# Patient Record
Sex: MALE | Race: WHITE | Employment: UNEMPLOYED | URBAN - METROPOLITAN AREA
[De-identification: names, ages, dates, MRNs, and addresses within clinical notes are randomized per-mention and may not be internally consistent; named-entity substitution may affect disease eponyms.]

---

## 2017-10-28 PROBLEM — F31.2 SEVERE MANIC BIPOLAR 1 DISORDER WITH PSYCHOTIC BEHAVIOR (HCC): Status: ACTIVE | Noted: 2017-10-28

## 2017-11-07 PROBLEM — F12.20 CANNABIS USE DISORDER, SEVERE, DEPENDENCE (HCC): Chronic | Status: ACTIVE | Noted: 2017-11-07

## 2018-03-01 PROBLEM — F31.9 BIPOLAR 1 DISORDER (HCC): Status: ACTIVE | Noted: 2018-03-01

## 2018-03-02 PROBLEM — F31.60 BIPOLAR AFFECTIVE DISORDER, CURRENT EPISODE MIXED, WITHOUT PSYCHOTIC FEATURES (HCC): Status: ACTIVE | Noted: 2018-03-02

## 2019-05-17 ENCOUNTER — HOSPITAL ENCOUNTER (EMERGENCY)
Age: 24
Discharge: PSYCHIATRIC OR SUBSTANCE USE FACILITY | End: 2019-05-17
Attending: EMERGENCY MEDICINE
Payer: MEDICAID

## 2019-05-17 VITALS
HEIGHT: 72 IN | SYSTOLIC BLOOD PRESSURE: 134 MMHG | WEIGHT: 173 LBS | RESPIRATION RATE: 16 BRPM | BODY MASS INDEX: 23.43 KG/M2 | TEMPERATURE: 97.9 F | OXYGEN SATURATION: 98 % | DIASTOLIC BLOOD PRESSURE: 79 MMHG | HEART RATE: 66 BPM

## 2019-05-17 DIAGNOSIS — F29 PSYCHOSIS, UNSPECIFIED PSYCHOSIS TYPE (HCC): Primary | ICD-10-CM

## 2019-05-17 LAB
ACETAMINOPHEN LEVEL: <5 MCG/ML (ref 10–30)
ALBUMIN SERPL-MCNC: 4.5 G/DL (ref 3.5–5.2)
ALP BLD-CCNC: 161 U/L (ref 40–129)
ALT SERPL-CCNC: 15 U/L (ref 0–40)
AMPHETAMINE SCREEN, URINE: NOT DETECTED
ANION GAP SERPL CALCULATED.3IONS-SCNC: 13 MMOL/L (ref 7–16)
AST SERPL-CCNC: 27 U/L (ref 0–39)
BARBITURATE SCREEN URINE: NOT DETECTED
BASOPHILS ABSOLUTE: 0.07 E9/L (ref 0–0.2)
BASOPHILS RELATIVE PERCENT: 0.4 % (ref 0–2)
BENZODIAZEPINE SCREEN, URINE: POSITIVE
BILIRUB SERPL-MCNC: 0.3 MG/DL (ref 0–1.2)
BUN BLDV-MCNC: 17 MG/DL (ref 6–20)
CALCIUM SERPL-MCNC: 10.1 MG/DL (ref 8.6–10.2)
CANNABINOID SCREEN URINE: POSITIVE
CHLORIDE BLD-SCNC: 100 MMOL/L (ref 98–107)
CO2: 27 MMOL/L (ref 22–29)
COCAINE METABOLITE SCREEN URINE: NOT DETECTED
CREAT SERPL-MCNC: 1.2 MG/DL (ref 0.7–1.2)
EOSINOPHILS ABSOLUTE: 0.34 E9/L (ref 0.05–0.5)
EOSINOPHILS RELATIVE PERCENT: 2.1 % (ref 0–6)
ETHANOL: <10 MG/DL (ref 0–0.08)
GFR AFRICAN AMERICAN: >60
GFR NON-AFRICAN AMERICAN: >60 ML/MIN/1.73
GLUCOSE BLD-MCNC: 82 MG/DL (ref 74–99)
HCT VFR BLD CALC: 45.6 % (ref 37–54)
HEMOGLOBIN: 16.1 G/DL (ref 12.5–16.5)
IMMATURE GRANULOCYTES #: 0.11 E9/L
IMMATURE GRANULOCYTES %: 0.7 % (ref 0–5)
LYMPHOCYTES ABSOLUTE: 2.13 E9/L (ref 1.5–4)
LYMPHOCYTES RELATIVE PERCENT: 13 % (ref 20–42)
MCH RBC QN AUTO: 31.3 PG (ref 26–35)
MCHC RBC AUTO-ENTMCNC: 35.3 % (ref 32–34.5)
MCV RBC AUTO: 88.7 FL (ref 80–99.9)
METHADONE SCREEN, URINE: NOT DETECTED
MONOCYTES ABSOLUTE: 1.18 E9/L (ref 0.1–0.95)
MONOCYTES RELATIVE PERCENT: 7.2 % (ref 2–12)
NEUTROPHILS ABSOLUTE: 12.51 E9/L (ref 1.8–7.3)
NEUTROPHILS RELATIVE PERCENT: 76.6 % (ref 43–80)
OPIATE SCREEN URINE: NOT DETECTED
PDW BLD-RTO: 12.5 FL (ref 11.5–15)
PHENCYCLIDINE SCREEN URINE: NOT DETECTED
PLATELET # BLD: 254 E9/L (ref 130–450)
PMV BLD AUTO: 11.3 FL (ref 7–12)
POTASSIUM SERPL-SCNC: 4 MMOL/L (ref 3.5–5)
PROPOXYPHENE SCREEN: NOT DETECTED
RBC # BLD: 5.14 E12/L (ref 3.8–5.8)
SALICYLATE, SERUM: <0.3 MG/DL (ref 0–30)
SODIUM BLD-SCNC: 140 MMOL/L (ref 132–146)
TOTAL PROTEIN: 7.6 G/DL (ref 6.4–8.3)
TRICYCLIC ANTIDEPRESSANTS SCREEN SERUM: NEGATIVE NG/ML
WBC # BLD: 16.3 E9/L (ref 4.5–11.5)

## 2019-05-17 PROCEDURE — G0480 DRUG TEST DEF 1-7 CLASSES: HCPCS

## 2019-05-17 PROCEDURE — 80053 COMPREHEN METABOLIC PANEL: CPT

## 2019-05-17 PROCEDURE — 80307 DRUG TEST PRSMV CHEM ANLYZR: CPT

## 2019-05-17 PROCEDURE — 99285 EMERGENCY DEPT VISIT HI MDM: CPT

## 2019-05-17 PROCEDURE — 36415 COLL VENOUS BLD VENIPUNCTURE: CPT

## 2019-05-17 PROCEDURE — 85025 COMPLETE CBC W/AUTO DIFF WBC: CPT

## 2019-05-17 NOTE — ED NOTES
Pt placed on cardiac monitor, NSR on monitor     Berta Jacobo, [facility]  05/17/19 0040

## 2019-05-17 NOTE — ED NOTES
Pt awake resting quietly in bed, pt calm/cooperative with nursing care.  No distress noted continue to monitor     María Welch RN  05/17/19 7500

## 2019-05-17 NOTE — ED NOTES
Pt awake. hosp gown and pants put on pt but refuses to remove his necklace and underware. md aware no new  Orders.         Dixon Reyez RN  05/17/19 8201

## 2019-05-17 NOTE — ED NOTES
Nurse to nurse report called to [facility] at [facility].       Alondra Gutierrez RN  05/17/19 4131

## 2019-05-17 NOTE — ED NOTES
[facility] ambulance is here to transport pt to [facility].       Brittany Kenny RN  05/17/19 9040

## 2019-05-17 NOTE — ED NOTES
Pt sleeping easily awaken with verbal stimuli. this rn asked pt if she could draw his blood . Pt declined becoming agitated, hostile,  excessively loud yelling at staff, [employer] and md elmore called to bedside. md spoke to pt regarding the importance of lab work and specimen needed to be collected. Pt then became cooperative with the collection of lab draw. Labs drawn/sent. Pt continue to refuse to apply cardiac monitor. md aware no new orders.      Murray Recinos RN  05/17/19 6385

## 2019-05-17 NOTE — ED NOTES
[facility] is aware that Pt needs assessed.     Pt has been medicated    Pt will be assessed after Pt is alert

## 2019-05-17 NOTE — ED NOTES
DR. Hardy Edwards PT TO [facility] ROOM 203B    MED TRANS [facility] OF [facility] ARRANGED TRANSPORT WITH [facility] AT [address] [facility]    [address] [place] [address]  05/17/19 3203

## 2019-05-17 NOTE — ED PROVIDER NOTES
HPI:  5/17/19, Time: 1:25 AM         Antonia Weathers is a 21 y.o. male presenting to the ED for psychiatric evaluation. Patient became very combative at home. As an extensive psychiatric history. Family is concerned he is not taking his medications. Please say he was operating them with nonsense. They did pink slip him. He did receive Haldol and Versed prior to arrival. Currently, patient has no complaints. Denies fever, chills, nausea, vomiting, or any other symptoms or complaints. Review of Systems:   Pertinent positives and negatives are stated within HPI, all other systems reviewed and are negative.          --------------------------------------------- PAST HISTORY ---------------------------------------------  Past Medical History:  has a past medical history of Bipolar 1 disorder ([facility] Depression, Hypertension, and Severe manic bipolar 1 disorder with psychotic behavior ([facility] Past Surgical History:  has no past surgical history on file. Social History:  reports that he has been smoking cigarettes. He has been smoking about 0.50 packs per day. He has never used smokeless tobacco. He reports that he does not drink alcohol or use drugs. Family History: family history is not on file. The patients home medications have been reviewed.     Allergies: Haldol [haloperidol]    -------------------------------------------------- RESULTS -------------------------------------------------  All laboratory and radiology results have been personally reviewed by myself   LABS:  Results for orders placed or performed during the hospital encounter of 05/17/19   Comprehensive Metabolic Panel   Result Value Ref Range    Sodium 140 132 - 146 mmol/L    Potassium 4.0 3.5 - 5.0 mmol/L    Chloride 100 98 - 107 mmol/L    CO2 27 22 - 29 mmol/L    Anion Gap 13 7 - 16 mmol/L    Glucose 82 74 - 99 mg/dL    BUN 17 6 - 20 mg/dL    CREATININE 1.2 0.7 - 1.2 mg/dL    GFR Non-African American >60 >=60 mL/min/1.73    GFR African American >60     Calcium 10.1 8.6 - 10.2 mg/dL    Total Protein 7.6 6.4 - 8.3 g/dL    Alb 4.5 3.5 - 5.2 g/dL    Total Bilirubin 0.3 0.0 - 1.2 mg/dL    Alkaline Phosphatase 161 (H) 40 - 129 U/L    ALT 15 0 - 40 U/L    AST 27 0 - 39 U/L   CBC Auto Differential   Result Value Ref Range    WBC 16.3 (H) 4.5 - 11.5 E9/L    RBC 5.14 3.80 - 5.80 E12/L    Hemoglobin 16.1 12.5 - 16.5 g/dL    Hematocrit 45.6 37.0 - 54.0 %    MCV 88.7 80.0 - 99.9 fL    MCH 31.3 26.0 - 35.0 pg    MCHC 35.3 (H) 32.0 - 34.5 %    RDW 12.5 11.5 - 15.0 fL    Platelets 974 141 - 880 E9/L    MPV 11.3 7.0 - 12.0 fL    Neutrophils % 76.6 43.0 - 80.0 %    Immature Granulocytes % 0.7 0.0 - 5.0 %    Lymphocytes % 13.0 (L) 20.0 - 42.0 %    Monocytes % 7.2 2.0 - 12.0 %    Eosinophils % 2.1 0.0 - 6.0 %    Basophils % 0.4 0.0 - 2.0 %    Neutrophils # 12.51 (H) 1.80 - 7.30 E9/L    Immature Granulocytes # 0.11 E9/L    Lymphocytes # 2.13 1.50 - 4.00 E9/L    Monocytes # 1.18 (H) 0.10 - 0.95 E9/L    Eosinophils # 0.34 0.05 - 0.50 E9/L    Basophils # 0.07 0.00 - 0.20 E9/L   Serum Drug Screen   Result Value Ref Range    Ethanol Lvl <10 mg/dL    Acetaminophen Level <5.0 (L) 10.0 - 78.3 mcg/mL    Salicylate, Serum <0.2 0.0 - 30.0 mg/dL    TCA Scrn NEGATIVE Cutoff:300 ng/mL       RADIOLOGY:  Interpreted by Radiologist.  No orders to display       ------------------------- NURSING NOTES AND VITALS REVIEWED ---------------------------   The nursing notes within the ED encounter and vital signs as below have been reviewed.    /70   Pulse 97   Temp 96.5 °F (35.8 °C) (Temporal)   Resp 16   Ht 6' (1.829 m)   Wt 173 lb (78.5 kg)   SpO2 95%   BMI 23.46 kg/m²   Oxygen Saturation Interpretation: Normal      ---------------------------------------------------PHYSICAL EXAM--------------------------------------      Constitutional/General: Alert and oriented x3, well appearing, non toxic in NAD  Head: Normocephalic and atraumatic  Eyes: PERRL, EOMI  Mouth: Oropharynx clear, handling secretions, no trismus  Neck: Supple, full ROM,   Pulmonary: Lungs clear to auscultation bilaterally, no wheezes, rales, or rhonchi. Not in respiratory distress  Cardiovascular:  Regular rate and rhythm, no murmurs, gallops, or rubs. 2+ distal pulses  Abdomen: Soft, non tender, non distended,   Extremities: Moves all extremities x 4. Warm and well perfused  Skin: warm and dry without rash  Neurologic: GCS 15,  Psych: Normal Affect      ------------------------------ ED COURSE/MEDICAL DECISION MAKING----------------------  Medications - No data to display      ED COURSE:       Medical Decision Making:    Medically clear     Counseling: The emergency provider has spoken with the patient and discussed todays results, in addition to providing specific details for the plan of care and counseling regarding the diagnosis and prognosis. Questions are answered at this time and they are agreeable with the plan.      --------------------------------- IMPRESSION AND DISPOSITION ---------------------------------    IMPRESSION  1. Psychosis, unspecified psychosis type ([facility]        DISPOSITION  Disposition: Admit to mental health unit - medically cleared for admission  Patient condition is stable      NOTE: This report was transcribed using voice recognition software.  Every effort was made to ensure accuracy; however, inadvertent computerized transcription errors may be present        Sri Bingham MD  05/17/19 9958

## 2019-05-17 NOTE — ED NOTES
PT WAS REFERRED TO THE ACCESS CENTER BUT NO REFERRALS HAVE BEEN MADE, ACCORDING TO THEIR NOTATION.     I REFERRED PT TO [facility]    AWAITING [address] DIONICIO Garcia  05/17/19 1100

## 2019-05-23 LAB
7-AMINOCLONAZEPAM, URINE: <5 NG/ML
ALPHA-HYDROXYALPRAZOLAM, URINE: <5 NG/ML
ALPHA-HYDROXYMIDAZOLAM, URINE: >4000 NG/ML
ALPRAZOLAM, URINE: <5 NG/ML
CANNABINOIDS CONF, URINE: 240 NG/ML
CHLORDIAZEPOXIDE, URINE: <20 NG/ML
CLONAZEPAM, URINE: <5 NG/ML
DIAZEPAM, URINE: <20 NG/ML
LORAZEPAM, URINE: <20 NG/ML
MIDAZOLAM, URINE: <20 NG/ML
NORDIAZEPAM, URINE: <20 NG/ML
OXAZEPAM, URINE: <20 NG/ML
TEMAZEPAM, URINE: <20 NG/ML

## 2019-05-30 ENCOUNTER — HOSPITAL ENCOUNTER (INPATIENT)
Age: 24
LOS: 8 days | Discharge: HOME | DRG: 750 | End: 2019-06-07
Attending: EMERGENCY MEDICINE | Admitting: PSYCHIATRY & NEUROLOGY
Payer: MEDICAID

## 2019-05-30 DIAGNOSIS — F29 PSYCHOSIS, UNSPECIFIED PSYCHOSIS TYPE (HCC): Primary | ICD-10-CM

## 2019-05-30 LAB
ACETAMINOPHEN LEVEL: <5 MCG/ML (ref 10–30)
AMPHETAMINE SCREEN, URINE: NOT DETECTED
ANION GAP SERPL CALCULATED.3IONS-SCNC: 14 MMOL/L (ref 7–16)
BARBITURATE SCREEN URINE: NOT DETECTED
BASOPHILS ABSOLUTE: 0.06 E9/L (ref 0–0.2)
BASOPHILS RELATIVE PERCENT: 0.4 % (ref 0–2)
BENZODIAZEPINE SCREEN, URINE: NOT DETECTED
BUN BLDV-MCNC: 18 MG/DL (ref 6–20)
CALCIUM SERPL-MCNC: 10.6 MG/DL (ref 8.6–10.2)
CANNABINOID SCREEN URINE: POSITIVE
CHLORIDE BLD-SCNC: 101 MMOL/L (ref 98–107)
CO2: 27 MMOL/L (ref 22–29)
COCAINE METABOLITE SCREEN URINE: NOT DETECTED
CREAT SERPL-MCNC: 0.9 MG/DL (ref 0.7–1.2)
EOSINOPHILS ABSOLUTE: 0.29 E9/L (ref 0.05–0.5)
EOSINOPHILS RELATIVE PERCENT: 2.1 % (ref 0–6)
ETHANOL: <10 MG/DL (ref 0–0.08)
GFR AFRICAN AMERICAN: >60
GFR NON-AFRICAN AMERICAN: >60 ML/MIN/1.73
GLUCOSE BLD-MCNC: 80 MG/DL (ref 74–99)
HCT VFR BLD CALC: 49.7 % (ref 37–54)
HEMOGLOBIN: 16.8 G/DL (ref 12.5–16.5)
IMMATURE GRANULOCYTES #: 0.1 E9/L
IMMATURE GRANULOCYTES %: 0.7 % (ref 0–5)
LYMPHOCYTES ABSOLUTE: 1.58 E9/L (ref 1.5–4)
LYMPHOCYTES RELATIVE PERCENT: 11.3 % (ref 20–42)
MCH RBC QN AUTO: 30.4 PG (ref 26–35)
MCHC RBC AUTO-ENTMCNC: 33.8 % (ref 32–34.5)
MCV RBC AUTO: 90 FL (ref 80–99.9)
METHADONE SCREEN, URINE: NOT DETECTED
MONOCYTES ABSOLUTE: 0.86 E9/L (ref 0.1–0.95)
MONOCYTES RELATIVE PERCENT: 6.1 % (ref 2–12)
NEUTROPHILS ABSOLUTE: 11.14 E9/L (ref 1.8–7.3)
NEUTROPHILS RELATIVE PERCENT: 79.4 % (ref 43–80)
OPIATE SCREEN URINE: NOT DETECTED
PDW BLD-RTO: 12.8 FL (ref 11.5–15)
PHENCYCLIDINE SCREEN URINE: NOT DETECTED
PLATELET # BLD: 242 E9/L (ref 130–450)
PMV BLD AUTO: 10.4 FL (ref 7–12)
POTASSIUM SERPL-SCNC: 4.5 MMOL/L (ref 3.5–5)
PROPOXYPHENE SCREEN: NOT DETECTED
RBC # BLD: 5.52 E12/L (ref 3.8–5.8)
SALICYLATE, SERUM: <0.3 MG/DL (ref 0–30)
SODIUM BLD-SCNC: 142 MMOL/L (ref 132–146)
TRICYCLIC ANTIDEPRESSANTS SCREEN SERUM: NEGATIVE NG/ML
TSH SERPL DL<=0.05 MIU/L-ACNC: 2.96 UIU/ML (ref 0.27–4.2)
WBC # BLD: 14 E9/L (ref 4.5–11.5)

## 2019-05-30 PROCEDURE — 80048 BASIC METABOLIC PNL TOTAL CA: CPT

## 2019-05-30 PROCEDURE — 1240000000 HC EMOTIONAL WELLNESS R&B

## 2019-05-30 PROCEDURE — 80307 DRUG TEST PRSMV CHEM ANLYZR: CPT

## 2019-05-30 PROCEDURE — 93005 ELECTROCARDIOGRAM TRACING: CPT | Performed by: INTERNAL MEDICINE

## 2019-05-30 PROCEDURE — G0480 DRUG TEST DEF 1-7 CLASSES: HCPCS

## 2019-05-30 PROCEDURE — 6360000002 HC RX W HCPCS: Performed by: INTERNAL MEDICINE

## 2019-05-30 PROCEDURE — 36415 COLL VENOUS BLD VENIPUNCTURE: CPT

## 2019-05-30 PROCEDURE — 99285 EMERGENCY DEPT VISIT HI MDM: CPT

## 2019-05-30 PROCEDURE — 84443 ASSAY THYROID STIM HORMONE: CPT

## 2019-05-30 PROCEDURE — 85025 COMPLETE CBC W/AUTO DIFF WBC: CPT

## 2019-05-30 PROCEDURE — 6360000002 HC RX W HCPCS: Performed by: EMERGENCY MEDICINE

## 2019-05-30 RX ORDER — ZIPRASIDONE MESYLATE 20 MG/ML
10 INJECTION, POWDER, LYOPHILIZED, FOR SOLUTION INTRAMUSCULAR ONCE
Status: COMPLETED | OUTPATIENT
Start: 2019-05-30 | End: 2019-05-30

## 2019-05-30 RX ORDER — MIDAZOLAM HYDROCHLORIDE 1 MG/ML
2 INJECTION INTRAMUSCULAR; INTRAVENOUS ONCE
Status: DISCONTINUED | OUTPATIENT
Start: 2019-05-30 | End: 2019-05-30

## 2019-05-30 RX ORDER — QUETIAPINE FUMARATE 200 MG/1
400 TABLET, FILM COATED ORAL DAILY
Status: ON HOLD | COMMUNITY
End: 2019-06-07 | Stop reason: HOSPADM

## 2019-05-30 RX ORDER — MIDAZOLAM HYDROCHLORIDE 1 MG/ML
2 INJECTION INTRAMUSCULAR; INTRAVENOUS ONCE
Status: COMPLETED | OUTPATIENT
Start: 2019-05-30 | End: 2019-05-30

## 2019-05-30 RX ADMIN — ZIPRASIDONE MESYLATE 10 MG: 20 INJECTION, POWDER, LYOPHILIZED, FOR SOLUTION INTRAMUSCULAR at 19:38

## 2019-05-30 RX ADMIN — MIDAZOLAM 2 MG: 1 INJECTION INTRAMUSCULAR; INTRAVENOUS at 17:01
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
## 2019-05-30 NOTE — ED NOTES
Notified dr Terra Valenzuela of need for admission pt accepted to [address] [place] [address].        Santos Houser, RN  05/30/19 6051

## 2019-05-30 NOTE — ED NOTES
Pt asked this rn if I like running over speed bumps or super heroes then he laughs he begins talking about vampires and how he is a vegan then states well no I'm gonna do meat shows this rn his pointed teeth.      Carlos Carrasco RN  05/30/19 0300

## 2019-05-30 NOTE — ED PROVIDER NOTES
HPI:  5/30/19, Time: 3:55 PM         Jose Alejandro Sharp is a 21 y.o. male presenting to the ED for irrational behavior, beginning several hours ago. The complaint has been constant, severe in severity, and worsened by nothing. Patient was brought in by the [facility] police department after he was found acting irrationally. He denies any suicidal and homicidal ideations and denies any ingestion of any substances. Patient denies any shortness of breath, cough, chest pain, headaches, hallucinations: visual or auditory,nausea, vomiting or diarrhea. Patient has a history of psychiatric illness but notes that he has not been taking his medications as prescribed. He as pink-slipped by the police. Patient denies any suicidal or homicidal ideations. Review of Systems:   Pertinent positives and negatives are stated within HPI, all other systems reviewed and are negative.      --------------------------------------------- PAST HISTORY ---------------------------------------------  Past Medical History:  has a past medical history of B[facility] disorder ([facility] Depression, Hypertension, and Severe manic bipolar 1 disorder with psychotic behavior ([facility] Past Surgical History:  has no past surgical history on file. Social History:  reports that he has been smoking cigarettes. He has been smoking about 0.50 packs per day. He has never used smokeless tobacco. He reports that he does not drink alcohol or use drugs. Family History: family history is not on file. The patients home medications have been reviewed. Allergies: Haldol [haloperidol]    -------------------------------------------------- RESULTS -------------------------------------------------  All laboratory and radiology results have been personally reviewed by myself   LABS:  No results found for this visit on 05/30/19.     RADIOLOGY:  Interpreted by Radiologist.  No orders to display       EKG:    ------------------------- NURSING NOTES AND VITALS REVIEWED ---------------------------   The nursing notes within the ED encounter and vital signs as below have been reviewed. BP (!) 155/122   Pulse 96   Temp 97.6 °F (36.4 °C) (Oral)   Resp 16   Wt 173 lb (78.5 kg)   SpO2 98%   BMI 23.46 kg/m²   Oxygen Saturation Interpretation: Normal      ---------------------------------------------------PHYSICAL EXAM--------------------------------------      Constitutional/General: Alert and oriented x 3, well appearing, non toxic in NAD, appears restless. Head: Normocephalic and atraumatic  Eyes: PERRL, EOMI  Mouth: Oropharynx clear, handling secretions  Neck: Supple, full ROM,   Pulmonary: Lungs clear to auscultation bilaterally, no wheezes, rales, or rhonchi. Not in respiratory distress  Cardiovascular:  Regular rate and rhythm, no murmurs, gallops, or rubs. 2+ distal pulses  Abdomen: Soft, non tender, non distended,   Extremities: Moves all extremities x 4. Warm and well perfused  Skin: warm and dry without rash  Neurologic: GCS 15,  Psych: Patient is inattentive, mood and affect is labile, speech is rapid and pressured, behavior is hyperactive, thought content is delusional,memory is intact, and judgement is inappropriate.     ------------------------------ ED COURSE/MEDICAL DECISION MAKING----------------------  Medications   midazolam (VERSED) injection 2 mg (has no administration in time range)         ED COURSE:   Patient brought to ED by police for agitation and irrational behavior and is pink slipped. Initial set of vitals are tachycardic with elevated blood pressure. Medical Decision Making:   Patient is redirectable, with pressured speech, will obtain new set of vitals, EKG, CBC, BMP,TSH, serum and urine drug screen. Patient for ED observation. Counseling:    The emergency provider has spoken with the patient and discussed todays results, in addition to providing specific details for the plan of care and counseling regarding the diagnosis and prognosis. Questions are answered at this time and they are agreeable with the plan.      --------------------------------- IMPRESSION AND DISPOSITION ---------------------------------    IMPRESSION  1. Psychosis, unspecified psychosis type ([facility]        DISPOSITION  Disposition: Other Disposition: ED Observation  Patient condition is fair      NOTE: This report was transcribed using voice recognition software. Every effort was made to ensure accuracy; however, inadvertent computerized transcription errors may be present       .      Zuleika Dupree MD  Resident  05/30/19 3171

## 2019-05-30 NOTE — ED NOTES
Bed: [facility]  Expected date:   Expected time:   Means of arrival:   Comments:     Christos Faith RN  05/30/19 4447

## 2019-05-30 NOTE — LETTER
[address]  Phone: [phone]          June 5, 2019     Patient: Nicolle Zhong   YOB: 1995   Date of Visit: 5/30/2019       To Whom it May Concern:    Nicolle Zhong has been hospitalized from 5/30/19. A discharge date is not know  If you have any questions or concerns, please don't hesitate to call.     Sincerely,               Holly Dimas MSW, LSW

## 2019-05-31 LAB
EKG ATRIAL RATE: 97 BPM
EKG P AXIS: 72 DEGREES
EKG P-R INTERVAL: 158 MS
EKG Q-T INTERVAL: 352 MS
EKG QRS DURATION: 90 MS
EKG QTC CALCULATION (BAZETT): 447 MS
EKG R AXIS: 78 DEGREES
EKG T AXIS: 54 DEGREES
EKG VENTRICULAR RATE: 97 BPM

## 2019-05-31 PROCEDURE — 1240000000 HC EMOTIONAL WELLNESS R&B

## 2019-05-31 PROCEDURE — 6370000000 HC RX 637 (ALT 250 FOR IP): Performed by: PSYCHIATRY & NEUROLOGY

## 2019-05-31 PROCEDURE — 99222 1ST HOSP IP/OBS MODERATE 55: CPT | Performed by: NURSE PRACTITIONER

## 2019-05-31 PROCEDURE — 93010 ELECTROCARDIOGRAM REPORT: CPT | Performed by: INTERNAL MEDICINE

## 2019-05-31 RX ORDER — OLANZAPINE 10 MG/1
10 INJECTION, POWDER, LYOPHILIZED, FOR SOLUTION INTRAMUSCULAR
Status: ACTIVE | OUTPATIENT
Start: 2019-05-31 | End: 2019-05-31

## 2019-05-31 RX ORDER — MAGNESIUM HYDROXIDE/ALUMINUM HYDROXICE/SIMETHICONE 120; 1200; 1200 MG/30ML; MG/30ML; MG/30ML
30 SUSPENSION ORAL PRN
Status: DISCONTINUED | OUTPATIENT
Start: 2019-05-31 | End: 2019-06-07 | Stop reason: HOSPADM

## 2019-05-31 RX ORDER — TRAZODONE HYDROCHLORIDE 50 MG/1
50 TABLET ORAL NIGHTLY PRN
Status: DISCONTINUED | OUTPATIENT
Start: 2019-05-31 | End: 2019-06-07 | Stop reason: HOSPADM

## 2019-05-31 RX ORDER — HYDROXYZINE PAMOATE 50 MG/1
50 CAPSULE ORAL EVERY 6 HOURS PRN
Status: DISCONTINUED | OUTPATIENT
Start: 2019-05-31 | End: 2019-06-07 | Stop reason: HOSPADM

## 2019-05-31 RX ORDER — NICOTINE 21 MG/24HR
1 PATCH, TRANSDERMAL 24 HOURS TRANSDERMAL DAILY
Status: DISCONTINUED | OUTPATIENT
Start: 2019-05-31 | End: 2019-06-07 | Stop reason: HOSPADM

## 2019-05-31 RX ORDER — BENZTROPINE MESYLATE 1 MG/ML
2 INJECTION INTRAMUSCULAR; INTRAVENOUS 2 TIMES DAILY PRN
Status: DISCONTINUED | OUTPATIENT
Start: 2019-05-31 | End: 2019-06-07 | Stop reason: HOSPADM

## 2019-05-31 RX ORDER — ACETAMINOPHEN 325 MG/1
650 TABLET ORAL EVERY 4 HOURS PRN
Status: DISCONTINUED | OUTPATIENT
Start: 2019-05-31 | End: 2019-06-07 | Stop reason: HOSPADM

## 2019-05-31 RX ORDER — OLANZAPINE 10 MG/1
10 TABLET ORAL
Status: COMPLETED | OUTPATIENT
Start: 2019-05-31 | End: 2019-05-31

## 2019-05-31 RX ORDER — PALIPERIDONE 3 MG/1
3 TABLET, EXTENDED RELEASE ORAL DAILY
Status: DISCONTINUED | OUTPATIENT
Start: 2019-05-31 | End: 2019-06-01

## 2019-05-31 RX ORDER — QUETIAPINE 200 MG/1
400 TABLET, FILM COATED, EXTENDED RELEASE ORAL NIGHTLY
Status: DISCONTINUED | OUTPATIENT
Start: 2019-05-31 | End: 2019-06-01

## 2019-05-31 RX ADMIN — HYDROXYZINE PAMOATE 50 MG: 50 CAPSULE ORAL at 17:41

## 2019-05-31 RX ADMIN — OLANZAPINE 10 MG: 10 TABLET, FILM COATED ORAL at 17:41

## 2019-05-31 ASSESSMENT — SLEEP AND FATIGUE QUESTIONNAIRES
SLEEP PATTERN: INSOMNIA
DIFFICULTY ARISING: NO
DO YOU HAVE DIFFICULTY SLEEPING: YES
AVERAGE NUMBER OF SLEEP HOURS: 2
RESTFUL SLEEP: NO
DIFFICULTY STAYING ASLEEP: YES
DO YOU USE A SLEEP AID: YES
DIFFICULTY FALLING ASLEEP: YES

## 2019-05-31 ASSESSMENT — LIFESTYLE VARIABLES: HISTORY_ALCOHOL_USE: NO

## 2019-05-31 NOTE — PROGRESS NOTES
Patient refuses his admission at this time states\" I am not in the mood for your formalities right now I just want to go to sleep. They gave me a lot medication\" . Patient also refused to sign paperwork will re attempt later in the shift.  Will continue to monitor and observe

## 2019-05-31 NOTE — PROGRESS NOTES
Attended community meeting, in and out jumping over chairs, rambling on and off. Inappropriate language. Shared goal for the day as to not take what Im allergic to.

## 2019-05-31 NOTE — H&P
PSYCHIATRIC EVALUATION  (HISTORY & PHYSICAL)     CHIEF COMPLAINT:   [x] Mood Problems [x] Anxiety Problems [x] Psychosis                    [x] Suicidal/Homicidal   [] Aggression  [] Other    HISTORY OF PRESENT ILLNESS: Shanti Ramsey  is a 21 y.o. male who has a previous psychiatric history of schizoaffective disorder, bipolar type and substance abuse presents for admission with psychosis. Symptoms onset was years ago and is becoming severe for an unknown amount of time. This presentation associates with disorganization, pressured speech, paranoia, delusions, and flight of ideas. Symptoms are constant and usually is worsened by medication noncompliance. Precipitating factor: Per ED note, patient was brought in by the [facility] police department after he was found acting irrationally.       Precipitating Factors:     [] Family Stress   [] Recent loss/grief Stress   [] Health Stress   [] Relationship Stress    [] Legal Stress   [x] Environmental Stress    [] Occupational Stress   [] Financial Stress   [x] Substance Abuse [] Other      PAST PSYCHIATRIC HISTORY:     History of psychiatric Hospitalization:    [] Denies    [x] yes  [] Days ago     []  Weeks Ago    [] Months ago  [] Years ago              [x] [facility]  [] [facility]  [] Other:        [] Once  [x] More than once    Outpatient treatment:  [] [facility]  [] [facility]  [] [facility]              [] [facility]  [] [employer]      [] [address] [place] [address] [] [facility]      [] [facility] [] CSN  [] [facility] [] [facility]  [] Other               [] currently  [] in the past  [x] Non-Compliant    [] Denies    Previous suicide attempt: [x]Denies                [] yes  [] OD  [] Cutting  [] Hanging  [] Gun  [] Other    Previous psych medications:  [x] Was prescribed               [] Currently Taking       [] Never taken medications      PAST MEDICAL HISTORY:       Diagnosis Date    B[facility] disorder ([facility]     Depression     Hypertension     Severe manic bipolar 1 disorder with psychotic behavior ([facility] 10/28/2017       FAMILY PSYCHIATRIC HISTORY:  History reviewed. No pertinent family history. [] Denies      [x] Endorses    [] Father     [] Depression  [] Anxiety  [] Bipolar  [] Psychosis  []  Other      [x] Mother    [] Depression  [] Anxiety  [x] Bipolar  [] Psychosis  []  Other      [] Sibling    [] Depression  [] Anxiety  [] Bipolar  [] Psychosis  []  Other      [] Grandparent    [] Depression  [] Anxiety  [] Bipolar  [] Psychosis  []  Other      SOCIAL HISTORY:     1. Living Situation:[x] Private Residence [] Homeless [] [email or web address]                                   [] Assisted Living [] [email or web address]  [] Shelter [] Other   2. Employment:  [] Yes  [x] No  3. Legal History: [x] No Arrest [] Arrest  [] Theft  []  Assault  [] Substances   4. History of Trama/ Abuse: [x] Denies  [] Emotional [] Physical [] Sexual   5. Spirituality: [x] Spiritual [] Not Spiritual   6. Substance Abuse: [] Denies  [] Drug of choice                                       [] Amphetamines [x] Marijuana [] Cocaine                                       [] Opioids  [] Alcohol  [] Benzodiazepines  For further SH review SW note.     Risk Assessment:  1. Risk Factors:   [] Depression  [] Anxiety  [x] Psychosis   [] Suicidal/Homicidal Thoughts [] Suicide Attempt [x] Substance Abuse      2. Protective Factors: [x] Controlled Environment                                          [] Supportive Family []                                          [] Mandaeism Support      3.  Level of Risk: [] Mild [] Moderate [x] Severe       Strengths & Weaknesses:     1. Strengths: [x] Ability to communicate feelings                          [x] Independent ADL's                           [] Supportive Family                          [] Current Health Status      2. Weaknesses: [x] Emotional                                [] Motivational     MEDICATIONS: Current Facility-Administered Medications: acetaminophen (TYLENOL) tablet 650 mg, 650 mg, Oral, Q4H PRN  hydrOXYzine (VISTARIL) capsule 50 mg, 50 mg, Oral, Q6H PRN  OLANZapine (ZYPREXA) tablet 10 mg, 10 mg, Oral, Once PRN  traZODone (DESYREL) tablet 50 mg, 50 mg, Oral, Nightly PRN  benztropine mesylate (COGENTIN) injection 2 mg, 2 mg, Intramuscular, BID PRN  magnesium hydroxide (MILK OF MAGNESIA) 400 MG/5ML suspension 30 mL, 30 mL, Oral, Daily PRN  aluminum & magnesium hydroxide-simethicone (MAALOX) 200-200-20 MG/5ML suspension 30 mL, 30 mL, Oral, PRN  nicotine (NICODERM CQ) 14 MG/24HR 1 patch, 1 patch, Transdermal, Daily  sterile water injection, , ,     Medical Review of Systems:     All other than marked systmes have been reviewed and are all negative.     Constitutional Symptoms: []  fever []  Chills  Skin Symptoms: [] rash []  Pruritus   Eye Symptoms: [] Vision unchanged []  recent vision problems[] blurred vision   Respiratory Symptoms:[] shortness of breath [] cough  Cardiovascular Symptoms:  [] chest pain   [] palpitations   Gastrointestinal Symptoms: []  abdominal pain []  nausea []  vomiting []  diarrhea  Genitourinary Symptoms: []  dysuria  []  hematuria   Musculoskeletal Symptoms: []  back pain []  muscle pain []  joint pain  Neurologic Symptoms: []  headache []  dizziness  Hematolymphoid Symptoms: [] Adenopathy [] Bruises   [] Schimosis       VITALS: BP (!) 157/88   Pulse 92   Temp 98 °F (36.7 °C) (Oral)   Resp 16   Wt 173 lb (78.5 kg)   SpO2 100%   BMI 23.46 kg/m²     ALLERGIES: Haldol [haloperidol]            Physical Examination:    Head:  [x] Atraumatic:  [x] normocephalic  Skin and Mucosa       [x] Moist [] Dry [] Pale [] Normal   Neck: [x] Thyroid [] Palpable    [x] Not palpable []  venus distention [] adenopathy   Chest: [x] Clear [] Rhonchi  [] Wheezing   CV: [x] S1 [x] S2 [] No murmer   Abdomen:  [x] Soft   [] Tender  [] Viceromegaly   Extremities:  [x] No Edema   [] Edema     Cranial Nerves Examination:     CN II: [x] Pupils are

## 2019-05-31 NOTE — PLAN OF CARE
Problem: Altered Mood, Psychotic Behavior:  Goal: Able to verbalize decrease in frequency and intensity of hallucinations  Description  Able to verbalize decrease in frequency and intensity of hallucinations  Outcome: Not Met This Shift
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
## 2019-05-31 NOTE — GROUP NOTE
Group Therapy Note    Date: May 31    Group Start Time: 1015  Group End Time: 1050  Group Topic: Psychoeducation    [facility]    Vicki Giron, CTRS        Group Therapy Note    Number of participants: 11  Type of group: Psychoeducation  Mode of intervention: Education, Support, Socialization, Exploration, Clarifying and Problem-solving  Topic: A Mindfulness Response to Thoughts: APPLE   Objective: Patient will identify ways to be mindful in recovery utilizing the APPLE acronym. Notes:  Patient came to group and participated in ice breaker activity. When handout was given to patient, patient was observed crumbling the paper up stating: \"I don not like apples\" Patient asked to leave group due to being disruptive.      Status After Intervention:  Unchanged    Participation Level: Minimal    Participation Quality: Resistant      Speech:  loud      Thought Process/Content: Flight of ideas      Affective Functioning: Constricted/Restricted      Mood: elevated      Level of consciousness:  Preoccupied      Response to Learning: Resistant      Endings: None Reported    Modes of Intervention: Limit-setting

## 2019-06-01 PROCEDURE — 99231 SBSQ HOSP IP/OBS SF/LOW 25: CPT | Performed by: NURSE PRACTITIONER

## 2019-06-01 PROCEDURE — 1240000000 HC EMOTIONAL WELLNESS R&B

## 2019-06-01 PROCEDURE — 6370000000 HC RX 637 (ALT 250 FOR IP): Performed by: PSYCHIATRY & NEUROLOGY

## 2019-06-01 PROCEDURE — 6370000000 HC RX 637 (ALT 250 FOR IP): Performed by: NURSE PRACTITIONER

## 2019-06-01 RX ORDER — QUETIAPINE 200 MG/1
400 TABLET, FILM COATED, EXTENDED RELEASE ORAL EVERY EVENING
Status: DISCONTINUED | OUTPATIENT
Start: 2019-06-01 | End: 2019-06-02

## 2019-06-01 RX ADMIN — HYDROXYZINE PAMOATE 50 MG: 50 CAPSULE ORAL at 21:01

## 2019-06-01 RX ADMIN — QUETIAPINE FUMARATE 400 MG: 200 TABLET, EXTENDED RELEASE ORAL at 20:59

## 2019-06-01 RX ADMIN — TRAZODONE HYDROCHLORIDE 50 MG: 50 TABLET ORAL at 21:00

## 2019-06-01 ASSESSMENT — PAIN SCALES - GENERAL: PAINLEVEL_OUTOF10: 0

## 2019-06-01 NOTE — PLAN OF CARE
Problem: Altered Mood, Psychotic Behavior:  Goal: Able to verbalize decrease in frequency and intensity of hallucinations  Description  Able to verbalize decrease in frequency and intensity of hallucinations  Outcome: Met This Shift     Problem: Altered Mood, Psychotic Behavior:  Goal: Able to verbalize reality based thinking  Description  Able to verbalize reality based thinking  Outcome: Ongoing   Up and about. Pleasant, in control. Denies harmful thoughts or hallucinations at this time. Agreeable to take Seroquel.  Will continue to monitor and assess

## 2019-06-01 NOTE — PLAN OF CARE
Patient presently denies suicidal, homicidal thoughts, or hallucinations. Patient continues to exhibit grandiose behavior. Out on unit this shift seclusive to self, pacing. Patient is in control this shift. Compliant with medications. Will continue to monitor and support throughout remainder of shift.

## 2019-06-01 NOTE — PROGRESS NOTES
DATE OF SERVICE:     6/1/2019    Kimmy Joe seen today for the purpose of continuation of care. Nursing, social work reports, laboratory studies and vital signs are reviewed. Patient chief complaint today is:             [] Depression      [] Anxiety        [x] Psychosis         [] Suicidal/Homicidal                         [x] Delusions           [] Aggression          Subjective: Today patient states that he doesn't want medications that effect his \"libido and that will poison him and cause cancer. \" Patient agrees to take seroquel. Patient also states that he wants to stay here as he feels it is \"[place]. \"    Sleep:  [] Good [x] Fair  [] Poor  Appetite:  [] Good [x] Fair  [] Poor    Depression:  [] Mild [] Moderate [] Severe                [] Constant [] Sporadic     Anxiety: [] Mild [] Moderate [x] Severe    [x] Constant [] Sporadic     Delusions: [] Mild [] Moderate [x] Severe     [x] Constant [] Sporadic     [] Paranoid [] Somatic [x] Grandiose     Hallucinations: [] Mild [] Moderate [] Severe     [] Constant [] Sporadic    [] Auditory  [] Visual [] Tactile       Suicidal: [] Constant [] Sporadic  Homicidal: [] Constant [] Sporadic    Unscheduled Medications     [] Patient Receiving Emergency Medications \" Chemical Restraint\"   [] Requesting PRN medications for anxiety    Medical Review of Systems:     All other than marked systmes have been reviewed and are all negative.     Constitutional Symptoms: []  fever []  Chills  Skin Symptoms: [] rash []  Pruritus   Eye Symptoms: [] Vision unchanged []  recent vision problems[] blurred vision   Respiratory Symptoms:[] shortness of breath [] cough  Cardiovascular Symptoms:  [] chest pain   [] palpitations   Gastrointestinal Symptoms: []  abdominal pain []  nausea []  vomiting []  diarrhea  Genitourinary Symptoms: []  dysuria  []  hematuria   Musculoskeletal Symptoms: []  back pain []  muscle pain []  joint pain  Neurologic Symptoms: [] headache []  dizziness  Hematolymphoid Symptoms: [] Adenopathy [] Bruises   [] Schimosis       Psychiatric Review of systems  Delusions:  [] Denies [] Endorses   Withdrawals:  [] Denies [] Endorses    Hallucinations: [] Denies [] Endorses    Extra Pyramidal Symptoms: [] Denies [] Endorses      BP (!) 157/88   Pulse 92   Temp 98 °F (36.7 °C) (Oral)   Resp 16   Ht 5' 9\" (1.753 m)   Wt 173 lb (78.5 kg)   SpO2 100%   BMI 25.55 kg/m²     Mental Status Examination:    Cognition:      [x] Alert  [x] Awake  [x] Oriented  [x] Person  [x] Place [x] Time      [] drowsy  [] tired  [] lethargic  [] distractable  [] Other     Attention/Concentration:   [x] Attentive  [] Distracted        Memory Recent and Remote: [x] Intact   [] Impaired [] Partially Impaired     Language: [] Able to recognize and name objects          [] Unable to recognize and name Objects    Fund of Knowledge:  [] Poor []  Fair  [x] Good    Speech: [x] Normal  [] Soft  [] Slow  [] Fast [] Pressured            [] Loud [] Dysarthria  [] Incoherent    Appearance: [] Well Groomed  [x] Casual Dressed  [] Unkept  [] Disheveled          [] Normal weight[] Thin  [] Overweight  [] Obese           Attitude: [] Positive  [] Hostile  [] Demanding  [] Guarded  [x] Defensive         [x] Cooperative  []  Uncooperative      Behavior:  [x] Normal Gait  [] Walks with Assistance  [] Joie Chair    [] Walks with Ingrid Sinning  [] In Hospital Bed  [] Sitting in Chair    Muscle-Skeletal:  [x] Normal Muscle Tone [] Muscle Atrophy       [] Abnormal Muscle Movement     Eye Contact:  [x] Good eye contact  [] Intermittent Eye Contact  [] Poor Eye Contact     Mood: [] Depressed  [x] Anxious  [x] Irritated  [] Euthymic   [] Angry [x] Restless    Affect:  [] Congruent  [] Incongruent  [x] Labile  [] Constricted  [] Flat  [] Bizarre     Thought Process and Association:  [] Logical [] Illogical       [] Linear and Goal Directed  [x] Tangential  [] Circumstantial     Thought Content:  [] Denies [] Endorses [] Suicidal [] Homicidal  [x] Delusional      [] Paranoid  [] Somatic  [x] Grandiose    Perception: [x]  None  [] Auditory   [] Visual  [] tactile   [] olfactory  [] Illusions         Insight: [] Intact  [] Fair  [x] Limited    Judgement:  [] Intact  [] Fair  [x] Limited      Assessment/Plan:        Patient Active Problem List   Diagnosis Code    Acute psychosis ([facility] F23    Schizoaffective disorder, bipolar type ([facility] F25.0    Bipolar 1 disorder, manic, moderate ([address] F31.12    Severe manic bipolar 1 disorder with psychotic behavior ([address] F31.2    Anxiety state F41.1    Cannabis use disorder, severe, dependence ([facility]) F12.20    Bipolar 1 disorder ([facility]) F31.9    Bipolar affective disorder, current episode mixed, without psychotic features ([address] F31.60         Plan:    [x]  Patient is refusing medications  [] Improving as expected   [x] Not improving as expected   [] Worsening    []  At Baseline     Stop Invega due to patient refusing, agrees to take Seroquel     Reason for more than one antipsychotic:  [x] N/A  [] 3 failed monotherapy(drugs tried):  [] Cross over to a new antipsychotic  [] Taper to monotherapy from polypharmacy  [] Augmentation of Clozapine therapy due to treatment resistance to single therapy      Signed:  Tracy Peña  6/1/2019  8:44 AM

## 2019-06-02 PROCEDURE — 6370000000 HC RX 637 (ALT 250 FOR IP): Performed by: NURSE PRACTITIONER

## 2019-06-02 PROCEDURE — 6370000000 HC RX 637 (ALT 250 FOR IP): Performed by: PSYCHIATRY & NEUROLOGY

## 2019-06-02 PROCEDURE — 1240000000 HC EMOTIONAL WELLNESS R&B

## 2019-06-02 PROCEDURE — 99231 SBSQ HOSP IP/OBS SF/LOW 25: CPT | Performed by: NURSE PRACTITIONER

## 2019-06-02 RX ADMIN — HYDROXYZINE PAMOATE 50 MG: 50 CAPSULE ORAL at 20:28

## 2019-06-02 RX ADMIN — QUETIAPINE FUMARATE 500 MG: 150 TABLET, EXTENDED RELEASE ORAL at 20:29

## 2019-06-02 RX ADMIN — TRAZODONE HYDROCHLORIDE 50 MG: 50 TABLET ORAL at 20:28

## 2019-06-02 ASSESSMENT — PAIN SCALES - GENERAL
PAINLEVEL_OUTOF10: 0
PAINLEVEL_OUTOF10: 0
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
## 2019-06-02 NOTE — PLAN OF CARE
Problem: Altered Mood, Psychotic Behavior:  Goal: Able to verbalize decrease in frequency and intensity of hallucinations  Description  Able to verbalize decrease in frequency and intensity of hallucinations  Outcome: Met This Shift     Problem: Altered Mood, Psychotic Behavior:  Goal: Able to verbalize reality based thinking  Description  Able to verbalize reality based thinking  Outcome: Ongoing   Up and about at intervals. Attending groups. Denies harmful thoughts or hallucinations. Social with select peers. Behavior has been in control.  Will continue to monitor and assess

## 2019-06-02 NOTE — PROGRESS NOTES
DATE OF SERVICE:     6/2/2019    Kimmy Joe seen today for the purpose of continuation of care. Nursing, social work reports, laboratory studies and vital signs are reviewed. Patient chief complaint today is:             [] Depression      [] Anxiety        [x] Psychosis         [] Suicidal/Homicidal                         [x] Delusions           [] Aggression          Subjective: Today patient states he \"is freaked out. \"  Patient states that the electiricyt wwent out last night and he saw \"ghosts trying to kiss me. \"  Patient did take Seroquel last night. Denies SI or HI. Sleep:  [] Good [x] Fair  [] Poor  Appetite:  [] Good [x] Fair  [] Poor    Depression:  [] Mild [] Moderate [] Severe                [] Constant [] Sporadic     Anxiety: [] Mild [] Moderate [x] Severe    [x] Constant [] Sporadic     Delusions: [] Mild [] Moderate [x] Severe     [x] Constant [] Sporadic     [] Paranoid [] Somatic [x] Grandiose     Hallucinations: [] Mild [] Moderate [x] Severe     [] Constant [x] Sporadic    [x] Auditory  [x] Visual [] Tactile       Suicidal: [] Constant [] Sporadic  Homicidal: [] Constant [] Sporadic    Unscheduled Medications     [] Patient Receiving Emergency Medications \" Chemical Restraint\"   [] Requesting PRN medications for anxiety    Medical Review of Systems:     All other than marked systmes have been reviewed and are all negative.     Constitutional Symptoms: []  fever []  Chills  Skin Symptoms: [] rash []  Pruritus   Eye Symptoms: [] Vision unchanged []  recent vision problems[] blurred vision   Respiratory Symptoms:[] shortness of breath [] cough  Cardiovascular Symptoms:  [] chest pain   [] palpitations   Gastrointestinal Symptoms: []  abdominal pain []  nausea []  vomiting []  diarrhea  Genitourinary Symptoms: []  dysuria  []  hematuria   Musculoskeletal Symptoms: []  back pain []  muscle pain []  joint pain  Neurologic Symptoms: []  headache [] Endorses [] Suicidal [] Homicidal  [x] Delusional      [] Paranoid  [] Somatic  [x] Grandiose    Perception: []  None  [x] Auditory   [x] Visual  [] tactile   [] olfactory  [] Illusions         Insight: [] Intact  [] Fair  [x] Limited    Judgement:  [] Intact  [] Fair  [x] Limited      Assessment/Plan:        Patient Active Problem List   Diagnosis Code    Acute psychosis ([facility] F23    Schizoaffective disorder, bipolar type ([facility] F25.0    Bipolar 1 disorder, manic, moderate ([address] F31.12    Severe manic bipolar 1 disorder with psychotic behavior ([facility] F31.2    Anxiety state F41.1    Cannabis use disorder, severe, dependence ([facility]) F12.20    Bipolar 1 disorder ([facility]) F31.9    Bipolar affective disorder, current episode mixed, without psychotic features ([address] F31.60         Plan:    []  Patient is refusing medications  [] Improving as expected   [x] Not improving as expected   [] Worsening    []  At Baseline     Will increase Seroquel to 500 mg QPM    Reason for more than one antipsychotic:  [x] N/A  [] 3 failed monotherapy(drugs tried):  [] Cross over to a new antipsychotic  [] Taper to monotherapy from polypharmacy  [] Augmentation of Clozapine therapy due to treatment resistance to single therapy      Signed:  [facility]  6/2/2019  11:39 AM

## 2019-06-02 NOTE — GROUP NOTE
Group Therapy Note    Date: June 1    Group Start Time: 1900  Group End Time: 2000  Group Topic: Healthy Living/Wellness    [facility]    Tommie Spain RN        Group Therapy Note    Patient attended and participated in Wellness Group.

## 2019-06-02 NOTE — GROUP NOTE
Group Therapy Note    Date: June 2    Group Start Time: 1115  Group End Time: 1150  Group Topic: Psychoeducation    [facility] [address]        Group Therapy Note    Attendees: 16       Notes:  Pleasant and active during discussion on community resources. Accepting of support and kindness from peers.     Status After Intervention:  Unchanged    Participation Level: Minimal    Participation Quality: Appropriate and Attentive      Speech:  hesitant      Thought Process/Content: Linear      Affective Functioning: Flat      Mood: depressed      Level of consciousness:  Alert      Response to Learning: Progressing to goal      Endings: None Reported    Modes of Intervention: Support, Socialization and Exploration      Discipline Responsible: Psychoeducational Specialist      Signature:  Mikhail Doan [place]

## 2019-06-03 PROCEDURE — 6370000000 HC RX 637 (ALT 250 FOR IP): Performed by: PSYCHIATRY & NEUROLOGY

## 2019-06-03 PROCEDURE — 6370000000 HC RX 637 (ALT 250 FOR IP): Performed by: NURSE PRACTITIONER

## 2019-06-03 PROCEDURE — 99231 SBSQ HOSP IP/OBS SF/LOW 25: CPT | Performed by: NURSE PRACTITIONER

## 2019-06-03 PROCEDURE — 1240000000 HC EMOTIONAL WELLNESS R&B

## 2019-06-03 RX ORDER — QUETIAPINE 200 MG/1
600 TABLET, FILM COATED, EXTENDED RELEASE ORAL EVERY EVENING
Status: DISCONTINUED | OUTPATIENT
Start: 2019-06-03 | End: 2019-06-05

## 2019-06-03 RX ADMIN — TRAZODONE HYDROCHLORIDE 50 MG: 50 TABLET ORAL at 20:59

## 2019-06-03 RX ADMIN — HYDROXYZINE PAMOATE 50 MG: 50 CAPSULE ORAL at 20:59

## 2019-06-03 RX ADMIN — QUETIAPINE FUMARATE 600 MG: 200 TABLET, EXTENDED RELEASE ORAL at 20:59

## 2019-06-03 NOTE — PLAN OF CARE
Mood is improved  attended treatment team this a.m. States he did not have any ghostly dreams last night  denies SI/HI   bizarre at times  asking team how many people  in room 25 before?    Otherwise pleasant  will continue to monitor

## 2019-06-03 NOTE — PROGRESS NOTES
systems  Delusions:  [] Denies [] Endorses   Withdrawals:  [] Denies [] Endorses    Hallucinations: [] Denies [] Endorses    Extra Pyramidal Symptoms: [] Denies [] Endorses      BP (!) 157/88   Pulse 92   Temp 98 °F (36.7 °C) (Oral)   Resp 16   Ht 5' 9\" (1.753 m)   Wt 173 lb (78.5 kg)   SpO2 100%   BMI 25.55 kg/m²     Mental Status Examination:    Cognition:      [x] Alert  [x] Awake  [x] Oriented  [x] Person  [x] Place [x] Time      [] drowsy  [] tired  [] lethargic  [] distractable  [] Other     Attention/Concentration:   [x] Attentive  [] Distracted        Memory Recent and Remote: [x] Intact   [] Impaired [] Partially Impaired     Language: [] Able to recognize and name objects          [] Unable to recognize and name Objects    Fund of Knowledge:  [] Poor []  Fair  [x] Good    Speech: [x] Normal  [] Soft  [] Slow  [] Fast [] Pressured            [] Loud [] Dysarthria  [] Incoherent    Appearance: [] Well Groomed  [x] Casual Dressed  [] Unkept  [] Disheveled          [] Normal weight[] Thin  [] Overweight  [] Obese           Attitude: [] Positive  [] Hostile  [] Demanding  [] Guarded  [] Defensive         [x] Cooperative  []  Uncooperative      Behavior:  [x] Normal Gait  [] Walks with Assistance  [] Joie Chair    [] Walks with Lorrane Fruit  [] In Hospital Bed  [] Sitting in Chair    Muscle-Skeletal:  [x] Normal Muscle Tone [] Muscle Atrophy       [] Abnormal Muscle Movement     Eye Contact:  [x] Good eye contact  [] Intermittent Eye Contact  [] Poor Eye Contact     Mood: [] Depressed  [x] Anxious  [x] Irritated  [] Euthymic   [] Angry [x] Restless    Affect:  [] Congruent  [] Incongruent  [x] Labile  [] Constricted  [] Flat  [] Bizarre     Thought Process and Association:  [] Logical [] Illogical       [] Linear and Goal Directed  [x] Tangential  [] Circumstantial     Thought Content:  [] Denies [] Endorses [] Suicidal [] Homicidal  [x] Delusional      [] Paranoid  [] Somatic  [x] Grandiose    Perception: [x]

## 2019-06-04 LAB — CANNABINOIDS CONF, URINE: 91 NG/ML

## 2019-06-04 PROCEDURE — 6370000000 HC RX 637 (ALT 250 FOR IP): Performed by: PSYCHIATRY & NEUROLOGY

## 2019-06-04 PROCEDURE — 1240000000 HC EMOTIONAL WELLNESS R&B

## 2019-06-04 PROCEDURE — 99231 SBSQ HOSP IP/OBS SF/LOW 25: CPT | Performed by: NURSE PRACTITIONER

## 2019-06-04 PROCEDURE — 6370000000 HC RX 637 (ALT 250 FOR IP): Performed by: NURSE PRACTITIONER

## 2019-06-04 RX ORDER — DIVALPROEX SODIUM 500 MG/1
500 TABLET, DELAYED RELEASE ORAL 2 TIMES DAILY
Status: DISCONTINUED | OUTPATIENT
Start: 2019-06-04 | End: 2019-06-05

## 2019-06-04 RX ADMIN — QUETIAPINE FUMARATE 600 MG: 200 TABLET, EXTENDED RELEASE ORAL at 17:05

## 2019-06-04 RX ADMIN — TRAZODONE HYDROCHLORIDE 50 MG: 50 TABLET ORAL at 21:00

## 2019-06-04 RX ADMIN — ALUMINUM HYDROXIDE, MAGNESIUM HYDROXIDE, AND SIMETHICONE 30 ML: 200; 200; 20 SUSPENSION ORAL at 21:01

## 2019-06-04 RX ADMIN — NICOTINE POLACRILEX 4 MG: 2 GUM, CHEWING BUCCAL at 21:01

## 2019-06-04 RX ADMIN — ACETAMINOPHEN 650 MG: 325 TABLET, FILM COATED ORAL at 21:00

## 2019-06-04 ASSESSMENT — PAIN SCALES - GENERAL
PAINLEVEL_OUTOF10: 0
PAINLEVEL_OUTOF10: 0
PAINLEVEL_OUTOF10: 4
PAINLEVEL_OUTOF10: 0

## 2019-06-04 NOTE — PROGRESS NOTES
Patient continues to be manic, labile and psychotic, however, with few loud outbursts. Patient is in better control and is able to be redirected. Patient does not display any violent behaviors but is loud and boisterous. Patient denies SI, HI, and AVH. Patient continues to have flight of ideas. Patient denies anxiety and depression. Patient sleep improving, will monitor closely for any adverse change in condition.

## 2019-06-04 NOTE — PROGRESS NOTES
Bruises   [] Schimosis       Psychiatric Review of systems  Delusions:  [] Denies [] Endorses   Withdrawals:  [] Denies [] Endorses    Hallucinations: [] Denies [] Endorses    Extra Pyramidal Symptoms: [] Denies [] Endorses      BP (!) 157/88   Pulse 92   Temp 98 °F (36.7 °C) (Oral)   Resp 16   Ht 5' 9\" (1.753 m)   Wt 173 lb (78.5 kg)   SpO2 100%   BMI 25.55 kg/m²     Mental Status Examination:    Cognition:      [x] Alert  [x] Awake  [x] Oriented  [x] Person  [x] Place [x] Time      [] drowsy  [] tired  [] lethargic  [] distractable  [] Other     Attention/Concentration:   [x] Attentive  [] Distracted        Memory Recent and Remote: [x] Intact   [] Impaired [] Partially Impaired     Language: [] Able to recognize and name objects          [] Unable to recognize and name Objects    Fund of Knowledge:  [] Poor []  Fair  [x] Good    Speech: [x] Normal  [] Soft  [] Slow  [] Fast [] Pressured            [] Loud [] Dysarthria  [] Incoherent    Appearance: [] Well Groomed  [x] Casual Dressed  [] Unkept  [] Disheveled          [] Normal weight[] Thin  [] Overweight  [] Obese           Attitude: [] Positive  [] Hostile  [] Demanding  [] Guarded  [] Defensive         [x] Cooperative  []  Uncooperative      Behavior:  [x] Normal Gait  [] Walks with Assistance  [] Joie Chair    [] Walks with Mandy Oas  [] In Hospital Bed  [] Sitting in Chair    Muscle-Skeletal:  [x] Normal Muscle Tone [] Muscle Atrophy       [] Abnormal Muscle Movement     Eye Contact:  [x] Good eye contact  [] Intermittent Eye Contact  [] Poor Eye Contact     Mood: [] Depressed  [x] Anxious  [x] Irritated  [] Euthymic   [] Angry [x] Restless    Affect:  [] Congruent  [] Incongruent  [x] Labile  [] Constricted  [] Flat  [] Bizarre     Thought Process and Association:  [] Logical [] Illogical       [] Linear and Goal Directed  [x] Tangential  [] Circumstantial     Thought Content:  [] Denies [] Endorses [] Suicidal [] Homicidal  [x] Delusional      [] Paranoid  [] Somatic  [x] Grandiose    Perception: [x]  None  [] Auditory   [] Visual  [] tactile   [] olfactory  [] Illusions         Insight: [] Intact  [] Fair  [x] Limited    Judgement:  [] Intact  [] Fair  [x] Limited      Assessment/Plan:        Patient Active Problem List   Diagnosis Code    Acute psychosis ([facility] F23    Schizoaffective disorder, bipolar type ([facility] F25.0    Bipolar 1 disorder, manic, moderate ([facility]) F31.12    Severe manic bipolar 1 disorder with psychotic behavior ([facility] F31.2    Anxiety state F41.1    Cannabis use disorder, severe, dependence ([facility]) F12.20    Bipolar 1 disorder ([facility]) F31.9    Bipolar affective disorder, current episode mixed, without psychotic features ([facility] F31.60         Plan:    []  Patient is refusing medications  [x] Improving as expected   [] Not improving as expected   [] Worsening    []  At Baseline     Will start Depakote 500 mg BID    Reason for more than one antipsychotic:  [x] N/A  [] 3 failed monotherapy(drugs tried):  [] Cross over to a new antipsychotic  [] Taper to monotherapy from polypharmacy  [] Augmentation of Clozapine therapy due to treatment resistance to single therapy      Signed:  Yuridia Ontiveros  6/4/2019  12:20 PM

## 2019-06-04 NOTE — GROUP NOTE
Group Therapy Note    Date: June 4    Group Start Time: 1110  Group End Time: 9971  Group Topic: Psychotherapy    [facility]  [address], [place], [place]    Number of participants: 10  Type of group: Psychotherapy  Mode of intervention: Support, Socialization and Exploration  Topic: Increasing socialization and interaction with others  Objective:  Pt to learn to identify ways to improve interaction with others     Notes:  Pt was active and verbal during group and was able to relate to others    Status After Intervention:  Unchanged    Participation Level:  Active Listener    Participation Quality: Attentive       Speech:  hesitant      Thought Process/Content: Delusional      Affective Functioning: Congruent      Mood: anxious and elevated      Level of consciousness:  Attentive      Response to Learning: Able to verbalize current knowledge/experience and Able to retain information      Endings: None Reported      Discipline Responsible: /Counselor      Signature:  DARINEL Treadwell, LSRU

## 2019-06-04 NOTE — PROGRESS NOTES
Patient declined to attend community meeting. Attempted to have patient identify goal for the day but patient was asleep not responding when staff was calling name.

## 2019-06-04 NOTE — GROUP NOTE
Group Therapy Note    Date: June 4    Group Start Time: 0115  Group End Time: 0150  Group Topic: Cognitive Skills    [facility]    Vicki Giron, CTRS        Group Therapy Note  Number of participants: 9  Type of group: Cognitive Skills  Mode of intervention: Education, Support, Socialization, Exploration, Clarifying, Problem-solving and Activity  Topic: Effective Communication   Objective: Patient will identify ways to communicate effectively by practicing ways to be a better speaker and listener. Notes:  Patient came to the last five minutes of group. Patient was an active listener as patient did not participate in activity. Patient was appropriate and was cooperative during group. Status After Intervention:  Unchanged    Participation Level:  Active Listener    Participation Quality: Appropriate and Attentive      Speech:  normal      Thought Process/Content: Logical      Affective Functioning: Congruent      Mood: euthymic      Level of consciousness:  Alert, Oriented x4 and Attentive      Response to Learning: Able to verbalize current knowledge/experience, Able to verbalize/acknowledge new learning, Able to retain information, Capable of insight, Able to change behavior and Progressing to goal      Endings: None Reported    Modes of Intervention: Education, Support, Socialization, Exploration, Clarifying, Problem-solving and Activity

## 2019-06-04 NOTE — GROUP NOTE
Group Therapy Note    Date: Bernadette 3    Group Start Time: 2045  Group End Time: 2115  Group Topic: Wrap-Up & Wellness    [facility]  [address], RN        Group Therapy Note    Attendees: 9

## 2019-06-04 NOTE — PLAN OF CARE
Patient was up for breakfast. Awake now for lunch watching TV. Bizarre and pre-occupied. Whispering to self. Behavior has been in good control. Denies Suicidal and homicidal thoughts.

## 2019-06-05 PROCEDURE — 1240000000 HC EMOTIONAL WELLNESS R&B

## 2019-06-05 PROCEDURE — 6370000000 HC RX 637 (ALT 250 FOR IP): Performed by: NURSE PRACTITIONER

## 2019-06-05 PROCEDURE — 6370000000 HC RX 637 (ALT 250 FOR IP): Performed by: PSYCHIATRY & NEUROLOGY

## 2019-06-05 PROCEDURE — 99231 SBSQ HOSP IP/OBS SF/LOW 25: CPT | Performed by: NURSE PRACTITIONER

## 2019-06-05 RX ORDER — QUETIAPINE 200 MG/1
800 TABLET, FILM COATED, EXTENDED RELEASE ORAL EVERY EVENING
Status: DISCONTINUED | OUTPATIENT
Start: 2019-06-05 | End: 2019-06-07 | Stop reason: HOSPADM

## 2019-06-05 RX ADMIN — NICOTINE POLACRILEX 4 MG: 2 GUM, CHEWING BUCCAL at 18:46

## 2019-06-05 RX ADMIN — QUETIAPINE FUMARATE 800 MG: 200 TABLET, EXTENDED RELEASE ORAL at 17:20

## 2019-06-05 RX ADMIN — TRAZODONE HYDROCHLORIDE 50 MG: 50 TABLET ORAL at 20:26

## 2019-06-05 RX ADMIN — NICOTINE POLACRILEX 4 MG: 2 GUM, CHEWING BUCCAL at 08:46

## 2019-06-05 ASSESSMENT — PAIN SCALES - GENERAL: PAINLEVEL_OUTOF10: 0
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
## 2019-06-05 NOTE — PLAN OF CARE
PAtient pressured and rambling. Focused on his room adding up to 18 using 666. I have bad dreams. \" people have  in this room. \" I have multiple personalities\". Offered a room change but declined. Denies SI and HI.

## 2019-06-05 NOTE — PLAN OF CARE
Patient denies SI, HI and hallucinations. Although patient denies HI, he admitted that he still wants to hurt the . Patient is calm and cooperative. Patient is bizarre at times. Patient is out on unit and is social with peers. Patient attended evening groups. Patient took all medications without issue, except for refusing his Depakote. No complaints or concerns voiced at this time. No unit problems reported. Will continue to observe and support.      Problem: Altered Mood, Psychotic Behavior:  Goal: Able to verbalize decrease in frequency and intensity of hallucinations  Description  Able to verbalize decrease in frequency and intensity of hallucinations  Outcome: Met This Shift  Note:   Pt denies hallucinations     Problem: Altered Mood, Psychotic Behavior:  Goal: Able to verbalize reality based thinking  Description  Able to verbalize reality based thinking  6/4/2019 2138 by Clara Salmon RN  Outcome: Ongoing  Note:   Pt continues to act bizarre

## 2019-06-05 NOTE — GROUP NOTE
Group Therapy Note    Date: June 5    Group Start Time: 1025  Group End Time: 1130  Group Topic: Psychoeducation    [facility]  [address], [place]        Group Therapy Note    Attendees: 12                                                                Module Name:  dimensions of wellness   Objective:  patient will be able to id dimensions of wellness and what he or she can do to improve one or more dimensions of his/her life. Status After Intervention:  Improved    Participation Level: Active Listener and Interactive    Participation Quality: Appropriate, Attentive and Sharing      Speech: normal       Thought Process/Content: Logical      Affective Functioning: Congruent      Mood: euthymic      Level of consciousness:  Alert, Oriented x4 and Attentive      Response to Learning: Able to verbalize/acknowledge new learning, Able to retain information and Progressing to goal      Endings: None Reported    Modes of Intervention: Education, Support, Socialization, Exploration and Problem-solving      Discipline Responsible: Psychoeducational Specialist      Signature:  HERB Bender          Notes: quiet and reserved in group.
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
## 2019-06-05 NOTE — CARE COORDINATION
Attempted to call Grandma, no answer, unable to leave voice mail.    Electronically signed by Radha Clemente on 6/5/2019 at 12:47 PM

## 2019-06-05 NOTE — GROUP NOTE
Group Therapy Note    Date: June 4    Group Start Time: 2045  Group End Time: 2115  Group Topic: Wrap-Up, wellness & relaxation    [facility] Angelia [address]cc[address]., RN        Group Therapy Note    Attendees: 15/24

## 2019-06-05 NOTE — PLAN OF CARE
Patient presently denies suicidal and homicidal thoughts. Patient remains fixated on number 18 and 666. Rambles when speaking which is pressured. Patient has to be redirected constantly about his cleanliness. Compliant with medications. Will continue to monitor and support throughout remainder of shift.

## 2019-06-06 PROCEDURE — 1240000000 HC EMOTIONAL WELLNESS R&B

## 2019-06-06 PROCEDURE — 99231 SBSQ HOSP IP/OBS SF/LOW 25: CPT | Performed by: NURSE PRACTITIONER

## 2019-06-06 RX ORDER — CARBAMAZEPINE 200 MG/1
200 TABLET ORAL 2 TIMES DAILY
Status: DISCONTINUED | OUTPATIENT
Start: 2019-06-06 | End: 2019-06-07 | Stop reason: HOSPADM

## 2019-06-06 ASSESSMENT — PAIN SCALES - GENERAL: PAINLEVEL_OUTOF10: 0

## 2019-06-06 NOTE — GROUP NOTE
Group Therapy Note    Date: June 6    Group Start Time: 3166  Group End Time: 1150  Group Topic: Psychoeducation    [facility]  [address] [facility], [facility]        Group Therapy Note    Attendees: 12                                                                        Wellness Binder Information  Module Name:  Id of stressors   Patient's Objective:    Status After Intervention:  Improved    Participation Level: Active Listener and Interactive    Participation Quality: Appropriate, Attentive and Sharing      Speech:  normal       Thought Process/Content: Logical      Affective Functioning: Congruent      Mood: euphoric      Level of consciousness:  Alert, Oriented x4 and Attentive      Response to Learning: Able to verbalize/acknowledge new learning, Able to retain information and Progressing to goal      Endings: None Reported    Modes of Intervention: Education, Support, Socialization and Exploration      Discipline Responsible: Psychoeducational Specialist      Signature:  Mardee Koyanagi, [facility]            Notes:  Came late to class, willing to share when did come.

## 2019-06-06 NOTE — PROGRESS NOTES
Spiritual Support Group Note    Number of Participants in Group: 7                               Time: 1400    Goal: Relief from isolation and loneliness             Anjelica Sharing             Self-understanding and gain insight              Acceptance and belonging            Recognize they are not alone                Socialization             Empowerment       Encouragement    Topic:  [x] Spiritual Wellness and [address] [facility]                  [] Hope                     [] Connecting with Divine/Others        [] Thankfulness and Gratitude               []  Meaningfulness and Purpose               [] Forgiveness               [] Peace               [] Connect to [employer]      [] Other    Participation Level:   [x] Active Listener   [] Minimal   [] Monopolizing   [] Interactive   [] No Participation   []  Other:     Attention:   [x] Alert   [] Distractible   [] Drowsy   [] Poor   [] Other:    Manner:   [x] Cooperative   [] Suspicious   [] Withdrawn   [] Guarded   [] Irritable   [] Inhospitable   [] Other:     Others Comments from Group: Patient actively participated in [facility] group about strength

## 2019-06-06 NOTE — GROUP NOTE
Group Therapy Note    Date: June 5    Group Start Time: 1900  Group End Time: 2000  Group Topic: Healthy Living/Wellness    [facility]    Nel Ledezma RN        Group Therapy Note    Attendees: 17  Patient attended and participated in Wellness Group.         Signature:  Nel Ledezma RN

## 2019-06-06 NOTE — PLAN OF CARE
Patient is resting quietly in bed with eyes closed at this time. No signs of distress or discomfort noted. No PRN medications given thus far. Safety needs met. No unit problems reported. Will continue to observe and support.      Problem: Altered Mood, Psychotic Behavior:  Goal: Able to verbalize decrease in frequency and intensity of hallucinations  Description  Able to verbalize decrease in frequency and intensity of hallucinations  6/6/2019 0224 by Krista Restrepo RN  Outcome: Ongoing  Note:   LAYTON pt sleeping       Problem: Altered Mood, Psychotic Behavior:  Goal: Able to verbalize reality based thinking  Description  Able to verbalize reality based thinking  6/6/2019 0224 by Krista Restrepo RN  Outcome: Ongoing  Note:   LAYTON pt sleeping

## 2019-06-06 NOTE — PROGRESS NOTES
DATE OF SERVICE:     6/6/2019    Deya Borges seen today for the purpose of continuation of care. Nursing, social work reports, laboratory studies and vital signs are reviewed. Patient chief complaint today is:             [] Depression      [] Anxiety        [x] Psychosis         [] Suicidal/Homicidal                         [x] Delusions           [] Aggression          Subjective: Today patient is the same, bizarre and grandiose with delusions, however has improved since admission. Patient is medication compliant. Possible d/c to [facility] tomorrow. Denies AVH, SI, or HI. Sleep:  [x] Good [] Fair  [] Poor  Appetite:  [] Good [x] Fair  [] Poor    Depression:  [] Mild [] Moderate [] Severe                [] Constant [] Sporadic     Anxiety: [] Mild [] Moderate [x] Severe    [x] Constant [] Sporadic     Delusions: [] Mild [] Moderate [x] Severe     [x] Constant [] Sporadic     [] Paranoid [] Somatic [x] Grandiose     Hallucinations: [] Mild [] Moderate [] Severe     [] Constant [] Sporadic    [] Auditory  [] Visual [] Tactile       Suicidal: [] Constant [] Sporadic  Homicidal: [] Constant [] Sporadic    Unscheduled Medications     [] Patient Receiving Emergency Medications \" Chemical Restraint\"   [] Requesting PRN medications for anxiety    Medical Review of Systems:     All other than marked systmes have been reviewed and are all negative.     Constitutional Symptoms: []  fever []  Chills  Skin Symptoms: [] rash []  Pruritus   Eye Symptoms: [] Vision unchanged []  recent vision problems[] blurred vision   Respiratory Symptoms:[] shortness of breath [] cough  Cardiovascular Symptoms:  [] chest pain   [] palpitations   Gastrointestinal Symptoms: []  abdominal pain []  nausea []  vomiting []  diarrhea  Genitourinary Symptoms: []  dysuria  []  hematuria   Musculoskeletal Symptoms: []  back pain []  muscle pain []  joint pain  Neurologic Symptoms: []  headache []  dizziness  Hematolymphoid Symptoms: [] Adenopathy [] Bruises   [] Schimosis       Psychiatric Review of systems  Delusions:  [] Denies [] Endorses   Withdrawals:  [] Denies [] Endorses    Hallucinations: [] Denies [] Endorses    Extra Pyramidal Symptoms: [] Denies [] Endorses      /68   Pulse 86   Temp 97 °F (36.1 °C) (Temporal)   Resp 16   Ht 5' 9\" (1.753 m)   Wt 173 lb (78.5 kg)   SpO2 100%   BMI 25.55 kg/m²     Mental Status Examination:    Cognition:      [x] Alert  [x] Awake  [x] Oriented  [x] Person  [x] Place [x] Time      [] drowsy  [] tired  [] lethargic  [] distractable  [] Other     Attention/Concentration:   [x] Attentive  [] Distracted        Memory Recent and Remote: [x] Intact   [] Impaired [] Partially Impaired     Language: [] Able to recognize and name objects          [] Unable to recognize and name Objects    Fund of Knowledge:  [] Poor []  Fair  [x] Good    Speech: [x] Normal  [] Soft  [] Slow  [] Fast [] Pressured            [] Loud [] Dysarthria  [] Incoherent    Appearance: [] Well Groomed  [x] Casual Dressed  [] Unkept  [] Disheveled          [] Normal weight[] Thin  [] Overweight  [] Obese           Attitude: [] Positive  [] Hostile  [] Demanding  [] Guarded  [] Defensive         [x] Cooperative  []  Uncooperative      Behavior:  [x] Normal Gait  [] Walks with Assistance  [] Joie Chair    [] Walks with Sami Keenan  [] In Hospital Bed  [] Sitting in Chair    Muscle-Skeletal:  [x] Normal Muscle Tone [] Muscle Atrophy       [] Abnormal Muscle Movement     Eye Contact:  [x] Good eye contact  [] Intermittent Eye Contact  [] Poor Eye Contact     Mood: [] Depressed  [x] Anxious  [x] Irritated  [] Euthymic   [] Angry [x] Restless    Affect:  [] Congruent  [] Incongruent  [x] Labile  [] Constricted  [] Flat  [] Bizarre     Thought Process and Association:  [] Logical [] Illogical       [] Linear and Goal Directed  [x] Tangential  [] Circumstantial     Thought Content:  [] Denies [] Endorses [] Suicidal [] Homicidal [x] Delusional      [] Paranoid  [] Somatic  [x] Grandiose    Perception: [x]  None  [] Auditory   [] Visual  [] tactile   [] olfactory  [] Illusions         Insight: [] Intact  [] Fair  [x] Limited    Judgement:  [] Intact  [] Fair  [x] Limited      Assessment/Plan:        Patient Active Problem List   Diagnosis Code    Acute psychosis ([facility] F23    Schizoaffective disorder, bipolar type ([facility] F25.0    Bipolar 1 disorder, manic, moderate ([facility] F31.12    Severe manic bipolar 1 disorder with psychotic behavior ([facility] [facility] F31.2    Anxiety state F41.1    Cannabis use disorder, severe, dependence ([facility]) F12.20    Bipolar 1 disorder ([facility]) F31.9    Bipolar affective disorder, current episode mixed, without psychotic features ([facility] [address] [facility] F31.60         Plan:    []  Patient is refusing medications  [x] Improving as expected   [] Not improving as expected   [] Worsening    []  At Baseline     Will order tegretol 200 mg BID    Reason for more than one antipsychotic:  [x] N/A  [] 3 failed monotherapy(drugs tried):  [] Cross over to a new antipsychotic  [] Taper to monotherapy from polypharmacy  [] Augmentation of Clozapine therapy due to treatment resistance to single therapy      Signed:  Jorge Montez  6/6/2019  10:26 AM

## 2019-06-06 NOTE — PLAN OF CARE
Problem: Altered Mood, Psychotic Behavior:  Goal: Able to verbalize decrease in frequency and intensity of hallucinations  Description  Able to verbalize decrease in frequency and intensity of hallucinations  6/6/2019 1138 by Mireya Wheat RN  Outcome: Met This Shift     Problem: Altered Mood, Psychotic Behavior:  Goal: Able to verbalize reality based thinking  Description  Able to verbalize reality based thinking  6/6/2019 1138 by Mireya Wheat RN  Outcome: Ongoing   Has been resting quietly this morning. Denies harmful thoughts or hallucinations. Refused Tegretol. Room is a mess and he became angry but allowed some of it to be cleaned. Layering his clothes. Pressured. Bizarre train of thought-has a bottle of ensure filled \"with pepper juice\" for his penis. Has remained in control despite being angry about his room and is presently in group.  Will continue to monitor and assess

## 2019-06-06 NOTE — GROUP NOTE
Group Therapy Note    Date: June 5    Group Start Time: 2100  Group End Time: 2115  Group Topic: Relaxation    [facility]  1    Alisia Green RN        Group Therapy Note    Attendees: 7  Patient attended and participated in relaxation group.           Signature:  Alisia Green RN
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
## 2019-06-06 NOTE — GROUP NOTE
Group Therapy Note    Date: June 5    Group Start Time: 2000  Group End Time: 2030  Group Topic: Wrap-Up    [facility]  1    Tereso Flowers RN        Group Therapy Note    Attendees: 17  Patient attended [address] [place] [address] group.           Signature:  Tereso Flowers RN

## 2019-06-07 VITALS
SYSTOLIC BLOOD PRESSURE: 143 MMHG | HEIGHT: 69 IN | OXYGEN SATURATION: 100 % | TEMPERATURE: 97.6 F | DIASTOLIC BLOOD PRESSURE: 88 MMHG | RESPIRATION RATE: 16 BRPM | HEART RATE: 91 BPM | BODY MASS INDEX: 25.62 KG/M2 | WEIGHT: 173 LBS

## 2019-06-07 PROCEDURE — 99238 HOSP IP/OBS DSCHRG MGMT 30/<: CPT | Performed by: NURSE PRACTITIONER

## 2019-06-07 RX ORDER — QUETIAPINE 400 MG/1
800 TABLET, FILM COATED, EXTENDED RELEASE ORAL EVERY EVENING
Qty: 60 TABLET | Refills: 0 | Status: ON HOLD | OUTPATIENT
Start: 2019-06-07 | End: 2019-08-14

## 2019-06-07 RX ORDER — NICOTINE 21 MG/24HR
1 PATCH, TRANSDERMAL 24 HOURS TRANSDERMAL DAILY
Qty: 30 PATCH | Refills: 0 | Status: SHIPPED | OUTPATIENT
Start: 2019-06-07 | End: 2020-02-28 | Stop reason: ALTCHOICE

## 2019-06-07 NOTE — PROGRESS NOTES
CLINICAL PHARMACY NOTE: MEDS TO [address] Select Patient?: No  Total # of Prescriptions Filled: 2   The following medications were delivered to the patient:  · QUETIAPINE FUMARATE  MG   · NICOTINE 14 MG PATCH  Total # of Interventions Completed: 3  Time Spent (min): 15    Additional Documentation:

## 2019-06-07 NOTE — PROGRESS NOTES
Nurse to nurse called to Paulo at [facility].

## 2019-06-07 NOTE — PROGRESS NOTES
Patient in room . Patient denies SI and hallucinations. Patient is homicidal towards the police .  \" I hate pigs they just want to rape me and that's why I don't eat pork because it reminds of the pigs\" \" I'm just waiting to go to alf\" will continue to monitor and observe

## 2019-06-07 NOTE — CARE COORDINATION
In order to ensure appropriate transition and discharge planning is in place, the following documents have been transmitted to [facility] as the new outpatient provider:     The d/c diagnosis was transmitted to the next care provider   The reason for hospitalization was transmitted to the next care provider   The d/c medications (dosage and indication) were transmitted to the next care provider    The continuing care plan was transmitted to the next care provider  Electronically signed by Fe Ansari [facility] on 6/7/2019 at 12:33 PM

## 2019-06-07 NOTE — GROUP NOTE
Group Therapy Note    Date: June 7    Group Start Time: 1000  Group End Time: 1040  Group Topic: Psychoeducation    [facility] 1    Vicki Giron, CTRS        Group Therapy Note  Number of participants: 9  Type of group: Psychoeducation  Mode of intervention: Education, Support, Socialization, Exploration, Clarifying and Problem-solving  Topic:  Gratitude   Objective: Patient will identify the role gratitude plays in recovery. Notes: Patient offered minimal interaction during group. Observed to be reading a book during discussion. Status After Intervention:  Unchanged    Participation Level:  Active Listener and Minimal    Participation Quality: Appropriate and Attentive      Speech:  normal      Thought Process/Content: Logical      Affective Functioning: Congruent      Mood: irritable      Level of consciousness:  Alert, Oriented x4 and Attentive      Response to Learning: Able to verbalize current knowledge/experience, Able to verbalize/acknowledge new learning, Able to retain information, Capable of insight, Able to change behavior and Progressing to goal      Endings: None Reported    Modes of Intervention: Education, Support, Socialization, Exploration, Clarifying and Problem-solving

## 2019-06-07 NOTE — PLAN OF CARE
Pt. Denies SI and hallucinations this shift. Pt. States he is positive for HI towards police. Pt. Denies physical complaints currently.

## 2019-06-07 NOTE — GROUP NOTE
Group Therapy Note    Date: June 7    Group Start Time: 0215  Group End Time: 3230  Group Topic: Psychoeducation    [facility]  [address] [facility], [facility]        Group Therapy Note    Attendees: 10                                                             Module Name:  Deep breathing techniques  Patient's objective:patient will be able to identify basic breathing techniques. Will practice breathing exercise as well. Status After Intervention:  Improved    Participation Level: Active Listener and Interactive    Participation Quality: Appropriate, Attentive and Sharing      Speech:  normal      Thought Process/Content: Logical      Affective Functioning: Congruent      Mood: euthymic      Level of consciousness:  Alert, Oriented x4 and Attentive      Response to Learning: Able to verbalize/acknowledge new learning, Able to retain information and Progressing to goal      Endings: None Reported    Modes of Intervention: Education, Support, Socialization, Exploration and Problem-solving      Discipline Responsible: Psychoeducational Specialist      Signature:  HERB Hector          Notes:patient teetering on inappropriate comments, able to be redirected.

## 2019-06-07 NOTE — DISCHARGE SUMMARY
Physician Discharge Summary      Physical Examination   VS/Measurements:     BP (!) 143/88   Pulse 91   Temp 97.6 °F (36.4 °C) (Oral)   Resp 16   Ht 5' 9\" (1.753 m)   Wt 173 lb (78.5 kg)   SpO2 100%   BMI 25.55 kg/m²         Discharge Medications:              Cong Martins [address] Medication Instructions [id number]    Printed on:06/07/19 0849   Medication Information                      nicotine (NICODERM CQ) 14 MG/24HR  Place 1 patch onto the skin daily             QUEtiapine (SEROQUEL XR) 400 MG extended release tablet  Take 2 tablets by mouth every evening                     Psychiatric: Mental Status Examination:    Cognition:      [x] Alert  [x] Awake  [x] Oriented  [x] Person  [x] Place [x] Time    [] drowsy  [] tired  [] lethargic  [] distractable       Attention/Concentration:   [x] Attentive  [] Distracted        Memory Recent and Remote: [x] Intact   [] Impaired [] Partially Impaired     Language: [] Able to recognize and name objects          [] Unable to recognize and name Objects    Fund of Knowledge:  [] Poor []  Fair  [] Good    Speech: [x] Normal  [] Soft  [] Slow  [] Fast [] Pressured            [] Loud [] Dysarthria  [] Incoherent       Appearance: [] Well Groomed  [x] Casual Dressed  [] Unkept  [] Disheveled          [] Normal weight[] Thin  [] Overweight  [] Obese           Attitude: [] Positive  [] Hostile  [] Demanding  [] Guarded  [] Defensive         [x] Cooperative  []  Uncooperative      Behavior:  [x] Normal Gait  [] Walks with Assistance  [] Joie Chair    [] Walks with Araceli Elpidio  [] In Hospital Bed  [] Sitting in Chair    Muscle-Skeletal:  [x] Normal Muscle Tone [] Muscle Atrophy       [] Abnormal Muscle Movement     Eye Contact:  [x] Good eye contact  [] Intermittent Eye Contact  [] Poor Eye Contact     Mood: [] Depressed  [x] Anxious  [] Irritated  [] Euthymic   [] Angry [] Restless    Affect:  [x] Congruent  [] Incongruent  [] Labile  [] Constricted  [] Flat [] Bizarre     Thought Process and Association:  [] Logical [] Illogical       [x] Linear and Goal Directed  [] Tangential  [] Circumstantial     Thought Content:  [x] Denies [] Endorses [] Suicidal [] Homicidal  [] Delusional      [] Paranoid  [] Somatic  [] Grandiose    Perception: [x]  None  [] Auditory   [] Visual  [] tactile   [] olfactory  [] Illusions         Insight: [] Intact  [x] Fair  [] Limited    Judgement:  [] Intact  [x] Fair  [] Limited    Hospital Course:   Admit Date: 5/30/2019     Discharge Date: 6/7/2019  Admitted from:  [x]  Emergency Room  []  Home  []  Another facility   []  [facility]     Admitting diagnosis:   Patient Active Problem List   Diagnosis    Acute psychosis ([facility] [address] [facility]    Schizoaffective disorder, bipolar type ([facility] [address] [facility]    Bipolar 1 disorder, manic, moderate ([facility])    Severe manic bipolar 1 disorder with psychotic behavior ([facility] [address] [facility]    Anxiety state    Cannabis use disorder, severe, dependence ([facility] [address]    Bipolar 1 disorder ([address]    Bipolar affective disorder, current episode mixed, without psychotic features ([address]      Length of stay:  8 days              Germán Medina was admitted in Psychiatric unit  from ER with psychosis. Patient was treated            With the above . Patient responded well to the treatment. Discharge Summary Plan:     Discharge Status:    [x] Improved [] Unchanged    [] Worse       Discharge instructions given:  [x] Patient    [] Family [] Other         Discharge disposition:  [] Home [] Step Down unit  [] Group Home []  [facility]                                                    [] [facility]    [] AMA  [x] Other           Prescriptions: Continue same medications, review with patient.        Reason for more than one antipsychotic:  [x] N/A  [] 3 failed monotherapy(drugs tried):  [] Cross over to a new antipsychotic  [] Taper to monotherapy from polypharmacy  [] Augmentation of Clozapine therapy due to treatment resistance to single therapy      Diagnosis: Patient Active Problem List   Diagnosis Code    Acute psychosis ([facility] F23    Schizoaffective disorder, bipolar type ([address] F25.0    Bipolar 1 disorder, manic, moderate ([address] F31.12    Severe manic bipolar 1 disorder with psychotic behavior ([facility] F31.2    Anxiety state F41.1    Cannabis use disorder, severe, dependence ([facility] [address] F12.20    Bipolar 1 disorder ([facility] [address] F31.9    Bipolar affective disorder, current episode mixed, without psychotic features ([facility] [address] F31.60       Education and Follow-up:  Counseled:  [x] Patient     [] Family    [] Guardian      Signed:   Verena Paget   6/7/2019   8:49 AM

## 2019-06-07 NOTE — PROGRESS NOTES
[facility]  Discharge Note    Pt discharged with followings belongings:   Dentures: None  Vision - Corrective Lenses: None  Hearing Aid: None  Jewelry: None  Body Piercings Removed: No  Clothing: Sweater, Pants, Shirt  Were All Patient Medications Collected?: No  Other Valuables: Wallet, Other (Comment)(1 debit card and 1 direction card.)   Valuables sent home with yes. Valuables retrieved from safe, Security envelope number:  n/a and returned to patient. Patient left department with Departure Mode: In police custody via Mobility at Departure: Ambulatory, discharged to Discharged to: [employer]. Patient education on aftercare instructions: yes  Information faxed to n/a by n/a Patient verbalize understanding of AVS:  yes.     Status EXAM upon discharge:  Status and Exam  Normal: No  Facial Expression: Avoids Gaze, Exaggerated  Affect: Congruent  Level of Consciousness: Alert  Mood:Normal: No  Mood: Anxious, Labile, Suspicious  Motor Activity:Normal: Yes  Motor Activity: Decreased  Interview Behavior: Cooperative  Preception: [place] to Person, Genita Brunt to Time, [place] to Place, [place] to Situation  Attention:Normal: No  Attention: Distractible  Thought Processes: Flt.of Ideas  Thought Content:Normal: No  Thought Content: Delusions, Obsessions, Paranoia  Hallucinations: None  Delusions: Yes  Delusions: [place], Persecution, Other(See Comment), Obsessions  Memory:Normal: No  Memory: Poor Recent  Insight and Judgment: No  Insight and Judgment: Poor Judgment, Poor Insight, Unrealistic  Present Suicidal Ideation: No  Present Homicidal Ideation: Yes      Metabolic Screening:    No results found for: LABA1C    Lab Results   Component Value Date    CHOL 241 (H) 10/17/2016     Lab Results   Component Value Date    TRIG 133 10/17/2016     Lab Results   Component Value Date    HDL 48 10/17/2016     No components found for: [facility] EVALUATION AND TREATMENT [place]  Lab Results   Component Value Date    LABVLDL 27 10/17/2016       Pili Montero Kayleigh Tellez RN

## 2019-08-11 ENCOUNTER — APPOINTMENT (OUTPATIENT)
Dept: GENERAL RADIOLOGY | Age: 24
DRG: 812 | End: 2019-08-11
Payer: MEDICAID

## 2019-08-11 ENCOUNTER — APPOINTMENT (OUTPATIENT)
Dept: CT IMAGING | Age: 24
DRG: 812 | End: 2019-08-11
Payer: MEDICAID

## 2019-08-11 ENCOUNTER — HOSPITAL ENCOUNTER (INPATIENT)
Age: 24
LOS: 2 days | Discharge: PSYCHIATRIC OR SUBSTANCE USE FACILITY | DRG: 812 | End: 2019-08-13
Attending: EMERGENCY MEDICINE | Admitting: INTERNAL MEDICINE
Payer: MEDICAID

## 2019-08-11 DIAGNOSIS — R46.89 COMBATIVE BEHAVIOR: Primary | ICD-10-CM

## 2019-08-11 DIAGNOSIS — R41.82 ALTERED MENTAL STATUS, UNSPECIFIED ALTERED MENTAL STATUS TYPE: ICD-10-CM

## 2019-08-11 DIAGNOSIS — T50.902A INTENTIONAL DRUG OVERDOSE, INITIAL ENCOUNTER (HCC): ICD-10-CM

## 2019-08-11 PROBLEM — T50.901A DRUG OVERDOSE: Status: ACTIVE | Noted: 2019-08-11

## 2019-08-11 LAB
ACETAMINOPHEN LEVEL: <5 MCG/ML (ref 10–30)
ALBUMIN SERPL-MCNC: 4.1 G/DL (ref 3.5–5.2)
ALBUMIN SERPL-MCNC: 4.4 G/DL (ref 3.5–5.2)
ALP BLD-CCNC: 111 U/L (ref 40–129)
ALP BLD-CCNC: 98 U/L (ref 40–129)
ALT SERPL-CCNC: 15 U/L (ref 0–40)
ALT SERPL-CCNC: 17 U/L (ref 0–40)
AMPHETAMINE SCREEN, URINE: NOT DETECTED
ANION GAP SERPL CALCULATED.3IONS-SCNC: 12 MMOL/L (ref 7–16)
ANION GAP SERPL CALCULATED.3IONS-SCNC: 6 MMOL/L (ref 7–16)
AST SERPL-CCNC: 42 U/L (ref 0–39)
AST SERPL-CCNC: 51 U/L (ref 0–39)
BARBITURATE SCREEN URINE: NOT DETECTED
BASOPHILS ABSOLUTE: 0.03 E9/L (ref 0–0.2)
BASOPHILS RELATIVE PERCENT: 0.3 % (ref 0–2)
BENZODIAZEPINE SCREEN, URINE: NOT DETECTED
BILIRUB SERPL-MCNC: 0.8 MG/DL (ref 0–1.2)
BILIRUB SERPL-MCNC: 1.1 MG/DL (ref 0–1.2)
BUN BLDV-MCNC: 16 MG/DL (ref 6–20)
BUN BLDV-MCNC: 8 MG/DL (ref 6–20)
CALCIUM SERPL-MCNC: 8.8 MG/DL (ref 8.6–10.2)
CALCIUM SERPL-MCNC: 9.2 MG/DL (ref 8.6–10.2)
CANNABINOID SCREEN URINE: POSITIVE
CHLORIDE BLD-SCNC: 100 MMOL/L (ref 98–107)
CHLORIDE BLD-SCNC: 107 MMOL/L (ref 98–107)
CO2: 26 MMOL/L (ref 22–29)
CO2: 30 MMOL/L (ref 22–29)
COCAINE METABOLITE SCREEN URINE: NOT DETECTED
CREAT SERPL-MCNC: 0.9 MG/DL (ref 0.7–1.2)
CREAT SERPL-MCNC: 1 MG/DL (ref 0.7–1.2)
EOSINOPHILS ABSOLUTE: 0.03 E9/L (ref 0.05–0.5)
EOSINOPHILS RELATIVE PERCENT: 0.3 % (ref 0–6)
ETHANOL: <10 MG/DL (ref 0–0.08)
GFR AFRICAN AMERICAN: >60
GFR AFRICAN AMERICAN: >60
GFR NON-AFRICAN AMERICAN: >60 ML/MIN/1.73
GFR NON-AFRICAN AMERICAN: >60 ML/MIN/1.73
GLUCOSE BLD-MCNC: 123 MG/DL (ref 74–99)
GLUCOSE BLD-MCNC: 86 MG/DL (ref 74–99)
HCT VFR BLD CALC: 41.2 % (ref 37–54)
HEMOGLOBIN: 14.2 G/DL (ref 12.5–16.5)
INR BLD: 1.2
LACTIC ACID: 1.3 MMOL/L (ref 0.5–2.2)
LYMPHOCYTES ABSOLUTE: 1.49 E9/L (ref 1.5–4)
LYMPHOCYTES RELATIVE PERCENT: 14.8 % (ref 20–42)
Lab: ABNORMAL
MAGNESIUM: 2.2 MG/DL (ref 1.6–2.6)
MAGNESIUM: 2.2 MG/DL (ref 1.6–2.6)
MCH RBC QN AUTO: 30.9 PG (ref 26–35)
MCHC RBC AUTO-ENTMCNC: 34.5 % (ref 32–34.5)
MCV RBC AUTO: 89.8 FL (ref 80–99.9)
METHADONE SCREEN, URINE: NOT DETECTED
MONOCYTES ABSOLUTE: 0.87 E9/L (ref 0.1–0.95)
MONOCYTES RELATIVE PERCENT: 8.7 % (ref 2–12)
NEUTROPHILS ABSOLUTE: 7.59 E9/L (ref 1.8–7.3)
NEUTROPHILS RELATIVE PERCENT: 75.6 % (ref 43–80)
OPIATE SCREEN URINE: NOT DETECTED
PDW BLD-RTO: 12.3 FL (ref 11.5–15)
PHENCYCLIDINE SCREEN URINE: NOT DETECTED
PHOSPHORUS: 1.7 MG/DL (ref 2.5–4.5)
PLATELET # BLD: 213 E9/L (ref 130–450)
PMV BLD AUTO: 10.4 FL (ref 7–12)
POTASSIUM SERPL-SCNC: 3 MMOL/L (ref 3.5–5)
POTASSIUM SERPL-SCNC: 3.5 MMOL/L (ref 3.5–5)
PROPOXYPHENE SCREEN: NOT DETECTED
PROTHROMBIN TIME: 13.7 SEC (ref 9.3–12.4)
RBC # BLD: 4.59 E12/L (ref 3.8–5.8)
RBC # BLD: NORMAL 10*6/UL
SALICYLATE, SERUM: <0.3 MG/DL (ref 0–30)
SODIUM BLD-SCNC: 138 MMOL/L (ref 132–146)
SODIUM BLD-SCNC: 143 MMOL/L (ref 132–146)
TOTAL CK: 1701 U/L (ref 20–200)
TOTAL PROTEIN: 6.6 G/DL (ref 6.4–8.3)
TOTAL PROTEIN: 7.2 G/DL (ref 6.4–8.3)
TRICYCLIC ANTIDEPRESSANTS SCREEN SERUM: NEGATIVE NG/ML
WBC # BLD: 10 E9/L (ref 4.5–11.5)

## 2019-08-11 PROCEDURE — 80053 COMPREHEN METABOLIC PANEL: CPT

## 2019-08-11 PROCEDURE — 93005 ELECTROCARDIOGRAM TRACING: CPT | Performed by: INTERNAL MEDICINE

## 2019-08-11 PROCEDURE — G0480 DRUG TEST DEF 1-7 CLASSES: HCPCS

## 2019-08-11 PROCEDURE — 93005 ELECTROCARDIOGRAM TRACING: CPT | Performed by: EMERGENCY MEDICINE

## 2019-08-11 PROCEDURE — 83735 ASSAY OF MAGNESIUM: CPT

## 2019-08-11 PROCEDURE — 80307 DRUG TEST PRSMV CHEM ANLYZR: CPT

## 2019-08-11 PROCEDURE — 82550 ASSAY OF CK (CPK): CPT

## 2019-08-11 PROCEDURE — 84100 ASSAY OF PHOSPHORUS: CPT

## 2019-08-11 PROCEDURE — 2580000003 HC RX 258: Performed by: EMERGENCY MEDICINE

## 2019-08-11 PROCEDURE — 6360000002 HC RX W HCPCS: Performed by: EMERGENCY MEDICINE

## 2019-08-11 PROCEDURE — 2580000003 HC RX 258: Performed by: INTERNAL MEDICINE

## 2019-08-11 PROCEDURE — 83605 ASSAY OF LACTIC ACID: CPT

## 2019-08-11 PROCEDURE — 72125 CT NECK SPINE W/O DYE: CPT

## 2019-08-11 PROCEDURE — 36415 COLL VENOUS BLD VENIPUNCTURE: CPT

## 2019-08-11 PROCEDURE — 6360000002 HC RX W HCPCS

## 2019-08-11 PROCEDURE — 70450 CT HEAD/BRAIN W/O DYE: CPT

## 2019-08-11 PROCEDURE — 6360000002 HC RX W HCPCS: Performed by: INTERNAL MEDICINE

## 2019-08-11 PROCEDURE — 99285 EMERGENCY DEPT VISIT HI MDM: CPT

## 2019-08-11 PROCEDURE — 85025 COMPLETE CBC W/AUTO DIFF WBC: CPT

## 2019-08-11 PROCEDURE — 71045 X-RAY EXAM CHEST 1 VIEW: CPT

## 2019-08-11 PROCEDURE — 2500000003 HC RX 250 WO HCPCS

## 2019-08-11 PROCEDURE — 2500000003 HC RX 250 WO HCPCS: Performed by: EMERGENCY MEDICINE

## 2019-08-11 PROCEDURE — 85610 PROTHROMBIN TIME: CPT

## 2019-08-11 PROCEDURE — 99255 IP/OBS CONSLTJ NEW/EST HI 80: CPT | Performed by: INTERNAL MEDICINE

## 2019-08-11 PROCEDURE — 94002 VENT MGMT INPAT INIT DAY: CPT

## 2019-08-11 PROCEDURE — 6360000002 HC RX W HCPCS: Performed by: FAMILY MEDICINE

## 2019-08-11 PROCEDURE — 2000000000 HC ICU R&B

## 2019-08-11 RX ORDER — ZIPRASIDONE MESYLATE 20 MG/ML
20 INJECTION, POWDER, LYOPHILIZED, FOR SOLUTION INTRAMUSCULAR ONCE
Status: COMPLETED | OUTPATIENT
Start: 2019-08-11 | End: 2019-08-11

## 2019-08-11 RX ORDER — LORAZEPAM 2 MG/ML
2 INJECTION INTRAMUSCULAR ONCE
Status: DISCONTINUED | OUTPATIENT
Start: 2019-08-11 | End: 2019-08-13 | Stop reason: HOSPADM

## 2019-08-11 RX ORDER — PROPOFOL 10 MG/ML
INJECTION, EMULSION INTRAVENOUS
Status: COMPLETED
Start: 2019-08-11 | End: 2019-08-11

## 2019-08-11 RX ORDER — PROPOFOL 10 MG/ML
20 INJECTION, EMULSION INTRAVENOUS
Status: DISCONTINUED | OUTPATIENT
Start: 2019-08-11 | End: 2019-08-11

## 2019-08-11 RX ORDER — SODIUM CHLORIDE 0.9 % (FLUSH) 0.9 %
10 SYRINGE (ML) INJECTION EVERY 12 HOURS SCHEDULED
Status: DISCONTINUED | OUTPATIENT
Start: 2019-08-11 | End: 2019-08-11 | Stop reason: SDUPTHER

## 2019-08-11 RX ORDER — VECURONIUM BROMIDE 1 MG/ML
10 INJECTION, POWDER, LYOPHILIZED, FOR SOLUTION INTRAVENOUS ONCE
Status: COMPLETED | OUTPATIENT
Start: 2019-08-11 | End: 2019-08-11

## 2019-08-11 RX ORDER — LORAZEPAM 2 MG/ML
2 INJECTION INTRAMUSCULAR ONCE
Status: DISCONTINUED | OUTPATIENT
Start: 2019-08-11 | End: 2019-08-11

## 2019-08-11 RX ORDER — SODIUM CHLORIDE 9 MG/ML
INJECTION, SOLUTION INTRAVENOUS CONTINUOUS
Status: DISCONTINUED | OUTPATIENT
Start: 2019-08-11 | End: 2019-08-11

## 2019-08-11 RX ORDER — DIPHENHYDRAMINE HYDROCHLORIDE 50 MG/ML
50 INJECTION INTRAMUSCULAR; INTRAVENOUS ONCE
Status: COMPLETED | OUTPATIENT
Start: 2019-08-11 | End: 2019-08-11

## 2019-08-11 RX ORDER — MIDAZOLAM HYDROCHLORIDE 1 MG/ML
2 INJECTION INTRAMUSCULAR; INTRAVENOUS ONCE
Status: COMPLETED | OUTPATIENT
Start: 2019-08-11 | End: 2019-08-11

## 2019-08-11 RX ORDER — ZIPRASIDONE MESYLATE 20 MG/ML
INJECTION, POWDER, LYOPHILIZED, FOR SOLUTION INTRAMUSCULAR
Status: COMPLETED
Start: 2019-08-11 | End: 2019-08-11

## 2019-08-11 RX ORDER — SODIUM CHLORIDE 9 MG/ML
INJECTION, SOLUTION INTRAVENOUS CONTINUOUS
Status: DISCONTINUED | OUTPATIENT
Start: 2019-08-11 | End: 2019-08-12

## 2019-08-11 RX ORDER — PROPOFOL 10 MG/ML
INJECTION, EMULSION INTRAVENOUS
Status: DISPENSED
Start: 2019-08-11 | End: 2019-08-11

## 2019-08-11 RX ORDER — SODIUM CHLORIDE 0.9 % (FLUSH) 0.9 %
10 SYRINGE (ML) INJECTION EVERY 12 HOURS SCHEDULED
Status: DISCONTINUED | OUTPATIENT
Start: 2019-08-11 | End: 2019-08-13 | Stop reason: HOSPADM

## 2019-08-11 RX ORDER — MIDAZOLAM HYDROCHLORIDE 1 MG/ML
4 INJECTION INTRAMUSCULAR; INTRAVENOUS ONCE
Status: COMPLETED | OUTPATIENT
Start: 2019-08-11 | End: 2019-08-11

## 2019-08-11 RX ORDER — ONDANSETRON 2 MG/ML
4 INJECTION INTRAMUSCULAR; INTRAVENOUS EVERY 6 HOURS PRN
Status: DISCONTINUED | OUTPATIENT
Start: 2019-08-11 | End: 2019-08-13 | Stop reason: HOSPADM

## 2019-08-11 RX ORDER — SODIUM CHLORIDE 0.9 % (FLUSH) 0.9 %
10 SYRINGE (ML) INJECTION PRN
Status: DISCONTINUED | OUTPATIENT
Start: 2019-08-11 | End: 2019-08-11 | Stop reason: SDUPTHER

## 2019-08-11 RX ORDER — ROCURONIUM BROMIDE 10 MG/ML
INJECTION, SOLUTION INTRAVENOUS
Status: DISCONTINUED
Start: 2019-08-11 | End: 2019-08-11 | Stop reason: WASHOUT

## 2019-08-11 RX ORDER — POTASSIUM CHLORIDE 7.45 MG/ML
10 INJECTION INTRAVENOUS
Status: COMPLETED | OUTPATIENT
Start: 2019-08-11 | End: 2019-08-11

## 2019-08-11 RX ORDER — LORAZEPAM 2 MG/ML
INJECTION INTRAMUSCULAR
Status: DISPENSED
Start: 2019-08-11 | End: 2019-08-11

## 2019-08-11 RX ORDER — VECURONIUM BROMIDE 1 MG/ML
INJECTION, POWDER, LYOPHILIZED, FOR SOLUTION INTRAVENOUS
Status: COMPLETED
Start: 2019-08-11 | End: 2019-08-11

## 2019-08-11 RX ORDER — PROPOFOL 10 MG/ML
10 INJECTION, EMULSION INTRAVENOUS
Status: DISCONTINUED | OUTPATIENT
Start: 2019-08-11 | End: 2019-08-12

## 2019-08-11 RX ORDER — SODIUM CHLORIDE 0.9 % (FLUSH) 0.9 %
10 SYRINGE (ML) INJECTION PRN
Status: DISCONTINUED | OUTPATIENT
Start: 2019-08-11 | End: 2019-08-13 | Stop reason: HOSPADM

## 2019-08-11 RX ORDER — 0.9 % SODIUM CHLORIDE 0.9 %
1000 INTRAVENOUS SOLUTION INTRAVENOUS ONCE
Status: COMPLETED | OUTPATIENT
Start: 2019-08-11 | End: 2019-08-11

## 2019-08-11 RX ORDER — PROPOFOL 10 MG/ML
INJECTION, EMULSION INTRAVENOUS
Status: DISCONTINUED
Start: 2019-08-11 | End: 2019-08-12

## 2019-08-11 RX ORDER — DIPHENHYDRAMINE HYDROCHLORIDE 50 MG/ML
INJECTION INTRAMUSCULAR; INTRAVENOUS
Status: COMPLETED
Start: 2019-08-11 | End: 2019-08-11

## 2019-08-11 RX ORDER — MIDAZOLAM HYDROCHLORIDE 1 MG/ML
INJECTION INTRAMUSCULAR; INTRAVENOUS
Status: COMPLETED
Start: 2019-08-11 | End: 2019-08-11

## 2019-08-11 RX ORDER — PROPOFOL 10 MG/ML
10 INJECTION, EMULSION INTRAVENOUS ONCE
Status: COMPLETED | OUTPATIENT
Start: 2019-08-11 | End: 2019-08-11

## 2019-08-11 RX ADMIN — ENOXAPARIN SODIUM 40 MG: 40 INJECTION SUBCUTANEOUS at 13:53

## 2019-08-11 RX ADMIN — VECURONIUM BROMIDE 10 MG: 10 INJECTION, POWDER, LYOPHILIZED, FOR SOLUTION INTRAVENOUS at 14:11

## 2019-08-11 RX ADMIN — PROPOFOL 30 MCG/KG/MIN: 10 INJECTION, EMULSION INTRAVENOUS at 08:15

## 2019-08-11 RX ADMIN — Medication 10 ML: at 21:40

## 2019-08-11 RX ADMIN — PROPOFOL 20 MCG/KG/MIN: 10 INJECTION, EMULSION INTRAVENOUS at 02:40

## 2019-08-11 RX ADMIN — MIDAZOLAM HYDROCHLORIDE 2 MG: 1 INJECTION INTRAMUSCULAR; INTRAVENOUS at 16:10

## 2019-08-11 RX ADMIN — MIDAZOLAM 2 MG: 1 INJECTION INTRAMUSCULAR; INTRAVENOUS at 14:11

## 2019-08-11 RX ADMIN — SODIUM CHLORIDE 1000 ML: 9 INJECTION, SOLUTION INTRAVENOUS at 01:01

## 2019-08-11 RX ADMIN — POTASSIUM CHLORIDE 10 MEQ: 7.46 INJECTION, SOLUTION INTRAVENOUS at 20:34

## 2019-08-11 RX ADMIN — POTASSIUM CHLORIDE 10 MEQ: 7.46 INJECTION, SOLUTION INTRAVENOUS at 22:36

## 2019-08-11 RX ADMIN — ZIPRASIDONE MESYLATE 20 MG: 20 INJECTION, POWDER, LYOPHILIZED, FOR SOLUTION INTRAMUSCULAR at 02:15

## 2019-08-11 RX ADMIN — VECURONIUM BROMIDE 10 MG: 10 INJECTION, POWDER, LYOPHILIZED, FOR SOLUTION INTRAVENOUS at 03:27

## 2019-08-11 RX ADMIN — DIPHENHYDRAMINE HYDROCHLORIDE 50 MG: 50 INJECTION INTRAMUSCULAR; INTRAVENOUS at 02:15

## 2019-08-11 RX ADMIN — VECURONIUM BROMIDE 10 MG: 1 INJECTION, POWDER, LYOPHILIZED, FOR SOLUTION INTRAVENOUS at 14:11

## 2019-08-11 RX ADMIN — PROPOFOL 45 MCG/KG/MIN: 10 INJECTION, EMULSION INTRAVENOUS at 13:46

## 2019-08-11 RX ADMIN — MIDAZOLAM 4 MG: 1 INJECTION INTRAMUSCULAR; INTRAVENOUS at 04:38

## 2019-08-11 RX ADMIN — POTASSIUM CHLORIDE 10 MEQ: 7.46 INJECTION, SOLUTION INTRAVENOUS at 21:52

## 2019-08-11 RX ADMIN — MIDAZOLAM 2 MG: 1 INJECTION INTRAMUSCULAR; INTRAVENOUS at 16:10

## 2019-08-11 RX ADMIN — POTASSIUM CHLORIDE 10 MEQ: 7.46 INJECTION, SOLUTION INTRAVENOUS at 23:58

## 2019-08-11 RX ADMIN — MIDAZOLAM 4 MG: 1 INJECTION INTRAMUSCULAR; INTRAVENOUS at 02:30

## 2019-08-11 RX ADMIN — DIPHENHYDRAMINE HYDROCHLORIDE 50 MG: 50 INJECTION, SOLUTION INTRAMUSCULAR; INTRAVENOUS at 02:15

## 2019-08-11 ASSESSMENT — PULMONARY FUNCTION TESTS
PIF_VALUE: 15

## 2019-08-11 ASSESSMENT — PAIN SCALES - GENERAL: PAINLEVEL_OUTOF10: 0

## 2019-08-11 NOTE — CONSULTS
cms    SECRETIONS   Amount:  [] Small [] Moderate  [] Large [x] None    Color:     [] White [] Colored  [] Bloody    SEDATION:  RAAS Score:   [x] Propofol gtt  [] Versed gtt  [] Ativan gtt   [] No Sedation    PARALYZED:  [x] No    [] Yes    VASOPRESSORS:  [x] No    [] Yes    If yes -   [] Levophed       [] Dopamine     [] Vasopressin       [] Dobutamine  [] Phenylephrine         [] Epinephrine    CENTRAL LINES:     [x] No   [] Yes   (Date of Insertion:   )           If yes -     [] Right IJ     [] Left IJ [] Right Femoral [] Left Femoral                   [] Right Subclavian [] Left Subclavian     SILVA'S CATHETER:   [] No   [x] Yes  (Date of Insertion:   )     URINE OUTPUT:            [x] Good   [] Low              [] Anuric    REVIEW OF SYSTEMS:    · Unable to perform patient is sedated    OBJECTIVE:     VITAL SIGNS:  BP (!) 148/82   Pulse 92   Temp 99 °F (37.2 °C) (Oral)   Resp 15   Ht 5' 9\" (1.753 m)   Wt 160 lb (72.6 kg)   SpO2 99%   BMI 23.63 kg/m²   Tmax over 24 hours:  Temp (24hrs), Av °F (36.7 °C), Min:97.1 °F (36.2 °C), Max:99 °F (37.2 °C)      Patient Vitals for the past 6 hrs:   BP Temp Temp src Pulse Resp SpO2 Weight   19 (!) 148/82 99 °F (37.2 °C) Oral 92 15 99 % --   19 1900 (!) 148/93 -- -- 114 24 99 % --   19 1800 (!) 147/92 -- -- 91 19 -- --   19 1749 (!) 140/89 -- -- 87 -- -- --   19 1722 (!) 150/91 97.8 °F (36.6 °C) Axillary 75 19 100 % 160 lb (72.6 kg)   19 1546 (!) 141/95 -- -- 85 19 100 % --   19 1445 (!) 141/94 -- -- 83 16 100 % --         Intake/Output Summary (Last 24 hours) at 2019  Last data filed at 2019  Gross per 24 hour   Intake --   Output  ml   Net - ml     Wt Readings from Last 2 Encounters:   19 160 lb (72.6 kg)   19 173 lb (78.5 kg)     Body mass index is 23.63 kg/m².       PHYSICAL EXAMINATION:  Constitutional: Patient is sedated and intubated  Head: Normocephalic and Hydroxyzine overdose   As discussed with poison control   Peak onset 6 hours after ingestion   Can cause EKG abnormalities   Can cause anticholinergic effects   We will follow-up repeat EKG   Possible concern for seizures   Consider EEG if patient has seizure-like activity   Given the timeframe of drug onset it is currently unlikely that he still suffers from side effects of the medication    4. Suicide attempt VS unintentional overdose   H/o bipolar with severe nini and psychotic behavior   We will hold home Seroquel dose   Consider psych consult once patient is off sedation     Pulmonary  · Currently intubated and on ventilator  · Will extubate in a.m. Genitourinary/Renal  1. Hypokalemia  · Potassium was 3.0 on repeat BMP  · 10 meq potassium were given  · Follow-up repeat BMP      WEAN PER PROTOCOL:  [x] No   [] Yes  [] N/A    ICU PROPHYLAXIS:  Stress ulcer:  [] PPI Agent  [] F6Pdnbr [] Sucralfate  [] Other:  VTE:   [x] Enoxaparin  [] Unfract. Heparin Subcut  [] EPC Cuffs    NUTRITION:  [x] NPO [] Tube Feeding (Specify: ) [] TPN  [] PO (Diet: Diet NPO Effective Now)    INSULIN DRIP:   [] No   [] Yes    CONSULTATION NEEDED:   [x] No   [] Yes    FAMILY UPDATED:    [x] No   [] Yes    TRANSFER OUT OF ICU:   [x] No   [] Yes    Abram Villareal MD PGY-1  Attending Physician: Dr. Sheree Jacobs  8/11/2019, 8:35 PM  Addendum ICU Attending Statement     Shima Dumont was seen, examined and discussed with the multi-disciplinary ICU team during rounds. A addendum note may be separate to the residents note. If not then, I have personally seen and examined the patient and the key elements of the encounter were performed by me (> 85 % time). The medications & laboratory data was discussed and adjusted where necessary. The radiographic images were reviewed either as a group or with radiologist.  Any changes are document or changed if felt dis-concordant with the exam or history.  The above findings were

## 2019-08-11 NOTE — ED NOTES
Pt resting in bed. Vitals are stable. Will conitnue to monitor.       Keo Crawford RN  08/11/19 1402

## 2019-08-11 NOTE — H&P
Pertinent positives as noted in the HPI. All other systems reviewed and negative. PHYSICAL EXAM:    BP (!) 150/91   Pulse 75   Temp 97.8 °F (36.6 °C) (Axillary)   Resp 19   Ht 5' 9\" (1.753 m)   Wt 160 lb (72.6 kg)   SpO2 100%   BMI 23.63 kg/m²     General appearance:  No apparent distress, appears stated age and cooperative. HEENT:  Normal cephalic, atraumatic without obvious deformity. Pupils equal, round, and reactive to light. Extra ocular muscles intact. Conjunctivae/corneas clear. Neck: Supple, with full range of motion. No jugular venous distention. Trachea midline. Respiratory:  Normal respiratory effort. Clear to auscultation, bilaterally without Rales/Wheezes/Rhonchi. Intubated  Cardiovascular:  Regular rate and rhythm with normal S1/S2 without murmurs, rubs or gallops. Abdomen: Soft, non-tender, non-distended with normal bowel sounds. Musculoskeletal:  No clubbing, cyanosis or edema bilaterally. Full range of motion without deformity. Skin: Skin color, texture, turgor normal.  No rashes or lesions. Neurologic: Sedated      CT Head WO Contrast   Final Result   No acute intracranial abnormality. This report has been electronically signed by Sabina Odom MD.      CT Cervical Spine WO Contrast   Final Result   No acute fracture. This report has been electronically signed by Sabina Odom MD.      XR CHEST PORTABLE   Final Result      Endotracheal and orogastric tubes as noted. No pulmonary airspace consolidation. Labs:     Recent Labs     08/11/19  0050   WBC 10.0   HGB 14.2   HCT 41.2        Recent Labs     08/11/19  0050      K 3.5      CO2 26   BUN 16   CREATININE 0.9   CALCIUM 9.2     Recent Labs     08/11/19  0050   AST 51*   ALT 17   BILITOT 1.1   ALKPHOS 111     Recent Labs     08/11/19  0050   INR 1.2     No results for input(s): Litzy Brandon in the last 72 hours.     Urinalysis:      Lab Results   Component Value Date    NITRU Negative 03/01/2018    WBCUA NONE 10/27/2017    BACTERIA FEW 10/27/2017    RBCUA NONE 10/27/2017    BLOODU Negative 03/01/2018    SPECGRAV 1.015 03/01/2018    GLUCOSEU Negative 03/01/2018         ASSESSMENT:  Drug overdose  Combative behavior  AMS  Acute hypoxic respiratory failure     PLAN:  Admit to ICU   Wean vent and sedation as able  Consult psych when more alert and less combative  Poison control contacted  Suicide precautions        DVT Prophylaxis: lovenox  Diet: Diet NPO Effective Now  Code Status: Full Code    PT/OT Eval Status: na    Dispo - ICU        Radha Ghotra MD    Thank you Bassem Saunders MD for the opportunity to be involved in this patient's care. If you have any questions or concerns please feel free to contact me at [phone].

## 2019-08-11 NOTE — ED NOTES
Spoke with poison control and updated them with the patients condition. They will continue to follow.      Janet Martin RN  08/11/19 3121

## 2019-08-12 LAB
ALBUMIN SERPL-MCNC: 3.7 G/DL (ref 3.5–5.2)
ALP BLD-CCNC: 100 U/L (ref 40–129)
ALT SERPL-CCNC: 17 U/L (ref 0–40)
ANION GAP SERPL CALCULATED.3IONS-SCNC: 15 MMOL/L (ref 7–16)
AST SERPL-CCNC: 45 U/L (ref 0–39)
BASOPHILS ABSOLUTE: 0.04 E9/L (ref 0–0.2)
BASOPHILS RELATIVE PERCENT: 0.3 % (ref 0–2)
BILIRUB SERPL-MCNC: 1.3 MG/DL (ref 0–1.2)
BUN BLDV-MCNC: 6 MG/DL (ref 6–20)
CALCIUM SERPL-MCNC: 8.4 MG/DL (ref 8.6–10.2)
CHLORIDE BLD-SCNC: 106 MMOL/L (ref 98–107)
CO2: 21 MMOL/L (ref 22–29)
CREAT SERPL-MCNC: 0.8 MG/DL (ref 0.7–1.2)
EKG ATRIAL RATE: 88 BPM
EKG ATRIAL RATE: 89 BPM
EKG P AXIS: 50 DEGREES
EKG P AXIS: 74 DEGREES
EKG P-R INTERVAL: 140 MS
EKG P-R INTERVAL: 146 MS
EKG Q-T INTERVAL: 390 MS
EKG Q-T INTERVAL: 410 MS
EKG QRS DURATION: 92 MS
EKG QRS DURATION: 96 MS
EKG QTC CALCULATION (BAZETT): 474 MS
EKG QTC CALCULATION (BAZETT): 496 MS
EKG R AXIS: 63 DEGREES
EKG R AXIS: 85 DEGREES
EKG T AXIS: 42 DEGREES
EKG T AXIS: 56 DEGREES
EKG VENTRICULAR RATE: 88 BPM
EKG VENTRICULAR RATE: 89 BPM
EOSINOPHILS ABSOLUTE: 0.18 E9/L (ref 0.05–0.5)
EOSINOPHILS RELATIVE PERCENT: 1.6 % (ref 0–6)
GFR AFRICAN AMERICAN: >60
GFR NON-AFRICAN AMERICAN: >60 ML/MIN/1.73
GLUCOSE BLD-MCNC: 88 MG/DL (ref 74–99)
HCT VFR BLD CALC: 40.8 % (ref 37–54)
HEMOGLOBIN: 13.8 G/DL (ref 12.5–16.5)
IMMATURE GRANULOCYTES #: 0.03 E9/L
IMMATURE GRANULOCYTES %: 0.3 % (ref 0–5)
LYMPHOCYTES ABSOLUTE: 1.16 E9/L (ref 1.5–4)
LYMPHOCYTES RELATIVE PERCENT: 10 % (ref 20–42)
MAGNESIUM: 2.1 MG/DL (ref 1.6–2.6)
MCH RBC QN AUTO: 30.9 PG (ref 26–35)
MCHC RBC AUTO-ENTMCNC: 33.8 % (ref 32–34.5)
MCV RBC AUTO: 91.5 FL (ref 80–99.9)
MONOCYTES ABSOLUTE: 0.97 E9/L (ref 0.1–0.95)
MONOCYTES RELATIVE PERCENT: 8.4 % (ref 2–12)
NEUTROPHILS ABSOLUTE: 9.23 E9/L (ref 1.8–7.3)
NEUTROPHILS RELATIVE PERCENT: 79.4 % (ref 43–80)
PDW BLD-RTO: 12.6 FL (ref 11.5–15)
PHOSPHORUS: 2.4 MG/DL (ref 2.5–4.5)
PLATELET # BLD: 193 E9/L (ref 130–450)
PMV BLD AUTO: 10.6 FL (ref 7–12)
POTASSIUM REFLEX MAGNESIUM: 4.2 MMOL/L (ref 3.5–5)
POTASSIUM SERPL-SCNC: 4.2 MMOL/L (ref 3.5–5)
RBC # BLD: 4.46 E12/L (ref 3.8–5.8)
REASON FOR REJECTION: NORMAL
REJECTED TEST: NORMAL
SODIUM BLD-SCNC: 142 MMOL/L (ref 132–146)
TOTAL PROTEIN: 6.6 G/DL (ref 6.4–8.3)
WBC # BLD: 11.6 E9/L (ref 4.5–11.5)

## 2019-08-12 PROCEDURE — 93010 ELECTROCARDIOGRAM REPORT: CPT | Performed by: INTERNAL MEDICINE

## 2019-08-12 PROCEDURE — 84100 ASSAY OF PHOSPHORUS: CPT

## 2019-08-12 PROCEDURE — 2580000003 HC RX 258: Performed by: EMERGENCY MEDICINE

## 2019-08-12 PROCEDURE — 36415 COLL VENOUS BLD VENIPUNCTURE: CPT

## 2019-08-12 PROCEDURE — 1200000000 HC SEMI PRIVATE

## 2019-08-12 PROCEDURE — 80048 BASIC METABOLIC PNL TOTAL CA: CPT

## 2019-08-12 PROCEDURE — 80053 COMPREHEN METABOLIC PANEL: CPT

## 2019-08-12 PROCEDURE — 83735 ASSAY OF MAGNESIUM: CPT

## 2019-08-12 PROCEDURE — 2580000003 HC RX 258: Performed by: INTERNAL MEDICINE

## 2019-08-12 PROCEDURE — 85025 COMPLETE CBC W/AUTO DIFF WBC: CPT

## 2019-08-12 PROCEDURE — 99253 IP/OBS CNSLTJ NEW/EST LOW 45: CPT | Performed by: NURSE PRACTITIONER

## 2019-08-12 PROCEDURE — 6360000002 HC RX W HCPCS: Performed by: INTERNAL MEDICINE

## 2019-08-12 RX ORDER — LORAZEPAM 2 MG/ML
2 INJECTION INTRAMUSCULAR ONCE
Status: COMPLETED | OUTPATIENT
Start: 2019-08-12 | End: 2019-08-12

## 2019-08-12 RX ADMIN — Medication 10 ML: at 20:55

## 2019-08-12 RX ADMIN — LORAZEPAM 2 MG: 2 INJECTION INTRAMUSCULAR; INTRAVENOUS at 22:47

## 2019-08-12 RX ADMIN — SODIUM CHLORIDE: 9 INJECTION, SOLUTION INTRAVENOUS at 05:47

## 2019-08-12 ASSESSMENT — PAIN SCALES - GENERAL
PAINLEVEL_OUTOF10: 0

## 2019-08-12 NOTE — ED NOTES
[facility] AWARE OF PATIENT NEED FOR PSYCH BED. MEDICAL CLEARANCE NEEDED BEFORE PATIENT CAN BE REFERRED FOR INPATIENT PSYCH.        Reba Dotson, DARINEL, \[facility] [place] [facility]\""  08/12/19 Jenae Camacho, MSRU, [place]  08/12/19 1986

## 2019-08-12 NOTE — CONSULTS
PSYCHIATRIC EVALUATION  (Consult)     CHIEF COMPLAINT:   [x] Mood Problems [x] Anxiety Problems [] Psychosis                    [x] Suicidal/Homicidal   [] Aggression  [] Other    HISTORY OF PRESENT ILLNESS: Renea Arias  is a 21 y.o. male who has a previous psychiatric history of bipolar disorder and presents for admission with overdose on trazodone and hydroxyzine. Symptoms onset was years ago and and is becoming severe for the last few days. This presentation associates with superficial cuts to wrists, suicide attempt via overdose, and aggressive behavior in ER, and usually is worsened by medication noncompliance. Please transfer to psych when medically clear. Precipitating Factors:     [] Family Stress   [] Recent loss/grief Stress   [] Health Stress   [] Relationship Stress    [] Legal Stress   [x] Environmental Stress    [] Occupational Stress   [] Financial Stress   [] Substance Abuse [] Other      PAST PSYCHIATRIC HISTORY:   History of psychiatric Hospitalization:    [] Denies    [x] yes  [] Days ago     []  Weeks Ago    [x] Months ago  [] Years ago              [x] [facility]  [] [facility]  [] Other:        [] Once  [x] More than once    Outpatient treatment:  [] [facility]  [] [place]  [] [facility]              [] [facility]  [] [facility] Pretty      [] [facility] [] [facility]      [] [facility] [] [facility]  [] [facility] [] [facility]             [] currently  [] in the past  [x] Non-Compliant    [] Denies    Previous suicide attempt: [x]Denies            [] yes  [] OD  [] Cutting  [] Hanging  [] Gun  [] Other    Previous psych medications:  [x] Was prescribed               [] Currently Taking       [] Never taken medications      PAST MEDICAL HISTORY:       Diagnosis Date    Bipolar 1 disorder [facility]     Depression     Hypertension     Severe manic bipolar 1 disorder with psychotic behavior ([facility] 10/28/2017       FAMILY PSYCHIATRIC HISTORY:  History reviewed.  No pertinent family history.        [] Denies       [x] Endorses               [] Father                [] Depression  [] Anxiety  [] Bipolar  [] Psychosis  []  Other                  [x] Mother               [] Depression  [] Anxiety  [x] Bipolar  [] Psychosis  []  Other                  [] Sibling               [] Depression  [] Anxiety  [] Bipolar  [] Psychosis  []  Other                  [] Grandparent               [] Depression  [] Anxiety  [] Bipolar  [] Psychosis  []  Other       SOCIAL HISTORY:     1. Living Situation:[x] Private Residence [] Homeless [] [address]             [] Assisted Living [] [address]  [] Shelter [] Other   2. Employment:  [] Unemployed  [] Employed  [] Disabled  [] Retired   3. Legal History: [x] No Arrest [] Arrest  [] Theft  []  Assault  [] Substances   4. History of Trama/ Abuse: [x] Denies  [] Emotional [] Physical [] Sexual   5. Spirituality: [x] Spiritual [] Not Spiritual   6. Substance Abuse: [] Denies  [x] Drug of choice      [] Amphetamines [x] Marijuana [] Cocaine      [] Opioids  [] Alcohol  [] Benzodiazepines     For further SH review SW note. Risk Assessment:  1. Risk Factors:   [x] Depression  [x] Anxiety  [] Psychosis   [x] Suicidal/Homicidal Thoughts [x] Suicide Attempt [] Substance Abuse     2. Protective Factors: [x] Controlled Environment         [x] Supportive Family []         [] Cheondoism Support     3. Level of Risk: [] Mild [] Moderate [x] Severe      Strengths & Weaknesses:    1. Strengths: [x] Ability to communicate feelings     [x] Independent ADL's     [x] Supportive Family    [] Current Health Status     2.  Weaknesses: [x] Emotional          [x] Motivational     MEDICATIONS: Current Facility-Administered Medications: 0.9 % sodium chloride infusion, , Intravenous, Continuous  LORazepam (ATIVAN) injection 2 mg, 2 mg, Intravenous, Once  ondansetron (ZOFRAN) injection 4 mg, 4 mg, Intravenous, Q6H PRN  enoxaparin (LOVENOX) injection 40 mg, 40 mg, Subcutaneous, Linear and Goal Directed  [] Tangential  [] Circumstantial     Thought Content:  [] Denies [x] Endorses [x] Suicidal [] Homicidal  [x] Delusional      [] Paranoid  [] Somatic  [x] Grandiose    Perception: []  None  [] Auditory   [] Visual  [] tactile   [] olfactory  [] Illusions         Insight: [] Intact  [] Fair  [x] Limited    Judgement:  [] Intact  [] Fair  [x] Limited        ASSESSMENT  Patient Active Problem List   Diagnosis    Acute psychosis ([facility]    Schizoaffective disorder, bipolar type ([facility]    Bipolar 1 disorder, manic, moderate (HCC)    Severe manic bipolar 1 disorder with psychotic behavior ([facility] [address] [facility]    Anxiety state    Cannabis use disorder, severe, dependence ([address]    Bipolar 1 disorder (HCC)    Bipolar affective disorder, current episode mixed, without psychotic features ([address]    Drug overdose         DX:  Bipolar Depressed    Recommendations and plan of treatment: Patient is actively presenting with severe psychiatric symptoms. Will benefit from inpatient hospitalization. Please transfer to psych when medically clear.       Signed:  Shital Thompson  8/12/2019  12:46 PM

## 2019-08-12 NOTE — PLAN OF CARE
Problem: Falls - Risk of:  Goal: Will remain free from falls  Description  Will remain free from falls  8/12/2019 0413 by Angelo Greenberg RN  Outcome: Met This Shift  8/11/2019 2233 by Angelo Greenberg RN  Outcome: Met This Shift  Goal: Absence of physical injury  Description  Absence of physical injury  8/12/2019 0413 by Angelo Greenberg RN  Outcome: Met This Shift  8/11/2019 2233 by Angelo Greenberg RN  Outcome: Met This Shift     Problem: Restraint Use - Nonviolent/Non-Self-Destructive Behavior:  Goal: Absence of restraint indications  Description  Absence of restraint indications  8/12/2019 0413 by Angelo Greenberg RN  Outcome: Not Met This Shift  8/11/2019 2233 by Angelo Greenberg RN  Outcome: Not Met This Shift  8/11/2019 1807 by Darlynn Alpers, RN  Outcome: Not Met This Shift  Goal: Absence of restraint-related injury  Description  Absence of restraint-related injury  8/12/2019 0413 by Angelo Greenberg RN  Outcome: Met This Shift  8/11/2019 2233 by Angelo Greenberg RN  Outcome: Met This Shift  8/11/2019 1807 by Darlynn Alpers, RN  Outcome: Met This Shift     Problem: Suicide risk  Goal: Provide patient with safe environment  Description  Provide patient with safe environment  Outcome: Met This Shift

## 2019-08-12 NOTE — PROGRESS NOTES
Daily    sodium chloride flush  10 mL Intravenous 2 times per day     PRN Meds: ondansetron, sodium chloride flush, magnesium hydroxide    Labs and Imaging Studies     CBC:   Recent Labs     08/11/19  0050 08/12/19  0530   WBC 10.0 11.6*   HGB 14.2 13.8   HCT 41.2 40.8   MCV 89.8 91.5    193       BMP:    Recent Labs     08/11/19  0050 08/11/19  1800 08/12/19  0420    143 142   K 3.5 3.0* 4.2  4.2    107 106   CO2 26 30* 21*   BUN 16 8 6   CREATININE 0.9 1.0 0.8   GLUCOSE 123* 86 88       LIVER PROFILE:   Recent Labs     08/11/19  0050 08/11/19  1800 08/12/19  0420   AST 51* 42* 45*   ALT 17 15 17   BILITOT 1.1 0.8 1.3*   ALKPHOS 111 98 100       PT/INR:   Recent Labs     08/11/19  0050   PROTIME 13.7*   INR 1.2       APTT:   No results for input(s): APTT in the last 72 hours. Fasting Lipid Panel:    Lab Results   Component Value Date    CHOL 241 10/17/2016    TRIG 133 10/17/2016    HDL 48 10/17/2016       Cardiac Enzymes:    Lab Results   Component Value Date    CKTOTAL 1,701 (H) 08/11/2019    CKTOTAL 624 (H) 08/08/2016    TROPONINI <0.01 08/08/2016       Notable Cultures:      Blood cultures No results found for: BC  Respiratory cultures No results found for: RESPCULTURE No results found for: LABGRAM  Urine No results found for: LABURIN  Legionella No results found for: LABLEGI  C Diff PCR No results found for: CDIFPCR  Wound culture/abscess: No results for input(s): WNDABS in the last 72 hours. Tip culture:No results for input(s): CXCATHTIP in the last 72 hours. Antibiotic  Days  Day started                                Oxygen:     Vent Information  $Ventilation: $Initial Day  Vent Type:  Other(comment)(HT70)  Vent Mode: AC/VC  Vt Ordered: 450 mL  Rate Set: 16 bmp  Peak Flow: 45 L/min  FiO2 : 30 %  PEEP/CPAP: 5  Cuff Pressure (cm H2O): 29 cm H2O  Humidification Source: HME  Additional Respiratory  Assessments  Pulse: 85  Resp: 29  SpO2: 100 %  End Tidal CO2: 41 (%)  Humidification therapists and consultants. Critical care services and times documented are independent of procedures and multidisciplinary rounds with Residents. Additionally comprehensive, multidisciplinary rounds were conducted with the MICU team. The case was discussed in detail and plans for care were established. Review of Residents documentation was conducted and revisions were made as appropriate.  I agree with the above documented exam, problem list and plan of care with the following additions:    Admitted for intentional overdose  Aggressive toward staff  Self extubated  Transfer to [facility], medically clear    June Carrizales MD

## 2019-08-13 ENCOUNTER — HOSPITAL ENCOUNTER (INPATIENT)
Age: 24
LOS: 3 days | Discharge: HOME | DRG: 751 | End: 2019-08-16
Attending: PSYCHIATRY & NEUROLOGY | Admitting: PSYCHIATRY & NEUROLOGY
Payer: MEDICAID

## 2019-08-13 VITALS
HEART RATE: 81 BPM | OXYGEN SATURATION: 99 % | HEIGHT: 69 IN | SYSTOLIC BLOOD PRESSURE: 153 MMHG | BODY MASS INDEX: 23.7 KG/M2 | DIASTOLIC BLOOD PRESSURE: 90 MMHG | TEMPERATURE: 99 F | RESPIRATION RATE: 27 BRPM | WEIGHT: 160 LBS

## 2019-08-13 DIAGNOSIS — F31.12 BIPOLAR 1 DISORDER, MANIC, MODERATE (HCC): Primary | ICD-10-CM

## 2019-08-13 LAB — METER GLUCOSE: 104 MG/DL (ref 74–99)

## 2019-08-13 PROCEDURE — 2580000003 HC RX 258: Performed by: INTERNAL MEDICINE

## 2019-08-13 PROCEDURE — 1240000000 HC EMOTIONAL WELLNESS R&B

## 2019-08-13 PROCEDURE — 6360000002 HC RX W HCPCS: Performed by: PSYCHIATRY & NEUROLOGY

## 2019-08-13 PROCEDURE — 82962 GLUCOSE BLOOD TEST: CPT

## 2019-08-13 RX ORDER — ZIPRASIDONE MESYLATE 20 MG/ML
20 INJECTION, POWDER, LYOPHILIZED, FOR SOLUTION INTRAMUSCULAR ONCE
Status: COMPLETED | OUTPATIENT
Start: 2019-08-13 | End: 2019-08-13

## 2019-08-13 RX ORDER — LORAZEPAM 2 MG/ML
2 INJECTION INTRAMUSCULAR ONCE
Status: COMPLETED | OUTPATIENT
Start: 2019-08-13 | End: 2019-08-13

## 2019-08-13 RX ORDER — OLANZAPINE 10 MG/1
10 INJECTION, POWDER, LYOPHILIZED, FOR SOLUTION INTRAMUSCULAR EVERY 4 HOURS PRN
Status: DISCONTINUED | OUTPATIENT
Start: 2019-08-13 | End: 2019-08-16 | Stop reason: HOSPADM

## 2019-08-13 RX ORDER — OLANZAPINE 5 MG/1
5 TABLET ORAL EVERY 4 HOURS PRN
Status: DISCONTINUED | OUTPATIENT
Start: 2019-08-13 | End: 2019-08-16 | Stop reason: HOSPADM

## 2019-08-13 RX ORDER — BENZTROPINE MESYLATE 1 MG/ML
2 INJECTION INTRAMUSCULAR; INTRAVENOUS 2 TIMES DAILY PRN
Status: DISCONTINUED | OUTPATIENT
Start: 2019-08-13 | End: 2019-08-16 | Stop reason: HOSPADM

## 2019-08-13 RX ORDER — ACETAMINOPHEN 325 MG/1
650 TABLET ORAL EVERY 4 HOURS PRN
Status: DISCONTINUED | OUTPATIENT
Start: 2019-08-13 | End: 2019-08-16 | Stop reason: HOSPADM

## 2019-08-13 RX ORDER — TRAZODONE HYDROCHLORIDE 50 MG/1
50 TABLET ORAL NIGHTLY PRN
Status: DISCONTINUED | OUTPATIENT
Start: 2019-08-14 | End: 2019-08-16 | Stop reason: HOSPADM

## 2019-08-13 RX ORDER — HYDROXYZINE PAMOATE 25 MG/1
50 CAPSULE ORAL 3 TIMES DAILY PRN
Status: DISCONTINUED | OUTPATIENT
Start: 2019-08-13 | End: 2019-08-16 | Stop reason: HOSPADM

## 2019-08-13 RX ORDER — MAGNESIUM HYDROXIDE/ALUMINUM HYDROXICE/SIMETHICONE 120; 1200; 1200 MG/30ML; MG/30ML; MG/30ML
30 SUSPENSION ORAL PRN
Status: DISCONTINUED | OUTPATIENT
Start: 2019-08-13 | End: 2019-08-16 | Stop reason: HOSPADM

## 2019-08-13 RX ORDER — NICOTINE 21 MG/24HR
1 PATCH, TRANSDERMAL 24 HOURS TRANSDERMAL DAILY
Status: DISCONTINUED | OUTPATIENT
Start: 2019-08-14 | End: 2019-08-16 | Stop reason: HOSPADM

## 2019-08-13 RX ADMIN — Medication 10 ML: at 09:11

## 2019-08-13 RX ADMIN — LORAZEPAM 2 MG: 2 INJECTION INTRAMUSCULAR; INTRAVENOUS at 19:30

## 2019-08-13 RX ADMIN — ZIPRASIDONE MESYLATE 20 MG: 20 INJECTION, POWDER, LYOPHILIZED, FOR SOLUTION INTRAMUSCULAR at 19:30

## 2019-08-13 ASSESSMENT — PAIN SCALES - GENERAL
PAINLEVEL_OUTOF10: 0
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
## 2019-08-13 NOTE — PROGRESS NOTES
42* 45*   ALT 17 15 17   BILITOT 1.1 0.8 1.3*   ALKPHOS 111 98 100     Recent Labs     08/11/19  0050   INR 1.2     Recent Labs     08/11/19  0050   CKTOTAL 1,701*     Recent Labs     08/11/19  0050 08/11/19  1800 08/12/19  0420   AST 51* 42* 45*   ALT 17 15 17   BILITOT 1.1 0.8 1.3*   ALKPHOS 111 98 100     Recent Labs     08/11/19  0050   LACTA 1.3     No results found for: Jenny Lombard  No results found for: AMMONIA    Assessment:    Active Hospital Problems    Diagnosis Date Noted    Drug overdose [T50.901A] 08/11/2019       Plan:  Cont supportive care   for psych today    Electronically signed by Lida Giron DO on 8/13/2019 at 6:09 PM Yelitza Anderson

## 2019-08-13 NOTE — ED NOTES
Pt accepted to 7s per Dr Anjelica Zelaya, room 2571. Spoke to floor. Unable to find pt nurse.  They will have him call     Dejon Stearns RN  08/13/19 3504

## 2019-08-13 NOTE — PLAN OF CARE
Patient is medically clearred to be transfer to [facility].     Wilberto Pabon MD  08/13/19  5:12 PM

## 2019-08-14 VITALS
HEART RATE: 90 BPM | TEMPERATURE: 98 F | RESPIRATION RATE: 18 BRPM | BODY MASS INDEX: 23.7 KG/M2 | HEIGHT: 69 IN | DIASTOLIC BLOOD PRESSURE: 87 MMHG | SYSTOLIC BLOOD PRESSURE: 131 MMHG | OXYGEN SATURATION: 97 % | WEIGHT: 160 LBS

## 2019-08-14 PROCEDURE — 1240000000 HC EMOTIONAL WELLNESS R&B

## 2019-08-14 PROCEDURE — 99222 1ST HOSP IP/OBS MODERATE 55: CPT | Performed by: NURSE PRACTITIONER

## 2019-08-14 PROCEDURE — 6370000000 HC RX 637 (ALT 250 FOR IP): Performed by: PSYCHIATRY & NEUROLOGY

## 2019-08-14 PROCEDURE — 6370000000 HC RX 637 (ALT 250 FOR IP): Performed by: NURSE PRACTITIONER

## 2019-08-14 RX ORDER — HALOPERIDOL 5 MG
10 TABLET ORAL 2 TIMES DAILY
Status: DISCONTINUED | OUTPATIENT
Start: 2019-08-14 | End: 2019-08-15

## 2019-08-14 RX ORDER — HYDROXYZINE PAMOATE 50 MG/1
50 CAPSULE ORAL 3 TIMES DAILY PRN
Status: ON HOLD | COMMUNITY
End: 2019-10-25 | Stop reason: HOSPADM

## 2019-08-14 RX ORDER — ALPRAZOLAM 1 MG/1
1 TABLET ORAL 2 TIMES DAILY
Status: DISCONTINUED | OUTPATIENT
Start: 2019-08-14 | End: 2019-08-15

## 2019-08-14 RX ORDER — QUETIAPINE FUMARATE 200 MG/1
400 TABLET, FILM COATED ORAL NIGHTLY
Status: ON HOLD | COMMUNITY
End: 2019-08-16 | Stop reason: HOSPADM

## 2019-08-14 RX ORDER — QUETIAPINE FUMARATE 200 MG/1
200 TABLET, FILM COATED ORAL DAILY
Status: ON HOLD | COMMUNITY
End: 2019-08-16 | Stop reason: HOSPADM

## 2019-08-14 RX ADMIN — HALOPERIDOL 10 MG: 5 TABLET ORAL at 20:04

## 2019-08-14 RX ADMIN — TRAZODONE HYDROCHLORIDE 50 MG: 50 TABLET ORAL at 20:04

## 2019-08-14 RX ADMIN — HALOPERIDOL 10 MG: 5 TABLET ORAL at 11:25

## 2019-08-14 RX ADMIN — ALPRAZOLAM 1 MG: 1 TABLET ORAL at 11:25

## 2019-08-14 RX ADMIN — ALPRAZOLAM 1 MG: 1 TABLET ORAL at 20:04

## 2019-08-14 ASSESSMENT — PAIN - FUNCTIONAL ASSESSMENT: PAIN_FUNCTIONAL_ASSESSMENT: 0-10

## 2019-08-14 ASSESSMENT — PAIN SCALES - GENERAL: PAINLEVEL_OUTOF10: 0

## 2019-08-14 NOTE — GROUP NOTE
Group Therapy Note    Date: August 14    Group Start Time: 1010  Group End Time: 1100  Group Topic: Psychoeducation    [facility] AC[facility]  [id number], [place]        Group Therapy Note    Attendees: [facility]   Patient's objective: Patient will be able to identify triggers and what patient has been dealing with prior to admission. Status After Intervention:  Improved  Participation Level: Active Listener and Interactive  Participation Quality: Appropriate, Attentive and Sharing  Speech:  normal   Thought Process/Content: Logical  Affective Functioning: Congruent  Mood: euthymic  Level of consciousness:  Alert, Oriented x4 and Attentive  Response to Learning: Able to verbalize/acknowledge new learning, Able to retain information and Progressing to goal  Endings: None Reported  Modes of Intervention: Education, Support, Socialization and Problem-solving  Discipline Responsible: Psychoeducational Specialist  Signature:  Mikhail Jones     Notes:  Patient attended class, kept to self and declined to verbally participate when prompted.

## 2019-08-14 NOTE — BH NOTE
Pt is stable though is not communicative. Pt denies suicidal / homicidal ideations. Pt denies hallucinations. Pt appears to be internally stimulated. Pt does not respond very much at all to questions. Has poverty of content in questions / requests asked of him. Will follow and monitor.

## 2019-08-14 NOTE — PROGRESS NOTES
Patient remains sleep at this time in the quiet room no signs or symptoms of discomfort or distress noted. Will continue to monitor patient q 15 minutes.

## 2019-08-14 NOTE — CARE COORDINATION
using marijuana with them. Mo states she doesn't want him to use marijuana in her home. Grandparents have had custody of pt since age 8. Mo had lost contact with him up until he turned 25. She is now trying to get closer with him. 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 She is now retired and on ss, and will be able to stay home with pt. She is agreeable with pt remaining with [facility] in [facility].

## 2019-08-14 NOTE — PROGRESS NOTES
Unable to complete assessment at this time. Patient fails to respond verbally when prompted. Patient appears to be internally stimulated. Will try again tomorrow hopefully with better results.

## 2019-08-15 PROCEDURE — 6370000000 HC RX 637 (ALT 250 FOR IP): Performed by: PSYCHIATRY & NEUROLOGY

## 2019-08-15 PROCEDURE — 6360000002 HC RX W HCPCS: Performed by: PSYCHIATRY & NEUROLOGY

## 2019-08-15 PROCEDURE — 1240000000 HC EMOTIONAL WELLNESS R&B

## 2019-08-15 PROCEDURE — 6370000000 HC RX 637 (ALT 250 FOR IP): Performed by: NURSE PRACTITIONER

## 2019-08-15 PROCEDURE — 99232 SBSQ HOSP IP/OBS MODERATE 35: CPT | Performed by: NURSE PRACTITIONER

## 2019-08-15 RX ORDER — ALPRAZOLAM 1 MG/1
1 TABLET ORAL 2 TIMES DAILY
Status: DISCONTINUED | OUTPATIENT
Start: 2019-08-15 | End: 2019-08-16 | Stop reason: HOSPADM

## 2019-08-15 RX ORDER — HALOPERIDOL 5 MG
20 TABLET ORAL 2 TIMES DAILY
Status: DISCONTINUED | OUTPATIENT
Start: 2019-08-15 | End: 2019-08-16 | Stop reason: HOSPADM

## 2019-08-15 RX ADMIN — HALOPERIDOL 10 MG: 5 TABLET ORAL at 08:41

## 2019-08-15 RX ADMIN — HALOPERIDOL 20 MG: 5 TABLET ORAL at 21:15

## 2019-08-15 RX ADMIN — ALPRAZOLAM 1 MG: 1 TABLET ORAL at 21:15

## 2019-08-15 RX ADMIN — BENZTROPINE MESYLATE 2 MG: 1 INJECTION INTRAMUSCULAR; INTRAVENOUS at 14:29

## 2019-08-15 RX ADMIN — TRAZODONE HYDROCHLORIDE 50 MG: 50 TABLET ORAL at 21:15

## 2019-08-15 RX ADMIN — ALPRAZOLAM 1 MG: 1 TABLET ORAL at 08:41

## 2019-08-15 ASSESSMENT — PAIN SCALES - GENERAL: PAINLEVEL_OUTOF10: 0

## 2019-08-15 NOTE — PROGRESS NOTES
Patient attended and participated in [facility] and was actively engaged. Patient was 1 out of 12.

## 2019-08-15 NOTE — PLAN OF CARE
Out of room for periods of time. Pacing hallways. Offers very little conversation. Denies suicidal ideations or intent to harm himself or others. Appears preoccupied and guarded. Will continue to monitor and offer reassurance.

## 2019-08-15 NOTE — GROUP NOTE
Group Therapy Note    Date: August 14    Group Start Time: 2030  Group End Time: 2100  Group Topic: Healthy Living/Wellness    [facility]  1    Citlalli Morgan RN; Marcelina Moore RN        Group Therapy Note    Attendees: 20/24

## 2019-08-16 PROCEDURE — 6370000000 HC RX 637 (ALT 250 FOR IP): Performed by: PSYCHIATRY & NEUROLOGY

## 2019-08-16 PROCEDURE — 6370000000 HC RX 637 (ALT 250 FOR IP): Performed by: NURSE PRACTITIONER

## 2019-08-16 PROCEDURE — 6360000002 HC RX W HCPCS: Performed by: NURSE PRACTITIONER

## 2019-08-16 PROCEDURE — 99238 HOSP IP/OBS DSCHRG MGMT 30/<: CPT | Performed by: NURSE PRACTITIONER

## 2019-08-16 RX ORDER — HALOPERIDOL 20 MG/1
20 TABLET ORAL 2 TIMES DAILY
Qty: 60 TABLET | Refills: 0 | Status: ON HOLD | OUTPATIENT
Start: 2019-08-16 | End: 2019-10-25 | Stop reason: HOSPADM

## 2019-08-16 RX ORDER — HALOPERIDOL DECANOATE 50 MG/ML
50 INJECTION INTRAMUSCULAR
Status: DISCONTINUED | OUTPATIENT
Start: 2019-08-16 | End: 2019-08-16 | Stop reason: HOSPADM

## 2019-08-16 RX ORDER — BENZTROPINE MESYLATE 1 MG/1
1 TABLET ORAL 2 TIMES DAILY
Qty: 60 TABLET | Refills: 0 | Status: SHIPPED | OUTPATIENT
Start: 2019-08-16 | End: 2020-02-28 | Stop reason: ALTCHOICE

## 2019-08-16 RX ORDER — ALPRAZOLAM 1 MG/1
1 TABLET ORAL 2 TIMES DAILY
Qty: 28 TABLET | Refills: 0 | Status: SHIPPED | OUTPATIENT
Start: 2019-08-16 | End: 2019-08-30

## 2019-08-16 RX ORDER — BENZTROPINE MESYLATE 1 MG/1
1 TABLET ORAL 2 TIMES DAILY
Status: DISCONTINUED | OUTPATIENT
Start: 2019-08-16 | End: 2019-08-16 | Stop reason: HOSPADM

## 2019-08-16 RX ORDER — HALOPERIDOL DECANOATE 50 MG/ML
50 INJECTION INTRAMUSCULAR
Qty: 1 ML | Refills: 0 | Status: ON HOLD | OUTPATIENT
Start: 2019-09-16 | End: 2019-10-25 | Stop reason: HOSPADM

## 2019-08-16 RX ADMIN — ALPRAZOLAM 1 MG: 1 TABLET ORAL at 09:17

## 2019-08-16 RX ADMIN — BENZTROPINE MESYLATE 1 MG: 1 TABLET ORAL at 13:23

## 2019-08-16 RX ADMIN — HYDROXYZINE PAMOATE 50 MG: 25 CAPSULE ORAL at 13:16

## 2019-08-16 RX ADMIN — HALOPERIDOL DECANOATE 50 MG: 50 INJECTION, SOLUTION INTRAMUSCULAR at 13:24

## 2019-08-16 ASSESSMENT — PAIN SCALES - GENERAL: PAINLEVEL_OUTOF10: 0

## 2019-08-16 NOTE — PROGRESS NOTES
[facility]  Discharge Note    Pt discharged with followings belongings:   Dentures: None  Vision - Corrective Lenses: None  Hearing Aid: None  Jewelry: None  Body Piercings Removed: N/A  Clothing: (as documented by unit tech. )  Were All Patient Medications Collected?: Not Applicable  Other Valuables: Other (Comment)(as documenyed by unit tech. )   Valuables sent home with yes. Valuables retrieved from safe, Security envelope number:  n/a and returned to patient. Patient education on aftercare instructions: yes  Information faxed to [facility] by  This staff Patient verbalize understanding of AVS:  yes    Status EXAM upon discharge:  Status and Exam  Normal: No  Facial Expression: Avoids Gaze, Flat  Affect: Blunt  Level of Consciousness: Alert  Mood:Normal: No  Mood: Suspicious  Motor Activity:Normal: No  Motor Activity: Increased  Interview Behavior: Cooperative, Evasive  Preception: [place] to Person, [place] to Time, [place] to Place  Attention:Normal: No  Attention: Distractible  Thought Processes: Blocking  Thought Content:Normal: No  Thought Content: Paranoia, Poverty of Content  Hallucinations: None  Delusions: Yes  Delusions:  Other(See Comment)  Memory:Normal: No  Memory: Poor Recent, Poor Remote  Insight and Judgment: No  Insight and Judgment: Poor Judgment, Poor Insight  Present Suicidal Ideation: No  Present Homicidal Ideation: No      Metabolic Screening:    No results found for: LABA1C    Lab Results   Component Value Date    CHOL 241 (H) 10/17/2016     Lab Results   Component Value Date    TRIG 133 10/17/2016     Lab Results   Component Value Date    HDL 48 10/17/2016     No components found for: [facility] [place]  Lab Results   Component Value Date    LABVLDL 27 10/17/2016       Justino Jacobsen RN

## 2019-08-16 NOTE — DISCHARGE SUMMARY
Physician Discharge Summary      Physical Examination   VS/Measurements:     /87   Pulse 90   Temp 98 °F (36.7 °C) (Oral)   Resp 18   Ht 5' 9\" (1.753 m)   Wt 160 lb (72.6 kg)   SpO2 97%   BMI 23.63 kg/m²         Discharge Medications:              Cong Arzola [address] Medication Instructions VBI:[id number]    Printed on:08/16/19 5849   Medication Information                      ALPRAZolam (XANAX) 1 MG tablet  Take 1 tablet by mouth 2 times daily for 14 days.              benztropine (COGENTIN) 1 MG tablet  Take 1 tablet by mouth 2 times daily             haloperidol (HALDOL) 20 MG tablet  Take 1 tablet by mouth 2 times daily             haloperidol decanoate (HALDOL DECANOATE) 50 MG/ML injection  Inject 1 mL into the muscle every 30 days             hydrOXYzine (VISTARIL) 50 MG capsule  Take 50 mg by mouth 3 times daily as needed for Anxiety             nicotine (NICODERM CQ) 14 MG/24HR  Place 1 patch onto the skin daily                     Psychiatric: Mental Status Examination:    Cognition:      [x] Alert  [x] Awake  [x] Oriented  [x] Person  [x] Place [x] Time    [] drowsy  [] tired  [] lethargic  [] distractable       Attention/Concentration:   [x] Attentive  [] Distracted        Memory Recent and Remote: [x] Intact   [] Impaired [] Partially Impaired     Language: [] Able to recognize and name objects          [] Unable to recognize and name Objects    Fund of Knowledge:  [] Poor []  Fair  [] Good    Speech: [x] Normal  [] Soft  [] Slow  [] Fast [] Pressured            [] Loud [] Dysarthria  [] Incoherent       Appearance: [] Well Groomed  [x] Casual Dressed  [] Unkept  [] Disheveled          [] Normal weight[] Thin  [] Overweight  [] Obese           Attitude: [] Positive  [] Hostile  [] Demanding  [] Guarded  [] Defensive         [x] Cooperative  []  Uncooperative      Behavior:  [x] Normal Gait  [] Walks with Assistance  [] Joie Chair    [] Walks with [place] Golas  [] In Hospital Bed [] Sitting in Chair    Muscle-Skeletal:  [x] Normal Muscle Tone [] Muscle Atrophy       [] Abnormal Muscle Movement     Eye Contact:  [x] Good eye contact  [] Intermittent Eye Contact  [] Poor Eye Contact     Mood: [] Depressed  [] Anxious  [] Irritated  [x] Euthymic   [] Angry [] Restless    Affect:  [x] Congruent  [] Incongruent  [] Labile  [] Constricted  [] Flat  [] Bizarre     Thought Process and Association:  [] Logical [] Illogical       [x] Linear and Goal Directed  [] Tangential  [] Circumstantial     Thought Content:  [x] Denies [] Endorses [] Suicidal [] Homicidal  [] Delusional      [] Paranoid  [] Somatic  [] Grandiose    Perception: [x]  None  [] Auditory   [] Visual  [] tactile   [] olfactory  [] Illusions         Insight: [] Intact  [x] Fair  [] Limited    Judgement:  [] Intact  [x] Fair  [] Limited    Hospital Course:   Admit Date: 8/13/2019     Discharge Date: 8/16/2019  Admitted from:  [x]  Emergency Room  []  Home  []  Another facility   []  [facility]     Admitting diagnosis:   Patient Active Problem List   Diagnosis    [facility]    Schizoaffective disorder, bipolar type ([facility]    Bipolar 1 disorder, manic, moderate (HCC)    Severe manic bipolar 1 disorder with psychotic behavior ([address]    Anxiety state    Cannabis use disorder, severe, dependence ([address]    Bipolar 1 disorder ([address]    Bipolar affective disorder, current episode mixed, without psychotic features ([facility]    Drug overdose      Length of stay:  3 days              Robert Henderson was admitted in Psychiatric unit  from ER with depression and SI. Patient was treated            With the above . Patient responded well to the treatment.      Discharge Summary Plan:     Discharge Status:    [x] Improved [] Unchanged    [] Worse       Discharge instructions given:  [x] Patient    [] Family [] Other         Discharge disposition:  [x] Home [] Step Down unit  [] Group Home []  [facility]

## 2019-08-17 LAB — CANNABINOIDS CONF, URINE: 70 NG/ML

## 2019-10-10 ENCOUNTER — APPOINTMENT (OUTPATIENT)
Dept: CT IMAGING | Age: 24
DRG: 053 | End: 2019-10-10
Payer: MEDICAID

## 2019-10-10 ENCOUNTER — HOSPITAL ENCOUNTER (EMERGENCY)
Age: 24
Discharge: HOME | DRG: 053 | End: 2019-10-10
Attending: EMERGENCY MEDICINE
Payer: MEDICAID

## 2019-10-10 VITALS
SYSTOLIC BLOOD PRESSURE: 144 MMHG | RESPIRATION RATE: 16 BRPM | OXYGEN SATURATION: 98 % | WEIGHT: 160 LBS | BODY MASS INDEX: 25.11 KG/M2 | DIASTOLIC BLOOD PRESSURE: 85 MMHG | HEIGHT: 67 IN | TEMPERATURE: 98.7 F | HEART RATE: 98 BPM

## 2019-10-10 DIAGNOSIS — R56.9 SEIZURE (HCC): Primary | ICD-10-CM

## 2019-10-10 LAB
ACETAMINOPHEN LEVEL: <5 MCG/ML (ref 10–30)
ALBUMIN SERPL-MCNC: 4.5 G/DL (ref 3.5–5.2)
ALP BLD-CCNC: 118 U/L (ref 40–129)
ALT SERPL-CCNC: 14 U/L (ref 0–40)
AMPHETAMINE SCREEN, URINE: NOT DETECTED
ANION GAP SERPL CALCULATED.3IONS-SCNC: 16 MMOL/L (ref 7–16)
AST SERPL-CCNC: 16 U/L (ref 0–39)
BARBITURATE SCREEN URINE: NOT DETECTED
BASOPHILS ABSOLUTE: 0.04 E9/L (ref 0–0.2)
BASOPHILS RELATIVE PERCENT: 0.5 % (ref 0–2)
BENZODIAZEPINE SCREEN, URINE: NOT DETECTED
BILIRUB SERPL-MCNC: 0.4 MG/DL (ref 0–1.2)
BUN BLDV-MCNC: 10 MG/DL (ref 6–20)
CALCIUM SERPL-MCNC: 9.3 MG/DL (ref 8.6–10.2)
CANNABINOID SCREEN URINE: NOT DETECTED
CHLORIDE BLD-SCNC: 94 MMOL/L (ref 98–107)
CO2: 24 MMOL/L (ref 22–29)
COCAINE METABOLITE SCREEN URINE: NOT DETECTED
CREAT SERPL-MCNC: 0.9 MG/DL (ref 0.7–1.2)
EOSINOPHILS ABSOLUTE: 0.07 E9/L (ref 0.05–0.5)
EOSINOPHILS RELATIVE PERCENT: 0.9 % (ref 0–6)
ETHANOL: <10 MG/DL (ref 0–0.08)
GFR AFRICAN AMERICAN: >60
GFR NON-AFRICAN AMERICAN: >60 ML/MIN/1.73
GLUCOSE BLD-MCNC: 72 MG/DL (ref 74–99)
HCT VFR BLD CALC: 44.4 % (ref 37–54)
HEMOGLOBIN: 15.1 G/DL (ref 12.5–16.5)
IMMATURE GRANULOCYTES #: 0.04 E9/L
IMMATURE GRANULOCYTES %: 0.5 % (ref 0–5)
LYMPHOCYTES ABSOLUTE: 1.28 E9/L (ref 1.5–4)
LYMPHOCYTES RELATIVE PERCENT: 15.9 % (ref 20–42)
Lab: NORMAL
MCH RBC QN AUTO: 30.9 PG (ref 26–35)
MCHC RBC AUTO-ENTMCNC: 34 % (ref 32–34.5)
MCV RBC AUTO: 90.8 FL (ref 80–99.9)
METHADONE SCREEN, URINE: NOT DETECTED
MONOCYTES ABSOLUTE: 0.78 E9/L (ref 0.1–0.95)
MONOCYTES RELATIVE PERCENT: 9.7 % (ref 2–12)
NEUTROPHILS ABSOLUTE: 5.85 E9/L (ref 1.8–7.3)
NEUTROPHILS RELATIVE PERCENT: 72.5 % (ref 43–80)
OPIATE SCREEN URINE: NOT DETECTED
PDW BLD-RTO: 12.8 FL (ref 11.5–15)
PHENCYCLIDINE SCREEN URINE: NOT DETECTED
PLATELET # BLD: 268 E9/L (ref 130–450)
PMV BLD AUTO: 10.2 FL (ref 7–12)
POTASSIUM SERPL-SCNC: 4 MMOL/L (ref 3.5–5)
PROPOXYPHENE SCREEN: NOT DETECTED
RBC # BLD: 4.89 E12/L (ref 3.8–5.8)
SALICYLATE, SERUM: 4.3 MG/DL (ref 0–30)
SODIUM BLD-SCNC: 134 MMOL/L (ref 132–146)
TOTAL PROTEIN: 7.9 G/DL (ref 6.4–8.3)
TRICYCLIC ANTIDEPRESSANTS SCREEN SERUM: NEGATIVE NG/ML
WBC # BLD: 8.1 E9/L (ref 4.5–11.5)

## 2019-10-10 PROCEDURE — G0480 DRUG TEST DEF 1-7 CLASSES: HCPCS

## 2019-10-10 PROCEDURE — 80307 DRUG TEST PRSMV CHEM ANLYZR: CPT

## 2019-10-10 PROCEDURE — 6360000002 HC RX W HCPCS

## 2019-10-10 PROCEDURE — 99285 EMERGENCY DEPT VISIT HI MDM: CPT

## 2019-10-10 PROCEDURE — 36415 COLL VENOUS BLD VENIPUNCTURE: CPT

## 2019-10-10 PROCEDURE — 70450 CT HEAD/BRAIN W/O DYE: CPT

## 2019-10-10 PROCEDURE — 80053 COMPREHEN METABOLIC PANEL: CPT

## 2019-10-10 PROCEDURE — 6370000000 HC RX 637 (ALT 250 FOR IP): Performed by: EMERGENCY MEDICINE

## 2019-10-10 PROCEDURE — 96374 THER/PROPH/DIAG INJ IV PUSH: CPT

## 2019-10-10 PROCEDURE — 85025 COMPLETE CBC W/AUTO DIFF WBC: CPT

## 2019-10-10 RX ORDER — DIAPER,BRIEF,INFANT-TODD,DISP
EACH MISCELLANEOUS ONCE
Status: COMPLETED | OUTPATIENT
Start: 2019-10-10 | End: 2019-10-10

## 2019-10-10 RX ORDER — LORAZEPAM 2 MG/ML
INJECTION INTRAMUSCULAR
Status: COMPLETED
Start: 2019-10-10 | End: 2019-10-10

## 2019-10-10 RX ORDER — LORAZEPAM 2 MG/ML
2 INJECTION INTRAMUSCULAR ONCE
Status: COMPLETED | OUTPATIENT
Start: 2019-10-10 | End: 2019-10-10

## 2019-10-10 RX ORDER — LEVETIRACETAM 10 MG/ML
1000 INJECTION INTRAVASCULAR ONCE
Status: DISCONTINUED | OUTPATIENT
Start: 2019-10-10 | End: 2019-10-10

## 2019-10-10 RX ADMIN — BACITRACIN ZINC: 500 OINTMENT TOPICAL at 13:34

## 2019-10-10 RX ADMIN — LORAZEPAM 2 MG: 2 INJECTION INTRAMUSCULAR at 11:55

## 2019-10-10 RX ADMIN — LORAZEPAM 2 MG: 2 INJECTION INTRAMUSCULAR; INTRAVENOUS at 11:55

## 2019-10-12 ENCOUNTER — APPOINTMENT (OUTPATIENT)
Dept: CT IMAGING | Age: 24
DRG: 053 | End: 2019-10-12
Payer: MEDICAID

## 2019-10-12 ENCOUNTER — HOSPITAL ENCOUNTER (INPATIENT)
Age: 24
LOS: 1 days | Discharge: PSYCHIATRIC OR SUBSTANCE USE FACILITY | DRG: 053 | End: 2019-10-13
Attending: EMERGENCY MEDICINE | Admitting: INTERNAL MEDICINE
Payer: MEDICAID

## 2019-10-12 DIAGNOSIS — R56.9 SEIZURE (HCC): Primary | ICD-10-CM

## 2019-10-12 LAB
ACETAMINOPHEN LEVEL: <5 MCG/ML (ref 10–30)
ALBUMIN SERPL-MCNC: 4.5 G/DL (ref 3.5–5.2)
ALP BLD-CCNC: 128 U/L (ref 40–129)
ALT SERPL-CCNC: 12 U/L (ref 0–40)
AMPHETAMINE SCREEN, URINE: NOT DETECTED
ANION GAP SERPL CALCULATED.3IONS-SCNC: 9 MMOL/L (ref 7–16)
AST SERPL-CCNC: 17 U/L (ref 0–39)
BACTERIA: ABNORMAL /HPF
BACTERIA: ABNORMAL /HPF
BARBITURATE SCREEN URINE: NOT DETECTED
BASOPHILS ABSOLUTE: 0.03 E9/L (ref 0–0.2)
BASOPHILS RELATIVE PERCENT: 0.6 % (ref 0–2)
BENZODIAZEPINE SCREEN, URINE: NOT DETECTED
BILIRUB SERPL-MCNC: 0.2 MG/DL (ref 0–1.2)
BILIRUBIN URINE: NEGATIVE
BILIRUBIN URINE: NEGATIVE
BLOOD, URINE: NEGATIVE
BLOOD, URINE: NEGATIVE
BUN BLDV-MCNC: 10 MG/DL (ref 6–20)
CALCIUM SERPL-MCNC: 9.4 MG/DL (ref 8.6–10.2)
CANNABINOID SCREEN URINE: POSITIVE
CHLORIDE BLD-SCNC: 100 MMOL/L (ref 98–107)
CLARITY: CLEAR
CLARITY: CLEAR
CO2: 31 MMOL/L (ref 22–29)
COCAINE METABOLITE SCREEN URINE: NOT DETECTED
COLOR: ABNORMAL
COLOR: YELLOW
CREAT SERPL-MCNC: 1 MG/DL (ref 0.7–1.2)
EOSINOPHILS ABSOLUTE: 0.11 E9/L (ref 0.05–0.5)
EOSINOPHILS RELATIVE PERCENT: 2 % (ref 0–6)
ETHANOL: 20 MG/DL (ref 0–0.08)
GFR AFRICAN AMERICAN: >60
GFR NON-AFRICAN AMERICAN: >60 ML/MIN/1.73
GLUCOSE BLD-MCNC: 92 MG/DL (ref 74–99)
GLUCOSE URINE: NEGATIVE MG/DL
GLUCOSE URINE: NEGATIVE MG/DL
HCT VFR BLD CALC: 44.2 % (ref 37–54)
HEMOGLOBIN: 14.6 G/DL (ref 12.5–16.5)
IMMATURE GRANULOCYTES #: 0.02 E9/L
IMMATURE GRANULOCYTES %: 0.4 % (ref 0–5)
KETONES, URINE: NEGATIVE MG/DL
KETONES, URINE: NEGATIVE MG/DL
LACTIC ACID: 1.5 MMOL/L (ref 0.5–2.2)
LEUKOCYTE ESTERASE, URINE: ABNORMAL
LEUKOCYTE ESTERASE, URINE: NEGATIVE
LYMPHOCYTES ABSOLUTE: 1.28 E9/L (ref 1.5–4)
LYMPHOCYTES RELATIVE PERCENT: 23.7 % (ref 20–42)
Lab: ABNORMAL
MAGNESIUM: 2.4 MG/DL (ref 1.6–2.6)
MCH RBC QN AUTO: 30.4 PG (ref 26–35)
MCHC RBC AUTO-ENTMCNC: 33 % (ref 32–34.5)
MCV RBC AUTO: 91.9 FL (ref 80–99.9)
METHADONE SCREEN, URINE: NOT DETECTED
MONOCYTES ABSOLUTE: 0.38 E9/L (ref 0.1–0.95)
MONOCYTES RELATIVE PERCENT: 7 % (ref 2–12)
NEUTROPHILS ABSOLUTE: 3.59 E9/L (ref 1.8–7.3)
NEUTROPHILS RELATIVE PERCENT: 66.3 % (ref 43–80)
NITRITE, URINE: NEGATIVE
NITRITE, URINE: NEGATIVE
OPIATE SCREEN URINE: NOT DETECTED
PDW BLD-RTO: 12.8 FL (ref 11.5–15)
PH UA: 7 (ref 5–9)
PH UA: 7 (ref 5–9)
PHENCYCLIDINE SCREEN URINE: NOT DETECTED
PHOSPHORUS: 3.6 MG/DL (ref 2.5–4.5)
PLATELET # BLD: 238 E9/L (ref 130–450)
PMV BLD AUTO: 9.8 FL (ref 7–12)
POTASSIUM REFLEX MAGNESIUM: 4 MMOL/L (ref 3.5–5)
PROLACTIN: 6.21 NG/ML
PROPOXYPHENE SCREEN: NOT DETECTED
PROTEIN UA: NEGATIVE MG/DL
PROTEIN UA: NEGATIVE MG/DL
RBC # BLD: 4.81 E12/L (ref 3.8–5.8)
RBC UA: ABNORMAL /HPF (ref 0–2)
RBC UA: ABNORMAL /HPF (ref 0–2)
SALICYLATE, SERUM: 3.2 MG/DL (ref 0–30)
SODIUM BLD-SCNC: 140 MMOL/L (ref 132–146)
SPECIFIC GRAVITY UA: 1.01 (ref 1–1.03)
SPECIFIC GRAVITY UA: <=1.005 (ref 1–1.03)
T4 TOTAL: 7.9 MCG/DL (ref 4.5–11.7)
TOTAL CK: 111 U/L (ref 20–200)
TOTAL PROTEIN: 7.5 G/DL (ref 6.4–8.3)
TRICYCLIC ANTIDEPRESSANTS SCREEN SERUM: NEGATIVE NG/ML
TSH SERPL DL<=0.05 MIU/L-ACNC: 0.79 UIU/ML (ref 0.27–4.2)
UROBILINOGEN, URINE: 0.2 E.U./DL
UROBILINOGEN, URINE: 1 E.U./DL
WBC # BLD: 5.4 E9/L (ref 4.5–11.5)
WBC UA: ABNORMAL /HPF (ref 0–5)
WBC UA: ABNORMAL /HPF (ref 0–5)

## 2019-10-12 PROCEDURE — 2580000003 HC RX 258: Performed by: INTERNAL MEDICINE

## 2019-10-12 PROCEDURE — 70450 CT HEAD/BRAIN W/O DYE: CPT

## 2019-10-12 PROCEDURE — 6370000000 HC RX 637 (ALT 250 FOR IP): Performed by: INTERNAL MEDICINE

## 2019-10-12 PROCEDURE — 93005 ELECTROCARDIOGRAM TRACING: CPT | Performed by: EMERGENCY MEDICINE

## 2019-10-12 PROCEDURE — 2060000000 HC ICU INTERMEDIATE R&B

## 2019-10-12 PROCEDURE — 80307 DRUG TEST PRSMV CHEM ANLYZR: CPT

## 2019-10-12 PROCEDURE — 83735 ASSAY OF MAGNESIUM: CPT

## 2019-10-12 PROCEDURE — 81001 URINALYSIS AUTO W/SCOPE: CPT

## 2019-10-12 PROCEDURE — 84146 ASSAY OF PROLACTIN: CPT

## 2019-10-12 PROCEDURE — 80053 COMPREHEN METABOLIC PANEL: CPT

## 2019-10-12 PROCEDURE — 36415 COLL VENOUS BLD VENIPUNCTURE: CPT

## 2019-10-12 PROCEDURE — G0480 DRUG TEST DEF 1-7 CLASSES: HCPCS

## 2019-10-12 PROCEDURE — 94760 N-INVAS EAR/PLS OXIMETRY 1: CPT

## 2019-10-12 PROCEDURE — 84443 ASSAY THYROID STIM HORMONE: CPT

## 2019-10-12 PROCEDURE — G0378 HOSPITAL OBSERVATION PER HR: HCPCS

## 2019-10-12 PROCEDURE — 82550 ASSAY OF CK (CPK): CPT

## 2019-10-12 PROCEDURE — 84100 ASSAY OF PHOSPHORUS: CPT

## 2019-10-12 PROCEDURE — 6360000002 HC RX W HCPCS: Performed by: EMERGENCY MEDICINE

## 2019-10-12 PROCEDURE — 99285 EMERGENCY DEPT VISIT HI MDM: CPT

## 2019-10-12 PROCEDURE — 84436 ASSAY OF TOTAL THYROXINE: CPT

## 2019-10-12 PROCEDURE — 96365 THER/PROPH/DIAG IV INF INIT: CPT

## 2019-10-12 PROCEDURE — 83605 ASSAY OF LACTIC ACID: CPT

## 2019-10-12 PROCEDURE — 85025 COMPLETE CBC W/AUTO DIFF WBC: CPT

## 2019-10-12 RX ORDER — ONDANSETRON 2 MG/ML
4 INJECTION INTRAMUSCULAR; INTRAVENOUS EVERY 4 HOURS PRN
Status: DISCONTINUED | OUTPATIENT
Start: 2019-10-12 | End: 2019-10-13 | Stop reason: HOSPADM

## 2019-10-12 RX ORDER — SODIUM CHLORIDE 0.9 % (FLUSH) 0.9 %
10 SYRINGE (ML) INJECTION PRN
Status: DISCONTINUED | OUTPATIENT
Start: 2019-10-12 | End: 2019-10-13 | Stop reason: HOSPADM

## 2019-10-12 RX ORDER — FAMOTIDINE 20 MG/1
20 TABLET, FILM COATED ORAL 2 TIMES DAILY
Status: DISCONTINUED | OUTPATIENT
Start: 2019-10-12 | End: 2019-10-13 | Stop reason: HOSPADM

## 2019-10-12 RX ORDER — ACETAMINOPHEN 325 MG/1
650 TABLET ORAL EVERY 4 HOURS PRN
Status: DISCONTINUED | OUTPATIENT
Start: 2019-10-12 | End: 2019-10-13 | Stop reason: HOSPADM

## 2019-10-12 RX ORDER — SODIUM CHLORIDE 0.9 % (FLUSH) 0.9 %
10 SYRINGE (ML) INJECTION EVERY 12 HOURS SCHEDULED
Status: DISCONTINUED | OUTPATIENT
Start: 2019-10-12 | End: 2019-10-13 | Stop reason: HOSPADM

## 2019-10-12 RX ORDER — LEVETIRACETAM 10 MG/ML
1000 INJECTION INTRAVASCULAR ONCE
Status: COMPLETED | OUTPATIENT
Start: 2019-10-12 | End: 2019-10-12

## 2019-10-12 RX ADMIN — FAMOTIDINE 20 MG: 20 TABLET ORAL at 20:00

## 2019-10-12 RX ADMIN — Medication 10 ML: at 20:01

## 2019-10-12 RX ADMIN — LEVETIRACETAM 1000 MG: 10 INJECTION INTRAVENOUS at 13:05

## 2019-10-12 ASSESSMENT — PAIN SCALES - GENERAL
PAINLEVEL_OUTOF10: 0
PAINLEVEL_OUTOF10: 0

## 2019-10-13 ENCOUNTER — HOSPITAL ENCOUNTER (INPATIENT)
Age: 24
LOS: 12 days | Discharge: ANOTHER HOSPITAL | DRG: 750 | End: 2019-10-25
Attending: PSYCHIATRY & NEUROLOGY | Admitting: PSYCHIATRY & NEUROLOGY
Payer: MEDICAID

## 2019-10-13 VITALS
BODY MASS INDEX: 25.11 KG/M2 | HEIGHT: 67 IN | RESPIRATION RATE: 16 BRPM | SYSTOLIC BLOOD PRESSURE: 137 MMHG | WEIGHT: 160 LBS | OXYGEN SATURATION: 100 % | HEART RATE: 73 BPM | TEMPERATURE: 98.2 F | DIASTOLIC BLOOD PRESSURE: 82 MMHG

## 2019-10-13 LAB
ALBUMIN SERPL-MCNC: 4.1 G/DL (ref 3.5–5.2)
ALP BLD-CCNC: 145 U/L (ref 40–129)
ALT SERPL-CCNC: 13 U/L (ref 0–40)
ANION GAP SERPL CALCULATED.3IONS-SCNC: 11 MMOL/L (ref 7–16)
AST SERPL-CCNC: 14 U/L (ref 0–39)
BASOPHILS ABSOLUTE: 0.06 E9/L (ref 0–0.2)
BASOPHILS RELATIVE PERCENT: 0.9 % (ref 0–2)
BILIRUB SERPL-MCNC: 0.3 MG/DL (ref 0–1.2)
BUN BLDV-MCNC: 15 MG/DL (ref 6–20)
CALCIUM SERPL-MCNC: 8.8 MG/DL (ref 8.6–10.2)
CHLORIDE BLD-SCNC: 104 MMOL/L (ref 98–107)
CO2: 25 MMOL/L (ref 22–29)
CREAT SERPL-MCNC: 1 MG/DL (ref 0.7–1.2)
EKG ATRIAL RATE: 86 BPM
EKG P AXIS: 54 DEGREES
EKG P-R INTERVAL: 148 MS
EKG Q-T INTERVAL: 366 MS
EKG QRS DURATION: 94 MS
EKG QTC CALCULATION (BAZETT): 437 MS
EKG R AXIS: 115 DEGREES
EKG T AXIS: 7 DEGREES
EKG VENTRICULAR RATE: 86 BPM
EOSINOPHILS ABSOLUTE: 0.49 E9/L (ref 0.05–0.5)
EOSINOPHILS RELATIVE PERCENT: 7 % (ref 0–6)
GFR AFRICAN AMERICAN: >60
GFR NON-AFRICAN AMERICAN: >60 ML/MIN/1.73
GLUCOSE BLD-MCNC: 123 MG/DL (ref 74–99)
HCT VFR BLD CALC: 41.7 % (ref 37–54)
HEMOGLOBIN: 14 G/DL (ref 12.5–16.5)
IMMATURE GRANULOCYTES #: 0.02 E9/L
IMMATURE GRANULOCYTES %: 0.3 % (ref 0–5)
LYMPHOCYTES ABSOLUTE: 2.08 E9/L (ref 1.5–4)
LYMPHOCYTES RELATIVE PERCENT: 29.8 % (ref 20–42)
MCH RBC QN AUTO: 31.3 PG (ref 26–35)
MCHC RBC AUTO-ENTMCNC: 33.6 % (ref 32–34.5)
MCV RBC AUTO: 93.1 FL (ref 80–99.9)
MONOCYTES ABSOLUTE: 0.56 E9/L (ref 0.1–0.95)
MONOCYTES RELATIVE PERCENT: 8 % (ref 2–12)
NEUTROPHILS ABSOLUTE: 3.77 E9/L (ref 1.8–7.3)
NEUTROPHILS RELATIVE PERCENT: 54 % (ref 43–80)
PDW BLD-RTO: 13.1 FL (ref 11.5–15)
PLATELET # BLD: 243 E9/L (ref 130–450)
PMV BLD AUTO: 10.5 FL (ref 7–12)
POTASSIUM SERPL-SCNC: 4.1 MMOL/L (ref 3.5–5)
RBC # BLD: 4.48 E12/L (ref 3.8–5.8)
SODIUM BLD-SCNC: 140 MMOL/L (ref 132–146)
TOTAL PROTEIN: 6.7 G/DL (ref 6.4–8.3)
WBC # BLD: 7 E9/L (ref 4.5–11.5)

## 2019-10-13 PROCEDURE — 6370000000 HC RX 637 (ALT 250 FOR IP): Performed by: INTERNAL MEDICINE

## 2019-10-13 PROCEDURE — 6370000000 HC RX 637 (ALT 250 FOR IP): Performed by: PSYCHIATRY & NEUROLOGY

## 2019-10-13 PROCEDURE — G0378 HOSPITAL OBSERVATION PER HR: HCPCS

## 2019-10-13 PROCEDURE — 85025 COMPLETE CBC W/AUTO DIFF WBC: CPT

## 2019-10-13 PROCEDURE — 99253 IP/OBS CNSLTJ NEW/EST LOW 45: CPT | Performed by: PSYCHIATRY & NEUROLOGY

## 2019-10-13 PROCEDURE — 99222 1ST HOSP IP/OBS MODERATE 55: CPT | Performed by: INTERNAL MEDICINE

## 2019-10-13 PROCEDURE — 80053 COMPREHEN METABOLIC PANEL: CPT

## 2019-10-13 PROCEDURE — 1240000000 HC EMOTIONAL WELLNESS R&B

## 2019-10-13 PROCEDURE — 93010 ELECTROCARDIOGRAM REPORT: CPT | Performed by: INTERNAL MEDICINE

## 2019-10-13 PROCEDURE — 6370000000 HC RX 637 (ALT 250 FOR IP): Performed by: NURSE PRACTITIONER

## 2019-10-13 PROCEDURE — 36415 COLL VENOUS BLD VENIPUNCTURE: CPT

## 2019-10-13 RX ORDER — FAMOTIDINE 20 MG/1
20 TABLET, FILM COATED ORAL 2 TIMES DAILY
Status: CANCELLED | OUTPATIENT
Start: 2019-10-13

## 2019-10-13 RX ORDER — LORAZEPAM 2 MG/ML
0.5 INJECTION INTRAMUSCULAR ONCE
Status: DISCONTINUED | OUTPATIENT
Start: 2019-10-13 | End: 2019-10-13 | Stop reason: HOSPADM

## 2019-10-13 RX ORDER — ZIPRASIDONE MESYLATE 20 MG/ML
20 INJECTION, POWDER, LYOPHILIZED, FOR SOLUTION INTRAMUSCULAR ONCE
Status: DISCONTINUED | OUTPATIENT
Start: 2019-10-13 | End: 2019-10-13

## 2019-10-13 RX ORDER — QUETIAPINE FUMARATE 25 MG/1
25 TABLET, FILM COATED ORAL 2 TIMES DAILY
Status: DISCONTINUED | OUTPATIENT
Start: 2019-10-13 | End: 2019-10-13 | Stop reason: HOSPADM

## 2019-10-13 RX ORDER — OLANZAPINE 10 MG/1
10 INJECTION, POWDER, LYOPHILIZED, FOR SOLUTION INTRAMUSCULAR EVERY 4 HOURS PRN
Status: DISCONTINUED | OUTPATIENT
Start: 2019-10-13 | End: 2019-10-16

## 2019-10-13 RX ORDER — HYDROXYZINE HYDROCHLORIDE 10 MG/1
50 TABLET, FILM COATED ORAL 3 TIMES DAILY PRN
Status: DISCONTINUED | OUTPATIENT
Start: 2019-10-13 | End: 2019-10-14 | Stop reason: SDUPTHER

## 2019-10-13 RX ORDER — ACETAMINOPHEN 325 MG/1
650 TABLET ORAL EVERY 4 HOURS PRN
Status: DISCONTINUED | OUTPATIENT
Start: 2019-10-13 | End: 2019-10-13 | Stop reason: DRUGHIGH

## 2019-10-13 RX ORDER — ZIPRASIDONE MESYLATE 20 MG/ML
20 INJECTION, POWDER, LYOPHILIZED, FOR SOLUTION INTRAMUSCULAR
Status: CANCELLED | OUTPATIENT
Start: 2019-10-13 | End: 2019-10-13

## 2019-10-13 RX ORDER — QUETIAPINE FUMARATE 25 MG/1
25 TABLET, FILM COATED ORAL 2 TIMES DAILY
Status: DISCONTINUED | OUTPATIENT
Start: 2019-10-13 | End: 2019-10-17

## 2019-10-13 RX ORDER — DIPHENHYDRAMINE HCL 25 MG
50 TABLET ORAL ONCE
Status: DISCONTINUED | OUTPATIENT
Start: 2019-10-13 | End: 2019-10-13 | Stop reason: HOSPADM

## 2019-10-13 RX ORDER — CARBAMAZEPINE 200 MG/1
200 TABLET ORAL 2 TIMES DAILY
Status: CANCELLED | OUTPATIENT
Start: 2019-10-13

## 2019-10-13 RX ORDER — FAMOTIDINE 20 MG/1
20 TABLET, FILM COATED ORAL 2 TIMES DAILY
Status: DISCONTINUED | OUTPATIENT
Start: 2019-10-13 | End: 2019-10-25 | Stop reason: HOSPADM

## 2019-10-13 RX ORDER — ACETAMINOPHEN 325 MG/1
650 TABLET ORAL EVERY 4 HOURS PRN
Status: CANCELLED | OUTPATIENT
Start: 2019-10-13

## 2019-10-13 RX ORDER — QUETIAPINE FUMARATE 25 MG/1
25 TABLET, FILM COATED ORAL 2 TIMES DAILY
Status: CANCELLED | OUTPATIENT
Start: 2019-10-13

## 2019-10-13 RX ORDER — TRAZODONE HYDROCHLORIDE 50 MG/1
50 TABLET ORAL NIGHTLY PRN
Status: DISCONTINUED | OUTPATIENT
Start: 2019-10-13 | End: 2019-10-25 | Stop reason: HOSPADM

## 2019-10-13 RX ORDER — ACETAMINOPHEN 325 MG/1
650 TABLET ORAL EVERY 4 HOURS PRN
Status: DISCONTINUED | OUTPATIENT
Start: 2019-10-13 | End: 2019-10-25 | Stop reason: HOSPADM

## 2019-10-13 RX ORDER — CARBAMAZEPINE 200 MG/1
200 TABLET ORAL 2 TIMES DAILY
Status: DISCONTINUED | OUTPATIENT
Start: 2019-10-13 | End: 2019-10-13 | Stop reason: HOSPADM

## 2019-10-13 RX ORDER — RISPERIDONE 2 MG/1
2 TABLET, ORALLY DISINTEGRATING ORAL ONCE
Status: COMPLETED | OUTPATIENT
Start: 2019-10-13 | End: 2019-10-13

## 2019-10-13 RX ORDER — ZIPRASIDONE MESYLATE 20 MG/ML
20 INJECTION, POWDER, LYOPHILIZED, FOR SOLUTION INTRAMUSCULAR
Status: DISCONTINUED | OUTPATIENT
Start: 2019-10-13 | End: 2019-10-13 | Stop reason: HOSPADM

## 2019-10-13 RX ORDER — OLANZAPINE 5 MG/1
5 TABLET ORAL EVERY 4 HOURS PRN
Status: DISCONTINUED | OUTPATIENT
Start: 2019-10-13 | End: 2019-10-16

## 2019-10-13 RX ORDER — CARBAMAZEPINE 200 MG/1
200 TABLET ORAL 2 TIMES DAILY
Status: DISCONTINUED | OUTPATIENT
Start: 2019-10-13 | End: 2019-10-17

## 2019-10-13 RX ORDER — BENZTROPINE MESYLATE 1 MG/ML
2 INJECTION INTRAMUSCULAR; INTRAVENOUS 2 TIMES DAILY PRN
Status: DISCONTINUED | OUTPATIENT
Start: 2019-10-13 | End: 2019-10-25 | Stop reason: HOSPADM

## 2019-10-13 RX ORDER — ZIPRASIDONE MESYLATE 20 MG/ML
20 INJECTION, POWDER, LYOPHILIZED, FOR SOLUTION INTRAMUSCULAR
Status: ACTIVE | OUTPATIENT
Start: 2019-10-13 | End: 2019-10-13

## 2019-10-13 RX ORDER — MAGNESIUM HYDROXIDE/ALUMINUM HYDROXICE/SIMETHICONE 120; 1200; 1200 MG/30ML; MG/30ML; MG/30ML
30 SUSPENSION ORAL PRN
Status: DISCONTINUED | OUTPATIENT
Start: 2019-10-13 | End: 2019-10-25 | Stop reason: HOSPADM

## 2019-10-13 RX ADMIN — TRAZODONE HYDROCHLORIDE 50 MG: 50 TABLET ORAL at 20:06

## 2019-10-13 RX ADMIN — QUETIAPINE FUMARATE 25 MG: 25 TABLET ORAL at 11:32

## 2019-10-13 RX ADMIN — FAMOTIDINE 20 MG: 20 TABLET, FILM COATED ORAL at 20:05

## 2019-10-13 RX ADMIN — QUETIAPINE FUMARATE 25 MG: 25 TABLET ORAL at 20:06

## 2019-10-13 RX ADMIN — CARBAMAZEPINE 200 MG: 200 TABLET ORAL at 20:05

## 2019-10-13 RX ADMIN — OLANZAPINE 5 MG: 5 TABLET, FILM COATED ORAL at 17:37

## 2019-10-13 RX ADMIN — RISPERIDONE 2 MG: 2 TABLET, ORALLY DISINTEGRATING ORAL at 11:32

## 2019-10-13 RX ADMIN — FAMOTIDINE 20 MG: 20 TABLET ORAL at 07:51

## 2019-10-13 RX ADMIN — CARBAMAZEPINE 200 MG: 200 TABLET ORAL at 11:32

## 2019-10-13 ASSESSMENT — SLEEP AND FATIGUE QUESTIONNAIRES
DIFFICULTY FALLING ASLEEP: YES
SLEEP PATTERN: RESTLESSNESS
DO YOU HAVE DIFFICULTY SLEEPING: YES
AVERAGE NUMBER OF SLEEP HOURS: 0
RESTFUL SLEEP: NO
DIFFICULTY ARISING: YES
DIFFICULTY STAYING ASLEEP: YES
DO YOU USE A SLEEP AID: YES

## 2019-10-13 ASSESSMENT — PAIN SCALES - GENERAL
PAINLEVEL_OUTOF10: 0

## 2019-10-13 ASSESSMENT — LIFESTYLE VARIABLES: HISTORY_ALCOHOL_USE: NO

## 2019-10-14 LAB
CHOLESTEROL, TOTAL: 145 MG/DL (ref 0–199)
HBA1C MFR BLD: 5.3 % (ref 4–5.6)
HDLC SERPL-MCNC: 42 MG/DL
LDL CHOLESTEROL CALCULATED: 52 MG/DL (ref 0–99)
TRIGL SERPL-MCNC: 255 MG/DL (ref 0–149)
VLDLC SERPL CALC-MCNC: 51 MG/DL

## 2019-10-14 PROCEDURE — 6370000000 HC RX 637 (ALT 250 FOR IP): Performed by: NURSE PRACTITIONER

## 2019-10-14 PROCEDURE — 36415 COLL VENOUS BLD VENIPUNCTURE: CPT

## 2019-10-14 PROCEDURE — 1240000000 HC EMOTIONAL WELLNESS R&B

## 2019-10-14 PROCEDURE — 6370000000 HC RX 637 (ALT 250 FOR IP): Performed by: PSYCHIATRY & NEUROLOGY

## 2019-10-14 PROCEDURE — 80061 LIPID PANEL: CPT

## 2019-10-14 PROCEDURE — 99233 SBSQ HOSP IP/OBS HIGH 50: CPT | Performed by: NURSE PRACTITIONER

## 2019-10-14 PROCEDURE — 6370000000 HC RX 637 (ALT 250 FOR IP): Performed by: INTERNAL MEDICINE

## 2019-10-14 PROCEDURE — 83036 HEMOGLOBIN GLYCOSYLATED A1C: CPT

## 2019-10-14 RX ORDER — BENZTROPINE MESYLATE 1 MG/1
1 TABLET ORAL 2 TIMES DAILY
Status: DISCONTINUED | OUTPATIENT
Start: 2019-10-14 | End: 2019-10-25 | Stop reason: HOSPADM

## 2019-10-14 RX ORDER — HYDROXYZINE PAMOATE 50 MG/1
50 CAPSULE ORAL 3 TIMES DAILY PRN
Status: DISCONTINUED | OUTPATIENT
Start: 2019-10-14 | End: 2019-10-19

## 2019-10-14 RX ORDER — LORAZEPAM 0.5 MG/1
0.5 TABLET ORAL 3 TIMES DAILY
Status: DISCONTINUED | OUTPATIENT
Start: 2019-10-14 | End: 2019-10-15

## 2019-10-14 RX ORDER — HALOPERIDOL 5 MG
20 TABLET ORAL 2 TIMES DAILY
Status: DISCONTINUED | OUTPATIENT
Start: 2019-10-14 | End: 2019-10-16

## 2019-10-14 RX ADMIN — CARBAMAZEPINE 200 MG: 200 TABLET ORAL at 08:40

## 2019-10-14 RX ADMIN — QUETIAPINE FUMARATE 25 MG: 25 TABLET ORAL at 21:44

## 2019-10-14 RX ADMIN — HYDROXYZINE HYDROCHLORIDE 50 MG: 10 TABLET, FILM COATED ORAL at 08:41

## 2019-10-14 RX ADMIN — CARBAMAZEPINE 200 MG: 200 TABLET ORAL at 21:44

## 2019-10-14 RX ADMIN — FAMOTIDINE 20 MG: 20 TABLET, FILM COATED ORAL at 08:40

## 2019-10-14 RX ADMIN — SERTRALINE 25 MG: 50 TABLET, FILM COATED ORAL at 11:33

## 2019-10-14 RX ADMIN — QUETIAPINE FUMARATE 25 MG: 25 TABLET ORAL at 08:40

## 2019-10-14 RX ADMIN — LORAZEPAM 0.5 MG: 0.5 TABLET ORAL at 13:46

## 2019-10-14 RX ADMIN — LORAZEPAM 0.5 MG: 0.5 TABLET ORAL at 21:43

## 2019-10-14 RX ADMIN — NICOTINE POLACRILEX 2 MG: 2 GUM, CHEWING BUCCAL at 08:40

## 2019-10-14 RX ADMIN — FAMOTIDINE 20 MG: 20 TABLET, FILM COATED ORAL at 21:44

## 2019-10-14 RX ADMIN — HALOPERIDOL 20 MG: 5 TABLET ORAL at 21:43

## 2019-10-14 RX ADMIN — TRAZODONE HYDROCHLORIDE 50 MG: 50 TABLET ORAL at 21:44

## 2019-10-14 RX ADMIN — BENZTROPINE MESYLATE 1 MG: 1 TABLET ORAL at 21:49

## 2019-10-14 ASSESSMENT — SLEEP AND FATIGUE QUESTIONNAIRES
DIFFICULTY STAYING ASLEEP: YES
SLEEP PATTERN: RESTLESSNESS
DO YOU USE A SLEEP AID: YES
RESTFUL SLEEP: NO
DIFFICULTY FALLING ASLEEP: YES
DO YOU HAVE DIFFICULTY SLEEPING: YES
AVERAGE NUMBER OF SLEEP HOURS: 0
DIFFICULTY ARISING: YES

## 2019-10-14 ASSESSMENT — LIFESTYLE VARIABLES: HISTORY_ALCOHOL_USE: NO

## 2019-10-14 ASSESSMENT — PAIN SCALES - GENERAL
PAINLEVEL_OUTOF10: 0

## 2019-10-15 PROCEDURE — 1240000000 HC EMOTIONAL WELLNESS R&B

## 2019-10-15 PROCEDURE — 6370000000 HC RX 637 (ALT 250 FOR IP): Performed by: NURSE PRACTITIONER

## 2019-10-15 PROCEDURE — 99231 SBSQ HOSP IP/OBS SF/LOW 25: CPT | Performed by: NURSE PRACTITIONER

## 2019-10-15 PROCEDURE — 6370000000 HC RX 637 (ALT 250 FOR IP): Performed by: INTERNAL MEDICINE

## 2019-10-15 RX ORDER — LORAZEPAM 0.5 MG/1
0.5 TABLET ORAL NIGHTLY
Status: DISCONTINUED | OUTPATIENT
Start: 2019-10-16 | End: 2019-10-16

## 2019-10-15 RX ADMIN — SERTRALINE 25 MG: 50 TABLET, FILM COATED ORAL at 09:05

## 2019-10-15 RX ADMIN — BENZTROPINE MESYLATE 1 MG: 1 TABLET ORAL at 09:05

## 2019-10-15 RX ADMIN — FAMOTIDINE 20 MG: 20 TABLET, FILM COATED ORAL at 09:05

## 2019-10-15 RX ADMIN — LORAZEPAM 0.5 MG: 0.5 TABLET ORAL at 09:05

## 2019-10-15 RX ADMIN — QUETIAPINE FUMARATE 25 MG: 25 TABLET ORAL at 09:05

## 2019-10-15 RX ADMIN — FAMOTIDINE 20 MG: 20 TABLET, FILM COATED ORAL at 21:51

## 2019-10-15 RX ADMIN — LORAZEPAM 0.5 MG: 0.5 TABLET ORAL at 13:04

## 2019-10-15 RX ADMIN — HALOPERIDOL 20 MG: 5 TABLET ORAL at 09:05

## 2019-10-15 RX ADMIN — BENZTROPINE MESYLATE 1 MG: 1 TABLET ORAL at 21:51

## 2019-10-15 RX ADMIN — CARBAMAZEPINE 200 MG: 200 TABLET ORAL at 09:05

## 2019-10-15 RX ADMIN — HALOPERIDOL 20 MG: 5 TABLET ORAL at 21:51

## 2019-10-15 RX ADMIN — QUETIAPINE FUMARATE 25 MG: 25 TABLET ORAL at 21:51

## 2019-10-15 RX ADMIN — CARBAMAZEPINE 200 MG: 200 TABLET ORAL at 21:51

## 2019-10-15 ASSESSMENT — PAIN SCALES - GENERAL
PAINLEVEL_OUTOF10: 0
PAINLEVEL_OUTOF10: 0

## 2019-10-16 PROCEDURE — 99232 SBSQ HOSP IP/OBS MODERATE 35: CPT | Performed by: NURSE PRACTITIONER

## 2019-10-16 PROCEDURE — 6370000000 HC RX 637 (ALT 250 FOR IP): Performed by: NURSE PRACTITIONER

## 2019-10-16 PROCEDURE — 6370000000 HC RX 637 (ALT 250 FOR IP): Performed by: INTERNAL MEDICINE

## 2019-10-16 PROCEDURE — 1240000000 HC EMOTIONAL WELLNESS R&B

## 2019-10-16 RX ORDER — LORAZEPAM 0.5 MG/1
0.5 TABLET ORAL 2 TIMES DAILY
Status: DISCONTINUED | OUTPATIENT
Start: 2019-10-16 | End: 2019-10-17

## 2019-10-16 RX ORDER — OLANZAPINE 10 MG/1
10 TABLET ORAL 2 TIMES DAILY
Status: DISCONTINUED | OUTPATIENT
Start: 2019-10-16 | End: 2019-10-17

## 2019-10-16 RX ADMIN — HALOPERIDOL 20 MG: 5 TABLET ORAL at 09:04

## 2019-10-16 RX ADMIN — CARBAMAZEPINE 200 MG: 200 TABLET ORAL at 20:34

## 2019-10-16 RX ADMIN — QUETIAPINE FUMARATE 25 MG: 25 TABLET ORAL at 09:04

## 2019-10-16 RX ADMIN — FAMOTIDINE 20 MG: 20 TABLET, FILM COATED ORAL at 20:34

## 2019-10-16 RX ADMIN — OLANZAPINE 10 MG: 10 TABLET, FILM COATED ORAL at 11:05

## 2019-10-16 RX ADMIN — BENZTROPINE MESYLATE 1 MG: 1 TABLET ORAL at 20:34

## 2019-10-16 RX ADMIN — SERTRALINE 25 MG: 50 TABLET, FILM COATED ORAL at 09:05

## 2019-10-16 RX ADMIN — CARBAMAZEPINE 200 MG: 200 TABLET ORAL at 09:04

## 2019-10-16 RX ADMIN — FAMOTIDINE 20 MG: 20 TABLET, FILM COATED ORAL at 09:04

## 2019-10-16 RX ADMIN — LORAZEPAM 0.5 MG: 0.5 TABLET ORAL at 20:35

## 2019-10-16 RX ADMIN — OLANZAPINE 10 MG: 10 TABLET, FILM COATED ORAL at 20:34

## 2019-10-16 RX ADMIN — QUETIAPINE FUMARATE 25 MG: 25 TABLET ORAL at 20:35

## 2019-10-16 RX ADMIN — BENZTROPINE MESYLATE 1 MG: 1 TABLET ORAL at 09:04

## 2019-10-16 ASSESSMENT — PAIN SCALES - GENERAL
PAINLEVEL_OUTOF10: 0
PAINLEVEL_OUTOF10: 0

## 2019-10-17 LAB — CANNABINOIDS CONF, URINE: 28 NG/ML

## 2019-10-17 PROCEDURE — 6370000000 HC RX 637 (ALT 250 FOR IP): Performed by: NURSE PRACTITIONER

## 2019-10-17 PROCEDURE — 6370000000 HC RX 637 (ALT 250 FOR IP): Performed by: INTERNAL MEDICINE

## 2019-10-17 PROCEDURE — 2580000003 HC RX 258: Performed by: PSYCHIATRY & NEUROLOGY

## 2019-10-17 PROCEDURE — 6360000002 HC RX W HCPCS

## 2019-10-17 PROCEDURE — 6360000002 HC RX W HCPCS: Performed by: NURSE PRACTITIONER

## 2019-10-17 PROCEDURE — 1240000000 HC EMOTIONAL WELLNESS R&B

## 2019-10-17 PROCEDURE — 99233 SBSQ HOSP IP/OBS HIGH 50: CPT | Performed by: NURSE PRACTITIONER

## 2019-10-17 RX ORDER — ZIPRASIDONE MESYLATE 20 MG/ML
INJECTION, POWDER, LYOPHILIZED, FOR SOLUTION INTRAMUSCULAR
Status: COMPLETED
Start: 2019-10-17 | End: 2019-10-17

## 2019-10-17 RX ORDER — QUETIAPINE FUMARATE 200 MG/1
400 TABLET, FILM COATED ORAL 2 TIMES DAILY
Status: DISCONTINUED | OUTPATIENT
Start: 2019-10-17 | End: 2019-10-25 | Stop reason: HOSPADM

## 2019-10-17 RX ORDER — ZIPRASIDONE MESYLATE 20 MG/ML
10 INJECTION, POWDER, LYOPHILIZED, FOR SOLUTION INTRAMUSCULAR ONCE
Status: COMPLETED | OUTPATIENT
Start: 2019-10-17 | End: 2019-10-17

## 2019-10-17 RX ORDER — DIPHENHYDRAMINE HYDROCHLORIDE 50 MG/ML
INJECTION INTRAMUSCULAR; INTRAVENOUS
Status: COMPLETED
Start: 2019-10-17 | End: 2019-10-17

## 2019-10-17 RX ORDER — CARBAMAZEPINE 200 MG/1
300 TABLET ORAL 2 TIMES DAILY
Status: DISCONTINUED | OUTPATIENT
Start: 2019-10-17 | End: 2019-10-25 | Stop reason: HOSPADM

## 2019-10-17 RX ORDER — QUETIAPINE FUMARATE 25 MG/1
50 TABLET, FILM COATED ORAL 2 TIMES DAILY
Status: DISCONTINUED | OUTPATIENT
Start: 2019-10-17 | End: 2019-10-17

## 2019-10-17 RX ORDER — DIPHENHYDRAMINE HYDROCHLORIDE 50 MG/ML
50 INJECTION INTRAMUSCULAR; INTRAVENOUS ONCE
Status: COMPLETED | OUTPATIENT
Start: 2019-10-17 | End: 2019-10-17

## 2019-10-17 RX ORDER — FLUPHENAZINE HYDROCHLORIDE 5 MG/1
5 TABLET ORAL 2 TIMES DAILY
Status: DISCONTINUED | OUTPATIENT
Start: 2019-10-17 | End: 2019-10-19

## 2019-10-17 RX ORDER — QUETIAPINE FUMARATE 200 MG/1
400 TABLET, FILM COATED ORAL ONCE
Status: DISCONTINUED | OUTPATIENT
Start: 2019-10-17 | End: 2019-10-19

## 2019-10-17 RX ADMIN — QUETIAPINE FUMARATE 25 MG: 25 TABLET ORAL at 09:13

## 2019-10-17 RX ADMIN — CARBAMAZEPINE 200 MG: 200 TABLET ORAL at 09:13

## 2019-10-17 RX ADMIN — FAMOTIDINE 20 MG: 20 TABLET, FILM COATED ORAL at 09:13

## 2019-10-17 RX ADMIN — ZIPRASIDONE MESYLATE 10 MG: 20 INJECTION, POWDER, LYOPHILIZED, FOR SOLUTION INTRAMUSCULAR at 10:34

## 2019-10-17 RX ADMIN — FLUPHENAZINE HYDROCHLORIDE 5 MG: 5 TABLET, FILM COATED ORAL at 20:12

## 2019-10-17 RX ADMIN — BENZTROPINE MESYLATE 1 MG: 1 TABLET ORAL at 09:13

## 2019-10-17 RX ADMIN — BENZTROPINE MESYLATE 1 MG: 1 TABLET ORAL at 20:12

## 2019-10-17 RX ADMIN — CARBAMAZEPINE 300 MG: 200 TABLET ORAL at 20:12

## 2019-10-17 RX ADMIN — DIPHENHYDRAMINE HYDROCHLORIDE 50 MG: 50 INJECTION, SOLUTION INTRAMUSCULAR; INTRAVENOUS at 10:39

## 2019-10-17 RX ADMIN — QUETIAPINE FUMARATE 400 MG: 200 TABLET ORAL at 20:11

## 2019-10-17 RX ADMIN — ZIPRASIDONE MESYLATE 10 MG: 20 INJECTION, POWDER, LYOPHILIZED, FOR SOLUTION INTRAMUSCULAR at 10:41

## 2019-10-17 RX ADMIN — FAMOTIDINE 20 MG: 20 TABLET, FILM COATED ORAL at 20:12

## 2019-10-17 RX ADMIN — DIPHENHYDRAMINE HYDROCHLORIDE 50 MG: 50 INJECTION INTRAMUSCULAR; INTRAVENOUS at 10:39

## 2019-10-17 RX ADMIN — WATER 2.1 ML: 1 INJECTION INTRAMUSCULAR; INTRAVENOUS; SUBCUTANEOUS at 10:41

## 2019-10-17 RX ADMIN — SERTRALINE 50 MG: 50 TABLET, FILM COATED ORAL at 09:13

## 2019-10-17 RX ADMIN — OLANZAPINE 10 MG: 10 TABLET, FILM COATED ORAL at 09:13

## 2019-10-17 RX ADMIN — LORAZEPAM 0.5 MG: 0.5 TABLET ORAL at 09:13

## 2019-10-17 ASSESSMENT — PAIN SCALES - GENERAL
PAINLEVEL_OUTOF10: 0
PAINLEVEL_OUTOF10: 0

## 2019-10-18 PROCEDURE — 6370000000 HC RX 637 (ALT 250 FOR IP): Performed by: PSYCHIATRY & NEUROLOGY

## 2019-10-18 PROCEDURE — 1240000000 HC EMOTIONAL WELLNESS R&B

## 2019-10-18 PROCEDURE — 6370000000 HC RX 637 (ALT 250 FOR IP): Performed by: NURSE PRACTITIONER

## 2019-10-18 PROCEDURE — 99232 SBSQ HOSP IP/OBS MODERATE 35: CPT | Performed by: NURSE PRACTITIONER

## 2019-10-18 PROCEDURE — 6370000000 HC RX 637 (ALT 250 FOR IP): Performed by: INTERNAL MEDICINE

## 2019-10-18 RX ADMIN — FLUPHENAZINE HYDROCHLORIDE 5 MG: 5 TABLET, FILM COATED ORAL at 09:08

## 2019-10-18 RX ADMIN — BENZTROPINE MESYLATE 1 MG: 1 TABLET ORAL at 09:07

## 2019-10-18 RX ADMIN — FAMOTIDINE 20 MG: 20 TABLET, FILM COATED ORAL at 09:08

## 2019-10-18 RX ADMIN — FAMOTIDINE 20 MG: 20 TABLET, FILM COATED ORAL at 20:35

## 2019-10-18 RX ADMIN — TRAZODONE HYDROCHLORIDE 50 MG: 50 TABLET ORAL at 20:35

## 2019-10-18 RX ADMIN — CARBAMAZEPINE 300 MG: 200 TABLET ORAL at 09:08

## 2019-10-18 RX ADMIN — FLUPHENAZINE HYDROCHLORIDE 5 MG: 5 TABLET, FILM COATED ORAL at 20:35

## 2019-10-18 RX ADMIN — SERTRALINE 50 MG: 50 TABLET, FILM COATED ORAL at 09:08

## 2019-10-18 RX ADMIN — BENZTROPINE MESYLATE 1 MG: 1 TABLET ORAL at 20:35

## 2019-10-18 RX ADMIN — QUETIAPINE FUMARATE 400 MG: 200 TABLET ORAL at 09:08

## 2019-10-18 RX ADMIN — QUETIAPINE FUMARATE 400 MG: 200 TABLET ORAL at 20:35

## 2019-10-18 RX ADMIN — CARBAMAZEPINE 300 MG: 200 TABLET ORAL at 20:35

## 2019-10-18 ASSESSMENT — PAIN SCALES - GENERAL: PAINLEVEL_OUTOF10: 0

## 2019-10-19 PROCEDURE — 6370000000 HC RX 637 (ALT 250 FOR IP): Performed by: INTERNAL MEDICINE

## 2019-10-19 PROCEDURE — 1240000000 HC EMOTIONAL WELLNESS R&B

## 2019-10-19 PROCEDURE — 6370000000 HC RX 637 (ALT 250 FOR IP): Performed by: PSYCHIATRY & NEUROLOGY

## 2019-10-19 PROCEDURE — 6370000000 HC RX 637 (ALT 250 FOR IP): Performed by: NURSE PRACTITIONER

## 2019-10-19 PROCEDURE — 99232 SBSQ HOSP IP/OBS MODERATE 35: CPT | Performed by: NURSE PRACTITIONER

## 2019-10-19 RX ORDER — HYDROXYZINE PAMOATE 50 MG/1
50 CAPSULE ORAL 3 TIMES DAILY
Status: DISCONTINUED | OUTPATIENT
Start: 2019-10-19 | End: 2019-10-25 | Stop reason: HOSPADM

## 2019-10-19 RX ORDER — FLUPHENAZINE HYDROCHLORIDE 5 MG/1
10 TABLET ORAL 2 TIMES DAILY
Status: DISCONTINUED | OUTPATIENT
Start: 2019-10-19 | End: 2019-10-21

## 2019-10-19 RX ORDER — NITROGLYCERIN 0.4 MG/1
10 TABLET SUBLINGUAL 2 TIMES DAILY
Status: DISCONTINUED | OUTPATIENT
Start: 2019-10-19 | End: 2019-10-19 | Stop reason: RX

## 2019-10-19 RX ORDER — FLUPHENAZINE HYDROCHLORIDE 5 MG/1
10 TABLET ORAL 2 TIMES DAILY
Status: DISCONTINUED | OUTPATIENT
Start: 2019-10-19 | End: 2019-10-19

## 2019-10-19 RX ADMIN — FLUPHENAZINE HYDROCHLORIDE 10 MG: 5 TABLET, FILM COATED ORAL at 20:33

## 2019-10-19 RX ADMIN — FAMOTIDINE 20 MG: 20 TABLET, FILM COATED ORAL at 20:33

## 2019-10-19 RX ADMIN — BENZTROPINE MESYLATE 1 MG: 1 TABLET ORAL at 12:07

## 2019-10-19 RX ADMIN — CARBAMAZEPINE 300 MG: 200 TABLET ORAL at 12:08

## 2019-10-19 RX ADMIN — QUETIAPINE FUMARATE 400 MG: 200 TABLET ORAL at 12:07

## 2019-10-19 RX ADMIN — FAMOTIDINE 20 MG: 20 TABLET, FILM COATED ORAL at 12:08

## 2019-10-19 RX ADMIN — SERTRALINE 50 MG: 50 TABLET, FILM COATED ORAL at 12:07

## 2019-10-19 RX ADMIN — HYDROXYZINE PAMOATE 50 MG: 50 CAPSULE ORAL at 20:33

## 2019-10-19 RX ADMIN — MAGNESIUM HYDROXIDE 30 ML: 400 SUSPENSION ORAL at 18:01

## 2019-10-19 RX ADMIN — TRAZODONE HYDROCHLORIDE 50 MG: 50 TABLET ORAL at 20:31

## 2019-10-19 RX ADMIN — CARBAMAZEPINE 300 MG: 200 TABLET ORAL at 20:34

## 2019-10-19 RX ADMIN — HYDROXYZINE PAMOATE 50 MG: 50 CAPSULE ORAL at 13:36

## 2019-10-19 RX ADMIN — BENZTROPINE MESYLATE 1 MG: 1 TABLET ORAL at 20:33

## 2019-10-19 RX ADMIN — QUETIAPINE FUMARATE 400 MG: 200 TABLET ORAL at 20:33

## 2019-10-19 ASSESSMENT — PAIN SCALES - GENERAL: PAINLEVEL_OUTOF10: 0

## 2019-10-20 PROCEDURE — 6370000000 HC RX 637 (ALT 250 FOR IP): Performed by: INTERNAL MEDICINE

## 2019-10-20 PROCEDURE — 1240000000 HC EMOTIONAL WELLNESS R&B

## 2019-10-20 PROCEDURE — 6370000000 HC RX 637 (ALT 250 FOR IP): Performed by: PSYCHIATRY & NEUROLOGY

## 2019-10-20 PROCEDURE — 6370000000 HC RX 637 (ALT 250 FOR IP): Performed by: NURSE PRACTITIONER

## 2019-10-20 PROCEDURE — 99231 SBSQ HOSP IP/OBS SF/LOW 25: CPT | Performed by: NURSE PRACTITIONER

## 2019-10-20 RX ADMIN — QUETIAPINE FUMARATE 400 MG: 200 TABLET ORAL at 09:00

## 2019-10-20 RX ADMIN — HYDROXYZINE PAMOATE 50 MG: 50 CAPSULE ORAL at 21:09

## 2019-10-20 RX ADMIN — FLUPHENAZINE HYDROCHLORIDE 10 MG: 5 TABLET, FILM COATED ORAL at 09:00

## 2019-10-20 RX ADMIN — QUETIAPINE FUMARATE 400 MG: 200 TABLET ORAL at 21:09

## 2019-10-20 RX ADMIN — FAMOTIDINE 20 MG: 20 TABLET, FILM COATED ORAL at 09:00

## 2019-10-20 RX ADMIN — FAMOTIDINE 20 MG: 20 TABLET, FILM COATED ORAL at 21:09

## 2019-10-20 RX ADMIN — HYDROXYZINE PAMOATE 50 MG: 50 CAPSULE ORAL at 14:33

## 2019-10-20 RX ADMIN — SERTRALINE 50 MG: 50 TABLET, FILM COATED ORAL at 09:00

## 2019-10-20 RX ADMIN — HYDROXYZINE PAMOATE 50 MG: 50 CAPSULE ORAL at 09:00

## 2019-10-20 RX ADMIN — MAGNESIUM HYDROXIDE 30 ML: 400 SUSPENSION ORAL at 19:02

## 2019-10-20 RX ADMIN — CARBAMAZEPINE 300 MG: 200 TABLET ORAL at 09:00

## 2019-10-20 RX ADMIN — BENZTROPINE MESYLATE 1 MG: 1 TABLET ORAL at 09:00

## 2019-10-20 RX ADMIN — BENZTROPINE MESYLATE 1 MG: 1 TABLET ORAL at 21:09

## 2019-10-20 RX ADMIN — FLUPHENAZINE HYDROCHLORIDE 10 MG: 5 TABLET, FILM COATED ORAL at 21:09

## 2019-10-20 RX ADMIN — CARBAMAZEPINE 300 MG: 200 TABLET ORAL at 21:09

## 2019-10-21 PROCEDURE — 1240000000 HC EMOTIONAL WELLNESS R&B

## 2019-10-21 PROCEDURE — 6370000000 HC RX 637 (ALT 250 FOR IP): Performed by: PSYCHIATRY & NEUROLOGY

## 2019-10-21 PROCEDURE — 6370000000 HC RX 637 (ALT 250 FOR IP): Performed by: INTERNAL MEDICINE

## 2019-10-21 PROCEDURE — 6370000000 HC RX 637 (ALT 250 FOR IP): Performed by: NURSE PRACTITIONER

## 2019-10-21 PROCEDURE — 99232 SBSQ HOSP IP/OBS MODERATE 35: CPT | Performed by: NURSE PRACTITIONER

## 2019-10-21 RX ORDER — FLUPHENAZINE HYDROCHLORIDE 5 MG/1
10 TABLET ORAL 3 TIMES DAILY
Status: DISCONTINUED | OUTPATIENT
Start: 2019-10-21 | End: 2019-10-25 | Stop reason: HOSPADM

## 2019-10-21 RX ADMIN — CARBAMAZEPINE 300 MG: 200 TABLET ORAL at 20:03

## 2019-10-21 RX ADMIN — CARBAMAZEPINE 300 MG: 200 TABLET ORAL at 09:09

## 2019-10-21 RX ADMIN — MAGNESIUM HYDROXIDE 30 ML: 400 SUSPENSION ORAL at 07:08

## 2019-10-21 RX ADMIN — HYDROXYZINE PAMOATE 50 MG: 50 CAPSULE ORAL at 09:09

## 2019-10-21 RX ADMIN — FLUPHENAZINE HYDROCHLORIDE 10 MG: 5 TABLET, FILM COATED ORAL at 09:09

## 2019-10-21 RX ADMIN — QUETIAPINE FUMARATE 400 MG: 200 TABLET ORAL at 09:09

## 2019-10-21 RX ADMIN — TRAZODONE HYDROCHLORIDE 50 MG: 50 TABLET ORAL at 20:03

## 2019-10-21 RX ADMIN — SERTRALINE 50 MG: 50 TABLET, FILM COATED ORAL at 09:09

## 2019-10-21 RX ADMIN — FLUPHENAZINE HYDROCHLORIDE 10 MG: 5 TABLET, FILM COATED ORAL at 14:05

## 2019-10-21 RX ADMIN — FLUPHENAZINE HYDROCHLORIDE 10 MG: 5 TABLET, FILM COATED ORAL at 20:03

## 2019-10-21 RX ADMIN — FAMOTIDINE 20 MG: 20 TABLET, FILM COATED ORAL at 09:09

## 2019-10-21 RX ADMIN — BENZTROPINE MESYLATE 1 MG: 1 TABLET ORAL at 09:09

## 2019-10-21 RX ADMIN — HYDROXYZINE PAMOATE 50 MG: 50 CAPSULE ORAL at 20:03

## 2019-10-21 RX ADMIN — BENZTROPINE MESYLATE 1 MG: 1 TABLET ORAL at 20:03

## 2019-10-21 RX ADMIN — FAMOTIDINE 20 MG: 20 TABLET, FILM COATED ORAL at 20:03

## 2019-10-21 RX ADMIN — HYDROXYZINE PAMOATE 50 MG: 50 CAPSULE ORAL at 14:05

## 2019-10-21 RX ADMIN — QUETIAPINE FUMARATE 400 MG: 200 TABLET ORAL at 20:03

## 2019-10-21 ASSESSMENT — PAIN SCALES - GENERAL: PAINLEVEL_OUTOF10: 0

## 2019-10-22 PROCEDURE — 1240000000 HC EMOTIONAL WELLNESS R&B

## 2019-10-22 PROCEDURE — 99231 SBSQ HOSP IP/OBS SF/LOW 25: CPT | Performed by: NURSE PRACTITIONER

## 2019-10-22 PROCEDURE — 6370000000 HC RX 637 (ALT 250 FOR IP): Performed by: NURSE PRACTITIONER

## 2019-10-22 PROCEDURE — 6370000000 HC RX 637 (ALT 250 FOR IP): Performed by: PSYCHIATRY & NEUROLOGY

## 2019-10-22 PROCEDURE — 6370000000 HC RX 637 (ALT 250 FOR IP): Performed by: INTERNAL MEDICINE

## 2019-10-22 RX ADMIN — HYDROXYZINE PAMOATE 50 MG: 50 CAPSULE ORAL at 21:17

## 2019-10-22 RX ADMIN — QUETIAPINE FUMARATE 400 MG: 200 TABLET ORAL at 08:57

## 2019-10-22 RX ADMIN — HYDROXYZINE PAMOATE 50 MG: 50 CAPSULE ORAL at 08:57

## 2019-10-22 RX ADMIN — QUETIAPINE FUMARATE 400 MG: 200 TABLET ORAL at 21:17

## 2019-10-22 RX ADMIN — MAGNESIUM HYDROXIDE 30 ML: 400 SUSPENSION ORAL at 06:31

## 2019-10-22 RX ADMIN — HYDROXYZINE PAMOATE 50 MG: 50 CAPSULE ORAL at 13:38

## 2019-10-22 RX ADMIN — FLUPHENAZINE HYDROCHLORIDE 10 MG: 5 TABLET, FILM COATED ORAL at 13:38

## 2019-10-22 RX ADMIN — FAMOTIDINE 20 MG: 20 TABLET, FILM COATED ORAL at 08:57

## 2019-10-22 RX ADMIN — CARBAMAZEPINE 300 MG: 200 TABLET ORAL at 21:17

## 2019-10-22 RX ADMIN — BENZTROPINE MESYLATE 1 MG: 1 TABLET ORAL at 21:18

## 2019-10-22 RX ADMIN — BENZTROPINE MESYLATE 1 MG: 1 TABLET ORAL at 08:58

## 2019-10-22 RX ADMIN — FLUPHENAZINE HYDROCHLORIDE 10 MG: 5 TABLET, FILM COATED ORAL at 08:57

## 2019-10-22 RX ADMIN — FLUPHENAZINE HYDROCHLORIDE 10 MG: 5 TABLET, FILM COATED ORAL at 21:17

## 2019-10-22 RX ADMIN — FAMOTIDINE 20 MG: 20 TABLET, FILM COATED ORAL at 21:17

## 2019-10-22 RX ADMIN — CARBAMAZEPINE 300 MG: 200 TABLET ORAL at 08:57

## 2019-10-22 ASSESSMENT — PAIN SCALES - GENERAL: PAINLEVEL_OUTOF10: 0

## 2019-10-23 PROCEDURE — 6370000000 HC RX 637 (ALT 250 FOR IP): Performed by: NURSE PRACTITIONER

## 2019-10-23 PROCEDURE — 6370000000 HC RX 637 (ALT 250 FOR IP): Performed by: INTERNAL MEDICINE

## 2019-10-23 PROCEDURE — 6370000000 HC RX 637 (ALT 250 FOR IP): Performed by: PSYCHIATRY & NEUROLOGY

## 2019-10-23 PROCEDURE — 99232 SBSQ HOSP IP/OBS MODERATE 35: CPT | Performed by: NURSE PRACTITIONER

## 2019-10-23 PROCEDURE — 1240000000 HC EMOTIONAL WELLNESS R&B

## 2019-10-23 RX ADMIN — QUETIAPINE FUMARATE 400 MG: 200 TABLET ORAL at 09:19

## 2019-10-23 RX ADMIN — BENZTROPINE MESYLATE 1 MG: 1 TABLET ORAL at 09:18

## 2019-10-23 RX ADMIN — FAMOTIDINE 20 MG: 20 TABLET, FILM COATED ORAL at 20:57

## 2019-10-23 RX ADMIN — SERTRALINE 25 MG: 50 TABLET, FILM COATED ORAL at 09:58

## 2019-10-23 RX ADMIN — HYDROXYZINE PAMOATE 50 MG: 50 CAPSULE ORAL at 20:57

## 2019-10-23 RX ADMIN — FLUPHENAZINE HYDROCHLORIDE 10 MG: 5 TABLET, FILM COATED ORAL at 09:19

## 2019-10-23 RX ADMIN — FAMOTIDINE 20 MG: 20 TABLET, FILM COATED ORAL at 09:18

## 2019-10-23 RX ADMIN — CARBAMAZEPINE 300 MG: 200 TABLET ORAL at 09:18

## 2019-10-23 RX ADMIN — HYDROXYZINE PAMOATE 50 MG: 50 CAPSULE ORAL at 09:19

## 2019-10-23 RX ADMIN — CARBAMAZEPINE 300 MG: 200 TABLET ORAL at 20:56

## 2019-10-23 RX ADMIN — FLUPHENAZINE HYDROCHLORIDE 10 MG: 5 TABLET, FILM COATED ORAL at 14:04

## 2019-10-23 RX ADMIN — BENZTROPINE MESYLATE 1 MG: 1 TABLET ORAL at 20:56

## 2019-10-23 RX ADMIN — QUETIAPINE FUMARATE 400 MG: 200 TABLET ORAL at 20:57

## 2019-10-23 RX ADMIN — FLUPHENAZINE HYDROCHLORIDE 10 MG: 5 TABLET, FILM COATED ORAL at 20:57

## 2019-10-23 RX ADMIN — MAGNESIUM HYDROXIDE 30 ML: 400 SUSPENSION ORAL at 10:34

## 2019-10-23 RX ADMIN — HYDROXYZINE PAMOATE 50 MG: 50 CAPSULE ORAL at 14:04

## 2019-10-23 ASSESSMENT — PAIN SCALES - GENERAL
PAINLEVEL_OUTOF10: 0
PAINLEVEL_OUTOF10: 0

## 2019-10-24 PROCEDURE — 1240000000 HC EMOTIONAL WELLNESS R&B

## 2019-10-24 PROCEDURE — 99232 SBSQ HOSP IP/OBS MODERATE 35: CPT | Performed by: NURSE PRACTITIONER

## 2019-10-24 PROCEDURE — 6370000000 HC RX 637 (ALT 250 FOR IP): Performed by: NURSE PRACTITIONER

## 2019-10-24 PROCEDURE — 6370000000 HC RX 637 (ALT 250 FOR IP): Performed by: PSYCHIATRY & NEUROLOGY

## 2019-10-24 PROCEDURE — 6370000000 HC RX 637 (ALT 250 FOR IP): Performed by: INTERNAL MEDICINE

## 2019-10-24 RX ADMIN — BENZTROPINE MESYLATE 1 MG: 1 TABLET ORAL at 08:42

## 2019-10-24 RX ADMIN — FLUPHENAZINE HYDROCHLORIDE 10 MG: 5 TABLET, FILM COATED ORAL at 14:30

## 2019-10-24 RX ADMIN — SERTRALINE 25 MG: 50 TABLET, FILM COATED ORAL at 08:41

## 2019-10-24 RX ADMIN — BENZTROPINE MESYLATE 1 MG: 1 TABLET ORAL at 20:10

## 2019-10-24 RX ADMIN — FLUPHENAZINE HYDROCHLORIDE 10 MG: 5 TABLET, FILM COATED ORAL at 08:41

## 2019-10-24 RX ADMIN — QUETIAPINE FUMARATE 400 MG: 200 TABLET ORAL at 08:41

## 2019-10-24 RX ADMIN — QUETIAPINE FUMARATE 400 MG: 200 TABLET ORAL at 20:10

## 2019-10-24 RX ADMIN — CARBAMAZEPINE 300 MG: 200 TABLET ORAL at 20:10

## 2019-10-24 RX ADMIN — HYDROXYZINE PAMOATE 50 MG: 50 CAPSULE ORAL at 20:11

## 2019-10-24 RX ADMIN — FAMOTIDINE 20 MG: 20 TABLET, FILM COATED ORAL at 20:11

## 2019-10-24 RX ADMIN — HYDROXYZINE PAMOATE 50 MG: 50 CAPSULE ORAL at 08:42

## 2019-10-24 RX ADMIN — CARBAMAZEPINE 300 MG: 200 TABLET ORAL at 08:41

## 2019-10-24 RX ADMIN — HYDROXYZINE PAMOATE 50 MG: 50 CAPSULE ORAL at 14:30

## 2019-10-24 RX ADMIN — FAMOTIDINE 20 MG: 20 TABLET, FILM COATED ORAL at 08:41

## 2019-10-24 RX ADMIN — FLUPHENAZINE HYDROCHLORIDE 10 MG: 5 TABLET, FILM COATED ORAL at 20:10

## 2019-10-24 ASSESSMENT — PAIN SCALES - GENERAL
PAINLEVEL_OUTOF10: 0
PAINLEVEL_OUTOF10: 0

## 2019-10-25 VITALS
OXYGEN SATURATION: 98 % | RESPIRATION RATE: 18 BRPM | HEIGHT: 67 IN | DIASTOLIC BLOOD PRESSURE: 84 MMHG | SYSTOLIC BLOOD PRESSURE: 137 MMHG | WEIGHT: 160 LBS | HEART RATE: 94 BPM | BODY MASS INDEX: 25.11 KG/M2 | TEMPERATURE: 98.2 F

## 2019-10-25 PROCEDURE — 6370000000 HC RX 637 (ALT 250 FOR IP): Performed by: PSYCHIATRY & NEUROLOGY

## 2019-10-25 PROCEDURE — 6370000000 HC RX 637 (ALT 250 FOR IP): Performed by: NURSE PRACTITIONER

## 2019-10-25 PROCEDURE — 6370000000 HC RX 637 (ALT 250 FOR IP): Performed by: INTERNAL MEDICINE

## 2019-10-25 PROCEDURE — 99238 HOSP IP/OBS DSCHRG MGMT 30/<: CPT | Performed by: NURSE PRACTITIONER

## 2019-10-25 RX ORDER — CARBAMAZEPINE 200 MG/1
300 TABLET ORAL 2 TIMES DAILY
Qty: 30 TABLET | Refills: 0 | Status: SHIPPED | OUTPATIENT
Start: 2019-10-25 | End: 2020-02-28 | Stop reason: DRUGHIGH

## 2019-10-25 RX ORDER — FLUPHENAZINE HYDROCHLORIDE 10 MG/1
10 TABLET ORAL 3 TIMES DAILY
Qty: 90 TABLET | Refills: 0 | Status: SHIPPED | OUTPATIENT
Start: 2019-10-25 | End: 2020-02-28 | Stop reason: ALTCHOICE

## 2019-10-25 RX ORDER — FAMOTIDINE 20 MG/1
20 TABLET, FILM COATED ORAL 2 TIMES DAILY
Qty: 60 TABLET | Refills: 0 | Status: SHIPPED | OUTPATIENT
Start: 2019-10-25 | End: 2020-02-28 | Stop reason: ALTCHOICE

## 2019-10-25 RX ORDER — BENZTROPINE MESYLATE 1 MG/1
1 TABLET ORAL 2 TIMES DAILY
Qty: 60 TABLET | Refills: 0 | Status: SHIPPED | OUTPATIENT
Start: 2019-10-25 | End: 2020-02-28 | Stop reason: ALTCHOICE

## 2019-10-25 RX ORDER — QUETIAPINE FUMARATE 400 MG/1
400 TABLET, FILM COATED ORAL 2 TIMES DAILY
Qty: 60 TABLET | Refills: 0 | Status: SHIPPED | OUTPATIENT
Start: 2019-10-25 | End: 2020-02-28 | Stop reason: ALTCHOICE

## 2019-10-25 RX ADMIN — FAMOTIDINE 20 MG: 20 TABLET, FILM COATED ORAL at 08:34

## 2019-10-25 RX ADMIN — FLUPHENAZINE HYDROCHLORIDE 10 MG: 5 TABLET, FILM COATED ORAL at 08:34

## 2019-10-25 RX ADMIN — HYDROXYZINE PAMOATE 50 MG: 50 CAPSULE ORAL at 08:34

## 2019-10-25 RX ADMIN — QUETIAPINE FUMARATE 400 MG: 200 TABLET ORAL at 08:34

## 2019-10-25 RX ADMIN — FLUPHENAZINE HYDROCHLORIDE 10 MG: 5 TABLET, FILM COATED ORAL at 14:09

## 2019-10-25 RX ADMIN — CARBAMAZEPINE 300 MG: 200 TABLET ORAL at 08:35

## 2019-10-25 RX ADMIN — HYDROXYZINE PAMOATE 50 MG: 50 CAPSULE ORAL at 14:09

## 2019-10-25 RX ADMIN — BENZTROPINE MESYLATE 1 MG: 1 TABLET ORAL at 08:34

## 2019-10-25 RX ADMIN — SERTRALINE 50 MG: 50 TABLET, FILM COATED ORAL at 08:34

## 2019-10-25 ASSESSMENT — PAIN SCALES - GENERAL
PAINLEVEL_OUTOF10: 0
PAINLEVEL_OUTOF10: 0

## 2019-12-05 ENCOUNTER — ANESTHESIA (OUTPATIENT)
Dept: EMERGENCY DEPT | Age: 24
DRG: 912 | End: 2019-12-05
Payer: MEDICAID

## 2019-12-05 ENCOUNTER — APPOINTMENT (OUTPATIENT)
Dept: GENERAL RADIOLOGY | Age: 24
DRG: 912 | End: 2019-12-05
Payer: MEDICAID

## 2019-12-05 ENCOUNTER — ANESTHESIA (OUTPATIENT)
Dept: OPERATING ROOM | Age: 24
DRG: 912 | End: 2019-12-05
Payer: MEDICAID

## 2019-12-05 ENCOUNTER — ANESTHESIA EVENT (OUTPATIENT)
Dept: OPERATING ROOM | Age: 24
DRG: 912 | End: 2019-12-05
Payer: MEDICAID

## 2019-12-05 ENCOUNTER — ANESTHESIA EVENT (OUTPATIENT)
Dept: EMERGENCY DEPT | Age: 24
DRG: 912 | End: 2019-12-05
Payer: MEDICAID

## 2019-12-05 ENCOUNTER — HOSPITAL ENCOUNTER (INPATIENT)
Age: 24
LOS: 18 days | Discharge: PSYCHIATRIC OR SUBSTANCE USE FACILITY | DRG: 912 | End: 2019-12-23
Attending: EMERGENCY MEDICINE | Admitting: SURGERY
Payer: MEDICAID

## 2019-12-05 ENCOUNTER — APPOINTMENT (OUTPATIENT)
Dept: CT IMAGING | Age: 24
DRG: 912 | End: 2019-12-05
Payer: MEDICAID

## 2019-12-05 VITALS — OXYGEN SATURATION: 100 % | RESPIRATION RATE: 10 BRPM | TEMPERATURE: 96.6 F

## 2019-12-05 DIAGNOSIS — J96.90 RESPIRATORY FAILURE FOLLOWING TRAUMA (HCC): ICD-10-CM

## 2019-12-05 DIAGNOSIS — J94.2 HEMOTHORAX, LEFT: Primary | ICD-10-CM
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
## 2019-12-05 DIAGNOSIS — T79.4XXA TRAUMATIC SHOCK, INITIAL ENCOUNTER (HCC): ICD-10-CM

## 2019-12-05 DIAGNOSIS — T14.90XA TRAUMA: ICD-10-CM

## 2019-12-05 DIAGNOSIS — S27.0XXA TRAUMATIC PNEUMOTHORAX, INITIAL ENCOUNTER: ICD-10-CM

## 2019-12-05 DIAGNOSIS — M54.50 LOW BACK PAIN, UNSPECIFIED BACK PAIN LATERALITY, UNSPECIFIED CHRONICITY, UNSPECIFIED WHETHER SCIATICA PRESENT: ICD-10-CM

## 2019-12-05 DIAGNOSIS — T14.91XA SUICIDAL BEHAVIOR WITH ATTEMPTED SELF-INJURY (HCC): ICD-10-CM

## 2019-12-05 LAB
AADO2: 177.2 MMHG
ABO/RH: NORMAL
ACETAMINOPHEN LEVEL: <5 MCG/ML (ref 10–30)
ALBUMIN SERPL-MCNC: 2 G/DL (ref 3.5–5.2)
ALBUMIN SERPL-MCNC: 2.6 G/DL (ref 3.5–5.2)
ALBUMIN SERPL-MCNC: 3.1 G/DL (ref 3.5–5.2)
ALP BLD-CCNC: 51 U/L (ref 40–129)
ALP BLD-CCNC: 51 U/L (ref 40–129)
ALP BLD-CCNC: 63 U/L (ref 40–129)
ALT SERPL-CCNC: 26 U/L (ref 0–40)
ALT SERPL-CCNC: 37 U/L (ref 0–40)
ALT SERPL-CCNC: 42 U/L (ref 0–40)
ANGLE (CLOT STRENGTH): 67.3 DEGREE (ref 59–74)
ANION GAP SERPL CALCULATED.3IONS-SCNC: 10 MMOL/L (ref 7–16)
ANION GAP SERPL CALCULATED.3IONS-SCNC: 13 MMOL/L (ref 7–16)
ANION GAP SERPL CALCULATED.3IONS-SCNC: 6 MMOL/L (ref 7–16)
ANTIBODY SCREEN: NORMAL
APTT: 30 SEC (ref 24.5–35.1)
AST SERPL-CCNC: 121 U/L (ref 0–39)
AST SERPL-CCNC: 123 U/L (ref 0–39)
AST SERPL-CCNC: 47 U/L (ref 0–39)
B.E.: -4.5 MMOL/L (ref -3–0)
B.E.: -8.8 MMOL/L (ref -3–3)
BILIRUB SERPL-MCNC: 0.4 MG/DL (ref 0–1.2)
BILIRUB SERPL-MCNC: 0.7 MG/DL (ref 0–1.2)
BILIRUB SERPL-MCNC: <0.2 MG/DL (ref 0–1.2)
BLOOD BANK DISPENSE STATUS: NORMAL
BLOOD BANK PRODUCT CODE: NORMAL
BPU ID: NORMAL
BUN BLDV-MCNC: 14 MG/DL (ref 6–20)
BUN BLDV-MCNC: 15 MG/DL (ref 6–20)
BUN BLDV-MCNC: 16 MG/DL (ref 6–20)
CALCIUM IONIZED: 1.13 MMOL/L (ref 1.15–1.33)
CALCIUM SERPL-MCNC: 5.8 MG/DL (ref 8.6–10.2)
CALCIUM SERPL-MCNC: 7.6 MG/DL (ref 8.6–10.2)
CALCIUM SERPL-MCNC: 7.8 MG/DL (ref 8.6–10.2)
CARDIOPULMONARY BYPASS: YES
CHLORIDE BLD-SCNC: 106 MMOL/L (ref 98–107)
CHLORIDE BLD-SCNC: 108 MMOL/L (ref 98–107)
CHLORIDE BLD-SCNC: 114 MMOL/L (ref 98–107)
CO2: 19 MMOL/L (ref 22–29)
CO2: 21 MMOL/L (ref 22–29)
CO2: 23 MMOL/L (ref 22–29)
COHB: 1.2 % (ref 0–1.5)
CREAT SERPL-MCNC: 0.9 MG/DL (ref 0.7–1.2)
CREAT SERPL-MCNC: 1 MG/DL (ref 0.7–1.2)
CREAT SERPL-MCNC: 1.1 MG/DL (ref 0.7–1.2)
CRITICAL: ABNORMAL
DATE ANALYZED: ABNORMAL
DATE OF COLLECTION: ABNORMAL
DESCRIPTION BLOOD BANK: NORMAL
DEVICE: ABNORMAL
EPL-TEG: 1.4 % (ref 0–15)
ETHANOL: <10 MG/DL (ref 0–0.08)
FIO2: 100 %
G-TEG: 7 K D/SC (ref 4.5–11)
GFR AFRICAN AMERICAN: >60
GFR NON-AFRICAN AMERICAN: >60 ML/MIN/1.73
GLUCOSE BLD-MCNC: 131 MG/DL (ref 74–99)
GLUCOSE BLD-MCNC: 136 MG/DL (ref 74–99)
GLUCOSE BLD-MCNC: 145 MG/DL (ref 74–99)
HCO3 ARTERIAL: 21.6 MMOL/L (ref 22–26)
HCO3: 18.7 MMOL/L (ref 22–26)
HCT (EST): 32 % (ref 37–54)
HCT VFR BLD CALC: 26.1 % (ref 37–54)
HCT VFR BLD CALC: 31.1 % (ref 37–54)
HEMOGLOBIN: 10.6 G/DL (ref 12.5–16.5)
HEMOGLOBIN: 8.3 G/DL (ref 12.5–16.5)
HGB, (EST): 10.7 G/DL (ref 12.5–15.5)
HHB: 1.1 % (ref 0–5)
INR BLD: 1.4
K (CLOTTING TIME): 1.8 MIN (ref 1–3)
LAB: ABNORMAL
LACTIC ACID: 4.6 MMOL/L (ref 0.5–2.2)
LY30 (FIBRINOLYSIS): 1.4 % (ref 0–8)
Lab: ABNORMAL
MA (MAX AMPLITUDE): 58.5 MM (ref 50–70)
MAGNESIUM: 1.7 MG/DL (ref 1.6–2.6)
MCH RBC QN AUTO: 30 PG (ref 26–35)
MCH RBC QN AUTO: 30.6 PG (ref 26–35)
MCHC RBC AUTO-ENTMCNC: 31.8 % (ref 32–34.5)
MCHC RBC AUTO-ENTMCNC: 34.1 % (ref 32–34.5)
MCV RBC AUTO: 88.1 FL (ref 80–99.9)
MCV RBC AUTO: 96.3 FL (ref 80–99.9)
METHB: 0.4 % (ref 0–1.5)
MODE: AC
O2 CONTENT: 14.3 ML/DL
O2 SATURATION: 98.9 % (ref 92–98.5)
O2 SATURATION: 99.9 % (ref 92–98.5)
O2HB: 97.3 % (ref 94–97)
OPERATOR ID: 2325
OPERATOR ID: ABNORMAL
PATIENT TEMP: 37 C
PCO2 ARTERIAL: 42.8 MMHG (ref 35–45)
PCO2: 47.3 MMHG (ref 35–45)
PDW BLD-RTO: 12.6 FL (ref 11.5–15)
PDW BLD-RTO: 13.9 FL (ref 11.5–15)
PEEP/CPAP: 5 CMH2O
PFO2: 4.64 MMHG/%
PH BLOOD GAS: 7.21 (ref 7.35–7.45)
PH BLOOD GAS: 7.31 (ref 7.35–7.45)
PHOSPHORUS: 4.1 MG/DL (ref 2.5–4.5)
PLATELET # BLD: 133 E9/L (ref 130–450)
PLATELET # BLD: 150 E9/L (ref 130–450)
PMV BLD AUTO: 10.2 FL (ref 7–12)
PMV BLD AUTO: 10.4 FL (ref 7–12)
PO2 ARTERIAL: 295.9 MMHG (ref 80–100)
PO2: 463.5 MMHG (ref 60–100)
POTASSIUM SERPL-SCNC: 3.4 MMOL/L (ref 3.5–5)
POTASSIUM SERPL-SCNC: 3.56 MMOL/L (ref 3.3–5.1)
POTASSIUM SERPL-SCNC: 4.1 MMOL/L (ref 3.5–5)
POTASSIUM SERPL-SCNC: 4.2 MMOL/L (ref 3.5–5)
POTASSIUM SERPL-SCNC: 4.6 MMOL/L (ref 3.5–5.5)
PROTHROMBIN TIME: 15.3 SEC (ref 9.3–12.4)
R (REACTION TIME): 6.3 MIN (ref 5–10)
RBC # BLD: 2.71 E12/L (ref 3.8–5.8)
RBC # BLD: 3.53 E12/L (ref 3.8–5.8)
RI(T): 38 %
RR MECHANICAL: 14 B/MIN
SALICYLATE, SERUM: <0.3 MG/DL (ref 0–30)
SODIUM BLD-SCNC: 139 MMOL/L (ref 132–146)
SODIUM BLD-SCNC: 140 MMOL/L (ref 132–146)
SODIUM BLD-SCNC: 141 MMOL/L (ref 132–146)
SOURCE, BLOOD GAS: ABNORMAL
SOURCE, BLOOD GAS: ABNORMAL
THB: 9.5 G/DL (ref 11.5–16.5)
TIME ANALYZED: 1059
TOTAL PROTEIN: 3.2 G/DL (ref 6.4–8.3)
TOTAL PROTEIN: 4.3 G/DL (ref 6.4–8.3)
TOTAL PROTEIN: 5.1 G/DL (ref 6.4–8.3)
TRICYCLIC ANTIDEPRESSANTS SCREEN SERUM: NEGATIVE NG/ML
VT MECHANICAL: 650 ML
WBC # BLD: 10.2 E9/L (ref 4.5–11.5)
WBC # BLD: 12 E9/L (ref 4.5–11.5)

## 2019-12-05 PROCEDURE — 85384 FIBRINOGEN ACTIVITY: CPT

## 2019-12-05 PROCEDURE — 31500 INSERT EMERGENCY AIRWAY: CPT | Performed by: ANESTHESIOLOGY

## 2019-12-05 PROCEDURE — 86920 COMPATIBILITY TEST SPIN: CPT

## 2019-12-05 PROCEDURE — 6370000000 HC RX 637 (ALT 250 FOR IP): Performed by: ORTHOPAEDIC SURGERY

## 2019-12-05 PROCEDURE — 86901 BLOOD TYPING SEROLOGIC RH(D): CPT

## 2019-12-05 PROCEDURE — 85347 COAGULATION TIME ACTIVATED: CPT

## 2019-12-05 PROCEDURE — 73090 X-RAY EXAM OF FOREARM: CPT

## 2019-12-05 PROCEDURE — 2709999900 HC NON-CHARGEABLE SUPPLY: Performed by: ORTHOPAEDIC SURGERY

## 2019-12-05 PROCEDURE — 6360000002 HC RX W HCPCS: Performed by: EMERGENCY MEDICINE

## 2019-12-05 PROCEDURE — 85610 PROTHROMBIN TIME: CPT

## 2019-12-05 PROCEDURE — 2580000003 HC RX 258: Performed by: STUDENT IN AN ORGANIZED HEALTH CARE EDUCATION/TRAINING PROGRAM

## 2019-12-05 PROCEDURE — 83605 ASSAY OF LACTIC ACID: CPT

## 2019-12-05 PROCEDURE — 2000000000 HC ICU R&B

## 2019-12-05 PROCEDURE — 5A1945Z RESPIRATORY VENTILATION, 24-96 CONSECUTIVE HOURS: ICD-10-PCS | Performed by: ANESTHESIOLOGY

## 2019-12-05 PROCEDURE — 6370000000 HC RX 637 (ALT 250 FOR IP): Performed by: STUDENT IN AN ORGANIZED HEALTH CARE EDUCATION/TRAINING PROGRAM

## 2019-12-05 PROCEDURE — 71045 X-RAY EXAM CHEST 1 VIEW: CPT

## 2019-12-05 PROCEDURE — 80053 COMPREHEN METABOLIC PANEL: CPT

## 2019-12-05 PROCEDURE — 85025 COMPLETE CBC W/AUTO DIFF WBC: CPT

## 2019-12-05 PROCEDURE — 6810039001 HC L1 TRAUMA PRIORITY

## 2019-12-05 PROCEDURE — 6360000002 HC RX W HCPCS: Performed by: STUDENT IN AN ORGANIZED HEALTH CARE EDUCATION/TRAINING PROGRAM

## 2019-12-05 PROCEDURE — 72125 CT NECK SPINE W/O DYE: CPT

## 2019-12-05 PROCEDURE — 2500000003 HC RX 250 WO HCPCS: Performed by: EMERGENCY MEDICINE

## 2019-12-05 PROCEDURE — 2500000003 HC RX 250 WO HCPCS

## 2019-12-05 PROCEDURE — 2580000003 HC RX 258: Performed by: ANESTHESIOLOGIST ASSISTANT

## 2019-12-05 PROCEDURE — 32551 INSERTION OF CHEST TUBE: CPT | Performed by: SURGERY

## 2019-12-05 PROCEDURE — 86900 BLOOD TYPING SEROLOGIC ABO: CPT

## 2019-12-05 PROCEDURE — 6360000002 HC RX W HCPCS: Performed by: ORTHOPAEDIC SURGERY

## 2019-12-05 PROCEDURE — 2580000003 HC RX 258: Performed by: RADIOLOGY

## 2019-12-05 PROCEDURE — 99284 EMERGENCY DEPT VISIT MOD MDM: CPT

## 2019-12-05 PROCEDURE — 05H533Z INSERTION OF INFUSION DEVICE INTO RIGHT SUBCLAVIAN VEIN, PERCUTANEOUS APPROACH: ICD-10-PCS | Performed by: SURGERY

## 2019-12-05 PROCEDURE — P9059 PLASMA, FRZ BETWEEN 8-24HOUR: HCPCS

## 2019-12-05 PROCEDURE — 82330 ASSAY OF CALCIUM: CPT

## 2019-12-05 PROCEDURE — 72131 CT LUMBAR SPINE W/O DYE: CPT

## 2019-12-05 PROCEDURE — 99254 IP/OBS CNSLTJ NEW/EST MOD 60: CPT | Performed by: ORTHOPAEDIC SURGERY

## 2019-12-05 PROCEDURE — 31500 INSERT EMERGENCY AIRWAY: CPT

## 2019-12-05 PROCEDURE — APPSS45 APP SPLIT SHARED TIME 31-45 MINUTES: Performed by: NURSE PRACTITIONER

## 2019-12-05 PROCEDURE — 6360000002 HC RX W HCPCS: Performed by: NURSE PRACTITIONER

## 2019-12-05 PROCEDURE — 0W9930Z DRAINAGE OF RIGHT PLEURAL CAVITY WITH DRAINAGE DEVICE, PERCUTANEOUS APPROACH: ICD-10-PCS | Performed by: SURGERY

## 2019-12-05 PROCEDURE — 87081 CULTURE SCREEN ONLY: CPT

## 2019-12-05 PROCEDURE — 73060 X-RAY EXAM OF HUMERUS: CPT

## 2019-12-05 PROCEDURE — 71260 CT THORAX DX C+: CPT

## 2019-12-05 PROCEDURE — 7100000000 HC PACU RECOVERY - FIRST 15 MIN

## 2019-12-05 PROCEDURE — C9113 INJ PANTOPRAZOLE SODIUM, VIA: HCPCS | Performed by: NURSE PRACTITIONER

## 2019-12-05 PROCEDURE — 70498 CT ANGIOGRAPHY NECK: CPT

## 2019-12-05 PROCEDURE — 70450 CT HEAD/BRAIN W/O DYE: CPT

## 2019-12-05 PROCEDURE — 36620 INSERTION CATHETER ARTERY: CPT | Performed by: ANESTHESIOLOGY

## 2019-12-05 PROCEDURE — 72170 X-RAY EXAM OF PELVIS: CPT

## 2019-12-05 PROCEDURE — 36620 INSERTION CATHETER ARTERY: CPT

## 2019-12-05 PROCEDURE — 6360000002 HC RX W HCPCS

## 2019-12-05 PROCEDURE — 3700000000 HC ANESTHESIA ATTENDED CARE: Performed by: ORTHOPAEDIC SURGERY

## 2019-12-05 PROCEDURE — 6360000002 HC RX W HCPCS: Performed by: ANESTHESIOLOGY

## 2019-12-05 PROCEDURE — 85027 COMPLETE CBC AUTOMATED: CPT

## 2019-12-05 PROCEDURE — 6360000002 HC RX W HCPCS: Performed by: SURGERY

## 2019-12-05 PROCEDURE — 82805 BLOOD GASES W/O2 SATURATION: CPT

## 2019-12-05 PROCEDURE — 3600000014 HC SURGERY LEVEL 4 ADDTL 15MIN: Performed by: ORTHOPAEDIC SURGERY

## 2019-12-05 PROCEDURE — 82803 BLOOD GASES ANY COMBINATION: CPT

## 2019-12-05 PROCEDURE — 73070 X-RAY EXAM OF ELBOW: CPT

## 2019-12-05 PROCEDURE — 7100000001 HC PACU RECOVERY - ADDTL 15 MIN

## 2019-12-05 PROCEDURE — 36556 INSERT NON-TUNNEL CV CATH: CPT

## 2019-12-05 PROCEDURE — 2580000003 HC RX 258: Performed by: NURSE PRACTITIONER

## 2019-12-05 PROCEDURE — 3209999900 FLUORO FOR SURGICAL PROCEDURES

## 2019-12-05 PROCEDURE — C1713 ANCHOR/SCREW BN/BN,TIS/BN: HCPCS | Performed by: ORTHOPAEDIC SURGERY

## 2019-12-05 PROCEDURE — 86923 COMPATIBILITY TEST ELECTRIC: CPT

## 2019-12-05 PROCEDURE — 6370000000 HC RX 637 (ALT 250 FOR IP): Performed by: NURSE PRACTITIONER

## 2019-12-05 PROCEDURE — 2720000010 HC SURG SUPPLY STERILE: Performed by: ORTHOPAEDIC SURGERY

## 2019-12-05 PROCEDURE — 73200 CT UPPER EXTREMITY W/O DYE: CPT

## 2019-12-05 PROCEDURE — 3700000001 HC ADD 15 MINUTES (ANESTHESIA): Performed by: ORTHOPAEDIC SURGERY

## 2019-12-05 PROCEDURE — G0480 DRUG TEST DEF 1-7 CLASSES: HCPCS

## 2019-12-05 PROCEDURE — 85576 BLOOD PLATELET AGGREGATION: CPT

## 2019-12-05 PROCEDURE — 36415 COLL VENOUS BLD VENIPUNCTURE: CPT

## 2019-12-05 PROCEDURE — 90715 TDAP VACCINE 7 YRS/> IM: CPT | Performed by: EMERGENCY MEDICINE

## 2019-12-05 PROCEDURE — P9016 RBC LEUKOCYTES REDUCED: HCPCS

## 2019-12-05 PROCEDURE — 2500000003 HC RX 250 WO HCPCS: Performed by: STUDENT IN AN ORGANIZED HEALTH CARE EDUCATION/TRAINING PROGRAM

## 2019-12-05 PROCEDURE — 72128 CT CHEST SPINE W/O DYE: CPT

## 2019-12-05 PROCEDURE — 94002 VENT MGMT INPAT INIT DAY: CPT

## 2019-12-05 PROCEDURE — 2580000003 HC RX 258

## 2019-12-05 PROCEDURE — 0BH17EZ INSERTION OF ENDOTRACHEAL AIRWAY INTO TRACHEA, VIA NATURAL OR ARTIFICIAL OPENING: ICD-10-PCS | Performed by: ANESTHESIOLOGY

## 2019-12-05 PROCEDURE — 84100 ASSAY OF PHOSPHORUS: CPT

## 2019-12-05 PROCEDURE — 2500000003 HC RX 250 WO HCPCS: Performed by: ANESTHESIOLOGY

## 2019-12-05 PROCEDURE — 3600000004 HC SURGERY LEVEL 4 BASE: Performed by: ORTHOPAEDIC SURGERY

## 2019-12-05 PROCEDURE — 90471 IMMUNIZATION ADMIN: CPT | Performed by: EMERGENCY MEDICINE

## 2019-12-05 PROCEDURE — 85730 THROMBOPLASTIN TIME PARTIAL: CPT

## 2019-12-05 PROCEDURE — 80307 DRUG TEST PRSMV CHEM ANLYZR: CPT

## 2019-12-05 PROCEDURE — 86850 RBC ANTIBODY SCREEN: CPT

## 2019-12-05 PROCEDURE — 99222 1ST HOSP IP/OBS MODERATE 55: CPT | Performed by: SURGERY

## 2019-12-05 PROCEDURE — 83735 ASSAY OF MAGNESIUM: CPT

## 2019-12-05 PROCEDURE — 6360000004 HC RX CONTRAST MEDICATION: Performed by: EMERGENCY MEDICINE

## 2019-12-05 PROCEDURE — 73600 X-RAY EXAM OF ANKLE: CPT

## 2019-12-05 PROCEDURE — 84132 ASSAY OF SERUM POTASSIUM: CPT

## 2019-12-05 PROCEDURE — 74177 CT ABD & PELVIS W/CONTRAST: CPT

## 2019-12-05 PROCEDURE — 36556 INSERT NON-TUNNEL CV CATH: CPT | Performed by: SURGERY

## 2019-12-05 DEVICE — IMPLANTABLE DEVICE: Type: IMPLANTABLE DEVICE | Site: PELVIS | Status: FUNCTIONAL

## 2019-12-05 DEVICE — SCREW BNE L38MM DIA3.5MM CORT S STL ST NONCANNULATED LOK: Type: IMPLANTABLE DEVICE | Site: PELVIS | Status: FUNCTIONAL

## 2019-12-05 DEVICE — SCREW BNE L46MM DIA3.5MM CORT S STL ST NONCANNULATED LOK: Type: IMPLANTABLE DEVICE | Site: PELVIS | Status: FUNCTIONAL

## 2019-12-05 DEVICE — SCREW BNE L22MM DIA3.5MM CORT S STL ST NONCANNULATED LOK: Type: IMPLANTABLE DEVICE | Site: PELVIS | Status: FUNCTIONAL

## 2019-12-05 DEVICE — SCREW BNE L24MM DIA3.5MM CORT S STL ST NONCANNULATED LOK: Type: IMPLANTABLE DEVICE | Site: PELVIS | Status: FUNCTIONAL

## 2019-12-05 DEVICE — SCREW BNE L65MM DIA3.5MM STD CORT S STL ST NONCANNULATED: Type: IMPLANTABLE DEVICE | Site: PELVIS | Status: FUNCTIONAL

## 2019-12-05 DEVICE — SCREW BNE L34MM DIA3.5MM CORT S STL ST NONCANNULATED LOK: Type: IMPLANTABLE DEVICE | Site: PELVIS | Status: FUNCTIONAL

## 2019-12-05 DEVICE — SCREW BNE L20MM DIA3.5MM CORT S STL ST NONCANNULATED LOK: Type: IMPLANTABLE DEVICE | Site: PELVIS | Status: FUNCTIONAL

## 2019-12-05 RX ORDER — CARBAMAZEPINE 300 MG/1
100 CAPSULE, EXTENDED RELEASE ORAL 3 TIMES DAILY
Status: DISCONTINUED | OUTPATIENT
Start: 2019-12-06 | End: 2019-12-06 | Stop reason: CLARIF

## 2019-12-05 RX ORDER — ACETAMINOPHEN 325 MG/1
650 TABLET ORAL EVERY 4 HOURS PRN
Status: DISCONTINUED | OUTPATIENT
Start: 2019-12-05 | End: 2019-12-07

## 2019-12-05 RX ORDER — CEFAZOLIN SODIUM 2 G/50ML
SOLUTION INTRAVENOUS CONTINUOUS PRN
Status: COMPLETED | OUTPATIENT
Start: 2019-12-05 | End: 2019-12-05

## 2019-12-05 RX ORDER — PHENYLEPHRINE HYDROCHLORIDE 10 MG/ML
INJECTION INTRAVENOUS PRN
Status: DISCONTINUED | OUTPATIENT
Start: 2019-12-05 | End: 2019-12-05 | Stop reason: SDUPTHER

## 2019-12-05 RX ORDER — FLUPHENAZINE HYDROCHLORIDE 5 MG/1
5 TABLET ORAL DAILY
Status: ON HOLD | COMMUNITY
End: 2020-01-02 | Stop reason: HOSPADM

## 2019-12-05 RX ORDER — 0.9 % SODIUM CHLORIDE 0.9 %
250 INTRAVENOUS SOLUTION INTRAVENOUS ONCE
Status: DISCONTINUED | OUTPATIENT
Start: 2019-12-05 | End: 2019-12-05

## 2019-12-05 RX ORDER — 0.9 % SODIUM CHLORIDE 0.9 %
1000 INTRAVENOUS SOLUTION INTRAVENOUS ONCE
Status: COMPLETED | OUTPATIENT
Start: 2019-12-06 | End: 2019-12-06

## 2019-12-05 RX ORDER — ROCURONIUM BROMIDE 10 MG/ML
INJECTION, SOLUTION INTRAVENOUS
Status: DISCONTINUED
Start: 2019-12-05 | End: 2019-12-05 | Stop reason: WASHOUT

## 2019-12-05 RX ORDER — FAMOTIDINE 20 MG/1
20 TABLET, FILM COATED ORAL 2 TIMES DAILY
Status: ON HOLD | COMMUNITY
End: 2020-01-02 | Stop reason: HOSPADM

## 2019-12-05 RX ORDER — VECURONIUM BROMIDE 1 MG/ML
10 INJECTION, POWDER, LYOPHILIZED, FOR SOLUTION INTRAVENOUS ONCE
Status: COMPLETED | OUTPATIENT
Start: 2019-12-05 | End: 2019-12-05

## 2019-12-05 RX ORDER — CARBAMAZEPINE 300 MG/1
300 CAPSULE, EXTENDED RELEASE ORAL 2 TIMES DAILY
Status: ON HOLD | COMMUNITY
End: 2019-12-06 | Stop reason: ALTCHOICE

## 2019-12-05 RX ORDER — FLUPHENAZINE HYDROCHLORIDE 5 MG/1
5 TABLET ORAL 3 TIMES DAILY
Status: DISCONTINUED | OUTPATIENT
Start: 2019-12-05 | End: 2019-12-23 | Stop reason: HOSPADM

## 2019-12-05 RX ORDER — CHLORHEXIDINE GLUCONATE 0.12 MG/ML
15 RINSE ORAL 2 TIMES DAILY
Status: DISCONTINUED | OUTPATIENT
Start: 2019-12-05 | End: 2019-12-07

## 2019-12-05 RX ORDER — MAGNESIUM SULFATE IN WATER 40 MG/ML
2 INJECTION, SOLUTION INTRAVENOUS ONCE
Status: COMPLETED | OUTPATIENT
Start: 2019-12-05 | End: 2019-12-05

## 2019-12-05 RX ORDER — MIDAZOLAM HYDROCHLORIDE 1 MG/ML
2 INJECTION INTRAMUSCULAR; INTRAVENOUS ONCE
Status: DISCONTINUED | OUTPATIENT
Start: 2019-12-05 | End: 2019-12-09

## 2019-12-05 RX ORDER — MORPHINE SULFATE 2 MG/ML
2 INJECTION, SOLUTION INTRAMUSCULAR; INTRAVENOUS
Status: DISCONTINUED | OUTPATIENT
Start: 2019-12-05 | End: 2019-12-06

## 2019-12-05 RX ORDER — QUETIAPINE FUMARATE 200 MG/1
400 TABLET, FILM COATED ORAL 2 TIMES DAILY
Status: ON HOLD | COMMUNITY
End: 2020-01-23

## 2019-12-05 RX ORDER — DOCUSATE SODIUM 100 MG/1
100 CAPSULE, LIQUID FILLED ORAL 2 TIMES DAILY
Status: DISCONTINUED | OUTPATIENT
Start: 2019-12-05 | End: 2019-12-05

## 2019-12-05 RX ORDER — MIDAZOLAM HYDROCHLORIDE 1 MG/ML
4 INJECTION INTRAMUSCULAR; INTRAVENOUS ONCE
Status: COMPLETED | OUTPATIENT
Start: 2019-12-05 | End: 2019-12-05

## 2019-12-05 RX ORDER — DIAPER,BRIEF,INFANT-TODD,DISP
EACH MISCELLANEOUS PRN
Status: DISCONTINUED | OUTPATIENT
Start: 2019-12-05 | End: 2019-12-05 | Stop reason: HOSPADM

## 2019-12-05 RX ORDER — MIDAZOLAM HYDROCHLORIDE 1 MG/ML
2 INJECTION INTRAMUSCULAR; INTRAVENOUS ONCE
Status: COMPLETED | OUTPATIENT
Start: 2019-12-05 | End: 2019-12-05

## 2019-12-05 RX ORDER — BENZTROPINE MESYLATE 1 MG/1
1 TABLET ORAL 2 TIMES DAILY
Status: DISCONTINUED | OUTPATIENT
Start: 2019-12-05 | End: 2019-12-23 | Stop reason: HOSPADM

## 2019-12-05 RX ORDER — SUCCINYLCHOLINE CHLORIDE 20 MG/ML
INJECTION INTRAMUSCULAR; INTRAVENOUS DAILY PRN
Status: COMPLETED | OUTPATIENT
Start: 2019-12-05 | End: 2019-12-05

## 2019-12-05 RX ORDER — SODIUM CHLORIDE 9 MG/ML
10 INJECTION INTRAVENOUS DAILY
Status: DISCONTINUED | OUTPATIENT
Start: 2019-12-05 | End: 2019-12-09

## 2019-12-05 RX ORDER — PROPOFOL 10 MG/ML
100 INJECTION, EMULSION INTRAVENOUS ONCE
Status: DISCONTINUED | OUTPATIENT
Start: 2019-12-05 | End: 2019-12-09

## 2019-12-05 RX ORDER — 0.9 % SODIUM CHLORIDE 0.9 %
250 INTRAVENOUS SOLUTION INTRAVENOUS ONCE
Status: COMPLETED | OUTPATIENT
Start: 2019-12-05 | End: 2019-12-05

## 2019-12-05 RX ORDER — MIDAZOLAM HYDROCHLORIDE 1 MG/ML
INJECTION INTRAMUSCULAR; INTRAVENOUS
Status: DISPENSED
Start: 2019-12-05 | End: 2019-12-06

## 2019-12-05 RX ORDER — FENTANYL CITRATE 50 UG/ML
INJECTION, SOLUTION INTRAMUSCULAR; INTRAVENOUS PRN
Status: DISCONTINUED | OUTPATIENT
Start: 2019-12-05 | End: 2019-12-05 | Stop reason: SDUPTHER

## 2019-12-05 RX ORDER — VECURONIUM BROMIDE 1 MG/ML
10 INJECTION, POWDER, LYOPHILIZED, FOR SOLUTION INTRAVENOUS ONCE
Status: DISCONTINUED | OUTPATIENT
Start: 2019-12-05 | End: 2019-12-05

## 2019-12-05 RX ORDER — SODIUM CHLORIDE 450 MG/100ML
INJECTION, SOLUTION INTRAVENOUS CONTINUOUS
Status: DISCONTINUED | OUTPATIENT
Start: 2019-12-05 | End: 2019-12-05

## 2019-12-05 RX ORDER — 0.9 % SODIUM CHLORIDE 0.9 %
250 INTRAVENOUS SOLUTION INTRAVENOUS ONCE
Status: COMPLETED | OUTPATIENT
Start: 2019-12-06 | End: 2019-12-06

## 2019-12-05 RX ORDER — SODIUM CHLORIDE 9 MG/ML
INJECTION, SOLUTION INTRAVENOUS CONTINUOUS PRN
Status: DISCONTINUED | OUTPATIENT
Start: 2019-12-05 | End: 2019-12-05 | Stop reason: SDUPTHER

## 2019-12-05 RX ORDER — VANCOMYCIN HYDROCHLORIDE 1 G/20ML
INJECTION, POWDER, LYOPHILIZED, FOR SOLUTION INTRAVENOUS PRN
Status: DISCONTINUED | OUTPATIENT
Start: 2019-12-05 | End: 2019-12-05 | Stop reason: HOSPADM

## 2019-12-05 RX ORDER — SODIUM CHLORIDE 0.9 % (FLUSH) 0.9 %
10 SYRINGE (ML) INJECTION EVERY 12 HOURS SCHEDULED
Status: DISCONTINUED | OUTPATIENT
Start: 2019-12-05 | End: 2019-12-10 | Stop reason: SDUPTHER

## 2019-12-05 RX ORDER — CEFAZOLIN SODIUM 2 G/50ML
2 SOLUTION INTRAVENOUS ONCE
Status: DISCONTINUED | OUTPATIENT
Start: 2019-12-05 | End: 2019-12-09

## 2019-12-05 RX ORDER — MORPHINE SULFATE 4 MG/ML
4 INJECTION, SOLUTION INTRAMUSCULAR; INTRAVENOUS
Status: DISCONTINUED | OUTPATIENT
Start: 2019-12-05 | End: 2019-12-06

## 2019-12-05 RX ORDER — CEFAZOLIN SODIUM 2 G/50ML
SOLUTION INTRAVENOUS
Status: DISPENSED
Start: 2019-12-05 | End: 2019-12-05

## 2019-12-05 RX ORDER — MIDAZOLAM HYDROCHLORIDE 5 MG/ML
INJECTION INTRAMUSCULAR; INTRAVENOUS DAILY PRN
Status: COMPLETED | OUTPATIENT
Start: 2019-12-05 | End: 2019-12-05

## 2019-12-05 RX ORDER — CEFAZOLIN SODIUM 2 G/50ML
2 SOLUTION INTRAVENOUS EVERY 8 HOURS
Status: COMPLETED | OUTPATIENT
Start: 2019-12-05 | End: 2019-12-06

## 2019-12-05 RX ORDER — ONDANSETRON 2 MG/ML
4 INJECTION INTRAMUSCULAR; INTRAVENOUS EVERY 6 HOURS PRN
Status: DISCONTINUED | OUTPATIENT
Start: 2019-12-05 | End: 2019-12-10 | Stop reason: SDUPTHER

## 2019-12-05 RX ORDER — PROPOFOL 10 MG/ML
10 INJECTION, EMULSION INTRAVENOUS
Status: DISCONTINUED | OUTPATIENT
Start: 2019-12-05 | End: 2019-12-07

## 2019-12-05 RX ORDER — SUCCINYLCHOLINE CHLORIDE 20 MG/ML
200 INJECTION INTRAMUSCULAR; INTRAVENOUS ONCE
Status: DISCONTINUED | OUTPATIENT
Start: 2019-12-05 | End: 2019-12-06

## 2019-12-05 RX ORDER — FENTANYL CITRATE 50 UG/ML
INJECTION, SOLUTION INTRAMUSCULAR; INTRAVENOUS
Status: DISPENSED
Start: 2019-12-05 | End: 2019-12-05

## 2019-12-05 RX ORDER — PROPOFOL 10 MG/ML
INJECTION, EMULSION INTRAVENOUS
Status: DISPENSED
Start: 2019-12-05 | End: 2019-12-06

## 2019-12-05 RX ORDER — VECURONIUM BROMIDE 1 MG/ML
INJECTION, POWDER, LYOPHILIZED, FOR SOLUTION INTRAVENOUS DAILY PRN
Status: COMPLETED | OUTPATIENT
Start: 2019-12-05 | End: 2019-12-05

## 2019-12-05 RX ORDER — PANTOPRAZOLE SODIUM 40 MG/10ML
40 INJECTION, POWDER, LYOPHILIZED, FOR SOLUTION INTRAVENOUS DAILY
Status: DISCONTINUED | OUTPATIENT
Start: 2019-12-05 | End: 2019-12-09

## 2019-12-05 RX ORDER — BENZTROPINE MESYLATE 1 MG/1
1 TABLET ORAL 2 TIMES DAILY
Status: ON HOLD | COMMUNITY
End: 2020-01-23

## 2019-12-05 RX ORDER — FENTANYL CITRATE 50 UG/ML
INJECTION, SOLUTION INTRAMUSCULAR; INTRAVENOUS DAILY PRN
Status: COMPLETED | OUTPATIENT
Start: 2019-12-05 | End: 2019-12-05

## 2019-12-05 RX ORDER — DOCUSATE SODIUM 50 MG/5ML
100 LIQUID ORAL 2 TIMES DAILY
Status: DISCONTINUED | OUTPATIENT
Start: 2019-12-05 | End: 2019-12-08 | Stop reason: SDUPTHER

## 2019-12-05 RX ORDER — SODIUM CHLORIDE 0.9 % (FLUSH) 0.9 %
10 SYRINGE (ML) INJECTION PRN
Status: DISCONTINUED | OUTPATIENT
Start: 2019-12-05 | End: 2019-12-10 | Stop reason: SDUPTHER

## 2019-12-05 RX ORDER — CARBAMAZEPINE 300 MG/1
300 CAPSULE, EXTENDED RELEASE ORAL 2 TIMES DAILY
Status: DISCONTINUED | OUTPATIENT
Start: 2019-12-05 | End: 2019-12-05

## 2019-12-05 RX ORDER — SODIUM CHLORIDE 0.9 % (FLUSH) 0.9 %
10 SYRINGE (ML) INJECTION PRN
Status: DISCONTINUED | OUTPATIENT
Start: 2019-12-05 | End: 2019-12-05

## 2019-12-05 RX ORDER — PROPOFOL 10 MG/ML
INJECTION, EMULSION INTRAVENOUS DAILY PRN
Status: COMPLETED | OUTPATIENT
Start: 2019-12-05 | End: 2019-12-05

## 2019-12-05 RX ORDER — QUETIAPINE FUMARATE 200 MG/1
400 TABLET, FILM COATED ORAL 2 TIMES DAILY
Status: DISCONTINUED | OUTPATIENT
Start: 2019-12-05 | End: 2019-12-23 | Stop reason: HOSPADM

## 2019-12-05 RX ORDER — CEFAZOLIN SODIUM 1 G/3ML
INJECTION, POWDER, FOR SOLUTION INTRAMUSCULAR; INTRAVENOUS PRN
Status: DISCONTINUED | OUTPATIENT
Start: 2019-12-05 | End: 2019-12-05 | Stop reason: SDUPTHER

## 2019-12-05 RX ORDER — FENTANYL CITRATE 50 UG/ML
100 INJECTION, SOLUTION INTRAMUSCULAR; INTRAVENOUS ONCE
Status: DISCONTINUED | OUTPATIENT
Start: 2019-12-05 | End: 2019-12-09

## 2019-12-05 RX ORDER — SODIUM CHLORIDE 9 MG/ML
INJECTION, SOLUTION INTRAVENOUS CONTINUOUS
Status: DISCONTINUED | OUTPATIENT
Start: 2019-12-05 | End: 2019-12-09

## 2019-12-05 RX ORDER — OXYCODONE HYDROCHLORIDE AND ACETAMINOPHEN 5; 325 MG/1; MG/1
1 TABLET ORAL EVERY 4 HOURS PRN
Status: DISCONTINUED | OUTPATIENT
Start: 2019-12-05 | End: 2019-12-06

## 2019-12-05 RX ADMIN — SODIUM CHLORIDE, PRESERVATIVE FREE 10 ML: 5 INJECTION INTRAVENOUS at 22:02

## 2019-12-05 RX ADMIN — CEFAZOLIN SODIUM 2 G: 2 SOLUTION INTRAVENOUS at 22:04

## 2019-12-05 RX ADMIN — QUETIAPINE FUMARATE 400 MG: 200 TABLET ORAL at 23:11

## 2019-12-05 RX ADMIN — PHENYLEPHRINE HYDROCHLORIDE 100 MCG: 10 INJECTION INTRAVENOUS at 14:23

## 2019-12-05 RX ADMIN — SUCCINYLCHOLINE CHLORIDE 200 MG: 20 INJECTION, SOLUTION INTRAMUSCULAR; INTRAVENOUS at 10:38

## 2019-12-05 RX ADMIN — VECURONIUM BROMIDE FOR INJECTION 5 MG: 1 INJECTION, POWDER, LYOPHILIZED, FOR SOLUTION INTRAVENOUS at 15:00

## 2019-12-05 RX ADMIN — IOPAMIDOL 110 ML: 755 INJECTION, SOLUTION INTRAVENOUS at 11:36

## 2019-12-05 RX ADMIN — CEFAZOLIN SODIUM 2 G: 2 SOLUTION INTRAVENOUS at 11:08

## 2019-12-05 RX ADMIN — FLUPHENAZINE HYDROCHLORIDE 5 MG: 5 TABLET, FILM COATED ORAL at 22:03

## 2019-12-05 RX ADMIN — FENTANYL CITRATE 50 MCG: 50 INJECTION, SOLUTION INTRAMUSCULAR; INTRAVENOUS at 13:52

## 2019-12-05 RX ADMIN — CEFAZOLIN 2000 MG: 1 INJECTION, POWDER, FOR SOLUTION INTRAMUSCULAR; INTRAVENOUS at 14:04

## 2019-12-05 RX ADMIN — FENTANYL CITRATE 200 MCG: 50 INJECTION, SOLUTION INTRAMUSCULAR; INTRAVENOUS at 13:43

## 2019-12-05 RX ADMIN — BENZTROPINE MESYLATE 1 MG: 1 TABLET ORAL at 22:55

## 2019-12-05 RX ADMIN — SODIUM CHLORIDE, PRESERVATIVE FREE 10 ML: 5 INJECTION INTRAVENOUS at 18:17

## 2019-12-05 RX ADMIN — CHLORHEXIDINE GLUCONATE 0.12% ORAL RINSE 15 ML: 1.2 LIQUID ORAL at 18:17

## 2019-12-05 RX ADMIN — TETANUS TOXOID, REDUCED DIPHTHERIA TOXOID AND ACELLULAR PERTUSSIS VACCINE, ADSORBED 0.5 ML: 5; 2.5; 8; 8; 2.5 SUSPENSION INTRAMUSCULAR at 11:08

## 2019-12-05 RX ADMIN — VECURONIUM BROMIDE FOR INJECTION 5 MG: 1 INJECTION, POWDER, LYOPHILIZED, FOR SOLUTION INTRAVENOUS at 14:00

## 2019-12-05 RX ADMIN — Medication 200 MCG/HR: at 17:30

## 2019-12-05 RX ADMIN — MAGNESIUM SULFATE HEPTAHYDRATE 2 G: 40 INJECTION, SOLUTION INTRAVENOUS at 19:55

## 2019-12-05 RX ADMIN — VECURONIUM BROMIDE FOR INJECTION 5 MG: 1 INJECTION, POWDER, LYOPHILIZED, FOR SOLUTION INTRAVENOUS at 13:20

## 2019-12-05 RX ADMIN — MIDAZOLAM 2 MG: 1 INJECTION INTRAMUSCULAR; INTRAVENOUS at 13:40

## 2019-12-05 RX ADMIN — SODIUM CHLORIDE: 9 INJECTION, SOLUTION INTRAVENOUS at 13:52

## 2019-12-05 RX ADMIN — CHLORHEXIDINE GLUCONATE 0.12% ORAL RINSE 15 ML: 1.2 LIQUID ORAL at 21:17

## 2019-12-05 RX ADMIN — Medication 50 MCG/HR: at 10:51

## 2019-12-05 RX ADMIN — VECURONIUM BROMIDE FOR INJECTION 5 MG: 1 INJECTION, POWDER, LYOPHILIZED, FOR SOLUTION INTRAVENOUS at 14:41

## 2019-12-05 RX ADMIN — FENTANYL CITRATE 100 MCG: 50 INJECTION, SOLUTION INTRAMUSCULAR; INTRAVENOUS at 10:38

## 2019-12-05 RX ADMIN — Medication 200 MCG/HR: at 14:08

## 2019-12-05 RX ADMIN — SODIUM CHLORIDE 1000 ML: 9 INJECTION, SOLUTION INTRAVENOUS at 23:51

## 2019-12-05 RX ADMIN — DOCUSATE SODIUM 100 MG: 50 LIQUID ORAL at 22:03

## 2019-12-05 RX ADMIN — PROPOFOL 100 MG: 10 INJECTION, EMULSION INTRAVENOUS at 10:38

## 2019-12-05 RX ADMIN — MIDAZOLAM 2 MG: 1 INJECTION INTRAMUSCULAR; INTRAVENOUS at 12:41

## 2019-12-05 RX ADMIN — ACETAMINOPHEN 650 MG: 325 TABLET, FILM COATED ORAL at 22:03

## 2019-12-05 RX ADMIN — MIDAZOLAM HYDROCHLORIDE 4 MG: 5 INJECTION INTRAMUSCULAR; INTRAVENOUS at 11:14

## 2019-12-05 RX ADMIN — SODIUM CHLORIDE 250 ML: 9 INJECTION, SOLUTION INTRAVENOUS at 14:00

## 2019-12-05 RX ADMIN — VECURONIUM BROMIDE FOR INJECTION 10 MG: 1 INJECTION, POWDER, LYOPHILIZED, FOR SOLUTION INTRAVENOUS at 10:44

## 2019-12-05 RX ADMIN — CALCIUM GLUCONATE 2 G: 98 INJECTION, SOLUTION INTRAVENOUS at 20:28

## 2019-12-05 RX ADMIN — PANTOPRAZOLE SODIUM 40 MG: 40 INJECTION, POWDER, FOR SOLUTION INTRAVENOUS at 18:17

## 2019-12-05 RX ADMIN — MIDAZOLAM 4 MG: 1 INJECTION INTRAMUSCULAR; INTRAVENOUS at 14:50

## 2019-12-05 RX ADMIN — Medication 200 MCG/HR: at 23:26

## 2019-12-05 RX ADMIN — PROPOFOL 30.03 MCG/KG/MIN: 10 INJECTION, EMULSION INTRAVENOUS at 19:58

## 2019-12-05 SDOH — HEALTH STABILITY: MENTAL HEALTH: HOW OFTEN DO YOU HAVE A DRINK CONTAINING ALCOHOL?: NEVER

## 2019-12-05 ASSESSMENT — PULMONARY FUNCTION TESTS
PIF_VALUE: 23
PIF_VALUE: 22
PIF_VALUE: 23
PIF_VALUE: 22
PIF_VALUE: 23
PIF_VALUE: 23
PIF_VALUE: 25
PIF_VALUE: 22
PIF_VALUE: 25
PIF_VALUE: 23
PIF_VALUE: 22
PIF_VALUE: 23
PIF_VALUE: 3
PIF_VALUE: 22
PIF_VALUE: 22
PIF_VALUE: 23
PIF_VALUE: 22
PIF_VALUE: 23
PIF_VALUE: 23
PIF_VALUE: 21
PIF_VALUE: 22
PIF_VALUE: 22
PIF_VALUE: 23
PIF_VALUE: 23
PIF_VALUE: 21
PIF_VALUE: 27
PIF_VALUE: 22
PIF_VALUE: 22
PIF_VALUE: 20
PIF_VALUE: 23
PIF_VALUE: 22
PIF_VALUE: 23
PIF_VALUE: 24
PIF_VALUE: 23
PIF_VALUE: 23
PIF_VALUE: 20
PIF_VALUE: 23
PIF_VALUE: 25
PIF_VALUE: 25
PIF_VALUE: 22
PIF_VALUE: 24
PIF_VALUE: 24
PIF_VALUE: 23
PIF_VALUE: 22
PIF_VALUE: 25
PIF_VALUE: 23
PIF_VALUE: 22
PIF_VALUE: 23
PIF_VALUE: 22
PIF_VALUE: 22
PIF_VALUE: 23
PIF_VALUE: 22
PIF_VALUE: 22
PIF_VALUE: 24
PIF_VALUE: 23
PIF_VALUE: 24
PIF_VALUE: 23
PIF_VALUE: 25
PIF_VALUE: 23
PIF_VALUE: 22
PIF_VALUE: 27
PIF_VALUE: 23
PIF_VALUE: 23
PIF_VALUE: 22
PIF_VALUE: 23
PIF_VALUE: 22
PIF_VALUE: 23
PIF_VALUE: 23
PIF_VALUE: 26
PIF_VALUE: 22
PIF_VALUE: 24
PIF_VALUE: 23
PIF_VALUE: 26
PIF_VALUE: 23
PIF_VALUE: 21
PIF_VALUE: 22
PIF_VALUE: 26
PIF_VALUE: 22
PIF_VALUE: 23
PIF_VALUE: 22
PIF_VALUE: 23
PIF_VALUE: 23
PIF_VALUE: 21
PIF_VALUE: 25
PIF_VALUE: 23
PIF_VALUE: 23
PIF_VALUE: 22
PIF_VALUE: 22
PIF_VALUE: 24
PIF_VALUE: 21
PIF_VALUE: 22
PIF_VALUE: 22
PIF_VALUE: 23
PIF_VALUE: 22
PIF_VALUE: 21
PIF_VALUE: 22
PIF_VALUE: 22
PIF_VALUE: 23
PIF_VALUE: 22
PIF_VALUE: 22
PIF_VALUE: 21
PIF_VALUE: 23
PIF_VALUE: 19
PIF_VALUE: 21
PIF_VALUE: 23
PIF_VALUE: 23
PIF_VALUE: 22
PIF_VALUE: 23
PIF_VALUE: 22
PIF_VALUE: 22
PIF_VALUE: 23
PIF_VALUE: 23
PIF_VALUE: 22
PIF_VALUE: 23
PIF_VALUE: 24
PIF_VALUE: 23
PIF_VALUE: 23
PIF_VALUE: 22
PIF_VALUE: 22
PIF_VALUE: 23
PIF_VALUE: 23
PIF_VALUE: 22
PIF_VALUE: 23
PIF_VALUE: 21
PIF_VALUE: 27
PIF_VALUE: 24
PIF_VALUE: 23
PIF_VALUE: 25
PIF_VALUE: 25
PIF_VALUE: 26
PIF_VALUE: 24
PIF_VALUE: 23
PIF_VALUE: 24
PIF_VALUE: 21
PIF_VALUE: 23
PIF_VALUE: 21
PIF_VALUE: 23
PIF_VALUE: 23
PIF_VALUE: 22
PIF_VALUE: 24
PIF_VALUE: 23
PIF_VALUE: 22
PIF_VALUE: 23
PIF_VALUE: 22
PIF_VALUE: 23
PIF_VALUE: 21
PIF_VALUE: 23
PIF_VALUE: 21
PIF_VALUE: 22
PIF_VALUE: 23
PIF_VALUE: 23
PIF_VALUE: 21
PIF_VALUE: 23
PIF_VALUE: 23
PIF_VALUE: 22
PIF_VALUE: 23
PIF_VALUE: 24
PIF_VALUE: 21
PIF_VALUE: 21
PIF_VALUE: 22
PIF_VALUE: 21
PIF_VALUE: 23
PIF_VALUE: 23
PIF_VALUE: 25
PIF_VALUE: 23
PIF_VALUE: 22
PIF_VALUE: 21
PIF_VALUE: 22
PIF_VALUE: 22
PIF_VALUE: 21
PIF_VALUE: 22
PIF_VALUE: 21
PIF_VALUE: 24
PIF_VALUE: 23
PIF_VALUE: 22
PIF_VALUE: 23
PIF_VALUE: 23

## 2019-12-06 ENCOUNTER — APPOINTMENT (OUTPATIENT)
Dept: GENERAL RADIOLOGY | Age: 24
DRG: 912 | End: 2019-12-06
Payer: MEDICAID

## 2019-12-06 ENCOUNTER — TELEPHONE (OUTPATIENT)
Dept: ORTHOPEDIC SURGERY | Age: 24
End: 2019-12-06

## 2019-12-06 LAB
AADO2: 149.6 MMHG
ALBUMIN SERPL-MCNC: 2.5 G/DL (ref 3.5–5.2)
ALP BLD-CCNC: 52 U/L (ref 40–129)
ALT SERPL-CCNC: 35 U/L (ref 0–40)
ANION GAP SERPL CALCULATED.3IONS-SCNC: 11 MMOL/L (ref 7–16)
AST SERPL-CCNC: 126 U/L (ref 0–39)
B.E.: -4.8 MMOL/L (ref -3–3)
BASOPHILS ABSOLUTE: 0 E9/L (ref 0–0.2)
BASOPHILS ABSOLUTE: 0.01 E9/L (ref 0–0.2)
BASOPHILS RELATIVE PERCENT: 0.2 % (ref 0–2)
BASOPHILS RELATIVE PERCENT: 0.2 % (ref 0–2)
BILIRUB SERPL-MCNC: 0.5 MG/DL (ref 0–1.2)
BUN BLDV-MCNC: 16 MG/DL (ref 6–20)
CALCIUM IONIZED: 1.2 MMOL/L (ref 1.15–1.33)
CALCIUM SERPL-MCNC: 7.6 MG/DL (ref 8.6–10.2)
CHLORIDE BLD-SCNC: 110 MMOL/L (ref 98–107)
CO2: 18 MMOL/L (ref 22–29)
COHB: 0.3 % (ref 0–1.5)
CREAT SERPL-MCNC: 1.1 MG/DL (ref 0.7–1.2)
CRITICAL: ABNORMAL
DATE ANALYZED: ABNORMAL
DATE OF COLLECTION: ABNORMAL
EKG ATRIAL RATE: 164 BPM
EKG P AXIS: 61 DEGREES
EKG P-R INTERVAL: 90 MS
EKG Q-T INTERVAL: 266 MS
EKG QRS DURATION: 72 MS
EKG QTC CALCULATION (BAZETT): 439 MS
EKG R AXIS: 76 DEGREES
EKG T AXIS: 75 DEGREES
EKG VENTRICULAR RATE: 164 BPM
EOSINOPHILS ABSOLUTE: 0.02 E9/L (ref 0.05–0.5)
EOSINOPHILS ABSOLUTE: 0.11 E9/L (ref 0.05–0.5)
EOSINOPHILS RELATIVE PERCENT: 0.3 % (ref 0–6)
EOSINOPHILS RELATIVE PERCENT: 1.8 % (ref 0–6)
FIO2: 60 %
GFR AFRICAN AMERICAN: >60
GFR NON-AFRICAN AMERICAN: >60 ML/MIN/1.73
GLUCOSE BLD-MCNC: 120 MG/DL (ref 74–99)
HCO3: 18.8 MMOL/L (ref 22–26)
HCT VFR BLD CALC: 26.5 % (ref 37–54)
HCT VFR BLD CALC: 26.7 % (ref 37–54)
HEMOGLOBIN: 8.9 G/DL (ref 12.5–16.5)
HEMOGLOBIN: 9.1 G/DL (ref 12.5–16.5)
HHB: 1.6 % (ref 0–5)
IMMATURE GRANULOCYTES #: 0.04 E9/L
IMMATURE GRANULOCYTES %: 0.6 % (ref 0–5)
LAB: ABNORMAL
LYMPHOCYTES ABSOLUTE: 0.81 E9/L (ref 1.5–4)
LYMPHOCYTES ABSOLUTE: 1.12 E9/L (ref 1.5–4)
LYMPHOCYTES RELATIVE PERCENT: 12.3 % (ref 20–42)
LYMPHOCYTES RELATIVE PERCENT: 18.4 % (ref 20–42)
Lab: ABNORMAL
MAGNESIUM: 1.9 MG/DL (ref 1.6–2.6)
MCH RBC QN AUTO: 29.7 PG (ref 26–35)
MCH RBC QN AUTO: 30 PG (ref 26–35)
MCHC RBC AUTO-ENTMCNC: 33.6 % (ref 32–34.5)
MCHC RBC AUTO-ENTMCNC: 34.1 % (ref 32–34.5)
MCV RBC AUTO: 88.1 FL (ref 80–99.9)
MCV RBC AUTO: 88.3 FL (ref 80–99.9)
METHB: 0.6 % (ref 0–1.5)
MODE: AC
MONOCYTES ABSOLUTE: 0.25 E9/L (ref 0.1–0.95)
MONOCYTES ABSOLUTE: 0.75 E9/L (ref 0.1–0.95)
MONOCYTES RELATIVE PERCENT: 11.4 % (ref 2–12)
MONOCYTES RELATIVE PERCENT: 4.4 % (ref 2–12)
NEUTROPHILS ABSOLUTE: 4.65 E9/L (ref 1.8–7.3)
NEUTROPHILS ABSOLUTE: 4.97 E9/L (ref 1.8–7.3)
NEUTROPHILS RELATIVE PERCENT: 75.2 % (ref 43–80)
NEUTROPHILS RELATIVE PERCENT: 75.4 % (ref 43–80)
O2 CONTENT: 14.3 ML/DL
O2 SATURATION: 98.4 % (ref 92–98.5)
O2HB: 97.5 % (ref 94–97)
OPERATOR ID: ABNORMAL
OVALOCYTES: ABNORMAL
PATIENT TEMP: 37 C
PCO2: 29.5 MMHG (ref 35–45)
PDW BLD-RTO: 14.2 FL (ref 11.5–15)
PDW BLD-RTO: 14.5 FL (ref 11.5–15)
PEEP/CPAP: 5 CMH2O
PFO2: 3.85 MMHG/%
PH BLOOD GAS: 7.42 (ref 7.35–7.45)
PHOSPHORUS: 3.7 MG/DL (ref 2.5–4.5)
PLATELET # BLD: 116 E9/L (ref 130–450)
PLATELET # BLD: 96 E9/L (ref 130–450)
PLATELET CONFIRMATION: NORMAL
PMV BLD AUTO: 10.7 FL (ref 7–12)
PMV BLD AUTO: 10.8 FL (ref 7–12)
PO2: 230.8 MMHG (ref 60–100)
POIKILOCYTES: ABNORMAL
POTASSIUM SERPL-SCNC: 4 MMOL/L (ref 3.5–5)
RBC # BLD: 3 E12/L (ref 3.8–5.8)
RBC # BLD: 3.03 E12/L (ref 3.8–5.8)
RBC # BLD: NORMAL 10*6/UL
RI(T): 0.65
RR MECHANICAL: 16 B/MIN
SODIUM BLD-SCNC: 139 MMOL/L (ref 132–146)
SOURCE, BLOOD GAS: ABNORMAL
THB: 10 G/DL (ref 11.5–16.5)
TIME ANALYZED: 621
TOTAL PROTEIN: 4.4 G/DL (ref 6.4–8.3)
VT MECHANICAL: 550 ML
WBC # BLD: 6.2 E9/L (ref 4.5–11.5)
WBC # BLD: 6.6 E9/L (ref 4.5–11.5)

## 2019-12-06 PROCEDURE — 73630 X-RAY EXAM OF FOOT: CPT

## 2019-12-06 PROCEDURE — 36430 TRANSFUSION BLD/BLD COMPNT: CPT

## 2019-12-06 PROCEDURE — 82330 ASSAY OF CALCIUM: CPT

## 2019-12-06 PROCEDURE — 0PSQ04Z REPOSITION LEFT METACARPAL WITH INTERNAL FIXATION DEVICE, OPEN APPROACH: ICD-10-PCS | Performed by: ORTHOPAEDIC SURGERY

## 2019-12-06 PROCEDURE — 25685 OPTX TRNS-SCPHPRLNR FX DISLC: CPT | Performed by: ORTHOPAEDIC SURGERY

## 2019-12-06 PROCEDURE — 0PSJ04Z REPOSITION LEFT RADIUS WITH INTERNAL FIXATION DEVICE, OPEN APPROACH: ICD-10-PCS | Performed by: ORTHOPAEDIC SURGERY

## 2019-12-06 PROCEDURE — 82805 BLOOD GASES W/O2 SATURATION: CPT

## 2019-12-06 PROCEDURE — 99291 CRITICAL CARE FIRST HOUR: CPT | Performed by: SURGERY

## 2019-12-06 PROCEDURE — 27218 TREAT PELVIC RING FRACTURE: CPT | Performed by: ORTHOPAEDIC SURGERY

## 2019-12-06 PROCEDURE — 6370000000 HC RX 637 (ALT 250 FOR IP): Performed by: STUDENT IN AN ORGANIZED HEALTH CARE EDUCATION/TRAINING PROGRAM

## 2019-12-06 PROCEDURE — 37799 UNLISTED PX VASCULAR SURGERY: CPT

## 2019-12-06 PROCEDURE — C9113 INJ PANTOPRAZOLE SODIUM, VIA: HCPCS | Performed by: STUDENT IN AN ORGANIZED HEALTH CARE EDUCATION/TRAINING PROGRAM

## 2019-12-06 PROCEDURE — 84100 ASSAY OF PHOSPHORUS: CPT

## 2019-12-06 PROCEDURE — 2000000000 HC ICU R&B

## 2019-12-06 PROCEDURE — 93010 ELECTROCARDIOGRAM REPORT: CPT | Performed by: INTERNAL MEDICINE

## 2019-12-06 PROCEDURE — 94003 VENT MGMT INPAT SUBQ DAY: CPT

## 2019-12-06 PROCEDURE — 2580000003 HC RX 258: Performed by: STUDENT IN AN ORGANIZED HEALTH CARE EDUCATION/TRAINING PROGRAM

## 2019-12-06 PROCEDURE — 93005 ELECTROCARDIOGRAM TRACING: CPT | Performed by: STUDENT IN AN ORGANIZED HEALTH CARE EDUCATION/TRAINING PROGRAM

## 2019-12-06 PROCEDURE — 0SS804Z REPOSITION LEFT SACROILIAC JOINT WITH INTERNAL FIXATION DEVICE, OPEN APPROACH: ICD-10-PCS | Performed by: ORTHOPAEDIC SURGERY

## 2019-12-06 PROCEDURE — 6360000002 HC RX W HCPCS

## 2019-12-06 PROCEDURE — 99222 1ST HOSP IP/OBS MODERATE 55: CPT | Performed by: NEUROLOGICAL SURGERY

## 2019-12-06 PROCEDURE — 83735 ASSAY OF MAGNESIUM: CPT

## 2019-12-06 PROCEDURE — 11012 DEB SKIN BONE AT FX SITE: CPT | Performed by: ORTHOPAEDIC SURGERY

## 2019-12-06 PROCEDURE — 2500000003 HC RX 250 WO HCPCS

## 2019-12-06 PROCEDURE — 6360000002 HC RX W HCPCS: Performed by: STUDENT IN AN ORGANIZED HEALTH CARE EDUCATION/TRAINING PROGRAM

## 2019-12-06 PROCEDURE — P9016 RBC LEUKOCYTES REDUCED: HCPCS

## 2019-12-06 PROCEDURE — 71045 X-RAY EXAM CHEST 1 VIEW: CPT

## 2019-12-06 PROCEDURE — 20690 APPL UNIPLN UNI EXT FIXJ SYS: CPT | Performed by: ORTHOPAEDIC SURGERY

## 2019-12-06 PROCEDURE — 2500000003 HC RX 250 WO HCPCS: Performed by: STUDENT IN AN ORGANIZED HEALTH CARE EDUCATION/TRAINING PROGRAM

## 2019-12-06 PROCEDURE — 36415 COLL VENOUS BLD VENIPUNCTURE: CPT

## 2019-12-06 PROCEDURE — 0PHQ35Z INSERTION OF EXTERNAL FIXATION DEVICE INTO LEFT METACARPAL, PERCUTANEOUS APPROACH: ICD-10-PCS | Performed by: ORTHOPAEDIC SURGERY

## 2019-12-06 PROCEDURE — P9059 PLASMA, FRZ BETWEEN 8-24HOUR: HCPCS

## 2019-12-06 PROCEDURE — 85025 COMPLETE CBC W/AUTO DIFF WBC: CPT

## 2019-12-06 PROCEDURE — 0QS304Z REPOSITION LEFT PELVIC BONE WITH INTERNAL FIXATION DEVICE, OPEN APPROACH: ICD-10-PCS | Performed by: ORTHOPAEDIC SURGERY

## 2019-12-06 PROCEDURE — 6370000000 HC RX 637 (ALT 250 FOR IP): Performed by: EMERGENCY MEDICINE

## 2019-12-06 PROCEDURE — 80053 COMPREHEN METABOLIC PANEL: CPT

## 2019-12-06 RX ORDER — 0.9 % SODIUM CHLORIDE 0.9 %
250 INTRAVENOUS SOLUTION INTRAVENOUS ONCE
Status: COMPLETED | OUTPATIENT
Start: 2019-12-06 | End: 2019-12-06

## 2019-12-06 RX ORDER — CARBAMAZEPINE 300 MG/1
100 CAPSULE, EXTENDED RELEASE ORAL 3 TIMES DAILY
Status: DISCONTINUED | OUTPATIENT
Start: 2019-12-06 | End: 2019-12-06

## 2019-12-06 RX ORDER — 0.9 % SODIUM CHLORIDE 0.9 %
1000 INTRAVENOUS SOLUTION INTRAVENOUS ONCE
Status: COMPLETED | OUTPATIENT
Start: 2019-12-06 | End: 2019-12-06

## 2019-12-06 RX ORDER — METOPROLOL TARTRATE 5 MG/5ML
INJECTION INTRAVENOUS
Status: COMPLETED
Start: 2019-12-06 | End: 2019-12-06

## 2019-12-06 RX ORDER — CARBAMAZEPINE 100 MG/1
300 TABLET, EXTENDED RELEASE ORAL 2 TIMES DAILY
Status: ON HOLD | COMMUNITY
End: 2020-01-23

## 2019-12-06 RX ORDER — OXYCODONE HYDROCHLORIDE AND ACETAMINOPHEN 5; 325 MG/1; MG/1
2 TABLET ORAL EVERY 4 HOURS
Status: DISCONTINUED | OUTPATIENT
Start: 2019-12-06 | End: 2019-12-06

## 2019-12-06 RX ORDER — OXYCODONE HCL 5 MG/5 ML
10 SOLUTION, ORAL ORAL EVERY 4 HOURS
Status: DISCONTINUED | OUTPATIENT
Start: 2019-12-06 | End: 2019-12-07

## 2019-12-06 RX ORDER — OXYCODONE HCL 5 MG/5 ML
10 SOLUTION, ORAL ORAL EVERY 4 HOURS
Status: DISCONTINUED | OUTPATIENT
Start: 2019-12-06 | End: 2019-12-06

## 2019-12-06 RX ORDER — ACETAMINOPHEN 325 MG/1
650 TABLET ORAL ONCE
Status: COMPLETED | OUTPATIENT
Start: 2019-12-06 | End: 2019-12-06

## 2019-12-06 RX ORDER — METOPROLOL TARTRATE 5 MG/5ML
2.5 INJECTION INTRAVENOUS ONCE
Status: COMPLETED | OUTPATIENT
Start: 2019-12-06 | End: 2019-12-06

## 2019-12-06 RX ADMIN — HYDROMORPHONE HYDROCHLORIDE 1 MG: 1 INJECTION, SOLUTION INTRAMUSCULAR; INTRAVENOUS; SUBCUTANEOUS at 12:39

## 2019-12-06 RX ADMIN — SODIUM CHLORIDE, PRESERVATIVE FREE 10 ML: 5 INJECTION INTRAVENOUS at 21:23

## 2019-12-06 RX ADMIN — DOCUSATE SODIUM 100 MG: 50 LIQUID ORAL at 21:26

## 2019-12-06 RX ADMIN — Medication 1 MG: at 11:32

## 2019-12-06 RX ADMIN — ACETAMINOPHEN 650 MG: 325 TABLET, FILM COATED ORAL at 02:51

## 2019-12-06 RX ADMIN — Medication 200 MCG/HR: at 21:24

## 2019-12-06 RX ADMIN — BENZTROPINE MESYLATE 1 MG: 1 TABLET ORAL at 21:24

## 2019-12-06 RX ADMIN — Medication 200 MCG/HR: at 15:56

## 2019-12-06 RX ADMIN — Medication 200 MCG/HR: at 06:02

## 2019-12-06 RX ADMIN — MORPHINE SULFATE 4 MG: 4 INJECTION, SOLUTION INTRAMUSCULAR; INTRAVENOUS at 10:43

## 2019-12-06 RX ADMIN — BENZTROPINE MESYLATE 1 MG: 1 TABLET ORAL at 08:08

## 2019-12-06 RX ADMIN — CEFAZOLIN SODIUM 2 G: 2 SOLUTION INTRAVENOUS at 05:15

## 2019-12-06 RX ADMIN — CHLORHEXIDINE GLUCONATE 0.12% ORAL RINSE 15 ML: 1.2 LIQUID ORAL at 08:09

## 2019-12-06 RX ADMIN — CHLORHEXIDINE GLUCONATE 0.12% ORAL RINSE 15 ML: 1.2 LIQUID ORAL at 21:23

## 2019-12-06 RX ADMIN — METOPROLOL TARTRATE 2.5 MG: 5 INJECTION INTRAVENOUS at 10:19

## 2019-12-06 RX ADMIN — FLUPHENAZINE HYDROCHLORIDE 5 MG: 5 TABLET, FILM COATED ORAL at 21:23

## 2019-12-06 RX ADMIN — OXYCODONE HYDROCHLORIDE AND ACETAMINOPHEN 1 TABLET: 5; 325 TABLET ORAL at 08:08

## 2019-12-06 RX ADMIN — SODIUM CHLORIDE 250 ML: 9 INJECTION, SOLUTION INTRAVENOUS at 00:10

## 2019-12-06 RX ADMIN — SODIUM CHLORIDE 250 ML: 9 INJECTION, SOLUTION INTRAVENOUS at 16:52

## 2019-12-06 RX ADMIN — SODIUM CHLORIDE 1000 ML: 9 INJECTION, SOLUTION INTRAVENOUS at 10:22

## 2019-12-06 RX ADMIN — FLUPHENAZINE HYDROCHLORIDE 5 MG: 5 TABLET, FILM COATED ORAL at 15:52

## 2019-12-06 RX ADMIN — FLUPHENAZINE HYDROCHLORIDE 5 MG: 5 TABLET, FILM COATED ORAL at 08:08

## 2019-12-06 RX ADMIN — CARBAMAZEPINE 300 MG: 200 TABLET, EXTENDED RELEASE ORAL at 21:24

## 2019-12-06 RX ADMIN — PANTOPRAZOLE SODIUM 40 MG: 40 INJECTION, POWDER, FOR SOLUTION INTRAVENOUS at 08:09

## 2019-12-06 RX ADMIN — ENOXAPARIN SODIUM 30 MG: 30 INJECTION SUBCUTANEOUS at 21:23

## 2019-12-06 RX ADMIN — ACETAMINOPHEN 650 MG: 325 TABLET, FILM COATED ORAL at 11:13

## 2019-12-06 RX ADMIN — HYDROMORPHONE HYDROCHLORIDE 1 MG: 1 INJECTION, SOLUTION INTRAMUSCULAR; INTRAVENOUS; SUBCUTANEOUS at 11:32

## 2019-12-06 RX ADMIN — OXYCODONE HYDROCHLORIDE 10 MG: 5 SOLUTION ORAL at 15:52

## 2019-12-06 RX ADMIN — SODIUM CHLORIDE, PRESERVATIVE FREE 10 ML: 5 INJECTION INTRAVENOUS at 08:09

## 2019-12-06 RX ADMIN — DOCUSATE SODIUM 100 MG: 50 LIQUID ORAL at 08:09

## 2019-12-06 RX ADMIN — OXYCODONE HYDROCHLORIDE AND ACETAMINOPHEN 2 TABLET: 5; 325 TABLET ORAL at 12:38

## 2019-12-06 RX ADMIN — QUETIAPINE FUMARATE 400 MG: 200 TABLET ORAL at 21:23

## 2019-12-06 RX ADMIN — SERTRALINE 50 MG: 50 TABLET, FILM COATED ORAL at 08:08

## 2019-12-06 RX ADMIN — PROPOFOL 40.04 MCG/KG/MIN: 10 INJECTION, EMULSION INTRAVENOUS at 06:38

## 2019-12-06 RX ADMIN — OXYCODONE HYDROCHLORIDE 10 MG: 5 SOLUTION ORAL at 21:22

## 2019-12-06 RX ADMIN — PROPOFOL 15 MCG/KG/MIN: 10 INJECTION, EMULSION INTRAVENOUS at 15:56

## 2019-12-06 RX ADMIN — MORPHINE SULFATE 4 MG: 4 INJECTION, SOLUTION INTRAMUSCULAR; INTRAVENOUS at 08:09

## 2019-12-06 RX ADMIN — QUETIAPINE FUMARATE 400 MG: 200 TABLET ORAL at 08:08

## 2019-12-06 RX ADMIN — SODIUM CHLORIDE: 9 INJECTION, SOLUTION INTRAVENOUS at 17:21

## 2019-12-06 ASSESSMENT — PULMONARY FUNCTION TESTS
PIF_VALUE: 10
PIF_VALUE: 19
PIF_VALUE: 17
PIF_VALUE: 16
PIF_VALUE: 12
PIF_VALUE: 13
PIF_VALUE: 11

## 2019-12-06 ASSESSMENT — PAIN SCALES - GENERAL
PAINLEVEL_OUTOF10: 0
PAINLEVEL_OUTOF10: 0
PAINLEVEL_OUTOF10: 10
PAINLEVEL_OUTOF10: 0

## 2019-12-07 ENCOUNTER — APPOINTMENT (OUTPATIENT)
Dept: GENERAL RADIOLOGY | Age: 24
DRG: 912 | End: 2019-12-07
Payer: MEDICAID

## 2019-12-07 LAB
AADO2: 60.8 MMHG
AADO2: 65.4 MMHG
AADO2: 95.4 MMHG
ALBUMIN SERPL-MCNC: 2.3 G/DL (ref 3.5–5.2)
ALP BLD-CCNC: 57 U/L (ref 40–129)
ALT SERPL-CCNC: 32 U/L (ref 0–40)
ANION GAP SERPL CALCULATED.3IONS-SCNC: 9 MMOL/L (ref 7–16)
AST SERPL-CCNC: 121 U/L (ref 0–39)
B.E.: -0.9 MMOL/L (ref -3–3)
B.E.: -2.8 MMOL/L (ref -3–3)
B.E.: -3.8 MMOL/L (ref -3–3)
BASOPHILS ABSOLUTE: 0.03 E9/L (ref 0–0.2)
BASOPHILS RELATIVE PERCENT: 0.4 % (ref 0–2)
BILIRUB SERPL-MCNC: 0.7 MG/DL (ref 0–1.2)
BUN BLDV-MCNC: 11 MG/DL (ref 6–20)
CALCIUM IONIZED: 1.29 MMOL/L (ref 1.15–1.33)
CALCIUM SERPL-MCNC: 8 MG/DL (ref 8.6–10.2)
CHLORIDE BLD-SCNC: 112 MMOL/L (ref 98–107)
CO2: 20 MMOL/L (ref 22–29)
COHB: 0.3 % (ref 0–1.5)
COHB: 0.3 % (ref 0–1.5)
COHB: 0.4 % (ref 0–1.5)
CREAT SERPL-MCNC: 1.1 MG/DL (ref 0.7–1.2)
CRITICAL: ABNORMAL
DATE ANALYZED: ABNORMAL
DATE OF COLLECTION: ABNORMAL
EOSINOPHILS ABSOLUTE: 0.15 E9/L (ref 0.05–0.5)
EOSINOPHILS RELATIVE PERCENT: 1.9 % (ref 0–6)
FIO2: 40 %
GFR AFRICAN AMERICAN: >60
GFR NON-AFRICAN AMERICAN: >60 ML/MIN/1.73
GLUCOSE BLD-MCNC: 107 MG/DL (ref 74–99)
HAPTOGLOBIN: 154 MG/DL (ref 30–200)
HCO3: 22 MMOL/L (ref 22–26)
HCO3: 22.8 MMOL/L (ref 22–26)
HCO3: 23.9 MMOL/L (ref 22–26)
HCT VFR BLD CALC: 22 % (ref 37–54)
HCT VFR BLD CALC: 25 % (ref 37–54)
HEMOGLOBIN: 7.3 G/DL (ref 12.5–16.5)
HEMOGLOBIN: 8.2 G/DL (ref 12.5–16.5)
HHB: 1.6 % (ref 0–5)
HHB: 1.6 % (ref 0–5)
HHB: 2 % (ref 0–5)
IMMATURE GRANULOCYTES #: 0.05 E9/L
IMMATURE GRANULOCYTES %: 0.6 % (ref 0–5)
LAB: ABNORMAL
LYMPHOCYTES ABSOLUTE: 0.95 E9/L (ref 1.5–4)
LYMPHOCYTES RELATIVE PERCENT: 12.2 % (ref 20–42)
Lab: ABNORMAL
MAGNESIUM: 1.9 MG/DL (ref 1.6–2.6)
MCH RBC QN AUTO: 29.9 PG (ref 26–35)
MCHC RBC AUTO-ENTMCNC: 32.8 % (ref 32–34.5)
MCV RBC AUTO: 91.2 FL (ref 80–99.9)
METHB: 0.5 % (ref 0–1.5)
METHB: 0.5 % (ref 0–1.5)
METHB: 0.6 % (ref 0–1.5)
MODE: ABNORMAL
MODE: ABNORMAL
MODE: AC
MONOCYTES ABSOLUTE: 0.98 E9/L (ref 0.1–0.95)
MONOCYTES RELATIVE PERCENT: 12.6 % (ref 2–12)
MRSA CULTURE ONLY: NORMAL
NEUTROPHILS ABSOLUTE: 5.63 E9/L (ref 1.8–7.3)
NEUTROPHILS RELATIVE PERCENT: 72.3 % (ref 43–80)
O2 CONTENT: 11.8 ML/DL
O2 CONTENT: 12.2 ML/DL
O2 CONTENT: 13.1 ML/DL
O2 SATURATION: 98 % (ref 92–98.5)
O2 SATURATION: 98.4 % (ref 92–98.5)
O2 SATURATION: 98.4 % (ref 92–98.5)
O2HB: 97.2 % (ref 94–97)
O2HB: 97.5 % (ref 94–97)
O2HB: 97.5 % (ref 94–97)
OPERATOR ID: 421
OPERATOR ID: 421
OPERATOR ID: ABNORMAL
PATIENT TEMP: 37 C
PCO2: 33.7 MMHG (ref 35–45)
PCO2: 43.3 MMHG (ref 35–45)
PCO2: 51.4 MMHG (ref 35–45)
PDW BLD-RTO: 14.5 FL (ref 11.5–15)
PEEP/CPAP: 5 CMH2O
PFO2: 3.25 MMHG/%
PFO2: 3.77 MMHG/%
PFO2: 4.39 MMHG/%
PH BLOOD GAS: 7.29 (ref 7.35–7.45)
PH BLOOD GAS: 7.32 (ref 7.35–7.45)
PH BLOOD GAS: 7.45 (ref 7.35–7.45)
PHOSPHORUS: 2.4 MG/DL (ref 2.5–4.5)
PLATELET # BLD: 82 E9/L (ref 130–450)
PLATELET CONFIRMATION: NORMAL
PMV BLD AUTO: 10.5 FL (ref 7–12)
PO2: 130 MMHG (ref 60–100)
PO2: 150.7 MMHG (ref 60–100)
PO2: 175.6 MMHG (ref 60–100)
POTASSIUM SERPL-SCNC: 4.6 MMOL/L (ref 3.5–5)
PROCALCITONIN: 1.25 NG/ML (ref 0–0.08)
PS: 5 CMH20
PS: 5 CMH20
RBC # BLD: 2.74 E12/L (ref 3.8–5.8)
RI(T): 0.35
RI(T): 43 %
RI(T): 73 %
RR MECHANICAL: 16 B/MIN
SODIUM BLD-SCNC: 141 MMOL/L (ref 132–146)
SOURCE, BLOOD GAS: ABNORMAL
THB: 8.4 G/DL (ref 11.5–16.5)
THB: 8.6 G/DL (ref 11.5–16.5)
THB: 9.3 G/DL (ref 11.5–16.5)
TIME ANALYZED: 1327
TIME ANALYZED: 526
TIME ANALYZED: 946
TOTAL CK: 5595 U/L (ref 20–200)
TOTAL PROTEIN: 4.6 G/DL (ref 6.4–8.3)
VT MECHANICAL: 550 ML
WBC # BLD: 7.8 E9/L (ref 4.5–11.5)

## 2019-12-07 PROCEDURE — 2500000003 HC RX 250 WO HCPCS: Performed by: STUDENT IN AN ORGANIZED HEALTH CARE EDUCATION/TRAINING PROGRAM

## 2019-12-07 PROCEDURE — 84145 PROCALCITONIN (PCT): CPT

## 2019-12-07 PROCEDURE — 94003 VENT MGMT INPAT SUBQ DAY: CPT

## 2019-12-07 PROCEDURE — 2000000000 HC ICU R&B

## 2019-12-07 PROCEDURE — 85014 HEMATOCRIT: CPT

## 2019-12-07 PROCEDURE — P9016 RBC LEUKOCYTES REDUCED: HCPCS

## 2019-12-07 PROCEDURE — 36430 TRANSFUSION BLD/BLD COMPNT: CPT

## 2019-12-07 PROCEDURE — 36415 COLL VENOUS BLD VENIPUNCTURE: CPT

## 2019-12-07 PROCEDURE — 6360000002 HC RX W HCPCS: Performed by: EMERGENCY MEDICINE

## 2019-12-07 PROCEDURE — 6370000000 HC RX 637 (ALT 250 FOR IP): Performed by: STUDENT IN AN ORGANIZED HEALTH CARE EDUCATION/TRAINING PROGRAM

## 2019-12-07 PROCEDURE — 2580000003 HC RX 258: Performed by: STUDENT IN AN ORGANIZED HEALTH CARE EDUCATION/TRAINING PROGRAM

## 2019-12-07 PROCEDURE — C9113 INJ PANTOPRAZOLE SODIUM, VIA: HCPCS | Performed by: STUDENT IN AN ORGANIZED HEALTH CARE EDUCATION/TRAINING PROGRAM

## 2019-12-07 PROCEDURE — 85025 COMPLETE CBC W/AUTO DIFF WBC: CPT

## 2019-12-07 PROCEDURE — 6360000002 HC RX W HCPCS: Performed by: STUDENT IN AN ORGANIZED HEALTH CARE EDUCATION/TRAINING PROGRAM

## 2019-12-07 PROCEDURE — 84100 ASSAY OF PHOSPHORUS: CPT

## 2019-12-07 PROCEDURE — 2700000000 HC OXYGEN THERAPY PER DAY

## 2019-12-07 PROCEDURE — 83735 ASSAY OF MAGNESIUM: CPT

## 2019-12-07 PROCEDURE — 83010 ASSAY OF HAPTOGLOBIN QUANT: CPT

## 2019-12-07 PROCEDURE — 82550 ASSAY OF CK (CPK): CPT

## 2019-12-07 PROCEDURE — 2500000003 HC RX 250 WO HCPCS

## 2019-12-07 PROCEDURE — 80053 COMPREHEN METABOLIC PANEL: CPT

## 2019-12-07 PROCEDURE — 82805 BLOOD GASES W/O2 SATURATION: CPT

## 2019-12-07 PROCEDURE — 82330 ASSAY OF CALCIUM: CPT

## 2019-12-07 PROCEDURE — 37799 UNLISTED PX VASCULAR SURGERY: CPT

## 2019-12-07 PROCEDURE — 85018 HEMOGLOBIN: CPT

## 2019-12-07 PROCEDURE — 99291 CRITICAL CARE FIRST HOUR: CPT | Performed by: SURGERY

## 2019-12-07 PROCEDURE — 71045 X-RAY EXAM CHEST 1 VIEW: CPT

## 2019-12-07 RX ORDER — OXYCODONE HYDROCHLORIDE AND ACETAMINOPHEN 5; 325 MG/1; MG/1
2 TABLET ORAL EVERY 4 HOURS PRN
Status: DISCONTINUED | OUTPATIENT
Start: 2019-12-07 | End: 2019-12-10

## 2019-12-07 RX ORDER — LABETALOL HYDROCHLORIDE 5 MG/ML
INJECTION, SOLUTION INTRAVENOUS
Status: COMPLETED
Start: 2019-12-07 | End: 2019-12-07

## 2019-12-07 RX ORDER — DIPHENHYDRAMINE HYDROCHLORIDE 50 MG/ML
50 INJECTION INTRAMUSCULAR; INTRAVENOUS ONCE
Status: COMPLETED | OUTPATIENT
Start: 2019-12-07 | End: 2019-12-07

## 2019-12-07 RX ORDER — 0.9 % SODIUM CHLORIDE 0.9 %
250 INTRAVENOUS SOLUTION INTRAVENOUS ONCE
Status: COMPLETED | OUTPATIENT
Start: 2019-12-07 | End: 2019-12-07

## 2019-12-07 RX ORDER — FUROSEMIDE 10 MG/ML
20 INJECTION INTRAMUSCULAR; INTRAVENOUS SEE ADMIN INSTRUCTIONS
Status: DISCONTINUED | OUTPATIENT
Start: 2019-12-07 | End: 2019-12-09

## 2019-12-07 RX ORDER — METOPROLOL TARTRATE 5 MG/5ML
5 INJECTION INTRAVENOUS ONCE
Status: COMPLETED | OUTPATIENT
Start: 2019-12-07 | End: 2019-12-07

## 2019-12-07 RX ORDER — METHYLPREDNISOLONE SODIUM SUCCINATE 125 MG/2ML
125 INJECTION, POWDER, LYOPHILIZED, FOR SOLUTION INTRAMUSCULAR; INTRAVENOUS ONCE
Status: COMPLETED | OUTPATIENT
Start: 2019-12-07 | End: 2019-12-07

## 2019-12-07 RX ORDER — OXYCODONE HYDROCHLORIDE AND ACETAMINOPHEN 5; 325 MG/1; MG/1
1 TABLET ORAL EVERY 4 HOURS PRN
Status: DISCONTINUED | OUTPATIENT
Start: 2019-12-07 | End: 2019-12-10

## 2019-12-07 RX ORDER — ACETAMINOPHEN 325 MG/1
650 TABLET ORAL EVERY 4 HOURS PRN
Status: DISCONTINUED | OUTPATIENT
Start: 2019-12-07 | End: 2019-12-10

## 2019-12-07 RX ADMIN — DOCUSATE SODIUM 100 MG: 50 LIQUID ORAL at 21:05

## 2019-12-07 RX ADMIN — PROPOFOL 15 MCG/KG/MIN: 10 INJECTION, EMULSION INTRAVENOUS at 11:18

## 2019-12-07 RX ADMIN — QUETIAPINE FUMARATE 400 MG: 200 TABLET ORAL at 21:05

## 2019-12-07 RX ADMIN — FLUPHENAZINE HYDROCHLORIDE 5 MG: 5 TABLET, FILM COATED ORAL at 21:05

## 2019-12-07 RX ADMIN — SERTRALINE 50 MG: 50 TABLET, FILM COATED ORAL at 09:03

## 2019-12-07 RX ADMIN — SODIUM CHLORIDE, PRESERVATIVE FREE 10 ML: 5 INJECTION INTRAVENOUS at 21:05

## 2019-12-07 RX ADMIN — OXYCODONE HYDROCHLORIDE 10 MG: 5 SOLUTION ORAL at 04:12

## 2019-12-07 RX ADMIN — SODIUM CHLORIDE, PRESERVATIVE FREE 10 ML: 5 INJECTION INTRAVENOUS at 09:07

## 2019-12-07 RX ADMIN — OXYCODONE HYDROCHLORIDE 10 MG: 5 SOLUTION ORAL at 00:31

## 2019-12-07 RX ADMIN — METHYLPREDNISOLONE SODIUM SUCCINATE 125 MG: 125 INJECTION, POWDER, FOR SOLUTION INTRAMUSCULAR; INTRAVENOUS at 18:03

## 2019-12-07 RX ADMIN — LABETALOL HYDROCHLORIDE 10 MG: 5 INJECTION, SOLUTION INTRAVENOUS at 19:05

## 2019-12-07 RX ADMIN — BENZTROPINE MESYLATE 1 MG: 1 TABLET ORAL at 21:04

## 2019-12-07 RX ADMIN — Medication 200 MCG/HR: at 04:11

## 2019-12-07 RX ADMIN — METOPROLOL TARTRATE 5 MG: 5 INJECTION INTRAVENOUS at 15:28

## 2019-12-07 RX ADMIN — ACETAMINOPHEN 650 MG: 325 TABLET, FILM COATED ORAL at 08:04

## 2019-12-07 RX ADMIN — OXYCODONE HYDROCHLORIDE 10 MG: 5 SOLUTION ORAL at 08:04

## 2019-12-07 RX ADMIN — HYDROMORPHONE HYDROCHLORIDE 0.5 MG: 1 INJECTION, SOLUTION INTRAMUSCULAR; INTRAVENOUS; SUBCUTANEOUS at 15:22

## 2019-12-07 RX ADMIN — Medication 200 MCG/HR: at 10:54

## 2019-12-07 RX ADMIN — OXYCODONE HYDROCHLORIDE AND ACETAMINOPHEN 2 TABLET: 5; 325 TABLET ORAL at 21:10

## 2019-12-07 RX ADMIN — ENOXAPARIN SODIUM 30 MG: 30 INJECTION SUBCUTANEOUS at 09:06

## 2019-12-07 RX ADMIN — DOCUSATE SODIUM 100 MG: 50 LIQUID ORAL at 09:06

## 2019-12-07 RX ADMIN — BENZTROPINE MESYLATE 1 MG: 1 TABLET ORAL at 13:26

## 2019-12-07 RX ADMIN — QUETIAPINE FUMARATE 400 MG: 200 TABLET ORAL at 09:03

## 2019-12-07 RX ADMIN — OXYCODONE HYDROCHLORIDE 10 MG: 5 SOLUTION ORAL at 12:25

## 2019-12-07 RX ADMIN — DIPHENHYDRAMINE HYDROCHLORIDE 50 MG: 50 INJECTION, SOLUTION INTRAMUSCULAR; INTRAVENOUS at 18:03

## 2019-12-07 RX ADMIN — PROPOFOL 15 MCG/KG/MIN: 10 INJECTION, EMULSION INTRAVENOUS at 00:33

## 2019-12-07 RX ADMIN — ENOXAPARIN SODIUM 30 MG: 30 INJECTION SUBCUTANEOUS at 21:05

## 2019-12-07 RX ADMIN — CHLORHEXIDINE GLUCONATE 0.12% ORAL RINSE 15 ML: 1.2 LIQUID ORAL at 09:06

## 2019-12-07 RX ADMIN — SODIUM CHLORIDE 250 ML: 9 INJECTION, SOLUTION INTRAVENOUS at 22:05

## 2019-12-07 RX ADMIN — SODIUM CHLORIDE: 9 INJECTION, SOLUTION INTRAVENOUS at 04:11

## 2019-12-07 RX ADMIN — FLUPHENAZINE HYDROCHLORIDE 5 MG: 5 TABLET, FILM COATED ORAL at 13:26

## 2019-12-07 RX ADMIN — PANTOPRAZOLE SODIUM 40 MG: 40 INJECTION, POWDER, FOR SOLUTION INTRAVENOUS at 09:06

## 2019-12-07 RX ADMIN — SODIUM CHLORIDE, PRESERVATIVE FREE 10 ML: 5 INJECTION INTRAVENOUS at 09:04

## 2019-12-07 RX ADMIN — CARBAMAZEPINE 300 MG: 200 TABLET, EXTENDED RELEASE ORAL at 21:04

## 2019-12-07 RX ADMIN — SODIUM CHLORIDE, PRESERVATIVE FREE 10 ML: 5 INJECTION INTRAVENOUS at 08:11

## 2019-12-07 ASSESSMENT — PAIN SCALES - GENERAL
PAINLEVEL_OUTOF10: 1
PAINLEVEL_OUTOF10: 0
PAINLEVEL_OUTOF10: 0
PAINLEVEL_OUTOF10: 3
PAINLEVEL_OUTOF10: 10
PAINLEVEL_OUTOF10: 10
PAINLEVEL_OUTOF10: 0
PAINLEVEL_OUTOF10: 10
PAINLEVEL_OUTOF10: 0
PAINLEVEL_OUTOF10: 1

## 2019-12-07 ASSESSMENT — PULMONARY FUNCTION TESTS
PIF_VALUE: 13
PIF_VALUE: 17
PIF_VALUE: 18
PIF_VALUE: 18
PIF_VALUE: 11
PIF_VALUE: 12

## 2019-12-07 ASSESSMENT — PAIN DESCRIPTION - PAIN TYPE
TYPE: SURGICAL PAIN;ACUTE PAIN
TYPE: SURGICAL PAIN;ACUTE PAIN

## 2019-12-07 ASSESSMENT — PAIN DESCRIPTION - DESCRIPTORS
DESCRIPTORS: PATIENT UNABLE TO DESCRIBE
DESCRIPTORS: PATIENT UNABLE TO DESCRIBE

## 2019-12-07 ASSESSMENT — PAIN DESCRIPTION - LOCATION: LOCATION: BACK;NECK;GENERALIZED

## 2019-12-08 ENCOUNTER — APPOINTMENT (OUTPATIENT)
Dept: GENERAL RADIOLOGY | Age: 24
DRG: 912 | End: 2019-12-08
Payer: MEDICAID

## 2019-12-08 LAB
ALBUMIN SERPL-MCNC: 2.6 G/DL (ref 3.5–5.2)
ALP BLD-CCNC: 64 U/L (ref 40–129)
ALT SERPL-CCNC: 34 U/L (ref 0–40)
ANION GAP SERPL CALCULATED.3IONS-SCNC: 15 MMOL/L (ref 7–16)
AST SERPL-CCNC: 118 U/L (ref 0–39)
BASOPHILS ABSOLUTE: 0 E9/L (ref 0–0.2)
BASOPHILS RELATIVE PERCENT: 0 % (ref 0–2)
BILIRUB SERPL-MCNC: 0.9 MG/DL (ref 0–1.2)
BLOOD BANK DISPENSE STATUS: NORMAL
BLOOD BANK PRODUCT CODE: NORMAL
BPU ID: NORMAL
BUN BLDV-MCNC: 8 MG/DL (ref 6–20)
CALCIUM IONIZED: 1.27 MMOL/L (ref 1.15–1.33)
CALCIUM SERPL-MCNC: 8.4 MG/DL (ref 8.6–10.2)
CHLORIDE BLD-SCNC: 108 MMOL/L (ref 98–107)
CO2: 19 MMOL/L (ref 22–29)
CREAT SERPL-MCNC: 0.8 MG/DL (ref 0.7–1.2)
DESCRIPTION BLOOD BANK: NORMAL
EOSINOPHILS ABSOLUTE: 0 E9/L (ref 0.05–0.5)
EOSINOPHILS RELATIVE PERCENT: 0 % (ref 0–6)
GFR AFRICAN AMERICAN: >60
GFR NON-AFRICAN AMERICAN: >60 ML/MIN/1.73
GLUCOSE BLD-MCNC: 152 MG/DL (ref 74–99)
HCT VFR BLD CALC: 25.9 % (ref 37–54)
HCT VFR BLD CALC: 27 % (ref 37–54)
HEMOGLOBIN: 8.8 G/DL (ref 12.5–16.5)
HEMOGLOBIN: 9.3 G/DL (ref 12.5–16.5)
IMMATURE GRANULOCYTES #: 0.08 E9/L
IMMATURE GRANULOCYTES %: 1.1 % (ref 0–5)
LYMPHOCYTES ABSOLUTE: 0.36 E9/L (ref 1.5–4)
LYMPHOCYTES RELATIVE PERCENT: 5 % (ref 20–42)
MAGNESIUM: 1.8 MG/DL (ref 1.6–2.6)
MCH RBC QN AUTO: 30 PG (ref 26–35)
MCHC RBC AUTO-ENTMCNC: 34 % (ref 32–34.5)
MCV RBC AUTO: 88.4 FL (ref 80–99.9)
MONOCYTES ABSOLUTE: 0.4 E9/L (ref 0.1–0.95)
MONOCYTES RELATIVE PERCENT: 5.5 % (ref 2–12)
NEUTROPHILS ABSOLUTE: 6.42 E9/L (ref 1.8–7.3)
NEUTROPHILS RELATIVE PERCENT: 88.4 % (ref 43–80)
PDW BLD-RTO: 13.2 FL (ref 11.5–15)
PHOSPHORUS: 2.2 MG/DL (ref 2.5–4.5)
PLATELET # BLD: 82 E9/L (ref 130–450)
PLATELET CONFIRMATION: NORMAL
PMV BLD AUTO: 10.8 FL (ref 7–12)
POTASSIUM SERPL-SCNC: 3.6 MMOL/L (ref 3.5–5)
RBC # BLD: 2.93 E12/L (ref 3.8–5.8)
RBC # BLD: NORMAL 10*6/UL
SODIUM BLD-SCNC: 142 MMOL/L (ref 132–146)
TOTAL PROTEIN: 5.5 G/DL (ref 6.4–8.3)
WBC # BLD: 7.3 E9/L (ref 4.5–11.5)

## 2019-12-08 PROCEDURE — 83735 ASSAY OF MAGNESIUM: CPT

## 2019-12-08 PROCEDURE — 80053 COMPREHEN METABOLIC PANEL: CPT

## 2019-12-08 PROCEDURE — 71045 X-RAY EXAM CHEST 1 VIEW: CPT

## 2019-12-08 PROCEDURE — 84100 ASSAY OF PHOSPHORUS: CPT

## 2019-12-08 PROCEDURE — 6360000002 HC RX W HCPCS: Performed by: STUDENT IN AN ORGANIZED HEALTH CARE EDUCATION/TRAINING PROGRAM

## 2019-12-08 PROCEDURE — 85018 HEMOGLOBIN: CPT

## 2019-12-08 PROCEDURE — 2580000003 HC RX 258: Performed by: STUDENT IN AN ORGANIZED HEALTH CARE EDUCATION/TRAINING PROGRAM

## 2019-12-08 PROCEDURE — 36430 TRANSFUSION BLD/BLD COMPNT: CPT

## 2019-12-08 PROCEDURE — 6370000000 HC RX 637 (ALT 250 FOR IP): Performed by: STUDENT IN AN ORGANIZED HEALTH CARE EDUCATION/TRAINING PROGRAM

## 2019-12-08 PROCEDURE — 36415 COLL VENOUS BLD VENIPUNCTURE: CPT

## 2019-12-08 PROCEDURE — 2000000000 HC ICU R&B

## 2019-12-08 PROCEDURE — 85014 HEMATOCRIT: CPT

## 2019-12-08 PROCEDURE — 99253 IP/OBS CNSLTJ NEW/EST LOW 45: CPT | Performed by: PSYCHIATRY & NEUROLOGY

## 2019-12-08 PROCEDURE — 82330 ASSAY OF CALCIUM: CPT

## 2019-12-08 PROCEDURE — P9016 RBC LEUKOCYTES REDUCED: HCPCS

## 2019-12-08 PROCEDURE — 2700000000 HC OXYGEN THERAPY PER DAY

## 2019-12-08 PROCEDURE — 37799 UNLISTED PX VASCULAR SURGERY: CPT

## 2019-12-08 PROCEDURE — C9113 INJ PANTOPRAZOLE SODIUM, VIA: HCPCS | Performed by: STUDENT IN AN ORGANIZED HEALTH CARE EDUCATION/TRAINING PROGRAM

## 2019-12-08 PROCEDURE — 85025 COMPLETE CBC W/AUTO DIFF WBC: CPT

## 2019-12-08 PROCEDURE — 99232 SBSQ HOSP IP/OBS MODERATE 35: CPT | Performed by: SURGERY

## 2019-12-08 RX ORDER — DOCUSATE SODIUM 100 MG/1
100 CAPSULE, LIQUID FILLED ORAL 2 TIMES DAILY
Status: DISCONTINUED | OUTPATIENT
Start: 2019-12-08 | End: 2019-12-10 | Stop reason: SDUPTHER

## 2019-12-08 RX ADMIN — DOCUSATE SODIUM 100 MG: 100 CAPSULE, LIQUID FILLED ORAL at 21:02

## 2019-12-08 RX ADMIN — QUETIAPINE FUMARATE 400 MG: 200 TABLET ORAL at 21:02

## 2019-12-08 RX ADMIN — SODIUM CHLORIDE: 9 INJECTION, SOLUTION INTRAVENOUS at 00:33

## 2019-12-08 RX ADMIN — ENOXAPARIN SODIUM 30 MG: 30 INJECTION SUBCUTANEOUS at 09:08

## 2019-12-08 RX ADMIN — CARBAMAZEPINE 300 MG: 200 TABLET, EXTENDED RELEASE ORAL at 22:32

## 2019-12-08 RX ADMIN — CARBAMAZEPINE 300 MG: 200 TABLET, EXTENDED RELEASE ORAL at 09:07

## 2019-12-08 RX ADMIN — HYDROMORPHONE HYDROCHLORIDE 1 MG: 1 INJECTION, SOLUTION INTRAMUSCULAR; INTRAVENOUS; SUBCUTANEOUS at 22:33

## 2019-12-08 RX ADMIN — FLUPHENAZINE HYDROCHLORIDE 5 MG: 5 TABLET, FILM COATED ORAL at 09:07

## 2019-12-08 RX ADMIN — FLUPHENAZINE HYDROCHLORIDE 5 MG: 5 TABLET, FILM COATED ORAL at 21:01

## 2019-12-08 RX ADMIN — SERTRALINE 50 MG: 50 TABLET, FILM COATED ORAL at 09:08

## 2019-12-08 RX ADMIN — BENZTROPINE MESYLATE 1 MG: 1 TABLET ORAL at 21:03

## 2019-12-08 RX ADMIN — SODIUM CHLORIDE, PRESERVATIVE FREE 10 ML: 5 INJECTION INTRAVENOUS at 21:01

## 2019-12-08 RX ADMIN — HYDROMORPHONE HYDROCHLORIDE 1 MG: 1 INJECTION, SOLUTION INTRAMUSCULAR; INTRAVENOUS; SUBCUTANEOUS at 05:07

## 2019-12-08 RX ADMIN — BENZTROPINE MESYLATE 1 MG: 1 TABLET ORAL at 09:08

## 2019-12-08 RX ADMIN — FLUPHENAZINE HYDROCHLORIDE 5 MG: 5 TABLET, FILM COATED ORAL at 15:29

## 2019-12-08 RX ADMIN — DOCUSATE SODIUM 100 MG: 50 LIQUID ORAL at 09:08

## 2019-12-08 RX ADMIN — QUETIAPINE FUMARATE 400 MG: 200 TABLET ORAL at 09:08

## 2019-12-08 RX ADMIN — SODIUM CHLORIDE, PRESERVATIVE FREE 10 ML: 5 INJECTION INTRAVENOUS at 09:09

## 2019-12-08 RX ADMIN — SODIUM CHLORIDE: 9 INJECTION, SOLUTION INTRAVENOUS at 22:03

## 2019-12-08 RX ADMIN — ENOXAPARIN SODIUM 30 MG: 30 INJECTION SUBCUTANEOUS at 21:02

## 2019-12-08 RX ADMIN — SODIUM CHLORIDE, PRESERVATIVE FREE 10 ML: 5 INJECTION INTRAVENOUS at 09:14

## 2019-12-08 RX ADMIN — HYDROMORPHONE HYDROCHLORIDE 1 MG: 1 INJECTION, SOLUTION INTRAMUSCULAR; INTRAVENOUS; SUBCUTANEOUS at 16:01

## 2019-12-08 RX ADMIN — HYDROMORPHONE HYDROCHLORIDE 1 MG: 1 INJECTION, SOLUTION INTRAMUSCULAR; INTRAVENOUS; SUBCUTANEOUS at 00:29

## 2019-12-08 RX ADMIN — HYDROMORPHONE HYDROCHLORIDE 1 MG: 1 INJECTION, SOLUTION INTRAMUSCULAR; INTRAVENOUS; SUBCUTANEOUS at 07:39

## 2019-12-08 RX ADMIN — PANTOPRAZOLE SODIUM 40 MG: 40 INJECTION, POWDER, FOR SOLUTION INTRAVENOUS at 09:09

## 2019-12-08 ASSESSMENT — PAIN SCALES - GENERAL
PAINLEVEL_OUTOF10: 2
PAINLEVEL_OUTOF10: 0
PAINLEVEL_OUTOF10: 0
PAINLEVEL_OUTOF10: 7
PAINLEVEL_OUTOF10: 0
PAINLEVEL_OUTOF10: 10
PAINLEVEL_OUTOF10: 0
PAINLEVEL_OUTOF10: 10
PAINLEVEL_OUTOF10: 7
PAINLEVEL_OUTOF10: 10
PAINLEVEL_OUTOF10: 0
PAINLEVEL_OUTOF10: 3
PAINLEVEL_OUTOF10: 0
PAINLEVEL_OUTOF10: 1
PAINLEVEL_OUTOF10: 7

## 2019-12-09 ENCOUNTER — APPOINTMENT (OUTPATIENT)
Dept: GENERAL RADIOLOGY | Age: 24
DRG: 912 | End: 2019-12-09
Payer: MEDICAID

## 2019-12-09 ENCOUNTER — APPOINTMENT (OUTPATIENT)
Dept: CT IMAGING | Age: 24
DRG: 912 | End: 2019-12-09
Payer: MEDICAID

## 2019-12-09 LAB
ALBUMIN SERPL-MCNC: 2.4 G/DL (ref 3.5–5.2)
ALP BLD-CCNC: 63 U/L (ref 40–129)
ALT SERPL-CCNC: 28 U/L (ref 0–40)
ANION GAP SERPL CALCULATED.3IONS-SCNC: 12 MMOL/L (ref 7–16)
AST SERPL-CCNC: 68 U/L (ref 0–39)
BASOPHILS ABSOLUTE: 0.01 E9/L (ref 0–0.2)
BASOPHILS RELATIVE PERCENT: 0.1 % (ref 0–2)
BILIRUB SERPL-MCNC: 0.8 MG/DL (ref 0–1.2)
BUN BLDV-MCNC: 12 MG/DL (ref 6–20)
CALCIUM IONIZED: 1.23 MMOL/L (ref 1.15–1.33)
CALCIUM SERPL-MCNC: 8.3 MG/DL (ref 8.6–10.2)
CHLORIDE BLD-SCNC: 107 MMOL/L (ref 98–107)
CO2: 24 MMOL/L (ref 22–29)
CREAT SERPL-MCNC: 0.7 MG/DL (ref 0.7–1.2)
EOSINOPHILS ABSOLUTE: 0 E9/L (ref 0.05–0.5)
EOSINOPHILS RELATIVE PERCENT: 0 % (ref 0–6)
GFR AFRICAN AMERICAN: >60
GFR NON-AFRICAN AMERICAN: >60 ML/MIN/1.73
GLUCOSE BLD-MCNC: 104 MG/DL (ref 74–99)
HCT VFR BLD CALC: 25.5 % (ref 37–54)
HEMOGLOBIN: 8.6 G/DL (ref 12.5–16.5)
IMMATURE GRANULOCYTES #: 0.07 E9/L
IMMATURE GRANULOCYTES %: 0.8 % (ref 0–5)
LYMPHOCYTES ABSOLUTE: 0.65 E9/L (ref 1.5–4)
LYMPHOCYTES RELATIVE PERCENT: 7.6 % (ref 20–42)
MAGNESIUM: 1.9 MG/DL (ref 1.6–2.6)
MCH RBC QN AUTO: 29.9 PG (ref 26–35)
MCHC RBC AUTO-ENTMCNC: 33.7 % (ref 32–34.5)
MCV RBC AUTO: 88.5 FL (ref 80–99.9)
MONOCYTES ABSOLUTE: 0.73 E9/L (ref 0.1–0.95)
MONOCYTES RELATIVE PERCENT: 8.5 % (ref 2–12)
NEUTROPHILS ABSOLUTE: 7.09 E9/L (ref 1.8–7.3)
NEUTROPHILS RELATIVE PERCENT: 83 % (ref 43–80)
PDW BLD-RTO: 13.2 FL (ref 11.5–15)
PHOSPHORUS: 2.2 MG/DL (ref 2.5–4.5)
PLATELET # BLD: 121 E9/L (ref 130–450)
PMV BLD AUTO: 10.5 FL (ref 7–12)
POTASSIUM SERPL-SCNC: 3.2 MMOL/L (ref 3.5–5)
RBC # BLD: 2.88 E12/L (ref 3.8–5.8)
SODIUM BLD-SCNC: 143 MMOL/L (ref 132–146)
TOTAL PROTEIN: 5.3 G/DL (ref 6.4–8.3)
WBC # BLD: 8.6 E9/L (ref 4.5–11.5)

## 2019-12-09 PROCEDURE — C9113 INJ PANTOPRAZOLE SODIUM, VIA: HCPCS | Performed by: STUDENT IN AN ORGANIZED HEALTH CARE EDUCATION/TRAINING PROGRAM

## 2019-12-09 PROCEDURE — 2580000003 HC RX 258: Performed by: STUDENT IN AN ORGANIZED HEALTH CARE EDUCATION/TRAINING PROGRAM

## 2019-12-09 PROCEDURE — 0W9B30Z DRAINAGE OF LEFT PLEURAL CAVITY WITH DRAINAGE DEVICE, PERCUTANEOUS APPROACH: ICD-10-PCS | Performed by: SURGERY

## 2019-12-09 PROCEDURE — 6360000004 HC RX CONTRAST MEDICATION: Performed by: RADIOLOGY

## 2019-12-09 PROCEDURE — 83735 ASSAY OF MAGNESIUM: CPT

## 2019-12-09 PROCEDURE — 32551 INSERTION OF CHEST TUBE: CPT

## 2019-12-09 PROCEDURE — 80053 COMPREHEN METABOLIC PANEL: CPT

## 2019-12-09 PROCEDURE — 6360000002 HC RX W HCPCS: Performed by: STUDENT IN AN ORGANIZED HEALTH CARE EDUCATION/TRAINING PROGRAM

## 2019-12-09 PROCEDURE — 6370000000 HC RX 637 (ALT 250 FOR IP): Performed by: STUDENT IN AN ORGANIZED HEALTH CARE EDUCATION/TRAINING PROGRAM

## 2019-12-09 PROCEDURE — 2500000003 HC RX 250 WO HCPCS: Performed by: EMERGENCY MEDICINE

## 2019-12-09 PROCEDURE — 36415 COLL VENOUS BLD VENIPUNCTURE: CPT

## 2019-12-09 PROCEDURE — 2500000003 HC RX 250 WO HCPCS: Performed by: STUDENT IN AN ORGANIZED HEALTH CARE EDUCATION/TRAINING PROGRAM

## 2019-12-09 PROCEDURE — 71275 CT ANGIOGRAPHY CHEST: CPT

## 2019-12-09 PROCEDURE — 71045 X-RAY EXAM CHEST 1 VIEW: CPT

## 2019-12-09 PROCEDURE — 37799 UNLISTED PX VASCULAR SURGERY: CPT

## 2019-12-09 PROCEDURE — 2700000000 HC OXYGEN THERAPY PER DAY

## 2019-12-09 PROCEDURE — 2580000003 HC RX 258: Performed by: RADIOLOGY

## 2019-12-09 PROCEDURE — 99291 CRITICAL CARE FIRST HOUR: CPT | Performed by: SURGERY

## 2019-12-09 PROCEDURE — 84100 ASSAY OF PHOSPHORUS: CPT

## 2019-12-09 PROCEDURE — 85025 COMPLETE CBC W/AUTO DIFF WBC: CPT

## 2019-12-09 PROCEDURE — 2000000000 HC ICU R&B

## 2019-12-09 PROCEDURE — 82330 ASSAY OF CALCIUM: CPT

## 2019-12-09 RX ORDER — LIDOCAINE 4 G/G
1 PATCH TOPICAL DAILY
Status: DISCONTINUED | OUTPATIENT
Start: 2019-12-09 | End: 2019-12-23 | Stop reason: HOSPADM

## 2019-12-09 RX ORDER — BISACODYL 10 MG
10 SUPPOSITORY, RECTAL RECTAL DAILY
Status: DISCONTINUED | OUTPATIENT
Start: 2019-12-09 | End: 2019-12-23 | Stop reason: HOSPADM

## 2019-12-09 RX ORDER — PANTOPRAZOLE SODIUM 40 MG/1
40 TABLET, DELAYED RELEASE ORAL
Status: DISCONTINUED | OUTPATIENT
Start: 2019-12-10 | End: 2019-12-23 | Stop reason: HOSPADM

## 2019-12-09 RX ORDER — SODIUM CHLORIDE 0.9 % (FLUSH) 0.9 %
10 SYRINGE (ML) INJECTION ONCE
Status: COMPLETED | OUTPATIENT
Start: 2019-12-09 | End: 2019-12-09

## 2019-12-09 RX ORDER — CEFAZOLIN SODIUM 2 G/50ML
2 SOLUTION INTRAVENOUS SEE ADMIN INSTRUCTIONS
Status: DISCONTINUED | OUTPATIENT
Start: 2019-12-09 | End: 2019-12-10

## 2019-12-09 RX ORDER — LIDOCAINE HYDROCHLORIDE AND EPINEPHRINE 10; 10 MG/ML; UG/ML
20 INJECTION, SOLUTION INFILTRATION; PERINEURAL ONCE
Status: COMPLETED | OUTPATIENT
Start: 2019-12-09 | End: 2019-12-09

## 2019-12-09 RX ORDER — POTASSIUM CHLORIDE 29.8 MG/ML
20 INJECTION INTRAVENOUS
Status: COMPLETED | OUTPATIENT
Start: 2019-12-09 | End: 2019-12-09

## 2019-12-09 RX ORDER — FUROSEMIDE 10 MG/ML
20 INJECTION INTRAMUSCULAR; INTRAVENOUS ONCE
Status: COMPLETED | OUTPATIENT
Start: 2019-12-09 | End: 2019-12-09

## 2019-12-09 RX ORDER — ACETAMINOPHEN 650 MG
TABLET, EXTENDED RELEASE ORAL ONCE
Status: COMPLETED | OUTPATIENT
Start: 2019-12-09 | End: 2019-12-09

## 2019-12-09 RX ADMIN — HYDROMORPHONE HYDROCHLORIDE 1 MG: 1 INJECTION, SOLUTION INTRAMUSCULAR; INTRAVENOUS; SUBCUTANEOUS at 12:42

## 2019-12-09 RX ADMIN — OXYCODONE HYDROCHLORIDE AND ACETAMINOPHEN 2 TABLET: 5; 325 TABLET ORAL at 16:29

## 2019-12-09 RX ADMIN — FLUPHENAZINE HYDROCHLORIDE 5 MG: 5 TABLET, FILM COATED ORAL at 09:24

## 2019-12-09 RX ADMIN — HYDROMORPHONE HYDROCHLORIDE 1 MG: 1 INJECTION, SOLUTION INTRAMUSCULAR; INTRAVENOUS; SUBCUTANEOUS at 14:10

## 2019-12-09 RX ADMIN — POTASSIUM PHOSPHATE, MONOBASIC AND POTASSIUM PHOSPHATE, DIBASIC 20 MMOL: 224; 236 INJECTION, SOLUTION, CONCENTRATE INTRAVENOUS at 09:39

## 2019-12-09 RX ADMIN — QUETIAPINE FUMARATE 400 MG: 200 TABLET ORAL at 09:26

## 2019-12-09 RX ADMIN — METHOCARBAMOL 1000 MG: 100 INJECTION, SOLUTION INTRAMUSCULAR; INTRAVENOUS at 23:05

## 2019-12-09 RX ADMIN — HYDROMORPHONE HYDROCHLORIDE 1 MG: 1 INJECTION, SOLUTION INTRAMUSCULAR; INTRAVENOUS; SUBCUTANEOUS at 07:41

## 2019-12-09 RX ADMIN — SODIUM CHLORIDE, PRESERVATIVE FREE 10 ML: 5 INJECTION INTRAVENOUS at 09:27

## 2019-12-09 RX ADMIN — CARBAMAZEPINE 300 MG: 200 TABLET, EXTENDED RELEASE ORAL at 20:27

## 2019-12-09 RX ADMIN — FUROSEMIDE 20 MG: 10 INJECTION, SOLUTION INTRAMUSCULAR; INTRAVENOUS at 11:16

## 2019-12-09 RX ADMIN — BENZTROPINE MESYLATE 1 MG: 1 TABLET ORAL at 20:29

## 2019-12-09 RX ADMIN — POTASSIUM CHLORIDE 20 MEQ: 29.8 INJECTION, SOLUTION INTRAVENOUS at 07:58

## 2019-12-09 RX ADMIN — HYDROMORPHONE HYDROCHLORIDE 1 MG: 1 INJECTION, SOLUTION INTRAMUSCULAR; INTRAVENOUS; SUBCUTANEOUS at 21:33

## 2019-12-09 RX ADMIN — Medication: at 15:00

## 2019-12-09 RX ADMIN — CARBAMAZEPINE 300 MG: 200 TABLET, EXTENDED RELEASE ORAL at 09:25

## 2019-12-09 RX ADMIN — METHOCARBAMOL 1000 MG: 100 INJECTION, SOLUTION INTRAMUSCULAR; INTRAVENOUS at 13:43

## 2019-12-09 RX ADMIN — SODIUM CHLORIDE: 9 INJECTION, SOLUTION INTRAVENOUS at 07:59

## 2019-12-09 RX ADMIN — DOCUSATE SODIUM 100 MG: 100 CAPSULE, LIQUID FILLED ORAL at 20:26

## 2019-12-09 RX ADMIN — POTASSIUM CHLORIDE 20 MEQ: 29.8 INJECTION, SOLUTION INTRAVENOUS at 08:48

## 2019-12-09 RX ADMIN — OXYCODONE HYDROCHLORIDE AND ACETAMINOPHEN 2 TABLET: 5; 325 TABLET ORAL at 23:16

## 2019-12-09 RX ADMIN — IOPAMIDOL 70 ML: 755 INJECTION, SOLUTION INTRAVENOUS at 12:19

## 2019-12-09 RX ADMIN — SODIUM CHLORIDE, PRESERVATIVE FREE 10 ML: 5 INJECTION INTRAVENOUS at 12:41

## 2019-12-09 RX ADMIN — HYDROMORPHONE HYDROCHLORIDE 1 MG: 1 INJECTION, SOLUTION INTRAMUSCULAR; INTRAVENOUS; SUBCUTANEOUS at 14:29

## 2019-12-09 RX ADMIN — DOCUSATE SODIUM 100 MG: 100 CAPSULE, LIQUID FILLED ORAL at 09:27

## 2019-12-09 RX ADMIN — QUETIAPINE FUMARATE 400 MG: 200 TABLET ORAL at 20:27

## 2019-12-09 RX ADMIN — ENOXAPARIN SODIUM 30 MG: 30 INJECTION SUBCUTANEOUS at 09:27

## 2019-12-09 RX ADMIN — ENOXAPARIN SODIUM 30 MG: 30 INJECTION SUBCUTANEOUS at 20:30

## 2019-12-09 RX ADMIN — BISACODYL 10 MG: 10 SUPPOSITORY RECTAL at 11:16

## 2019-12-09 RX ADMIN — SODIUM CHLORIDE, PRESERVATIVE FREE 10 ML: 5 INJECTION INTRAVENOUS at 09:34

## 2019-12-09 RX ADMIN — BENZTROPINE MESYLATE 1 MG: 1 TABLET ORAL at 09:24

## 2019-12-09 RX ADMIN — METHOCARBAMOL 1000 MG: 100 INJECTION, SOLUTION INTRAMUSCULAR; INTRAVENOUS at 16:34

## 2019-12-09 RX ADMIN — FLUPHENAZINE HYDROCHLORIDE 5 MG: 5 TABLET, FILM COATED ORAL at 20:27

## 2019-12-09 RX ADMIN — Medication 10 ML: at 07:42

## 2019-12-09 RX ADMIN — Medication 10 ML: at 12:19

## 2019-12-09 RX ADMIN — LIDOCAINE HYDROCHLORIDE,EPINEPHRINE BITARTRATE 20 ML: 10; .01 INJECTION, SOLUTION INFILTRATION; PERINEURAL at 15:00

## 2019-12-09 RX ADMIN — LIDOCAINE HYDROCHLORIDE AND EPINEPHRINE 20 ML: 10; 10 INJECTION, SOLUTION INFILTRATION; PERINEURAL at 17:53

## 2019-12-09 RX ADMIN — HYDROMORPHONE HYDROCHLORIDE 1 MG: 1 INJECTION, SOLUTION INTRAMUSCULAR; INTRAVENOUS; SUBCUTANEOUS at 05:29

## 2019-12-09 RX ADMIN — SODIUM CHLORIDE, PRESERVATIVE FREE 10 ML: 5 INJECTION INTRAVENOUS at 20:28

## 2019-12-09 RX ADMIN — PANTOPRAZOLE SODIUM 40 MG: 40 INJECTION, POWDER, FOR SOLUTION INTRAVENOUS at 09:27

## 2019-12-09 ASSESSMENT — PAIN SCALES - GENERAL
PAINLEVEL_OUTOF10: 10
PAINLEVEL_OUTOF10: 0
PAINLEVEL_OUTOF10: 10
PAINLEVEL_OUTOF10: 1
PAINLEVEL_OUTOF10: 10
PAINLEVEL_OUTOF10: 10
PAINLEVEL_OUTOF10: 3
PAINLEVEL_OUTOF10: 0
PAINLEVEL_OUTOF10: 10

## 2019-12-09 ASSESSMENT — PAIN DESCRIPTION - LOCATION
LOCATION: BACK;NECK
LOCATION: BACK;NECK
LOCATION: BACK;NECK;PELVIS

## 2019-12-09 ASSESSMENT — PAIN DESCRIPTION - PAIN TYPE
TYPE: SURGICAL PAIN
TYPE: SURGICAL PAIN
TYPE: SURGICAL PAIN;ACUTE PAIN

## 2019-12-09 ASSESSMENT — PAIN DESCRIPTION - DESCRIPTORS
DESCRIPTORS: ACHING;BURNING;CONSTANT
DESCRIPTORS: PATIENT UNABLE TO DESCRIBE
DESCRIPTORS: ACHING;BURNING;CONSTANT

## 2019-12-09 ASSESSMENT — PAIN DESCRIPTION - ORIENTATION
ORIENTATION: LEFT
ORIENTATION: LEFT

## 2019-12-10 ENCOUNTER — APPOINTMENT (OUTPATIENT)
Dept: GENERAL RADIOLOGY | Age: 24
DRG: 912 | End: 2019-12-10
Payer: MEDICAID

## 2019-12-10 ENCOUNTER — ANESTHESIA EVENT (OUTPATIENT)
Dept: OPERATING ROOM | Age: 24
DRG: 912 | End: 2019-12-10
Payer: MEDICAID

## 2019-12-10 ENCOUNTER — ANESTHESIA (OUTPATIENT)
Dept: OPERATING ROOM | Age: 24
DRG: 912 | End: 2019-12-10
Payer: MEDICAID

## 2019-12-10 VITALS — TEMPERATURE: 99.1 F | OXYGEN SATURATION: 97 % | RESPIRATION RATE: 13 BRPM

## 2019-12-10 PROBLEM — S22.081A T12 BURST FRACTURE (HCC): Status: ACTIVE | Noted: 2019-12-10

## 2019-12-10 LAB
ABO/RH: NORMAL
ALBUMIN SERPL-MCNC: 2.6 G/DL (ref 3.5–5.2)
ALP BLD-CCNC: 69 U/L (ref 40–129)
ALT SERPL-CCNC: 27 U/L (ref 0–40)
ANION GAP SERPL CALCULATED.3IONS-SCNC: 13 MMOL/L (ref 7–16)
ANTIBODY SCREEN: NORMAL
AST SERPL-CCNC: 44 U/L (ref 0–39)
BASOPHILS ABSOLUTE: 0.01 E9/L (ref 0–0.2)
BASOPHILS RELATIVE PERCENT: 0.1 % (ref 0–2)
BILIRUB SERPL-MCNC: 1 MG/DL (ref 0–1.2)
BUN BLDV-MCNC: 11 MG/DL (ref 6–20)
CALCIUM IONIZED: 1.18 MMOL/L (ref 1.15–1.33)
CALCIUM SERPL-MCNC: 8.2 MG/DL (ref 8.6–10.2)
CHLORIDE BLD-SCNC: 98 MMOL/L (ref 98–107)
CO2: 26 MMOL/L (ref 22–29)
CREAT SERPL-MCNC: 0.6 MG/DL (ref 0.7–1.2)
EOSINOPHILS ABSOLUTE: 0.16 E9/L (ref 0.05–0.5)
EOSINOPHILS RELATIVE PERCENT: 2.3 % (ref 0–6)
GFR AFRICAN AMERICAN: >60
GFR NON-AFRICAN AMERICAN: >60 ML/MIN/1.73
GLUCOSE BLD-MCNC: 99 MG/DL (ref 74–99)
HCT VFR BLD CALC: 29.1 % (ref 37–54)
HEMOGLOBIN: 9.6 G/DL (ref 12.5–16.5)
IMMATURE GRANULOCYTES #: 0.07 E9/L
IMMATURE GRANULOCYTES %: 1 % (ref 0–5)
LYMPHOCYTES ABSOLUTE: 0.87 E9/L (ref 1.5–4)
LYMPHOCYTES RELATIVE PERCENT: 12.6 % (ref 20–42)
MAGNESIUM: 1.9 MG/DL (ref 1.6–2.6)
MCH RBC QN AUTO: 29.5 PG (ref 26–35)
MCHC RBC AUTO-ENTMCNC: 33 % (ref 32–34.5)
MCV RBC AUTO: 89.5 FL (ref 80–99.9)
MONOCYTES ABSOLUTE: 0.62 E9/L (ref 0.1–0.95)
MONOCYTES RELATIVE PERCENT: 9 % (ref 2–12)
NEUTROPHILS ABSOLUTE: 5.17 E9/L (ref 1.8–7.3)
NEUTROPHILS RELATIVE PERCENT: 75 % (ref 43–80)
PDW BLD-RTO: 13.2 FL (ref 11.5–15)
PHOSPHORUS: 3.2 MG/DL (ref 2.5–4.5)
PLATELET # BLD: 161 E9/L (ref 130–450)
PMV BLD AUTO: 10 FL (ref 7–12)
POTASSIUM SERPL-SCNC: 3.2 MMOL/L (ref 3.5–5)
RBC # BLD: 3.25 E12/L (ref 3.8–5.8)
SODIUM BLD-SCNC: 137 MMOL/L (ref 132–146)
TOTAL PROTEIN: 5.8 G/DL (ref 6.4–8.3)
WBC # BLD: 6.9 E9/L (ref 4.5–11.5)

## 2019-12-10 PROCEDURE — 22842 INSERT SPINE FIXATION DEVICE: CPT | Performed by: PHYSICIAN ASSISTANT

## 2019-12-10 PROCEDURE — 6370000000 HC RX 637 (ALT 250 FOR IP): Performed by: PHYSICIAN ASSISTANT

## 2019-12-10 PROCEDURE — 71045 X-RAY EXAM CHEST 1 VIEW: CPT

## 2019-12-10 PROCEDURE — 36415 COLL VENOUS BLD VENIPUNCTURE: CPT

## 2019-12-10 PROCEDURE — 95940 IONM IN OPERATNG ROOM 15 MIN: CPT | Performed by: AUDIOLOGIST

## 2019-12-10 PROCEDURE — 32551 INSERTION OF CHEST TUBE: CPT

## 2019-12-10 PROCEDURE — 6370000000 HC RX 637 (ALT 250 FOR IP): Performed by: NEUROLOGICAL SURGERY

## 2019-12-10 PROCEDURE — 2720000010 HC SURG SUPPLY STERILE: Performed by: NEUROLOGICAL SURGERY

## 2019-12-10 PROCEDURE — 2709999900 HC NON-CHARGEABLE SUPPLY: Performed by: NEUROLOGICAL SURGERY

## 2019-12-10 PROCEDURE — 2060000000 HC ICU INTERMEDIATE R&B

## 2019-12-10 PROCEDURE — 0RGA0K1 FUSION OF THORACOLUMBAR VERTEBRAL JOINT WITH NONAUTOLOGOUS TISSUE SUBSTITUTE, POSTERIOR APPROACH, POSTERIOR COLUMN, OPEN APPROACH: ICD-10-PCS | Performed by: NEUROLOGICAL SURGERY

## 2019-12-10 PROCEDURE — 6370000000 HC RX 637 (ALT 250 FOR IP): Performed by: STUDENT IN AN ORGANIZED HEALTH CARE EDUCATION/TRAINING PROGRAM

## 2019-12-10 PROCEDURE — 86850 RBC ANTIBODY SCREEN: CPT

## 2019-12-10 PROCEDURE — 3209999900 FLUORO FOR SURGICAL PROCEDURES

## 2019-12-10 PROCEDURE — 85025 COMPLETE CBC W/AUTO DIFF WBC: CPT

## 2019-12-10 PROCEDURE — 22842 INSERT SPINE FIXATION DEVICE: CPT | Performed by: NEUROLOGICAL SURGERY

## 2019-12-10 PROCEDURE — 22610 ARTHRD PST TQ 1NTRSPC THRC: CPT | Performed by: NEUROLOGICAL SURGERY

## 2019-12-10 PROCEDURE — 86900 BLOOD TYPING SEROLOGIC ABO: CPT

## 2019-12-10 PROCEDURE — 84100 ASSAY OF PHOSPHORUS: CPT

## 2019-12-10 PROCEDURE — 0RG60K1 FUSION OF THORACIC VERTEBRAL JOINT WITH NONAUTOLOGOUS TISSUE SUBSTITUTE, POSTERIOR APPROACH, POSTERIOR COLUMN, OPEN APPROACH: ICD-10-PCS | Performed by: NEUROLOGICAL SURGERY

## 2019-12-10 PROCEDURE — 2700000000 HC OXYGEN THERAPY PER DAY

## 2019-12-10 PROCEDURE — 6360000002 HC RX W HCPCS: Performed by: PHYSICIAN ASSISTANT

## 2019-12-10 PROCEDURE — 6360000002 HC RX W HCPCS

## 2019-12-10 PROCEDURE — 95909 NRV CNDJ TST 5-6 STUDIES: CPT | Performed by: AUDIOLOGIST

## 2019-12-10 PROCEDURE — C1713 ANCHOR/SCREW BN/BN,TIS/BN: HCPCS | Performed by: NEUROLOGICAL SURGERY

## 2019-12-10 PROCEDURE — 22610 ARTHRD PST TQ 1NTRSPC THRC: CPT | Performed by: PHYSICIAN ASSISTANT

## 2019-12-10 PROCEDURE — 2580000003 HC RX 258: Performed by: STUDENT IN AN ORGANIZED HEALTH CARE EDUCATION/TRAINING PROGRAM

## 2019-12-10 PROCEDURE — 3600000005 HC SURGERY LEVEL 5 BASE: Performed by: NEUROLOGICAL SURGERY

## 2019-12-10 PROCEDURE — 2580000003 HC RX 258: Performed by: PHYSICIAN ASSISTANT

## 2019-12-10 PROCEDURE — 83735 ASSAY OF MAGNESIUM: CPT

## 2019-12-10 PROCEDURE — 99233 SBSQ HOSP IP/OBS HIGH 50: CPT | Performed by: SURGERY

## 2019-12-10 PROCEDURE — 2580000003 HC RX 258: Performed by: ANESTHESIOLOGIST ASSISTANT

## 2019-12-10 PROCEDURE — 7100000001 HC PACU RECOVERY - ADDTL 15 MIN

## 2019-12-10 PROCEDURE — 3700000001 HC ADD 15 MINUTES (ANESTHESIA): Performed by: NEUROLOGICAL SURGERY

## 2019-12-10 PROCEDURE — 80053 COMPREHEN METABOLIC PANEL: CPT

## 2019-12-10 PROCEDURE — 2500000003 HC RX 250 WO HCPCS: Performed by: ANESTHESIOLOGIST ASSISTANT

## 2019-12-10 PROCEDURE — 22614 ARTHRD PST TQ 1NTRSPC EA ADD: CPT | Performed by: PHYSICIAN ASSISTANT

## 2019-12-10 PROCEDURE — 2500000003 HC RX 250 WO HCPCS: Performed by: NEUROLOGICAL SURGERY

## 2019-12-10 PROCEDURE — 3700000000 HC ANESTHESIA ATTENDED CARE: Performed by: NEUROLOGICAL SURGERY

## 2019-12-10 PROCEDURE — 22614 ARTHRD PST TQ 1NTRSPC EA ADD: CPT | Performed by: NEUROLOGICAL SURGERY

## 2019-12-10 PROCEDURE — 86901 BLOOD TYPING SEROLOGIC RH(D): CPT

## 2019-12-10 PROCEDURE — 2580000003 HC RX 258: Performed by: NEUROLOGICAL SURGERY

## 2019-12-10 PROCEDURE — 7100000000 HC PACU RECOVERY - FIRST 15 MIN

## 2019-12-10 PROCEDURE — L0486 TLSO RIGIDLINED CUST FAB TWO: HCPCS

## 2019-12-10 PROCEDURE — 37799 UNLISTED PX VASCULAR SURGERY: CPT

## 2019-12-10 PROCEDURE — 6360000002 HC RX W HCPCS: Performed by: NEUROLOGICAL SURGERY

## 2019-12-10 PROCEDURE — 0SG00K1 FUSION OF LUMBAR VERTEBRAL JOINT WITH NONAUTOLOGOUS TISSUE SUBSTITUTE, POSTERIOR APPROACH, POSTERIOR COLUMN, OPEN APPROACH: ICD-10-PCS | Performed by: NEUROLOGICAL SURGERY

## 2019-12-10 PROCEDURE — 6360000002 HC RX W HCPCS: Performed by: STUDENT IN AN ORGANIZED HEALTH CARE EDUCATION/TRAINING PROGRAM

## 2019-12-10 PROCEDURE — 82330 ASSAY OF CALCIUM: CPT

## 2019-12-10 PROCEDURE — 6360000002 HC RX W HCPCS: Performed by: ANESTHESIOLOGIST ASSISTANT

## 2019-12-10 PROCEDURE — 3600000015 HC SURGERY LEVEL 5 ADDTL 15MIN: Performed by: NEUROLOGICAL SURGERY

## 2019-12-10 DEVICE — DBM 7509211 MAGNIFUSE 1 X 10CM
Type: IMPLANTABLE DEVICE | Site: SPINE THORACIC | Status: FUNCTIONAL
Brand: MAGNIFUSE® BONE GRAFT

## 2019-12-10 DEVICE — CREO® THREADED 6.5 X 40MM POLYAXIAL SCREW
Type: IMPLANTABLE DEVICE | Site: SPINE THORACIC | Status: FUNCTIONAL
Brand: CREO

## 2019-12-10 DEVICE — THREADED LOCKING CAP, CREO
Type: IMPLANTABLE DEVICE | Site: SPINE THORACIC | Status: FUNCTIONAL
Brand: CREO

## 2019-12-10 DEVICE — DBM 7509215 MAGNIFUSE 1 X 5CM
Type: IMPLANTABLE DEVICE | Site: SPINE THORACIC | Status: FUNCTIONAL
Brand: MAGNIFUSE® BONE GRAFT

## 2019-12-10 DEVICE — 5.5MM CURVED ROD, 110MM
Type: IMPLANTABLE DEVICE | Site: SPINE THORACIC | Status: FUNCTIONAL
Brand: REVERE

## 2019-12-10 DEVICE — CREO® THREADED 6.5 X 45MM POLYAXIAL SCREW
Type: IMPLANTABLE DEVICE | Site: SPINE THORACIC | Status: FUNCTIONAL
Brand: CREO

## 2019-12-10 RX ORDER — DEXAMETHASONE SODIUM PHOSPHATE 10 MG/ML
INJECTION INTRAMUSCULAR; INTRAVENOUS PRN
Status: DISCONTINUED | OUTPATIENT
Start: 2019-12-10 | End: 2019-12-10 | Stop reason: SDUPTHER

## 2019-12-10 RX ORDER — FUROSEMIDE 10 MG/ML
20 INJECTION INTRAMUSCULAR; INTRAVENOUS ONCE
Status: COMPLETED | OUTPATIENT
Start: 2019-12-10 | End: 2019-12-10

## 2019-12-10 RX ORDER — SODIUM CHLORIDE 9 MG/ML
INJECTION, SOLUTION INTRAVENOUS CONTINUOUS PRN
Status: DISCONTINUED | OUTPATIENT
Start: 2019-12-10 | End: 2019-12-10 | Stop reason: SDUPTHER

## 2019-12-10 RX ORDER — MIDAZOLAM HYDROCHLORIDE 1 MG/ML
INJECTION INTRAMUSCULAR; INTRAVENOUS PRN
Status: DISCONTINUED | OUTPATIENT
Start: 2019-12-10 | End: 2019-12-10 | Stop reason: SDUPTHER

## 2019-12-10 RX ORDER — VANCOMYCIN HYDROCHLORIDE 500 MG/10ML
INJECTION, POWDER, LYOPHILIZED, FOR SOLUTION INTRAVENOUS PRN
Status: DISCONTINUED | OUTPATIENT
Start: 2019-12-10 | End: 2019-12-10 | Stop reason: ALTCHOICE

## 2019-12-10 RX ORDER — OXYCODONE HYDROCHLORIDE AND ACETAMINOPHEN 5; 325 MG/1; MG/1
1 TABLET ORAL PRN
Status: DISCONTINUED | OUTPATIENT
Start: 2019-12-10 | End: 2019-12-10

## 2019-12-10 RX ORDER — SODIUM CHLORIDE 9 MG/ML
INJECTION, SOLUTION INTRAVENOUS CONTINUOUS
Status: DISCONTINUED | OUTPATIENT
Start: 2019-12-10 | End: 2019-12-10

## 2019-12-10 RX ORDER — CYCLOBENZAPRINE HCL 10 MG
10 TABLET ORAL 3 TIMES DAILY PRN
Status: DISCONTINUED | OUTPATIENT
Start: 2019-12-10 | End: 2019-12-23 | Stop reason: HOSPADM

## 2019-12-10 RX ORDER — NEOSTIGMINE METHYLSULFATE 1 MG/ML
INJECTION, SOLUTION INTRAVENOUS PRN
Status: DISCONTINUED | OUTPATIENT
Start: 2019-12-10 | End: 2019-12-10 | Stop reason: SDUPTHER

## 2019-12-10 RX ORDER — BUPIVACAINE HYDROCHLORIDE 2.5 MG/ML
INJECTION, SOLUTION EPIDURAL; INFILTRATION; INTRACAUDAL PRN
Status: DISCONTINUED | OUTPATIENT
Start: 2019-12-10 | End: 2019-12-10 | Stop reason: ALTCHOICE

## 2019-12-10 RX ORDER — PROPOFOL 10 MG/ML
INJECTION, EMULSION INTRAVENOUS PRN
Status: DISCONTINUED | OUTPATIENT
Start: 2019-12-10 | End: 2019-12-10 | Stop reason: SDUPTHER

## 2019-12-10 RX ORDER — ACETAMINOPHEN 325 MG/1
650 TABLET ORAL EVERY 4 HOURS
Status: DISCONTINUED | OUTPATIENT
Start: 2019-12-10 | End: 2019-12-23 | Stop reason: HOSPADM

## 2019-12-10 RX ORDER — FENTANYL CITRATE 50 UG/ML
INJECTION, SOLUTION INTRAMUSCULAR; INTRAVENOUS PRN
Status: DISCONTINUED | OUTPATIENT
Start: 2019-12-10 | End: 2019-12-10 | Stop reason: SDUPTHER

## 2019-12-10 RX ORDER — OXYCODONE HYDROCHLORIDE 15 MG/1
15 TABLET ORAL EVERY 4 HOURS PRN
Status: DISCONTINUED | OUTPATIENT
Start: 2019-12-10 | End: 2019-12-15

## 2019-12-10 RX ORDER — DOCUSATE SODIUM 100 MG/1
100 CAPSULE, LIQUID FILLED ORAL 2 TIMES DAILY
Status: DISCONTINUED | OUTPATIENT
Start: 2019-12-10 | End: 2019-12-15

## 2019-12-10 RX ORDER — GLYCOPYRROLATE 1 MG/5 ML
SYRINGE (ML) INTRAVENOUS PRN
Status: DISCONTINUED | OUTPATIENT
Start: 2019-12-10 | End: 2019-12-10 | Stop reason: SDUPTHER

## 2019-12-10 RX ORDER — MORPHINE SULFATE 2 MG/ML
1 INJECTION, SOLUTION INTRAMUSCULAR; INTRAVENOUS EVERY 5 MIN PRN
Status: DISCONTINUED | OUTPATIENT
Start: 2019-12-10 | End: 2019-12-10

## 2019-12-10 RX ORDER — SODIUM CHLORIDE 0.9 % (FLUSH) 0.9 %
10 SYRINGE (ML) INJECTION PRN
Status: DISCONTINUED | OUTPATIENT
Start: 2019-12-10 | End: 2019-12-23 | Stop reason: HOSPADM

## 2019-12-10 RX ORDER — FAMOTIDINE 20 MG/1
20 TABLET, FILM COATED ORAL 2 TIMES DAILY
Status: DISCONTINUED | OUTPATIENT
Start: 2019-12-10 | End: 2019-12-10

## 2019-12-10 RX ORDER — LIDOCAINE HYDROCHLORIDE AND EPINEPHRINE 5; 5 MG/ML; UG/ML
INJECTION, SOLUTION INFILTRATION; PERINEURAL PRN
Status: DISCONTINUED | OUTPATIENT
Start: 2019-12-10 | End: 2019-12-10 | Stop reason: ALTCHOICE

## 2019-12-10 RX ORDER — OXYCODONE HYDROCHLORIDE 10 MG/1
10 TABLET ORAL EVERY 4 HOURS PRN
Status: DISCONTINUED | OUTPATIENT
Start: 2019-12-10 | End: 2019-12-23 | Stop reason: HOSPADM

## 2019-12-10 RX ORDER — POTASSIUM CHLORIDE 29.8 MG/ML
20 INJECTION INTRAVENOUS
Status: DISPENSED | OUTPATIENT
Start: 2019-12-10 | End: 2019-12-10

## 2019-12-10 RX ORDER — OXYCODONE HYDROCHLORIDE AND ACETAMINOPHEN 5; 325 MG/1; MG/1
2 TABLET ORAL PRN
Status: DISCONTINUED | OUTPATIENT
Start: 2019-12-10 | End: 2019-12-10

## 2019-12-10 RX ORDER — ONDANSETRON 2 MG/ML
4 INJECTION INTRAMUSCULAR; INTRAVENOUS EVERY 6 HOURS PRN
Status: DISCONTINUED | OUTPATIENT
Start: 2019-12-10 | End: 2019-12-23 | Stop reason: HOSPADM

## 2019-12-10 RX ORDER — ONDANSETRON 2 MG/ML
INJECTION INTRAMUSCULAR; INTRAVENOUS PRN
Status: DISCONTINUED | OUTPATIENT
Start: 2019-12-10 | End: 2019-12-10 | Stop reason: SDUPTHER

## 2019-12-10 RX ORDER — CEFAZOLIN SODIUM 1 G/3ML
INJECTION, POWDER, FOR SOLUTION INTRAMUSCULAR; INTRAVENOUS PRN
Status: DISCONTINUED | OUTPATIENT
Start: 2019-12-10 | End: 2019-12-10 | Stop reason: SDUPTHER

## 2019-12-10 RX ORDER — DIAPER,BRIEF,INFANT-TODD,DISP
EACH MISCELLANEOUS PRN
Status: DISCONTINUED | OUTPATIENT
Start: 2019-12-10 | End: 2019-12-10 | Stop reason: ALTCHOICE

## 2019-12-10 RX ORDER — SODIUM CHLORIDE 0.9 % (FLUSH) 0.9 %
10 SYRINGE (ML) INJECTION EVERY 12 HOURS SCHEDULED
Status: DISCONTINUED | OUTPATIENT
Start: 2019-12-10 | End: 2019-12-23 | Stop reason: HOSPADM

## 2019-12-10 RX ORDER — MEPERIDINE HYDROCHLORIDE 50 MG/ML
12.5 INJECTION INTRAMUSCULAR; INTRAVENOUS; SUBCUTANEOUS
Status: DISCONTINUED | OUTPATIENT
Start: 2019-12-10 | End: 2019-12-10

## 2019-12-10 RX ORDER — ONDANSETRON 2 MG/ML
4 INJECTION INTRAMUSCULAR; INTRAVENOUS
Status: DISCONTINUED | OUTPATIENT
Start: 2019-12-10 | End: 2019-12-10

## 2019-12-10 RX ORDER — MORPHINE SULFATE 2 MG/ML
2 INJECTION, SOLUTION INTRAMUSCULAR; INTRAVENOUS EVERY 5 MIN PRN
Status: DISCONTINUED | OUTPATIENT
Start: 2019-12-10 | End: 2019-12-10

## 2019-12-10 RX ORDER — LIDOCAINE HYDROCHLORIDE 20 MG/ML
INJECTION, SOLUTION INTRAVENOUS PRN
Status: DISCONTINUED | OUTPATIENT
Start: 2019-12-10 | End: 2019-12-10 | Stop reason: SDUPTHER

## 2019-12-10 RX ORDER — CEFAZOLIN SODIUM 2 G/50ML
2 SOLUTION INTRAVENOUS EVERY 8 HOURS
Status: COMPLETED | OUTPATIENT
Start: 2019-12-10 | End: 2019-12-10

## 2019-12-10 RX ORDER — ROCURONIUM BROMIDE 10 MG/ML
INJECTION, SOLUTION INTRAVENOUS PRN
Status: DISCONTINUED | OUTPATIENT
Start: 2019-12-10 | End: 2019-12-10 | Stop reason: SDUPTHER

## 2019-12-10 RX ADMIN — BENZTROPINE MESYLATE 1 MG: 1 TABLET ORAL at 20:28

## 2019-12-10 RX ADMIN — FLUPHENAZINE HYDROCHLORIDE 5 MG: 5 TABLET, FILM COATED ORAL at 11:54

## 2019-12-10 RX ADMIN — METHOCARBAMOL 1000 MG: 100 INJECTION, SOLUTION INTRAMUSCULAR; INTRAVENOUS at 12:02

## 2019-12-10 RX ADMIN — Medication 10 ML: at 04:08

## 2019-12-10 RX ADMIN — OXYCODONE HYDROCHLORIDE 10 MG: 10 TABLET ORAL at 22:26

## 2019-12-10 RX ADMIN — QUETIAPINE FUMARATE 400 MG: 200 TABLET ORAL at 20:28

## 2019-12-10 RX ADMIN — SODIUM CHLORIDE, PRESERVATIVE FREE 10 ML: 5 INJECTION INTRAVENOUS at 22:26

## 2019-12-10 RX ADMIN — ACETAMINOPHEN 650 MG: 325 TABLET, FILM COATED ORAL at 20:28

## 2019-12-10 RX ADMIN — OXYCODONE HYDROCHLORIDE AND ACETAMINOPHEN 2 TABLET: 5; 325 TABLET ORAL at 06:33

## 2019-12-10 RX ADMIN — FLUPHENAZINE HYDROCHLORIDE 5 MG: 5 TABLET, FILM COATED ORAL at 20:28

## 2019-12-10 RX ADMIN — Medication 10 ML: at 11:55

## 2019-12-10 RX ADMIN — METHOCARBAMOL 1000 MG: 100 INJECTION, SOLUTION INTRAMUSCULAR; INTRAVENOUS at 17:52

## 2019-12-10 RX ADMIN — Medication 10 ML: at 10:54

## 2019-12-10 RX ADMIN — DEXAMETHASONE SODIUM PHOSPHATE 10 MG: 10 INJECTION INTRAMUSCULAR; INTRAVENOUS at 07:56

## 2019-12-10 RX ADMIN — FLUPHENAZINE HYDROCHLORIDE 5 MG: 5 TABLET, FILM COATED ORAL at 16:30

## 2019-12-10 RX ADMIN — PROPOFOL 200 MG: 10 INJECTION, EMULSION INTRAVENOUS at 07:56

## 2019-12-10 RX ADMIN — SODIUM CHLORIDE: 9 INJECTION, SOLUTION INTRAVENOUS at 07:51

## 2019-12-10 RX ADMIN — LIDOCAINE HYDROCHLORIDE 100 MG: 20 INJECTION, SOLUTION INTRAVENOUS at 07:56

## 2019-12-10 RX ADMIN — ONDANSETRON HYDROCHLORIDE 4 MG: 2 INJECTION, SOLUTION INTRAMUSCULAR; INTRAVENOUS at 07:56

## 2019-12-10 RX ADMIN — ONDANSETRON HYDROCHLORIDE 4 MG: 2 INJECTION, SOLUTION INTRAMUSCULAR; INTRAVENOUS at 14:13

## 2019-12-10 RX ADMIN — ACETAMINOPHEN 650 MG: 325 TABLET, FILM COATED ORAL at 16:30

## 2019-12-10 RX ADMIN — Medication 10 ML: at 20:29

## 2019-12-10 RX ADMIN — FUROSEMIDE 20 MG: 10 INJECTION, SOLUTION INTRAMUSCULAR; INTRAVENOUS at 12:00

## 2019-12-10 RX ADMIN — DOCUSATE SODIUM 100 MG: 100 CAPSULE, LIQUID FILLED ORAL at 12:00

## 2019-12-10 RX ADMIN — CARBAMAZEPINE 300 MG: 200 TABLET, EXTENDED RELEASE ORAL at 11:55

## 2019-12-10 RX ADMIN — Medication 10 ML: at 16:52

## 2019-12-10 RX ADMIN — FENTANYL CITRATE 100 MCG: 50 INJECTION, SOLUTION INTRAMUSCULAR; INTRAVENOUS at 07:56

## 2019-12-10 RX ADMIN — Medication 3 MG: at 09:44

## 2019-12-10 RX ADMIN — HYDROMORPHONE HYDROCHLORIDE 1 MG: 1 INJECTION, SOLUTION INTRAMUSCULAR; INTRAVENOUS; SUBCUTANEOUS at 16:50

## 2019-12-10 RX ADMIN — ACETAMINOPHEN 650 MG: 325 TABLET, FILM COATED ORAL at 12:00

## 2019-12-10 RX ADMIN — HYDROMORPHONE HYDROCHLORIDE 1 MG: 1 INJECTION, SOLUTION INTRAMUSCULAR; INTRAVENOUS; SUBCUTANEOUS at 10:49

## 2019-12-10 RX ADMIN — FENTANYL CITRATE 100 MCG: 50 INJECTION, SOLUTION INTRAMUSCULAR; INTRAVENOUS at 08:09

## 2019-12-10 RX ADMIN — CEFAZOLIN SODIUM 2 G: 2 SOLUTION INTRAVENOUS at 16:23

## 2019-12-10 RX ADMIN — CEFAZOLIN SODIUM 2 G: 2 SOLUTION INTRAVENOUS at 22:30

## 2019-12-10 RX ADMIN — OXYCODONE HYDROCHLORIDE 15 MG: 15 TABLET ORAL at 12:48

## 2019-12-10 RX ADMIN — METHOCARBAMOL 1000 MG: 100 INJECTION, SOLUTION INTRAMUSCULAR; INTRAVENOUS at 04:11

## 2019-12-10 RX ADMIN — ROCURONIUM BROMIDE 20 MG: 10 INJECTION, SOLUTION INTRAVENOUS at 08:11

## 2019-12-10 RX ADMIN — DOCUSATE SODIUM 100 MG: 100 CAPSULE, LIQUID FILLED ORAL at 20:28

## 2019-12-10 RX ADMIN — QUETIAPINE FUMARATE 400 MG: 200 TABLET ORAL at 11:54

## 2019-12-10 RX ADMIN — FENTANYL CITRATE 50 MCG: 50 INJECTION, SOLUTION INTRAMUSCULAR; INTRAVENOUS at 08:15

## 2019-12-10 RX ADMIN — MIDAZOLAM 2 MG: 1 INJECTION INTRAMUSCULAR; INTRAVENOUS at 07:51

## 2019-12-10 RX ADMIN — CARBAMAZEPINE 300 MG: 200 TABLET, EXTENDED RELEASE ORAL at 20:28

## 2019-12-10 RX ADMIN — Medication 0.6 MG: at 09:44

## 2019-12-10 RX ADMIN — CEFAZOLIN 2000 MG: 1 INJECTION, POWDER, FOR SOLUTION INTRAMUSCULAR; INTRAVENOUS at 08:01

## 2019-12-10 RX ADMIN — PANTOPRAZOLE SODIUM 40 MG: 40 TABLET, DELAYED RELEASE ORAL at 06:33

## 2019-12-10 RX ADMIN — ROCURONIUM BROMIDE 50 MG: 10 INJECTION, SOLUTION INTRAVENOUS at 07:56

## 2019-12-10 RX ADMIN — HYDROMORPHONE HYDROCHLORIDE 1 MG: 1 INJECTION, SOLUTION INTRAMUSCULAR; INTRAVENOUS; SUBCUTANEOUS at 20:28

## 2019-12-10 RX ADMIN — HYDROMORPHONE HYDROCHLORIDE 1 MG: 1 INJECTION, SOLUTION INTRAMUSCULAR; INTRAVENOUS; SUBCUTANEOUS at 04:08

## 2019-12-10 ASSESSMENT — PULMONARY FUNCTION TESTS
PIF_VALUE: 26
PIF_VALUE: 2
PIF_VALUE: 26
PIF_VALUE: 28
PIF_VALUE: 26
PIF_VALUE: 26
PIF_VALUE: 2
PIF_VALUE: 26
PIF_VALUE: 23
PIF_VALUE: 26
PIF_VALUE: 38
PIF_VALUE: 26
PIF_VALUE: 27
PIF_VALUE: 0
PIF_VALUE: 26
PIF_VALUE: 66
PIF_VALUE: 26
PIF_VALUE: 27
PIF_VALUE: 26
PIF_VALUE: 26
PIF_VALUE: 2
PIF_VALUE: 26
PIF_VALUE: 26
PIF_VALUE: 27
PIF_VALUE: 26
PIF_VALUE: 30
PIF_VALUE: 25
PIF_VALUE: 26
PIF_VALUE: 25
PIF_VALUE: 26
PIF_VALUE: 27
PIF_VALUE: 26
PIF_VALUE: 27
PIF_VALUE: 26
PIF_VALUE: 27
PIF_VALUE: 26
PIF_VALUE: 41
PIF_VALUE: 26
PIF_VALUE: 3
PIF_VALUE: 26
PIF_VALUE: 1
PIF_VALUE: 26
PIF_VALUE: 2
PIF_VALUE: 12
PIF_VALUE: 27
PIF_VALUE: 26
PIF_VALUE: 2
PIF_VALUE: 26
PIF_VALUE: 27
PIF_VALUE: 27
PIF_VALUE: 26
PIF_VALUE: 2
PIF_VALUE: 26
PIF_VALUE: 27
PIF_VALUE: 26
PIF_VALUE: 2
PIF_VALUE: 26
PIF_VALUE: 3
PIF_VALUE: 26
PIF_VALUE: 2
PIF_VALUE: 26
PIF_VALUE: 27
PIF_VALUE: 26

## 2019-12-10 ASSESSMENT — PAIN DESCRIPTION - ORIENTATION
ORIENTATION: LEFT

## 2019-12-10 ASSESSMENT — PAIN DESCRIPTION - PAIN TYPE
TYPE: SURGICAL PAIN

## 2019-12-10 ASSESSMENT — PAIN SCALES - GENERAL
PAINLEVEL_OUTOF10: 10
PAINLEVEL_OUTOF10: 9
PAINLEVEL_OUTOF10: 6
PAINLEVEL_OUTOF10: 6
PAINLEVEL_OUTOF10: 0
PAINLEVEL_OUTOF10: 0
PAINLEVEL_OUTOF10: 4
PAINLEVEL_OUTOF10: 10
PAINLEVEL_OUTOF10: 4
PAINLEVEL_OUTOF10: 10
PAINLEVEL_OUTOF10: 9
PAINLEVEL_OUTOF10: 0
PAINLEVEL_OUTOF10: 9
PAINLEVEL_OUTOF10: 4
PAINLEVEL_OUTOF10: 0
PAINLEVEL_OUTOF10: 9
PAINLEVEL_OUTOF10: 0
PAINLEVEL_OUTOF10: 0
PAINLEVEL_OUTOF10: 7
PAINLEVEL_OUTOF10: 7

## 2019-12-10 ASSESSMENT — PAIN DESCRIPTION - DESCRIPTORS
DESCRIPTORS: ACHING

## 2019-12-10 ASSESSMENT — PAIN DESCRIPTION - LOCATION
LOCATION: BACK
LOCATION: BACK
LOCATION: ARM
LOCATION: BACK

## 2019-12-11 ENCOUNTER — APPOINTMENT (OUTPATIENT)
Dept: GENERAL RADIOLOGY | Age: 24
DRG: 912 | End: 2019-12-11
Payer: MEDICAID

## 2019-12-11 ENCOUNTER — ANESTHESIA EVENT (OUTPATIENT)
Dept: OPERATING ROOM | Age: 24
DRG: 912 | End: 2019-12-11
Payer: MEDICAID

## 2019-12-11 ENCOUNTER — ANESTHESIA (OUTPATIENT)
Dept: OPERATING ROOM | Age: 24
DRG: 912 | End: 2019-12-11
Payer: MEDICAID

## 2019-12-11 VITALS — TEMPERATURE: 98.1 F | DIASTOLIC BLOOD PRESSURE: 110 MMHG | SYSTOLIC BLOOD PRESSURE: 154 MMHG | OXYGEN SATURATION: 100 %

## 2019-12-11 LAB
ALBUMIN SERPL-MCNC: 2.7 G/DL (ref 3.5–5.2)
ALP BLD-CCNC: 69 U/L (ref 40–129)
ALT SERPL-CCNC: 23 U/L (ref 0–40)
ANION GAP SERPL CALCULATED.3IONS-SCNC: 12 MMOL/L (ref 7–16)
AST SERPL-CCNC: 37 U/L (ref 0–39)
BASOPHILS ABSOLUTE: 0.02 E9/L (ref 0–0.2)
BASOPHILS RELATIVE PERCENT: 0.2 % (ref 0–2)
BILIRUB SERPL-MCNC: 0.8 MG/DL (ref 0–1.2)
BUN BLDV-MCNC: 12 MG/DL (ref 6–20)
CALCIUM SERPL-MCNC: 8.4 MG/DL (ref 8.6–10.2)
CHLORIDE BLD-SCNC: 97 MMOL/L (ref 98–107)
CO2: 29 MMOL/L (ref 22–29)
CREAT SERPL-MCNC: 0.7 MG/DL (ref 0.7–1.2)
EOSINOPHILS ABSOLUTE: 0.12 E9/L (ref 0.05–0.5)
EOSINOPHILS RELATIVE PERCENT: 1.4 % (ref 0–6)
GFR AFRICAN AMERICAN: >60
GFR NON-AFRICAN AMERICAN: >60 ML/MIN/1.73
GLUCOSE BLD-MCNC: 126 MG/DL (ref 74–99)
HCT VFR BLD CALC: 30.6 % (ref 37–54)
HEMOGLOBIN: 10 G/DL (ref 12.5–16.5)
IMMATURE GRANULOCYTES #: 0.35 E9/L
IMMATURE GRANULOCYTES %: 4 % (ref 0–5)
LYMPHOCYTES ABSOLUTE: 0.95 E9/L (ref 1.5–4)
LYMPHOCYTES RELATIVE PERCENT: 10.9 % (ref 20–42)
MCH RBC QN AUTO: 29.6 PG (ref 26–35)
MCHC RBC AUTO-ENTMCNC: 32.7 % (ref 32–34.5)
MCV RBC AUTO: 90.5 FL (ref 80–99.9)
MONOCYTES ABSOLUTE: 0.83 E9/L (ref 0.1–0.95)
MONOCYTES RELATIVE PERCENT: 9.5 % (ref 2–12)
NEUTROPHILS ABSOLUTE: 6.47 E9/L (ref 1.8–7.3)
NEUTROPHILS RELATIVE PERCENT: 74 % (ref 43–80)
PDW BLD-RTO: 13 FL (ref 11.5–15)
PLATELET # BLD: 212 E9/L (ref 130–450)
PMV BLD AUTO: 10 FL (ref 7–12)
POTASSIUM SERPL-SCNC: 3.2 MMOL/L (ref 3.5–5)
RBC # BLD: 3.38 E12/L (ref 3.8–5.8)
SODIUM BLD-SCNC: 138 MMOL/L (ref 132–146)
TOTAL PROTEIN: 5.9 G/DL (ref 6.4–8.3)
WBC # BLD: 8.7 E9/L (ref 4.5–11.5)

## 2019-12-11 PROCEDURE — 6360000002 HC RX W HCPCS

## 2019-12-11 PROCEDURE — 73060 X-RAY EXAM OF HUMERUS: CPT

## 2019-12-11 PROCEDURE — 6370000000 HC RX 637 (ALT 250 FOR IP): Performed by: STUDENT IN AN ORGANIZED HEALTH CARE EDUCATION/TRAINING PROGRAM

## 2019-12-11 PROCEDURE — 36415 COLL VENOUS BLD VENIPUNCTURE: CPT

## 2019-12-11 PROCEDURE — 99232 SBSQ HOSP IP/OBS MODERATE 35: CPT | Performed by: SURGERY

## 2019-12-11 PROCEDURE — 2700000000 HC OXYGEN THERAPY PER DAY

## 2019-12-11 PROCEDURE — 2709999900 HC NON-CHARGEABLE SUPPLY: Performed by: ORTHOPAEDIC SURGERY

## 2019-12-11 PROCEDURE — 7100000000 HC PACU RECOVERY - FIRST 15 MIN: Performed by: ORTHOPAEDIC SURGERY

## 2019-12-11 PROCEDURE — 2580000003 HC RX 258: Performed by: STUDENT IN AN ORGANIZED HEALTH CARE EDUCATION/TRAINING PROGRAM

## 2019-12-11 PROCEDURE — 3600000015 HC SURGERY LEVEL 5 ADDTL 15MIN: Performed by: ORTHOPAEDIC SURGERY

## 2019-12-11 PROCEDURE — 6360000002 HC RX W HCPCS: Performed by: ANESTHESIOLOGY

## 2019-12-11 PROCEDURE — 80053 COMPREHEN METABOLIC PANEL: CPT

## 2019-12-11 PROCEDURE — 97530 THERAPEUTIC ACTIVITIES: CPT

## 2019-12-11 PROCEDURE — 3700000001 HC ADD 15 MINUTES (ANESTHESIA): Performed by: ORTHOPAEDIC SURGERY

## 2019-12-11 PROCEDURE — 2500000003 HC RX 250 WO HCPCS

## 2019-12-11 PROCEDURE — C1713 ANCHOR/SCREW BN/BN,TIS/BN: HCPCS | Performed by: ORTHOPAEDIC SURGERY

## 2019-12-11 PROCEDURE — 6360000002 HC RX W HCPCS: Performed by: STUDENT IN AN ORGANIZED HEALTH CARE EDUCATION/TRAINING PROGRAM

## 2019-12-11 PROCEDURE — 2580000003 HC RX 258: Performed by: PHYSICIAN ASSISTANT

## 2019-12-11 PROCEDURE — 3209999900 FLUORO FOR SURGICAL PROCEDURES

## 2019-12-11 PROCEDURE — 36592 COLLECT BLOOD FROM PICC: CPT

## 2019-12-11 PROCEDURE — 2580000003 HC RX 258

## 2019-12-11 PROCEDURE — 3600000005 HC SURGERY LEVEL 5 BASE: Performed by: ORTHOPAEDIC SURGERY

## 2019-12-11 PROCEDURE — 85025 COMPLETE CBC W/AUTO DIFF WBC: CPT

## 2019-12-11 PROCEDURE — 97163 PT EVAL HIGH COMPLEX 45 MIN: CPT

## 2019-12-11 PROCEDURE — 71045 X-RAY EXAM CHEST 1 VIEW: CPT

## 2019-12-11 PROCEDURE — 24515 OPTX HUMRL SHFT FX PLATE/SCR: CPT | Performed by: ORTHOPAEDIC SURGERY

## 2019-12-11 PROCEDURE — 3700000000 HC ANESTHESIA ATTENDED CARE: Performed by: ORTHOPAEDIC SURGERY

## 2019-12-11 PROCEDURE — 97167 OT EVAL HIGH COMPLEX 60 MIN: CPT

## 2019-12-11 PROCEDURE — 2060000000 HC ICU INTERMEDIATE R&B

## 2019-12-11 PROCEDURE — 97535 SELF CARE MNGMENT TRAINING: CPT

## 2019-12-11 PROCEDURE — 2720000010 HC SURG SUPPLY STERILE: Performed by: ORTHOPAEDIC SURGERY

## 2019-12-11 PROCEDURE — 0PSG04Z REPOSITION LEFT HUMERAL SHAFT WITH INTERNAL FIXATION DEVICE, OPEN APPROACH: ICD-10-PCS | Performed by: ORTHOPAEDIC SURGERY

## 2019-12-11 DEVICE — SCREW BNE L30MM DIA4.5MM PROX CORT TIB S STL ST LOK FULL: Type: IMPLANTABLE DEVICE | Site: ARM | Status: FUNCTIONAL

## 2019-12-11 DEVICE — SCREW BNE L26MM DIA4.5MM PROX CORT TIB S STL ST LOK FULL: Type: IMPLANTABLE DEVICE | Site: ARM | Status: FUNCTIONAL

## 2019-12-11 DEVICE — PLATE BNE W13.5XL134MM THK4.2MM 7 H BILAT S STL NAR LOK: Type: IMPLANTABLE DEVICE | Site: ARM | Status: FUNCTIONAL

## 2019-12-11 DEVICE — SCREW BNE L28MM DIA4.5MM PROX CORT TIB S STL ST LOK FULL: Type: IMPLANTABLE DEVICE | Site: ARM | Status: FUNCTIONAL

## 2019-12-11 RX ORDER — ROCURONIUM BROMIDE 10 MG/ML
INJECTION, SOLUTION INTRAVENOUS PRN
Status: DISCONTINUED | OUTPATIENT
Start: 2019-12-11 | End: 2019-12-11 | Stop reason: SDUPTHER

## 2019-12-11 RX ORDER — SODIUM CHLORIDE 9 MG/ML
INJECTION, SOLUTION INTRAVENOUS CONTINUOUS PRN
Status: DISCONTINUED | OUTPATIENT
Start: 2019-12-11 | End: 2019-12-11 | Stop reason: SDUPTHER

## 2019-12-11 RX ORDER — MEPERIDINE HYDROCHLORIDE 50 MG/ML
12.5 INJECTION INTRAMUSCULAR; INTRAVENOUS; SUBCUTANEOUS EVERY 5 MIN PRN
Status: DISCONTINUED | OUTPATIENT
Start: 2019-12-11 | End: 2019-12-11 | Stop reason: HOSPADM

## 2019-12-11 RX ORDER — NEOSTIGMINE METHYLSULFATE 1 MG/ML
INJECTION, SOLUTION INTRAVENOUS PRN
Status: DISCONTINUED | OUTPATIENT
Start: 2019-12-11 | End: 2019-12-11 | Stop reason: SDUPTHER

## 2019-12-11 RX ORDER — LABETALOL HYDROCHLORIDE 5 MG/ML
5 INJECTION, SOLUTION INTRAVENOUS EVERY 10 MIN PRN
Status: DISCONTINUED | OUTPATIENT
Start: 2019-12-11 | End: 2019-12-11 | Stop reason: HOSPADM

## 2019-12-11 RX ORDER — PROMETHAZINE HYDROCHLORIDE 25 MG/ML
25 INJECTION, SOLUTION INTRAMUSCULAR; INTRAVENOUS PRN
Status: DISCONTINUED | OUTPATIENT
Start: 2019-12-11 | End: 2019-12-11 | Stop reason: HOSPADM

## 2019-12-11 RX ORDER — PROPOFOL 10 MG/ML
INJECTION, EMULSION INTRAVENOUS PRN
Status: DISCONTINUED | OUTPATIENT
Start: 2019-12-11 | End: 2019-12-11 | Stop reason: SDUPTHER

## 2019-12-11 RX ORDER — DEXAMETHASONE SODIUM PHOSPHATE 10 MG/ML
INJECTION, SOLUTION INTRAMUSCULAR; INTRAVENOUS PRN
Status: DISCONTINUED | OUTPATIENT
Start: 2019-12-11 | End: 2019-12-11 | Stop reason: SDUPTHER

## 2019-12-11 RX ORDER — MIDAZOLAM HYDROCHLORIDE 1 MG/ML
INJECTION INTRAMUSCULAR; INTRAVENOUS PRN
Status: DISCONTINUED | OUTPATIENT
Start: 2019-12-11 | End: 2019-12-11 | Stop reason: SDUPTHER

## 2019-12-11 RX ORDER — GLYCOPYRROLATE 1 MG/5 ML
SYRINGE (ML) INTRAVENOUS PRN
Status: DISCONTINUED | OUTPATIENT
Start: 2019-12-11 | End: 2019-12-11 | Stop reason: SDUPTHER

## 2019-12-11 RX ORDER — ONDANSETRON 2 MG/ML
INJECTION INTRAMUSCULAR; INTRAVENOUS PRN
Status: DISCONTINUED | OUTPATIENT
Start: 2019-12-11 | End: 2019-12-11 | Stop reason: SDUPTHER

## 2019-12-11 RX ORDER — POTASSIUM CHLORIDE 20 MEQ/1
40 TABLET, EXTENDED RELEASE ORAL
Status: DISPENSED | OUTPATIENT
Start: 2019-12-11 | End: 2019-12-12

## 2019-12-11 RX ORDER — FENTANYL CITRATE 50 UG/ML
INJECTION, SOLUTION INTRAMUSCULAR; INTRAVENOUS PRN
Status: DISCONTINUED | OUTPATIENT
Start: 2019-12-11 | End: 2019-12-11 | Stop reason: SDUPTHER

## 2019-12-11 RX ORDER — CEFAZOLIN SODIUM 1 G/3ML
INJECTION, POWDER, FOR SOLUTION INTRAMUSCULAR; INTRAVENOUS PRN
Status: DISCONTINUED | OUTPATIENT
Start: 2019-12-11 | End: 2019-12-11 | Stop reason: SDUPTHER

## 2019-12-11 RX ADMIN — FENTANYL CITRATE 100 MCG: 50 INJECTION, SOLUTION INTRAMUSCULAR; INTRAVENOUS at 20:07

## 2019-12-11 RX ADMIN — HYDROMORPHONE HYDROCHLORIDE 0.5 MG: 1 INJECTION, SOLUTION INTRAMUSCULAR; INTRAVENOUS; SUBCUTANEOUS at 22:02

## 2019-12-11 RX ADMIN — OXYCODONE HYDROCHLORIDE 15 MG: 15 TABLET ORAL at 08:21

## 2019-12-11 RX ADMIN — QUETIAPINE FUMARATE 400 MG: 200 TABLET ORAL at 23:36

## 2019-12-11 RX ADMIN — CARBAMAZEPINE 300 MG: 200 TABLET, EXTENDED RELEASE ORAL at 09:24

## 2019-12-11 RX ADMIN — FLUPHENAZINE HYDROCHLORIDE 5 MG: 5 TABLET, FILM COATED ORAL at 09:24

## 2019-12-11 RX ADMIN — OXYCODONE HYDROCHLORIDE 15 MG: 15 TABLET ORAL at 13:24

## 2019-12-11 RX ADMIN — SODIUM CHLORIDE: 9 INJECTION, SOLUTION INTRAVENOUS at 21:24

## 2019-12-11 RX ADMIN — HYDROMORPHONE HYDROCHLORIDE 1 MG: 1 INJECTION, SOLUTION INTRAMUSCULAR; INTRAVENOUS; SUBCUTANEOUS at 11:40

## 2019-12-11 RX ADMIN — BENZTROPINE MESYLATE 1 MG: 1 TABLET ORAL at 09:24

## 2019-12-11 RX ADMIN — DEXAMETHASONE SODIUM PHOSPHATE 10 MG: 10 INJECTION INTRAMUSCULAR; INTRAVENOUS at 20:55

## 2019-12-11 RX ADMIN — DOCUSATE SODIUM 100 MG: 100 CAPSULE, LIQUID FILLED ORAL at 23:45

## 2019-12-11 RX ADMIN — FLUPHENAZINE HYDROCHLORIDE 5 MG: 5 TABLET, FILM COATED ORAL at 23:37

## 2019-12-11 RX ADMIN — POTASSIUM CHLORIDE 40 MEQ: 1500 TABLET, EXTENDED RELEASE ORAL at 13:24

## 2019-12-11 RX ADMIN — Medication 3 MG: at 21:25

## 2019-12-11 RX ADMIN — CEFAZOLIN 2000 MG: 1 INJECTION, POWDER, FOR SOLUTION INTRAMUSCULAR; INTRAVENOUS; PARENTERAL at 20:33

## 2019-12-11 RX ADMIN — ACETAMINOPHEN 650 MG: 325 TABLET, FILM COATED ORAL at 11:41

## 2019-12-11 RX ADMIN — HYDROMORPHONE HYDROCHLORIDE 1 MG: 1 INJECTION, SOLUTION INTRAMUSCULAR; INTRAVENOUS; SUBCUTANEOUS at 15:29

## 2019-12-11 RX ADMIN — HYDROMORPHONE HYDROCHLORIDE 1 MG: 1 INJECTION, SOLUTION INTRAMUSCULAR; INTRAVENOUS; SUBCUTANEOUS at 05:22

## 2019-12-11 RX ADMIN — SODIUM CHLORIDE, PRESERVATIVE FREE 10 ML: 5 INJECTION INTRAVENOUS at 01:18

## 2019-12-11 RX ADMIN — ACETAMINOPHEN 650 MG: 325 TABLET, FILM COATED ORAL at 08:20

## 2019-12-11 RX ADMIN — FENTANYL CITRATE 50 MCG: 50 INJECTION, SOLUTION INTRAMUSCULAR; INTRAVENOUS at 20:12

## 2019-12-11 RX ADMIN — ONDANSETRON HYDROCHLORIDE 4 MG: 2 INJECTION, SOLUTION INTRAMUSCULAR; INTRAVENOUS at 21:24

## 2019-12-11 RX ADMIN — SODIUM CHLORIDE, PRESERVATIVE FREE 10 ML: 5 INJECTION INTRAVENOUS at 11:40

## 2019-12-11 RX ADMIN — Medication 10 ML: at 23:40

## 2019-12-11 RX ADMIN — METHOCARBAMOL 1000 MG: 100 INJECTION, SOLUTION INTRAMUSCULAR; INTRAVENOUS at 01:18

## 2019-12-11 RX ADMIN — Medication 10 ML: at 09:25

## 2019-12-11 RX ADMIN — QUETIAPINE FUMARATE 400 MG: 200 TABLET ORAL at 09:24

## 2019-12-11 RX ADMIN — HYDROMORPHONE HYDROCHLORIDE 1 MG: 1 INJECTION, SOLUTION INTRAMUSCULAR; INTRAVENOUS; SUBCUTANEOUS at 01:21

## 2019-12-11 RX ADMIN — OXYCODONE HYDROCHLORIDE 10 MG: 10 TABLET ORAL at 23:45

## 2019-12-11 RX ADMIN — METHOCARBAMOL 1000 MG: 100 INJECTION, SOLUTION INTRAMUSCULAR; INTRAVENOUS at 23:37

## 2019-12-11 RX ADMIN — SODIUM CHLORIDE, PRESERVATIVE FREE 10 ML: 5 INJECTION INTRAVENOUS at 15:29

## 2019-12-11 RX ADMIN — FENTANYL CITRATE 50 MCG: 50 INJECTION, SOLUTION INTRAMUSCULAR; INTRAVENOUS at 21:46

## 2019-12-11 RX ADMIN — POTASSIUM CHLORIDE 40 MEQ: 1500 TABLET, EXTENDED RELEASE ORAL at 10:23

## 2019-12-11 RX ADMIN — BENZTROPINE MESYLATE 1 MG: 1 TABLET ORAL at 23:36

## 2019-12-11 RX ADMIN — CARBAMAZEPINE 300 MG: 200 TABLET, EXTENDED RELEASE ORAL at 23:37

## 2019-12-11 RX ADMIN — HYDROMORPHONE HYDROCHLORIDE 1 MG: 1 INJECTION, SOLUTION INTRAMUSCULAR; INTRAVENOUS; SUBCUTANEOUS at 09:24

## 2019-12-11 RX ADMIN — ROCURONIUM BROMIDE 10 MG: 10 INJECTION, SOLUTION INTRAVENOUS at 20:13

## 2019-12-11 RX ADMIN — PROPOFOL 150 MG: 10 INJECTION, EMULSION INTRAVENOUS at 20:07

## 2019-12-11 RX ADMIN — HYDROMORPHONE HYDROCHLORIDE 0.5 MG: 1 INJECTION, SOLUTION INTRAMUSCULAR; INTRAVENOUS; SUBCUTANEOUS at 22:12

## 2019-12-11 RX ADMIN — ROCURONIUM BROMIDE 40 MG: 10 INJECTION, SOLUTION INTRAVENOUS at 20:07

## 2019-12-11 RX ADMIN — MIDAZOLAM 2 MG: 1 INJECTION INTRAMUSCULAR; INTRAVENOUS at 20:03

## 2019-12-11 RX ADMIN — METHOCARBAMOL 1000 MG: 100 INJECTION, SOLUTION INTRAMUSCULAR; INTRAVENOUS at 05:23

## 2019-12-11 RX ADMIN — FLUPHENAZINE HYDROCHLORIDE 5 MG: 5 TABLET, FILM COATED ORAL at 13:24

## 2019-12-11 RX ADMIN — SODIUM CHLORIDE: 9 INJECTION, SOLUTION INTRAVENOUS at 20:04

## 2019-12-11 RX ADMIN — Medication 0.6 MG: at 21:25

## 2019-12-11 RX ADMIN — SODIUM CHLORIDE, PRESERVATIVE FREE 10 ML: 5 INJECTION INTRAVENOUS at 05:22

## 2019-12-11 RX ADMIN — DOCUSATE SODIUM 100 MG: 100 CAPSULE, LIQUID FILLED ORAL at 09:24

## 2019-12-11 RX ADMIN — ACETAMINOPHEN 650 MG: 325 TABLET, FILM COATED ORAL at 15:29

## 2019-12-11 ASSESSMENT — PAIN DESCRIPTION - LOCATION
LOCATION: SHOULDER
LOCATION: BACK;ARM;GENERALIZED
LOCATION: ARM
LOCATION: BACK;ARM;GENERALIZED
LOCATION: SHOULDER
LOCATION: BACK;ARM;GENERALIZED
LOCATION: BACK;ARM;GENERALIZED
LOCATION: SHOULDER
LOCATION: BACK;ARM;GENERALIZED

## 2019-12-11 ASSESSMENT — PULMONARY FUNCTION TESTS
PIF_VALUE: 27
PIF_VALUE: 3
PIF_VALUE: 27
PIF_VALUE: 28
PIF_VALUE: 27
PIF_VALUE: 26
PIF_VALUE: 27
PIF_VALUE: 3
PIF_VALUE: 27
PIF_VALUE: 27
PIF_VALUE: 4
PIF_VALUE: 0
PIF_VALUE: 1
PIF_VALUE: 27
PIF_VALUE: 0
PIF_VALUE: 27
PIF_VALUE: 27
PIF_VALUE: 2
PIF_VALUE: 27
PIF_VALUE: 28
PIF_VALUE: 27
PIF_VALUE: 28
PIF_VALUE: 27
PIF_VALUE: 26
PIF_VALUE: 27
PIF_VALUE: 0
PIF_VALUE: 0
PIF_VALUE: 25
PIF_VALUE: 25
PIF_VALUE: 3
PIF_VALUE: 27
PIF_VALUE: 24
PIF_VALUE: 27
PIF_VALUE: 0
PIF_VALUE: 27
PIF_VALUE: 28
PIF_VALUE: 27
PIF_VALUE: 2
PIF_VALUE: 2
PIF_VALUE: 3
PIF_VALUE: 23
PIF_VALUE: 27
PIF_VALUE: 2
PIF_VALUE: 27
PIF_VALUE: 26
PIF_VALUE: 28
PIF_VALUE: 26
PIF_VALUE: 24
PIF_VALUE: 27
PIF_VALUE: 0
PIF_VALUE: 24
PIF_VALUE: 27
PIF_VALUE: 1
PIF_VALUE: 3
PIF_VALUE: 6
PIF_VALUE: 27
PIF_VALUE: 3
PIF_VALUE: 27
PIF_VALUE: 1
PIF_VALUE: 27

## 2019-12-11 ASSESSMENT — PAIN DESCRIPTION - FREQUENCY
FREQUENCY: CONTINUOUS

## 2019-12-11 ASSESSMENT — PAIN DESCRIPTION - DESCRIPTORS
DESCRIPTORS: ACHING;DISCOMFORT
DESCRIPTORS: ACHING;CONSTANT;DISCOMFORT
DESCRIPTORS: ACHING;DISCOMFORT
DESCRIPTORS: ACHING;DISCOMFORT
DESCRIPTORS: ACHING;CONSTANT;DISCOMFORT
DESCRIPTORS: ACHING;DISCOMFORT
DESCRIPTORS: ACHING;CONSTANT;DISCOMFORT

## 2019-12-11 ASSESSMENT — PAIN SCALES - GENERAL
PAINLEVEL_OUTOF10: 10
PAINLEVEL_OUTOF10: 9
PAINLEVEL_OUTOF10: 10
PAINLEVEL_OUTOF10: 7
PAINLEVEL_OUTOF10: 10
PAINLEVEL_OUTOF10: 0
PAINLEVEL_OUTOF10: 8
PAINLEVEL_OUTOF10: 7
PAINLEVEL_OUTOF10: 8
PAINLEVEL_OUTOF10: 10
PAINLEVEL_OUTOF10: 2
PAINLEVEL_OUTOF10: 8
PAINLEVEL_OUTOF10: 2
PAINLEVEL_OUTOF10: 8

## 2019-12-11 ASSESSMENT — PAIN DESCRIPTION - ONSET
ONSET: ON-GOING

## 2019-12-11 ASSESSMENT — PAIN DESCRIPTION - ORIENTATION
ORIENTATION: LEFT

## 2019-12-11 ASSESSMENT — PAIN DESCRIPTION - PAIN TYPE
TYPE: SURGICAL PAIN
TYPE: ACUTE PAIN;SURGICAL PAIN
TYPE: SURGICAL PAIN
TYPE: ACUTE PAIN;SURGICAL PAIN

## 2019-12-12 ENCOUNTER — APPOINTMENT (OUTPATIENT)
Dept: GENERAL RADIOLOGY | Age: 24
DRG: 912 | End: 2019-12-12
Payer: MEDICAID

## 2019-12-12 PROBLEM — S42.302A CLOSED FRACTURE OF SHAFT OF LEFT HUMERUS: Status: ACTIVE | Noted: 2019-12-12

## 2019-12-12 PROBLEM — S52.512A DISPLACED FRACTURE OF LEFT RADIAL STYLOID PROCESS, INITIAL ENCOUNTER FOR CLOSED FRACTURE: Status: ACTIVE | Noted: 2019-12-12

## 2019-12-12 PROBLEM — S62.002B: Status: ACTIVE | Noted: 2019-12-12

## 2019-12-12 LAB
ALBUMIN SERPL-MCNC: 2.3 G/DL (ref 3.5–5.2)
ALP BLD-CCNC: 187 U/L (ref 40–129)
ALT SERPL-CCNC: 57 U/L (ref 0–40)
ANION GAP SERPL CALCULATED.3IONS-SCNC: 14 MMOL/L (ref 7–16)
AST SERPL-CCNC: 80 U/L (ref 0–39)
BASOPHILS ABSOLUTE: 0.02 E9/L (ref 0–0.2)
BASOPHILS RELATIVE PERCENT: 0.2 % (ref 0–2)
BILIRUB SERPL-MCNC: 0.7 MG/DL (ref 0–1.2)
BUN BLDV-MCNC: 13 MG/DL (ref 6–20)
CALCIUM SERPL-MCNC: 8.3 MG/DL (ref 8.6–10.2)
CHLORIDE BLD-SCNC: 100 MMOL/L (ref 98–107)
CO2: 22 MMOL/L (ref 22–29)
CREAT SERPL-MCNC: 0.6 MG/DL (ref 0.7–1.2)
EOSINOPHILS ABSOLUTE: 0.03 E9/L (ref 0.05–0.5)
EOSINOPHILS RELATIVE PERCENT: 0.3 % (ref 0–6)
GFR AFRICAN AMERICAN: >60
GFR NON-AFRICAN AMERICAN: >60 ML/MIN/1.73
GLUCOSE BLD-MCNC: 146 MG/DL (ref 74–99)
HCT VFR BLD CALC: 30.8 % (ref 37–54)
HEMOGLOBIN: 9.8 G/DL (ref 12.5–16.5)
IMMATURE GRANULOCYTES #: 0.31 E9/L
IMMATURE GRANULOCYTES %: 3.1 % (ref 0–5)
LYMPHOCYTES ABSOLUTE: 0.79 E9/L (ref 1.5–4)
LYMPHOCYTES RELATIVE PERCENT: 7.8 % (ref 20–42)
MCH RBC QN AUTO: 29.3 PG (ref 26–35)
MCHC RBC AUTO-ENTMCNC: 31.8 % (ref 32–34.5)
MCV RBC AUTO: 92.2 FL (ref 80–99.9)
MONOCYTES ABSOLUTE: 0.78 E9/L (ref 0.1–0.95)
MONOCYTES RELATIVE PERCENT: 7.7 % (ref 2–12)
NEUTROPHILS ABSOLUTE: 8.22 E9/L (ref 1.8–7.3)
NEUTROPHILS RELATIVE PERCENT: 80.9 % (ref 43–80)
PDW BLD-RTO: 13.4 FL (ref 11.5–15)
PLATELET # BLD: 305 E9/L (ref 130–450)
PMV BLD AUTO: 10.1 FL (ref 7–12)
POTASSIUM SERPL-SCNC: 4.5 MMOL/L (ref 3.5–5)
RBC # BLD: 3.34 E12/L (ref 3.8–5.8)
SODIUM BLD-SCNC: 136 MMOL/L (ref 132–146)
TOTAL PROTEIN: 5.9 G/DL (ref 6.4–8.3)
WBC # BLD: 10.2 E9/L (ref 4.5–11.5)

## 2019-12-12 PROCEDURE — 97112 NEUROMUSCULAR REEDUCATION: CPT

## 2019-12-12 PROCEDURE — 97535 SELF CARE MNGMENT TRAINING: CPT

## 2019-12-12 PROCEDURE — 6370000000 HC RX 637 (ALT 250 FOR IP): Performed by: ORTHOPAEDIC SURGERY

## 2019-12-12 PROCEDURE — 2580000003 HC RX 258: Performed by: ORTHOPAEDIC SURGERY

## 2019-12-12 PROCEDURE — 36415 COLL VENOUS BLD VENIPUNCTURE: CPT

## 2019-12-12 PROCEDURE — 6360000002 HC RX W HCPCS: Performed by: STUDENT IN AN ORGANIZED HEALTH CARE EDUCATION/TRAINING PROGRAM

## 2019-12-12 PROCEDURE — 97110 THERAPEUTIC EXERCISES: CPT

## 2019-12-12 PROCEDURE — APPSS60 APP SPLIT SHARED TIME 46-60 MINUTES: Performed by: NURSE PRACTITIONER

## 2019-12-12 PROCEDURE — 2700000000 HC OXYGEN THERAPY PER DAY

## 2019-12-12 PROCEDURE — 99232 SBSQ HOSP IP/OBS MODERATE 35: CPT | Performed by: SURGERY

## 2019-12-12 PROCEDURE — 71045 X-RAY EXAM CHEST 1 VIEW: CPT

## 2019-12-12 PROCEDURE — 85025 COMPLETE CBC W/AUTO DIFF WBC: CPT

## 2019-12-12 PROCEDURE — 2060000000 HC ICU INTERMEDIATE R&B

## 2019-12-12 PROCEDURE — 2580000003 HC RX 258: Performed by: STUDENT IN AN ORGANIZED HEALTH CARE EDUCATION/TRAINING PROGRAM

## 2019-12-12 PROCEDURE — 6360000002 HC RX W HCPCS: Performed by: ORTHOPAEDIC SURGERY

## 2019-12-12 PROCEDURE — 80053 COMPREHEN METABOLIC PANEL: CPT

## 2019-12-12 PROCEDURE — 6370000000 HC RX 637 (ALT 250 FOR IP): Performed by: NURSE PRACTITIONER

## 2019-12-12 RX ORDER — METHOCARBAMOL 750 MG/1
1500 TABLET, FILM COATED ORAL 4 TIMES DAILY
Status: DISCONTINUED | OUTPATIENT
Start: 2019-12-12 | End: 2019-12-23 | Stop reason: HOSPADM

## 2019-12-12 RX ORDER — SENNA PLUS 8.6 MG/1
1 TABLET ORAL NIGHTLY
Status: DISCONTINUED | OUTPATIENT
Start: 2019-12-12 | End: 2019-12-15

## 2019-12-12 RX ADMIN — ACETAMINOPHEN 650 MG: 325 TABLET, FILM COATED ORAL at 11:35

## 2019-12-12 RX ADMIN — HYDROMORPHONE HYDROCHLORIDE 1 MG: 1 INJECTION, SOLUTION INTRAMUSCULAR; INTRAVENOUS; SUBCUTANEOUS at 11:35

## 2019-12-12 RX ADMIN — QUETIAPINE FUMARATE 400 MG: 200 TABLET ORAL at 22:19

## 2019-12-12 RX ADMIN — FLUPHENAZINE HYDROCHLORIDE 5 MG: 5 TABLET, FILM COATED ORAL at 20:53

## 2019-12-12 RX ADMIN — SODIUM CHLORIDE, PRESERVATIVE FREE 10 ML: 5 INJECTION INTRAVENOUS at 15:49

## 2019-12-12 RX ADMIN — CARBAMAZEPINE 300 MG: 200 TABLET, EXTENDED RELEASE ORAL at 22:19

## 2019-12-12 RX ADMIN — HYDROMORPHONE HYDROCHLORIDE 1 MG: 1 INJECTION, SOLUTION INTRAMUSCULAR; INTRAVENOUS; SUBCUTANEOUS at 08:31

## 2019-12-12 RX ADMIN — SODIUM CHLORIDE, PRESERVATIVE FREE 10 ML: 5 INJECTION INTRAVENOUS at 02:04

## 2019-12-12 RX ADMIN — METHOCARBAMOL TABLETS 1500 MG: 750 TABLET, COATED ORAL at 17:09

## 2019-12-12 RX ADMIN — SODIUM CHLORIDE, PRESERVATIVE FREE 10 ML: 5 INJECTION INTRAVENOUS at 05:48

## 2019-12-12 RX ADMIN — HYDROMORPHONE HYDROCHLORIDE 1 MG: 1 INJECTION, SOLUTION INTRAMUSCULAR; INTRAVENOUS; SUBCUTANEOUS at 02:03

## 2019-12-12 RX ADMIN — DOCUSATE SODIUM 100 MG: 100 CAPSULE, LIQUID FILLED ORAL at 20:52

## 2019-12-12 RX ADMIN — ACETAMINOPHEN 650 MG: 325 TABLET, FILM COATED ORAL at 20:51

## 2019-12-12 RX ADMIN — QUETIAPINE FUMARATE 400 MG: 200 TABLET ORAL at 10:22

## 2019-12-12 RX ADMIN — SODIUM CHLORIDE, PRESERVATIVE FREE 10 ML: 5 INJECTION INTRAVENOUS at 18:27

## 2019-12-12 RX ADMIN — HYDROMORPHONE HYDROCHLORIDE 1 MG: 1 INJECTION, SOLUTION INTRAMUSCULAR; INTRAVENOUS; SUBCUTANEOUS at 13:42

## 2019-12-12 RX ADMIN — HYDROMORPHONE HYDROCHLORIDE 1 MG: 1 INJECTION, SOLUTION INTRAMUSCULAR; INTRAVENOUS; SUBCUTANEOUS at 20:51

## 2019-12-12 RX ADMIN — METHOCARBAMOL TABLETS 1500 MG: 750 TABLET, COATED ORAL at 20:52

## 2019-12-12 RX ADMIN — BENZTROPINE MESYLATE 1 MG: 1 TABLET ORAL at 10:22

## 2019-12-12 RX ADMIN — METHOCARBAMOL TABLETS 1500 MG: 750 TABLET, COATED ORAL at 13:41

## 2019-12-12 RX ADMIN — SODIUM CHLORIDE, PRESERVATIVE FREE 10 ML: 5 INJECTION INTRAVENOUS at 13:42

## 2019-12-12 RX ADMIN — CARBAMAZEPINE 300 MG: 200 TABLET, EXTENDED RELEASE ORAL at 10:22

## 2019-12-12 RX ADMIN — SODIUM CHLORIDE, PRESERVATIVE FREE 10 ML: 5 INJECTION INTRAVENOUS at 04:58

## 2019-12-12 RX ADMIN — OXYCODONE HYDROCHLORIDE 10 MG: 10 TABLET ORAL at 22:38

## 2019-12-12 RX ADMIN — SODIUM CHLORIDE, PRESERVATIVE FREE 10 ML: 5 INJECTION INTRAVENOUS at 11:35

## 2019-12-12 RX ADMIN — FLUPHENAZINE HYDROCHLORIDE 5 MG: 5 TABLET, FILM COATED ORAL at 10:22

## 2019-12-12 RX ADMIN — ACETAMINOPHEN 650 MG: 325 TABLET, FILM COATED ORAL at 15:24

## 2019-12-12 RX ADMIN — HYDROMORPHONE HYDROCHLORIDE 1 MG: 1 INJECTION, SOLUTION INTRAMUSCULAR; INTRAVENOUS; SUBCUTANEOUS at 04:58

## 2019-12-12 RX ADMIN — FLUPHENAZINE HYDROCHLORIDE 5 MG: 5 TABLET, FILM COATED ORAL at 15:24

## 2019-12-12 RX ADMIN — SENNOSIDES 8.6 MG: 8.6 TABLET, FILM COATED ORAL at 20:52

## 2019-12-12 RX ADMIN — HYDROMORPHONE HYDROCHLORIDE 1 MG: 1 INJECTION, SOLUTION INTRAMUSCULAR; INTRAVENOUS; SUBCUTANEOUS at 18:27

## 2019-12-12 RX ADMIN — BENZTROPINE MESYLATE 1 MG: 1 TABLET ORAL at 22:19

## 2019-12-12 RX ADMIN — DOCUSATE SODIUM 100 MG: 100 CAPSULE, LIQUID FILLED ORAL at 08:31

## 2019-12-12 RX ADMIN — HYDROMORPHONE HYDROCHLORIDE 1 MG: 1 INJECTION, SOLUTION INTRAMUSCULAR; INTRAVENOUS; SUBCUTANEOUS at 15:49

## 2019-12-12 RX ADMIN — OXYCODONE HYDROCHLORIDE 10 MG: 10 TABLET ORAL at 17:09

## 2019-12-12 RX ADMIN — Medication 10 ML: at 20:51

## 2019-12-12 RX ADMIN — OXYCODONE HYDROCHLORIDE 15 MG: 15 TABLET ORAL at 10:22

## 2019-12-12 RX ADMIN — METHOCARBAMOL 1000 MG: 100 INJECTION, SOLUTION INTRAMUSCULAR; INTRAVENOUS at 05:47

## 2019-12-12 RX ADMIN — Medication 10 ML: at 08:43

## 2019-12-12 RX ADMIN — PANTOPRAZOLE SODIUM 40 MG: 40 TABLET, DELAYED RELEASE ORAL at 05:48

## 2019-12-12 RX ADMIN — ACETAMINOPHEN 650 MG: 325 TABLET, FILM COATED ORAL at 08:30

## 2019-12-12 ASSESSMENT — PAIN DESCRIPTION - ONSET
ONSET: ON-GOING

## 2019-12-12 ASSESSMENT — PAIN DESCRIPTION - DESCRIPTORS
DESCRIPTORS: ACHING;DISCOMFORT;SORE

## 2019-12-12 ASSESSMENT — PAIN SCALES - GENERAL
PAINLEVEL_OUTOF10: 10
PAINLEVEL_OUTOF10: 8
PAINLEVEL_OUTOF10: 10
PAINLEVEL_OUTOF10: 8
PAINLEVEL_OUTOF10: 10
PAINLEVEL_OUTOF10: 8
PAINLEVEL_OUTOF10: 10
PAINLEVEL_OUTOF10: 8
PAINLEVEL_OUTOF10: 10

## 2019-12-12 ASSESSMENT — PAIN DESCRIPTION - FREQUENCY
FREQUENCY: CONTINUOUS

## 2019-12-12 ASSESSMENT — PAIN DESCRIPTION - PAIN TYPE
TYPE: SURGICAL PAIN

## 2019-12-12 ASSESSMENT — PAIN DESCRIPTION - PROGRESSION
CLINICAL_PROGRESSION: NOT CHANGED

## 2019-12-12 ASSESSMENT — PAIN DESCRIPTION - LOCATION: LOCATION: GENERALIZED

## 2019-12-13 ENCOUNTER — APPOINTMENT (OUTPATIENT)
Dept: GENERAL RADIOLOGY | Age: 24
DRG: 912 | End: 2019-12-13
Payer: MEDICAID

## 2019-12-13 LAB
ALBUMIN SERPL-MCNC: 2.4 G/DL (ref 3.5–5.2)
ALP BLD-CCNC: 134 U/L (ref 40–129)
ALT SERPL-CCNC: 34 U/L (ref 0–40)
ANION GAP SERPL CALCULATED.3IONS-SCNC: 12 MMOL/L (ref 7–16)
AST SERPL-CCNC: 31 U/L (ref 0–39)
BASOPHILS ABSOLUTE: 0.02 E9/L (ref 0–0.2)
BASOPHILS RELATIVE PERCENT: 0.2 % (ref 0–2)
BILIRUB SERPL-MCNC: 0.7 MG/DL (ref 0–1.2)
BUN BLDV-MCNC: 12 MG/DL (ref 6–20)
CALCIUM SERPL-MCNC: 8.2 MG/DL (ref 8.6–10.2)
CHLORIDE BLD-SCNC: 102 MMOL/L (ref 98–107)
CO2: 26 MMOL/L (ref 22–29)
CREAT SERPL-MCNC: 0.6 MG/DL (ref 0.7–1.2)
EOSINOPHILS ABSOLUTE: 0.34 E9/L (ref 0.05–0.5)
EOSINOPHILS RELATIVE PERCENT: 2.9 % (ref 0–6)
GFR AFRICAN AMERICAN: >60
GFR NON-AFRICAN AMERICAN: >60 ML/MIN/1.73
GLUCOSE BLD-MCNC: 134 MG/DL (ref 74–99)
HCT VFR BLD CALC: 29.5 % (ref 37–54)
HEMOGLOBIN: 9.4 G/DL (ref 12.5–16.5)
IMMATURE GRANULOCYTES #: 0.33 E9/L
IMMATURE GRANULOCYTES %: 2.8 % (ref 0–5)
LYMPHOCYTES ABSOLUTE: 1.54 E9/L (ref 1.5–4)
LYMPHOCYTES RELATIVE PERCENT: 13 % (ref 20–42)
MCH RBC QN AUTO: 29.7 PG (ref 26–35)
MCHC RBC AUTO-ENTMCNC: 31.9 % (ref 32–34.5)
MCV RBC AUTO: 93.4 FL (ref 80–99.9)
MONOCYTES ABSOLUTE: 0.88 E9/L (ref 0.1–0.95)
MONOCYTES RELATIVE PERCENT: 7.5 % (ref 2–12)
NEUTROPHILS ABSOLUTE: 8.7 E9/L (ref 1.8–7.3)
NEUTROPHILS RELATIVE PERCENT: 73.6 % (ref 43–80)
PDW BLD-RTO: 13.8 FL (ref 11.5–15)
PLATELET # BLD: 352 E9/L (ref 130–450)
PMV BLD AUTO: 9.7 FL (ref 7–12)
POTASSIUM SERPL-SCNC: 3.7 MMOL/L (ref 3.5–5)
RBC # BLD: 3.16 E12/L (ref 3.8–5.8)
SODIUM BLD-SCNC: 140 MMOL/L (ref 132–146)
TOTAL PROTEIN: 5.6 G/DL (ref 6.4–8.3)
WBC # BLD: 11.8 E9/L (ref 4.5–11.5)

## 2019-12-13 PROCEDURE — 71045 X-RAY EXAM CHEST 1 VIEW: CPT

## 2019-12-13 PROCEDURE — 2700000000 HC OXYGEN THERAPY PER DAY

## 2019-12-13 PROCEDURE — 6370000000 HC RX 637 (ALT 250 FOR IP): Performed by: ORTHOPAEDIC SURGERY

## 2019-12-13 PROCEDURE — 6360000002 HC RX W HCPCS: Performed by: ORTHOPAEDIC SURGERY

## 2019-12-13 PROCEDURE — 6370000000 HC RX 637 (ALT 250 FOR IP): Performed by: NURSE PRACTITIONER

## 2019-12-13 PROCEDURE — 80053 COMPREHEN METABOLIC PANEL: CPT

## 2019-12-13 PROCEDURE — 97530 THERAPEUTIC ACTIVITIES: CPT

## 2019-12-13 PROCEDURE — 99232 SBSQ HOSP IP/OBS MODERATE 35: CPT | Performed by: SURGERY

## 2019-12-13 PROCEDURE — 2060000000 HC ICU INTERMEDIATE R&B

## 2019-12-13 PROCEDURE — 6360000002 HC RX W HCPCS: Performed by: STUDENT IN AN ORGANIZED HEALTH CARE EDUCATION/TRAINING PROGRAM

## 2019-12-13 PROCEDURE — 85025 COMPLETE CBC W/AUTO DIFF WBC: CPT

## 2019-12-13 PROCEDURE — 2580000003 HC RX 258: Performed by: ORTHOPAEDIC SURGERY

## 2019-12-13 RX ADMIN — CARBAMAZEPINE 300 MG: 200 TABLET, EXTENDED RELEASE ORAL at 09:29

## 2019-12-13 RX ADMIN — ACETAMINOPHEN 650 MG: 325 TABLET, FILM COATED ORAL at 04:15

## 2019-12-13 RX ADMIN — METHOCARBAMOL TABLETS 1500 MG: 750 TABLET, COATED ORAL at 17:28

## 2019-12-13 RX ADMIN — PANTOPRAZOLE SODIUM 40 MG: 40 TABLET, DELAYED RELEASE ORAL at 09:29

## 2019-12-13 RX ADMIN — OXYCODONE HYDROCHLORIDE 15 MG: 15 TABLET ORAL at 22:34

## 2019-12-13 RX ADMIN — FLUPHENAZINE HYDROCHLORIDE 5 MG: 5 TABLET, FILM COATED ORAL at 14:25

## 2019-12-13 RX ADMIN — ACETAMINOPHEN 650 MG: 325 TABLET, FILM COATED ORAL at 17:28

## 2019-12-13 RX ADMIN — METHOCARBAMOL TABLETS 1500 MG: 750 TABLET, COATED ORAL at 09:29

## 2019-12-13 RX ADMIN — SENNOSIDES 8.6 MG: 8.6 TABLET, FILM COATED ORAL at 20:15

## 2019-12-13 RX ADMIN — ACETAMINOPHEN 650 MG: 325 TABLET, FILM COATED ORAL at 12:05

## 2019-12-13 RX ADMIN — FLUPHENAZINE HYDROCHLORIDE 5 MG: 5 TABLET, FILM COATED ORAL at 20:14

## 2019-12-13 RX ADMIN — SODIUM CHLORIDE, PRESERVATIVE FREE 10 ML: 5 INJECTION INTRAVENOUS at 09:30

## 2019-12-13 RX ADMIN — FLUPHENAZINE HYDROCHLORIDE 5 MG: 5 TABLET, FILM COATED ORAL at 09:29

## 2019-12-13 RX ADMIN — DOCUSATE SODIUM 100 MG: 100 CAPSULE, LIQUID FILLED ORAL at 09:30

## 2019-12-13 RX ADMIN — CARBAMAZEPINE 300 MG: 200 TABLET, EXTENDED RELEASE ORAL at 20:14

## 2019-12-13 RX ADMIN — BISACODYL 10 MG: 10 SUPPOSITORY RECTAL at 09:30

## 2019-12-13 RX ADMIN — QUETIAPINE FUMARATE 400 MG: 200 TABLET ORAL at 20:14

## 2019-12-13 RX ADMIN — ENOXAPARIN SODIUM 30 MG: 30 INJECTION SUBCUTANEOUS at 09:30

## 2019-12-13 RX ADMIN — HYDROMORPHONE HYDROCHLORIDE 1 MG: 1 INJECTION, SOLUTION INTRAMUSCULAR; INTRAVENOUS; SUBCUTANEOUS at 08:04

## 2019-12-13 RX ADMIN — QUETIAPINE FUMARATE 400 MG: 200 TABLET ORAL at 09:29

## 2019-12-13 RX ADMIN — OXYCODONE HYDROCHLORIDE 15 MG: 15 TABLET ORAL at 18:26

## 2019-12-13 RX ADMIN — METHOCARBAMOL TABLETS 1500 MG: 750 TABLET, COATED ORAL at 14:25

## 2019-12-13 RX ADMIN — DOCUSATE SODIUM 100 MG: 100 CAPSULE, LIQUID FILLED ORAL at 20:15

## 2019-12-13 RX ADMIN — HYDROMORPHONE HYDROCHLORIDE 1 MG: 1 INJECTION, SOLUTION INTRAMUSCULAR; INTRAVENOUS; SUBCUTANEOUS at 00:45

## 2019-12-13 RX ADMIN — OXYCODONE HYDROCHLORIDE 15 MG: 15 TABLET ORAL at 14:25

## 2019-12-13 RX ADMIN — BENZTROPINE MESYLATE 1 MG: 1 TABLET ORAL at 20:14

## 2019-12-13 RX ADMIN — OXYCODONE HYDROCHLORIDE 15 MG: 15 TABLET ORAL at 09:50

## 2019-12-13 RX ADMIN — BENZTROPINE MESYLATE 1 MG: 1 TABLET ORAL at 09:30

## 2019-12-13 RX ADMIN — OXYCODONE HYDROCHLORIDE 10 MG: 10 TABLET ORAL at 04:15

## 2019-12-13 RX ADMIN — ENOXAPARIN SODIUM 30 MG: 30 INJECTION SUBCUTANEOUS at 20:13

## 2019-12-13 RX ADMIN — SODIUM CHLORIDE, PRESERVATIVE FREE 10 ML: 5 INJECTION INTRAVENOUS at 00:45

## 2019-12-13 RX ADMIN — CYCLOBENZAPRINE 10 MG: 10 TABLET, FILM COATED ORAL at 20:14

## 2019-12-13 RX ADMIN — METHOCARBAMOL TABLETS 1500 MG: 750 TABLET, COATED ORAL at 20:14

## 2019-12-13 RX ADMIN — Medication 10 ML: at 08:04

## 2019-12-13 RX ADMIN — ACETAMINOPHEN 650 MG: 325 TABLET, FILM COATED ORAL at 20:14

## 2019-12-13 RX ADMIN — Medication 10 ML: at 20:15

## 2019-12-13 ASSESSMENT — PAIN SCALES - GENERAL
PAINLEVEL_OUTOF10: 10
PAINLEVEL_OUTOF10: 10
PAINLEVEL_OUTOF10: 5
PAINLEVEL_OUTOF10: 8
PAINLEVEL_OUTOF10: 10
PAINLEVEL_OUTOF10: 8
PAINLEVEL_OUTOF10: 9
PAINLEVEL_OUTOF10: 0
PAINLEVEL_OUTOF10: 10
PAINLEVEL_OUTOF10: 10

## 2019-12-14 ENCOUNTER — APPOINTMENT (OUTPATIENT)
Dept: ULTRASOUND IMAGING | Age: 24
DRG: 912 | End: 2019-12-14
Payer: MEDICAID

## 2019-12-14 ENCOUNTER — APPOINTMENT (OUTPATIENT)
Dept: GENERAL RADIOLOGY | Age: 24
DRG: 912 | End: 2019-12-14
Payer: MEDICAID

## 2019-12-14 LAB
ALBUMIN SERPL-MCNC: 2.7 G/DL (ref 3.5–5.2)
ALP BLD-CCNC: 131 U/L (ref 40–129)
ALT SERPL-CCNC: 28 U/L (ref 0–40)
ANION GAP SERPL CALCULATED.3IONS-SCNC: 11 MMOL/L (ref 7–16)
AST SERPL-CCNC: 30 U/L (ref 0–39)
BACTERIA: ABNORMAL /HPF
BASOPHILS ABSOLUTE: 0.04 E9/L (ref 0–0.2)
BASOPHILS RELATIVE PERCENT: 0.2 % (ref 0–2)
BILIRUB SERPL-MCNC: 0.7 MG/DL (ref 0–1.2)
BILIRUBIN URINE: NEGATIVE
BLOOD, URINE: ABNORMAL
BUN BLDV-MCNC: 12 MG/DL (ref 6–20)
CALCIUM SERPL-MCNC: 8.7 MG/DL (ref 8.6–10.2)
CHLORIDE BLD-SCNC: 98 MMOL/L (ref 98–107)
CLARITY: CLEAR
CO2: 26 MMOL/L (ref 22–29)
COLOR: YELLOW
CREAT SERPL-MCNC: 0.6 MG/DL (ref 0.7–1.2)
EOSINOPHILS ABSOLUTE: 0.32 E9/L (ref 0.05–0.5)
EOSINOPHILS RELATIVE PERCENT: 1.8 % (ref 0–6)
GFR AFRICAN AMERICAN: >60
GFR NON-AFRICAN AMERICAN: >60 ML/MIN/1.73
GLUCOSE BLD-MCNC: 110 MG/DL (ref 74–99)
GLUCOSE URINE: NEGATIVE MG/DL
HCT VFR BLD CALC: 30.8 % (ref 37–54)
HEMOGLOBIN: 10 G/DL (ref 12.5–16.5)
IMMATURE GRANULOCYTES #: 0.4 E9/L
IMMATURE GRANULOCYTES %: 2.3 % (ref 0–5)
KETONES, URINE: NEGATIVE MG/DL
LEUKOCYTE ESTERASE, URINE: NEGATIVE
LYMPHOCYTES ABSOLUTE: 1.2 E9/L (ref 1.5–4)
LYMPHOCYTES RELATIVE PERCENT: 6.8 % (ref 20–42)
MCH RBC QN AUTO: 29.9 PG (ref 26–35)
MCHC RBC AUTO-ENTMCNC: 32.5 % (ref 32–34.5)
MCV RBC AUTO: 92.2 FL (ref 80–99.9)
MONOCYTES ABSOLUTE: 1.03 E9/L (ref 0.1–0.95)
MONOCYTES RELATIVE PERCENT: 5.8 % (ref 2–12)
NEUTROPHILS ABSOLUTE: 14.67 E9/L (ref 1.8–7.3)
NEUTROPHILS RELATIVE PERCENT: 83.1 % (ref 43–80)
NITRITE, URINE: NEGATIVE
PDW BLD-RTO: 13.9 FL (ref 11.5–15)
PH UA: 7.5 (ref 5–9)
PLATELET # BLD: 479 E9/L (ref 130–450)
PMV BLD AUTO: 9.6 FL (ref 7–12)
POTASSIUM SERPL-SCNC: 3.8 MMOL/L (ref 3.5–5)
PROTEIN UA: NEGATIVE MG/DL
RBC # BLD: 3.34 E12/L (ref 3.8–5.8)
RBC UA: ABNORMAL /HPF (ref 0–2)
SODIUM BLD-SCNC: 135 MMOL/L (ref 132–146)
SPECIFIC GRAVITY UA: 1.01 (ref 1–1.03)
TOTAL PROTEIN: 6.2 G/DL (ref 6.4–8.3)
UROBILINOGEN, URINE: 0.2 E.U./DL
WBC # BLD: 17.7 E9/L (ref 4.5–11.5)
WBC UA: ABNORMAL /HPF (ref 0–5)

## 2019-12-14 PROCEDURE — 6370000000 HC RX 637 (ALT 250 FOR IP): Performed by: STUDENT IN AN ORGANIZED HEALTH CARE EDUCATION/TRAINING PROGRAM

## 2019-12-14 PROCEDURE — 2060000000 HC ICU INTERMEDIATE R&B

## 2019-12-14 PROCEDURE — 97530 THERAPEUTIC ACTIVITIES: CPT

## 2019-12-14 PROCEDURE — 6370000000 HC RX 637 (ALT 250 FOR IP): Performed by: ORTHOPAEDIC SURGERY

## 2019-12-14 PROCEDURE — 6370000000 HC RX 637 (ALT 250 FOR IP): Performed by: SURGERY

## 2019-12-14 PROCEDURE — 2580000003 HC RX 258: Performed by: ORTHOPAEDIC SURGERY

## 2019-12-14 PROCEDURE — 85025 COMPLETE CBC W/AUTO DIFF WBC: CPT

## 2019-12-14 PROCEDURE — 36415 COLL VENOUS BLD VENIPUNCTURE: CPT

## 2019-12-14 PROCEDURE — 93970 EXTREMITY STUDY: CPT

## 2019-12-14 PROCEDURE — 99232 SBSQ HOSP IP/OBS MODERATE 35: CPT | Performed by: SURGERY

## 2019-12-14 PROCEDURE — 71045 X-RAY EXAM CHEST 1 VIEW: CPT

## 2019-12-14 PROCEDURE — 80053 COMPREHEN METABOLIC PANEL: CPT

## 2019-12-14 PROCEDURE — 81001 URINALYSIS AUTO W/SCOPE: CPT

## 2019-12-14 PROCEDURE — 87088 URINE BACTERIA CULTURE: CPT

## 2019-12-14 PROCEDURE — 6370000000 HC RX 637 (ALT 250 FOR IP): Performed by: NURSE PRACTITIONER

## 2019-12-14 PROCEDURE — 97110 THERAPEUTIC EXERCISES: CPT

## 2019-12-14 PROCEDURE — 6360000002 HC RX W HCPCS: Performed by: STUDENT IN AN ORGANIZED HEALTH CARE EDUCATION/TRAINING PROGRAM

## 2019-12-14 RX ORDER — TAMSULOSIN HYDROCHLORIDE 0.4 MG/1
0.4 CAPSULE ORAL DAILY
Status: DISCONTINUED | OUTPATIENT
Start: 2019-12-14 | End: 2019-12-23 | Stop reason: HOSPADM

## 2019-12-14 RX ORDER — POLYETHYLENE GLYCOL 3350 17 G/17G
17 POWDER, FOR SOLUTION ORAL DAILY
Status: DISCONTINUED | OUTPATIENT
Start: 2019-12-14 | End: 2019-12-15

## 2019-12-14 RX ADMIN — OXYCODONE HYDROCHLORIDE 10 MG: 10 TABLET ORAL at 19:58

## 2019-12-14 RX ADMIN — BENZTROPINE MESYLATE 1 MG: 1 TABLET ORAL at 09:40

## 2019-12-14 RX ADMIN — DOCUSATE SODIUM 100 MG: 100 CAPSULE, LIQUID FILLED ORAL at 09:40

## 2019-12-14 RX ADMIN — Medication 10 ML: at 23:03

## 2019-12-14 RX ADMIN — DOCUSATE SODIUM 100 MG: 100 CAPSULE, LIQUID FILLED ORAL at 23:02

## 2019-12-14 RX ADMIN — METHOCARBAMOL TABLETS 1500 MG: 750 TABLET, COATED ORAL at 09:40

## 2019-12-14 RX ADMIN — FLUPHENAZINE HYDROCHLORIDE 5 MG: 5 TABLET, FILM COATED ORAL at 23:02

## 2019-12-14 RX ADMIN — ENOXAPARIN SODIUM 30 MG: 30 INJECTION SUBCUTANEOUS at 09:39

## 2019-12-14 RX ADMIN — PANTOPRAZOLE SODIUM 40 MG: 40 TABLET, DELAYED RELEASE ORAL at 06:47

## 2019-12-14 RX ADMIN — ENOXAPARIN SODIUM 30 MG: 30 INJECTION SUBCUTANEOUS at 23:01

## 2019-12-14 RX ADMIN — METHOCARBAMOL TABLETS 1500 MG: 750 TABLET, COATED ORAL at 14:03

## 2019-12-14 RX ADMIN — OXYCODONE HYDROCHLORIDE 15 MG: 15 TABLET ORAL at 02:45

## 2019-12-14 RX ADMIN — POLYETHYLENE GLYCOL 3350 17 G: 17 POWDER, FOR SOLUTION ORAL at 09:39

## 2019-12-14 RX ADMIN — FLUPHENAZINE HYDROCHLORIDE 5 MG: 5 TABLET, FILM COATED ORAL at 14:03

## 2019-12-14 RX ADMIN — CARBAMAZEPINE 300 MG: 200 TABLET, EXTENDED RELEASE ORAL at 23:02

## 2019-12-14 RX ADMIN — BENZTROPINE MESYLATE 1 MG: 1 TABLET ORAL at 23:01

## 2019-12-14 RX ADMIN — QUETIAPINE FUMARATE 400 MG: 200 TABLET ORAL at 23:02

## 2019-12-14 RX ADMIN — OXYCODONE HYDROCHLORIDE 10 MG: 10 TABLET ORAL at 15:13

## 2019-12-14 RX ADMIN — METHOCARBAMOL TABLETS 1500 MG: 750 TABLET, COATED ORAL at 23:01

## 2019-12-14 RX ADMIN — BISACODYL 10 MG: 10 SUPPOSITORY RECTAL at 09:40

## 2019-12-14 RX ADMIN — ACETAMINOPHEN 650 MG: 325 TABLET, FILM COATED ORAL at 00:32

## 2019-12-14 RX ADMIN — CARBAMAZEPINE 300 MG: 200 TABLET, EXTENDED RELEASE ORAL at 09:39

## 2019-12-14 RX ADMIN — OXYCODONE HYDROCHLORIDE 15 MG: 15 TABLET ORAL at 10:50

## 2019-12-14 RX ADMIN — QUETIAPINE FUMARATE 400 MG: 200 TABLET ORAL at 09:40

## 2019-12-14 RX ADMIN — Medication 10 ML: at 09:39

## 2019-12-14 RX ADMIN — ACETAMINOPHEN 650 MG: 325 TABLET, FILM COATED ORAL at 06:47

## 2019-12-14 RX ADMIN — ACETAMINOPHEN 650 MG: 325 TABLET, FILM COATED ORAL at 23:00

## 2019-12-14 RX ADMIN — TAMSULOSIN HYDROCHLORIDE 0.4 MG: 0.4 CAPSULE ORAL at 23:18

## 2019-12-14 RX ADMIN — METHOCARBAMOL TABLETS 1500 MG: 750 TABLET, COATED ORAL at 17:17

## 2019-12-14 RX ADMIN — FLUPHENAZINE HYDROCHLORIDE 5 MG: 5 TABLET, FILM COATED ORAL at 09:40

## 2019-12-14 RX ADMIN — SENNOSIDES 8.6 MG: 8.6 TABLET, FILM COATED ORAL at 23:02

## 2019-12-14 ASSESSMENT — PAIN DESCRIPTION - PAIN TYPE: TYPE: ACUTE PAIN;SURGICAL PAIN

## 2019-12-14 ASSESSMENT — PAIN SCALES - GENERAL
PAINLEVEL_OUTOF10: 8
PAINLEVEL_OUTOF10: 10
PAINLEVEL_OUTOF10: 5

## 2019-12-14 ASSESSMENT — PAIN DESCRIPTION - ONSET: ONSET: ON-GOING

## 2019-12-14 ASSESSMENT — PAIN DESCRIPTION - LOCATION: LOCATION: ARM;BACK

## 2019-12-14 ASSESSMENT — PAIN DESCRIPTION - DESCRIPTORS: DESCRIPTORS: ACHING;DISCOMFORT;NAGGING

## 2019-12-15 LAB
ALBUMIN SERPL-MCNC: 2.9 G/DL (ref 3.5–5.2)
ALP BLD-CCNC: 139 U/L (ref 40–129)
ALT SERPL-CCNC: 24 U/L (ref 0–40)
ANION GAP SERPL CALCULATED.3IONS-SCNC: 14 MMOL/L (ref 7–16)
AST SERPL-CCNC: 29 U/L (ref 0–39)
BASOPHILS ABSOLUTE: 0 E9/L (ref 0–0.2)
BASOPHILS RELATIVE PERCENT: 0.3 % (ref 0–2)
BILIRUB SERPL-MCNC: 1.1 MG/DL (ref 0–1.2)
BUN BLDV-MCNC: 18 MG/DL (ref 6–20)
CALCIUM SERPL-MCNC: 9 MG/DL (ref 8.6–10.2)
CHLORIDE BLD-SCNC: 94 MMOL/L (ref 98–107)
CO2: 25 MMOL/L (ref 22–29)
CREAT SERPL-MCNC: 0.7 MG/DL (ref 0.7–1.2)
EOSINOPHILS ABSOLUTE: 0 E9/L (ref 0.05–0.5)
EOSINOPHILS RELATIVE PERCENT: 1 % (ref 0–6)
GFR AFRICAN AMERICAN: >60
GFR NON-AFRICAN AMERICAN: >60 ML/MIN/1.73
GLUCOSE BLD-MCNC: 112 MG/DL (ref 74–99)
HCT VFR BLD CALC: 30.7 % (ref 37–54)
HEMOGLOBIN: 10.1 G/DL (ref 12.5–16.5)
LYMPHOCYTES ABSOLUTE: 0.91 E9/L (ref 1.5–4)
LYMPHOCYTES RELATIVE PERCENT: 4.3 % (ref 20–42)
MCH RBC QN AUTO: 30.4 PG (ref 26–35)
MCHC RBC AUTO-ENTMCNC: 32.9 % (ref 32–34.5)
MCV RBC AUTO: 92.5 FL (ref 80–99.9)
MONOCYTES ABSOLUTE: 1.37 E9/L (ref 0.1–0.95)
MONOCYTES RELATIVE PERCENT: 6.1 % (ref 2–12)
NEUTROPHILS ABSOLUTE: 20.52 E9/L (ref 1.8–7.3)
NEUTROPHILS RELATIVE PERCENT: 89.6 % (ref 43–80)
OVALOCYTES: ABNORMAL
PDW BLD-RTO: 13.9 FL (ref 11.5–15)
PLATELET # BLD: 562 E9/L (ref 130–450)
PMV BLD AUTO: 9.6 FL (ref 7–12)
POIKILOCYTES: ABNORMAL
POLYCHROMASIA: ABNORMAL
POTASSIUM SERPL-SCNC: 4 MMOL/L (ref 3.5–5)
RBC # BLD: 3.32 E12/L (ref 3.8–5.8)
SODIUM BLD-SCNC: 133 MMOL/L (ref 132–146)
TOTAL PROTEIN: 6.6 G/DL (ref 6.4–8.3)
WBC # BLD: 22.8 E9/L (ref 4.5–11.5)

## 2019-12-15 PROCEDURE — 99232 SBSQ HOSP IP/OBS MODERATE 35: CPT | Performed by: SURGERY

## 2019-12-15 PROCEDURE — 6370000000 HC RX 637 (ALT 250 FOR IP): Performed by: SURGERY

## 2019-12-15 PROCEDURE — 6370000000 HC RX 637 (ALT 250 FOR IP): Performed by: STUDENT IN AN ORGANIZED HEALTH CARE EDUCATION/TRAINING PROGRAM

## 2019-12-15 PROCEDURE — 6370000000 HC RX 637 (ALT 250 FOR IP): Performed by: ORTHOPAEDIC SURGERY

## 2019-12-15 PROCEDURE — 2580000003 HC RX 258: Performed by: ORTHOPAEDIC SURGERY

## 2019-12-15 PROCEDURE — 85025 COMPLETE CBC W/AUTO DIFF WBC: CPT

## 2019-12-15 PROCEDURE — 87150 DNA/RNA AMPLIFIED PROBE: CPT

## 2019-12-15 PROCEDURE — 2060000000 HC ICU INTERMEDIATE R&B

## 2019-12-15 PROCEDURE — 80053 COMPREHEN METABOLIC PANEL: CPT

## 2019-12-15 PROCEDURE — 6370000000 HC RX 637 (ALT 250 FOR IP): Performed by: NURSE PRACTITIONER

## 2019-12-15 PROCEDURE — 36415 COLL VENOUS BLD VENIPUNCTURE: CPT

## 2019-12-15 PROCEDURE — 87040 BLOOD CULTURE FOR BACTERIA: CPT

## 2019-12-15 RX ORDER — SENNA PLUS 8.6 MG/1
2 TABLET ORAL NIGHTLY
Status: DISCONTINUED | OUTPATIENT
Start: 2019-12-15 | End: 2019-12-23 | Stop reason: HOSPADM

## 2019-12-15 RX ORDER — POLYETHYLENE GLYCOL 3350 17 G/17G
17 POWDER, FOR SOLUTION ORAL 2 TIMES DAILY
Status: DISCONTINUED | OUTPATIENT
Start: 2019-12-15 | End: 2019-12-23 | Stop reason: HOSPADM

## 2019-12-15 RX ORDER — METOPROLOL TARTRATE 5 MG/5ML
5 INJECTION INTRAVENOUS EVERY 6 HOURS PRN
Status: DISCONTINUED | OUTPATIENT
Start: 2019-12-15 | End: 2019-12-23 | Stop reason: HOSPADM

## 2019-12-15 RX ORDER — DOCUSATE SODIUM 100 MG/1
200 CAPSULE, LIQUID FILLED ORAL 2 TIMES DAILY
Status: DISCONTINUED | OUTPATIENT
Start: 2019-12-15 | End: 2019-12-23 | Stop reason: HOSPADM

## 2019-12-15 RX ADMIN — PANTOPRAZOLE SODIUM 40 MG: 40 TABLET, DELAYED RELEASE ORAL at 07:06

## 2019-12-15 RX ADMIN — OXYCODONE HYDROCHLORIDE 15 MG: 15 TABLET ORAL at 07:08

## 2019-12-15 RX ADMIN — QUETIAPINE FUMARATE 400 MG: 200 TABLET ORAL at 10:13

## 2019-12-15 RX ADMIN — CARBAMAZEPINE 300 MG: 200 TABLET, EXTENDED RELEASE ORAL at 21:49

## 2019-12-15 RX ADMIN — FLUPHENAZINE HYDROCHLORIDE 5 MG: 5 TABLET, FILM COATED ORAL at 13:41

## 2019-12-15 RX ADMIN — Medication 10 ML: at 10:12

## 2019-12-15 RX ADMIN — POLYETHYLENE GLYCOL 3350 17 G: 17 POWDER, FOR SOLUTION ORAL at 21:46

## 2019-12-15 RX ADMIN — METHOCARBAMOL TABLETS 1500 MG: 750 TABLET, COATED ORAL at 17:43

## 2019-12-15 RX ADMIN — FLUPHENAZINE HYDROCHLORIDE 5 MG: 5 TABLET, FILM COATED ORAL at 21:55

## 2019-12-15 RX ADMIN — BISACODYL 10 MG: 10 SUPPOSITORY RECTAL at 10:13

## 2019-12-15 RX ADMIN — ACETAMINOPHEN 650 MG: 325 TABLET, FILM COATED ORAL at 03:58

## 2019-12-15 RX ADMIN — Medication 10 ML: at 22:00

## 2019-12-15 RX ADMIN — OXYCODONE HYDROCHLORIDE 15 MG: 15 TABLET ORAL at 12:03

## 2019-12-15 RX ADMIN — CARBAMAZEPINE 300 MG: 200 TABLET, EXTENDED RELEASE ORAL at 10:14

## 2019-12-15 RX ADMIN — DOCUSATE SODIUM 100 MG: 100 CAPSULE, LIQUID FILLED ORAL at 10:14

## 2019-12-15 RX ADMIN — ACETAMINOPHEN 650 MG: 325 TABLET, FILM COATED ORAL at 12:03

## 2019-12-15 RX ADMIN — METHOCARBAMOL TABLETS 1500 MG: 750 TABLET, COATED ORAL at 10:13

## 2019-12-15 RX ADMIN — FLUPHENAZINE HYDROCHLORIDE 5 MG: 5 TABLET, FILM COATED ORAL at 10:14

## 2019-12-15 RX ADMIN — METHOCARBAMOL TABLETS 1500 MG: 750 TABLET, COATED ORAL at 13:41

## 2019-12-15 RX ADMIN — ACETAMINOPHEN 650 MG: 325 TABLET, FILM COATED ORAL at 21:47

## 2019-12-15 RX ADMIN — ACETAMINOPHEN 650 MG: 325 TABLET, FILM COATED ORAL at 07:06

## 2019-12-15 RX ADMIN — BENZTROPINE MESYLATE 1 MG: 1 TABLET ORAL at 21:55

## 2019-12-15 RX ADMIN — OXYCODONE HYDROCHLORIDE 10 MG: 10 TABLET ORAL at 17:43

## 2019-12-15 RX ADMIN — SENNOSIDES 17.2 MG: 8.6 TABLET, FILM COATED ORAL at 21:47

## 2019-12-15 RX ADMIN — METHOCARBAMOL TABLETS 1500 MG: 750 TABLET, COATED ORAL at 21:49

## 2019-12-15 RX ADMIN — OXYCODONE HYDROCHLORIDE 10 MG: 10 TABLET ORAL at 00:40

## 2019-12-15 RX ADMIN — POLYETHYLENE GLYCOL 3350 17 G: 17 POWDER, FOR SOLUTION ORAL at 10:13

## 2019-12-15 RX ADMIN — BENZTROPINE MESYLATE 1 MG: 1 TABLET ORAL at 10:14

## 2019-12-15 RX ADMIN — OXYCODONE HYDROCHLORIDE 10 MG: 10 TABLET ORAL at 21:46

## 2019-12-15 RX ADMIN — TAMSULOSIN HYDROCHLORIDE 0.4 MG: 0.4 CAPSULE ORAL at 10:14

## 2019-12-15 RX ADMIN — QUETIAPINE FUMARATE 400 MG: 200 TABLET ORAL at 21:50

## 2019-12-15 RX ADMIN — DOCUSATE SODIUM 200 MG: 100 CAPSULE, LIQUID FILLED ORAL at 21:46

## 2019-12-15 ASSESSMENT — PAIN SCALES - GENERAL
PAINLEVEL_OUTOF10: 4
PAINLEVEL_OUTOF10: 10
PAINLEVEL_OUTOF10: 8
PAINLEVEL_OUTOF10: 6
PAINLEVEL_OUTOF10: 10
PAINLEVEL_OUTOF10: 7

## 2019-12-15 ASSESSMENT — PAIN DESCRIPTION - LOCATION
LOCATION: ARM;BACK
LOCATION: ARM;BACK

## 2019-12-15 ASSESSMENT — PAIN DESCRIPTION - FREQUENCY: FREQUENCY: CONTINUOUS

## 2019-12-15 ASSESSMENT — PAIN DESCRIPTION - ONSET
ONSET: ON-GOING
ONSET: ON-GOING

## 2019-12-15 ASSESSMENT — PAIN DESCRIPTION - DESCRIPTORS
DESCRIPTORS: ACHING;DISCOMFORT;NAGGING
DESCRIPTORS: ACHING;DISCOMFORT;NAGGING

## 2019-12-15 ASSESSMENT — PAIN DESCRIPTION - PAIN TYPE
TYPE: ACUTE PAIN;SURGICAL PAIN
TYPE: ACUTE PAIN;SURGICAL PAIN

## 2019-12-16 ENCOUNTER — APPOINTMENT (OUTPATIENT)
Dept: GENERAL RADIOLOGY | Age: 24
DRG: 912 | End: 2019-12-16
Payer: MEDICAID

## 2019-12-16 LAB
ALBUMIN SERPL-MCNC: 2.9 G/DL (ref 3.5–5.2)
ALP BLD-CCNC: 125 U/L (ref 40–129)
ALT SERPL-CCNC: 21 U/L (ref 0–40)
ANION GAP SERPL CALCULATED.3IONS-SCNC: 12 MMOL/L (ref 7–16)
AST SERPL-CCNC: 23 U/L (ref 0–39)
BASOPHILS ABSOLUTE: 0 E9/L (ref 0–0.2)
BASOPHILS RELATIVE PERCENT: 0 % (ref 0–2)
BILIRUB SERPL-MCNC: 0.9 MG/DL (ref 0–1.2)
BUN BLDV-MCNC: 17 MG/DL (ref 6–20)
CALCIUM SERPL-MCNC: 8.9 MG/DL (ref 8.6–10.2)
CHLORIDE BLD-SCNC: 98 MMOL/L (ref 98–107)
CO2: 27 MMOL/L (ref 22–29)
CREAT SERPL-MCNC: 0.6 MG/DL (ref 0.7–1.2)
EOSINOPHILS ABSOLUTE: 0.16 E9/L (ref 0.05–0.5)
EOSINOPHILS RELATIVE PERCENT: 1 % (ref 0–6)
GFR AFRICAN AMERICAN: >60
GFR NON-AFRICAN AMERICAN: >60 ML/MIN/1.73
GLUCOSE BLD-MCNC: 119 MG/DL (ref 74–99)
HCT VFR BLD CALC: 28.8 % (ref 37–54)
HEMOGLOBIN: 9.2 G/DL (ref 12.5–16.5)
LYMPHOCYTES ABSOLUTE: 1.93 E9/L (ref 1.5–4)
LYMPHOCYTES RELATIVE PERCENT: 12 % (ref 20–42)
MCH RBC QN AUTO: 29.6 PG (ref 26–35)
MCHC RBC AUTO-ENTMCNC: 31.9 % (ref 32–34.5)
MCV RBC AUTO: 92.6 FL (ref 80–99.9)
MONOCYTES ABSOLUTE: 1.45 E9/L (ref 0.1–0.95)
MONOCYTES RELATIVE PERCENT: 9 % (ref 2–12)
MYELOCYTE PERCENT: 1 % (ref 0–0)
NEUTROPHILS ABSOLUTE: 12.56 E9/L (ref 1.8–7.3)
NEUTROPHILS RELATIVE PERCENT: 77 % (ref 43–80)
OVALOCYTES: ABNORMAL
PDW BLD-RTO: 14.1 FL (ref 11.5–15)
PLATELET # BLD: 580 E9/L (ref 130–450)
PMV BLD AUTO: 9.3 FL (ref 7–12)
POIKILOCYTES: ABNORMAL
POLYCHROMASIA: ABNORMAL
POTASSIUM SERPL-SCNC: 3.6 MMOL/L (ref 3.5–5)
PROCALCITONIN: 0.28 NG/ML (ref 0–0.08)
RBC # BLD: 3.11 E12/L (ref 3.8–5.8)
SODIUM BLD-SCNC: 137 MMOL/L (ref 132–146)
TOTAL PROTEIN: 6.3 G/DL (ref 6.4–8.3)
URINE CULTURE, ROUTINE: NORMAL
WBC # BLD: 16.1 E9/L (ref 4.5–11.5)

## 2019-12-16 PROCEDURE — 6370000000 HC RX 637 (ALT 250 FOR IP): Performed by: ORTHOPAEDIC SURGERY

## 2019-12-16 PROCEDURE — 97530 THERAPEUTIC ACTIVITIES: CPT

## 2019-12-16 PROCEDURE — 1200000000 HC SEMI PRIVATE

## 2019-12-16 PROCEDURE — 99233 SBSQ HOSP IP/OBS HIGH 50: CPT | Performed by: SURGERY

## 2019-12-16 PROCEDURE — 6370000000 HC RX 637 (ALT 250 FOR IP): Performed by: STUDENT IN AN ORGANIZED HEALTH CARE EDUCATION/TRAINING PROGRAM

## 2019-12-16 PROCEDURE — 6360000002 HC RX W HCPCS: Performed by: STUDENT IN AN ORGANIZED HEALTH CARE EDUCATION/TRAINING PROGRAM

## 2019-12-16 PROCEDURE — 6370000000 HC RX 637 (ALT 250 FOR IP): Performed by: SURGERY

## 2019-12-16 PROCEDURE — 97110 THERAPEUTIC EXERCISES: CPT

## 2019-12-16 PROCEDURE — 2500000003 HC RX 250 WO HCPCS: Performed by: SURGERY

## 2019-12-16 PROCEDURE — 6370000000 HC RX 637 (ALT 250 FOR IP): Performed by: NURSE PRACTITIONER

## 2019-12-16 PROCEDURE — 2580000003 HC RX 258: Performed by: ORTHOPAEDIC SURGERY

## 2019-12-16 PROCEDURE — 36415 COLL VENOUS BLD VENIPUNCTURE: CPT

## 2019-12-16 PROCEDURE — 84145 PROCALCITONIN (PCT): CPT

## 2019-12-16 PROCEDURE — 97535 SELF CARE MNGMENT TRAINING: CPT

## 2019-12-16 PROCEDURE — 80053 COMPREHEN METABOLIC PANEL: CPT

## 2019-12-16 PROCEDURE — 85025 COMPLETE CBC W/AUTO DIFF WBC: CPT

## 2019-12-16 PROCEDURE — L4386 NON-PNEUM WALK BOOT PRE CST: HCPCS

## 2019-12-16 PROCEDURE — 73110 X-RAY EXAM OF WRIST: CPT

## 2019-12-16 RX ADMIN — FLUPHENAZINE HYDROCHLORIDE 5 MG: 5 TABLET, FILM COATED ORAL at 13:36

## 2019-12-16 RX ADMIN — FLUPHENAZINE HYDROCHLORIDE 5 MG: 5 TABLET, FILM COATED ORAL at 20:52

## 2019-12-16 RX ADMIN — QUETIAPINE FUMARATE 400 MG: 200 TABLET ORAL at 09:27

## 2019-12-16 RX ADMIN — BENZTROPINE MESYLATE 1 MG: 1 TABLET ORAL at 21:16

## 2019-12-16 RX ADMIN — OXYCODONE HYDROCHLORIDE 10 MG: 10 TABLET ORAL at 19:52

## 2019-12-16 RX ADMIN — ACETAMINOPHEN 650 MG: 325 TABLET, FILM COATED ORAL at 19:53

## 2019-12-16 RX ADMIN — POLYETHYLENE GLYCOL 3350 17 G: 17 POWDER, FOR SOLUTION ORAL at 20:54

## 2019-12-16 RX ADMIN — POLYETHYLENE GLYCOL 3350 17 G: 17 POWDER, FOR SOLUTION ORAL at 09:27

## 2019-12-16 RX ADMIN — METHOCARBAMOL TABLETS 1500 MG: 750 TABLET, COATED ORAL at 20:52

## 2019-12-16 RX ADMIN — CARBAMAZEPINE 300 MG: 200 TABLET, EXTENDED RELEASE ORAL at 20:53

## 2019-12-16 RX ADMIN — Medication 10 ML: at 20:54

## 2019-12-16 RX ADMIN — METOPROLOL TARTRATE 5 MG: 5 INJECTION INTRAVENOUS at 01:16

## 2019-12-16 RX ADMIN — PANTOPRAZOLE SODIUM 40 MG: 40 TABLET, DELAYED RELEASE ORAL at 06:01

## 2019-12-16 RX ADMIN — OXYCODONE HYDROCHLORIDE 10 MG: 10 TABLET ORAL at 10:17

## 2019-12-16 RX ADMIN — METHOCARBAMOL TABLETS 1500 MG: 750 TABLET, COATED ORAL at 17:07

## 2019-12-16 RX ADMIN — BENZTROPINE MESYLATE 1 MG: 1 TABLET ORAL at 09:27

## 2019-12-16 RX ADMIN — BISACODYL 10 MG: 10 SUPPOSITORY RECTAL at 09:31

## 2019-12-16 RX ADMIN — ACETAMINOPHEN 650 MG: 325 TABLET, FILM COATED ORAL at 15:34

## 2019-12-16 RX ADMIN — OXYCODONE HYDROCHLORIDE 10 MG: 10 TABLET ORAL at 06:01

## 2019-12-16 RX ADMIN — SENNOSIDES 17.2 MG: 8.6 TABLET, FILM COATED ORAL at 20:52

## 2019-12-16 RX ADMIN — Medication 10 ML: at 09:28

## 2019-12-16 RX ADMIN — METHOCARBAMOL TABLETS 1500 MG: 750 TABLET, COATED ORAL at 12:46

## 2019-12-16 RX ADMIN — CYCLOBENZAPRINE 10 MG: 10 TABLET, FILM COATED ORAL at 19:53

## 2019-12-16 RX ADMIN — QUETIAPINE FUMARATE 400 MG: 200 TABLET ORAL at 20:52

## 2019-12-16 RX ADMIN — ACETAMINOPHEN 650 MG: 325 TABLET, FILM COATED ORAL at 06:01

## 2019-12-16 RX ADMIN — ACETAMINOPHEN 650 MG: 325 TABLET, FILM COATED ORAL at 12:46

## 2019-12-16 RX ADMIN — CARBAMAZEPINE 300 MG: 200 TABLET, EXTENDED RELEASE ORAL at 09:27

## 2019-12-16 RX ADMIN — OXYCODONE HYDROCHLORIDE 10 MG: 10 TABLET ORAL at 14:37

## 2019-12-16 RX ADMIN — FLUPHENAZINE HYDROCHLORIDE 5 MG: 5 TABLET, FILM COATED ORAL at 09:27

## 2019-12-16 RX ADMIN — TAMSULOSIN HYDROCHLORIDE 0.4 MG: 0.4 CAPSULE ORAL at 09:27

## 2019-12-16 RX ADMIN — DOCUSATE SODIUM 200 MG: 100 CAPSULE, LIQUID FILLED ORAL at 20:53

## 2019-12-16 RX ADMIN — ENOXAPARIN SODIUM 30 MG: 30 INJECTION SUBCUTANEOUS at 20:53

## 2019-12-16 RX ADMIN — METHOCARBAMOL TABLETS 1500 MG: 750 TABLET, COATED ORAL at 09:27

## 2019-12-16 RX ADMIN — CYCLOBENZAPRINE 10 MG: 10 TABLET, FILM COATED ORAL at 09:31

## 2019-12-16 ASSESSMENT — PAIN DESCRIPTION - LOCATION
LOCATION: ARM;BACK
LOCATION: ARM;BACK

## 2019-12-16 ASSESSMENT — PAIN SCALES - GENERAL
PAINLEVEL_OUTOF10: 9
PAINLEVEL_OUTOF10: 6
PAINLEVEL_OUTOF10: 5
PAINLEVEL_OUTOF10: 10
PAINLEVEL_OUTOF10: 8

## 2019-12-16 ASSESSMENT — PAIN DESCRIPTION - PAIN TYPE
TYPE: ACUTE PAIN;SURGICAL PAIN
TYPE: ACUTE PAIN;SURGICAL PAIN

## 2019-12-16 ASSESSMENT — PAIN DESCRIPTION - ONSET
ONSET: ON-GOING
ONSET: ON-GOING

## 2019-12-16 ASSESSMENT — PAIN DESCRIPTION - FREQUENCY
FREQUENCY: CONTINUOUS
FREQUENCY: CONTINUOUS

## 2019-12-16 ASSESSMENT — PAIN DESCRIPTION - ORIENTATION: ORIENTATION: LEFT

## 2019-12-16 ASSESSMENT — PAIN DESCRIPTION - DESCRIPTORS
DESCRIPTORS: ACHING;CONSTANT;DISCOMFORT
DESCRIPTORS: ACHING;CONSTANT;DISCOMFORT

## 2019-12-17 LAB
ACINETOBACTER BAUMANNII BY PCR: NOT DETECTED
ALBUMIN SERPL-MCNC: 2.8 G/DL (ref 3.5–5.2)
ALP BLD-CCNC: 139 U/L (ref 40–129)
ALT SERPL-CCNC: 22 U/L (ref 0–40)
ANION GAP SERPL CALCULATED.3IONS-SCNC: 11 MMOL/L (ref 7–16)
ANISOCYTOSIS: ABNORMAL
AST SERPL-CCNC: 26 U/L (ref 0–39)
BASOPHILS ABSOLUTE: 0 E9/L (ref 0–0.2)
BASOPHILS RELATIVE PERCENT: 0.4 % (ref 0–2)
BILIRUB SERPL-MCNC: 0.7 MG/DL (ref 0–1.2)
BOTTLE TYPE: ABNORMAL
BUN BLDV-MCNC: 12 MG/DL (ref 6–20)
CALCIUM SERPL-MCNC: 8.9 MG/DL (ref 8.6–10.2)
CANDIDA ALBICANS BY PCR: NOT DETECTED
CANDIDA GLABRATA BY PCR: NOT DETECTED
CANDIDA KRUSEI BY PCR: NOT DETECTED
CANDIDA PARAPSILOSIS BY PCR: NOT DETECTED
CANDIDA TROPICALIS BY PCR: NOT DETECTED
CHLORIDE BLD-SCNC: 100 MMOL/L (ref 98–107)
CO2: 28 MMOL/L (ref 22–29)
CREAT SERPL-MCNC: 0.6 MG/DL (ref 0.7–1.2)
ENTEROBACTER CLOACAE COMPLEX BY PCR: NOT DETECTED
ENTEROBACTERALES BY PCR: NOT DETECTED
ENTEROCOCCUS BY PCR: NOT DETECTED
EOSINOPHILS ABSOLUTE: 0.53 E9/L (ref 0.05–0.5)
EOSINOPHILS RELATIVE PERCENT: 4.3 % (ref 0–6)
ESCHERICHIA COLI BY PCR: NOT DETECTED
GFR AFRICAN AMERICAN: >60
GFR NON-AFRICAN AMERICAN: >60 ML/MIN/1.73
GLUCOSE BLD-MCNC: 126 MG/DL (ref 74–99)
HAEMOPHILUS INFLUENZAE BY PCR: NOT DETECTED
HCT VFR BLD CALC: 28.3 % (ref 37–54)
HEMOGLOBIN: 9 G/DL (ref 12.5–16.5)
HYPOCHROMIA: ABNORMAL
KLEBSIELLA OXYTOCA BY PCR: NOT DETECTED
KLEBSIELLA PNEUMONIAE GROUP BY PCR: NOT DETECTED
LISTERIA MONOCYTOGENES BY PCR: NOT DETECTED
LYMPHOCYTES ABSOLUTE: 2.09 E9/L (ref 1.5–4)
LYMPHOCYTES RELATIVE PERCENT: 17.4 % (ref 20–42)
MCH RBC QN AUTO: 30.2 PG (ref 26–35)
MCHC RBC AUTO-ENTMCNC: 31.8 % (ref 32–34.5)
MCV RBC AUTO: 95 FL (ref 80–99.9)
METHICILLIN RESISTANCE MECA/C  BY PCR: DETECTED
MONOCYTES ABSOLUTE: 0.49 E9/L (ref 0.1–0.95)
MONOCYTES RELATIVE PERCENT: 3.5 % (ref 2–12)
NEISSERIA MENINGITIDIS BY PCR: NOT DETECTED
NEUTROPHILS ABSOLUTE: 9.1 E9/L (ref 1.8–7.3)
NEUTROPHILS RELATIVE PERCENT: 73.9 % (ref 43–80)
ORDER NUMBER: ABNORMAL
PDW BLD-RTO: 14.2 FL (ref 11.5–15)
PLATELET # BLD: 624 E9/L (ref 130–450)
PMV BLD AUTO: 9.5 FL (ref 7–12)
POIKILOCYTES: ABNORMAL
POLYCHROMASIA: ABNORMAL
POTASSIUM SERPL-SCNC: 4.3 MMOL/L (ref 3.5–5)
PROMYELOCYTES PERCENT: 0.9 % (ref 0–0)
PROTEUS BY PCR: NOT DETECTED
PSEUDOMONAS AERUGINOSA BY PCR: NOT DETECTED
RBC # BLD: 2.98 E12/L (ref 3.8–5.8)
SERRATIA MARCESCENS BY PCR: NOT DETECTED
SODIUM BLD-SCNC: 139 MMOL/L (ref 132–146)
SOURCE OF BLOOD CULTURE: ABNORMAL
STAPHYLOCOCCUS AUREUS BY PCR: NOT DETECTED
STAPHYLOCOCCUS SPECIES BY PCR: DETECTED
STREPTOCOCCUS AGALACTIAE BY PCR: NOT DETECTED
STREPTOCOCCUS PNEUMONIAE BY PCR: NOT DETECTED
STREPTOCOCCUS PYOGENES  BY PCR: NOT DETECTED
STREPTOCOCCUS SPECIES BY PCR: NOT DETECTED
TOTAL PROTEIN: 6.3 G/DL (ref 6.4–8.3)
WBC # BLD: 12.3 E9/L (ref 4.5–11.5)

## 2019-12-17 PROCEDURE — 6370000000 HC RX 637 (ALT 250 FOR IP): Performed by: NURSE PRACTITIONER

## 2019-12-17 PROCEDURE — 99254 IP/OBS CNSLTJ NEW/EST MOD 60: CPT | Performed by: ORTHOPAEDIC SURGERY

## 2019-12-17 PROCEDURE — 6370000000 HC RX 637 (ALT 250 FOR IP): Performed by: ORTHOPAEDIC SURGERY

## 2019-12-17 PROCEDURE — 6370000000 HC RX 637 (ALT 250 FOR IP): Performed by: STUDENT IN AN ORGANIZED HEALTH CARE EDUCATION/TRAINING PROGRAM

## 2019-12-17 PROCEDURE — 6360000002 HC RX W HCPCS: Performed by: STUDENT IN AN ORGANIZED HEALTH CARE EDUCATION/TRAINING PROGRAM

## 2019-12-17 PROCEDURE — 85025 COMPLETE CBC W/AUTO DIFF WBC: CPT

## 2019-12-17 PROCEDURE — 1200000000 HC SEMI PRIVATE

## 2019-12-17 PROCEDURE — 99233 SBSQ HOSP IP/OBS HIGH 50: CPT | Performed by: SURGERY

## 2019-12-17 PROCEDURE — 80053 COMPREHEN METABOLIC PANEL: CPT

## 2019-12-17 PROCEDURE — 97530 THERAPEUTIC ACTIVITIES: CPT

## 2019-12-17 PROCEDURE — 36415 COLL VENOUS BLD VENIPUNCTURE: CPT

## 2019-12-17 PROCEDURE — 97535 SELF CARE MNGMENT TRAINING: CPT

## 2019-12-17 PROCEDURE — 6370000000 HC RX 637 (ALT 250 FOR IP): Performed by: SURGERY

## 2019-12-17 PROCEDURE — 2580000003 HC RX 258: Performed by: ORTHOPAEDIC SURGERY

## 2019-12-17 RX ADMIN — ACETAMINOPHEN 650 MG: 325 TABLET, FILM COATED ORAL at 11:26

## 2019-12-17 RX ADMIN — OXYCODONE HYDROCHLORIDE 10 MG: 10 TABLET ORAL at 11:24

## 2019-12-17 RX ADMIN — ACETAMINOPHEN 650 MG: 325 TABLET, FILM COATED ORAL at 02:50

## 2019-12-17 RX ADMIN — QUETIAPINE FUMARATE 400 MG: 200 TABLET ORAL at 20:57

## 2019-12-17 RX ADMIN — TAMSULOSIN HYDROCHLORIDE 0.4 MG: 0.4 CAPSULE ORAL at 08:06

## 2019-12-17 RX ADMIN — ENOXAPARIN SODIUM 30 MG: 30 INJECTION SUBCUTANEOUS at 08:05

## 2019-12-17 RX ADMIN — POLYETHYLENE GLYCOL 3350 17 G: 17 POWDER, FOR SOLUTION ORAL at 08:05

## 2019-12-17 RX ADMIN — CARBAMAZEPINE 300 MG: 200 TABLET, EXTENDED RELEASE ORAL at 20:58

## 2019-12-17 RX ADMIN — OXYCODONE HYDROCHLORIDE 10 MG: 10 TABLET ORAL at 15:40

## 2019-12-17 RX ADMIN — SENNOSIDES 17.2 MG: 8.6 TABLET, FILM COATED ORAL at 20:57

## 2019-12-17 RX ADMIN — Medication 10 ML: at 21:55

## 2019-12-17 RX ADMIN — CYCLOBENZAPRINE 10 MG: 10 TABLET, FILM COATED ORAL at 02:50

## 2019-12-17 RX ADMIN — ACETAMINOPHEN 650 MG: 325 TABLET, FILM COATED ORAL at 18:26

## 2019-12-17 RX ADMIN — QUETIAPINE FUMARATE 400 MG: 200 TABLET ORAL at 08:06

## 2019-12-17 RX ADMIN — CARBAMAZEPINE 300 MG: 200 TABLET, EXTENDED RELEASE ORAL at 08:05

## 2019-12-17 RX ADMIN — METHOCARBAMOL TABLETS 1500 MG: 750 TABLET, COATED ORAL at 14:10

## 2019-12-17 RX ADMIN — OXYCODONE HYDROCHLORIDE 10 MG: 10 TABLET ORAL at 06:52

## 2019-12-17 RX ADMIN — BENZTROPINE MESYLATE 1 MG: 1 TABLET ORAL at 20:55

## 2019-12-17 RX ADMIN — POLYETHYLENE GLYCOL 3350 17 G: 17 POWDER, FOR SOLUTION ORAL at 20:58

## 2019-12-17 RX ADMIN — FLUPHENAZINE HYDROCHLORIDE 5 MG: 5 TABLET, FILM COATED ORAL at 14:11

## 2019-12-17 RX ADMIN — PANTOPRAZOLE SODIUM 40 MG: 40 TABLET, DELAYED RELEASE ORAL at 06:52

## 2019-12-17 RX ADMIN — ACETAMINOPHEN 650 MG: 325 TABLET, FILM COATED ORAL at 15:38

## 2019-12-17 RX ADMIN — OXYCODONE HYDROCHLORIDE 10 MG: 10 TABLET ORAL at 20:57

## 2019-12-17 RX ADMIN — DOCUSATE SODIUM 200 MG: 100 CAPSULE, LIQUID FILLED ORAL at 08:06

## 2019-12-17 RX ADMIN — METHOCARBAMOL TABLETS 1500 MG: 750 TABLET, COATED ORAL at 20:56

## 2019-12-17 RX ADMIN — METHOCARBAMOL TABLETS 1500 MG: 750 TABLET, COATED ORAL at 08:04

## 2019-12-17 RX ADMIN — METHOCARBAMOL TABLETS 1500 MG: 750 TABLET, COATED ORAL at 17:06

## 2019-12-17 RX ADMIN — ACETAMINOPHEN 650 MG: 325 TABLET, FILM COATED ORAL at 06:52

## 2019-12-17 RX ADMIN — BENZTROPINE MESYLATE 1 MG: 1 TABLET ORAL at 08:05

## 2019-12-17 RX ADMIN — DOCUSATE SODIUM 200 MG: 100 CAPSULE, LIQUID FILLED ORAL at 20:57

## 2019-12-17 RX ADMIN — OXYCODONE HYDROCHLORIDE 10 MG: 10 TABLET ORAL at 02:50

## 2019-12-17 RX ADMIN — Medication 10 ML: at 08:07

## 2019-12-17 RX ADMIN — FLUPHENAZINE HYDROCHLORIDE 5 MG: 5 TABLET, FILM COATED ORAL at 20:56

## 2019-12-17 RX ADMIN — FLUPHENAZINE HYDROCHLORIDE 5 MG: 5 TABLET, FILM COATED ORAL at 08:05

## 2019-12-17 ASSESSMENT — PAIN DESCRIPTION - DESCRIPTORS
DESCRIPTORS: ACHING;CONSTANT;DISCOMFORT
DESCRIPTORS: ACHING;CONSTANT;DISCOMFORT
DESCRIPTORS: ACHING;CONSTANT

## 2019-12-17 ASSESSMENT — PAIN DESCRIPTION - PAIN TYPE
TYPE: ACUTE PAIN;SURGICAL PAIN

## 2019-12-17 ASSESSMENT — PAIN DESCRIPTION - PROGRESSION
CLINICAL_PROGRESSION: NOT CHANGED

## 2019-12-17 ASSESSMENT — PAIN DESCRIPTION - ONSET
ONSET: ON-GOING

## 2019-12-17 ASSESSMENT — PAIN DESCRIPTION - FREQUENCY
FREQUENCY: CONTINUOUS

## 2019-12-17 ASSESSMENT — PAIN SCALES - GENERAL
PAINLEVEL_OUTOF10: 8
PAINLEVEL_OUTOF10: 7
PAINLEVEL_OUTOF10: 8
PAINLEVEL_OUTOF10: 7
PAINLEVEL_OUTOF10: 6
PAINLEVEL_OUTOF10: 8
PAINLEVEL_OUTOF10: 5
PAINLEVEL_OUTOF10: 10

## 2019-12-17 ASSESSMENT — PAIN DESCRIPTION - ORIENTATION
ORIENTATION: LEFT

## 2019-12-17 ASSESSMENT — PAIN DESCRIPTION - LOCATION
LOCATION: ARM;BACK;LEG
LOCATION: ARM;LEG;BACK
LOCATION: ARM;BACK;LEG

## 2019-12-18 ENCOUNTER — ANESTHESIA (OUTPATIENT)
Dept: OPERATING ROOM | Age: 24
DRG: 912 | End: 2019-12-18
Payer: MEDICAID

## 2019-12-18 ENCOUNTER — ANESTHESIA EVENT (OUTPATIENT)
Dept: OPERATING ROOM | Age: 24
DRG: 912 | End: 2019-12-18
Payer: MEDICAID

## 2019-12-18 LAB
ALBUMIN SERPL-MCNC: 3 G/DL (ref 3.5–5.2)
ALP BLD-CCNC: 166 U/L (ref 40–129)
ALT SERPL-CCNC: 23 U/L (ref 0–40)
ANION GAP SERPL CALCULATED.3IONS-SCNC: 12 MMOL/L (ref 7–16)
AST SERPL-CCNC: 26 U/L (ref 0–39)
BASOPHILS ABSOLUTE: 0 E9/L (ref 0–0.2)
BASOPHILS RELATIVE PERCENT: 0.5 % (ref 0–2)
BILIRUB SERPL-MCNC: 0.7 MG/DL (ref 0–1.2)
BUN BLDV-MCNC: 10 MG/DL (ref 6–20)
CALCIUM SERPL-MCNC: 8.8 MG/DL (ref 8.6–10.2)
CHLORIDE BLD-SCNC: 98 MMOL/L (ref 98–107)
CO2: 26 MMOL/L (ref 22–29)
CREAT SERPL-MCNC: 0.6 MG/DL (ref 0.7–1.2)
EOSINOPHILS ABSOLUTE: 0.34 E9/L (ref 0.05–0.5)
EOSINOPHILS RELATIVE PERCENT: 3.5 % (ref 0–6)
GFR AFRICAN AMERICAN: >60
GFR NON-AFRICAN AMERICAN: >60 ML/MIN/1.73
GLUCOSE BLD-MCNC: 108 MG/DL (ref 74–99)
HCT VFR BLD CALC: 30.3 % (ref 37–54)
HEMOGLOBIN: 9.4 G/DL (ref 12.5–16.5)
HYPOCHROMIA: ABNORMAL
LYMPHOCYTES ABSOLUTE: 1.47 E9/L (ref 1.5–4)
LYMPHOCYTES RELATIVE PERCENT: 14.9 % (ref 20–42)
MCH RBC QN AUTO: 29.7 PG (ref 26–35)
MCHC RBC AUTO-ENTMCNC: 31 % (ref 32–34.5)
MCV RBC AUTO: 95.9 FL (ref 80–99.9)
METAMYELOCYTES RELATIVE PERCENT: 1.8 % (ref 0–1)
MONOCYTES ABSOLUTE: 0.78 E9/L (ref 0.1–0.95)
MONOCYTES RELATIVE PERCENT: 7.9 % (ref 2–12)
MYELOCYTE PERCENT: 3.5 % (ref 0–0)
NEUTROPHILS ABSOLUTE: 7.25 E9/L (ref 1.8–7.3)
NEUTROPHILS RELATIVE PERCENT: 68.4 % (ref 43–80)
PDW BLD-RTO: 14.1 FL (ref 11.5–15)
PLATELET # BLD: 705 E9/L (ref 130–450)
PMV BLD AUTO: 9.2 FL (ref 7–12)
POLYCHROMASIA: ABNORMAL
POTASSIUM SERPL-SCNC: 4.1 MMOL/L (ref 3.5–5)
RBC # BLD: 3.16 E12/L (ref 3.8–5.8)
SODIUM BLD-SCNC: 136 MMOL/L (ref 132–146)
TOTAL PROTEIN: 6.4 G/DL (ref 6.4–8.3)
WBC # BLD: 9.8 E9/L (ref 4.5–11.5)

## 2019-12-18 PROCEDURE — 6370000000 HC RX 637 (ALT 250 FOR IP): Performed by: ORTHOPAEDIC SURGERY

## 2019-12-18 PROCEDURE — 2580000003 HC RX 258: Performed by: ORTHOPAEDIC SURGERY

## 2019-12-18 PROCEDURE — 1200000000 HC SEMI PRIVATE

## 2019-12-18 PROCEDURE — 80053 COMPREHEN METABOLIC PANEL: CPT

## 2019-12-18 PROCEDURE — 6370000000 HC RX 637 (ALT 250 FOR IP): Performed by: NURSE PRACTITIONER

## 2019-12-18 PROCEDURE — 99233 SBSQ HOSP IP/OBS HIGH 50: CPT | Performed by: SURGERY

## 2019-12-18 PROCEDURE — 36415 COLL VENOUS BLD VENIPUNCTURE: CPT

## 2019-12-18 PROCEDURE — 85025 COMPLETE CBC W/AUTO DIFF WBC: CPT

## 2019-12-18 PROCEDURE — 6370000000 HC RX 637 (ALT 250 FOR IP): Performed by: SURGERY

## 2019-12-18 RX ORDER — CEFAZOLIN SODIUM 2 G/50ML
2 SOLUTION INTRAVENOUS
Status: DISPENSED | OUTPATIENT
Start: 2019-12-18 | End: 2019-12-18

## 2019-12-18 RX ADMIN — CYCLOBENZAPRINE 10 MG: 10 TABLET, FILM COATED ORAL at 15:35

## 2019-12-18 RX ADMIN — METHOCARBAMOL TABLETS 1500 MG: 750 TABLET, COATED ORAL at 08:42

## 2019-12-18 RX ADMIN — CYCLOBENZAPRINE 10 MG: 10 TABLET, FILM COATED ORAL at 00:04

## 2019-12-18 RX ADMIN — ACETAMINOPHEN 650 MG: 325 TABLET, FILM COATED ORAL at 06:52

## 2019-12-18 RX ADMIN — ACETAMINOPHEN 650 MG: 325 TABLET, FILM COATED ORAL at 00:04

## 2019-12-18 RX ADMIN — Medication 10 ML: at 08:45

## 2019-12-18 RX ADMIN — BENZTROPINE MESYLATE 1 MG: 1 TABLET ORAL at 08:42

## 2019-12-18 RX ADMIN — OXYCODONE HYDROCHLORIDE 10 MG: 10 TABLET ORAL at 21:15

## 2019-12-18 RX ADMIN — CARBAMAZEPINE 300 MG: 200 TABLET, EXTENDED RELEASE ORAL at 08:42

## 2019-12-18 RX ADMIN — CARBAMAZEPINE 300 MG: 200 TABLET, EXTENDED RELEASE ORAL at 20:17

## 2019-12-18 RX ADMIN — QUETIAPINE FUMARATE 400 MG: 200 TABLET ORAL at 08:42

## 2019-12-18 RX ADMIN — QUETIAPINE FUMARATE 400 MG: 200 TABLET ORAL at 20:19

## 2019-12-18 RX ADMIN — OXYCODONE HYDROCHLORIDE 10 MG: 10 TABLET ORAL at 17:08

## 2019-12-18 RX ADMIN — CYCLOBENZAPRINE 10 MG: 10 TABLET, FILM COATED ORAL at 20:19

## 2019-12-18 RX ADMIN — BENZTROPINE MESYLATE 1 MG: 1 TABLET ORAL at 20:19

## 2019-12-18 RX ADMIN — FLUPHENAZINE HYDROCHLORIDE 5 MG: 5 TABLET, FILM COATED ORAL at 20:18

## 2019-12-18 RX ADMIN — FLUPHENAZINE HYDROCHLORIDE 5 MG: 5 TABLET, FILM COATED ORAL at 08:42

## 2019-12-18 RX ADMIN — OXYCODONE HYDROCHLORIDE 10 MG: 10 TABLET ORAL at 12:58

## 2019-12-18 RX ADMIN — PANTOPRAZOLE SODIUM 40 MG: 40 TABLET, DELAYED RELEASE ORAL at 06:52

## 2019-12-18 RX ADMIN — METHOCARBAMOL TABLETS 1500 MG: 750 TABLET, COATED ORAL at 20:17

## 2019-12-18 RX ADMIN — OXYCODONE HYDROCHLORIDE 10 MG: 10 TABLET ORAL at 08:47

## 2019-12-18 RX ADMIN — OXYCODONE HYDROCHLORIDE 10 MG: 10 TABLET ORAL at 04:29

## 2019-12-18 RX ADMIN — Medication 10 ML: at 21:43

## 2019-12-18 RX ADMIN — SENNOSIDES 17.2 MG: 8.6 TABLET, FILM COATED ORAL at 20:17

## 2019-12-18 ASSESSMENT — PAIN DESCRIPTION - PAIN TYPE
TYPE: ACUTE PAIN

## 2019-12-18 ASSESSMENT — PAIN DESCRIPTION - FREQUENCY
FREQUENCY: CONTINUOUS

## 2019-12-18 ASSESSMENT — PAIN SCALES - GENERAL
PAINLEVEL_OUTOF10: 6
PAINLEVEL_OUTOF10: 8
PAINLEVEL_OUTOF10: 8
PAINLEVEL_OUTOF10: 0
PAINLEVEL_OUTOF10: 4
PAINLEVEL_OUTOF10: 3
PAINLEVEL_OUTOF10: 8
PAINLEVEL_OUTOF10: 8
PAINLEVEL_OUTOF10: 9
PAINLEVEL_OUTOF10: 4
PAINLEVEL_OUTOF10: 10

## 2019-12-18 ASSESSMENT — PAIN DESCRIPTION - ORIENTATION
ORIENTATION: LOWER

## 2019-12-18 ASSESSMENT — PAIN DESCRIPTION - PROGRESSION
CLINICAL_PROGRESSION: NOT CHANGED

## 2019-12-18 ASSESSMENT — PAIN DESCRIPTION - ONSET
ONSET: ON-GOING

## 2019-12-18 ASSESSMENT — PAIN DESCRIPTION - DESCRIPTORS
DESCRIPTORS: ACHING;CONSTANT;SHARP
DESCRIPTORS: ACHING;CONSTANT;SHARP
DESCRIPTORS: ACHING;CONSTANT;THROBBING

## 2019-12-18 ASSESSMENT — PAIN DESCRIPTION - LOCATION
LOCATION: BACK

## 2019-12-19 ENCOUNTER — APPOINTMENT (OUTPATIENT)
Dept: GENERAL RADIOLOGY | Age: 24
DRG: 912 | End: 2019-12-19
Payer: MEDICAID

## 2019-12-19 ENCOUNTER — APPOINTMENT (OUTPATIENT)
Dept: CT IMAGING | Age: 24
DRG: 912 | End: 2019-12-19
Payer: MEDICAID

## 2019-12-19 VITALS — OXYGEN SATURATION: 92 % | DIASTOLIC BLOOD PRESSURE: 98 MMHG | SYSTOLIC BLOOD PRESSURE: 161 MMHG | TEMPERATURE: 98.6 F

## 2019-12-19 PROBLEM — S62.022B: Status: ACTIVE | Noted: 2019-12-19

## 2019-12-19 LAB
ALBUMIN SERPL-MCNC: 3.2 G/DL (ref 3.5–5.2)
ALP BLD-CCNC: 225 U/L (ref 40–129)
ALT SERPL-CCNC: 54 U/L (ref 0–40)
ANION GAP SERPL CALCULATED.3IONS-SCNC: 16 MMOL/L (ref 7–16)
AST SERPL-CCNC: 68 U/L (ref 0–39)
BASOPHILS ABSOLUTE: 0 E9/L (ref 0–0.2)
BASOPHILS RELATIVE PERCENT: 0.4 % (ref 0–2)
BILIRUB SERPL-MCNC: 0.7 MG/DL (ref 0–1.2)
BUN BLDV-MCNC: 14 MG/DL (ref 6–20)
CALCIUM SERPL-MCNC: 9.1 MG/DL (ref 8.6–10.2)
CHLORIDE BLD-SCNC: 97 MMOL/L (ref 98–107)
CO2: 22 MMOL/L (ref 22–29)
CREAT SERPL-MCNC: 0.7 MG/DL (ref 0.7–1.2)
EOSINOPHILS ABSOLUTE: 0 E9/L (ref 0.05–0.5)
EOSINOPHILS RELATIVE PERCENT: 0.3 % (ref 0–6)
GFR AFRICAN AMERICAN: >60
GFR NON-AFRICAN AMERICAN: >60 ML/MIN/1.73
GLUCOSE BLD-MCNC: 136 MG/DL (ref 74–99)
HCT VFR BLD CALC: 31.3 % (ref 37–54)
HEMOGLOBIN: 9.9 G/DL (ref 12.5–16.5)
LYMPHOCYTES ABSOLUTE: 0.63 E9/L (ref 1.5–4)
LYMPHOCYTES RELATIVE PERCENT: 3.5 % (ref 20–42)
MCH RBC QN AUTO: 30 PG (ref 26–35)
MCHC RBC AUTO-ENTMCNC: 31.6 % (ref 32–34.5)
MCV RBC AUTO: 94.8 FL (ref 80–99.9)
METAMYELOCYTES RELATIVE PERCENT: 1.7 % (ref 0–1)
MONOCYTES ABSOLUTE: 0.32 E9/L (ref 0.1–0.95)
MONOCYTES RELATIVE PERCENT: 1.7 % (ref 2–12)
MYELOCYTE PERCENT: 0.9 % (ref 0–0)
NEUTROPHILS ABSOLUTE: 14.85 E9/L (ref 1.8–7.3)
NEUTROPHILS RELATIVE PERCENT: 91.3 % (ref 43–80)
OVALOCYTES: ABNORMAL
PDW BLD-RTO: 14.4 FL (ref 11.5–15)
PLATELET # BLD: 790 E9/L (ref 130–450)
PMV BLD AUTO: 9 FL (ref 7–12)
POIKILOCYTES: ABNORMAL
POLYCHROMASIA: ABNORMAL
POTASSIUM SERPL-SCNC: 4.5 MMOL/L (ref 3.5–5)
PROMYELOCYTES PERCENT: 0.9 % (ref 0–0)
RBC # BLD: 3.3 E12/L (ref 3.8–5.8)
SODIUM BLD-SCNC: 135 MMOL/L (ref 132–146)
TOTAL PROTEIN: 6.7 G/DL (ref 6.4–8.3)
WBC # BLD: 15.8 E9/L (ref 4.5–11.5)

## 2019-12-19 PROCEDURE — 6370000000 HC RX 637 (ALT 250 FOR IP): Performed by: STUDENT IN AN ORGANIZED HEALTH CARE EDUCATION/TRAINING PROGRAM

## 2019-12-19 PROCEDURE — 2720000010 HC SURG SUPPLY STERILE: Performed by: ORTHOPAEDIC SURGERY

## 2019-12-19 PROCEDURE — 3600000002 HC SURGERY LEVEL 2 BASE: Performed by: ORTHOPAEDIC SURGERY

## 2019-12-19 PROCEDURE — 0PSQ04Z REPOSITION LEFT METACARPAL WITH INTERNAL FIXATION DEVICE, OPEN APPROACH: ICD-10-PCS | Performed by: ORTHOPAEDIC SURGERY

## 2019-12-19 PROCEDURE — 1200000000 HC SEMI PRIVATE

## 2019-12-19 PROCEDURE — 99233 SBSQ HOSP IP/OBS HIGH 50: CPT | Performed by: SURGERY

## 2019-12-19 PROCEDURE — 0PSJ04Z REPOSITION LEFT RADIUS WITH INTERNAL FIXATION DEVICE, OPEN APPROACH: ICD-10-PCS | Performed by: ORTHOPAEDIC SURGERY

## 2019-12-19 PROCEDURE — 2580000003 HC RX 258: Performed by: STUDENT IN AN ORGANIZED HEALTH CARE EDUCATION/TRAINING PROGRAM

## 2019-12-19 PROCEDURE — 6360000002 HC RX W HCPCS: Performed by: SURGERY

## 2019-12-19 PROCEDURE — 64772 INCISION OF SPINAL NERVE: CPT | Performed by: ORTHOPAEDIC SURGERY

## 2019-12-19 PROCEDURE — 87205 SMEAR GRAM STAIN: CPT

## 2019-12-19 PROCEDURE — 6360000002 HC RX W HCPCS: Performed by: STUDENT IN AN ORGANIZED HEALTH CARE EDUCATION/TRAINING PROGRAM

## 2019-12-19 PROCEDURE — 01N50ZZ RELEASE MEDIAN NERVE, OPEN APPROACH: ICD-10-PCS | Performed by: ORTHOPAEDIC SURGERY

## 2019-12-19 PROCEDURE — 64721 CARPAL TUNNEL SURGERY: CPT | Performed by: ORTHOPAEDIC SURGERY

## 2019-12-19 PROCEDURE — 71275 CT ANGIOGRAPHY CHEST: CPT

## 2019-12-19 PROCEDURE — 2500000003 HC RX 250 WO HCPCS: Performed by: STUDENT IN AN ORGANIZED HEALTH CARE EDUCATION/TRAINING PROGRAM

## 2019-12-19 PROCEDURE — 11012 DEB SKIN BONE AT FX SITE: CPT | Performed by: ORTHOPAEDIC SURGERY

## 2019-12-19 PROCEDURE — 73110 X-RAY EXAM OF WRIST: CPT

## 2019-12-19 PROCEDURE — 25607 OPTX DST RD XARTC FX/EPI SEP: CPT | Performed by: ORTHOPAEDIC SURGERY

## 2019-12-19 PROCEDURE — 0PPQX5Z REMOVAL OF EXTERNAL FIXATION DEVICE FROM LEFT METACARPAL, EXTERNAL APPROACH: ICD-10-PCS | Performed by: ORTHOPAEDIC SURGERY

## 2019-12-19 PROCEDURE — 72190 X-RAY EXAM OF PELVIS: CPT

## 2019-12-19 PROCEDURE — 36415 COLL VENOUS BLD VENIPUNCTURE: CPT

## 2019-12-19 PROCEDURE — C1776 JOINT DEVICE (IMPLANTABLE): HCPCS | Performed by: ORTHOPAEDIC SURGERY

## 2019-12-19 PROCEDURE — 6360000004 HC RX CONTRAST MEDICATION: Performed by: RADIOLOGY

## 2019-12-19 PROCEDURE — 2580000003 HC RX 258: Performed by: NURSE ANESTHETIST, CERTIFIED REGISTERED

## 2019-12-19 PROCEDURE — 73130 X-RAY EXAM OF HAND: CPT

## 2019-12-19 PROCEDURE — C1713 ANCHOR/SCREW BN/BN,TIS/BN: HCPCS | Performed by: ORTHOPAEDIC SURGERY

## 2019-12-19 PROCEDURE — C1769 GUIDE WIRE: HCPCS | Performed by: ORTHOPAEDIC SURGERY

## 2019-12-19 PROCEDURE — 7100000000 HC PACU RECOVERY - FIRST 15 MIN: Performed by: ORTHOPAEDIC SURGERY

## 2019-12-19 PROCEDURE — 0MQ80ZZ REPAIR LEFT HAND BURSA AND LIGAMENT, OPEN APPROACH: ICD-10-PCS | Performed by: ORTHOPAEDIC SURGERY

## 2019-12-19 PROCEDURE — 25695 OPTX LUNATE DISLOCATION: CPT | Performed by: ORTHOPAEDIC SURGERY

## 2019-12-19 PROCEDURE — 85025 COMPLETE CBC W/AUTO DIFF WBC: CPT

## 2019-12-19 PROCEDURE — 3700000000 HC ANESTHESIA ATTENDED CARE: Performed by: ORTHOPAEDIC SURGERY

## 2019-12-19 PROCEDURE — 87075 CULTR BACTERIA EXCEPT BLOOD: CPT

## 2019-12-19 PROCEDURE — 2500000003 HC RX 250 WO HCPCS: Performed by: ORTHOPAEDIC SURGERY

## 2019-12-19 PROCEDURE — 3700000001 HC ADD 15 MINUTES (ANESTHESIA): Performed by: ORTHOPAEDIC SURGERY

## 2019-12-19 PROCEDURE — 87070 CULTURE OTHR SPECIMN AEROBIC: CPT

## 2019-12-19 PROCEDURE — 80053 COMPREHEN METABOLIC PANEL: CPT

## 2019-12-19 PROCEDURE — 2700000000 HC OXYGEN THERAPY PER DAY

## 2019-12-19 PROCEDURE — 0LN80ZZ RELEASE LEFT HAND TENDON, OPEN APPROACH: ICD-10-PCS | Performed by: ORTHOPAEDIC SURGERY

## 2019-12-19 PROCEDURE — 6370000000 HC RX 637 (ALT 250 FOR IP): Performed by: ORTHOPAEDIC SURGERY

## 2019-12-19 PROCEDURE — 7100000001 HC PACU RECOVERY - ADDTL 15 MIN: Performed by: ORTHOPAEDIC SURGERY

## 2019-12-19 PROCEDURE — 2500000003 HC RX 250 WO HCPCS: Performed by: NURSE ANESTHETIST, CERTIFIED REGISTERED

## 2019-12-19 PROCEDURE — 25320 REPAIR/REVISE WRIST JOINT: CPT | Performed by: ORTHOPAEDIC SURGERY

## 2019-12-19 PROCEDURE — 3209999900 FLUORO FOR SURGICAL PROCEDURES

## 2019-12-19 PROCEDURE — 6360000002 HC RX W HCPCS: Performed by: NURSE ANESTHETIST, CERTIFIED REGISTERED

## 2019-12-19 PROCEDURE — 20694 RMVL EXT FIXJ SYS UNDER ANES: CPT | Performed by: ORTHOPAEDIC SURGERY

## 2019-12-19 PROCEDURE — 25628 OPTX CARPL SCPHD FX INT FIXJ: CPT | Performed by: ORTHOPAEDIC SURGERY

## 2019-12-19 PROCEDURE — 2709999900 HC NON-CHARGEABLE SUPPLY: Performed by: ORTHOPAEDIC SURGERY

## 2019-12-19 PROCEDURE — 3600000012 HC SURGERY LEVEL 2 ADDTL 15MIN: Performed by: ORTHOPAEDIC SURGERY

## 2019-12-19 DEVICE — IMPLANTABLE DEVICE: Type: IMPLANTABLE DEVICE | Site: WRIST | Status: FUNCTIONAL

## 2019-12-19 DEVICE — ZINACTIVE USE 2646902 ANCHOR SUT L8.5MM DIA3.5MM HND WRST FORKED TIP EYELET: Type: IMPLANTABLE DEVICE | Site: WRIST | Status: FUNCTIONAL

## 2019-12-19 RX ORDER — FENTANYL CITRATE 50 UG/ML
INJECTION, SOLUTION INTRAMUSCULAR; INTRAVENOUS PRN
Status: DISCONTINUED | OUTPATIENT
Start: 2019-12-19 | End: 2019-12-19 | Stop reason: SDUPTHER

## 2019-12-19 RX ORDER — BUPIVACAINE HYDROCHLORIDE AND EPINEPHRINE 5; 5 MG/ML; UG/ML
INJECTION, SOLUTION EPIDURAL; INTRACAUDAL; PERINEURAL PRN
Status: DISCONTINUED | OUTPATIENT
Start: 2019-12-19 | End: 2019-12-19 | Stop reason: ALTCHOICE

## 2019-12-19 RX ORDER — ONDANSETRON 2 MG/ML
INJECTION INTRAMUSCULAR; INTRAVENOUS PRN
Status: DISCONTINUED | OUTPATIENT
Start: 2019-12-19 | End: 2019-12-19 | Stop reason: SDUPTHER

## 2019-12-19 RX ORDER — SODIUM CHLORIDE 9 MG/ML
INJECTION, SOLUTION INTRAVENOUS CONTINUOUS PRN
Status: DISCONTINUED | OUTPATIENT
Start: 2019-12-19 | End: 2019-12-19

## 2019-12-19 RX ORDER — CEFAZOLIN SODIUM 1 G/3ML
INJECTION, POWDER, FOR SOLUTION INTRAMUSCULAR; INTRAVENOUS PRN
Status: DISCONTINUED | OUTPATIENT
Start: 2019-12-19 | End: 2019-12-19 | Stop reason: SDUPTHER

## 2019-12-19 RX ORDER — FENTANYL CITRATE 50 UG/ML
50 INJECTION, SOLUTION INTRAMUSCULAR; INTRAVENOUS EVERY 5 MIN PRN
Status: DISCONTINUED | OUTPATIENT
Start: 2019-12-19 | End: 2019-12-19 | Stop reason: HOSPADM

## 2019-12-19 RX ORDER — SODIUM CHLORIDE 9 MG/ML
INJECTION, SOLUTION INTRAVENOUS CONTINUOUS PRN
Status: DISCONTINUED | OUTPATIENT
Start: 2019-12-19 | End: 2019-12-19 | Stop reason: SDUPTHER

## 2019-12-19 RX ORDER — CEFAZOLIN SODIUM 2 G/50ML
2 SOLUTION INTRAVENOUS EVERY 8 HOURS
Status: COMPLETED | OUTPATIENT
Start: 2019-12-19 | End: 2019-12-21

## 2019-12-19 RX ORDER — FENTANYL CITRATE 50 UG/ML
25 INJECTION, SOLUTION INTRAMUSCULAR; INTRAVENOUS EVERY 5 MIN PRN
Status: DISCONTINUED | OUTPATIENT
Start: 2019-12-19 | End: 2019-12-19 | Stop reason: HOSPADM

## 2019-12-19 RX ORDER — OXYCODONE HYDROCHLORIDE AND ACETAMINOPHEN 5; 325 MG/1; MG/1
1 TABLET ORAL
Status: DISCONTINUED | OUTPATIENT
Start: 2019-12-19 | End: 2019-12-19 | Stop reason: HOSPADM

## 2019-12-19 RX ORDER — DEXAMETHASONE SODIUM PHOSPHATE 10 MG/ML
INJECTION INTRAMUSCULAR; INTRAVENOUS PRN
Status: DISCONTINUED | OUTPATIENT
Start: 2019-12-19 | End: 2019-12-19 | Stop reason: SDUPTHER

## 2019-12-19 RX ORDER — NEOSTIGMINE METHYLSULFATE 1 MG/ML
INJECTION, SOLUTION INTRAVENOUS PRN
Status: DISCONTINUED | OUTPATIENT
Start: 2019-12-19 | End: 2019-12-19 | Stop reason: SDUPTHER

## 2019-12-19 RX ORDER — SODIUM CHLORIDE 0.9 % (FLUSH) 0.9 %
10 SYRINGE (ML) INJECTION PRN
Status: DISCONTINUED | OUTPATIENT
Start: 2019-12-19 | End: 2019-12-23 | Stop reason: HOSPADM

## 2019-12-19 RX ORDER — MEPERIDINE HYDROCHLORIDE 50 MG/ML
12.5 INJECTION INTRAMUSCULAR; INTRAVENOUS; SUBCUTANEOUS EVERY 5 MIN PRN
Status: DISCONTINUED | OUTPATIENT
Start: 2019-12-19 | End: 2019-12-19 | Stop reason: HOSPADM

## 2019-12-19 RX ORDER — PROPOFOL 10 MG/ML
INJECTION, EMULSION INTRAVENOUS PRN
Status: DISCONTINUED | OUTPATIENT
Start: 2019-12-19 | End: 2019-12-19 | Stop reason: SDUPTHER

## 2019-12-19 RX ORDER — ROCURONIUM BROMIDE 10 MG/ML
INJECTION, SOLUTION INTRAVENOUS PRN
Status: DISCONTINUED | OUTPATIENT
Start: 2019-12-19 | End: 2019-12-19 | Stop reason: SDUPTHER

## 2019-12-19 RX ORDER — GLYCOPYRROLATE 1 MG/5 ML
SYRINGE (ML) INTRAVENOUS PRN
Status: DISCONTINUED | OUTPATIENT
Start: 2019-12-19 | End: 2019-12-19 | Stop reason: SDUPTHER

## 2019-12-19 RX ORDER — DIPHENHYDRAMINE HYDROCHLORIDE 50 MG/ML
12.5 INJECTION INTRAMUSCULAR; INTRAVENOUS
Status: DISCONTINUED | OUTPATIENT
Start: 2019-12-19 | End: 2019-12-19 | Stop reason: HOSPADM

## 2019-12-19 RX ORDER — MIDAZOLAM HYDROCHLORIDE 1 MG/ML
INJECTION INTRAMUSCULAR; INTRAVENOUS PRN
Status: DISCONTINUED | OUTPATIENT
Start: 2019-12-19 | End: 2019-12-19 | Stop reason: SDUPTHER

## 2019-12-19 RX ORDER — PROMETHAZINE HYDROCHLORIDE 25 MG/ML
6.25 INJECTION, SOLUTION INTRAMUSCULAR; INTRAVENOUS
Status: DISCONTINUED | OUTPATIENT
Start: 2019-12-19 | End: 2019-12-19 | Stop reason: HOSPADM

## 2019-12-19 RX ORDER — SUCCINYLCHOLINE/SOD CL,ISO/PF 200MG/10ML
SYRINGE (ML) INTRAVENOUS PRN
Status: DISCONTINUED | OUTPATIENT
Start: 2019-12-19 | End: 2019-12-19 | Stop reason: SDUPTHER

## 2019-12-19 RX ORDER — LIDOCAINE HYDROCHLORIDE 20 MG/ML
INJECTION, SOLUTION INTRAVENOUS PRN
Status: DISCONTINUED | OUTPATIENT
Start: 2019-12-19 | End: 2019-12-19 | Stop reason: SDUPTHER

## 2019-12-19 RX ADMIN — DEXAMETHASONE SODIUM PHOSPHATE 10 MG: 10 INJECTION INTRAMUSCULAR; INTRAVENOUS at 02:24

## 2019-12-19 RX ADMIN — CYCLOBENZAPRINE 10 MG: 10 TABLET, FILM COATED ORAL at 17:32

## 2019-12-19 RX ADMIN — IOPAMIDOL 60 ML: 755 INJECTION, SOLUTION INTRAVENOUS at 22:10

## 2019-12-19 RX ADMIN — DOCUSATE SODIUM 200 MG: 100 CAPSULE, LIQUID FILLED ORAL at 08:52

## 2019-12-19 RX ADMIN — Medication 10 ML: at 20:51

## 2019-12-19 RX ADMIN — METHOCARBAMOL TABLETS 1500 MG: 750 TABLET, COATED ORAL at 12:39

## 2019-12-19 RX ADMIN — CARBAMAZEPINE 300 MG: 200 TABLET, EXTENDED RELEASE ORAL at 08:53

## 2019-12-19 RX ADMIN — SODIUM CHLORIDE, PRESERVATIVE FREE 10 ML: 5 INJECTION INTRAVENOUS at 23:56

## 2019-12-19 RX ADMIN — ROCURONIUM BROMIDE 30 MG: 10 INJECTION, SOLUTION INTRAVENOUS at 02:29

## 2019-12-19 RX ADMIN — Medication 3 MG: at 05:11

## 2019-12-19 RX ADMIN — METHOCARBAMOL TABLETS 1500 MG: 750 TABLET, COATED ORAL at 16:39

## 2019-12-19 RX ADMIN — PROPOFOL 200 MG: 10 INJECTION, EMULSION INTRAVENOUS at 02:21

## 2019-12-19 RX ADMIN — ROCURONIUM BROMIDE 20 MG: 10 INJECTION, SOLUTION INTRAVENOUS at 03:28

## 2019-12-19 RX ADMIN — ACETAMINOPHEN 650 MG: 325 TABLET, FILM COATED ORAL at 18:21

## 2019-12-19 RX ADMIN — Medication 0.6 MG: at 05:11

## 2019-12-19 RX ADMIN — ACETAMINOPHEN 650 MG: 325 TABLET, FILM COATED ORAL at 06:52

## 2019-12-19 RX ADMIN — SENNOSIDES 17.2 MG: 8.6 TABLET, FILM COATED ORAL at 20:51

## 2019-12-19 RX ADMIN — QUETIAPINE FUMARATE 400 MG: 200 TABLET ORAL at 20:51

## 2019-12-19 RX ADMIN — OXYCODONE HYDROCHLORIDE 10 MG: 10 TABLET ORAL at 11:12

## 2019-12-19 RX ADMIN — ONDANSETRON HYDROCHLORIDE 4 MG: 2 INJECTION, SOLUTION INTRAMUSCULAR; INTRAVENOUS at 05:06

## 2019-12-19 RX ADMIN — FENTANYL CITRATE 50 MCG: 50 INJECTION, SOLUTION INTRAMUSCULAR; INTRAVENOUS at 03:37

## 2019-12-19 RX ADMIN — LIDOCAINE HYDROCHLORIDE 60 MG: 20 INJECTION, SOLUTION INTRAVENOUS at 02:21

## 2019-12-19 RX ADMIN — ACETAMINOPHEN 650 MG: 325 TABLET, FILM COATED ORAL at 13:44

## 2019-12-19 RX ADMIN — METOPROLOL TARTRATE 5 MG: 5 INJECTION INTRAVENOUS at 16:33

## 2019-12-19 RX ADMIN — CARBAMAZEPINE 300 MG: 200 TABLET, EXTENDED RELEASE ORAL at 20:51

## 2019-12-19 RX ADMIN — MIDAZOLAM 2 MG: 1 INJECTION INTRAMUSCULAR; INTRAVENOUS at 02:15

## 2019-12-19 RX ADMIN — OXYCODONE HYDROCHLORIDE 10 MG: 10 TABLET ORAL at 20:52

## 2019-12-19 RX ADMIN — TAMSULOSIN HYDROCHLORIDE 0.4 MG: 0.4 CAPSULE ORAL at 08:53

## 2019-12-19 RX ADMIN — HYDROMORPHONE HYDROCHLORIDE 0.5 MG: 1 INJECTION, SOLUTION INTRAMUSCULAR; INTRAVENOUS; SUBCUTANEOUS at 23:55

## 2019-12-19 RX ADMIN — PANTOPRAZOLE SODIUM 40 MG: 40 TABLET, DELAYED RELEASE ORAL at 06:52

## 2019-12-19 RX ADMIN — FLUPHENAZINE HYDROCHLORIDE 5 MG: 5 TABLET, FILM COATED ORAL at 08:53

## 2019-12-19 RX ADMIN — HYDROMORPHONE HYDROCHLORIDE 1 MG: 1 INJECTION, SOLUTION INTRAMUSCULAR; INTRAVENOUS; SUBCUTANEOUS at 17:36

## 2019-12-19 RX ADMIN — ROCURONIUM BROMIDE 10 MG: 10 INJECTION, SOLUTION INTRAVENOUS at 04:04

## 2019-12-19 RX ADMIN — FENTANYL CITRATE 100 MCG: 50 INJECTION, SOLUTION INTRAMUSCULAR; INTRAVENOUS at 02:21

## 2019-12-19 RX ADMIN — FLUPHENAZINE HYDROCHLORIDE 5 MG: 5 TABLET, FILM COATED ORAL at 13:45

## 2019-12-19 RX ADMIN — CYCLOBENZAPRINE 10 MG: 10 TABLET, FILM COATED ORAL at 06:52

## 2019-12-19 RX ADMIN — BENZTROPINE MESYLATE 1 MG: 1 TABLET ORAL at 20:52

## 2019-12-19 RX ADMIN — CEFAZOLIN SODIUM 2 G: 2 SOLUTION INTRAVENOUS at 11:13

## 2019-12-19 RX ADMIN — OXYCODONE HYDROCHLORIDE 10 MG: 10 TABLET ORAL at 15:18

## 2019-12-19 RX ADMIN — BENZTROPINE MESYLATE 1 MG: 1 TABLET ORAL at 08:53

## 2019-12-19 RX ADMIN — FENTANYL CITRATE 50 MCG: 50 INJECTION, SOLUTION INTRAMUSCULAR; INTRAVENOUS at 02:44

## 2019-12-19 RX ADMIN — CEFAZOLIN 2000 MG: 1 INJECTION, POWDER, FOR SOLUTION INTRAMUSCULAR; INTRAVENOUS at 02:27

## 2019-12-19 RX ADMIN — Medication 140 MG: at 02:21

## 2019-12-19 RX ADMIN — METHOCARBAMOL TABLETS 1500 MG: 750 TABLET, COATED ORAL at 20:51

## 2019-12-19 RX ADMIN — CYCLOBENZAPRINE 10 MG: 10 TABLET, FILM COATED ORAL at 11:12

## 2019-12-19 RX ADMIN — FENTANYL CITRATE 50 MCG: 50 INJECTION, SOLUTION INTRAMUSCULAR; INTRAVENOUS at 05:22

## 2019-12-19 RX ADMIN — SODIUM CHLORIDE: 9 INJECTION, SOLUTION INTRAVENOUS at 02:15

## 2019-12-19 RX ADMIN — CEFAZOLIN SODIUM 2 G: 2 SOLUTION INTRAVENOUS at 18:30

## 2019-12-19 RX ADMIN — Medication 10 ML: at 08:59

## 2019-12-19 RX ADMIN — SODIUM CHLORIDE: 9 INJECTION, SOLUTION INTRAVENOUS at 04:07

## 2019-12-19 RX ADMIN — OXYCODONE HYDROCHLORIDE 10 MG: 10 TABLET ORAL at 06:52

## 2019-12-19 RX ADMIN — METHOCARBAMOL TABLETS 1500 MG: 750 TABLET, COATED ORAL at 08:53

## 2019-12-19 RX ADMIN — DOCUSATE SODIUM 200 MG: 100 CAPSULE, LIQUID FILLED ORAL at 20:51

## 2019-12-19 RX ADMIN — FLUPHENAZINE HYDROCHLORIDE 5 MG: 5 TABLET, FILM COATED ORAL at 20:51

## 2019-12-19 RX ADMIN — QUETIAPINE FUMARATE 400 MG: 200 TABLET ORAL at 08:53

## 2019-12-19 ASSESSMENT — PULMONARY FUNCTION TESTS
PIF_VALUE: 23
PIF_VALUE: 24
PIF_VALUE: 23
PIF_VALUE: 23
PIF_VALUE: 24
PIF_VALUE: 25
PIF_VALUE: 6
PIF_VALUE: 2
PIF_VALUE: 23
PIF_VALUE: 23
PIF_VALUE: 6
PIF_VALUE: 23
PIF_VALUE: 23
PIF_VALUE: 24
PIF_VALUE: 23
PIF_VALUE: 24
PIF_VALUE: 24
PIF_VALUE: 6
PIF_VALUE: 3
PIF_VALUE: 23
PIF_VALUE: 6
PIF_VALUE: 23
PIF_VALUE: 23
PIF_VALUE: 22
PIF_VALUE: 22
PIF_VALUE: 23
PIF_VALUE: 6
PIF_VALUE: 24
PIF_VALUE: 23
PIF_VALUE: 24
PIF_VALUE: 23
PIF_VALUE: 1
PIF_VALUE: 2
PIF_VALUE: 24
PIF_VALUE: 6
PIF_VALUE: 23
PIF_VALUE: 24
PIF_VALUE: 10
PIF_VALUE: 23
PIF_VALUE: 24
PIF_VALUE: 5
PIF_VALUE: 3
PIF_VALUE: 23
PIF_VALUE: 3
PIF_VALUE: 23
PIF_VALUE: 24
PIF_VALUE: 1
PIF_VALUE: 23
PIF_VALUE: 23
PIF_VALUE: 24
PIF_VALUE: 24
PIF_VALUE: 23
PIF_VALUE: 3
PIF_VALUE: 24
PIF_VALUE: 1
PIF_VALUE: 24
PIF_VALUE: 23
PIF_VALUE: 23
PIF_VALUE: 24
PIF_VALUE: 24
PIF_VALUE: 23
PIF_VALUE: 26
PIF_VALUE: 23
PIF_VALUE: 25
PIF_VALUE: 5
PIF_VALUE: 24
PIF_VALUE: 24
PIF_VALUE: 23
PIF_VALUE: 3
PIF_VALUE: 23
PIF_VALUE: 6
PIF_VALUE: 24
PIF_VALUE: 23
PIF_VALUE: 23
PIF_VALUE: 24
PIF_VALUE: 2
PIF_VALUE: 23
PIF_VALUE: 5
PIF_VALUE: 23
PIF_VALUE: 3
PIF_VALUE: 23
PIF_VALUE: 25
PIF_VALUE: 23
PIF_VALUE: 24
PIF_VALUE: 2
PIF_VALUE: 23
PIF_VALUE: 0
PIF_VALUE: 6
PIF_VALUE: 24
PIF_VALUE: 23
PIF_VALUE: 23
PIF_VALUE: 3
PIF_VALUE: 23
PIF_VALUE: 25
PIF_VALUE: 23
PIF_VALUE: 23
PIF_VALUE: 24
PIF_VALUE: 22
PIF_VALUE: 23
PIF_VALUE: 23
PIF_VALUE: 24
PIF_VALUE: 23
PIF_VALUE: 24
PIF_VALUE: 0
PIF_VALUE: 24
PIF_VALUE: 25
PIF_VALUE: 24
PIF_VALUE: 23
PIF_VALUE: 24
PIF_VALUE: 24
PIF_VALUE: 23
PIF_VALUE: 24
PIF_VALUE: 24
PIF_VALUE: 23
PIF_VALUE: 0
PIF_VALUE: 23
PIF_VALUE: 1
PIF_VALUE: 24
PIF_VALUE: 22
PIF_VALUE: 24
PIF_VALUE: 4
PIF_VALUE: 23
PIF_VALUE: 24
PIF_VALUE: 24
PIF_VALUE: 5
PIF_VALUE: 23
PIF_VALUE: 3
PIF_VALUE: 23
PIF_VALUE: 25
PIF_VALUE: 6
PIF_VALUE: 24
PIF_VALUE: 23
PIF_VALUE: 23
PIF_VALUE: 3
PIF_VALUE: 23
PIF_VALUE: 23
PIF_VALUE: 24
PIF_VALUE: 2
PIF_VALUE: 23
PIF_VALUE: 24
PIF_VALUE: 1
PIF_VALUE: 23
PIF_VALUE: 23
PIF_VALUE: 24
PIF_VALUE: 23
PIF_VALUE: 24
PIF_VALUE: 3
PIF_VALUE: 24
PIF_VALUE: 5
PIF_VALUE: 24
PIF_VALUE: 23
PIF_VALUE: 24
PIF_VALUE: 24
PIF_VALUE: 1
PIF_VALUE: 24
PIF_VALUE: 23
PIF_VALUE: 23
PIF_VALUE: 24
PIF_VALUE: 23
PIF_VALUE: 23
PIF_VALUE: 24
PIF_VALUE: 24
PIF_VALUE: 23
PIF_VALUE: 5
PIF_VALUE: 23
PIF_VALUE: 24
PIF_VALUE: 23
PIF_VALUE: 7
PIF_VALUE: 24
PIF_VALUE: 25
PIF_VALUE: 6
PIF_VALUE: 1
PIF_VALUE: 24
PIF_VALUE: 23
PIF_VALUE: 3

## 2019-12-19 ASSESSMENT — PAIN SCALES - GENERAL
PAINLEVEL_OUTOF10: 2
PAINLEVEL_OUTOF10: 5
PAINLEVEL_OUTOF10: 10
PAINLEVEL_OUTOF10: 0
PAINLEVEL_OUTOF10: 10
PAINLEVEL_OUTOF10: 10
PAINLEVEL_OUTOF10: 2
PAINLEVEL_OUTOF10: 9
PAINLEVEL_OUTOF10: 10
PAINLEVEL_OUTOF10: 5
PAINLEVEL_OUTOF10: 0
PAINLEVEL_OUTOF10: 10
PAINLEVEL_OUTOF10: 10
PAINLEVEL_OUTOF10: 8
PAINLEVEL_OUTOF10: 10
PAINLEVEL_OUTOF10: 0
PAINLEVEL_OUTOF10: 10
PAINLEVEL_OUTOF10: 10

## 2019-12-19 ASSESSMENT — PAIN DESCRIPTION - FREQUENCY
FREQUENCY: CONTINUOUS
FREQUENCY: INTERMITTENT

## 2019-12-19 ASSESSMENT — PAIN DESCRIPTION - PROGRESSION: CLINICAL_PROGRESSION: NOT CHANGED

## 2019-12-19 ASSESSMENT — PAIN DESCRIPTION - LOCATION
LOCATION: HAND

## 2019-12-19 ASSESSMENT — PAIN DESCRIPTION - DESCRIPTORS
DESCRIPTORS: ACHING;CRAMPING;DISCOMFORT
DESCRIPTORS: ACHING;CONSTANT;DISCOMFORT

## 2019-12-19 ASSESSMENT — PAIN DESCRIPTION - ONSET
ONSET: ON-GOING

## 2019-12-19 ASSESSMENT — PAIN DESCRIPTION - ORIENTATION
ORIENTATION: LEFT

## 2019-12-19 ASSESSMENT — PAIN DESCRIPTION - PAIN TYPE
TYPE: SURGICAL PAIN

## 2019-12-20 LAB
ALBUMIN SERPL-MCNC: 3.3 G/DL (ref 3.5–5.2)
ALP BLD-CCNC: 229 U/L (ref 40–129)
ALT SERPL-CCNC: 44 U/L (ref 0–40)
ANION GAP SERPL CALCULATED.3IONS-SCNC: 13 MMOL/L (ref 7–16)
ANISOCYTOSIS: ABNORMAL
AST SERPL-CCNC: 38 U/L (ref 0–39)
BASOPHILS ABSOLUTE: 0 E9/L (ref 0–0.2)
BASOPHILS RELATIVE PERCENT: 0.6 % (ref 0–2)
BILIRUB SERPL-MCNC: 0.6 MG/DL (ref 0–1.2)
BUN BLDV-MCNC: 11 MG/DL (ref 6–20)
CALCIUM SERPL-MCNC: 9.1 MG/DL (ref 8.6–10.2)
CHLORIDE BLD-SCNC: 99 MMOL/L (ref 98–107)
CO2: 26 MMOL/L (ref 22–29)
CREAT SERPL-MCNC: 0.6 MG/DL (ref 0.7–1.2)
CULTURE, BLOOD 2: NORMAL
EOSINOPHILS ABSOLUTE: 0 E9/L (ref 0.05–0.5)
EOSINOPHILS RELATIVE PERCENT: 1.2 % (ref 0–6)
GFR AFRICAN AMERICAN: >60
GFR NON-AFRICAN AMERICAN: >60 ML/MIN/1.73
GLUCOSE BLD-MCNC: 120 MG/DL (ref 74–99)
GRAM STAIN ORDERABLE: NORMAL
HCT VFR BLD CALC: 32.4 % (ref 37–54)
HEMOGLOBIN: 10.2 G/DL (ref 12.5–16.5)
HYPOCHROMIA: ABNORMAL
LYMPHOCYTES ABSOLUTE: 1.02 E9/L (ref 1.5–4)
LYMPHOCYTES RELATIVE PERCENT: 7.8 % (ref 20–42)
MCH RBC QN AUTO: 29.7 PG (ref 26–35)
MCHC RBC AUTO-ENTMCNC: 31.5 % (ref 32–34.5)
MCV RBC AUTO: 94.5 FL (ref 80–99.9)
METAMYELOCYTES RELATIVE PERCENT: 1.7 % (ref 0–1)
MONOCYTES ABSOLUTE: 1.14 E9/L (ref 0.1–0.95)
MONOCYTES RELATIVE PERCENT: 8.7 % (ref 2–12)
MYELOCYTE PERCENT: 1.7 % (ref 0–0)
NEUTROPHILS ABSOLUTE: 10.54 E9/L (ref 1.8–7.3)
NEUTROPHILS RELATIVE PERCENT: 80 % (ref 43–80)
ORGANISM: ABNORMAL
OVALOCYTES: ABNORMAL
PDW BLD-RTO: 14.7 FL (ref 11.5–15)
PLATELET # BLD: 726 E9/L (ref 130–450)
PMV BLD AUTO: 8.9 FL (ref 7–12)
POIKILOCYTES: ABNORMAL
POLYCHROMASIA: ABNORMAL
POTASSIUM SERPL-SCNC: 3.6 MMOL/L (ref 3.5–5)
RBC # BLD: 3.43 E12/L (ref 3.8–5.8)
SODIUM BLD-SCNC: 138 MMOL/L (ref 132–146)
TOTAL PROTEIN: 6.9 G/DL (ref 6.4–8.3)
WBC # BLD: 12.7 E9/L (ref 4.5–11.5)

## 2019-12-20 PROCEDURE — 6360000002 HC RX W HCPCS: Performed by: STUDENT IN AN ORGANIZED HEALTH CARE EDUCATION/TRAINING PROGRAM

## 2019-12-20 PROCEDURE — 6370000000 HC RX 637 (ALT 250 FOR IP): Performed by: STUDENT IN AN ORGANIZED HEALTH CARE EDUCATION/TRAINING PROGRAM

## 2019-12-20 PROCEDURE — 97530 THERAPEUTIC ACTIVITIES: CPT

## 2019-12-20 PROCEDURE — 1200000000 HC SEMI PRIVATE

## 2019-12-20 PROCEDURE — 85025 COMPLETE CBC W/AUTO DIFF WBC: CPT

## 2019-12-20 PROCEDURE — 6370000000 HC RX 637 (ALT 250 FOR IP): Performed by: SURGERY

## 2019-12-20 PROCEDURE — 99233 SBSQ HOSP IP/OBS HIGH 50: CPT | Performed by: SURGERY

## 2019-12-20 PROCEDURE — 2580000003 HC RX 258: Performed by: STUDENT IN AN ORGANIZED HEALTH CARE EDUCATION/TRAINING PROGRAM

## 2019-12-20 PROCEDURE — 80053 COMPREHEN METABOLIC PANEL: CPT

## 2019-12-20 PROCEDURE — 97535 SELF CARE MNGMENT TRAINING: CPT

## 2019-12-20 PROCEDURE — 36415 COLL VENOUS BLD VENIPUNCTURE: CPT

## 2019-12-20 RX ORDER — LANOLIN ALCOHOL/MO/W.PET/CERES
3 CREAM (GRAM) TOPICAL NIGHTLY PRN
Status: DISCONTINUED | OUTPATIENT
Start: 2019-12-20 | End: 2019-12-23 | Stop reason: HOSPADM

## 2019-12-20 RX ADMIN — ACETAMINOPHEN 650 MG: 325 TABLET, FILM COATED ORAL at 06:33

## 2019-12-20 RX ADMIN — DOCUSATE SODIUM 200 MG: 100 CAPSULE, LIQUID FILLED ORAL at 21:46

## 2019-12-20 RX ADMIN — SENNOSIDES 17.2 MG: 8.6 TABLET, FILM COATED ORAL at 21:47

## 2019-12-20 RX ADMIN — BENZTROPINE MESYLATE 1 MG: 1 TABLET ORAL at 21:47

## 2019-12-20 RX ADMIN — DOCUSATE SODIUM 200 MG: 100 CAPSULE, LIQUID FILLED ORAL at 09:30

## 2019-12-20 RX ADMIN — Medication 10 ML: at 09:32

## 2019-12-20 RX ADMIN — OXYCODONE HYDROCHLORIDE 10 MG: 10 TABLET ORAL at 23:29

## 2019-12-20 RX ADMIN — TAMSULOSIN HYDROCHLORIDE 0.4 MG: 0.4 CAPSULE ORAL at 09:30

## 2019-12-20 RX ADMIN — QUETIAPINE FUMARATE 400 MG: 200 TABLET ORAL at 09:30

## 2019-12-20 RX ADMIN — BENZTROPINE MESYLATE 1 MG: 1 TABLET ORAL at 09:30

## 2019-12-20 RX ADMIN — ACETAMINOPHEN 650 MG: 325 TABLET, FILM COATED ORAL at 10:30

## 2019-12-20 RX ADMIN — OXYCODONE HYDROCHLORIDE 10 MG: 10 TABLET ORAL at 02:41

## 2019-12-20 RX ADMIN — CARBAMAZEPINE 300 MG: 200 TABLET, EXTENDED RELEASE ORAL at 21:46

## 2019-12-20 RX ADMIN — OXYCODONE HYDROCHLORIDE 10 MG: 10 TABLET ORAL at 15:12

## 2019-12-20 RX ADMIN — OXYCODONE HYDROCHLORIDE 10 MG: 10 TABLET ORAL at 10:26

## 2019-12-20 RX ADMIN — METHOCARBAMOL TABLETS 1500 MG: 750 TABLET, COATED ORAL at 09:30

## 2019-12-20 RX ADMIN — FLUPHENAZINE HYDROCHLORIDE 5 MG: 5 TABLET, FILM COATED ORAL at 21:46

## 2019-12-20 RX ADMIN — CEFAZOLIN SODIUM 2 G: 2 SOLUTION INTRAVENOUS at 10:27

## 2019-12-20 RX ADMIN — METHOCARBAMOL TABLETS 1500 MG: 750 TABLET, COATED ORAL at 13:41

## 2019-12-20 RX ADMIN — FLUPHENAZINE HYDROCHLORIDE 5 MG: 5 TABLET, FILM COATED ORAL at 09:31

## 2019-12-20 RX ADMIN — OXYCODONE HYDROCHLORIDE 10 MG: 10 TABLET ORAL at 06:33

## 2019-12-20 RX ADMIN — QUETIAPINE FUMARATE 400 MG: 200 TABLET ORAL at 21:46

## 2019-12-20 RX ADMIN — METHOCARBAMOL TABLETS 1500 MG: 750 TABLET, COATED ORAL at 17:59

## 2019-12-20 RX ADMIN — ACETAMINOPHEN 650 MG: 325 TABLET, FILM COATED ORAL at 00:01

## 2019-12-20 RX ADMIN — PANTOPRAZOLE SODIUM 40 MG: 40 TABLET, DELAYED RELEASE ORAL at 06:33

## 2019-12-20 RX ADMIN — ACETAMINOPHEN 650 MG: 325 TABLET, FILM COATED ORAL at 18:32

## 2019-12-20 RX ADMIN — OXYCODONE HYDROCHLORIDE 10 MG: 10 TABLET ORAL at 18:32

## 2019-12-20 RX ADMIN — CYCLOBENZAPRINE 10 MG: 10 TABLET, FILM COATED ORAL at 09:31

## 2019-12-20 RX ADMIN — ACETAMINOPHEN 650 MG: 325 TABLET, FILM COATED ORAL at 15:12

## 2019-12-20 RX ADMIN — FLUPHENAZINE HYDROCHLORIDE 5 MG: 5 TABLET, FILM COATED ORAL at 13:41

## 2019-12-20 RX ADMIN — CARBAMAZEPINE 300 MG: 200 TABLET, EXTENDED RELEASE ORAL at 09:31

## 2019-12-20 RX ADMIN — METHOCARBAMOL TABLETS 1500 MG: 750 TABLET, COATED ORAL at 21:46

## 2019-12-20 RX ADMIN — CEFAZOLIN SODIUM 2 G: 2 SOLUTION INTRAVENOUS at 18:04

## 2019-12-20 RX ADMIN — CEFAZOLIN SODIUM 2 G: 2 SOLUTION INTRAVENOUS at 03:59

## 2019-12-20 RX ADMIN — MELATONIN 3 MG ORAL TABLET 3 MG: 3 TABLET ORAL at 21:47

## 2019-12-20 ASSESSMENT — PAIN DESCRIPTION - FREQUENCY
FREQUENCY: INTERMITTENT
FREQUENCY: CONTINUOUS

## 2019-12-20 ASSESSMENT — PAIN DESCRIPTION - ONSET
ONSET: ON-GOING

## 2019-12-20 ASSESSMENT — PAIN DESCRIPTION - LOCATION
LOCATION: ARM
LOCATION: HAND
LOCATION: ARM
LOCATION: ARM

## 2019-12-20 ASSESSMENT — PAIN SCALES - GENERAL
PAINLEVEL_OUTOF10: 10

## 2019-12-20 ASSESSMENT — PAIN DESCRIPTION - ORIENTATION
ORIENTATION: LEFT

## 2019-12-20 ASSESSMENT — PAIN DESCRIPTION - DESCRIPTORS
DESCRIPTORS: ACHING;DISCOMFORT;HEAVINESS
DESCRIPTORS: ACHING
DESCRIPTORS: ACHING;CONSTANT;DISCOMFORT
DESCRIPTORS: ACHING

## 2019-12-20 ASSESSMENT — PAIN DESCRIPTION - PAIN TYPE
TYPE: SURGICAL PAIN

## 2019-12-20 ASSESSMENT — PAIN SCALES - WONG BAKER: WONGBAKER_NUMERICALRESPONSE: 0

## 2019-12-21 LAB
ALBUMIN SERPL-MCNC: 3.5 G/DL (ref 3.5–5.2)
ALP BLD-CCNC: 253 U/L (ref 40–129)
ALT SERPL-CCNC: 29 U/L (ref 0–40)
ANION GAP SERPL CALCULATED.3IONS-SCNC: 13 MMOL/L (ref 7–16)
ANISOCYTOSIS: ABNORMAL
AST SERPL-CCNC: 22 U/L (ref 0–39)
BASOPHILS ABSOLUTE: 0.09 E9/L (ref 0–0.2)
BASOPHILS RELATIVE PERCENT: 0.9 % (ref 0–2)
BILIRUB SERPL-MCNC: 0.5 MG/DL (ref 0–1.2)
BUN BLDV-MCNC: 11 MG/DL (ref 6–20)
CALCIUM SERPL-MCNC: 9.2 MG/DL (ref 8.6–10.2)
CHLORIDE BLD-SCNC: 98 MMOL/L (ref 98–107)
CO2: 28 MMOL/L (ref 22–29)
CREAT SERPL-MCNC: 0.6 MG/DL (ref 0.7–1.2)
CULTURE SURGICAL: NORMAL
EOSINOPHILS ABSOLUTE: 0.16 E9/L (ref 0.05–0.5)
EOSINOPHILS RELATIVE PERCENT: 1.7 % (ref 0–6)
GFR AFRICAN AMERICAN: >60
GFR NON-AFRICAN AMERICAN: >60 ML/MIN/1.73
GLUCOSE BLD-MCNC: 114 MG/DL (ref 74–99)
HCT VFR BLD CALC: 35.8 % (ref 37–54)
HEMOGLOBIN: 11 G/DL (ref 12.5–16.5)
LYMPHOCYTES ABSOLUTE: 1.36 E9/L (ref 1.5–4)
LYMPHOCYTES RELATIVE PERCENT: 13.9 % (ref 20–42)
MCH RBC QN AUTO: 29.6 PG (ref 26–35)
MCHC RBC AUTO-ENTMCNC: 30.7 % (ref 32–34.5)
MCV RBC AUTO: 96.2 FL (ref 80–99.9)
METAMYELOCYTES RELATIVE PERCENT: 2.6 % (ref 0–1)
MONOCYTES ABSOLUTE: 0.97 E9/L (ref 0.1–0.95)
MONOCYTES RELATIVE PERCENT: 10.4 % (ref 2–12)
MYELOCYTE PERCENT: 1.7 % (ref 0–0)
NEUTROPHILS ABSOLUTE: 7.08 E9/L (ref 1.8–7.3)
NEUTROPHILS RELATIVE PERCENT: 68.7 % (ref 43–80)
PDW BLD-RTO: 14.6 FL (ref 11.5–15)
PLATELET # BLD: 755 E9/L (ref 130–450)
PMV BLD AUTO: 8.6 FL (ref 7–12)
POLYCHROMASIA: ABNORMAL
POTASSIUM SERPL-SCNC: 4.4 MMOL/L (ref 3.5–5)
RBC # BLD: 3.72 E12/L (ref 3.8–5.8)
SODIUM BLD-SCNC: 139 MMOL/L (ref 132–146)
TOTAL PROTEIN: 7.5 G/DL (ref 6.4–8.3)
WBC # BLD: 9.7 E9/L (ref 4.5–11.5)

## 2019-12-21 PROCEDURE — 6370000000 HC RX 637 (ALT 250 FOR IP): Performed by: STUDENT IN AN ORGANIZED HEALTH CARE EDUCATION/TRAINING PROGRAM

## 2019-12-21 PROCEDURE — 86706 HEP B SURFACE ANTIBODY: CPT

## 2019-12-21 PROCEDURE — 80053 COMPREHEN METABOLIC PANEL: CPT

## 2019-12-21 PROCEDURE — 1200000000 HC SEMI PRIVATE

## 2019-12-21 PROCEDURE — 86703 HIV-1/HIV-2 1 RESULT ANTBDY: CPT

## 2019-12-21 PROCEDURE — 87491 CHLMYD TRACH DNA AMP PROBE: CPT

## 2019-12-21 PROCEDURE — 86803 HEPATITIS C AB TEST: CPT

## 2019-12-21 PROCEDURE — 99232 SBSQ HOSP IP/OBS MODERATE 35: CPT | Performed by: SURGERY

## 2019-12-21 PROCEDURE — 2580000003 HC RX 258: Performed by: STUDENT IN AN ORGANIZED HEALTH CARE EDUCATION/TRAINING PROGRAM

## 2019-12-21 PROCEDURE — 97530 THERAPEUTIC ACTIVITIES: CPT

## 2019-12-21 PROCEDURE — 87340 HEPATITIS B SURFACE AG IA: CPT

## 2019-12-21 PROCEDURE — 36415 COLL VENOUS BLD VENIPUNCTURE: CPT

## 2019-12-21 PROCEDURE — 87591 N.GONORRHOEAE DNA AMP PROB: CPT

## 2019-12-21 PROCEDURE — 86704 HEP B CORE ANTIBODY TOTAL: CPT

## 2019-12-21 PROCEDURE — 6360000002 HC RX W HCPCS: Performed by: STUDENT IN AN ORGANIZED HEALTH CARE EDUCATION/TRAINING PROGRAM

## 2019-12-21 PROCEDURE — 85025 COMPLETE CBC W/AUTO DIFF WBC: CPT

## 2019-12-21 RX ADMIN — BENZTROPINE MESYLATE 1 MG: 1 TABLET ORAL at 20:11

## 2019-12-21 RX ADMIN — BENZTROPINE MESYLATE 1 MG: 1 TABLET ORAL at 09:19

## 2019-12-21 RX ADMIN — DOCUSATE SODIUM 200 MG: 100 CAPSULE, LIQUID FILLED ORAL at 09:18

## 2019-12-21 RX ADMIN — FLUPHENAZINE HYDROCHLORIDE 5 MG: 5 TABLET, FILM COATED ORAL at 09:18

## 2019-12-21 RX ADMIN — OXYCODONE HYDROCHLORIDE 10 MG: 10 TABLET ORAL at 23:21

## 2019-12-21 RX ADMIN — OXYCODONE HYDROCHLORIDE 10 MG: 10 TABLET ORAL at 18:45

## 2019-12-21 RX ADMIN — OXYCODONE HYDROCHLORIDE 10 MG: 10 TABLET ORAL at 13:16

## 2019-12-21 RX ADMIN — OXYCODONE HYDROCHLORIDE 10 MG: 10 TABLET ORAL at 03:30

## 2019-12-21 RX ADMIN — ACETAMINOPHEN 650 MG: 325 TABLET, FILM COATED ORAL at 19:19

## 2019-12-21 RX ADMIN — ACETAMINOPHEN 650 MG: 325 TABLET, FILM COATED ORAL at 15:24

## 2019-12-21 RX ADMIN — CEFAZOLIN SODIUM 2 G: 2 SOLUTION INTRAVENOUS at 03:30

## 2019-12-21 RX ADMIN — QUETIAPINE FUMARATE 400 MG: 200 TABLET ORAL at 20:12

## 2019-12-21 RX ADMIN — ACETAMINOPHEN 650 MG: 325 TABLET, FILM COATED ORAL at 23:21

## 2019-12-21 RX ADMIN — METHOCARBAMOL TABLETS 1500 MG: 750 TABLET, COATED ORAL at 13:16

## 2019-12-21 RX ADMIN — SENNOSIDES 17.2 MG: 8.6 TABLET, FILM COATED ORAL at 20:11

## 2019-12-21 RX ADMIN — CARBAMAZEPINE 300 MG: 200 TABLET, EXTENDED RELEASE ORAL at 09:18

## 2019-12-21 RX ADMIN — DOCUSATE SODIUM 200 MG: 100 CAPSULE, LIQUID FILLED ORAL at 20:12

## 2019-12-21 RX ADMIN — METHOCARBAMOL TABLETS 1500 MG: 750 TABLET, COATED ORAL at 16:40

## 2019-12-21 RX ADMIN — Medication 10 ML: at 20:21

## 2019-12-21 RX ADMIN — FLUPHENAZINE HYDROCHLORIDE 5 MG: 5 TABLET, FILM COATED ORAL at 15:10

## 2019-12-21 RX ADMIN — PANTOPRAZOLE SODIUM 40 MG: 40 TABLET, DELAYED RELEASE ORAL at 07:30

## 2019-12-21 RX ADMIN — METHOCARBAMOL TABLETS 1500 MG: 750 TABLET, COATED ORAL at 09:18

## 2019-12-21 RX ADMIN — FLUPHENAZINE HYDROCHLORIDE 5 MG: 5 TABLET, FILM COATED ORAL at 20:12

## 2019-12-21 RX ADMIN — OXYCODONE HYDROCHLORIDE 10 MG: 10 TABLET ORAL at 07:33

## 2019-12-21 RX ADMIN — CYCLOBENZAPRINE 10 MG: 10 TABLET, FILM COATED ORAL at 18:45

## 2019-12-21 RX ADMIN — TAMSULOSIN HYDROCHLORIDE 0.4 MG: 0.4 CAPSULE ORAL at 09:19

## 2019-12-21 RX ADMIN — ACETAMINOPHEN 650 MG: 325 TABLET, FILM COATED ORAL at 07:30

## 2019-12-21 RX ADMIN — QUETIAPINE FUMARATE 400 MG: 200 TABLET ORAL at 09:18

## 2019-12-21 RX ADMIN — CARBAMAZEPINE 300 MG: 200 TABLET, EXTENDED RELEASE ORAL at 20:12

## 2019-12-21 RX ADMIN — METHOCARBAMOL TABLETS 1500 MG: 750 TABLET, COATED ORAL at 20:12

## 2019-12-21 ASSESSMENT — PAIN DESCRIPTION - ORIENTATION
ORIENTATION: LEFT

## 2019-12-21 ASSESSMENT — PAIN SCALES - GENERAL
PAINLEVEL_OUTOF10: 8
PAINLEVEL_OUTOF10: 4
PAINLEVEL_OUTOF10: 9
PAINLEVEL_OUTOF10: 8
PAINLEVEL_OUTOF10: 10

## 2019-12-21 ASSESSMENT — PAIN DESCRIPTION - DESCRIPTORS
DESCRIPTORS: ACHING
DESCRIPTORS: ACHING;CONSTANT;THROBBING
DESCRIPTORS: ACHING;CONSTANT;THROBBING

## 2019-12-21 ASSESSMENT — PAIN DESCRIPTION - ONSET
ONSET: ON-GOING

## 2019-12-21 ASSESSMENT — PAIN DESCRIPTION - FREQUENCY
FREQUENCY: CONTINUOUS

## 2019-12-21 ASSESSMENT — PAIN DESCRIPTION - PROGRESSION
CLINICAL_PROGRESSION: GRADUALLY WORSENING
CLINICAL_PROGRESSION: NOT CHANGED

## 2019-12-21 ASSESSMENT — PAIN DESCRIPTION - PAIN TYPE
TYPE: SURGICAL PAIN

## 2019-12-21 ASSESSMENT — PAIN DESCRIPTION - LOCATION
LOCATION: ARM
LOCATION: ARM

## 2019-12-21 ASSESSMENT — PAIN SCALES - WONG BAKER
WONGBAKER_NUMERICALRESPONSE: 0
WONGBAKER_NUMERICALRESPONSE: 0

## 2019-12-21 ASSESSMENT — PAIN - FUNCTIONAL ASSESSMENT
PAIN_FUNCTIONAL_ASSESSMENT: PREVENTS OR INTERFERES WITH ALL ACTIVE AND SOME PASSIVE ACTIVITIES
PAIN_FUNCTIONAL_ASSESSMENT: PREVENTS OR INTERFERES SOME ACTIVE ACTIVITIES AND ADLS

## 2019-12-22 PROCEDURE — 99232 SBSQ HOSP IP/OBS MODERATE 35: CPT | Performed by: SURGERY

## 2019-12-22 PROCEDURE — 2580000003 HC RX 258: Performed by: STUDENT IN AN ORGANIZED HEALTH CARE EDUCATION/TRAINING PROGRAM

## 2019-12-22 PROCEDURE — 1200000000 HC SEMI PRIVATE

## 2019-12-22 PROCEDURE — 6360000002 HC RX W HCPCS: Performed by: SURGERY

## 2019-12-22 PROCEDURE — 6370000000 HC RX 637 (ALT 250 FOR IP): Performed by: STUDENT IN AN ORGANIZED HEALTH CARE EDUCATION/TRAINING PROGRAM

## 2019-12-22 PROCEDURE — 2500000003 HC RX 250 WO HCPCS: Performed by: STUDENT IN AN ORGANIZED HEALTH CARE EDUCATION/TRAINING PROGRAM

## 2019-12-22 PROCEDURE — 6360000002 HC RX W HCPCS: Performed by: STUDENT IN AN ORGANIZED HEALTH CARE EDUCATION/TRAINING PROGRAM

## 2019-12-22 RX ORDER — LIDOCAINE 4 G/G
1 PATCH TOPICAL DAILY
Status: CANCELLED | OUTPATIENT
Start: 2019-12-22

## 2019-12-22 RX ORDER — BENZTROPINE MESYLATE 1 MG/1
1 TABLET ORAL 2 TIMES DAILY
Status: CANCELLED | OUTPATIENT
Start: 2019-12-22

## 2019-12-22 RX ORDER — OXYCODONE HYDROCHLORIDE 10 MG/1
10 TABLET ORAL EVERY 4 HOURS PRN
Status: CANCELLED | OUTPATIENT
Start: 2019-12-22

## 2019-12-22 RX ORDER — ONDANSETRON 2 MG/ML
4 INJECTION INTRAMUSCULAR; INTRAVENOUS EVERY 6 HOURS PRN
Status: CANCELLED | OUTPATIENT
Start: 2019-12-22

## 2019-12-22 RX ORDER — PROPRANOLOL HYDROCHLORIDE 1 MG/ML
1 INJECTION, SOLUTION INTRAVENOUS ONCE
Status: COMPLETED | OUTPATIENT
Start: 2019-12-22 | End: 2019-12-22

## 2019-12-22 RX ORDER — BISACODYL 10 MG
10 SUPPOSITORY, RECTAL RECTAL DAILY
Status: CANCELLED | OUTPATIENT
Start: 2019-12-22

## 2019-12-22 RX ORDER — SODIUM CHLORIDE 0.9 % (FLUSH) 0.9 %
10 SYRINGE (ML) INJECTION EVERY 12 HOURS SCHEDULED
Status: CANCELLED | OUTPATIENT
Start: 2019-12-22

## 2019-12-22 RX ORDER — SODIUM CHLORIDE 0.9 % (FLUSH) 0.9 %
10 SYRINGE (ML) INJECTION PRN
Status: CANCELLED | OUTPATIENT
Start: 2019-12-22

## 2019-12-22 RX ORDER — FLUPHENAZINE HYDROCHLORIDE 5 MG/1
5 TABLET ORAL 3 TIMES DAILY
Status: CANCELLED | OUTPATIENT
Start: 2019-12-22

## 2019-12-22 RX ORDER — METOPROLOL TARTRATE 5 MG/5ML
5 INJECTION INTRAVENOUS EVERY 6 HOURS PRN
Status: CANCELLED | OUTPATIENT
Start: 2019-12-22

## 2019-12-22 RX ORDER — CYCLOBENZAPRINE HCL 10 MG
10 TABLET ORAL 3 TIMES DAILY PRN
Status: CANCELLED | OUTPATIENT
Start: 2019-12-22

## 2019-12-22 RX ORDER — LANOLIN ALCOHOL/MO/W.PET/CERES
3 CREAM (GRAM) TOPICAL NIGHTLY PRN
Status: CANCELLED | OUTPATIENT
Start: 2019-12-22

## 2019-12-22 RX ORDER — QUETIAPINE FUMARATE 200 MG/1
400 TABLET, FILM COATED ORAL 2 TIMES DAILY
Status: CANCELLED | OUTPATIENT
Start: 2019-12-22

## 2019-12-22 RX ORDER — SENNA PLUS 8.6 MG/1
2 TABLET ORAL NIGHTLY
Status: CANCELLED | OUTPATIENT
Start: 2019-12-22

## 2019-12-22 RX ORDER — TAMSULOSIN HYDROCHLORIDE 0.4 MG/1
0.4 CAPSULE ORAL DAILY
Status: CANCELLED | OUTPATIENT
Start: 2019-12-22

## 2019-12-22 RX ORDER — ACETAMINOPHEN 325 MG/1
650 TABLET ORAL EVERY 4 HOURS
Status: CANCELLED | OUTPATIENT
Start: 2019-12-22

## 2019-12-22 RX ORDER — METHOCARBAMOL 750 MG/1
1500 TABLET, FILM COATED ORAL 4 TIMES DAILY
Status: CANCELLED | OUTPATIENT
Start: 2019-12-22

## 2019-12-22 RX ORDER — DOCUSATE SODIUM 100 MG/1
200 CAPSULE, LIQUID FILLED ORAL 2 TIMES DAILY
Status: CANCELLED | OUTPATIENT
Start: 2019-12-22

## 2019-12-22 RX ORDER — POLYETHYLENE GLYCOL 3350 17 G/17G
17 POWDER, FOR SOLUTION ORAL 2 TIMES DAILY
Status: CANCELLED | OUTPATIENT
Start: 2019-12-22

## 2019-12-22 RX ORDER — PANTOPRAZOLE SODIUM 40 MG/1
40 TABLET, DELAYED RELEASE ORAL
Status: CANCELLED | OUTPATIENT
Start: 2019-12-22

## 2019-12-22 RX ADMIN — DOCUSATE SODIUM 200 MG: 100 CAPSULE, LIQUID FILLED ORAL at 21:39

## 2019-12-22 RX ADMIN — DOCUSATE SODIUM 200 MG: 100 CAPSULE, LIQUID FILLED ORAL at 09:00

## 2019-12-22 RX ADMIN — METHOCARBAMOL TABLETS 1500 MG: 750 TABLET, COATED ORAL at 17:12

## 2019-12-22 RX ADMIN — Medication 10 ML: at 09:04

## 2019-12-22 RX ADMIN — CYCLOBENZAPRINE 10 MG: 10 TABLET, FILM COATED ORAL at 15:44

## 2019-12-22 RX ADMIN — ACETAMINOPHEN 650 MG: 325 TABLET, FILM COATED ORAL at 21:40

## 2019-12-22 RX ADMIN — OXYCODONE HYDROCHLORIDE 10 MG: 10 TABLET ORAL at 03:27

## 2019-12-22 RX ADMIN — QUETIAPINE FUMARATE 400 MG: 200 TABLET ORAL at 21:40

## 2019-12-22 RX ADMIN — CARBAMAZEPINE 300 MG: 200 TABLET, EXTENDED RELEASE ORAL at 21:39

## 2019-12-22 RX ADMIN — QUETIAPINE FUMARATE 400 MG: 200 TABLET ORAL at 08:58

## 2019-12-22 RX ADMIN — ACETAMINOPHEN 650 MG: 325 TABLET, FILM COATED ORAL at 13:36

## 2019-12-22 RX ADMIN — CYCLOBENZAPRINE 10 MG: 10 TABLET, FILM COATED ORAL at 02:29

## 2019-12-22 RX ADMIN — METHOCARBAMOL TABLETS 1500 MG: 750 TABLET, COATED ORAL at 08:58

## 2019-12-22 RX ADMIN — METHOCARBAMOL TABLETS 1500 MG: 750 TABLET, COATED ORAL at 21:39

## 2019-12-22 RX ADMIN — SENNOSIDES 17.2 MG: 8.6 TABLET, FILM COATED ORAL at 21:40

## 2019-12-22 RX ADMIN — FLUPHENAZINE HYDROCHLORIDE 5 MG: 5 TABLET, FILM COATED ORAL at 21:39

## 2019-12-22 RX ADMIN — OXYCODONE HYDROCHLORIDE 10 MG: 10 TABLET ORAL at 07:42

## 2019-12-22 RX ADMIN — OXYCODONE HYDROCHLORIDE 10 MG: 10 TABLET ORAL at 15:44

## 2019-12-22 RX ADMIN — OXYCODONE HYDROCHLORIDE 10 MG: 10 TABLET ORAL at 19:41

## 2019-12-22 RX ADMIN — PANTOPRAZOLE SODIUM 40 MG: 40 TABLET, DELAYED RELEASE ORAL at 07:04

## 2019-12-22 RX ADMIN — Medication 10 ML: at 21:48

## 2019-12-22 RX ADMIN — ACETAMINOPHEN 650 MG: 325 TABLET, FILM COATED ORAL at 17:12

## 2019-12-22 RX ADMIN — BENZTROPINE MESYLATE 1 MG: 1 TABLET ORAL at 21:39

## 2019-12-22 RX ADMIN — METHOCARBAMOL TABLETS 1500 MG: 750 TABLET, COATED ORAL at 13:36

## 2019-12-22 RX ADMIN — OXYCODONE HYDROCHLORIDE 10 MG: 10 TABLET ORAL at 11:32

## 2019-12-22 RX ADMIN — TAMSULOSIN HYDROCHLORIDE 0.4 MG: 0.4 CAPSULE ORAL at 09:00

## 2019-12-22 RX ADMIN — PROPRANOLOL HYDROCHLORIDE 1 MG: 1 INJECTION, SOLUTION INTRAVENOUS at 03:24

## 2019-12-22 RX ADMIN — ENOXAPARIN SODIUM 30 MG: 30 INJECTION SUBCUTANEOUS at 21:40

## 2019-12-22 ASSESSMENT — PAIN DESCRIPTION - PAIN TYPE
TYPE: SURGICAL PAIN
TYPE: SURGICAL PAIN

## 2019-12-22 ASSESSMENT — PAIN DESCRIPTION - ORIENTATION
ORIENTATION: LEFT;LOWER;MID
ORIENTATION: LEFT;LOWER;MID

## 2019-12-22 ASSESSMENT — PAIN SCALES - GENERAL
PAINLEVEL_OUTOF10: 10

## 2019-12-22 ASSESSMENT — PAIN DESCRIPTION - DESCRIPTORS
DESCRIPTORS: ACHING;CONSTANT;THROBBING
DESCRIPTORS: ACHING;CONSTANT;DISCOMFORT

## 2019-12-22 ASSESSMENT — PAIN DESCRIPTION - ONSET: ONSET: ON-GOING

## 2019-12-22 ASSESSMENT — PAIN DESCRIPTION - LOCATION
LOCATION: ARM;BACK
LOCATION: ARM;BACK

## 2019-12-22 ASSESSMENT — PAIN - FUNCTIONAL ASSESSMENT: PAIN_FUNCTIONAL_ASSESSMENT: PREVENTS OR INTERFERES WITH MANY ACTIVE NOT PASSIVE ACTIVITIES

## 2019-12-22 ASSESSMENT — PAIN DESCRIPTION - FREQUENCY
FREQUENCY: CONTINUOUS
FREQUENCY: CONTINUOUS

## 2019-12-22 ASSESSMENT — PAIN DESCRIPTION - PROGRESSION: CLINICAL_PROGRESSION: NOT CHANGED

## 2019-12-23 ENCOUNTER — HOSPITAL ENCOUNTER (INPATIENT)
Age: 24
LOS: 10 days | Discharge: NURSING, REHAB OR LONG-TERM CARE | DRG: 754 | End: 2020-01-02
Attending: PSYCHIATRY & NEUROLOGY | Admitting: PSYCHIATRY & NEUROLOGY
Payer: MEDICAID

## 2019-12-23 VITALS
RESPIRATION RATE: 16 BRPM | WEIGHT: 202.6 LBS | TEMPERATURE: 98.1 F | SYSTOLIC BLOOD PRESSURE: 147 MMHG | HEART RATE: 110 BPM | HEIGHT: 70 IN | DIASTOLIC BLOOD PRESSURE: 90 MMHG | BODY MASS INDEX: 29.01 KG/M2 | OXYGEN SATURATION: 98 %

## 2019-12-23 PROCEDURE — 6370000000 HC RX 637 (ALT 250 FOR IP): Performed by: STUDENT IN AN ORGANIZED HEALTH CARE EDUCATION/TRAINING PROGRAM

## 2019-12-23 PROCEDURE — 97535 SELF CARE MNGMENT TRAINING: CPT

## 2019-12-23 PROCEDURE — 1240000000 HC EMOTIONAL WELLNESS R&B

## 2019-12-23 PROCEDURE — 6370000000 HC RX 637 (ALT 250 FOR IP): Performed by: SURGERY

## 2019-12-23 PROCEDURE — 97530 THERAPEUTIC ACTIVITIES: CPT

## 2019-12-23 PROCEDURE — 99238 HOSP IP/OBS DSCHRG MGMT 30/<: CPT | Performed by: SURGERY

## 2019-12-23 PROCEDURE — 2580000003 HC RX 258: Performed by: STUDENT IN AN ORGANIZED HEALTH CARE EDUCATION/TRAINING PROGRAM

## 2019-12-23 RX ORDER — BENZTROPINE MESYLATE 1 MG/ML
2 INJECTION INTRAMUSCULAR; INTRAVENOUS 2 TIMES DAILY PRN
Status: DISCONTINUED | OUTPATIENT
Start: 2019-12-23 | End: 2020-01-02 | Stop reason: HOSPADM

## 2019-12-23 RX ORDER — HYDROXYZINE PAMOATE 50 MG/1
50 CAPSULE ORAL 3 TIMES DAILY PRN
Status: DISCONTINUED | OUTPATIENT
Start: 2019-12-23 | End: 2020-01-02 | Stop reason: HOSPADM

## 2019-12-23 RX ORDER — TAMSULOSIN HYDROCHLORIDE 0.4 MG/1
0.4 CAPSULE ORAL DAILY
Status: DISCONTINUED | OUTPATIENT
Start: 2019-12-24 | End: 2020-01-02 | Stop reason: HOSPADM

## 2019-12-23 RX ORDER — LANOLIN ALCOHOL/MO/W.PET/CERES
3 CREAM (GRAM) TOPICAL NIGHTLY PRN
Status: DISCONTINUED | OUTPATIENT
Start: 2019-12-23 | End: 2020-01-02 | Stop reason: HOSPADM

## 2019-12-23 RX ORDER — METHOCARBAMOL 750 MG/1
1500 TABLET, FILM COATED ORAL 4 TIMES DAILY
Status: DISCONTINUED | OUTPATIENT
Start: 2019-12-24 | End: 2020-01-02 | Stop reason: HOSPADM

## 2019-12-23 RX ORDER — LIDOCAINE 4 G/G
1 PATCH TOPICAL DAILY
Status: DISCONTINUED | OUTPATIENT
Start: 2019-12-24 | End: 2020-01-02 | Stop reason: HOSPADM

## 2019-12-23 RX ORDER — OLANZAPINE 5 MG/1
5 TABLET ORAL EVERY 4 HOURS PRN
Status: DISCONTINUED | OUTPATIENT
Start: 2019-12-23 | End: 2020-01-02 | Stop reason: HOSPADM

## 2019-12-23 RX ORDER — SENNA PLUS 8.6 MG/1
2 TABLET ORAL NIGHTLY
Status: DISCONTINUED | OUTPATIENT
Start: 2019-12-24 | End: 2020-01-02 | Stop reason: HOSPADM

## 2019-12-23 RX ORDER — SODIUM CHLORIDE 0.9 % (FLUSH) 0.9 %
10 SYRINGE (ML) INJECTION EVERY 12 HOURS SCHEDULED
Status: DISCONTINUED | OUTPATIENT
Start: 2019-12-24 | End: 2020-01-02 | Stop reason: HOSPADM

## 2019-12-23 RX ORDER — QUETIAPINE FUMARATE 200 MG/1
400 TABLET, FILM COATED ORAL 2 TIMES DAILY
Status: DISCONTINUED | OUTPATIENT
Start: 2019-12-24 | End: 2020-01-02 | Stop reason: HOSPADM

## 2019-12-23 RX ORDER — ONDANSETRON 2 MG/ML
4 INJECTION INTRAMUSCULAR; INTRAVENOUS EVERY 6 HOURS PRN
Status: DISCONTINUED | OUTPATIENT
Start: 2019-12-23 | End: 2020-01-02

## 2019-12-23 RX ORDER — FLUPHENAZINE HYDROCHLORIDE 5 MG/1
5 TABLET ORAL 3 TIMES DAILY
Status: DISCONTINUED | OUTPATIENT
Start: 2019-12-24 | End: 2019-12-31

## 2019-12-23 RX ORDER — METOPROLOL TARTRATE 5 MG/5ML
5 INJECTION INTRAVENOUS EVERY 6 HOURS PRN
Status: DISCONTINUED | OUTPATIENT
Start: 2019-12-23 | End: 2019-12-31

## 2019-12-23 RX ORDER — OXYCODONE HYDROCHLORIDE 5 MG/1
10 TABLET ORAL EVERY 4 HOURS PRN
Status: DISCONTINUED | OUTPATIENT
Start: 2019-12-23 | End: 2019-12-26

## 2019-12-23 RX ORDER — BENZTROPINE MESYLATE 1 MG/1
1 TABLET ORAL 2 TIMES DAILY
Status: DISCONTINUED | OUTPATIENT
Start: 2019-12-24 | End: 2020-01-02 | Stop reason: HOSPADM

## 2019-12-23 RX ORDER — ACETAMINOPHEN 325 MG/1
650 TABLET ORAL EVERY 4 HOURS PRN
Status: DISCONTINUED | OUTPATIENT
Start: 2019-12-23 | End: 2020-01-02 | Stop reason: HOSPADM

## 2019-12-23 RX ORDER — PANTOPRAZOLE SODIUM 40 MG/1
40 TABLET, DELAYED RELEASE ORAL
Status: DISCONTINUED | OUTPATIENT
Start: 2019-12-24 | End: 2020-01-02 | Stop reason: HOSPADM

## 2019-12-23 RX ORDER — BISACODYL 10 MG
10 SUPPOSITORY, RECTAL RECTAL DAILY
Status: DISCONTINUED | OUTPATIENT
Start: 2019-12-24 | End: 2020-01-02 | Stop reason: HOSPADM

## 2019-12-23 RX ORDER — SODIUM CHLORIDE 0.9 % (FLUSH) 0.9 %
10 SYRINGE (ML) INJECTION PRN
Status: DISCONTINUED | OUTPATIENT
Start: 2019-12-23 | End: 2019-12-23 | Stop reason: SDUPTHER

## 2019-12-23 RX ORDER — POLYETHYLENE GLYCOL 3350 17 G/17G
17 POWDER, FOR SOLUTION ORAL 2 TIMES DAILY
Status: DISCONTINUED | OUTPATIENT
Start: 2019-12-24 | End: 2020-01-02 | Stop reason: HOSPADM

## 2019-12-23 RX ORDER — NICOTINE 21 MG/24HR
1 PATCH, TRANSDERMAL 24 HOURS TRANSDERMAL DAILY
Status: DISCONTINUED | OUTPATIENT
Start: 2019-12-24 | End: 2019-12-26

## 2019-12-23 RX ORDER — SODIUM CHLORIDE 0.9 % (FLUSH) 0.9 %
10 SYRINGE (ML) INJECTION PRN
Status: DISCONTINUED | OUTPATIENT
Start: 2019-12-23 | End: 2019-12-30 | Stop reason: SDUPTHER

## 2019-12-23 RX ORDER — MAGNESIUM HYDROXIDE/ALUMINUM HYDROXICE/SIMETHICONE 120; 1200; 1200 MG/30ML; MG/30ML; MG/30ML
30 SUSPENSION ORAL PRN
Status: DISCONTINUED | OUTPATIENT
Start: 2019-12-23 | End: 2020-01-02 | Stop reason: HOSPADM

## 2019-12-23 RX ORDER — CYCLOBENZAPRINE HCL 10 MG
10 TABLET ORAL 3 TIMES DAILY PRN
Status: DISCONTINUED | OUTPATIENT
Start: 2019-12-23 | End: 2019-12-26

## 2019-12-23 RX ORDER — DOCUSATE SODIUM 100 MG/1
200 CAPSULE, LIQUID FILLED ORAL 2 TIMES DAILY
Status: DISCONTINUED | OUTPATIENT
Start: 2019-12-24 | End: 2020-01-02 | Stop reason: HOSPADM

## 2019-12-23 RX ORDER — TRAZODONE HYDROCHLORIDE 50 MG/1
50 TABLET ORAL NIGHTLY PRN
Status: DISCONTINUED | OUTPATIENT
Start: 2019-12-23 | End: 2020-01-02 | Stop reason: HOSPADM

## 2019-12-23 RX ORDER — ACETAMINOPHEN 325 MG/1
650 TABLET ORAL EVERY 4 HOURS
Status: DISCONTINUED | OUTPATIENT
Start: 2019-12-24 | End: 2020-01-02 | Stop reason: HOSPADM

## 2019-12-23 RX ORDER — OLANZAPINE 10 MG/1
10 INJECTION, POWDER, LYOPHILIZED, FOR SOLUTION INTRAMUSCULAR EVERY 4 HOURS PRN
Status: DISCONTINUED | OUTPATIENT
Start: 2019-12-23 | End: 2020-01-02 | Stop reason: HOSPADM

## 2019-12-23 RX ADMIN — QUETIAPINE FUMARATE 400 MG: 200 TABLET ORAL at 21:42

## 2019-12-23 RX ADMIN — METHOCARBAMOL TABLETS 1500 MG: 750 TABLET, COATED ORAL at 16:20

## 2019-12-23 RX ADMIN — OXYCODONE HYDROCHLORIDE 10 MG: 10 TABLET ORAL at 00:31

## 2019-12-23 RX ADMIN — BENZTROPINE MESYLATE 1 MG: 1 TABLET ORAL at 21:42

## 2019-12-23 RX ADMIN — ACETAMINOPHEN 650 MG: 325 TABLET, FILM COATED ORAL at 06:09

## 2019-12-23 RX ADMIN — OXYCODONE HYDROCHLORIDE 10 MG: 10 TABLET ORAL at 14:09

## 2019-12-23 RX ADMIN — OXYCODONE HYDROCHLORIDE 10 MG: 10 TABLET ORAL at 06:09

## 2019-12-23 RX ADMIN — OXYCODONE 10 MG: 5 TABLET ORAL at 22:50

## 2019-12-23 RX ADMIN — Medication 10 ML: at 10:11

## 2019-12-23 RX ADMIN — CYCLOBENZAPRINE 10 MG: 10 TABLET, FILM COATED ORAL at 08:52

## 2019-12-23 RX ADMIN — METHOCARBAMOL TABLETS 1500 MG: 750 TABLET, COATED ORAL at 08:51

## 2019-12-23 RX ADMIN — CYCLOBENZAPRINE 10 MG: 10 TABLET, FILM COATED ORAL at 18:16

## 2019-12-23 RX ADMIN — FLUPHENAZINE HYDROCHLORIDE 5 MG: 5 TABLET, FILM COATED ORAL at 13:54

## 2019-12-23 RX ADMIN — QUETIAPINE FUMARATE 400 MG: 200 TABLET ORAL at 08:51

## 2019-12-23 RX ADMIN — PANTOPRAZOLE SODIUM 40 MG: 40 TABLET, DELAYED RELEASE ORAL at 06:10

## 2019-12-23 RX ADMIN — METHOCARBAMOL TABLETS 1500 MG: 750 TABLET, COATED ORAL at 21:42

## 2019-12-23 RX ADMIN — TAMSULOSIN HYDROCHLORIDE 0.4 MG: 0.4 CAPSULE ORAL at 08:52

## 2019-12-23 RX ADMIN — FLUPHENAZINE HYDROCHLORIDE 5 MG: 5 TABLET, FILM COATED ORAL at 21:42

## 2019-12-23 RX ADMIN — FLUPHENAZINE HYDROCHLORIDE 5 MG: 5 TABLET, FILM COATED ORAL at 08:52

## 2019-12-23 RX ADMIN — METHOCARBAMOL TABLETS 1500 MG: 750 TABLET, COATED ORAL at 13:54

## 2019-12-23 RX ADMIN — BENZTROPINE MESYLATE 1 MG: 1 TABLET ORAL at 08:52

## 2019-12-23 RX ADMIN — CARBAMAZEPINE 300 MG: 200 TABLET, EXTENDED RELEASE ORAL at 08:51

## 2019-12-23 RX ADMIN — ACETAMINOPHEN 650 MG: 325 TABLET, FILM COATED ORAL at 00:31

## 2019-12-23 RX ADMIN — OXYCODONE HYDROCHLORIDE 10 MG: 10 TABLET ORAL at 18:16

## 2019-12-23 RX ADMIN — OXYCODONE HYDROCHLORIDE 10 MG: 10 TABLET ORAL at 10:09

## 2019-12-23 RX ADMIN — ACETAMINOPHEN 650 MG: 325 TABLET, FILM COATED ORAL at 13:54

## 2019-12-23 RX ADMIN — CARBAMAZEPINE 300 MG: 200 TABLET, EXTENDED RELEASE ORAL at 21:42

## 2019-12-23 ASSESSMENT — SLEEP AND FATIGUE QUESTIONNAIRES
SLEEP PATTERN: RESTLESSNESS;DIFFICULTY FALLING ASLEEP
RESTFUL SLEEP: NO
DIFFICULTY FALLING ASLEEP: YES
AVERAGE NUMBER OF SLEEP HOURS: 9
DIFFICULTY ARISING: NO
DO YOU HAVE DIFFICULTY SLEEPING: YES
DIFFICULTY STAYING ASLEEP: NO
DO YOU USE A SLEEP AID: YES

## 2019-12-23 ASSESSMENT — PAIN DESCRIPTION - FREQUENCY
FREQUENCY: CONTINUOUS

## 2019-12-23 ASSESSMENT — PAIN SCALES - GENERAL
PAINLEVEL_OUTOF10: 0
PAINLEVEL_OUTOF10: 10
PAINLEVEL_OUTOF10: 0
PAINLEVEL_OUTOF10: 0

## 2019-12-23 ASSESSMENT — PAIN - FUNCTIONAL ASSESSMENT
PAIN_FUNCTIONAL_ASSESSMENT: PREVENTS OR INTERFERES WITH MANY ACTIVE NOT PASSIVE ACTIVITIES
PAIN_FUNCTIONAL_ASSESSMENT: PREVENTS OR INTERFERES WITH MANY ACTIVE NOT PASSIVE ACTIVITIES

## 2019-12-23 ASSESSMENT — PAIN DESCRIPTION - ONSET
ONSET: ON-GOING
ONSET: ON-GOING
ONSET: AWAKENED FROM SLEEP

## 2019-12-23 ASSESSMENT — PATIENT HEALTH QUESTIONNAIRE - PHQ9: SUM OF ALL RESPONSES TO PHQ QUESTIONS 1-9: 16

## 2019-12-23 ASSESSMENT — PAIN DESCRIPTION - DESCRIPTORS
DESCRIPTORS: ACHING
DESCRIPTORS: ACHING;CONSTANT;DISCOMFORT
DESCRIPTORS: ACHING;CONSTANT;DISCOMFORT

## 2019-12-23 ASSESSMENT — PAIN DESCRIPTION - PAIN TYPE
TYPE: SURGICAL PAIN

## 2019-12-23 ASSESSMENT — PAIN DESCRIPTION - LOCATION
LOCATION: PELVIS
LOCATION: BACK
LOCATION: ARM;BACK

## 2019-12-23 ASSESSMENT — LIFESTYLE VARIABLES: HISTORY_ALCOHOL_USE: NO

## 2019-12-23 ASSESSMENT — PAIN DESCRIPTION - ORIENTATION
ORIENTATION: LOWER
ORIENTATION: LEFT;LOWER;MID
ORIENTATION: MID;LOWER

## 2019-12-23 ASSESSMENT — PAIN DESCRIPTION - PROGRESSION
CLINICAL_PROGRESSION: NOT CHANGED

## 2019-12-24 PROBLEM — R45.851 DEPRESSION WITH SUICIDAL IDEATION: Status: ACTIVE | Noted: 2019-12-24

## 2019-12-24 PROBLEM — F32.A DEPRESSION WITH SUICIDAL IDEATION: Status: ACTIVE | Noted: 2019-12-24

## 2019-12-24 PROBLEM — F25.0 SCHIZOAFFECTIVE DISORDER, BIPOLAR TYPE (HCC): Status: ACTIVE | Noted: 2019-12-24

## 2019-12-24 LAB
ANAEROBIC CULTURE: NORMAL
HEPATITIS B CORE TOTAL ANTIBODY: NONREACTIVE

## 2019-12-24 PROCEDURE — 99221 1ST HOSP IP/OBS SF/LOW 40: CPT | Performed by: NURSE PRACTITIONER

## 2019-12-24 PROCEDURE — 97530 THERAPEUTIC ACTIVITIES: CPT

## 2019-12-24 PROCEDURE — 97162 PT EVAL MOD COMPLEX 30 MIN: CPT

## 2019-12-24 PROCEDURE — 6370000000 HC RX 637 (ALT 250 FOR IP): Performed by: PSYCHIATRY & NEUROLOGY

## 2019-12-24 PROCEDURE — 6360000002 HC RX W HCPCS: Performed by: SURGERY

## 2019-12-24 PROCEDURE — 1240000000 HC EMOTIONAL WELLNESS R&B

## 2019-12-24 PROCEDURE — 6370000000 HC RX 637 (ALT 250 FOR IP): Performed by: SURGERY

## 2019-12-24 PROCEDURE — 99221 1ST HOSP IP/OBS SF/LOW 40: CPT | Performed by: PSYCHIATRY & NEUROLOGY

## 2019-12-24 RX ORDER — VENLAFAXINE HYDROCHLORIDE 37.5 MG/1
37.5 CAPSULE, EXTENDED RELEASE ORAL
Status: DISCONTINUED | OUTPATIENT
Start: 2019-12-25 | End: 2020-01-02 | Stop reason: HOSPADM

## 2019-12-24 RX ADMIN — CYCLOBENZAPRINE 10 MG: 10 TABLET, FILM COATED ORAL at 08:33

## 2019-12-24 RX ADMIN — OXYCODONE 10 MG: 5 TABLET ORAL at 02:10

## 2019-12-24 RX ADMIN — METHOCARBAMOL TABLETS 1500 MG: 750 TABLET, COATED ORAL at 17:03

## 2019-12-24 RX ADMIN — CARBAMAZEPINE 300 MG: 200 TABLET, EXTENDED RELEASE ORAL at 08:33

## 2019-12-24 RX ADMIN — FLUPHENAZINE HYDROCHLORIDE 5 MG: 5 TABLET, FILM COATED ORAL at 08:32

## 2019-12-24 RX ADMIN — OXYCODONE 10 MG: 5 TABLET ORAL at 20:30

## 2019-12-24 RX ADMIN — OXYCODONE 10 MG: 5 TABLET ORAL at 06:20

## 2019-12-24 RX ADMIN — POLYETHYLENE GLYCOL 3350 17 G: 17 POWDER, FOR SOLUTION ORAL at 08:36

## 2019-12-24 RX ADMIN — PANTOPRAZOLE SODIUM 40 MG: 40 TABLET, DELAYED RELEASE ORAL at 05:59

## 2019-12-24 RX ADMIN — FLUPHENAZINE HYDROCHLORIDE 5 MG: 5 TABLET, FILM COATED ORAL at 13:00

## 2019-12-24 RX ADMIN — DOCUSATE SODIUM 200 MG: 100 CAPSULE, LIQUID FILLED ORAL at 08:32

## 2019-12-24 RX ADMIN — SENNOSIDES 17.2 MG: 8.6 TABLET, FILM COATED ORAL at 20:31

## 2019-12-24 RX ADMIN — ACETAMINOPHEN 650 MG: 325 TABLET, FILM COATED ORAL at 13:00

## 2019-12-24 RX ADMIN — QUETIAPINE FUMARATE 400 MG: 200 TABLET ORAL at 08:36

## 2019-12-24 RX ADMIN — METHOCARBAMOL TABLETS 1500 MG: 750 TABLET, COATED ORAL at 13:00

## 2019-12-24 RX ADMIN — ACETAMINOPHEN 650 MG: 325 TABLET, FILM COATED ORAL at 17:05

## 2019-12-24 RX ADMIN — QUETIAPINE FUMARATE 400 MG: 200 TABLET ORAL at 20:31

## 2019-12-24 RX ADMIN — METHOCARBAMOL TABLETS 1500 MG: 750 TABLET, COATED ORAL at 20:31

## 2019-12-24 RX ADMIN — ACETAMINOPHEN 650 MG: 325 TABLET, FILM COATED ORAL at 10:44

## 2019-12-24 RX ADMIN — TAMSULOSIN HYDROCHLORIDE 0.4 MG: 0.4 CAPSULE ORAL at 08:42

## 2019-12-24 RX ADMIN — ENOXAPARIN SODIUM 30 MG: 30 INJECTION SUBCUTANEOUS at 08:34

## 2019-12-24 RX ADMIN — OXYCODONE 10 MG: 5 TABLET ORAL at 10:45

## 2019-12-24 RX ADMIN — ACETAMINOPHEN 650 MG: 325 TABLET, FILM COATED ORAL at 00:59

## 2019-12-24 RX ADMIN — OXYCODONE 10 MG: 5 TABLET ORAL at 15:53

## 2019-12-24 RX ADMIN — CARBAMAZEPINE 300 MG: 200 TABLET, EXTENDED RELEASE ORAL at 21:16

## 2019-12-24 RX ADMIN — BENZTROPINE MESYLATE 1 MG: 1 TABLET ORAL at 20:31

## 2019-12-24 RX ADMIN — MAGNESIUM HYDROXIDE 30 ML: 400 SUSPENSION ORAL at 21:15

## 2019-12-24 RX ADMIN — METHOCARBAMOL TABLETS 1500 MG: 750 TABLET, COATED ORAL at 08:33

## 2019-12-24 RX ADMIN — ACETAMINOPHEN 650 MG: 325 TABLET, FILM COATED ORAL at 22:46

## 2019-12-24 RX ADMIN — ENOXAPARIN SODIUM 30 MG: 30 INJECTION SUBCUTANEOUS at 20:31

## 2019-12-24 RX ADMIN — BENZTROPINE MESYLATE 1 MG: 1 TABLET ORAL at 08:32

## 2019-12-24 RX ADMIN — DOCUSATE SODIUM 200 MG: 100 CAPSULE, LIQUID FILLED ORAL at 20:32

## 2019-12-24 RX ADMIN — FLUPHENAZINE HYDROCHLORIDE 5 MG: 5 TABLET, FILM COATED ORAL at 23:24

## 2019-12-24 RX ADMIN — POLYETHYLENE GLYCOL 3350 17 G: 17 POWDER, FOR SOLUTION ORAL at 20:32

## 2019-12-24 RX ADMIN — ACETAMINOPHEN 650 MG: 325 TABLET, FILM COATED ORAL at 05:59

## 2019-12-24 ASSESSMENT — PAIN DESCRIPTION - PAIN TYPE
TYPE: ACUTE PAIN
TYPE: ACUTE PAIN

## 2019-12-24 ASSESSMENT — PAIN DESCRIPTION - DESCRIPTORS
DESCRIPTORS: CONSTANT;DISCOMFORT;SHARP;SORE
DESCRIPTORS: ACHING;CONSTANT;DISCOMFORT;SHARP;SORE

## 2019-12-24 ASSESSMENT — PAIN DESCRIPTION - ONSET
ONSET: GRADUAL
ONSET: ON-GOING

## 2019-12-24 ASSESSMENT — PAIN SCALES - GENERAL
PAINLEVEL_OUTOF10: 8
PAINLEVEL_OUTOF10: 0
PAINLEVEL_OUTOF10: 8
PAINLEVEL_OUTOF10: 8
PAINLEVEL_OUTOF10: 0
PAINLEVEL_OUTOF10: 10
PAINLEVEL_OUTOF10: 8
PAINLEVEL_OUTOF10: 6
PAINLEVEL_OUTOF10: 10

## 2019-12-24 ASSESSMENT — SLEEP AND FATIGUE QUESTIONNAIRES
DIFFICULTY STAYING ASLEEP: YES
AVERAGE NUMBER OF SLEEP HOURS: 9
DIFFICULTY ARISING: NO
RESTFUL SLEEP: NO
DO YOU USE A SLEEP AID: NO
DIFFICULTY FALLING ASLEEP: YES

## 2019-12-24 ASSESSMENT — PAIN DESCRIPTION - ORIENTATION
ORIENTATION: LEFT
ORIENTATION: LEFT

## 2019-12-24 ASSESSMENT — PAIN - FUNCTIONAL ASSESSMENT
PAIN_FUNCTIONAL_ASSESSMENT: PREVENTS OR INTERFERES SOME ACTIVE ACTIVITIES AND ADLS
PAIN_FUNCTIONAL_ASSESSMENT: PREVENTS OR INTERFERES SOME ACTIVE ACTIVITIES AND ADLS

## 2019-12-24 ASSESSMENT — LIFESTYLE VARIABLES: HISTORY_ALCOHOL_USE: NO

## 2019-12-24 ASSESSMENT — PAIN DESCRIPTION - PROGRESSION
CLINICAL_PROGRESSION: NOT CHANGED
CLINICAL_PROGRESSION: NOT CHANGED

## 2019-12-24 ASSESSMENT — PAIN DESCRIPTION - FREQUENCY
FREQUENCY: CONTINUOUS
FREQUENCY: CONTINUOUS

## 2019-12-24 NOTE — CARE COORDINATION
Guardianship hearing is set for 2/12/20 at 1:30 pm at [facility].   Electronically signed by Esau Guerrero [address] Viviane on 12/24/2019 at 10:39 AM

## 2019-12-24 NOTE — PROGRESS NOTES
Called office of Dr. Rosie Mckenzie. Office closed for the holiday. Dr. Marialuisa Vázquez would like him to see patient for pain management. Will have to retry on Thursday as office is again closed for the holiday.
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
## 2019-12-24 NOTE — H&P
cyclobenzaprine (FLEXERIL) tablet 10 mg, 10 mg, Oral, TID PRN  docusate sodium (COLACE) capsule 200 mg, 200 mg, Oral, BID  ondansetron (ZOFRAN) injection 4 mg, 4 mg, Intravenous, Q6H PRN  sodium chloride flush 0.9 % injection 10 mL, 10 mL, Intravenous, 2 times per day  acetaminophen (TYLENOL) tablet 650 mg, 650 mg, Oral, Q4H  benztropine (COGENTIN) tablet 1 mg, 1 mg, Oral, BID  bisacodyl (DULCOLAX) suppository 10 mg, 10 mg, Rectal, Daily  carBAMazepine (TEGRETOL XR) extended release tablet 300 mg, 300 mg, Oral, BID  enoxaparin (LOVENOX) injection 30 mg, 30 mg, Subcutaneous, BID  fluPHENAZine HCl (PROLIXIN) tablet 5 mg, 5 mg, Oral, TID  lidocaine 4 % external patch 1 patch, 1 patch, Transdermal, Daily  melatonin tablet 3 mg, 3 mg, Oral, Nightly PRN  methocarbamol (ROBAXIN) tablet 1,500 mg, 1,500 mg, Oral, 4x Daily  metoprolol (LOPRESSOR) injection 5 mg, 5 mg, Intravenous, Q6H PRN  oxyCODONE (ROXICODONE) immediate release tablet 10 mg, 10 mg, Oral, Q4H PRN  pantoprazole (PROTONIX) tablet 40 mg, 40 mg, Oral, QAM AC  polyethylene glycol (GLYCOLAX) packet 17 g, 17 g, Oral, BID  QUEtiapine (SEROQUEL) tablet 400 mg, 400 mg, Oral, BID  senna (SENOKOT) tablet 17.2 mg, 2 tablet, Oral, Nightly  sodium chloride flush 0.9 % injection 10 mL, 10 mL, Intravenous, PRN  tamsulosin (FLOMAX) capsule 0.4 mg, 0.4 mg, Oral, Daily  acetaminophen (TYLENOL) tablet 650 mg, 650 mg, Oral, Q4H PRN  hydrOXYzine (VISTARIL) capsule 50 mg, 50 mg, Oral, TID PRN  OLANZapine (ZYPREXA) tablet 5 mg, 5 mg, Oral, Q4H PRN **OR** OLANZapine (ZYPREXA) injection 10 mg, 10 mg, Intramuscular, Q4H PRN  sterile water injection 2.1 mL, 2.1 mL, Intramuscular, Q4H PRN  traZODone (DESYREL) tablet 50 mg, 50 mg, Oral, Nightly PRN  benztropine mesylate (COGENTIN) injection 2 mg, 2 mg, Intramuscular, BID PRN  magnesium hydroxide (MILK OF MAGNESIA) 400 MG/5ML suspension 30 mL, 30 mL, Oral, Daily PRN  aluminum & magnesium hydroxide-simethicone (MAALOX) 200-200-20 MG/5ML suspension 30 mL, 30 mL, Oral, PRN  nicotine (NICODERM CQ) 21 MG/24HR 1 patch, 1 patch, Transdermal, Daily    Medical Review of Systems:     All other than marked systmes have been reviewed and are all negative. Constitutional Symptoms: []  fever []  Chills  Skin Symptoms: [] rash []  Pruritus   Eye Symptoms: [] Vision unchanged []  recent vision problems[] blurred vision   Respiratory Symptoms:[] shortness of breath [] cough  Cardiovascular Symptoms:  [] chest pain   [] palpitations   Gastrointestinal Symptoms: []  abdominal pain []  nausea []  vomiting []  diarrhea  Genitourinary Symptoms: []  dysuria  []  hematuria   Musculoskeletal Symptoms: [x]  back pain []  muscle pain [x]  joint pain  Neurologic Symptoms: []  headache []  dizziness  Hematolymphoid Symptoms: [] Adenopathy [] Bruises   [] Schimosis       VITALS: BP (!) 162/90   Pulse 120   Temp 98.8 °F (37.1 °C) (Temporal)   Resp 16   Ht 5' 9\" (1.753 m)   Wt 160 lb (72.6 kg)   BMI 23.63 kg/m²     ALLERGIES: Depakote [valproic acid]; Haldol [haloperidol];  Invega sustenna [paliperidone palmitate er]; and Invega [paliperidone]            Physical Examination:    Head:  [x] Atraumatic:  [x] normocephalic  Skin and Mucosa       [] Moist [] Dry [] Pale [x] Normal   Neck: [x] Thyroid [] Palpable    [x] Not palpable []  venus distention [] adenopathy   Chest: [x] Clear [] Rhonchi  [] Wheezing   CV: [x] S1 [x] S2 [x] No murmer   Abdomen:  [x] Soft   [] Tender  [] Viceromegaly   Extremities:  [x] No Edema   [x] left arm in a cast     Cranial Nerves Examination:    CN II: [x] Pupils are reactive to light [x] Pupils are non reactive to light  CN III, IV, VI:[x] No eye deviation  [] No diplopia or ptosis   CN V: [x] Facial Sensation is intact  [] Facial Sensation is not intact   CN IIIV:  [x] Hearing is normal to rubbing fingers   CN IX, X:  [x] Normal gag reflex and phonation   CN XI: [x] Shoulder shrug and neck rotation is normal  CNXII: [x] Tongue is midline no

## 2019-12-24 NOTE — PLAN OF CARE
[facility]  Initial Interdisciplinary Treatment Plan NOTE    Review Date & Time: 12/24/19 1800    Patient was in treatment team    Admission Type:   Admission Type: Probate    Reason for admission:  Reason for Admission: jumped off [address]. suicide attempt      Estimated Length of Stay Update:  3-5 days  Estimated Discharge Date Update: 12/27/19    PATIENT STRENGTHS:  Patient Strengths Strengths: Communication, Positive Support, Connection to output provider, Motivated  Patient Strengths and Limitations:Limitations: Perceives need for assistance with self-care, Tendency to isolate self, Multiple barriers to leisure interests  Addictive Behavior:Addictive Behavior  In the past 3 months, have you felt or has someone told you that you have a problem with:  : None  Do you have a history of Chemical Use?: No  Do you have a history of Alcohol Use?: No  Do you have a history of Street Drug Abuse?: No  Histroy of Prescripton Drug Abuse?: No  Medical Problems:  Past Medical History:   Diagnosis Date    Bipolar 1 disorder ([facility] [address]        EDUCATION:   Learner Progress Toward Treatment Goals: Reviewed results and recommendations of this team    Method: Small group    Outcome: Verbalized understanding    PATIENT GOALS: No goal    PLAN/TREATMENT RECOMMENDATIONS UPDATE: Encourage group participation and continue to provide emotional support. GOALS UPDATE:   Time frame for Short-Term Goals: 1-3 days.     Rosmery Epps RN

## 2019-12-24 NOTE — ED NOTES
PT ACCEPTED BY DR. Pérez Like TO [address] [place] [address], BED [id number]    DISPOSITION CALLED INTO [facility] IN ADMITTING    ACCEPTING INFORMATION GIVEN TO [facility], RN     EVA [address]       DARINEL Zhao, [facility]  12/23/19 1916

## 2019-12-25 LAB
HBV SURFACE AB TITR SER: NORMAL {TITER}
HEPATITIS B SURFACE ANTIGEN INTERPRETATION: NORMAL
HEPATITIS C ANTIBODY INTERPRETATION: NORMAL
HIV-1 AND HIV-2 ANTIBODIES: NORMAL

## 2019-12-25 PROCEDURE — 99231 SBSQ HOSP IP/OBS SF/LOW 25: CPT | Performed by: PSYCHIATRY & NEUROLOGY

## 2019-12-25 PROCEDURE — 6370000000 HC RX 637 (ALT 250 FOR IP): Performed by: SURGERY

## 2019-12-25 PROCEDURE — 1240000000 HC EMOTIONAL WELLNESS R&B

## 2019-12-25 PROCEDURE — 6370000000 HC RX 637 (ALT 250 FOR IP): Performed by: PSYCHIATRY & NEUROLOGY

## 2019-12-25 PROCEDURE — 6360000002 HC RX W HCPCS: Performed by: SURGERY

## 2019-12-25 PROCEDURE — 6370000000 HC RX 637 (ALT 250 FOR IP): Performed by: NURSE PRACTITIONER

## 2019-12-25 RX ADMIN — CARBAMAZEPINE 300 MG: 200 TABLET, EXTENDED RELEASE ORAL at 08:13

## 2019-12-25 RX ADMIN — OXYCODONE 10 MG: 5 TABLET ORAL at 08:09

## 2019-12-25 RX ADMIN — ACETAMINOPHEN 650 MG: 325 TABLET, FILM COATED ORAL at 20:41

## 2019-12-25 RX ADMIN — ACETAMINOPHEN 650 MG: 325 TABLET, FILM COATED ORAL at 17:35

## 2019-12-25 RX ADMIN — BENZTROPINE MESYLATE 1 MG: 1 TABLET ORAL at 20:40

## 2019-12-25 RX ADMIN — VENLAFAXINE HYDROCHLORIDE 37.5 MG: 37.5 CAPSULE, EXTENDED RELEASE ORAL at 08:12

## 2019-12-25 RX ADMIN — METHOCARBAMOL TABLETS 1500 MG: 750 TABLET, COATED ORAL at 16:22

## 2019-12-25 RX ADMIN — METHOCARBAMOL TABLETS 1500 MG: 750 TABLET, COATED ORAL at 20:40

## 2019-12-25 RX ADMIN — HYDROXYZINE PAMOATE 50 MG: 50 CAPSULE ORAL at 20:40

## 2019-12-25 RX ADMIN — QUETIAPINE FUMARATE 400 MG: 200 TABLET ORAL at 20:39

## 2019-12-25 RX ADMIN — CYCLOBENZAPRINE 10 MG: 10 TABLET, FILM COATED ORAL at 19:39

## 2019-12-25 RX ADMIN — TRAZODONE HYDROCHLORIDE 50 MG: 50 TABLET ORAL at 20:40

## 2019-12-25 RX ADMIN — ENOXAPARIN SODIUM 30 MG: 30 INJECTION SUBCUTANEOUS at 20:41

## 2019-12-25 RX ADMIN — CARBAMAZEPINE 300 MG: 200 TABLET, EXTENDED RELEASE ORAL at 20:40

## 2019-12-25 RX ADMIN — OXYCODONE 10 MG: 5 TABLET ORAL at 13:31

## 2019-12-25 RX ADMIN — QUETIAPINE FUMARATE 400 MG: 200 TABLET ORAL at 08:12

## 2019-12-25 RX ADMIN — FLUPHENAZINE HYDROCHLORIDE 5 MG: 5 TABLET, FILM COATED ORAL at 20:40

## 2019-12-25 RX ADMIN — ACETAMINOPHEN 650 MG: 325 TABLET, FILM COATED ORAL at 10:10

## 2019-12-25 RX ADMIN — OXYCODONE 10 MG: 5 TABLET ORAL at 19:38

## 2019-12-25 RX ADMIN — PANTOPRAZOLE SODIUM 40 MG: 40 TABLET, DELAYED RELEASE ORAL at 06:09

## 2019-12-25 RX ADMIN — OXYCODONE 10 MG: 5 TABLET ORAL at 01:29

## 2019-12-25 RX ADMIN — FLUPHENAZINE HYDROCHLORIDE 5 MG: 5 TABLET, FILM COATED ORAL at 13:31

## 2019-12-25 RX ADMIN — FLUPHENAZINE HYDROCHLORIDE 5 MG: 5 TABLET, FILM COATED ORAL at 08:13

## 2019-12-25 RX ADMIN — ACETAMINOPHEN 650 MG: 325 TABLET, FILM COATED ORAL at 13:31

## 2019-12-25 RX ADMIN — ACETAMINOPHEN 650 MG: 325 TABLET, FILM COATED ORAL at 06:11

## 2019-12-25 RX ADMIN — METHOCARBAMOL TABLETS 1500 MG: 750 TABLET, COATED ORAL at 13:31

## 2019-12-25 RX ADMIN — TAMSULOSIN HYDROCHLORIDE 0.4 MG: 0.4 CAPSULE ORAL at 08:14

## 2019-12-25 RX ADMIN — BENZTROPINE MESYLATE 1 MG: 1 TABLET ORAL at 08:12

## 2019-12-25 RX ADMIN — METHOCARBAMOL TABLETS 1500 MG: 750 TABLET, COATED ORAL at 08:12

## 2019-12-25 ASSESSMENT — PAIN DESCRIPTION - PAIN TYPE: TYPE: ACUTE PAIN

## 2019-12-25 ASSESSMENT — PAIN SCALES - GENERAL
PAINLEVEL_OUTOF10: 10
PAINLEVEL_OUTOF10: 10
PAINLEVEL_OUTOF10: 9
PAINLEVEL_OUTOF10: 10
PAINLEVEL_OUTOF10: 6
PAINLEVEL_OUTOF10: 8
PAINLEVEL_OUTOF10: 5
PAINLEVEL_OUTOF10: 10

## 2019-12-25 ASSESSMENT — PAIN DESCRIPTION - FREQUENCY: FREQUENCY: CONTINUOUS

## 2019-12-25 ASSESSMENT — PAIN DESCRIPTION - DESCRIPTORS: DESCRIPTORS: ACHING;CONSTANT

## 2019-12-25 ASSESSMENT — PAIN - FUNCTIONAL ASSESSMENT: PAIN_FUNCTIONAL_ASSESSMENT: PREVENTS OR INTERFERES WITH MANY ACTIVE NOT PASSIVE ACTIVITIES

## 2019-12-25 ASSESSMENT — PAIN SCALES - WONG BAKER: WONGBAKER_NUMERICALRESPONSE: 10

## 2019-12-25 ASSESSMENT — PAIN DESCRIPTION - PROGRESSION: CLINICAL_PROGRESSION: NOT CHANGED

## 2019-12-25 ASSESSMENT — PAIN DESCRIPTION - ONSET: ONSET: ON-GOING

## 2019-12-25 ASSESSMENT — PAIN DESCRIPTION - LOCATION: LOCATION: ARM;PELVIS

## 2019-12-25 ASSESSMENT — PAIN DESCRIPTION - ORIENTATION: ORIENTATION: LOWER;LEFT

## 2019-12-25 NOTE — PLAN OF CARE
Problem: Pain:  Goal: Pain level will decrease  Description  Pain level will decrease  Outcome: Ongoing  Goal: Control of acute pain  Description  Control of acute pain  Outcome: Ongoing      Jonathan Farmer denies any SI, HI, or any Hallucinations at this time. Patient states he is depressed 5/10 and has 8/10 anxiety. Groups are offered and encouraged.

## 2019-12-25 NOTE — PROGRESS NOTES
Patient has been in his room, pleasant, calm, and cooperative. Patient denies all and states \"I just didn't think I would start 2020 like this. \"  Attempted to offer patient positive support and patient stated that \"I'm so stupid. Why would I do this to myself? \"  Patient stated \"I definitely learned that jumping off a bridge isn't smart and antidepressants aren't good either. \"  Patient is encouraged to continue to work towards discharge goal by complying with medications, attending groups and to seek staff if feelings are overwhelming. Environmental rounds completed per unit policy to maintain safety of everyone on the unit. Staff will offer support and interventions as requested or required.

## 2019-12-25 NOTE — PROGRESS NOTES
pain []  muscle pain []  joint pain  Neurologic Symptoms: []  headache []  dizziness  Hematolymphoid Symptoms: [] Adenopathy [] Bruises   [] Schimosis       Mental Status Examination:    Cognition:      [x] Alert  [x] Awake  [x] Oriented  [x] Person  [x] Place [x] Time      [] drowsy  [] tired  [] lethargic  [] distractable  [] Other     Attention/Concentration:   [x] Attentive  [] Distracted        Memory Recent and Remote: [x] Intact   [] Impaired [] Partially Impaired     Language: [x] Able to recognize and name objects          [] Unable to recognize and name Objects    Fund of Knowledge:  [] Poor [x]  Fair  [] Good    Speech: [x] Normal  [x] Soft  [] Slow  [] Fast [] Pressured            [] Loud [] Dysarthria  [] Incoherent    Appearance: [] Well Groomed  [x] Casual Dressed  [] Unkept  [] Disheveled          [] Normal weight[] Thin  [] Overweight  [] Obese           Attitude: [x] Positive  [] Hostile  [] Demanding  [] Guarded  [] Defensive         [x] Cooperative  []  Uncooperative      Behavior:  [] Normal Gait  [] Walks with Assistance  [] Joie Chair    [] Walks with Wendy Wilner  [x] In Hospital Bed  [] Sitting in Chair    Muscle-Skeletal:  [] Normal Muscle Tone [] Muscle Atrophy       [] Abnormal Muscle Movement     Eye Contact:  [x] Good eye contact  [] Intermittent Eye Contact  [] Poor Eye Contact     Mood: [x] Depressed  [x] Anxious  [] Irritated  [] Euthymic   [] Angry [] Restless    Affect:  [x] Congruent  [] Incongruent  [] Labile  [] Constricted  [] Flat  [] Bizarre     Thought Process and Association:  [x] Logical [] Illogical       [x] Linear and Goal Directed  [] Tangential  [] Circumstantial     Thought Content:  [x] Denies [] Endorses [] Suicidal [] Homicidal  [] Delusional      [] Paranoid  [] Somatic  [] Grandiose    Perception: [x]  None  [] Auditory   [] Visual  [] tactile   [] olfactory  [] Illusions         Insight: [] Intact  [] Fair  [x] Limited    Judgement:  [] Intact  [] Fair  [x] Limited      Assessment/Plan:        Patient Active Problem List   Diagnosis Code    Trauma T14.90XA    Injury resulting from fall from height W17.89XA    Closed displaced fracture of pelvis ([address] S32. 9XXA    Shock following injury ([address] T79. 4XXA    Pneumothorax, traumatic S27. 0XXA    Suicidal behavior with attempted self-injury ([address] T14.91XA    Respiratory failure following trauma ([address] J96.90    T12 burst fracture ([address] S22.081A    Closed fracture of shaft of left humerus S42.302A    Transscaphoid perilunate fracture dislocation of left wrist, open, initial encounter S62.002B    Displaced fracture of left radial styloid process, initial encounter for closed fracture S52.512A    Open comminuted fracture of waist of scaphoid bone of left wrist, initial encounter S62.022B    Depression with suicidal ideation F32.9, R45.851    Schizoaffective disorder, bipolar type ([address] F25.0         Plan:    []  Patient is refusing medications  [] Improving as expected   [x] Not improving as expected   [] Worsening    []  At Baseline      Add Trazodone 100 mg PO QHS  Cont other meds      Reason for more than one antipsychotic:  [] N/A  [] 3 failed monotherapy(drugs tried):  [] Cross over to a new antipsychotic  [] Taper to monotherapy from polypharmacy  [] Augmentation of Clozapine therapy due to treatment resistance to single therapy      Signed:   Daphney Patel  12/25/2019  4:23 PM

## 2019-12-25 NOTE — PLAN OF CARE
Problem: Pain:  Goal: Control of acute pain  Description  Control of acute pain  Outcome: Met This Shift     Problem: Suicide risk  Goal: Provide patient with safe environment  Description  Provide patient with safe environment  12/24/2019 2105 by Howard Joe RN  Outcome: Met This Shift

## 2019-12-26 LAB
C. TRACHOMATIS DNA ,URINE: NEGATIVE
N. GONORRHOEAE DNA, URINE: NEGATIVE
SOURCE: NORMAL

## 2019-12-26 PROCEDURE — 99231 SBSQ HOSP IP/OBS SF/LOW 25: CPT | Performed by: PSYCHIATRY & NEUROLOGY

## 2019-12-26 PROCEDURE — 97535 SELF CARE MNGMENT TRAINING: CPT

## 2019-12-26 PROCEDURE — 6370000000 HC RX 637 (ALT 250 FOR IP): Performed by: PSYCHIATRY & NEUROLOGY

## 2019-12-26 PROCEDURE — 6370000000 HC RX 637 (ALT 250 FOR IP): Performed by: SURGERY

## 2019-12-26 PROCEDURE — 6370000000 HC RX 637 (ALT 250 FOR IP): Performed by: PHYSICAL MEDICINE & REHABILITATION

## 2019-12-26 PROCEDURE — 6360000002 HC RX W HCPCS: Performed by: PHYSICAL MEDICINE & REHABILITATION

## 2019-12-26 PROCEDURE — 6360000002 HC RX W HCPCS: Performed by: SURGERY

## 2019-12-26 PROCEDURE — 1240000000 HC EMOTIONAL WELLNESS R&B

## 2019-12-26 PROCEDURE — 97530 THERAPEUTIC ACTIVITIES: CPT

## 2019-12-26 PROCEDURE — 6370000000 HC RX 637 (ALT 250 FOR IP): Performed by: NURSE PRACTITIONER

## 2019-12-26 PROCEDURE — 97112 NEUROMUSCULAR REEDUCATION: CPT

## 2019-12-26 RX ORDER — ERGOCALCIFEROL 1.25 MG/1
50000 CAPSULE ORAL WEEKLY
Status: DISCONTINUED | OUTPATIENT
Start: 2019-12-26 | End: 2020-01-02 | Stop reason: HOSPADM

## 2019-12-26 RX ORDER — OXYCODONE HYDROCHLORIDE 5 MG/1
5 TABLET ORAL EVERY 4 HOURS PRN
Status: DISCONTINUED | OUTPATIENT
Start: 2019-12-26 | End: 2020-01-02 | Stop reason: HOSPADM

## 2019-12-26 RX ORDER — OXYCODONE HCL 10 MG/1
10 TABLET, FILM COATED, EXTENDED RELEASE ORAL EVERY 12 HOURS SCHEDULED
Status: DISCONTINUED | OUTPATIENT
Start: 2019-12-26 | End: 2020-01-02 | Stop reason: HOSPADM

## 2019-12-26 RX ORDER — CALCITONIN SALMON 200 [USP'U]/ML
100 INJECTION, SOLUTION INTRAMUSCULAR; SUBCUTANEOUS DAILY
Status: COMPLETED | OUTPATIENT
Start: 2019-12-26 | End: 2020-01-01

## 2019-12-26 RX ORDER — NICOTINE 21 MG/24HR
1 PATCH, TRANSDERMAL 24 HOURS TRANSDERMAL DAILY
Status: DISCONTINUED | OUTPATIENT
Start: 2019-12-26 | End: 2020-01-02 | Stop reason: HOSPADM

## 2019-12-26 RX ADMIN — CALCITONIN SALMON 100 UNITS: 200 INJECTION, SOLUTION INTRAMUSCULAR; SUBCUTANEOUS at 10:48

## 2019-12-26 RX ADMIN — FLUPHENAZINE HYDROCHLORIDE 5 MG: 5 TABLET, FILM COATED ORAL at 14:16

## 2019-12-26 RX ADMIN — ACETAMINOPHEN 650 MG: 325 TABLET, FILM COATED ORAL at 06:15

## 2019-12-26 RX ADMIN — ACETAMINOPHEN 650 MG: 325 TABLET, FILM COATED ORAL at 21:23

## 2019-12-26 RX ADMIN — METHOCARBAMOL TABLETS 1500 MG: 750 TABLET, COATED ORAL at 10:05

## 2019-12-26 RX ADMIN — ERGOCALCIFEROL 50000 UNITS: 1.25 CAPSULE ORAL at 10:48

## 2019-12-26 RX ADMIN — ACETAMINOPHEN 650 MG: 325 TABLET, FILM COATED ORAL at 17:22

## 2019-12-26 RX ADMIN — OXYCODONE 5 MG: 5 TABLET ORAL at 16:27

## 2019-12-26 RX ADMIN — BENZTROPINE MESYLATE 1 MG: 1 TABLET ORAL at 21:23

## 2019-12-26 RX ADMIN — FLUPHENAZINE HYDROCHLORIDE 5 MG: 5 TABLET, FILM COATED ORAL at 10:06

## 2019-12-26 RX ADMIN — OXYCODONE HYDROCHLORIDE 10 MG: 10 TABLET, FILM COATED, EXTENDED RELEASE ORAL at 21:24

## 2019-12-26 RX ADMIN — TRAZODONE HYDROCHLORIDE 50 MG: 50 TABLET ORAL at 21:22

## 2019-12-26 RX ADMIN — DOCUSATE SODIUM 200 MG: 100 CAPSULE, LIQUID FILLED ORAL at 21:23

## 2019-12-26 RX ADMIN — ACETAMINOPHEN 650 MG: 325 TABLET, FILM COATED ORAL at 14:16

## 2019-12-26 RX ADMIN — POLYETHYLENE GLYCOL 3350 17 G: 17 POWDER, FOR SOLUTION ORAL at 10:04

## 2019-12-26 RX ADMIN — ACETAMINOPHEN 650 MG: 325 TABLET, FILM COATED ORAL at 10:06

## 2019-12-26 RX ADMIN — METHOCARBAMOL TABLETS 1500 MG: 750 TABLET, COATED ORAL at 21:23

## 2019-12-26 RX ADMIN — SENNOSIDES 17.2 MG: 8.6 TABLET, FILM COATED ORAL at 21:22

## 2019-12-26 RX ADMIN — ENOXAPARIN SODIUM 30 MG: 30 INJECTION SUBCUTANEOUS at 21:22

## 2019-12-26 RX ADMIN — DOCUSATE SODIUM 200 MG: 100 CAPSULE, LIQUID FILLED ORAL at 10:05

## 2019-12-26 RX ADMIN — METHOCARBAMOL TABLETS 1500 MG: 750 TABLET, COATED ORAL at 13:12

## 2019-12-26 RX ADMIN — ENOXAPARIN SODIUM 30 MG: 30 INJECTION SUBCUTANEOUS at 10:33

## 2019-12-26 RX ADMIN — OXYCODONE 10 MG: 5 TABLET ORAL at 02:26

## 2019-12-26 RX ADMIN — TAMSULOSIN HYDROCHLORIDE 0.4 MG: 0.4 CAPSULE ORAL at 10:05

## 2019-12-26 RX ADMIN — PANTOPRAZOLE SODIUM 40 MG: 40 TABLET, DELAYED RELEASE ORAL at 06:15

## 2019-12-26 RX ADMIN — FLUPHENAZINE HYDROCHLORIDE 5 MG: 5 TABLET, FILM COATED ORAL at 21:23

## 2019-12-26 RX ADMIN — OXYCODONE 10 MG: 5 TABLET ORAL at 08:10

## 2019-12-26 RX ADMIN — CARBAMAZEPINE 300 MG: 200 TABLET, EXTENDED RELEASE ORAL at 21:23

## 2019-12-26 RX ADMIN — QUETIAPINE FUMARATE 400 MG: 200 TABLET ORAL at 21:23

## 2019-12-26 RX ADMIN — POLYETHYLENE GLYCOL 3350 17 G: 17 POWDER, FOR SOLUTION ORAL at 21:22

## 2019-12-26 RX ADMIN — BENZTROPINE MESYLATE 1 MG: 1 TABLET ORAL at 10:05

## 2019-12-26 RX ADMIN — HYDROXYZINE PAMOATE 50 MG: 50 CAPSULE ORAL at 21:22

## 2019-12-26 RX ADMIN — OXYCODONE 5 MG: 5 TABLET ORAL at 12:26

## 2019-12-26 RX ADMIN — METHOCARBAMOL TABLETS 1500 MG: 750 TABLET, COATED ORAL at 17:21

## 2019-12-26 RX ADMIN — VENLAFAXINE HYDROCHLORIDE 37.5 MG: 37.5 CAPSULE, EXTENDED RELEASE ORAL at 10:06

## 2019-12-26 RX ADMIN — CARBAMAZEPINE 300 MG: 200 TABLET, EXTENDED RELEASE ORAL at 10:06

## 2019-12-26 RX ADMIN — QUETIAPINE FUMARATE 400 MG: 200 TABLET ORAL at 10:05

## 2019-12-26 ASSESSMENT — PAIN DESCRIPTION - FREQUENCY
FREQUENCY: CONTINUOUS
FREQUENCY: CONTINUOUS

## 2019-12-26 ASSESSMENT — PAIN DESCRIPTION - DESCRIPTORS
DESCRIPTORS: ACHING;CONSTANT;DISCOMFORT
DESCRIPTORS: ACHING;CONSTANT
DESCRIPTORS: ACHING;CONSTANT;DISCOMFORT
DESCRIPTORS: ACHING;CONSTANT;DISCOMFORT
DESCRIPTORS: ACHING;CONSTANT
DESCRIPTORS: ACHING;CONSTANT;DISCOMFORT

## 2019-12-26 ASSESSMENT — PAIN SCALES - GENERAL
PAINLEVEL_OUTOF10: 7
PAINLEVEL_OUTOF10: 9
PAINLEVEL_OUTOF10: 8
PAINLEVEL_OUTOF10: 9
PAINLEVEL_OUTOF10: 9
PAINLEVEL_OUTOF10: 8
PAINLEVEL_OUTOF10: 9
PAINLEVEL_OUTOF10: 8
PAINLEVEL_OUTOF10: 7
PAINLEVEL_OUTOF10: 8
PAINLEVEL_OUTOF10: 9

## 2019-12-26 ASSESSMENT — PAIN DESCRIPTION - ORIENTATION
ORIENTATION: LOWER
ORIENTATION: LEFT;LOWER

## 2019-12-26 ASSESSMENT — PAIN DESCRIPTION - PAIN TYPE
TYPE: SURGICAL PAIN
TYPE: SURGICAL PAIN
TYPE: ACUTE PAIN;SURGICAL PAIN
TYPE: ACUTE PAIN;SURGICAL PAIN

## 2019-12-26 ASSESSMENT — PAIN DESCRIPTION - ONSET
ONSET: ON-GOING
ONSET: ON-GOING

## 2019-12-26 ASSESSMENT — PAIN DESCRIPTION - LOCATION
LOCATION: BACK;PELVIS
LOCATION: BACK;PELVIS
LOCATION: ARM;BACK
LOCATION: BACK;PELVIS

## 2019-12-26 ASSESSMENT — PAIN DESCRIPTION - PROGRESSION
CLINICAL_PROGRESSION: NOT CHANGED
CLINICAL_PROGRESSION: NOT CHANGED

## 2019-12-26 NOTE — PROGRESS NOTES
OT BEDSIDE TREATMENT NOTE      Date:2019  Patient Name: Shannon Parra  MRN: [id number]  : 1995  Room: [address] [place], [place] [address]       Evaluating OT: MIKHAIL Iyer, OTR/L #[id number]     AM-PAC Daily Activity Raw Score:  10/24  Recommended Adaptive Equipment:  TBD at next level of care       Reason for Admission:  Pt was admitted after jumping of a bridge     Diagnosis:  Trauma  Injuries Sustained:    - Left midshaft humerus fracture, closed. - Left transscaphoid perilunate open fracture dislocation, grade 2, with extruded proximal scaphoid and lunate.  - Complex 3 cm open wound, volar wrist/hand.  - Left radial styloid fracture. - Left pelvic ring fracture disruption, lateral compression type 2 pattern, with large crescent fracture and complete fracture through ileum into the anterior superior sacroiliac joint with comminution.  - Left anterior pelvic ring fracture, junctional rami, superiorly, and  comminuted inferior rami into symphysial body   - Right foot 1st digit distal phalanx avulsion fx  - T12 burst fracture     Procedures this admission:    19:    I&D Left Wrist, Placement of External Fixator  19:    ORIF Left Pelvis w/ Dr. Mike De La Rosa  12-10-19:  T10-L1 Posterior Lateral Fusion w/ Dr. Bhargav Cuevas   19:  Left Humerus ORIF w/ Dr. Mike De La Rosa     Pertinent Medical History:  Bipolar D/O     Precautions:    Falls          Suicide Precautions -   External Fixator Left Wrist - NWB L UE  NWB L LE  WBAT R LE w/ surgical shoe per Dr. Crow Campoverde   Spinal Precautions - Custom TLSO when [address]. Living: Pt lives with his Grandmother and her  in a 2-story house with 2 PETROS and 1 HR/s.  Bed/bath on the 2nd floor   Bathroom setup:  Tub-Shower, standard commode   Equipment owned:  None     Available Family Assist:  Grandmother     Prior Level of Function:  IND with ADLs, IADLs, Transfers and Mobility using No AD for ambulation.    Driving:  ??  Occupation:  ??     Pain Level:  10/10 throughout;  Nsg Notified      Cognition: A & O x 4   Able to Follow Multi-Step Commands w/ Min-Mod VCs              Memory:  good (-)              Sequencing:  poor              Problem solving:  Poor+              Judgement/safety:  poor+        Functional Assessment:    Initial Eval Status  Date: 12-11-19 Treatment Status  Date: 12/26/19 Short Term Goals  Treatment frequency: PRN 2-4 x/week   Feeding Currently NPO for OR     Per report, Able to use R Dominant UE to feed self, required Good set up of tray, assist w/ positioning d/t limited elevation of HOB w/o TLSO Setup    Mod I   Grooming Min A/Good Set up     Pt able to wash face, brush teeth w/ Good Set up, use of R UE, Required assist to wash hands d/t inability to use L UE for ax -bed level d/t position restrictions  Setup    To wash face and comb hair while sitting up in the chair. SUP   UB Dressing Max A/Set up     Required Max A to don/doff garment in supine, assist to thread L UE, assist for bed mobility and assist to arrange garment around back, assist w/ all fasteners d/t inability to utilize L UE - in supine d/t position restrictions  Max A;     Mod A to don/doff gown while supine, Max A to don/doff TLSO rolling side to side. Mod A   LB Dressing DEP     Max A to don/doff socks in supine  Max A of 1-2 to facilitate log-rolling + Max A of 1 to don/doff garment - bed level d/t WB and Position restrictions  Dep;     To julius/doff socks while supine Mod A of 2   Bathing DEP     Max A of 1-2 to facilitate log-rolling + Max A of 1 for bathing task in supine  Dep; Pt requires Dep A due to poor motivation and self limiting.   Mod A of 2   Toileting DEP     Max A of 1-2 to facilitate log-rolling + Max A of 1 for placement of bedpan, bowel hygiene and clothing adjustment Mod A    With use of urinal.    Mod A of 2   Bed Mobility  Rolling:  Max A of 1-2  Repositioning:  Max A of 2 toward HOB in supine   Supine to Sit:  NT    Sit to Supine:  NT       Pt ed/demo for Log-Rolling Tech, assist of 2 for safety, unable to don TLSO for transfers Max A x2- supine to sit    Educated pt on technique to increase independence while maintaining precautions.       Mod A of 2     When medically cleared to don TLSO - CTs and drain Removed   Functional Transfers Sit to stand:  NT  Stand to sit:  NT       Per conversation w/ Orthotist, pt is unable to don TLSO with Chest tubes in place.  Per NS, pt unable to advance HOB > 45*/sit EOB w/o wearing of TLSO Dep A x 2- sit<->stand    Pt requires Dep x 2 with max verbal prompting for proper WBing precautions. Max A of 2     When medically cleared to don TLSO   Functional Mobility NT       Due to pt not assisting to help with sit to stands mobility was not tested. Max A of 2     When medically cleared to don TLSO   Balance Sitting:  NT     Standing:  NT  Sitting: SBA  Standing: Dep x2        Activity Tolerance Tolerated Sitting:  NT  Tolerated Standing:  NT  Poor-     Visual/  Perceptual WFL  Glasses:  No        Hearing WFL  Hearing Aids  No            Comments: Upon arrival pt supine in bed. Pt educated on adaptive techniques to increase independence and safety during ADL's, bed mobility, and functional transfers while maintaining precautions Pt left seated in chair position in the bed, call light within reach and tray table next to bed. · Pt has made fair progress towards set goals.    · Continue with current plan of care    Time in: 3:55  Time out: 4:20  Total Tx Time: 25 minutes    Barby Sim [address], [address]

## 2019-12-26 NOTE — PLAN OF CARE
Problem: Pain:  Goal: Control of acute pain  Description  Control of acute pain  12/26/2019 1035 by Jeronimo Mancuso RN  Outcome: Met This Shift  12/26/2019 0321 by Krsitin Nguyen  Outcome: Ongoing  12/26/2019 0039 by Staci Terry RN  Outcome: Ongoing     Problem: Suicide risk  Goal: Provide patient with safe environment  Description  Provide patient with safe environment  12/26/2019 1035 by Jeronimo Mancuso RN  Outcome: Met This Shift  12/26/2019 0039 by Staci Terry RN  Outcome: Met This Shift     Problem: Falls - Risk of:  Goal: Absence of physical injury  Description  Absence of physical injury  Outcome: Met This Shift     Patient denies SI/HI/Hallucinations. Patient appears flat, sad, and anxious. Patient reports feeling depressed and anxious because he is in the hospital. Patient calm and cooperative during conversation. Patient reported that he has not been getting up out of bed due to severe pain, which causes patient to be isolative to his room. Attempted to get patient out of bed for breakfast, but was unsuccessful. Patient has good bed mobility and was able to get to the side of the bed with assistance, but his knees buckled twice and was unable to stand. PT/OT following. Patient taking prescribed medications and eating provided meals.

## 2019-12-26 NOTE — PROGRESS NOTES
Called and spoke with Dr. Alison Alarcon regarding pain management consult. He will see patient today following rounds.

## 2019-12-26 NOTE — PROGRESS NOTES
Physical Therapy    Facility/Department: [address] ACUTE [facility] 2  Rx. Note   NAME: Priyank Fuller  : 1995  MRN: [id number]    Date of Service: 2019  Evaluating Therapist: Alycia Jeronimo PT    Room #: 56-A  DIAGNOSIS: Trauma, suicidal ideation  PRECAUTIONS: Fall risk, spinal with TLSO when OOB or HOB >45°, NWB LUE, NWB LLE, WBAT RLE with hard cast shoe, suicide  HPI: The pt jumped from an overpass on  resulting in the following:   - Left midshaft humerus fracture, closed. - Left transscaphoid perilunate open fracture dislocation, grade 2, with extruded proximal scaphoid and lunate. - Complex 3 cm open wound, volar wrist/hand.  - Left radial styloid fracture. - Left pelvic ring fracture disruption, lateral compression type 2 pattern, with large crescent fracture and complete fracture through ileum into the anterior superior sacroiliac joint with comminution.  - Left anterior pelvic ring fracture, junctional rami, superiorly, and  comminuted inferior rami into symphysial body  - T12 burst fracture  PROCEDURE(S):    Application of left wrist spanning external fixation  19 Left posterior pelvis fracture disruption through ilium and sacroiliac joint open reduction and internal fixation  12/10/19 T10-L1  Posterior lateral fusion   19 L humerus ORIF    Social:  Pt lives with his grandparents in a 2 floor plan 2 steps and no rail to enter with a flight of steps with no rail to the 2nd floor bath/bedroom. Prior to admission pt walked with no AD and was Independent. Initial Evaluation  19 Treatment  Date:  See above  Short Term/ Long Term   Goals   Was pt agreeable to Eval/treatment? Yes, with encouragement yes    Does pt have pain? 10/10 LUE pain 10/10 pain  Back, pelvis    Bed Mobility  Rolling: Mod A  Supine to sit: Max A x2  Sit to supine: Max A x2  Scooting:  Max A Rolling max  Supine to sit max 2  Sit to supine dep 2  Scooting dep 2 Min A   Transfers Sit to stand: Max A x2 (bed elevated) to a R hemipost walker  Stand to sit: Max A  Stand pivot: NT Attempted to stand pt 2 times and not able and pt not making any effort to assist  Min A   Ambulation   NT NT 20 feet with R hemiwalker with Min A   Stair negotiation: ascended and descended NT NT    AM-PAC Raw Score 7/24 7/24        Pt is alert and oriented x 3  Balance: sitting eob sba    Pt performed therapeutic exercise of the following:  Quad sets    Patient education  Pt was educated on  scooting    Patient response to education:   Pt verbalized understanding Pt demonstrated skill Pt requires further education in this area   nt nt x     Additional Comments: pt not making any effort to use his right ue and le to assist with standing or scooting. Tried standing 2 times and on 2nd attempt with max/dep 2 pt not uprighting trunk not able to maintain wbing. Attempted to scoot along eob and pt not attempting to scoot down the eob. Pt returned to supine  And pt demonstrated quad sets and glut sets. Instructed to do intermittent within tolerance through out the day. Pt with brace on and hob elevated,. Nursing ok with taking brace off after pt sit up a while in bed. Pt was left in bed with call light left by patient. Chair/bed alarm: x    Time in: 355  Time out:420    Pt is making poor progress toward established Physical Therapy goals. Continue with physical therapy current plan of care. Sarah Norwood, P.T. A.   License Number: [id number]

## 2019-12-26 NOTE — PROGRESS NOTES
Patient can be demanding. Patient continuously on the call light, making multiple requests. PT/OT attempted to get patient out of bed and up to the wheelchair, but per PT they were unable to transfer patient. Per PT, patient to sit in a chair position in the bed with back brace on to help tolerate and adjust to wearing brace. Approximately 15 minutes later patient put on call light and removed back brace without assistance because \"there is a new dressing on his back and it hurts. \"

## 2019-12-26 NOTE — CONSULTS
[address]                  [address] [place],  [facility]                                  CONSULTATION    PATIENT NAME: Brittany Alexis           :        1995  MED REC NO:   [id number]                            ROOM:       7301  ACCOUNT NO:   [de-identified]                           ADMIT DATE: 2019  PROVIDER:     Sarah Almaguer MD    CONSULT DATE:  2019    AGE:  24. SEX:  Male. CHIEF COMPLAINT:  Pain. HISTORY OF PRESENT ILLNESS:  The patient apparently attempted suicide by  jumping off of a bridge. He was admitted to [facility] on about  2019. He had multiple fractures at that point including a T12  fracture, a pelvic fracture, and fractured left arm. He underwent ORIF  of the wrist, ORIF of the pelvic ring, and a spinal fusion from T10 to  L1. He tolerated those surgeries well but is still reporting severe  pain. He was transferred to Psych and I saw him on the Psych Unit. He  has been evaluated by Therapy. He is nonweightbearing on the left upper  and left lower extremity, weightbearing as tolerated on the right. He  is at a maximal assist level with his functioning and I was asked to see  him in terms of pain, which he reports to me as severe. He looks a  little bit anxious and restless. I am not sure how accurate his report  of the pain is, but there is probably significant psychogenic overlay in  terms of his anxiety level and his underlying depression and  schizoaffective disorder. PAST MEDICAL HISTORY:  1. Depression. 2.  Schizoaffective disorder. ALLERGIES:  DEPAKOTE, HALDOL, INVEGA.     CURRENT MEDICATIONS:  Cogentin 1 mg b.i.d., Tegretol 300 mg b.i.d.,  Lovenox 30 mg b.i.d., Prolixin 5 mg t.i.d., Robaxin 1500 mg q.i.d.,  Nicoderm patch, Protonix 40 mg daily, Seroquel 400 mg b.i.d., Flomax 0.4  mg daily, Effexor 37.5 mg daily, Flexeril t.i.d. p.r.n., Zyprexa p.r.n.,  and oxycodone 10

## 2019-12-27 PROCEDURE — 99231 SBSQ HOSP IP/OBS SF/LOW 25: CPT | Performed by: PSYCHIATRY & NEUROLOGY

## 2019-12-27 PROCEDURE — 97530 THERAPEUTIC ACTIVITIES: CPT

## 2019-12-27 PROCEDURE — 6360000002 HC RX W HCPCS: Performed by: SURGERY

## 2019-12-27 PROCEDURE — 6370000000 HC RX 637 (ALT 250 FOR IP): Performed by: NURSE PRACTITIONER

## 2019-12-27 PROCEDURE — 1240000000 HC EMOTIONAL WELLNESS R&B

## 2019-12-27 PROCEDURE — 6370000000 HC RX 637 (ALT 250 FOR IP): Performed by: PSYCHIATRY & NEUROLOGY

## 2019-12-27 PROCEDURE — 97535 SELF CARE MNGMENT TRAINING: CPT

## 2019-12-27 PROCEDURE — 6370000000 HC RX 637 (ALT 250 FOR IP): Performed by: SURGERY

## 2019-12-27 PROCEDURE — 6370000000 HC RX 637 (ALT 250 FOR IP): Performed by: PHYSICAL MEDICINE & REHABILITATION

## 2019-12-27 PROCEDURE — 6360000002 HC RX W HCPCS: Performed by: PHYSICAL MEDICINE & REHABILITATION

## 2019-12-27 RX ADMIN — CALCITONIN SALMON 100 UNITS: 200 INJECTION, SOLUTION INTRAMUSCULAR; SUBCUTANEOUS at 09:24

## 2019-12-27 RX ADMIN — MELATONIN 3 MG ORAL TABLET 3 MG: 3 TABLET ORAL at 04:40

## 2019-12-27 RX ADMIN — METHOCARBAMOL TABLETS 1500 MG: 750 TABLET, COATED ORAL at 09:25

## 2019-12-27 RX ADMIN — DOCUSATE SODIUM 200 MG: 100 CAPSULE, LIQUID FILLED ORAL at 09:25

## 2019-12-27 RX ADMIN — ACETAMINOPHEN 650 MG: 325 TABLET, FILM COATED ORAL at 04:40

## 2019-12-27 RX ADMIN — CARBAMAZEPINE 300 MG: 200 TABLET, EXTENDED RELEASE ORAL at 09:25

## 2019-12-27 RX ADMIN — OXYCODONE HYDROCHLORIDE 10 MG: 10 TABLET, FILM COATED, EXTENDED RELEASE ORAL at 09:25

## 2019-12-27 RX ADMIN — HYDROXYZINE PAMOATE 50 MG: 50 CAPSULE ORAL at 20:41

## 2019-12-27 RX ADMIN — ACETAMINOPHEN 650 MG: 325 TABLET, FILM COATED ORAL at 13:56

## 2019-12-27 RX ADMIN — DOCUSATE SODIUM 200 MG: 100 CAPSULE, LIQUID FILLED ORAL at 20:42

## 2019-12-27 RX ADMIN — POLYETHYLENE GLYCOL 3350 17 G: 17 POWDER, FOR SOLUTION ORAL at 09:24

## 2019-12-27 RX ADMIN — TRAZODONE HYDROCHLORIDE 50 MG: 50 TABLET ORAL at 20:41

## 2019-12-27 RX ADMIN — QUETIAPINE FUMARATE 400 MG: 200 TABLET ORAL at 09:25

## 2019-12-27 RX ADMIN — OXYCODONE 5 MG: 5 TABLET ORAL at 18:26

## 2019-12-27 RX ADMIN — VENLAFAXINE HYDROCHLORIDE 37.5 MG: 37.5 CAPSULE, EXTENDED RELEASE ORAL at 09:25

## 2019-12-27 RX ADMIN — QUETIAPINE FUMARATE 400 MG: 200 TABLET ORAL at 20:41

## 2019-12-27 RX ADMIN — ACETAMINOPHEN 650 MG: 325 TABLET, FILM COATED ORAL at 09:24

## 2019-12-27 RX ADMIN — TAMSULOSIN HYDROCHLORIDE 0.4 MG: 0.4 CAPSULE ORAL at 09:24

## 2019-12-27 RX ADMIN — FLUPHENAZINE HYDROCHLORIDE 5 MG: 5 TABLET, FILM COATED ORAL at 09:25

## 2019-12-27 RX ADMIN — ENOXAPARIN SODIUM 30 MG: 30 INJECTION SUBCUTANEOUS at 20:41

## 2019-12-27 RX ADMIN — OXYCODONE 5 MG: 5 TABLET ORAL at 13:56

## 2019-12-27 RX ADMIN — BENZTROPINE MESYLATE 1 MG: 1 TABLET ORAL at 09:25

## 2019-12-27 RX ADMIN — BENZTROPINE MESYLATE 1 MG: 1 TABLET ORAL at 20:42

## 2019-12-27 RX ADMIN — CARBAMAZEPINE 300 MG: 200 TABLET, EXTENDED RELEASE ORAL at 20:42

## 2019-12-27 RX ADMIN — FLUPHENAZINE HYDROCHLORIDE 5 MG: 5 TABLET, FILM COATED ORAL at 20:41

## 2019-12-27 RX ADMIN — SENNOSIDES 17.2 MG: 8.6 TABLET, FILM COATED ORAL at 20:41

## 2019-12-27 RX ADMIN — METHOCARBAMOL TABLETS 1500 MG: 750 TABLET, COATED ORAL at 13:55

## 2019-12-27 RX ADMIN — ACETAMINOPHEN 650 MG: 325 TABLET, FILM COATED ORAL at 20:41

## 2019-12-27 RX ADMIN — METHOCARBAMOL TABLETS 1500 MG: 750 TABLET, COATED ORAL at 20:41

## 2019-12-27 RX ADMIN — OXYCODONE HYDROCHLORIDE 10 MG: 10 TABLET, FILM COATED, EXTENDED RELEASE ORAL at 20:42

## 2019-12-27 RX ADMIN — PANTOPRAZOLE SODIUM 40 MG: 40 TABLET, DELAYED RELEASE ORAL at 06:34

## 2019-12-27 RX ADMIN — ENOXAPARIN SODIUM 30 MG: 30 INJECTION SUBCUTANEOUS at 09:32

## 2019-12-27 RX ADMIN — FLUPHENAZINE HYDROCHLORIDE 5 MG: 5 TABLET, FILM COATED ORAL at 13:55

## 2019-12-27 RX ADMIN — METHOCARBAMOL TABLETS 1500 MG: 750 TABLET, COATED ORAL at 18:26

## 2019-12-27 RX ADMIN — OXYCODONE 5 MG: 5 TABLET ORAL at 04:39

## 2019-12-27 RX ADMIN — ACETAMINOPHEN 650 MG: 325 TABLET, FILM COATED ORAL at 18:27

## 2019-12-27 ASSESSMENT — PAIN SCALES - GENERAL
PAINLEVEL_OUTOF10: 8
PAINLEVEL_OUTOF10: 10
PAINLEVEL_OUTOF10: 10
PAINLEVEL_OUTOF10: 8

## 2019-12-27 ASSESSMENT — PAIN DESCRIPTION - DESCRIPTORS: DESCRIPTORS: ACHING;CONSTANT;DISCOMFORT;SHARP

## 2019-12-27 ASSESSMENT — PAIN DESCRIPTION - PAIN TYPE: TYPE: ACUTE PAIN

## 2019-12-27 ASSESSMENT — PAIN DESCRIPTION - FREQUENCY: FREQUENCY: CONTINUOUS

## 2019-12-27 ASSESSMENT — PAIN DESCRIPTION - ONSET: ONSET: ON-GOING

## 2019-12-27 ASSESSMENT — PAIN DESCRIPTION - PROGRESSION: CLINICAL_PROGRESSION: NOT CHANGED

## 2019-12-27 ASSESSMENT — PAIN DESCRIPTION - ORIENTATION: ORIENTATION: OTHER (COMMENT)

## 2019-12-27 ASSESSMENT — PAIN - FUNCTIONAL ASSESSMENT: PAIN_FUNCTIONAL_ASSESSMENT: PREVENTS OR INTERFERES WITH MANY ACTIVE NOT PASSIVE ACTIVITIES

## 2019-12-27 NOTE — PROGRESS NOTES
to stand: Max A x2 (bed elevated) to a R hemipost walker  Stand to sit: Max A  Stand pivot: NT   Sit to stand   Max 2    Stand to sit max 2  Stand pivot  nt - safety Min A   Ambulation   NT NT 20 feet with R hemiwalker with Min A   Stair negotiation: ascended and descended NT NT    AM-PAC Raw Score 7/24 7/24        Pt is alert and oriented x 3  Balance: sitting eob sba    Pt performed therapeutic exercise of the following:      Patient education  Pt was educated on  Scooting along eob and sit to stand transfers and donning brace    Patient response to education:   Pt verbalized understanding Pt demonstrated skill Pt requires further education in this area   nt nt x     Additional Comments:   Pt more receptive to therapy today. Pt attempting to assist with standing and able to get pt standing 2 x and had pt try to scoot along eob and pt knee on right buckled and pt not maintaining NWB without assist.  Pt instructed in scooting and he scooted fwd closer to eob. Had to move pt back more on the bed and then with assist of 2 pt was able to initiate a scoot. Pt requires a lot of instruction and at times difficulty understanding what he is going to do. Encouraged pt to sit up longer then 10 min in brace today. Educated pt on rehab. Nursing will remove brace when pt ready and they are aware. Pt was left in bed with call light left by patient. Chair/bed alarm: x    Time in: 105  Time out:200    Pt is making poor progress toward established Physical Therapy goals. Continue with physical therapy current plan of care. Rosa Skiff, P.T. A.   License Number: [id number]

## 2019-12-27 NOTE — PROGRESS NOTES
OT BEDSIDE TREATMENT NOTE      Date:2019  Patient Name: Amy Osborne  MRN: [id number]  : 1995  Room: [address] [place] [address]A       Evaluating OT: Cornelio StaleyMIKHAIL, OTR/L #[id number]     AM-PAC Daily Activity Raw Score:    Recommended Adaptive Equipment:  TBD at next level of care       Reason for Admission:  Pt was admitted after jumping of a bridge     Diagnosis:  Trauma  Injuries Sustained:    - Left midshaft humerus fracture, closed. - Left transscaphoid perilunate open fracture dislocation, grade 2, with extruded proximal scaphoid and lunate.  - Complex 3 cm open wound, volar wrist/hand.  - Left radial styloid fracture. - Left pelvic ring fracture disruption, lateral compression type 2 pattern, with large crescent fracture and complete fracture through ileum into the anterior superior sacroiliac joint with comminution.  - Left anterior pelvic ring fracture, junctional rami, superiorly, and  comminuted inferior rami into symphysial body   - Right foot 1st digit distal phalanx avulsion fx  - T12 burst fracture     Procedures this admission:    19:    I&D Left Wrist, Placement of External Fixator  19:    ORIF Left Pelvis w/ Dr. Gary Sosa  12-10-19:  T10-L1 Posterior Lateral Fusion w/ Dr. Imelda Clements   19:  Left Humerus ORIF w/ Dr. Gary Sosa     Pertinent Medical History:  Bipolar D/O     Precautions:    Falls          Suicide Precautions -   External Fixator Left Wrist - NWB L UE  NWB L LE  WBAT R LE w/ surgical shoe per Dr. Stacey Mendosa   Spinal Precautions - Custom TLSO when [address]. Living: Pt lives with his Grandmother and her  in a 2-story house with 2 PETROS and 1 HR/s.  Bed/bath on the 2nd floor   Bathroom setup:  Tub-Shower, standard commode   Equipment owned:  None     Available Family Assist:  Grandmother     Prior Level of Function:  IND with ADLs, IADLs, Transfers and Mobility using No AD for ambulation.    Driving:  ??  Occupation:  ??     Pain Level:  Pt complaining of 8/10back pain this session      Cognition: A & O x 4   Able to Follow Multi-Step Commands w/ Min-Mod VCs              Memory:  good (-)              Sequencing:  poor              Problem solving:  Poor+              Judgement/safety:  poor+        Functional Assessment:    Initial Eval Status  Date: 12-11-19 Treatment Status  Date: 12/26/19 Short Term Goals  Treatment frequency: PRN 2-4 x/week   Feeding Currently NPO for OR     Per report, Able to use R Dominant UE to feed self, required Good set up of tray, assist w/ positioning d/t limited elevation of HOB w/o TLSO Setup   Pt eating and opening of vanilla ice cream with use of LUE Mod I   Grooming Min A/Good Set up     Pt able to wash face, brush teeth w/ Good Set up, use of R UE, Required assist to wash hands d/t inability to use L UE for ax -bed level d/t position restrictions  Setup    To wash face and comb hair while sitting up in the chair.   SUP   UB Dressing Max A/Set up     Required Max A to don/doff garment in supine, assist to thread L UE, assist for bed mobility and assist to arrange garment around back, assist w/ all fasteners d/t inability to utilize L UE - in supine d/t position restrictions  Max A     Sanjay/doff TLSO brace Mod A   LB Dressing DEP     Max A to don/doff socks in supine  Max A of 1-2 to facilitate log-rolling + Max A of 1 to don/doff garment - bed level d/t WB and Position restrictions  Dep;     To sanjay/doff socks while supine Mod A of 2   Bathing DEP     Max A of 1-2 to facilitate log-rolling + Max A of 1 for bathing task in supine  DNT  Dependent per previous level    Grandmother present upon arrival, stating she just washed up pt and placed of new gown/pants on pt Mod A of 2   Toileting DEP     Max A of 1-2 to facilitate log-rolling + Max A of 1 for placement of bedpan, bowel hygiene and clothing adjustment Eva  Pt using of urinal while lying supine in bed Mod A of 2   Bed Mobility  Rolling:  Max A of

## 2019-12-27 NOTE — PROGRESS NOTES
2:30- 3:30  Attended and participated in afternoon leisure activity. Pleasant and social during reminiscence sierra. Was 1 of 9 in attendance.

## 2019-12-27 NOTE — PLAN OF CARE
Problem: Suicide risk  Goal: Provide patient with safe environment  Description  Provide patient with safe environment  56/93/1327 [id number] by Chino Mcdonald RN  Outcome: Met This Shift     Problem: Falls - Risk of:  Goal: Will remain free from falls  Description  Will remain free from falls  Outcome: Met This Shift     Problem: Pain:  Goal: Control of acute pain  Description  Control of acute pain  71/48/3803 2640 by Chino Mcdonald RN  Outcome: Ongoing

## 2019-12-27 NOTE — PROGRESS NOTES
Pt cooperative during assessment. Affect is flat. Pt appears sad. Denies SI/HI/AV hallucinations. Pt described his depression 2/10 and his anxiety 5/10. Medicated appropriately per PRN Vistaril. Pt stated \"Im really regretting jumping from that bridge. I want to get better and go home\". Educated on incentive spirometer. Pt verbalized understanding and demonstrated use. Will continue to monitor and offer support.

## 2019-12-27 NOTE — PROGRESS NOTES
[facility]  Day 3 Interdisciplinary Treatment Plan NOTE    Review Date & Time: 12/26/19 1830    Patient was in treatment team    Admission Type:   Admission Type: Probate    Reason for admission:  Reason for Admission: jumped off [address] bridge.  suicide attempt  Estimated Length of Stay Update:  3-5 days  Estimated Discharge Date Update: 12/28/19    PATIENT STRENGTHS:  Patient Strengths Strengths: Communication, Positive Support, Connection to output provider, Motivated  Patient Strengths and Limitations:Limitations: Perceives need for assistance with self-care, Tendency to isolate self, Multiple barriers to leisure interests  Addictive Behavior:Addictive Behavior  In the past 3 months, have you felt or has someone told you that you have a problem with:  : None  Do you have a history of Chemical Use?: No  Do you have a history of Alcohol Use?: No  Do you have a history of Street Drug Abuse?: No  Histroy of Prescripton Drug Abuse?: No  Medical Problems:  Past Medical History:   Diagnosis Date    Bipolar 1 disorder ([facility] [address]        Risk:  Fall RiskTotal: 96  James Scale James Scale Score: 18  BVC Total: 0  Change in scores No. Changes to plan of Care No    Status EXAM:   Status and Exam  Normal: No  Facial Expression: Flat  Affect: Blunt  Level of Consciousness: Alert  Mood:Normal: No  Mood: Depressed, Anxious  Motor Activity:Normal: No  Motor Activity: Decreased  Interview Behavior: Cooperative  Preception: Center to Person, Center to Time, Center to Place, Center to Situation  Attention:Normal: Yes  Thought Processes: Other(See comment)(expressed thoughts are organized )  Thought Content:Normal: No  Thought Content: Preoccupations  Hallucinations: None  Delusions: No  Memory:Normal: Yes  Insight and Judgment: No  Insight and Judgment: Poor Insight, Unmotivated  Present Suicidal Ideation: No  Present Homicidal Ideation: No    Daily Assessment Last Entry:   Daily Sleep (WDL): Within Defined Limits         Patient Currently in Pain: Yes  Daily Nutrition (WDL): Within Defined Limits    Patient Monitoring:  Frequency of Checks: 4 times per hour, close    Psychiatric Symptoms:   Depression Symptoms  Depression Symptoms: Feelings of helplessness, Isolative, Loss of interest  Anxiety Symptoms  Anxiety Symptoms: Generalized  Ange Symptoms  Ange Symptoms: No problems reported or observed. Psychosis Symptoms  Hallucination Type: No problems reported or observed. Delusion Type: No problems reported or observed. Suicide Risk CSSR-S:  1) Within the past month, have you wished you were dead or wished you could go to sleep and not wake up? : Yes  2) Have you actually had any thoughts of killing yourself? : Yes  3) Have you been thinking about how you might kill yourself? : Yes  5) Have you started to work out or worked out the details of how to kill yourself? Do you intend to carry out this plan? : Yes  6) Have you ever done anything, started to do anything, or prepared to do anything to end your life?: Yes  Change in Result No Change in Plan of care No      EDUCATION:   Learner Progress Toward Treatment Goals: Reviewed results and recommendations of this team and Reviewed goals and plan of care    Method: Small group    Outcome: Verbalized understanding    PATIENT GOALS: None at this time    PLAN/TREATMENT RECOMMENDATIONS UPDATE: Take prescribed medications, attend/participate in group. Continue to provide emotional support to patient.     GOALS UPDATE:   Time frame for Short-Term Goals: Prior to discharge      Lupillo Arroyo RN

## 2019-12-27 NOTE — PROGRESS NOTES
DATE OF SERVICE:     12/26/2019    Zack Henry seen today for the purpose of continuation of care. Nursing, social work reports, laboratory studies and vital signs are reviewed. Patient chief complaint today is:             [x] Depression      [x] Anxiety        [] Psychosis         [x] Suicidal/Homicidal                         [] Delusions           [] Aggression          Subjective:     Patient seen, he was in his room, happy that he was able to sleep well last night. He says, his mood is better, looking forward to go to rehab. He is taking meds, no S/E    Sleep:  [] Good [x] Fair  [] Poor  Appetite:  [] Good [x] Fair  [] Poor    Depression:  [x] Mild [] Moderate [] Severe                [] Constant [x] Sporadic     Anxiety: [] Mild [x] Moderate [] Severe    [] Constant [x] Sporadic     Delusions: [] Mild [] Moderate [] Severe     [] Constant [] Sporadic     [] Paranoid [] Somatic [] Grandiose     Hallucinations: [] Mild [] Moderate [] Severe     [] Constant [] Sporadic    [] Auditory  [] Visual [] Tactile       Suicidal: [] Constant [] Sporadic  Homicidal: [] Constant [] Sporadic    Unscheduled Medications     [] Patient Receiving Emergency Medications \" Chemical Restraint\"   [] Requesting PRN medications for anxiety    Psychiatric Review of systems  Delusions:  [x] Denies [] Endorses   Withdrawals:  [x] Denies [] Endorses    Hallucinations: [x] Denies [] Endorses    Extra Pyramidal Symptoms: [x] Denies [] Endorses      Medical Review of Systems:     All other than marked systmes have been reviewed and are all negative.     Constitutional Symptoms: []  fever []  Chills  Skin Symptoms: [] rash []  Pruritus   Eye Symptoms: [] Vision unchanged []  recent vision problems[] blurred vision   Respiratory Symptoms:[] shortness of breath [] cough  Cardiovascular Symptoms:  [] chest pain   [] palpitations   Gastrointestinal Symptoms: []  abdominal pain []  nausea []  vomiting []  diarrhea  Genitourinary Intact  [] Fair  [x] Limited    Judgement:  [] Intact  [] Fair  [x] Limited      Assessment/Plan:        Patient Active Problem List   Diagnosis Code    Trauma T14.90XA    Injury resulting from fall from height W17.89XA    Closed displaced fracture of pelvis ([address] S32. 9XXA    Shock following injury ([address] T79. 4XXA    Pneumothorax, traumatic S27. 0XXA    Suicidal behavior with attempted self-injury ([address] T14.91XA    Respiratory failure following trauma ([address] J96.90    T12 burst fracture ([address] S22.081A    Closed fracture of shaft of left humerus S42.302A    Transscaphoid perilunate fracture dislocation of left wrist, open, initial encounter S62.002B    Displaced fracture of left radial styloid process, initial encounter for closed fracture S52.512A    Open comminuted fracture of waist of scaphoid bone of left wrist, initial encounter S62.022B    Depression with suicidal ideation F32.9, R45.851    Schizoaffective disorder, bipolar type ([address] F25.0         Plan:    []  Patient is refusing medications  [x] Improving as expected   [] Not improving as expected   [] Worsening    []  At Baseline      Cont meds  Supportive care      Reason for more than one antipsychotic:  [] N/A  [] 3 failed monotherapy(drugs tried):  [] Cross over to a new antipsychotic  [] Taper to monotherapy from polypharmacy  [] Augmentation of Clozapine therapy due to treatment resistance to single therapy      Signed:   Gisele Patel  12/26/2019  7:49 PM

## 2019-12-27 NOTE — GROUP NOTE
Group Therapy Note    Date: 12/27/2019    Group Start Time: 1010  Group End Time: 1050  Group Topic: Psychoeducation    [facility]        Group Therapy Note    Attendees: 11       Patient's Goal:  \" Walk to the chair\". Notes:  Minimal interest during wellness discussion. Observed to social with select peers, but having difficulty getting comfortable.       Status After Intervention:  Unchanged    Participation Level: Minimal    Participation Quality: Attentive      Speech:  hesitant      Thought Process/Content: Linear      Affective Functioning: Incongruent      Mood: elevated and irritable      Level of consciousness:  Alert      Response to Learning: Progressing to goal      Endings: None Reported    Modes of Intervention: Education, Support, Socialization and Exploration      Discipline Responsible: Psychoeducational Specialist      Signature:  Mikhail Herrera

## 2019-12-27 NOTE — GROUP NOTE
Group Therapy Note    Date: 12/27/2019    Group Start Time: 1100  Group End Time: 1130  Group Topic: Cognitive Skills    [facility]  2    Xiao Hinkle        Group Therapy Note    Attendees:       Signature:  Xiao Hinkle

## 2019-12-28 PROCEDURE — 99231 SBSQ HOSP IP/OBS SF/LOW 25: CPT | Performed by: PSYCHIATRY & NEUROLOGY

## 2019-12-28 PROCEDURE — 6370000000 HC RX 637 (ALT 250 FOR IP): Performed by: PSYCHIATRY & NEUROLOGY

## 2019-12-28 PROCEDURE — 6370000000 HC RX 637 (ALT 250 FOR IP): Performed by: PHYSICAL MEDICINE & REHABILITATION

## 2019-12-28 PROCEDURE — 6370000000 HC RX 637 (ALT 250 FOR IP): Performed by: SURGERY

## 2019-12-28 PROCEDURE — 6370000000 HC RX 637 (ALT 250 FOR IP): Performed by: NURSE PRACTITIONER

## 2019-12-28 PROCEDURE — 6360000002 HC RX W HCPCS: Performed by: SURGERY

## 2019-12-28 PROCEDURE — 6360000002 HC RX W HCPCS: Performed by: PHYSICAL MEDICINE & REHABILITATION

## 2019-12-28 PROCEDURE — 1240000000 HC EMOTIONAL WELLNESS R&B

## 2019-12-28 RX ADMIN — TRAZODONE HYDROCHLORIDE 50 MG: 50 TABLET ORAL at 21:23

## 2019-12-28 RX ADMIN — ENOXAPARIN SODIUM 30 MG: 30 INJECTION SUBCUTANEOUS at 21:24

## 2019-12-28 RX ADMIN — METHOCARBAMOL TABLETS 1500 MG: 750 TABLET, COATED ORAL at 13:34

## 2019-12-28 RX ADMIN — POLYETHYLENE GLYCOL 3350 17 G: 17 POWDER, FOR SOLUTION ORAL at 21:24

## 2019-12-28 RX ADMIN — HYDROXYZINE PAMOATE 50 MG: 50 CAPSULE ORAL at 06:29

## 2019-12-28 RX ADMIN — VENLAFAXINE HYDROCHLORIDE 37.5 MG: 37.5 CAPSULE, EXTENDED RELEASE ORAL at 08:51

## 2019-12-28 RX ADMIN — HYDROXYZINE PAMOATE 50 MG: 50 CAPSULE ORAL at 21:23

## 2019-12-28 RX ADMIN — QUETIAPINE FUMARATE 400 MG: 200 TABLET ORAL at 08:51

## 2019-12-28 RX ADMIN — DOCUSATE SODIUM 200 MG: 100 CAPSULE, LIQUID FILLED ORAL at 21:23

## 2019-12-28 RX ADMIN — OXYCODONE HYDROCHLORIDE 10 MG: 10 TABLET, FILM COATED, EXTENDED RELEASE ORAL at 21:23

## 2019-12-28 RX ADMIN — CALCITONIN SALMON 100 UNITS: 200 INJECTION, SOLUTION INTRAMUSCULAR; SUBCUTANEOUS at 08:51

## 2019-12-28 RX ADMIN — QUETIAPINE FUMARATE 400 MG: 200 TABLET ORAL at 21:23

## 2019-12-28 RX ADMIN — ACETAMINOPHEN 650 MG: 325 TABLET, FILM COATED ORAL at 20:05

## 2019-12-28 RX ADMIN — DOCUSATE SODIUM 200 MG: 100 CAPSULE, LIQUID FILLED ORAL at 08:51

## 2019-12-28 RX ADMIN — ENOXAPARIN SODIUM 30 MG: 30 INJECTION SUBCUTANEOUS at 08:51

## 2019-12-28 RX ADMIN — PANTOPRAZOLE SODIUM 40 MG: 40 TABLET, DELAYED RELEASE ORAL at 06:29

## 2019-12-28 RX ADMIN — METHOCARBAMOL TABLETS 1500 MG: 750 TABLET, COATED ORAL at 17:32

## 2019-12-28 RX ADMIN — MELATONIN 3 MG ORAL TABLET 3 MG: 3 TABLET ORAL at 03:32

## 2019-12-28 RX ADMIN — FLUPHENAZINE HYDROCHLORIDE 5 MG: 5 TABLET, FILM COATED ORAL at 08:51

## 2019-12-28 RX ADMIN — ACETAMINOPHEN 650 MG: 325 TABLET, FILM COATED ORAL at 10:31

## 2019-12-28 RX ADMIN — ACETAMINOPHEN 650 MG: 325 TABLET, FILM COATED ORAL at 14:35

## 2019-12-28 RX ADMIN — METHOCARBAMOL TABLETS 1500 MG: 750 TABLET, COATED ORAL at 21:24

## 2019-12-28 RX ADMIN — OXYCODONE 5 MG: 5 TABLET ORAL at 03:31

## 2019-12-28 RX ADMIN — CARBAMAZEPINE 300 MG: 200 TABLET, EXTENDED RELEASE ORAL at 08:51

## 2019-12-28 RX ADMIN — OXYCODONE HYDROCHLORIDE 10 MG: 10 TABLET, FILM COATED, EXTENDED RELEASE ORAL at 08:50

## 2019-12-28 RX ADMIN — POLYETHYLENE GLYCOL 3350 17 G: 17 POWDER, FOR SOLUTION ORAL at 08:52

## 2019-12-28 RX ADMIN — TAMSULOSIN HYDROCHLORIDE 0.4 MG: 0.4 CAPSULE ORAL at 08:51

## 2019-12-28 RX ADMIN — BENZTROPINE MESYLATE 1 MG: 1 TABLET ORAL at 21:23

## 2019-12-28 RX ADMIN — BENZTROPINE MESYLATE 1 MG: 1 TABLET ORAL at 08:51

## 2019-12-28 RX ADMIN — METHOCARBAMOL TABLETS 1500 MG: 750 TABLET, COATED ORAL at 08:51

## 2019-12-28 RX ADMIN — FLUPHENAZINE HYDROCHLORIDE 5 MG: 5 TABLET, FILM COATED ORAL at 13:34

## 2019-12-28 RX ADMIN — ACETAMINOPHEN 650 MG: 325 TABLET, FILM COATED ORAL at 06:29

## 2019-12-28 RX ADMIN — FLUPHENAZINE HYDROCHLORIDE 5 MG: 5 TABLET, FILM COATED ORAL at 21:24

## 2019-12-28 RX ADMIN — CARBAMAZEPINE 300 MG: 200 TABLET, EXTENDED RELEASE ORAL at 21:23

## 2019-12-28 ASSESSMENT — PAIN DESCRIPTION - ORIENTATION
ORIENTATION: LOWER

## 2019-12-28 ASSESSMENT — PAIN SCALES - GENERAL
PAINLEVEL_OUTOF10: 8
PAINLEVEL_OUTOF10: 10

## 2019-12-28 ASSESSMENT — PAIN DESCRIPTION - LOCATION
LOCATION: BACK;PELVIS

## 2019-12-28 ASSESSMENT — PAIN DESCRIPTION - DESCRIPTORS
DESCRIPTORS: ACHING;CONSTANT;DISCOMFORT

## 2019-12-28 ASSESSMENT — PAIN DESCRIPTION - PAIN TYPE
TYPE: SURGICAL PAIN

## 2019-12-28 NOTE — PROGRESS NOTES
DATE OF SERVICE:     12/27/2019    Early Harada seen today for the purpose of continuation of care. Nursing, social work reports, laboratory studies and vital signs are reviewed. Patient chief complaint today is:             [x] Depression      [x] Anxiety        [] Psychosis         [x] Suicidal/Homicidal                         [] Delusions           [] Aggression          Subjective:     Patient seen, reports doing \"OK\", complaints of pain, denied depressed mood or hopelessness, reports good sleep and appetite. He is  looking forward to go to rehab. He is taking meds, no S/E    Sleep:  [] Good [x] Fair  [] Poor  Appetite:  [] Good [x] Fair  [] Poor    Depression:  [x] Mild [] Moderate [] Severe                [] Constant [x] Sporadic     Anxiety: [] Mild [x] Moderate [] Severe    [] Constant [x] Sporadic     Delusions: [] Mild [] Moderate [] Severe     [] Constant [] Sporadic     [] Paranoid [] Somatic [] Grandiose     Hallucinations: [] Mild [] Moderate [] Severe     [] Constant [] Sporadic    [] Auditory  [] Visual [] Tactile       Suicidal: [] Constant [] Sporadic  Homicidal: [] Constant [] Sporadic    Unscheduled Medications     [] Patient Receiving Emergency Medications \" Chemical Restraint\"   [] Requesting PRN medications for anxiety    Psychiatric Review of systems  Delusions:  [x] Denies [] Endorses   Withdrawals:  [x] Denies [] Endorses    Hallucinations: [x] Denies [] Endorses    Extra Pyramidal Symptoms: [x] Denies [] Endorses      Medical Review of Systems:     All other than marked systmes have been reviewed and are all negative.     Constitutional Symptoms: []  fever []  Chills  Skin Symptoms: [] rash []  Pruritus   Eye Symptoms: [] Vision unchanged []  recent vision problems[] blurred vision   Respiratory Symptoms:[] shortness of breath [] cough  Cardiovascular Symptoms:  [] chest pain   [] palpitations   Gastrointestinal Symptoms: []  abdominal pain []  nausea []  vomiting [] diarrhea  Genitourinary Symptoms: []  dysuria  []  hematuria   Musculoskeletal Symptoms: []  back pain []  muscle pain []  joint pain  Neurologic Symptoms: []  headache []  dizziness  Hematolymphoid Symptoms: [] Adenopathy [] Bruises   [] Schimosis       Mental Status Examination:    Cognition:      [x] Alert  [x] Awake  [x] Oriented  [x] Person  [x] Place [x] Time      [] drowsy  [] tired  [] lethargic  [] distractable  [] Other     Attention/Concentration:   [x] Attentive  [] Distracted        Memory Recent and Remote: [x] Intact   [] Impaired [] Partially Impaired     Language: [x] Able to recognize and name objects          [] Unable to recognize and name Objects    Fund of Knowledge:  [] Poor [x]  Fair  [] Good    Speech: [x] Normal  [x] Soft  [] Slow  [] Fast [] Pressured            [] Loud [] Dysarthria  [] Incoherent    Appearance: [] Well Groomed  [x] Casual Dressed  [] Unkept  [] Disheveled          [] Normal weight[] Thin  [] Overweight  [] Obese           Attitude: [x] Positive  [] Hostile  [] Demanding  [] Guarded  [] Defensive         [x] Cooperative  []  Uncooperative      Behavior:  [] Normal Gait  [] Walks with Assistance  [] Joie Chair    [] Walks with Alric Human  [x] In Hospital Bed  [] Sitting in Chair    Muscle-Skeletal:  [] Normal Muscle Tone [] Muscle Atrophy       [] Abnormal Muscle Movement     Eye Contact:  [x] Good eye contact  [] Intermittent Eye Contact  [] Poor Eye Contact     Mood: [] Depressed  [] Anxious  [] Irritated  [x] Euthymic   [] Angry [] Restless    Affect:  [x] Congruent  [] Incongruent  [] Labile  [] Constricted  [] Flat  [] Bizarre     Thought Process and Association:  [x] Logical [] Illogical       [x] Linear and Goal Directed  [] Tangential  [] Circumstantial     Thought Content:  [x] Denies [] Endorses [] Suicidal [] Homicidal  [] Delusional      [] Paranoid  [] Somatic  [] Grandiose    Perception: [x]  None  [] Auditory   [] Visual  [] tactile   [] olfactory  [] Illusions

## 2019-12-28 NOTE — PLAN OF CARE
Problem: Suicide risk  Goal: Provide patient with safe environment  Description  Provide patient with safe environment  [id number]/68/[id number] by Nani Waters RN  Outcome: Met This Shift     Problem: Falls - Risk of:  Goal: Will remain free from falls  Description  Will remain free from falls  90/48/1140 [id number] by Nani Waters RN  Outcome: Met This Shift     Problem: Pain:  Goal: Control of acute pain  Description  Control of acute pain  Outcome: Ongoing

## 2019-12-29 PROCEDURE — 1240000000 HC EMOTIONAL WELLNESS R&B

## 2019-12-29 PROCEDURE — 6360000002 HC RX W HCPCS: Performed by: PHYSICAL MEDICINE & REHABILITATION

## 2019-12-29 PROCEDURE — 6360000002 HC RX W HCPCS: Performed by: SURGERY

## 2019-12-29 PROCEDURE — 6370000000 HC RX 637 (ALT 250 FOR IP): Performed by: PHYSICAL MEDICINE & REHABILITATION

## 2019-12-29 PROCEDURE — 6370000000 HC RX 637 (ALT 250 FOR IP): Performed by: SURGERY

## 2019-12-29 PROCEDURE — 6370000000 HC RX 637 (ALT 250 FOR IP): Performed by: NURSE PRACTITIONER

## 2019-12-29 PROCEDURE — 6370000000 HC RX 637 (ALT 250 FOR IP): Performed by: PSYCHIATRY & NEUROLOGY

## 2019-12-29 PROCEDURE — 99231 SBSQ HOSP IP/OBS SF/LOW 25: CPT | Performed by: PSYCHIATRY & NEUROLOGY

## 2019-12-29 PROCEDURE — 6370000000 HC RX 637 (ALT 250 FOR IP): Performed by: INTERNAL MEDICINE

## 2019-12-29 RX ORDER — PETROLATUM 42 G/100G
OINTMENT TOPICAL DAILY
Status: DISCONTINUED | OUTPATIENT
Start: 2019-12-29 | End: 2020-01-02 | Stop reason: HOSPADM

## 2019-12-29 RX ADMIN — BENZTROPINE MESYLATE 1 MG: 1 TABLET ORAL at 08:41

## 2019-12-29 RX ADMIN — OXYCODONE HYDROCHLORIDE 10 MG: 10 TABLET, FILM COATED, EXTENDED RELEASE ORAL at 21:43

## 2019-12-29 RX ADMIN — PETROLATUM: 42 OINTMENT TOPICAL at 17:33

## 2019-12-29 RX ADMIN — TRAZODONE HYDROCHLORIDE 50 MG: 50 TABLET ORAL at 21:43

## 2019-12-29 RX ADMIN — ACETAMINOPHEN 650 MG: 325 TABLET, FILM COATED ORAL at 13:33

## 2019-12-29 RX ADMIN — MELATONIN 3 MG ORAL TABLET 3 MG: 3 TABLET ORAL at 00:59

## 2019-12-29 RX ADMIN — ACETAMINOPHEN 650 MG: 325 TABLET, FILM COATED ORAL at 05:54

## 2019-12-29 RX ADMIN — VENLAFAXINE HYDROCHLORIDE 37.5 MG: 37.5 CAPSULE, EXTENDED RELEASE ORAL at 08:38

## 2019-12-29 RX ADMIN — ACETAMINOPHEN 650 MG: 325 TABLET, FILM COATED ORAL at 22:27

## 2019-12-29 RX ADMIN — QUETIAPINE FUMARATE 400 MG: 200 TABLET ORAL at 08:38

## 2019-12-29 RX ADMIN — FLUPHENAZINE HYDROCHLORIDE 5 MG: 5 TABLET, FILM COATED ORAL at 08:37

## 2019-12-29 RX ADMIN — ENOXAPARIN SODIUM 30 MG: 30 INJECTION SUBCUTANEOUS at 09:51

## 2019-12-29 RX ADMIN — OXYCODONE 5 MG: 5 TABLET ORAL at 01:00

## 2019-12-29 RX ADMIN — CALCITONIN SALMON 100 UNITS: 200 INJECTION, SOLUTION INTRAMUSCULAR; SUBCUTANEOUS at 08:38

## 2019-12-29 RX ADMIN — FLUPHENAZINE HYDROCHLORIDE 5 MG: 5 TABLET, FILM COATED ORAL at 21:43

## 2019-12-29 RX ADMIN — FLUPHENAZINE HYDROCHLORIDE 5 MG: 5 TABLET, FILM COATED ORAL at 13:33

## 2019-12-29 RX ADMIN — OXYCODONE 5 MG: 5 TABLET ORAL at 15:46

## 2019-12-29 RX ADMIN — CARBAMAZEPINE 300 MG: 200 TABLET, EXTENDED RELEASE ORAL at 21:44

## 2019-12-29 RX ADMIN — ENOXAPARIN SODIUM 30 MG: 30 INJECTION SUBCUTANEOUS at 21:44

## 2019-12-29 RX ADMIN — HYDROXYZINE PAMOATE 50 MG: 50 CAPSULE ORAL at 21:43

## 2019-12-29 RX ADMIN — METHOCARBAMOL TABLETS 1500 MG: 750 TABLET, COATED ORAL at 21:42

## 2019-12-29 RX ADMIN — TAMSULOSIN HYDROCHLORIDE 0.4 MG: 0.4 CAPSULE ORAL at 08:38

## 2019-12-29 RX ADMIN — SENNOSIDES 17.2 MG: 8.6 TABLET, FILM COATED ORAL at 21:42

## 2019-12-29 RX ADMIN — METHOCARBAMOL TABLETS 1500 MG: 750 TABLET, COATED ORAL at 08:37

## 2019-12-29 RX ADMIN — CARBAMAZEPINE 300 MG: 200 TABLET, EXTENDED RELEASE ORAL at 08:37

## 2019-12-29 RX ADMIN — ACETAMINOPHEN 650 MG: 325 TABLET, FILM COATED ORAL at 09:51

## 2019-12-29 RX ADMIN — ACETAMINOPHEN 650 MG: 325 TABLET, FILM COATED ORAL at 17:34

## 2019-12-29 RX ADMIN — DOCUSATE SODIUM 200 MG: 100 CAPSULE, LIQUID FILLED ORAL at 21:43

## 2019-12-29 RX ADMIN — QUETIAPINE FUMARATE 400 MG: 200 TABLET ORAL at 21:42

## 2019-12-29 RX ADMIN — METHOCARBAMOL TABLETS 1500 MG: 750 TABLET, COATED ORAL at 17:34

## 2019-12-29 RX ADMIN — POLYETHYLENE GLYCOL 3350 17 G: 17 POWDER, FOR SOLUTION ORAL at 08:37

## 2019-12-29 RX ADMIN — PANTOPRAZOLE SODIUM 40 MG: 40 TABLET, DELAYED RELEASE ORAL at 05:54

## 2019-12-29 RX ADMIN — POLYETHYLENE GLYCOL 3350 17 G: 17 POWDER, FOR SOLUTION ORAL at 21:44

## 2019-12-29 RX ADMIN — METHOCARBAMOL TABLETS 1500 MG: 750 TABLET, COATED ORAL at 13:33

## 2019-12-29 RX ADMIN — BENZTROPINE MESYLATE 1 MG: 1 TABLET ORAL at 21:43

## 2019-12-29 RX ADMIN — DOCUSATE SODIUM 200 MG: 100 CAPSULE, LIQUID FILLED ORAL at 08:38

## 2019-12-29 RX ADMIN — OXYCODONE HYDROCHLORIDE 10 MG: 10 TABLET, FILM COATED, EXTENDED RELEASE ORAL at 08:38

## 2019-12-29 ASSESSMENT — PAIN DESCRIPTION - ORIENTATION
ORIENTATION: LOWER
ORIENTATION: LOWER
ORIENTATION: MID
ORIENTATION: LOWER
ORIENTATION: MID;LOWER
ORIENTATION: LOWER

## 2019-12-29 ASSESSMENT — PAIN DESCRIPTION - LOCATION
LOCATION: BACK
LOCATION: BACK;PELVIS
LOCATION: BACK
LOCATION: BACK;PELVIS
LOCATION: CHEST
LOCATION: BACK;PELVIS

## 2019-12-29 ASSESSMENT — PAIN DESCRIPTION - PAIN TYPE
TYPE: SURGICAL PAIN
TYPE: ACUTE PAIN
TYPE: SURGICAL PAIN

## 2019-12-29 ASSESSMENT — PAIN SCALES - GENERAL
PAINLEVEL_OUTOF10: 10
PAINLEVEL_OUTOF10: 10
PAINLEVEL_OUTOF10: 8
PAINLEVEL_OUTOF10: 10
PAINLEVEL_OUTOF10: 8
PAINLEVEL_OUTOF10: 0
PAINLEVEL_OUTOF10: 8
PAINLEVEL_OUTOF10: 8

## 2019-12-29 ASSESSMENT — PAIN DESCRIPTION - DESCRIPTORS
DESCRIPTORS: ACHING;CONSTANT;DISCOMFORT
DESCRIPTORS: PRESSURE;TIGHTNESS
DESCRIPTORS: ACHING;CONSTANT;DISCOMFORT
DESCRIPTORS: ACHING;CONSTANT;DISCOMFORT
DESCRIPTORS: ACHING;CONSTANT;DISCOMFORT;SORE
DESCRIPTORS: ACHING;CONSTANT;DISCOMFORT
DESCRIPTORS: ACHING;CONSTANT;DISCOMFORT

## 2019-12-29 NOTE — GROUP NOTE
Group Therapy Note    Date: 12/29/2019    Group Start Time: 8479  Group End Time: 0230  Group Topic: Cognitive Skills    [facility]    CATA Chisholm        Group Therapy Note    Attendees: 7         Patient's Goal:  Pt will be able to discuss importance of dealing with anger before it explodes. Pt will be able to identify secrets of good communication, and  characteristics of bad communication. Notes: Pt was active in class discussion. Status After Intervention:  Improved    Participation Level:  Active Listener and Interactive    Participation Quality: Appropriate, Attentive, Sharing and Supportive      Speech:  normal      Thought Process/Content: Logical      Affective Functioning: Congruent      Mood: euthymic      Level of consciousness:  Alert, Oriented x4 and Attentive      Response to Learning: Able to verbalize current knowledge/experience, Able to verbalize/acknowledge new learning and Progressing to goal      Endings: None Reported    Modes of Intervention: Education, Support, Socialization and Problem-solving      Discipline Responsible: /Counselor      Signature:  CATA Stevens

## 2019-12-29 NOTE — PLAN OF CARE
Patient is resting in bed with eyes closed. No signs of distress or discomfort. No unit issues. Safety needs met. Will continue to monitor closely.           Problem: Suicide risk  Goal: Provide patient with safe environment  Description  Provide patient with safe environment  12/29/2019 0133 by Isabel Elise RN  Outcome: Met This Shift     Problem: Falls - Risk of:  Goal: Absence of physical injury  Description  Absence of physical injury  Outcome: Met This Shift           Electronically signed by Isabel Elise RN on 12/29/2019 at 1:34 AM

## 2019-12-29 NOTE — PROGRESS NOTES
DATE OF SERVICE:     12/28/2019    Marina Maurer seen today for the purpose of continuation of care. Nursing, social work reports, laboratory studies and vital signs are reviewed. Patient chief complaint today is:             [x] Depression      [x] Anxiety        [] Psychosis         [x] Suicidal/Homicidal                         [] Delusions           [] Aggression          Subjective:     Patient seen, he was watching TV in the day area, on wheelchair, reports doing \"better\", denied depressed mood or hopelessness, reports good sleep and appetite. He is taking meds, no S/E    Sleep:  [] Good [x] Fair  [] Poor  Appetite:  [] Good [x] Fair  [] Poor    Depression:  [x] Mild [] Moderate [] Severe                [] Constant [x] Sporadic     Anxiety: [] Mild [x] Moderate [] Severe    [] Constant [x] Sporadic     Delusions: [] Mild [] Moderate [] Severe     [] Constant [] Sporadic     [] Paranoid [] Somatic [] Grandiose     Hallucinations: [] Mild [] Moderate [] Severe     [] Constant [] Sporadic    [] Auditory  [] Visual [] Tactile       Suicidal: [] Constant [] Sporadic  Homicidal: [] Constant [] Sporadic    Unscheduled Medications     [] Patient Receiving Emergency Medications \" Chemical Restraint\"   [] Requesting PRN medications for anxiety    Psychiatric Review of systems  Delusions:  [x] Denies [] Endorses   Withdrawals:  [x] Denies [] Endorses    Hallucinations: [x] Denies [] Endorses    Extra Pyramidal Symptoms: [x] Denies [] Endorses      Medical Review of Systems:     All other than marked systmes have been reviewed and are all negative.     Constitutional Symptoms: []  fever []  Chills  Skin Symptoms: [] rash []  Pruritus   Eye Symptoms: [] Vision unchanged []  recent vision problems[] blurred vision   Respiratory Symptoms:[] shortness of breath [] cough  Cardiovascular Symptoms:  [] chest pain   [] palpitations   Gastrointestinal Symptoms: []  abdominal pain []  nausea []  vomiting [] diarrhea  Genitourinary Symptoms: []  dysuria  []  hematuria   Musculoskeletal Symptoms: []  back pain []  muscle pain []  joint pain  Neurologic Symptoms: []  headache []  dizziness  Hematolymphoid Symptoms: [] Adenopathy [] Bruises   [] Schimosis       Mental Status Examination:    Cognition:      [x] Alert  [x] Awake  [x] Oriented  [x] Person  [x] Place [x] Time      [] drowsy  [] tired  [] lethargic  [] distractable  [] Other     Attention/Concentration:   [x] Attentive  [] Distracted        Memory Recent and Remote: [x] Intact   [] Impaired [] Partially Impaired     Language: [x] Able to recognize and name objects          [] Unable to recognize and name Objects    Fund of Knowledge:  [] Poor [x]  Fair  [] Good    Speech: [x] Normal  [x] Soft  [] Slow  [] Fast [] Pressured            [] Loud [] Dysarthria  [] Incoherent    Appearance: [] Well Groomed  [x] Casual Dressed  [] Unkept  [] Disheveled          [] Normal weight[] Thin  [] Overweight  [] Obese           Attitude: [x] Positive  [] Hostile  [] Demanding  [] Guarded  [] Defensive         [x] Cooperative  []  Uncooperative      Behavior:  [] Normal Gait  [] Walks with Assistance  [] Joie Chair    [] Walks with Hilton Angel  [x] In Hospital Bed  [] Sitting in Chair    Muscle-Skeletal:  [] Normal Muscle Tone [] Muscle Atrophy       [] Abnormal Muscle Movement     Eye Contact:  [x] Good eye contact  [] Intermittent Eye Contact  [] Poor Eye Contact     Mood: [] Depressed  [] Anxious  [] Irritated  [x] Euthymic   [] Angry [] Restless    Affect:  [x] Congruent  [] Incongruent  [] Labile  [] Constricted  [] Flat  [] Bizarre     Thought Process and Association:  [x] Logical [] Illogical       [x] Linear and Goal Directed  [] Tangential  [] Circumstantial     Thought Content:  [x] Denies [] Endorses [] Suicidal [] Homicidal  [] Delusional      [] Paranoid  [] Somatic  [] Grandiose    Perception: [x]  None  [] Auditory   [] Visual  [] tactile   [] olfactory  [] Illusions Insight: [] Intact  [x] Fair  [] Limited    Judgement:  [] Intact  [x] Fair  [] Limited      Assessment/Plan:        Patient Active Problem List   Diagnosis Code    Trauma T14.90XA    Injury resulting from fall from height W17.89XA    Closed displaced fracture of pelvis ([address] S32. 9XXA    Shock following injury ([address] T79. 4XXA    Pneumothorax, traumatic S27. 0XXA    Suicidal behavior with attempted self-injury ([address] T14.91XA    Respiratory failure following trauma ([address] J96.90    T12 burst fracture ([address] S22.081A    Closed fracture of shaft of left humerus S42.302A    Transscaphoid perilunate fracture dislocation of left wrist, open, initial encounter S62.002B    Displaced fracture of left radial styloid process, initial encounter for closed fracture S52.512A    Open comminuted fracture of waist of scaphoid bone of left wrist, initial encounter S62.022B    Depression with suicidal ideation F32.9, R45.851    Schizoaffective disorder, bipolar type ([address] F25.0         Plan:    []  Patient is refusing medications  [x] Improving as expected   [] Not improving as expected   [] Worsening    []  At Baseline      Cont meds  Supportive care      Reason for more than one antipsychotic:  [] N/A  [] 3 failed monotherapy(drugs tried):  [] Cross over to a new antipsychotic  [] Taper to monotherapy from polypharmacy  [] Augmentation of Clozapine therapy due to treatment resistance to single therapy      Signed:   Neal Patel  12/28/2019  7:16 PM

## 2019-12-30 ENCOUNTER — APPOINTMENT (OUTPATIENT)
Dept: CT IMAGING | Age: 24
DRG: 754 | End: 2019-12-30
Attending: PSYCHIATRY & NEUROLOGY
Payer: MEDICAID

## 2019-12-30 PROCEDURE — 99231 SBSQ HOSP IP/OBS SF/LOW 25: CPT | Performed by: NURSE PRACTITIONER

## 2019-12-30 PROCEDURE — 6370000000 HC RX 637 (ALT 250 FOR IP): Performed by: PSYCHIATRY & NEUROLOGY

## 2019-12-30 PROCEDURE — 6360000002 HC RX W HCPCS: Performed by: PHYSICAL MEDICINE & REHABILITATION

## 2019-12-30 PROCEDURE — 6360000002 HC RX W HCPCS: Performed by: SURGERY

## 2019-12-30 PROCEDURE — 97530 THERAPEUTIC ACTIVITIES: CPT

## 2019-12-30 PROCEDURE — 2500000003 HC RX 250 WO HCPCS: Performed by: SURGERY

## 2019-12-30 PROCEDURE — 2580000003 HC RX 258: Performed by: SURGERY

## 2019-12-30 PROCEDURE — 6370000000 HC RX 637 (ALT 250 FOR IP): Performed by: SURGERY

## 2019-12-30 PROCEDURE — 1240000000 HC EMOTIONAL WELLNESS R&B

## 2019-12-30 PROCEDURE — 6370000000 HC RX 637 (ALT 250 FOR IP): Performed by: PHYSICAL MEDICINE & REHABILITATION

## 2019-12-30 PROCEDURE — 71275 CT ANGIOGRAPHY CHEST: CPT

## 2019-12-30 PROCEDURE — 6370000000 HC RX 637 (ALT 250 FOR IP): Performed by: NURSE PRACTITIONER

## 2019-12-30 PROCEDURE — 6360000004 HC RX CONTRAST MEDICATION: Performed by: RADIOLOGY

## 2019-12-30 RX ORDER — SODIUM CHLORIDE 0.9 % (FLUSH) 0.9 %
10 SYRINGE (ML) INJECTION PRN
Status: DISCONTINUED | OUTPATIENT
Start: 2019-12-30 | End: 2020-01-02 | Stop reason: HOSPADM

## 2019-12-30 RX ADMIN — METOPROLOL TARTRATE 5 MG: 1 INJECTION, SOLUTION INTRAVENOUS at 07:02

## 2019-12-30 RX ADMIN — TAMSULOSIN HYDROCHLORIDE 0.4 MG: 0.4 CAPSULE ORAL at 09:27

## 2019-12-30 RX ADMIN — OXYCODONE 5 MG: 5 TABLET ORAL at 06:33

## 2019-12-30 RX ADMIN — OXYCODONE HYDROCHLORIDE 10 MG: 10 TABLET, FILM COATED, EXTENDED RELEASE ORAL at 21:17

## 2019-12-30 RX ADMIN — BENZTROPINE MESYLATE 1 MG: 1 TABLET ORAL at 21:20

## 2019-12-30 RX ADMIN — Medication 10 ML: at 21:21

## 2019-12-30 RX ADMIN — CARBAMAZEPINE 300 MG: 200 TABLET, EXTENDED RELEASE ORAL at 09:27

## 2019-12-30 RX ADMIN — Medication 10 ML: at 09:59

## 2019-12-30 RX ADMIN — FLUPHENAZINE HYDROCHLORIDE 5 MG: 5 TABLET, FILM COATED ORAL at 13:26

## 2019-12-30 RX ADMIN — ACETAMINOPHEN 650 MG: 325 TABLET, FILM COATED ORAL at 21:46

## 2019-12-30 RX ADMIN — PANTOPRAZOLE SODIUM 40 MG: 40 TABLET, DELAYED RELEASE ORAL at 06:33

## 2019-12-30 RX ADMIN — IOPAMIDOL 70 ML: 755 INJECTION, SOLUTION INTRAVENOUS at 01:00

## 2019-12-30 RX ADMIN — OXYCODONE HYDROCHLORIDE 10 MG: 10 TABLET, FILM COATED, EXTENDED RELEASE ORAL at 12:28

## 2019-12-30 RX ADMIN — VENLAFAXINE HYDROCHLORIDE 37.5 MG: 37.5 CAPSULE, EXTENDED RELEASE ORAL at 09:26

## 2019-12-30 RX ADMIN — DOCUSATE SODIUM 200 MG: 100 CAPSULE, LIQUID FILLED ORAL at 21:19

## 2019-12-30 RX ADMIN — DOCUSATE SODIUM 200 MG: 100 CAPSULE, LIQUID FILLED ORAL at 09:32

## 2019-12-30 RX ADMIN — QUETIAPINE FUMARATE 400 MG: 200 TABLET ORAL at 21:20

## 2019-12-30 RX ADMIN — ENOXAPARIN SODIUM 30 MG: 30 INJECTION SUBCUTANEOUS at 09:25

## 2019-12-30 RX ADMIN — ENOXAPARIN SODIUM 30 MG: 30 INJECTION SUBCUTANEOUS at 21:20

## 2019-12-30 RX ADMIN — SENNOSIDES 17.2 MG: 8.6 TABLET, FILM COATED ORAL at 21:20

## 2019-12-30 RX ADMIN — FLUPHENAZINE HYDROCHLORIDE 5 MG: 5 TABLET, FILM COATED ORAL at 09:27

## 2019-12-30 RX ADMIN — FLUPHENAZINE HYDROCHLORIDE 5 MG: 5 TABLET, FILM COATED ORAL at 21:46

## 2019-12-30 RX ADMIN — METHOCARBAMOL TABLETS 1500 MG: 750 TABLET, COATED ORAL at 09:26

## 2019-12-30 RX ADMIN — ACETAMINOPHEN 650 MG: 325 TABLET, FILM COATED ORAL at 17:15

## 2019-12-30 RX ADMIN — POLYETHYLENE GLYCOL 3350 17 G: 17 POWDER, FOR SOLUTION ORAL at 21:20

## 2019-12-30 RX ADMIN — ACETAMINOPHEN 650 MG: 325 TABLET, FILM COATED ORAL at 09:26

## 2019-12-30 RX ADMIN — METHOCARBAMOL TABLETS 1500 MG: 750 TABLET, COATED ORAL at 13:26

## 2019-12-30 RX ADMIN — METHOCARBAMOL TABLETS 1500 MG: 750 TABLET, COATED ORAL at 21:20

## 2019-12-30 RX ADMIN — CARBAMAZEPINE 300 MG: 200 TABLET, EXTENDED RELEASE ORAL at 23:33

## 2019-12-30 RX ADMIN — ACETAMINOPHEN 650 MG: 325 TABLET, FILM COATED ORAL at 13:26

## 2019-12-30 RX ADMIN — METHOCARBAMOL TABLETS 1500 MG: 750 TABLET, COATED ORAL at 17:14

## 2019-12-30 RX ADMIN — OXYCODONE 5 MG: 5 TABLET ORAL at 01:23

## 2019-12-30 RX ADMIN — CALCITONIN SALMON 100 UNITS: 200 INJECTION, SOLUTION INTRAMUSCULAR; SUBCUTANEOUS at 09:34

## 2019-12-30 RX ADMIN — TRAZODONE HYDROCHLORIDE 50 MG: 50 TABLET ORAL at 21:20

## 2019-12-30 RX ADMIN — PETROLATUM: 42 OINTMENT TOPICAL at 09:37

## 2019-12-30 RX ADMIN — BENZTROPINE MESYLATE 1 MG: 1 TABLET ORAL at 09:26

## 2019-12-30 RX ADMIN — POLYETHYLENE GLYCOL 3350 17 G: 17 POWDER, FOR SOLUTION ORAL at 09:25

## 2019-12-30 RX ADMIN — QUETIAPINE FUMARATE 400 MG: 200 TABLET ORAL at 09:27

## 2019-12-30 ASSESSMENT — PAIN DESCRIPTION - DESCRIPTORS
DESCRIPTORS: ACHING;DISCOMFORT;SHARP;SORE
DESCRIPTORS: ACHING;CONSTANT;DISCOMFORT;SORE
DESCRIPTORS: ACHING;CONSTANT;DISCOMFORT;SHARP;SORE

## 2019-12-30 ASSESSMENT — PAIN DESCRIPTION - LOCATION
LOCATION: BACK;LEG
LOCATION: ARM;BACK;HIP;LEG
LOCATION: BACK;ARM;HIP;LEG

## 2019-12-30 ASSESSMENT — PAIN SCALES - GENERAL
PAINLEVEL_OUTOF10: 10
PAINLEVEL_OUTOF10: 8
PAINLEVEL_OUTOF10: 7
PAINLEVEL_OUTOF10: 10
PAINLEVEL_OUTOF10: 8
PAINLEVEL_OUTOF10: 0
PAINLEVEL_OUTOF10: 0
PAINLEVEL_OUTOF10: 8
PAINLEVEL_OUTOF10: 10

## 2019-12-30 ASSESSMENT — PAIN DESCRIPTION - FREQUENCY
FREQUENCY: CONTINUOUS

## 2019-12-30 ASSESSMENT — PAIN DESCRIPTION - PROGRESSION
CLINICAL_PROGRESSION: NOT CHANGED

## 2019-12-30 ASSESSMENT — PAIN DESCRIPTION - ONSET
ONSET: ON-GOING

## 2019-12-30 ASSESSMENT — PAIN DESCRIPTION - ORIENTATION
ORIENTATION: LEFT

## 2019-12-30 ASSESSMENT — PAIN DESCRIPTION - PAIN TYPE
TYPE: ACUTE PAIN

## 2019-12-30 NOTE — PROGRESS NOTES
Nathaneil Seip is a 25 y.o. male patient.     Current Facility-Administered Medications   Medication Dose Route Frequency Provider Last Rate Last Dose    sodium chloride flush 0.9 % injection 10 mL  10 mL Intravenous PRN Kerry Ogden MD        mineral oil-hydrophilic petrolatum (HYDROPHOR) ointment   Topical Daily Nadja Reilly MD        nicotine (NICODERM CQ) 14 MG/24HR 1 patch  1 patch Transdermal Daily Malaika Cespedes MD        oxyCODONE (ROXICODONE) immediate release tablet 5 mg  5 mg Oral Q4H PRN Malaika Cespedes MD   5 mg at 12/30/19 6141    oxyCODONE (OXYCONTIN) extended release tablet 10 mg  10 mg Oral 2 times per day Malaika Cespedes MD   10 mg at 12/29/19 2143    calcitonin (MIACALCIN) injection 100 Units  100 Units Intramuscular Daily Malaika Cespedes MD   100 Units at 12/29/19 5656    vitamin D (ERGOCALCIFEROL) capsule 50,000 Units  50,000 Units Oral Weekly Malaika Cespedes MD   50,000 Units at 12/26/19 1048    venlafaxine (EFFEXOR XR) extended release capsule 37.5 mg  37.5 mg Oral Daily with breakfast MICH Contreras - CNP   37.5 mg at 12/29/19 7075    docusate sodium (COLACE) capsule 200 mg  200 mg Oral BID Nury Arreaga MD   200 mg at 12/29/19 2143    ondansetron (ZOFRAN) injection 4 mg  4 mg Intravenous Q6H PRN Nury Arreaga MD        sodium chloride flush 0.9 % injection 10 mL  10 mL Intravenous 2 times per day Nury Arreaga MD        acetaminophen (TYLENOL) tablet 650 mg  650 mg Oral Q4H Nury Arreaga MD   650 mg at 12/29/19 2227    benztropine (COGENTIN) tablet 1 mg  1 mg Oral BID Nury Arreaga MD   1 mg at 12/29/19 2143    bisacodyl (DULCOLAX) suppository 10 mg  10 mg Rectal Daily Nury Arreaga MD        carBAMazepine (TEGRETOL XR) extended release tablet 300 mg  300 mg Oral BID Nury Arreaga MD   300 mg at 12/29/19 2144    enoxaparin (LOVENOX) injection 30 mg  30 mg Subcutaneous BID Nury Arreaga MD   30 mg at 12/29/19 2144    fluPHENAZine HCl (PROLIXIN) tablet 5 mg  5 mg Alvin Patel MD         Allergies   Allergen Reactions    Depakote [Valproic Acid] Other (See Comments)     Feels bloated     Haldol [Haloperidol] Other (See Comments)    Ling Pacheco Sustenna [Paliperidone Palmitate Er] Hives    Invega [Paliperidone] Hives     Principal Problem:    Schizoaffective disorder, bipolar type ([address]  Active Problems:    Depression with suicidal ideation  Resolved Problems:    * No resolved hospital problems. *    Blood pressure (!) 173/91, pulse 111, temperature 96.7 °F (35.9 °C), temperature source Temporal, resp. rate 20, height 5' 9\" (1.753 m), weight 160 lb (72.6 kg), SpO2 98 %. Subjective:  Symptoms:  Stable. He reports weakness. Diet:  Adequate intake. Activity level: Impaired due to pain. Pain:  He complains of pain that is moderate. Objective:  General Appearance: In no acute distress. Vital signs: (most recent): Blood pressure (!) 173/91, pulse 111, temperature 96.7 °F (35.9 °C), temperature source Temporal, resp. rate 20, height 5' 9\" (1.753 m), weight 160 lb (72.6 kg), SpO2 98 %. Vital signs are normal.    Output: Producing urine and producing stool. Lungs:  Normal effort and normal respiratory rate. Breath sounds clear to auscultation. Heart: Normal rate. Regular rhythm. S1 normal and S2 normal.    Abdomen: Abdomen is soft. Bowel sounds are normal.   There is no abdominal tenderness. Extremities: Decreased range of motion. Neurological: Patient is alert. (4/5 str). Assessment:    Condition: In stable condition. Improving.   (Mult trauma). Plan:   Encourage ambulation. (In order to come to acute rehab he needs to be willing to do 3 hrs of therapy daily  Right now only doing limited activity  Will have to begin doing more in order to get precert  He tells me he is going to snf at [facility]).        Tasha Mckeon MD  12/30/2019

## 2019-12-30 NOTE — PROGRESS NOTES
Patient back from CT and denying any chest pain or difficulty breathing. States \"The pain is gone. I can take deep breaths now. \" Repositioned in bed. No complaints or concerns at this time. Will continue to monitor and offer support.           Electronically signed by Shiela Murdock RN on 12/30/2019 at 1:23 AM

## 2019-12-30 NOTE — PLAN OF CARE
[facility] Interdisciplinary Treatment Plan Note     Review Date & Time: 12/30/19 1100    Patient was in treatment team.    Admission Type:   Admission Type: Probate    Reason for admission:  Reason for Admission: jumped off [address].  suicide attempt    Estimated Length of Stay Update:  3-5 days   Estimated Discharge Date Update: 01/03/20    PATIENT STRENGTHS:  Patient Strengths:Strengths: Communication, Positive Support, Connection to output provider, Motivated  Patient Strengths and Limitations:Limitations: Perceives need for assistance with self-care, Tendency to isolate self, Multiple barriers to leisure interests  Addictive Behavior:Addictive Behavior  In the past 3 months, have you felt or has someone told you that you have a problem with:  : None  Do you have a history of Chemical Use?: No  Do you have a history of Alcohol Use?: No  Do you have a history of Street Drug Abuse?: No  Histroy of Prescripton Drug Abuse?: No  Medical Problems:   Past Medical History:   Diagnosis Date    Bipolar 1 disorder ([facility]        Risk:  Fall RiskTotal: 103  James Scale James Scale Score: 18  BVC Total: 0  Change in scores No. Changes to plan of Care No.    Status EXAM:   Status and Exam  Normal: No  Facial Expression: Sad, Flat, Worried  Affect: Blunt, Congruent, Appropriate  Level of Consciousness: Alert  Mood:Normal: No  Mood: Depressed, Anxious, Sad, Helpless  Motor Activity:Normal: No  Motor Activity: Decreased, Other(See Comments)  Interview Behavior: Cooperative  Preception: Evangeline to Person, Evangeline to Time, Evangeline to Place, Evangeline to Situation  Attention:Normal: Yes  Thought Processes: Circumstantial  Thought Content:Normal: No  Thought Content: Preoccupations  Hallucinations: None  Delusions: No  Memory:Normal: Yes  Insight and Judgment: No  Insight and Judgment: Poor Judgment, Poor Insight(improved)  Present Suicidal Ideation: No  Present Homicidal Ideation: No    Daily Assessment Last Entry:   Daily Sleep (WDL): Exceptions to WDL         Patient Currently in Pain: Yes  Daily Nutrition (WDL): Within Defined Limits    Patient Monitoring:  Frequency of Checks: 4 times per hour, close    Psychiatric Symptoms:   Depression Symptoms  Depression Symptoms: Feelings of helplessness  Anxiety Symptoms  Anxiety Symptoms: Generalized  Ange Symptoms  Ange Symptoms: No problems reported or observed. Psychosis Symptoms  Hallucination Type: No problems reported or observed. Delusion Type: No problems reported or observed. Suicide Risk CSSR-S:  1) Within the past month, have you wished you were dead or wished you could go to sleep and not wake up? : Yes  2) Have you actually had any thoughts of killing yourself? : Yes  3) Have you been thinking about how you might kill yourself? : Yes  5) Have you started to work out or worked out the details of how to kill yourself? Do you intend to carry out this plan? : Yes  6) Have you ever done anything, started to do anything, or prepared to do anything to end your life?: Yes  Change in Result No. Change in Plan of care No.    EDUCATION:   Learner Progress Toward Treatment Goals: Reviewed results and recommendations of this team and Reviewed goals and plan of care    Method: Small group    Outcome: Verbalized understanding    PATIENT GOALS: No goal    PLAN/TREATMENT RECOMMENDATIONS UPDATE: Encourage group participation and continue to provide emotional support. GOALS UPDATE:  Time frame for Short-Term Goals: 1-3 days.       Ana Harkins RN

## 2019-12-30 NOTE — PROGRESS NOTES
Physical Therapy    Facility/Department: [address] ACUTE BH 2  Rx. Note   NAME: Tal Vazquez  : 1995  MRN: [id number]    Date of Service: 2019  Evaluating Therapist: Greer Bates PT    Room #: 56-A  DIAGNOSIS: Trauma, suicidal ideation  PRECAUTIONS: Fall risk, spinal with TLSO when OOB or HOB >45°, NWB LUE, NWB LLE, WBAT RLE with hard cast shoe, suicide  HPI: The pt jumped from an overpass on  resulting in the following:   - Left midshaft humerus fracture, closed. - Left transscaphoid perilunate open fracture dislocation, grade 2, with extruded proximal scaphoid and lunate. - Complex 3 cm open wound, volar wrist/hand.  - Left radial styloid fracture. - Left pelvic ring fracture disruption, lateral compression type 2 pattern, with large crescent fracture and complete fracture through ileum into the anterior superior sacroiliac joint with comminution.  - Left anterior pelvic ring fracture, junctional rami, superiorly, and  comminuted inferior rami into symphysial body  - T12 burst fracture  PROCEDURE(S):    Application of left wrist spanning external fixation  19 Left posterior pelvis fracture disruption through ilium and sacroiliac joint open reduction and internal fixation  12/10/19 T10-L1  Posterior lateral fusion   19 L humerus ORIF    Social:  Pt lives with his grandparents in a 2 floor plan 2 steps and no rail to enter with a flight of steps with no rail to the 2nd floor bath/bedroom. Prior to admission pt walked with no AD and was Independent. Initial Evaluation  19 Treatment  Date:  See above  Short Term/ Long Term   Goals   Was pt agreeable to Eval/treatment? Yes, with encouragement yes    Does pt have pain? 10/10 LUE pain 7/10 back and LUE pain     Bed Mobility  Rolling: Mod A  Supine to sit: Max A x2  Sit to supine: Max A x2  Scooting:  Max A Rolling Mod A  Supine to sit Max A   Sit to supine NT  Scooting Mod A to EOB Min A   Transfers Sit to

## 2019-12-30 NOTE — PROGRESS NOTES
Acute Rehab Pre-Admission Screen      Referral date: 12/30/19  Onset/Hospital Admit Date: 12/23/2019 10:14 PM    Current Location: 7301/7301-A    Name: Marvin Osullivan  YOB: 1995  Age: 25 y.o. Admitting Diagnosis: trauma   Address: [address] [place] [id number]  Home Phone: [phone] (home)  Social Security #:     Sex: male  Race:   Marital Status: single   Ethnic/Cultural/Sikhism Considerations: none    Advanced Directives: [x] Full Code  [] [address] [] Medications only       [] Living Will  [] DPOA      []Organ donor      [] No mechanical breathing or ventilation     [] no tube feeding, nutrition or hydration      [x] Patient does not have advanced directives or living will    COVERAGE INFORMATION   Primary Insurer: [facility]  Payor Contact: Cande VAZQUEZ Phone: [phone]  Authorization #: [id number]    Medicaid #:   Verified coverage: [] Patient  [] Family/caregiver    [x] financial department [] insurance carrier    MEDICAL UPDATE:  History of present admission: The patient attempted suicide by  jumping off of a bridge and was admitted to [address] [place] [address] on 12/05/2019. He had multiple fractures at that point including a T12 fracture, a pelvic fracture, and fractured left arm. He underwent ORIF of the wrist (12/19/19), ORIF of left Humerus (12/11/19), ORIF of the pelvic ring (12/6/19), and a spinal fusion from T10 to L1 (12/10/19). He was transferred to [facility]. He is nonweightbearing on the left upper and left lower extremity, weightbearing as tolerated on the right.        PHYSICIAN / REFERRAL INFORMATION  Referring Physician: Bruna Murillo  Attending Physician: Shira Dorsey, *  Primary Care Physician: Nilson Mancuso MD  Consultants/Opinions (see full consult notes on chart): Carson Dance (ortho) rec: surgery  [place] (neurosurgery) rec: surgery  Ahmed (psych) rec: inpatient admission  Nury Matson (ortho) rec: surgery   [place]: rec: follow LEFT HUMERUS OPEN REDUCTION INTERNAL FIXATION performed by Pascual Dietz DO at [facility] 473 N/A 12/10/2019    T10-L1  POSTERIOR LATERAL FUSION -- GLOBUS -- NEEDS VIANEY TABLE, O-ARM performed by Mary Tyler MD at [facility] Left 12/5/2019    I & D LEFT WRIST WITH EXTERNAL FIXATOR performed by Pascual Dietz DO at [facility] OR       Past Medical:  Past Medical History:   Diagnosis Date    Bipolar 1 disorder ([facility] [address]        Current Co-morbidities:  [] Alzheimer's   [] Dysphasia     [] Parkinsonism  [] Amputation   [] GERD  [] Peripheral artery disease   [] Anemia      [] Encephalopathy  [] Peripheral vascular disease  [] Anxiety   [] Gangrene   [] Pneumonia  [] Aphasia   [] Gout    [] Polyneuropathy  [] Asthma   [] Heart Failure (diastolic) [] Post-polio syndrome  [] Atrial fibrillation  [] Heart Failure (left-sided) [] Pseudomonas enteritis   [] Blind    [] Heart Failure (right-sided) [] Pulmonary embolism  [] Cellulitis     [] Heart Failure (systolic) [] Renal dialysis  [] Clostridium difficile  [] Hemiparesis   [] Renal failure  [] Congestive heart failure [] Hypertension   [] Rheumatoid arthritis  [] COPD   [] Hypotension   [] Seizure disorder   [] Coronary Artery Disease [] Hypothyroidism  [] Septicemia   [] Deaf    [] Hyperlipidemia   [] Sleep apnea  [x] Depression   [] Morbid obesity  [] Spinal cord injury  [] Diabetes   [] MRSA   [] Stroke  [] Diabetic nephropathy  [] Myocardial infarction  [] Tracheostomy  [] Diabetic neuropathy  [] Osteoarthritis  [] Traumatic brain injury   [] Diabetic retinopathy  [] Osteoporosis   [] Urinary tract infection  [] DVT    [] Pancytopenia  [] Vocal cord paralysis  []  Spinal stenosis   []  kidney disease [] VRE  [x]bipolar  [x] multiple fractures   []        Medical/Functional Conditions requiring inpatient rehabilitation: bipolar (monitor mood), multiple fractures (montior pain, incisions)    Risk for Medical/Clinical 0.5 0.0 - 5.0 %    Lymphocytes % 16.5 (L) 20.0 - 42.0 %    Monocytes % 9.8 2.0 - 12.0 %    Eosinophils % 3.4 0.0 - 6.0 %    Basophils % 0.7 0.0 - 2.0 %    Neutrophils Absolute 3.86 1.80 - 7.30 E9/L    Immature Granulocytes # 0.03 E9/L    Lymphocytes Absolute 0.92 (L) 1.50 - 4.00 E9/L    Monocytes Absolute 0.55 0.10 - 0.95 E9/L    Eosinophils Absolute 0.19 0.05 - 0.50 E9/L    Basophils Absolute 0.04 0.00 - 0.20 E9/L   Lactic Acid, Plasma    Collection Time: 01/01/20  8:49 AM   Result Value Ref Range    Lactic Acid 2.9 (H) 0.5 - 2.2 mmol/L   EKG 12 Lead    Collection Time: 01/01/20  9:56 AM   Result Value Ref Range    Ventricular Rate 127 BPM    Atrial Rate 127 BPM    P-R Interval 136 ms    QRS Duration 88 ms    Q-T Interval 300 ms    QTc Calculation (Bazett) 436 ms    P Axis 55 degrees    R Axis 40 degrees    T Axis 44 degrees     No results found.     Additional labs or diagnostic studies needed before admission to rehabilitation unit:  None noted      Medications:   sodium chloride  1,000 mL Intravenous Once    diltiazem  180 mg Oral Daily    mineral oil-hydrophilic petrolatum   Topical Daily    nicotine  1 patch Transdermal Daily    oxyCODONE  10 mg Oral 2 times per day    vitamin D  50,000 Units Oral Weekly    venlafaxine  37.5 mg Oral Daily with breakfast    docusate sodium  200 mg Oral BID    sodium chloride flush  10 mL Intravenous 2 times per day    acetaminophen  650 mg Oral Q4H    benztropine  1 mg Oral BID    bisacodyl  10 mg Rectal Daily    carBAMazepine  300 mg Oral BID    enoxaparin  30 mg Subcutaneous BID    lidocaine  1 patch Transdermal Daily    methocarbamol  1,500 mg Oral 4x Daily    pantoprazole  40 mg Oral QAM AC    polyethylene glycol  17 g Oral BID    QUEtiapine  400 mg Oral BID    senna  2 tablet Oral Nightly    tamsulosin  0.4 mg Oral Daily       sodium chloride flush, oxyCODONE, melatonin, acetaminophen, hydrOXYzine, OLANZapine **OR** OLANZapine, sterile water, traZODone, benztropine mesylate, magnesium hydroxide, aluminum & magnesium hydroxide-simethicone      SPECIAL PRECAUTIONS: [x] No current precautions  [] Cardiac  [] Renal [] Sternal [] Respiratory      [] Neurological           [] Hip  [x] Spinal [] Seizure  [] Aspiration  [] Isolation precautions:    [] Contact   [] Respiratory   [] Protective     [] Droplet    [x] Weight Bearing precautions:         [x] Non Weight Bearing LUE, LLE        [] Toe Touch Weight Bearing        [] Partial Weight Bearing        [x] Weight Bearing as Tolerated RLE with hard cast shoe        [x] Fall Risk:   [x] Recent history of falls [x] Falls risk level (Eli Scale): high      [] Bed Alarm    [] Do not leave alone in the bathroom    [] Chair Alarm    [] Cognitive impairment      [] One to One supervision  [] Sitter / Tele sitter   [] Safety enclosure bed  [] Decreased balance     SPECIAL REHABILITATION NEEDS:   [x] IV Therapy: [x] PRN Adapter  [] Midline  [] PICC      [] Central Line    [] TPN       [] Oxygen: [] Trach [] Bi-PAP [] CPAP  [] Nasal cannula  [] Liters:     [x] Wound Care:   [] Pressure ulcers(stage and location)    [] Wound vac   [x] Wound or incision care multiple surgical incisions    [x] Pain Management (level of pain, meds): back and surgical pain ranges 8-0, on oxycodone     [] Incontinence Bladder [] Espinoza  Insertion date:   []Hemodialysis and  Frequency:   [] Incontinence Bowel    [x] Last bowel movement :12/27/19    [x] Substance use history:  [x] Tobacco  [] Alcohol  [x] Other - marijuana    [] Ethnic  [] Cultural  [] Spiritual  [] Language [] Needs  [] Other than English  [] Hearing Impaired  [] Visually Impaired  [] Speaking Impaired  [] Blind    [] Special equipment:  [] Devices/Splints  [] Type   [] Brace   [] Type  [] Bariatric bed  [] Extra wide commode  [] Extra wide wheelchair [] Extra wide walker  [] Dawit walker  [] Dawit wheelchair  [] Transfer lift    [] Other equipment     FUNCTIONAL STATUS PT / Genevive Fleeting / SPEECH EVALS:  FIM / EVAL Discipline 12/31/19 Follow Up:  Current:    Eating OT Set up      Grooming OT Set up      Bathing OT Max Assist to Moderate Assist      Dressing Upper Extremity OT Max Assist      Dressing Lower Extremity OT Max Assist to Moderate Assist      Toileting OT nt     Toilet Transfers OT nt     Tub/Shower Transfers OT nt     Homemaking OT nt     Bed Mobility PT 1/2/19  Moderate Assist      Bed/Wheelchair Transfers PT Moderate Assist x2      Locomotion Walk / Wheelchair  Device:  Distance: PT nt     Endurance PT      Expression SP      Social Interaction SP      Problem Solving SP      Memory SP      Comprehension SP      Swallowing SP      Bowel Management NSG      Bladder Management NSG        Comments on Functional Status:  Will benefit from 3 hours of acute rehab therapy daily , improve self care and mobility to supervision    [x] Able to participate a minimum of 3 hours per day of therapy intervention    Required treatments/services: [x] Rehabilitation nursing [] Dietitian / nurtition                 [x] Case management  [] Respiratory Therapy      [x] Social work   [] Other     Required Therapy:  Therapy Hours per Day Days per Week Therapeutic Interventions Required   [x] Physical Therapy 1.5 5-7 Gait,transfers, Safety, strength, education, endurance    [x] Occupational Therapy 1.5 5-7 ADL's, Safety, strength, education, endurance    [] Speech Pathology      [] Prosthetics / Orthotics       []         Anticipated Discharge Plan:   Anticipated DME Needs:  [x] Home     [] Commode   [] Alone    [] Wheelchair   [x] Supervised    [] Walker   [x] Assist    [] Oxygen        [] Hospital Bed  [] Assisted Living    [] Ramp        [x] To Be Determined      Anticipated Home Health Services:  Anticipated Outpatient Services:  [] PT       [] PT  [] OT      [] OT  [] Speech     [] Speech  [] Nursing     [] Dialysis  [] Aide      [x] To Be Determined  [x] To Be Determined    Anticipated support group:  [] plan  [] Rehabilitation bed unavailable [] Functional level too low  [] patient or family refused ARU    If patient not accepted for IRF admission, recommended level of care:  [] [address]  [] [address]  [] [place]   [] Home Care  [] Other      [] LTAC       Physician Assigned:  [] Dr. Valentino Vang         [x] Dr. Carley Nageotte              [] Dr. Dimas Colin [] Dr. Michael Avina (if not admitted within 48 hours of initial pre-screen)    Medical Update/Changes: Prescreen updated to reflect current data since initiated. Functional Update/Changes: Therapy notes updated on prescreen graph to reflect current status.        Reviewer Signature:_____________________________________    Date:  1/2/20 Time: 3567    PHYSICIAN ADMISSION DETERMINATION AND REVIEW UPDATE:     ____________________________________________________________________  ____________________________________________________________________  ____________________________________________________________________  ____________________________________________________________________  ____________________________________________________________________      Physician Signature:_____________________________________    Print Signature:_________________________________________    Date: 1/2/20    Time:  2536

## 2019-12-30 NOTE — PROGRESS NOTES
Met with patient and reviewed ARU program. He is agreeable to come to ARU at [facility] and he is aware and verbalizes understanding that he must participate in therapy when on ARU. Will need updated OT notes to proceed with precert. Spoke with patient's grandmother earlier this morning and reviewed the ARU program. She would like to see patient have rehab at [facility] .

## 2019-12-30 NOTE — PLAN OF CARE
Problem: Pain:  Goal: Control of acute pain  Description  Control of acute pain  Outcome: Met This Shift     Problem: Suicide risk  Goal: Provide patient with safe environment  Description  Provide patient with safe environment  12/30/2019 1157 by Curtis Paez RN  Outcome: Met This Shift     Problem: Risk for Impaired Skin Integrity  Goal: Tissue integrity - skin and mucous membranes  Description  Structural intactness and normal physiological function of skin and  mucous membranes.   Outcome: Met This Shift     Problem: Falls - Risk of:  Goal: Will remain free from falls  Description  Will remain free from falls  12/30/2019 1157 by Curtis Paez RN  Outcome: Met This Shift

## 2019-12-30 NOTE — PROGRESS NOTES
This nurse assisted patient in ADLs this morning, however, pt was able to do at least 50%. Pt was able to clean genital area as well as turn with minimal assistance. Pt assisted with WB to rt leg only. Pt pivoted with assistance. Pt is able to feed self, needs assistance with opening packages as his left hand is splinted. Pt states pain is less today but still there. Pt c/o of soreness in the left thigh/hip area. Pt encouraged to try to do ROM exercises while laying in bed. Pt able to lift at the waist to assist with putting pants on. Pt sat at the edge of the bed for approximately 2 minutes without complaint w/o brace on. Pt continues to need encouragement to assist in ADLs but is able to do so. Pt denies SI HI and hallucinations. Pt is pleasant, calm, flat, blunt, quiet. Pt is medication compliant. Pt is eating provided meals. Pt is up to the wheelchair at this time for breakfast and group. We will continue to provide support and comfort for the patient.

## 2019-12-30 NOTE — PLAN OF CARE
Patient is resting in bed with eyes closed. No signs of distress or discomfort. No unit issues. Safety needs met. Will continue to monitor closely.           Problem: Falls - Risk of:  Goal: Absence of physical injury  Description  Absence of physical injury  Outcome: Met This Shift        Problem: Suicide risk  Goal: Provide patient with safe environment  Description  Provide patient with safe environment  Outcome: Met This Shift           Electronically signed by Lilliam Carrasco RN on 12/30/2019 at 4:06 AM

## 2019-12-30 NOTE — PROGRESS NOTES
OT BEDSIDE TREATMENT NOTE      Date:2019  Patient Name: Onur Louis  MRN: [id number]  : 1995  Room: [address] [place] [address]-A       Evaluating OT: MIKHAIL Dias, OTR/L #[id number]     AM-PAC Daily Activity Raw Score:  15/24  Recommended Adaptive Equipment:  TBD at next level of care       Reason for Admission:  Pt was admitted after jumping of a bridge     Diagnosis:  Trauma  Injuries Sustained:    - Left midshaft humerus fracture, closed. - Left transscaphoid perilunate open fracture dislocation, grade 2, with extruded proximal scaphoid and lunate.  - Complex 3 cm open wound, volar wrist/hand.  - Left radial styloid fracture. - Left pelvic ring fracture disruption, lateral compression type 2 pattern, with large crescent fracture and complete fracture through ileum into the anterior superior sacroiliac joint with comminution.  - Left anterior pelvic ring fracture, junctional rami, superiorly, and  comminuted inferior rami into symphysial body   - Right foot 1st digit distal phalanx avulsion fx  - T12 burst fracture     Procedures this admission:    19:    I&D Left Wrist, Placement of External Fixator  19:    ORIF Left Pelvis w/ Dr. Idalmis Hurst  12-10-19:  T10-L1 Posterior Lateral Fusion w/ Dr. Rachel Jean   19:  Left Humerus ORIF w/ Dr. Idalmis Hurst     Pertinent Medical History:  Bipolar D/O     Precautions:    Falls          Suicide Precautions -   External Fixator Left Wrist - NWB L UE  NWB L LE  WBAT R LE w/ surgical shoe per Dr. Grisel Oliver   Spinal Precautions - Custom TLSO when [address]. Living: Pt lives with his Grandmother and her  in a 2-story house with 2 PETROS and 1 HR/s.  Bed/bath on the 2nd floor   Bathroom setup:  Tub-Shower, standard commode   Equipment owned:  None     Available Family Assist:  Grandmother     Prior Level of Function:  IND with ADLs, IADLs, Transfers and Mobility using No AD for ambulation.    Driving:  ??  Occupation:  ??     Pain Level:  Pt did not complain of pain this session 0/10     Cognition: A & O x 4   Able to Follow Multi-Step Commands w/ Min-Mod VCs              Memory:  good (-)              Sequencing:  poor              Problem solving:  Poor+              Judgement/safety:  poor+        Functional Assessment:    Initial Eval Status  Date: 12-11-19 Treatment Status  Date: 12/30/19 Short Term Goals  Treatment frequency: PRN 2-4 x/week   Feeding Currently NPO for OR     Per report, Able to use R Dominant UE to feed self, required Good set up of tray, assist w/ positioning d/t limited elevation of HOB w/o TLSO Setup  Assistance to open of packages  Mod I   Grooming Min A/Good Set up     Pt able to wash face, brush teeth w/ Good Set up, use of R UE, Required assist to wash hands d/t inability to use L UE for ax -bed level d/t position restrictions  Setup      SUP   UB Dressing Max A/Set up     Required Max A to don/doff garment in supine, assist to thread L UE, assist for bed mobility and assist to arrange garment around back, assist w/ all fasteners d/t inability to utilize L UE - in supine d/t position restrictions  Max A     Sanjay/doff TLSO brace Mod A   LB Dressing DEP     Max A to don/doff socks in supine  Max A of 1-2 to facilitate log-rolling + Max A of 1 to don/doff garment - bed level d/t WB and Position restrictions  maxA  Sanjay/doff hospital pants, with pt using bridge technique to pull pants up/down over hips.  Max cueing to follow of NWB to LLE precautions Mod A of 2   Bathing DEP     Max A of 1-2 to facilitate log-rolling + Max A of 1 for bathing task in supine  DNT  Dependent per previous level     Mod A of 2   Toileting DEP     Max A of 1-2 to facilitate log-rolling + Max A of 1 for placement of bedpan, bowel hygiene and clothing adjustment Eva  Pt using of urinal while lying supine in bed Mod A of 2   Bed Mobility  Rolling:  Max A of 1-2  Repositioning:  Max A of 2 toward HOB in supine   Supine to Sit:  NT    Sit to

## 2019-12-30 NOTE — PLAN OF CARE
Denies SI, HI, and hallucinations. Patient is flat, sad, anxious, and helpless. Patient has complaints of back pain and requests pain medication frequently. Patient was encouraged to get out of bed for snacks. Assisted into wheelchair by staff. Patient sat out on unit for 15 minutes and was requesting to go back to bed due to back pain while sitting in chair. Medications compliant. PRN trazodone and vistaril given per patient request. Will continue to monitor and offer support.       Problem: Falls - Risk of:  Goal: Will remain free from falls  Description  Will remain free from falls  12/29/2019 2233 by Manny Palencia RN  Outcome: Met This Shift     Problem: Pain:  Goal: Pain level will decrease  Description  Pain level will decrease  Outcome: Not Met This Shift      Electronically signed by Manny Palencia RN on 12/29/2019 at 10:37 PM

## 2019-12-30 NOTE — PROGRESS NOTES
Pt up to wheelchair out on floor watching a movie. Pt a bit more interactive with staff and others today. We will continue to provide support and comfort for the patient.

## 2019-12-31 PROCEDURE — 97530 THERAPEUTIC ACTIVITIES: CPT

## 2019-12-31 PROCEDURE — 6370000000 HC RX 637 (ALT 250 FOR IP): Performed by: SURGERY

## 2019-12-31 PROCEDURE — 6370000000 HC RX 637 (ALT 250 FOR IP): Performed by: PHYSICAL MEDICINE & REHABILITATION

## 2019-12-31 PROCEDURE — 1240000000 HC EMOTIONAL WELLNESS R&B

## 2019-12-31 PROCEDURE — 2580000003 HC RX 258: Performed by: SURGERY

## 2019-12-31 PROCEDURE — 99232 SBSQ HOSP IP/OBS MODERATE 35: CPT | Performed by: NURSE PRACTITIONER

## 2019-12-31 PROCEDURE — 6360000002 HC RX W HCPCS: Performed by: PHYSICAL MEDICINE & REHABILITATION

## 2019-12-31 PROCEDURE — 6360000002 HC RX W HCPCS: Performed by: SURGERY

## 2019-12-31 PROCEDURE — 6370000000 HC RX 637 (ALT 250 FOR IP): Performed by: NURSE PRACTITIONER

## 2019-12-31 PROCEDURE — 6370000000 HC RX 637 (ALT 250 FOR IP): Performed by: INTERNAL MEDICINE

## 2019-12-31 PROCEDURE — 6370000000 HC RX 637 (ALT 250 FOR IP): Performed by: PSYCHIATRY & NEUROLOGY

## 2019-12-31 PROCEDURE — 97535 SELF CARE MNGMENT TRAINING: CPT

## 2019-12-31 RX ORDER — DILTIAZEM HYDROCHLORIDE 120 MG/1
120 CAPSULE, COATED, EXTENDED RELEASE ORAL DAILY
Status: DISCONTINUED | OUTPATIENT
Start: 2019-12-31 | End: 2020-01-02

## 2019-12-31 RX ADMIN — ACETAMINOPHEN 650 MG: 325 TABLET, FILM COATED ORAL at 10:14

## 2019-12-31 RX ADMIN — Medication 10 ML: at 10:17

## 2019-12-31 RX ADMIN — CALCITONIN SALMON 100 UNITS: 200 INJECTION, SOLUTION INTRAMUSCULAR; SUBCUTANEOUS at 08:48

## 2019-12-31 RX ADMIN — OXYCODONE 5 MG: 5 TABLET ORAL at 06:23

## 2019-12-31 RX ADMIN — ACETAMINOPHEN 650 MG: 325 TABLET, FILM COATED ORAL at 06:23

## 2019-12-31 RX ADMIN — BENZTROPINE MESYLATE 1 MG: 1 TABLET ORAL at 08:47

## 2019-12-31 RX ADMIN — METHOCARBAMOL TABLETS 1500 MG: 750 TABLET, COATED ORAL at 08:47

## 2019-12-31 RX ADMIN — METHOCARBAMOL TABLETS 1500 MG: 750 TABLET, COATED ORAL at 17:10

## 2019-12-31 RX ADMIN — POLYETHYLENE GLYCOL 3350 17 G: 17 POWDER, FOR SOLUTION ORAL at 08:47

## 2019-12-31 RX ADMIN — BENZTROPINE MESYLATE 1 MG: 1 TABLET ORAL at 20:48

## 2019-12-31 RX ADMIN — OXYCODONE HYDROCHLORIDE 10 MG: 10 TABLET, FILM COATED, EXTENDED RELEASE ORAL at 10:23

## 2019-12-31 RX ADMIN — DOCUSATE SODIUM 200 MG: 100 CAPSULE, LIQUID FILLED ORAL at 20:58

## 2019-12-31 RX ADMIN — QUETIAPINE FUMARATE 400 MG: 200 TABLET ORAL at 20:48

## 2019-12-31 RX ADMIN — HYDROXYZINE PAMOATE 50 MG: 50 CAPSULE ORAL at 22:04

## 2019-12-31 RX ADMIN — ACETAMINOPHEN 650 MG: 325 TABLET, FILM COATED ORAL at 17:50

## 2019-12-31 RX ADMIN — ACETAMINOPHEN 650 MG: 325 TABLET, FILM COATED ORAL at 20:49

## 2019-12-31 RX ADMIN — ENOXAPARIN SODIUM 30 MG: 30 INJECTION SUBCUTANEOUS at 10:12

## 2019-12-31 RX ADMIN — QUETIAPINE FUMARATE 400 MG: 200 TABLET ORAL at 08:47

## 2019-12-31 RX ADMIN — VENLAFAXINE HYDROCHLORIDE 37.5 MG: 37.5 CAPSULE, EXTENDED RELEASE ORAL at 08:47

## 2019-12-31 RX ADMIN — ACETAMINOPHEN 650 MG: 325 TABLET, FILM COATED ORAL at 13:36

## 2019-12-31 RX ADMIN — PANTOPRAZOLE SODIUM 40 MG: 40 TABLET, DELAYED RELEASE ORAL at 06:23

## 2019-12-31 RX ADMIN — TAMSULOSIN HYDROCHLORIDE 0.4 MG: 0.4 CAPSULE ORAL at 08:47

## 2019-12-31 RX ADMIN — ENOXAPARIN SODIUM 30 MG: 30 INJECTION SUBCUTANEOUS at 20:48

## 2019-12-31 RX ADMIN — DILTIAZEM HYDROCHLORIDE 120 MG: 120 CAPSULE, COATED, EXTENDED RELEASE ORAL at 14:39

## 2019-12-31 RX ADMIN — OXYCODONE HYDROCHLORIDE 10 MG: 10 TABLET, FILM COATED, EXTENDED RELEASE ORAL at 22:04

## 2019-12-31 RX ADMIN — MELATONIN 3 MG ORAL TABLET 3 MG: 3 TABLET ORAL at 22:04

## 2019-12-31 RX ADMIN — TRAZODONE HYDROCHLORIDE 50 MG: 50 TABLET ORAL at 22:05

## 2019-12-31 RX ADMIN — CARBAMAZEPINE 300 MG: 200 TABLET, EXTENDED RELEASE ORAL at 08:48

## 2019-12-31 RX ADMIN — METHOCARBAMOL TABLETS 1500 MG: 750 TABLET, COATED ORAL at 13:35

## 2019-12-31 RX ADMIN — DOCUSATE SODIUM 200 MG: 100 CAPSULE, LIQUID FILLED ORAL at 08:47

## 2019-12-31 RX ADMIN — METHOCARBAMOL TABLETS 1500 MG: 750 TABLET, COATED ORAL at 20:49

## 2019-12-31 RX ADMIN — SENNOSIDES 17.2 MG: 8.6 TABLET, FILM COATED ORAL at 20:49

## 2019-12-31 RX ADMIN — CARBAMAZEPINE 300 MG: 200 TABLET, EXTENDED RELEASE ORAL at 20:48

## 2019-12-31 RX ADMIN — PETROLATUM: 42 OINTMENT TOPICAL at 10:12

## 2019-12-31 ASSESSMENT — PAIN SCALES - GENERAL
PAINLEVEL_OUTOF10: 7
PAINLEVEL_OUTOF10: 8
PAINLEVEL_OUTOF10: 7
PAINLEVEL_OUTOF10: 8
PAINLEVEL_OUTOF10: 7
PAINLEVEL_OUTOF10: 8
PAINLEVEL_OUTOF10: 7
PAINLEVEL_OUTOF10: 8

## 2019-12-31 ASSESSMENT — PAIN DESCRIPTION - ORIENTATION
ORIENTATION: LEFT;LOWER
ORIENTATION: LOWER

## 2019-12-31 ASSESSMENT — PAIN DESCRIPTION - PAIN TYPE
TYPE: SURGICAL PAIN

## 2019-12-31 ASSESSMENT — PAIN DESCRIPTION - LOCATION
LOCATION: BACK;PELVIS
LOCATION: ARM;PELVIS

## 2019-12-31 ASSESSMENT — PAIN DESCRIPTION - DESCRIPTORS
DESCRIPTORS: ACHING;CONSTANT;DISCOMFORT

## 2019-12-31 NOTE — GROUP NOTE
Group Therapy Note    Date: 12/31/2019    Group Start Time: 1000  Group End Time: 1050  Group Topic: Psychoeducation    [id number]    Vicki Giron, CTRS        Group Therapy Note      Number of participants: 6  Type of group: Psychoeducation  Mode of intervention: Education, Support, Socialization, Exploration, Clarifying, Problem-solving, and Activity  Topic: Mindfulness Response to Thoughts: APPLE  Objective: Pt will identify ways to utilize the acronym APPLE in recovery. Patient's Goal:  \"To get better\"     Notes:  Pt was interactive during group sharing ways to utilize APPLE in recovery. Pt gave support and feedback to others. Status After Intervention:  Improved    Participation Level:  Active Listener and Interactive    Participation Quality: Appropriate, Attentive, Sharing and Supportive      Speech:  normal      Thought Process/Content: Logical      Affective Functioning: Congruent      Mood: euthymic      Level of consciousness:  Alert, Oriented x4 and Attentive      Response to Learning: Able to verbalize current knowledge/experience, Able to verbalize/acknowledge new learning, Able to retain information, Capable of insight, Able to change behavior and Progressing to goal      Endings: None Reported    Modes of Intervention: Education, Support, Socialization, Exploration, Clarifying, Problem-solving and Activity

## 2019-12-31 NOTE — PROGRESS NOTES
Pt up to w/c in day room. Pt stated \"I want to try to do it myself\". Pt encouraged to assist with transfer and ROM but also reminded of the seriousness of his injuries. Pt did well with sitting up and transferring. Pt currently sitting in day room watching football game.

## 2019-12-31 NOTE — PLAN OF CARE
Pt denies SI HI and  hallucinations. Pt is pleasant, flat, calm, cooperative, blunt. Pt rates pain today 7/10. States \"I don't want to be on these Oxycontin anymore I just want to go home and smoke a joint. \" Pt informed that what he does at home is his business but while he is here on our unit we have to comply with the medications ordered and he does have the opportunity to refuse any medications if he chooses to do so. Pt acknowledged information. Pt is medication compliant. Pt has been up to the wheelchair with brace on back and foot twice today, about an hour for breakfast and 1hr 45 minutes for lunch. Pt VS BP was 143/96 Pulse 111 O2 98% and temp 98.3. Pt is eating provided meals. Pt is attending select groups. No aggression. We will continue to provide support and comfort for the patient.       Problem: Pain:  Goal: Control of acute pain  Description  Control of acute pain  Outcome: Met This Shift     Problem: Suicide risk  Goal: Provide patient with safe environment  Description  Provide patient with safe environment  Outcome: Met This Shift     Problem: Falls - Risk of:  Goal: Will remain free from falls  Description  Will remain free from falls  Outcome: Met This Shift

## 2019-12-31 NOTE — PROGRESS NOTES
Cognition: A & O x 4, pleasant & cooperative   Able to Follow Multi-Step Commands with min cues              Memory:  good (-)              Sequencing: Fair              Problem solving: Fair-              Judgement/safety: Fair-        Functional Assessment:    Initial Eval Status  Date: 12-11-19 Treatment Status  Date: 12/31/19 Short Term Goals  Treatment frequency: PRN 2-4 x/week   Feeding Currently NPO for OR     Per report, Able to use R Dominant UE to feed self, required Good set up of tray, assist w/ positioning d/t limited elevation of HOB w/o TLSO Set up Mod I   Grooming Min A/Good Set up     Pt able to wash face, brush teeth w/ Good Set up, use of R UE, Required assist to wash hands d/t inability to use L UE for ax -bed level d/t position restrictions  Set up  seated      SUP   UB Dressing Max A/Set up     Required Max A to don/doff garment in supine, assist to thread L UE, assist for bed mobility and assist to arrange garment around back, assist w/ all fasteners d/t inability to utilize L UE - in supine d/t position restrictions  Max A     Sanjay/doff TLSO brace  Mod A  Pull over shirt  Mod A   LB Dressing DEP     Max A to don/doff socks in supine  Max A of 1-2 to facilitate log-rolling + Max A of 1 to don/doff garment - bed level d/t WB and Position restrictions  Mod-Max A  Min A donning pants over his hips with pt using bridge technique.  Min cueing to follow of NWB to LLE precautions  Max A sanjay R surgical shoe Mod A of 2   Bathing DEP     Max A of 1-2 to facilitate log-rolling + Max A of 1 for bathing task in supine Mod-Max A  Simulated task      Mod A of 2   Toileting DEP     Max A of 1-2 to facilitate log-rolling + Max A of 1 for placement of bedpan, bowel hygiene and clothing adjustment Mod Indep with urinal bed level  Staff reports pt used regular commode this date  Mod A of 2   Bed Mobility  Rolling:  Max A of 1-2  Repositioning:  Max A of 2 toward HOB in supine   Supine to Sit:  NT    Sit to Supine:  NT       Pt ed/demo for Log-Rolling Tech, assist of 2 for safety, unable to don TLSO for transfers Max A - supine to sit  Min rolling  Max A sit to supine with pt giving minimal effort   Pt using of log rolling technique, cueing pt to use of bed rails to assist with task and following of precautions  Mod A of 2     When medically cleared to don TLSO - CTs and drain Removed   Functional Transfers Sit to stand:  NT  Stand to sit:  NT       Per conversation w/ Orthotist, pt is unable to don TLSO with Chest tubes in place.  Per NS, pt unable to advance HOB > 45*/sit EOB w/o wearing of TLSO Mod A x2  Sit to Stand  Good results maintaing NWB L LE  R surgical shoe in place   Mod A x 2   Stand pivot  EOB to & from w/c  Pt required max cues to maintain NWB L LE when returning to bed with pt being non-complaint Max A of 2     When medically cleared to don TLSO   Functional Mobility NT       DNT  maxA x2 per previous level   Max A of 2     When medically cleared to don TLSO   Balance Sitting:  NT     Standing:  NT  Sitting: SBA  Standing: Mod A x2        Activity Tolerance Tolerated Sitting:  NT  Tolerated Standing:  NT  Fair  Pt was participating in therapy with Good effort until at end of treatment, with pt not assisting with bed mobility stating \"they always  my legs\"  Educated pt on importance of completing tasks on his own to progress towards returning home      Visual/  Perceptual WFL  Glasses:  No        Hearing WFL  Hearing Aids  No            Comments: Upon arrival pt supine in bed, agreeable to therapy. Pt educated bed mobility, functional transfers, precautions to follow, ADL techniques to assist with LB dressing task. Pt declined to stay up in chair after nsg told pt he was unable to attend group because he was late, therefor pt insisted on returning back to bed. Educated pt on importance of OOB with no results, pt stating he was tired & wanting to sleep.  Pt lying supine in bed at end of session,

## 2019-12-31 NOTE — PROGRESS NOTES
DATE OF SERVICE:     12/31/2019    Priyank Fuller seen today for the purpose of continuation of care. Nursing, social work reports, laboratory studies and vital signs are reviewed. Patient chief complaint today is:             [x] Depression      [x] Anxiety        [] Psychosis         [] Suicidal/Homicidal                         [] Delusions           [] Aggression          Subjective: Today patient states that \"I am in pain. \"  Patient is anxious and depressed over current health status, is to go to rehab at discharge. Sleep:  [x] Good [] Fair  [] Poor  Appetite:  [x] Good [] Fair  [] Poor    Depression:  [] Mild [x] Moderate [] Severe                [x] Constant [] Sporadic     Anxiety: [] Mild [x] Moderate [] Severe    [x] Constant [] Sporadic     Delusions: [] Mild [] Moderate [] Severe     [] Constant [] Sporadic     [] Paranoid [] Somatic [] Grandiose     Hallucinations: [] Mild [] Moderate [] Severe     [] Constant [] Sporadic    [] Auditory  [] Visual [] Tactile       Suicidal: [] Constant [] Sporadic  Homicidal: [] Constant [] Sporadic    Unscheduled Medications     [] Patient Receiving Emergency Medications \" Chemical Restraint\"   [] Requesting PRN medications for anxiety    Medical Review of Systems:     All other than marked systmes have been reviewed and are all negative.     Constitutional Symptoms: []  fever []  Chills  Skin Symptoms: [] rash []  Pruritus   Eye Symptoms: [] Vision unchanged []  recent vision problems[] blurred vision   Respiratory Symptoms:[] shortness of breath [] cough  Cardiovascular Symptoms:  [] chest pain   [] palpitations   Gastrointestinal Symptoms: []  abdominal pain []  nausea []  vomiting []  diarrhea  Genitourinary Symptoms: []  dysuria  []  hematuria   Musculoskeletal Symptoms: []  back pain []  muscle pain []  joint pain  Neurologic Symptoms: []  headache []  dizziness  Hematolymphoid Symptoms: [] Adenopathy [] Bruises   [] Schimosis Psychiatric Review of systems  Delusions:  [] Denies [] Endorses   Withdrawals:  [] Denies [] Endorses    Hallucinations: [] Denies [] Endorses    Extra Pyramidal Symptoms: [] Denies [] Endorses      BP (!) 143/95   Pulse 113   Temp 97.5 °F (36.4 °C) (Temporal)   Resp 18   Ht 5' 9\" (1.753 m)   Wt 160 lb (72.6 kg)   SpO2 97%   BMI 23.63 kg/m²     Mental Status Examination:    Cognition:      [x] Alert  [x] Awake  [x] Oriented  [x] Person  [x] Place [x] Time      [] drowsy  [] tired  [] lethargic  [] distractable  [] Other     Attention/Concentration:   [x] Attentive  [] Distracted        Memory Recent and Remote: [x] Intact   [] Impaired [] Partially Impaired     Language: [] Able to recognize and name objects          [] Unable to recognize and name Objects    Fund of Knowledge:  [] Poor []  Fair  [x] Good    Speech: [x] Normal  [] Soft  [] Slow  [] Fast [] Pressured            [] Loud [] Dysarthria  [] Incoherent    Appearance: [] Well Groomed  [x] Casual Dressed  [] Unkept  [] Disheveled          [] Normal weight[] Thin  [] Overweight  [] Obese           Attitude: [] Positive  [] Hostile  [] Demanding  [] Guarded  [] Defensive         [x] Cooperative  []  Uncooperative      Behavior:  [] Normal Gait  [] Walks with Assistance  [] Joie Chair    [] Walks with Shari Cazares  [x] In Hospital Bed  [] Sitting in Chair    Muscle-Skeletal:  [x] Normal Muscle Tone [] Muscle Atrophy       [] Abnormal Muscle Movement     Eye Contact:  [x] Good eye contact  [] Intermittent Eye Contact  [] Poor Eye Contact     Mood: [x] Depressed  [x] Anxious  [] Irritated  [] Euthymic   [] Angry [] Restless    Affect:  [x] Congruent  [] Incongruent  [] Labile  [] Constricted  [] Flat  [] Bizarre     Thought Process and Association:  [] Logical [] Illogical       [x] Linear and Goal Directed  [] Tangential  [] Circumstantial     Thought Content:  [x] Denies [] Endorses [] Suicidal [] Homicidal  [] Delusional      [] Paranoid  [] Somatic  [] Grandiose    Perception: [x]  None  [] Auditory   [] Visual  [] tactile   [] olfactory  [] Illusions         Insight: [] Intact  [] Fair  [x] Limited    Judgement:  [] Intact  [] Fair  [x] Limited      Assessment/Plan:        Patient Active Problem List   Diagnosis Code    Trauma T14.90XA    Injury resulting from fall from height W17.89XA    Closed displaced fracture of pelvis ([address] S32. 9XXA    Shock following injury ([address] T79. 4XXA    Pneumothorax, traumatic S27. 0XXA    Suicidal behavior with attempted self-injury ([address] T14.91XA    Respiratory failure following trauma ([address] J96.90    T12 burst fracture ([address] S22.081A    Closed fracture of shaft of left humerus S42.302A    Transscaphoid perilunate fracture dislocation of left wrist, open, initial encounter S62.002B    Displaced fracture of left radial styloid process, initial encounter for closed fracture S52.512A    Open comminuted fracture of waist of scaphoid bone of left wrist, initial encounter S62.022B    Depression with suicidal ideation F32.9, R45.851    Schizoaffective disorder, bipolar type ([address] F25.0         Plan:    []  Patient is refusing medications  [x] Improving as expected   [] Not improving as expected   [] Worsening    []  At Baseline     Will d/c prolixin    Reason for more than one antipsychotic:  [x] N/A  [] 3 failed monotherapy(drugs tried):  [] Cross over to a new antipsychotic  [] Taper to monotherapy from polypharmacy  [] Augmentation of Clozapine therapy due to treatment resistance to single therapy      Signed:  Beni Elkins  12/31/2019  11:26 AM

## 2019-12-31 NOTE — PROGRESS NOTES
Pt mother came to visit patient. Mom asked for list of medications that the patient is currently on. Explained the ANNALISA paper. Audra Mcmillan agreed to sign the paper. However, patient was served with papers for a hearing of legal guardianship by his grandmother, Lisa White. This nurse called Stone Yepez and informed her of the patients mother searching for information and she informed staff to not give mother information as she is \"just there to cause trouble. She will try to get Audra Mcmillan upset and give information about what medications he is on. She is nothing but trouble. \" Called Nurse Manager Tessy and she stated, if the patient was served with papers from the court, no one except those listed on the paper are allowed information even if they have not yet gone to court, there is a hearing scheduled. \" Informed patients mother of such, she stated to this nurse that she is also filing papers for guardianship. She also continues to ask what medications he is taking and informed staff that she \"used to be a nurse\" and proceeded to ask if he had an IV running, pt does not. He does have a HepLock in place. Pt mother then stated, shouldn't that be out because its going to infiltrate if you don't take it out\" Pt mother informed that the only chance of infiltration is if there was actual fluid running through it. Pt mother wanted to sit with patient in his room. Pt mother allowed to sit at the door opening while patient lay in his bed. We will continue to provide support and comfort for the patient.

## 2019-12-31 NOTE — PROGRESS NOTES
Pleasant and cooperative during assessment. Denies SI/HI/AV hallucinations. Describes depression 2/10 and anxiety 0/10. Pt stated \"Im ready to leave\". Medication compliant. Will continue to monitor and assess.

## 2020-01-01 PROBLEM — R00.0 TACHYCARDIA: Status: ACTIVE | Noted: 2020-01-01

## 2020-01-01 PROBLEM — I10 UNCONTROLLED HYPERTENSION: Status: ACTIVE | Noted: 2020-01-01

## 2020-01-01 LAB
ALBUMIN SERPL-MCNC: 3.7 G/DL (ref 3.5–5.2)
ALP BLD-CCNC: 315 U/L (ref 40–129)
ALT SERPL-CCNC: 13 U/L (ref 0–40)
ANION GAP SERPL CALCULATED.3IONS-SCNC: 17 MMOL/L (ref 7–16)
AST SERPL-CCNC: 20 U/L (ref 0–39)
BASOPHILS ABSOLUTE: 0.04 E9/L (ref 0–0.2)
BASOPHILS RELATIVE PERCENT: 0.7 % (ref 0–2)
BILIRUB SERPL-MCNC: 0.4 MG/DL (ref 0–1.2)
BUN BLDV-MCNC: 8 MG/DL (ref 6–20)
CALCIUM SERPL-MCNC: 9.8 MG/DL (ref 8.6–10.2)
CHLORIDE BLD-SCNC: 98 MMOL/L (ref 98–107)
CO2: 22 MMOL/L (ref 22–29)
CREAT SERPL-MCNC: 0.6 MG/DL (ref 0.7–1.2)
EKG ATRIAL RATE: 127 BPM
EKG P AXIS: 55 DEGREES
EKG P-R INTERVAL: 136 MS
EKG Q-T INTERVAL: 300 MS
EKG QRS DURATION: 88 MS
EKG QTC CALCULATION (BAZETT): 436 MS
EKG R AXIS: 40 DEGREES
EKG T AXIS: 44 DEGREES
EKG VENTRICULAR RATE: 127 BPM
EOSINOPHILS ABSOLUTE: 0.19 E9/L (ref 0.05–0.5)
EOSINOPHILS RELATIVE PERCENT: 3.4 % (ref 0–6)
GFR AFRICAN AMERICAN: >60
GFR NON-AFRICAN AMERICAN: >60 ML/MIN/1.73
GLUCOSE BLD-MCNC: 139 MG/DL (ref 74–99)
HCT VFR BLD CALC: 40.8 % (ref 37–54)
HEMOGLOBIN: 12.6 G/DL (ref 12.5–16.5)
IMMATURE GRANULOCYTES #: 0.03 E9/L
IMMATURE GRANULOCYTES %: 0.5 % (ref 0–5)
LACTIC ACID: 2.9 MMOL/L (ref 0.5–2.2)
LYMPHOCYTES ABSOLUTE: 0.92 E9/L (ref 1.5–4)
LYMPHOCYTES RELATIVE PERCENT: 16.5 % (ref 20–42)
MCH RBC QN AUTO: 29.5 PG (ref 26–35)
MCHC RBC AUTO-ENTMCNC: 30.9 % (ref 32–34.5)
MCV RBC AUTO: 95.6 FL (ref 80–99.9)
MONOCYTES ABSOLUTE: 0.55 E9/L (ref 0.1–0.95)
MONOCYTES RELATIVE PERCENT: 9.8 % (ref 2–12)
NEUTROPHILS ABSOLUTE: 3.86 E9/L (ref 1.8–7.3)
NEUTROPHILS RELATIVE PERCENT: 69.1 % (ref 43–80)
PDW BLD-RTO: 14.2 FL (ref 11.5–15)
PLATELET # BLD: 380 E9/L (ref 130–450)
PMV BLD AUTO: 9.2 FL (ref 7–12)
POTASSIUM REFLEX MAGNESIUM: 4.2 MMOL/L (ref 3.5–5)
RBC # BLD: 4.27 E12/L (ref 3.8–5.8)
SODIUM BLD-SCNC: 137 MMOL/L (ref 132–146)
TOTAL PROTEIN: 8.2 G/DL (ref 6.4–8.3)
TSH SERPL DL<=0.05 MIU/L-ACNC: 1.96 UIU/ML (ref 0.27–4.2)
WBC # BLD: 5.6 E9/L (ref 4.5–11.5)

## 2020-01-01 PROCEDURE — 85025 COMPLETE CBC W/AUTO DIFF WBC: CPT

## 2020-01-01 PROCEDURE — 1240000000 HC EMOTIONAL WELLNESS R&B

## 2020-01-01 PROCEDURE — 6370000000 HC RX 637 (ALT 250 FOR IP): Performed by: INTERNAL MEDICINE

## 2020-01-01 PROCEDURE — 36415 COLL VENOUS BLD VENIPUNCTURE: CPT

## 2020-01-01 PROCEDURE — 6360000002 HC RX W HCPCS: Performed by: PHYSICAL MEDICINE & REHABILITATION

## 2020-01-01 PROCEDURE — 6370000000 HC RX 637 (ALT 250 FOR IP): Performed by: SURGERY

## 2020-01-01 PROCEDURE — 6370000000 HC RX 637 (ALT 250 FOR IP): Performed by: PHYSICAL MEDICINE & REHABILITATION

## 2020-01-01 PROCEDURE — 6370000000 HC RX 637 (ALT 250 FOR IP): Performed by: NURSE PRACTITIONER

## 2020-01-01 PROCEDURE — 99231 SBSQ HOSP IP/OBS SF/LOW 25: CPT | Performed by: NURSE PRACTITIONER

## 2020-01-01 PROCEDURE — 6360000002 HC RX W HCPCS: Performed by: SURGERY

## 2020-01-01 PROCEDURE — 93005 ELECTROCARDIOGRAM TRACING: CPT | Performed by: INTERNAL MEDICINE

## 2020-01-01 PROCEDURE — 93010 ELECTROCARDIOGRAM REPORT: CPT | Performed by: INTERNAL MEDICINE

## 2020-01-01 PROCEDURE — 84443 ASSAY THYROID STIM HORMONE: CPT

## 2020-01-01 PROCEDURE — 83605 ASSAY OF LACTIC ACID: CPT

## 2020-01-01 PROCEDURE — 6370000000 HC RX 637 (ALT 250 FOR IP): Performed by: PSYCHIATRY & NEUROLOGY

## 2020-01-01 PROCEDURE — 80053 COMPREHEN METABOLIC PANEL: CPT

## 2020-01-01 RX ADMIN — ACETAMINOPHEN 650 MG: 325 TABLET, FILM COATED ORAL at 06:02

## 2020-01-01 RX ADMIN — ENOXAPARIN SODIUM 30 MG: 30 INJECTION SUBCUTANEOUS at 10:17

## 2020-01-01 RX ADMIN — ENOXAPARIN SODIUM 30 MG: 30 INJECTION SUBCUTANEOUS at 22:39

## 2020-01-01 RX ADMIN — BENZTROPINE MESYLATE 1 MG: 1 TABLET ORAL at 08:45

## 2020-01-01 RX ADMIN — VENLAFAXINE HYDROCHLORIDE 37.5 MG: 37.5 CAPSULE, EXTENDED RELEASE ORAL at 08:45

## 2020-01-01 RX ADMIN — CARBAMAZEPINE 300 MG: 200 TABLET, EXTENDED RELEASE ORAL at 08:45

## 2020-01-01 RX ADMIN — QUETIAPINE FUMARATE 400 MG: 200 TABLET ORAL at 08:45

## 2020-01-01 RX ADMIN — METHOCARBAMOL TABLETS 1500 MG: 750 TABLET, COATED ORAL at 22:39

## 2020-01-01 RX ADMIN — ACETAMINOPHEN 650 MG: 325 TABLET, FILM COATED ORAL at 10:17

## 2020-01-01 RX ADMIN — BENZTROPINE MESYLATE 1 MG: 1 TABLET ORAL at 22:38

## 2020-01-01 RX ADMIN — DOCUSATE SODIUM 200 MG: 100 CAPSULE, LIQUID FILLED ORAL at 08:45

## 2020-01-01 RX ADMIN — METHOCARBAMOL TABLETS 1500 MG: 750 TABLET, COATED ORAL at 13:24

## 2020-01-01 RX ADMIN — METHOCARBAMOL TABLETS 1500 MG: 750 TABLET, COATED ORAL at 17:27

## 2020-01-01 RX ADMIN — ACETAMINOPHEN 650 MG: 325 TABLET, FILM COATED ORAL at 18:19

## 2020-01-01 RX ADMIN — PANTOPRAZOLE SODIUM 40 MG: 40 TABLET, DELAYED RELEASE ORAL at 06:02

## 2020-01-01 RX ADMIN — DILTIAZEM HYDROCHLORIDE 120 MG: 120 CAPSULE, COATED, EXTENDED RELEASE ORAL at 08:45

## 2020-01-01 RX ADMIN — OXYCODONE HYDROCHLORIDE 10 MG: 10 TABLET, FILM COATED, EXTENDED RELEASE ORAL at 08:45

## 2020-01-01 RX ADMIN — PETROLATUM: 42 OINTMENT TOPICAL at 10:18

## 2020-01-01 RX ADMIN — CARBAMAZEPINE 300 MG: 200 TABLET, EXTENDED RELEASE ORAL at 22:38

## 2020-01-01 RX ADMIN — DOCUSATE SODIUM 200 MG: 100 CAPSULE, LIQUID FILLED ORAL at 22:39

## 2020-01-01 RX ADMIN — OXYCODONE 5 MG: 5 TABLET ORAL at 14:22

## 2020-01-01 RX ADMIN — TRAZODONE HYDROCHLORIDE 50 MG: 50 TABLET ORAL at 22:40

## 2020-01-01 RX ADMIN — CALCITONIN SALMON 100 UNITS: 200 INJECTION, SOLUTION INTRAMUSCULAR; SUBCUTANEOUS at 10:17

## 2020-01-01 RX ADMIN — ACETAMINOPHEN 650 MG: 325 TABLET, FILM COATED ORAL at 14:19

## 2020-01-01 RX ADMIN — TAMSULOSIN HYDROCHLORIDE 0.4 MG: 0.4 CAPSULE ORAL at 08:45

## 2020-01-01 RX ADMIN — HYDROXYZINE PAMOATE 50 MG: 50 CAPSULE ORAL at 22:40

## 2020-01-01 RX ADMIN — QUETIAPINE FUMARATE 400 MG: 200 TABLET ORAL at 22:40

## 2020-01-01 RX ADMIN — OXYCODONE HYDROCHLORIDE 10 MG: 10 TABLET, FILM COATED, EXTENDED RELEASE ORAL at 22:39

## 2020-01-01 RX ADMIN — METHOCARBAMOL TABLETS 1500 MG: 750 TABLET, COATED ORAL at 08:45

## 2020-01-01 RX ADMIN — SENNOSIDES 17.2 MG: 8.6 TABLET, FILM COATED ORAL at 22:40

## 2020-01-01 RX ADMIN — ACETAMINOPHEN 650 MG: 325 TABLET, FILM COATED ORAL at 22:38

## 2020-01-01 ASSESSMENT — PAIN SCALES - GENERAL
PAINLEVEL_OUTOF10: 8
PAINLEVEL_OUTOF10: 8
PAINLEVEL_OUTOF10: 10
PAINLEVEL_OUTOF10: 9
PAINLEVEL_OUTOF10: 8
PAINLEVEL_OUTOF10: 10
PAINLEVEL_OUTOF10: 8
PAINLEVEL_OUTOF10: 0

## 2020-01-01 ASSESSMENT — PAIN DESCRIPTION - ORIENTATION
ORIENTATION: LOWER
ORIENTATION: LEFT

## 2020-01-01 ASSESSMENT — PAIN DESCRIPTION - PAIN TYPE
TYPE: SURGICAL PAIN

## 2020-01-01 ASSESSMENT — PAIN DESCRIPTION - LOCATION
LOCATION: BACK
LOCATION: BACK
LOCATION: ARM
LOCATION: BACK

## 2020-01-01 ASSESSMENT — PAIN DESCRIPTION - DESCRIPTORS
DESCRIPTORS: ACHING;CONSTANT;DISCOMFORT

## 2020-01-01 NOTE — PROGRESS NOTES
Internal Medicine Progress Note  (Covering Dr. Jv Boudreaux)    Patient's name: Marina Maurer  : 1995  Admission date: 2019  Date of service: 2020   Room: [place] [address]  Primary care physician: Dimple Cabrera MD    Fuad Mcmillan was seen and examined at bedside. He was resting comfortably in his bed. He states that he is having significant pain in his left arm. He also has pain in his back. He has shortness of breath at times. I discussed with him possibly needing IV fluid hydration and he adamantly refused. Review of Systems  There are no new complaints of chest pain, abdominal pain, nausea, vomiting, diarrhea, constipation.     Hospital Medications  Current Facility-Administered Medications   Medication Dose Route Frequency Provider Last Rate Last Dose    diltiazem (CARDIZEM CD) extended release capsule 120 mg  120 mg Oral Daily Magdi Pappas DO   120 mg at 19 1439    sodium chloride flush 0.9 % injection 10 mL  10 mL Intravenous PRN Eveline Still MD        mineral oil-hydrophilic petrolatum (HYDROPHOR) ointment   Topical Daily Karen Bustillos MD        nicotine (NICODERM CQ) 14 MG/24HR 1 patch  1 patch Transdermal Daily Getachew Farooq MD        oxyCODONE (ROXICODONE) immediate release tablet 5 mg  5 mg Oral Q4H PRN Getachew Farooq MD   5 mg at 19 0313    oxyCODONE (OXYCONTIN) extended release tablet 10 mg  10 mg Oral 2 times per day Getachew Farooq MD   10 mg at 19 2204    calcitonin (MIACALCIN) injection 100 Units  100 Units Intramuscular Daily Getachew Farooq MD   100 Units at 19 0848    vitamin D (ERGOCALCIFEROL) capsule 50,000 Units  50,000 Units Oral Weekly Getachew Farooq MD   50,000 Units at 19 1048    venlafaxine (EFFEXOR XR) extended release capsule 37.5 mg  37.5 mg Oral Daily with breakfast Cathie Cam, APRJEFF - CNP   37.5 mg at 19 0847    docusate sodium (COLACE) capsule 200 mg  200 mg Oral BID Saad Edwards MD Sergio   200 mg at 12/31/19 2058    ondansetron (ZOFRAN) injection 4 mg  4 mg Intravenous Q6H PRN Seb Conrad MD        sodium chloride flush 0.9 % injection 10 mL  10 mL Intravenous 2 times per day Seb Conrad MD   10 mL at 12/31/19 1017    acetaminophen (TYLENOL) tablet 650 mg  650 mg Oral Q4H Seb Conrad MD   650 mg at 01/01/20 0602    benztropine (COGENTIN) tablet 1 mg  1 mg Oral BID Seb Conrad MD   1 mg at 12/31/19 2048    bisacodyl (DULCOLAX) suppository 10 mg  10 mg Rectal Daily Seb Conrad MD        carBAMazepine (TEGRETOL XR) extended release tablet 300 mg  300 mg Oral BID Seb Conrad MD   300 mg at 12/31/19 2048    enoxaparin (LOVENOX) injection 30 mg  30 mg Subcutaneous BID Seb Conrad MD   30 mg at 12/31/19 2048    lidocaine 4 % external patch 1 patch  1 patch Transdermal Daily Seb Conrad MD   1 patch at 12/31/19 1013    melatonin tablet 3 mg  3 mg Oral Nightly PRN Seb Conrad MD   3 mg at 12/31/19 2204    methocarbamol (ROBAXIN) tablet 1,500 mg  1,500 mg Oral 4x Daily Seb Conrad MD   1,500 mg at 12/31/19 2049    pantoprazole (PROTONIX) tablet 40 mg  40 mg Oral QAM 45 [facility] MD Dell   40 mg at 01/01/20 0602    polyethylene glycol (GLYCOLAX) packet 17 g  17 g Oral BID Seb Conrad MD   17 g at 12/31/19 0881    QUEtiapine (SEROQUEL) tablet 400 mg  400 mg Oral BID Seb Conrad MD   400 mg at 12/31/19 2048    senna (SENOKOT) tablet 17.2 mg  2 tablet Oral Nightly Seb Conrad MD   17.2 mg at 12/31/19 2049    tamsulosin (FLOMAX) capsule 0.4 mg  0.4 mg Oral Daily Seb Conrad MD   0.4 mg at 12/31/19 0847    acetaminophen (TYLENOL) tablet 650 mg  650 mg Oral Q4H PRN Neal aPtel MD        hydrOXYzine (VISTARIL) capsule 50 mg  50 mg Oral TID PRN Rein Nap Ahmed, MD   50 mg at 12/31/19 2204    OLANZapine (ZYPREXA) tablet 5 mg  5 mg Oral Q4H PRN Neal Patel MD        Or    OLANZapine (ZYPREXA) injection 10 mg  10 mg Intramuscular Q4H PRN Ashok Schulz MD  sterile water injection 2.1 mL  2.1 mL Intramuscular Q4H PRN Neal Patel MD        traZODone (DESYREL) tablet 50 mg  50 mg Oral Nightly PRN Geno Rose MD   50 mg at 12/31/19 2205    benztropine mesylate (COGENTIN) injection 2 mg  2 mg Intramuscular BID PRN Geno Rose MD        magnesium hydroxide (MILK OF MAGNESIA) 400 MG/5ML suspension 30 mL  30 mL Oral Daily PRN Denese Cockayne Ahmed, MD   30 mL at 12/24/19 2115    aluminum & magnesium hydroxide-simethicone (MAALOX) 200-200-20 MG/5ML suspension 30 mL  30 mL Oral PRN Neal Patel MD           PRN Medications  sodium chloride flush, oxyCODONE, ondansetron, melatonin, acetaminophen, hydrOXYzine, OLANZapine **OR** OLANZapine, sterile water, traZODone, benztropine mesylate, magnesium hydroxide, aluminum & magnesium hydroxide-simethicone    Objective  Most Recent Recorded Vitals  /82   Pulse 111   Temp 96.7 °F (35.9 °C) (Temporal)   Resp 19   Ht 5' 9\" (1.753 m)   Wt 160 lb (72.6 kg)   SpO2 99%   BMI 23.63 kg/m²   I/O last 3 completed shifts: In: 600 [P.O.:600]  Out: -   No intake/output data recorded. Physical Exam:  General: AAO to person/place/time/purpose, NAD, no labored breathing, somewhat flat affect  Eyes: conjunctivae/corneas clear, sclera non icteric  Skin: color/texture/turgor normal, no rashes or lesions  Lungs: Diminished breath sounds but CTAB, no retractions/use of accessory muscles, no vocal fremitus, no rhonchi, no crackle, no rales  Heart: tachy rate, regular rhythm, no murmur  Abdomen: soft, NT; bowel sounds normal; no masses,  no organomegaly  Extremities: Cast on the left arm, no cyanosis, no edema  Neurologic: cranial nerves 2-12 grossly intact, no slurred speech.     Most Recent Labs  Lab Results   Component Value Date    WBC 9.7 12/21/2019    HGB 11.0 (L) 12/21/2019    HCT 35.8 (L) 12/21/2019     (H) 12/21/2019     12/21/2019    K 4.4 12/21/2019    CL 98 12/21/2019    CREATININE 0.6 (L) 12/21/2019    BUN 11 12/21/2019    CO2 28 12/21/2019    GLUCOSE 114 (H) 12/21/2019    ALT 29 12/21/2019    AST 22 12/21/2019    INR 1.4 12/05/2019       CTA PULMONARY W CONTRAST   Final Result   1. No definite CT evidence to account for the patient's shortness of breath. Specifically, no CT angiogram evidence of aortic dissection, aneurysm, injury,    periaortic hematoma, or pulmonary emboli identified. 2. Subacute fracture at the posterior aspect of the left left 7th through 11th    ribs, and the right 1st and left 2nd transverse processes, and a burst fracture    at the superior endplate of U76 again seen. 3. Additional nonacute/incidental findings as described above. This report has been electronically signed by Charlotte Rodriguez MD.          BLOOD CX #1  No results for input(s): BC in the last 72 hours. BLOOD CX #2  No results for input(s): Cruz Dave in the last 72 hours. TIP CULTURE  No results for input(s): CXCATHTIP in the last 72 hours. CULTURE, RESPIRATORY   No results for input(s): CULTRESP in the last 72 hours. RESPIRATORY SMEAR  No results for input(s): RESPSMEAR in the last 72 hours. BODY FLUID CULTURE  No results for input(s): BFCS in the last 72 hours. WOUND ABSCESS  No results for input(s): WNDABS in the last 72 hours. Anaerobic cx  No results for input(s): LABANAE in the last 72 hours. CULTURE SURGICAL  No results for input(s): CXSURG in the last 72 hours. URINE CULTURE  No results for input(s): LABURIN in the last 72 hours. No results for input(s): ORG in the last 72 hours. MISC. SENDOUT  No results for input(s): MREF in the last 72 hours. STREP PNEUMONIA AG URINE  No results for input(s): STREPNEUMAGU in the last 72 hours. LEGIONELLA AG URINE  No results for input(s): LEGUR in the last 72 hours. No results for input(s): [address] in the last 72 hours.       Assessment   Active Hospital Problems    Diagnosis    Schizoaffective disorder, bipolar type ([facility] [F25.0]     Priority: High    Uncontrolled hypertension [I10]     Priority: Medium    Tachycardia [R00.0]     Priority: Medium    Bipolar 1 disorder (HCC) [F31.9]    Depression with suicidal ideation [F32.9, R45.851]    Open comminuted fracture of waist of scaphoid bone of left wrist, initial encounter [S62.022B]    Closed fracture of shaft of left humerus [S42.302A]    Transscaphoid perilunate fracture dislocation of left wrist, open, initial encounter [S62.002B]    Displaced fracture of left radial styloid process, initial encounter for closed fracture [S52.512A]    T12 burst fracture ([facility] [S08.930E]    Injury resulting from fall from height [W17.89XA]    Closed displaced fracture of pelvis ([facility] [S32. 9XXA]    Shock following injury ([facility] Ardia Slimmer. 4XXA]    Pneumothorax, traumatic [S27. 0XXA]    Suicidal behavior with attempted self-injury ([facility] Barbara Savage    Respiratory failure following trauma ([facility] [J96.90]    Trauma [T14.90XA]         Plan  · Uncontrolled HTN, also w/ tachycardia: Cardizem started. CTA chest neg for PE. Check TSH/CBC/CMP/lactic acid. He would benefit from IV fluid hydration but he is refusing. For now push p.o. fluids. EKG revealed sinus tachycardia. · Attempted suicide (35-40 ft jump) w/ multiple fractures (early December): DC'ed by trauma. Psych now managing. · Generalized weakness from fx's: PT/OT. PM&R consulted-- pending DC to acute rehab. · Follow labs  · DVT prophylaxis. · Please see orders for further management and care. ·  for discharge planning  · Discharge plan: Acute rehab when precertification is obtained. Electronically signed by Ernesto Milner DO on 1/1/2020 at 8:00 AM    I can be reached through [address] [place] [address].

## 2020-01-02 ENCOUNTER — HOSPITAL ENCOUNTER (INPATIENT)
Age: 25
LOS: 21 days | Discharge: HOME | DRG: 862 | End: 2020-01-23
Attending: PHYSICAL MEDICINE & REHABILITATION | Admitting: PHYSICAL MEDICINE & REHABILITATION
Payer: MEDICAID

## 2020-01-02 ENCOUNTER — APPOINTMENT (OUTPATIENT)
Dept: GENERAL RADIOLOGY | Age: 25
DRG: 754 | End: 2020-01-02
Attending: PSYCHIATRY & NEUROLOGY
Payer: MEDICAID

## 2020-01-02 VITALS
OXYGEN SATURATION: 97 % | SYSTOLIC BLOOD PRESSURE: 140 MMHG | DIASTOLIC BLOOD PRESSURE: 82 MMHG | TEMPERATURE: 99.5 F | RESPIRATION RATE: 16 BRPM | WEIGHT: 160 LBS | HEART RATE: 110 BPM | BODY MASS INDEX: 23.7 KG/M2 | HEIGHT: 69 IN

## 2020-01-02 PROBLEM — T07.XXXA MULTIPLE TRAUMA: Status: ACTIVE | Noted: 2020-01-02

## 2020-01-02 LAB
ADENOVIRUS BY PCR: NOT DETECTED
ALBUMIN SERPL-MCNC: 3.8 G/DL (ref 3.5–5.2)
ALP BLD-CCNC: 326 U/L (ref 40–129)
ALT SERPL-CCNC: 15 U/L (ref 0–40)
ANION GAP SERPL CALCULATED.3IONS-SCNC: 13 MMOL/L (ref 7–16)
AST SERPL-CCNC: 17 U/L (ref 0–39)
BACTERIA: ABNORMAL /HPF
BASOPHILS ABSOLUTE: 0.04 E9/L (ref 0–0.2)
BASOPHILS RELATIVE PERCENT: 0.4 % (ref 0–2)
BILIRUB SERPL-MCNC: 0.3 MG/DL (ref 0–1.2)
BILIRUBIN URINE: NEGATIVE
BLOOD, URINE: NEGATIVE
BORDETELLA PARAPERTUSSIS BY PCR: NOT DETECTED
BORDETELLA PERTUSSIS BY PCR: NOT DETECTED
BUN BLDV-MCNC: 7 MG/DL (ref 6–20)
CALCIUM SERPL-MCNC: 10.3 MG/DL (ref 8.6–10.2)
CHLAMYDOPHILIA PNEUMONIAE BY PCR: NOT DETECTED
CHLORIDE BLD-SCNC: 94 MMOL/L (ref 98–107)
CLARITY: CLEAR
CO2: 26 MMOL/L (ref 22–29)
COLOR: YELLOW
CORONAVIRUS 229E BY PCR: NOT DETECTED
CORONAVIRUS HKU1 BY PCR: NOT DETECTED
CORONAVIRUS NL63 BY PCR: NOT DETECTED
CORONAVIRUS OC43 BY PCR: NOT DETECTED
CREAT SERPL-MCNC: 0.6 MG/DL (ref 0.7–1.2)
EOSINOPHILS ABSOLUTE: 0.2 E9/L (ref 0.05–0.5)
EOSINOPHILS RELATIVE PERCENT: 2 % (ref 0–6)
GFR AFRICAN AMERICAN: >60
GFR NON-AFRICAN AMERICAN: >60 ML/MIN/1.73
GLUCOSE BLD-MCNC: 116 MG/DL (ref 74–99)
GLUCOSE URINE: NEGATIVE MG/DL
HCT VFR BLD CALC: 39.8 % (ref 37–54)
HEMOGLOBIN: 12.8 G/DL (ref 12.5–16.5)
HUMAN METAPNEUMOVIRUS BY PCR: NOT DETECTED
HUMAN RHINOVIRUS/ENTEROVIRUS BY PCR: NOT DETECTED
IMMATURE GRANULOCYTES #: 0.05 E9/L
IMMATURE GRANULOCYTES %: 0.5 % (ref 0–5)
INFLUENZA A BY PCR: NOT DETECTED
INFLUENZA B BY PCR: NOT DETECTED
KETONES, URINE: NEGATIVE MG/DL
LACTIC ACID: 1.2 MMOL/L (ref 0.5–2.2)
LEUKOCYTE ESTERASE, URINE: ABNORMAL
LYMPHOCYTES ABSOLUTE: 1.38 E9/L (ref 1.5–4)
LYMPHOCYTES RELATIVE PERCENT: 14 % (ref 20–42)
MCH RBC QN AUTO: 30 PG (ref 26–35)
MCHC RBC AUTO-ENTMCNC: 32.2 % (ref 32–34.5)
MCV RBC AUTO: 93.4 FL (ref 80–99.9)
MONOCYTES ABSOLUTE: 1.13 E9/L (ref 0.1–0.95)
MONOCYTES RELATIVE PERCENT: 11.5 % (ref 2–12)
MYCOPLASMA PNEUMONIAE BY PCR: NOT DETECTED
NEUTROPHILS ABSOLUTE: 7.06 E9/L (ref 1.8–7.3)
NEUTROPHILS RELATIVE PERCENT: 71.6 % (ref 43–80)
NITRITE, URINE: POSITIVE
PARAINFLUENZA VIRUS 1 BY PCR: NOT DETECTED
PARAINFLUENZA VIRUS 2 BY PCR: NOT DETECTED
PARAINFLUENZA VIRUS 3 BY PCR: NOT DETECTED
PARAINFLUENZA VIRUS 4 BY PCR: NOT DETECTED
PDW BLD-RTO: 14.1 FL (ref 11.5–15)
PH UA: 7 (ref 5–9)
PLATELET # BLD: 404 E9/L (ref 130–450)
PMV BLD AUTO: 9.3 FL (ref 7–12)
POTASSIUM REFLEX MAGNESIUM: 4.3 MMOL/L (ref 3.5–5)
PROTEIN UA: NEGATIVE MG/DL
RBC # BLD: 4.26 E12/L (ref 3.8–5.8)
RBC UA: ABNORMAL /HPF (ref 0–2)
RESPIRATORY SYNCYTIAL VIRUS BY PCR: NOT DETECTED
SODIUM BLD-SCNC: 133 MMOL/L (ref 132–146)
SPECIFIC GRAVITY UA: 1.01 (ref 1–1.03)
TOTAL PROTEIN: 8.6 G/DL (ref 6.4–8.3)
UROBILINOGEN, URINE: 0.2 E.U./DL
WBC # BLD: 9.9 E9/L (ref 4.5–11.5)
WBC UA: >20 /HPF (ref 0–5)

## 2020-01-02 PROCEDURE — 0100U HC RESPIRPTHGN MULT REV TRANS & AMP PRB TECH 21 TRGT: CPT

## 2020-01-02 PROCEDURE — 6370000000 HC RX 637 (ALT 250 FOR IP): Performed by: INTERNAL MEDICINE

## 2020-01-02 PROCEDURE — 9900000067 HC THERAPEUTIC EXERCISE EA 15 MINS (SELF-PAY)

## 2020-01-02 PROCEDURE — 97530 THERAPEUTIC ACTIVITIES: CPT

## 2020-01-02 PROCEDURE — 6370000000 HC RX 637 (ALT 250 FOR IP): Performed by: PHYSICAL MEDICINE & REHABILITATION

## 2020-01-02 PROCEDURE — 83605 ASSAY OF LACTIC ACID: CPT

## 2020-01-02 PROCEDURE — 6370000000 HC RX 637 (ALT 250 FOR IP): Performed by: SURGERY

## 2020-01-02 PROCEDURE — 97112 NEUROMUSCULAR REEDUCATION: CPT

## 2020-01-02 PROCEDURE — 81001 URINALYSIS AUTO W/SCOPE: CPT

## 2020-01-02 PROCEDURE — 6370000000 HC RX 637 (ALT 250 FOR IP): Performed by: NURSE PRACTITIONER

## 2020-01-02 PROCEDURE — 85025 COMPLETE CBC W/AUTO DIFF WBC: CPT

## 2020-01-02 PROCEDURE — 36415 COLL VENOUS BLD VENIPUNCTURE: CPT

## 2020-01-02 PROCEDURE — 1280000000 HC REHAB R&B

## 2020-01-02 PROCEDURE — 71045 X-RAY EXAM CHEST 1 VIEW: CPT

## 2020-01-02 PROCEDURE — 87186 SC STD MICRODIL/AGAR DIL: CPT

## 2020-01-02 PROCEDURE — 87088 URINE BACTERIA CULTURE: CPT

## 2020-01-02 PROCEDURE — 99238 HOSP IP/OBS DSCHRG MGMT 30/<: CPT | Performed by: NURSE PRACTITIONER

## 2020-01-02 PROCEDURE — 6360000002 HC RX W HCPCS: Performed by: INTERNAL MEDICINE

## 2020-01-02 PROCEDURE — 80053 COMPREHEN METABOLIC PANEL: CPT

## 2020-01-02 PROCEDURE — 2580000003 HC RX 258: Performed by: INTERNAL MEDICINE

## 2020-01-02 RX ORDER — VENLAFAXINE HYDROCHLORIDE 37.5 MG/1
37.5 CAPSULE, EXTENDED RELEASE ORAL
Status: CANCELLED | OUTPATIENT
Start: 2020-01-03

## 2020-01-02 RX ORDER — LANOLIN ALCOHOL/MO/W.PET/CERES
3 CREAM (GRAM) TOPICAL NIGHTLY PRN
Status: DISCONTINUED | OUTPATIENT
Start: 2020-01-02 | End: 2020-01-23 | Stop reason: HOSPADM

## 2020-01-02 RX ORDER — OXYCODONE HYDROCHLORIDE 5 MG/1
5 TABLET ORAL EVERY 4 HOURS PRN
Status: DISCONTINUED | OUTPATIENT
Start: 2020-01-02 | End: 2020-01-23 | Stop reason: HOSPADM

## 2020-01-02 RX ORDER — OXYCODONE HYDROCHLORIDE 5 MG/1
5 TABLET ORAL EVERY 4 HOURS PRN
Status: CANCELLED | OUTPATIENT
Start: 2020-01-02

## 2020-01-02 RX ORDER — DOCUSATE SODIUM 100 MG/1
200 CAPSULE, LIQUID FILLED ORAL 2 TIMES DAILY
Status: DISCONTINUED | OUTPATIENT
Start: 2020-01-02 | End: 2020-01-23 | Stop reason: HOSPADM

## 2020-01-02 RX ORDER — BENZTROPINE MESYLATE 1 MG/1
1 TABLET ORAL 2 TIMES DAILY
Status: CANCELLED | OUTPATIENT
Start: 2020-01-02

## 2020-01-02 RX ORDER — ACETAMINOPHEN 325 MG/1
650 TABLET ORAL EVERY 4 HOURS
Status: DISCONTINUED | OUTPATIENT
Start: 2020-01-02 | End: 2020-01-07

## 2020-01-02 RX ORDER — SENNA PLUS 8.6 MG/1
2 TABLET ORAL NIGHTLY
Status: DISCONTINUED | OUTPATIENT
Start: 2020-01-02 | End: 2020-01-23 | Stop reason: HOSPADM

## 2020-01-02 RX ORDER — PETROLATUM 42 G/100G
OINTMENT TOPICAL DAILY
Status: DISCONTINUED | OUTPATIENT
Start: 2020-01-03 | End: 2020-01-23 | Stop reason: HOSPADM

## 2020-01-02 RX ORDER — ERGOCALCIFEROL 1.25 MG/1
50000 CAPSULE ORAL WEEKLY
Status: CANCELLED | OUTPATIENT
Start: 2020-01-09

## 2020-01-02 RX ORDER — OXYCODONE HCL 10 MG/1
10 TABLET, FILM COATED, EXTENDED RELEASE ORAL EVERY 12 HOURS SCHEDULED
Status: DISCONTINUED | OUTPATIENT
Start: 2020-01-02 | End: 2020-01-13

## 2020-01-02 RX ORDER — NICOTINE 21 MG/24HR
1 PATCH, TRANSDERMAL 24 HOURS TRANSDERMAL DAILY
Qty: 30 PATCH | Refills: 0 | Status: ON HOLD | OUTPATIENT
Start: 2020-01-02 | End: 2020-01-23

## 2020-01-02 RX ORDER — LIDOCAINE 4 G/G
1 PATCH TOPICAL DAILY
Status: DISCONTINUED | OUTPATIENT
Start: 2020-01-03 | End: 2020-01-23 | Stop reason: HOSPADM

## 2020-01-02 RX ORDER — PETROLATUM 42 G/100G
OINTMENT TOPICAL DAILY
Status: CANCELLED | OUTPATIENT
Start: 2020-01-03

## 2020-01-02 RX ORDER — ACETAMINOPHEN 325 MG/1
650 TABLET ORAL EVERY 4 HOURS PRN
Status: DISCONTINUED | OUTPATIENT
Start: 2020-01-02 | End: 2020-01-23 | Stop reason: HOSPADM

## 2020-01-02 RX ORDER — QUETIAPINE FUMARATE 200 MG/1
400 TABLET, FILM COATED ORAL 2 TIMES DAILY
Status: DISCONTINUED | OUTPATIENT
Start: 2020-01-02 | End: 2020-01-23 | Stop reason: HOSPADM

## 2020-01-02 RX ORDER — DILTIAZEM HYDROCHLORIDE 180 MG/1
180 CAPSULE, COATED, EXTENDED RELEASE ORAL DAILY
Status: CANCELLED | OUTPATIENT
Start: 2020-01-03

## 2020-01-02 RX ORDER — DOCUSATE SODIUM 100 MG/1
200 CAPSULE, LIQUID FILLED ORAL 2 TIMES DAILY
Status: CANCELLED | OUTPATIENT
Start: 2020-01-02

## 2020-01-02 RX ORDER — VENLAFAXINE HYDROCHLORIDE 37.5 MG/1
37.5 CAPSULE, EXTENDED RELEASE ORAL
Status: DISCONTINUED | OUTPATIENT
Start: 2020-01-03 | End: 2020-01-23 | Stop reason: HOSPADM

## 2020-01-02 RX ORDER — TAMSULOSIN HYDROCHLORIDE 0.4 MG/1
0.4 CAPSULE ORAL DAILY
Status: CANCELLED | OUTPATIENT
Start: 2020-01-03

## 2020-01-02 RX ORDER — METHOCARBAMOL 750 MG/1
1500 TABLET, FILM COATED ORAL 4 TIMES DAILY
Status: CANCELLED | OUTPATIENT
Start: 2020-01-02

## 2020-01-02 RX ORDER — ACETAMINOPHEN 325 MG/1
650 TABLET ORAL EVERY 4 HOURS
Status: CANCELLED | OUTPATIENT
Start: 2020-01-02

## 2020-01-02 RX ORDER — METHOCARBAMOL 750 MG/1
1500 TABLET, FILM COATED ORAL 4 TIMES DAILY
Status: DISCONTINUED | OUTPATIENT
Start: 2020-01-02 | End: 2020-01-07

## 2020-01-02 RX ORDER — AMMONIUM LACTATE 12 G/100G
LOTION TOPICAL PRN
Status: DISCONTINUED | OUTPATIENT
Start: 2020-01-02 | End: 2020-01-23 | Stop reason: HOSPADM

## 2020-01-02 RX ORDER — PANTOPRAZOLE SODIUM 40 MG/1
40 TABLET, DELAYED RELEASE ORAL
Status: DISCONTINUED | OUTPATIENT
Start: 2020-01-03 | End: 2020-01-23 | Stop reason: HOSPADM

## 2020-01-02 RX ORDER — SENNA PLUS 8.6 MG/1
2 TABLET ORAL NIGHTLY
Status: CANCELLED | OUTPATIENT
Start: 2020-01-02

## 2020-01-02 RX ORDER — HYDROXYZINE PAMOATE 50 MG/1
50 CAPSULE ORAL 3 TIMES DAILY PRN
Status: DISCONTINUED | OUTPATIENT
Start: 2020-01-02 | End: 2020-01-23 | Stop reason: HOSPADM

## 2020-01-02 RX ORDER — DILTIAZEM HYDROCHLORIDE 180 MG/1
180 CAPSULE, COATED, EXTENDED RELEASE ORAL DAILY
Status: DISCONTINUED | OUTPATIENT
Start: 2020-01-02 | End: 2020-01-02 | Stop reason: HOSPADM

## 2020-01-02 RX ORDER — POLYETHYLENE GLYCOL 3350 17 G/17G
17 POWDER, FOR SOLUTION ORAL 2 TIMES DAILY
Status: CANCELLED | OUTPATIENT
Start: 2020-01-02

## 2020-01-02 RX ORDER — NICOTINE 21 MG/24HR
1 PATCH, TRANSDERMAL 24 HOURS TRANSDERMAL DAILY
Status: DISCONTINUED | OUTPATIENT
Start: 2020-01-03 | End: 2020-01-13

## 2020-01-02 RX ORDER — PANTOPRAZOLE SODIUM 40 MG/1
40 TABLET, DELAYED RELEASE ORAL
Status: CANCELLED | OUTPATIENT
Start: 2020-01-03

## 2020-01-02 RX ORDER — TAMSULOSIN HYDROCHLORIDE 0.4 MG/1
0.4 CAPSULE ORAL DAILY
Status: DISCONTINUED | OUTPATIENT
Start: 2020-01-03 | End: 2020-01-14

## 2020-01-02 RX ORDER — DILTIAZEM HYDROCHLORIDE 180 MG/1
180 CAPSULE, COATED, EXTENDED RELEASE ORAL DAILY
Status: DISCONTINUED | OUTPATIENT
Start: 2020-01-03 | End: 2020-01-23 | Stop reason: HOSPADM

## 2020-01-02 RX ORDER — VENLAFAXINE HYDROCHLORIDE 37.5 MG/1
37.5 CAPSULE, EXTENDED RELEASE ORAL
Qty: 30 CAPSULE | Refills: 0 | Status: ON HOLD | OUTPATIENT
Start: 2020-01-02 | End: 2020-01-23

## 2020-01-02 RX ORDER — OXYCODONE HCL 10 MG/1
10 TABLET, FILM COATED, EXTENDED RELEASE ORAL EVERY 12 HOURS SCHEDULED
Status: CANCELLED | OUTPATIENT
Start: 2020-01-02

## 2020-01-02 RX ORDER — 0.9 % SODIUM CHLORIDE 0.9 %
1000 INTRAVENOUS SOLUTION INTRAVENOUS ONCE
Status: COMPLETED | OUTPATIENT
Start: 2020-01-02 | End: 2020-01-02

## 2020-01-02 RX ORDER — QUETIAPINE FUMARATE 200 MG/1
400 TABLET, FILM COATED ORAL 2 TIMES DAILY
Status: CANCELLED | OUTPATIENT
Start: 2020-01-02

## 2020-01-02 RX ORDER — LIDOCAINE 4 G/G
1 PATCH TOPICAL DAILY
Status: CANCELLED | OUTPATIENT
Start: 2020-01-03

## 2020-01-02 RX ORDER — POLYETHYLENE GLYCOL 3350 17 G/17G
17 POWDER, FOR SOLUTION ORAL 2 TIMES DAILY
Status: DISCONTINUED | OUTPATIENT
Start: 2020-01-02 | End: 2020-01-23 | Stop reason: HOSPADM

## 2020-01-02 RX ORDER — LANOLIN ALCOHOL/MO/W.PET/CERES
3 CREAM (GRAM) TOPICAL NIGHTLY PRN
Status: CANCELLED | OUTPATIENT
Start: 2020-01-02

## 2020-01-02 RX ORDER — NICOTINE 21 MG/24HR
1 PATCH, TRANSDERMAL 24 HOURS TRANSDERMAL DAILY
Status: CANCELLED | OUTPATIENT
Start: 2020-01-03

## 2020-01-02 RX ORDER — ERGOCALCIFEROL 1.25 MG/1
50000 CAPSULE ORAL WEEKLY
Status: DISCONTINUED | OUTPATIENT
Start: 2020-01-09 | End: 2020-01-23 | Stop reason: HOSPADM

## 2020-01-02 RX ORDER — BENZTROPINE MESYLATE 1 MG/1
1 TABLET ORAL 2 TIMES DAILY
Status: DISCONTINUED | OUTPATIENT
Start: 2020-01-02 | End: 2020-01-23 | Stop reason: HOSPADM

## 2020-01-02 RX ORDER — HYDROXYZINE PAMOATE 50 MG/1
50 CAPSULE ORAL 3 TIMES DAILY PRN
Status: CANCELLED | OUTPATIENT
Start: 2020-01-02

## 2020-01-02 RX ORDER — CARBAMAZEPINE 100 MG/1
300 TABLET, EXTENDED RELEASE ORAL 2 TIMES DAILY
Status: DISCONTINUED | OUTPATIENT
Start: 2020-01-02 | End: 2020-01-23 | Stop reason: HOSPADM

## 2020-01-02 RX ADMIN — METHOCARBAMOL TABLETS 1500 MG: 750 TABLET, COATED ORAL at 21:14

## 2020-01-02 RX ADMIN — ERGOCALCIFEROL 50000 UNITS: 1.25 CAPSULE ORAL at 09:15

## 2020-01-02 RX ADMIN — HYDROXYZINE PAMOATE 50 MG: 50 CAPSULE ORAL at 21:23

## 2020-01-02 RX ADMIN — ENOXAPARIN SODIUM 30 MG: 30 INJECTION SUBCUTANEOUS at 21:15

## 2020-01-02 RX ADMIN — ACETAMINOPHEN 650 MG: 325 TABLET, FILM COATED ORAL at 17:54

## 2020-01-02 RX ADMIN — PANTOPRAZOLE SODIUM 40 MG: 40 TABLET, DELAYED RELEASE ORAL at 06:18

## 2020-01-02 RX ADMIN — QUETIAPINE FUMARATE 400 MG: 200 TABLET ORAL at 09:15

## 2020-01-02 RX ADMIN — CARBAMAZEPINE 300 MG: 200 TABLET, EXTENDED RELEASE ORAL at 09:14

## 2020-01-02 RX ADMIN — OXYCODONE 5 MG: 5 TABLET ORAL at 06:18

## 2020-01-02 RX ADMIN — TAMSULOSIN HYDROCHLORIDE 0.4 MG: 0.4 CAPSULE ORAL at 09:16

## 2020-01-02 RX ADMIN — BENZTROPINE MESYLATE 1 MG: 1 TABLET ORAL at 21:15

## 2020-01-02 RX ADMIN — DOCUSATE SODIUM 200 MG: 100 CAPSULE, LIQUID FILLED ORAL at 09:16

## 2020-01-02 RX ADMIN — SODIUM CHLORIDE 1000 ML: 9 INJECTION, SOLUTION INTRAVENOUS at 10:00

## 2020-01-02 RX ADMIN — ACETAMINOPHEN 650 MG: 325 TABLET, FILM COATED ORAL at 13:40

## 2020-01-02 RX ADMIN — ACETAMINOPHEN 650 MG: 325 TABLET, FILM COATED ORAL at 06:17

## 2020-01-02 RX ADMIN — METHOCARBAMOL TABLETS 1500 MG: 750 TABLET, COATED ORAL at 09:15

## 2020-01-02 RX ADMIN — QUETIAPINE FUMARATE 400 MG: 200 TABLET ORAL at 21:15

## 2020-01-02 RX ADMIN — POLYETHYLENE GLYCOL 3350 17 G: 17 POWDER, FOR SOLUTION ORAL at 09:22

## 2020-01-02 RX ADMIN — DOCUSATE SODIUM 200 MG: 100 CAPSULE, LIQUID FILLED ORAL at 21:15

## 2020-01-02 RX ADMIN — CARBAMAZEPINE 300 MG: 100 TABLET, EXTENDED RELEASE ORAL at 21:15

## 2020-01-02 RX ADMIN — ACETAMINOPHEN 650 MG: 325 TABLET, FILM COATED ORAL at 22:04

## 2020-01-02 RX ADMIN — VENLAFAXINE HYDROCHLORIDE 37.5 MG: 37.5 CAPSULE, EXTENDED RELEASE ORAL at 09:15

## 2020-01-02 RX ADMIN — ACETAMINOPHEN 650 MG: 325 TABLET, FILM COATED ORAL at 09:17

## 2020-01-02 RX ADMIN — SENNOSIDES 17.2 MG: 8.6 TABLET, FILM COATED ORAL at 21:14

## 2020-01-02 RX ADMIN — OXYCODONE HYDROCHLORIDE 10 MG: 10 TABLET, FILM COATED, EXTENDED RELEASE ORAL at 09:16

## 2020-01-02 RX ADMIN — BENZTROPINE MESYLATE 1 MG: 1 TABLET ORAL at 09:14

## 2020-01-02 RX ADMIN — OXYCODONE HYDROCHLORIDE 10 MG: 10 TABLET, FILM COATED, EXTENDED RELEASE ORAL at 21:15

## 2020-01-02 RX ADMIN — OXYCODONE 5 MG: 5 TABLET ORAL at 13:40

## 2020-01-02 RX ADMIN — MELATONIN 3 MG ORAL TABLET 3 MG: 3 TABLET ORAL at 21:23

## 2020-01-02 RX ADMIN — METHOCARBAMOL TABLETS 1500 MG: 750 TABLET, COATED ORAL at 13:39

## 2020-01-02 ASSESSMENT — PAIN DESCRIPTION - ONSET: ONSET: ON-GOING

## 2020-01-02 ASSESSMENT — PAIN SCALES - GENERAL
PAINLEVEL_OUTOF10: 4
PAINLEVEL_OUTOF10: 8
PAINLEVEL_OUTOF10: 0
PAINLEVEL_OUTOF10: 8
PAINLEVEL_OUTOF10: 6
PAINLEVEL_OUTOF10: 8
PAINLEVEL_OUTOF10: 6
PAINLEVEL_OUTOF10: 8

## 2020-01-02 ASSESSMENT — PAIN DESCRIPTION - PAIN TYPE
TYPE: SURGICAL PAIN
TYPE: ACUTE PAIN;SURGICAL PAIN
TYPE: ACUTE PAIN
TYPE: ACUTE PAIN;SURGICAL PAIN

## 2020-01-02 ASSESSMENT — PAIN DESCRIPTION - PROGRESSION
CLINICAL_PROGRESSION: NOT CHANGED

## 2020-01-02 ASSESSMENT — PAIN DESCRIPTION - DESCRIPTORS
DESCRIPTORS: OTHER (COMMENT)
DESCRIPTORS: ACHING;DISCOMFORT;SORE
DESCRIPTORS: OTHER (COMMENT)
DESCRIPTORS: ACHING

## 2020-01-02 ASSESSMENT — PAIN DESCRIPTION - ORIENTATION
ORIENTATION: LEFT
ORIENTATION: LEFT
ORIENTATION: LOWER

## 2020-01-02 ASSESSMENT — PAIN DESCRIPTION - LOCATION
LOCATION: BACK
LOCATION: BACK
LOCATION: ARM
LOCATION: ARM

## 2020-01-02 ASSESSMENT — PAIN DESCRIPTION - FREQUENCY: FREQUENCY: CONTINUOUS

## 2020-01-02 NOTE — PROGRESS NOTES
Patient attended goals group and stated daily goal as to succeed in PT to make myself stronger. Group Therapy Note    Date: 1/2/2020  Start Time: 10:00  End Time:  10:30  Number of Participants: 10    Type of Group: Psychoeducation    Wellness Binder Information  Module Name: Coping skills  Session Number: NA    Patient's Goal: To id positive coping skills to use on a daily basis. Notes: Attended group and was able to participate in discussion. Status After Intervention:  Improved    Participation Level:  Active Listener and Interactive    Participation Quality: Attentive and Sharing      Speech:  normal      Thought Process/Content: Logical      Affective Functioning: Flat      Mood: anxious and depressed      Level of consciousness:  Alert      Response to Learning: Progressing to goal      Endings: None Reported    Modes of Intervention: Education      Discipline Responsible: Psychoeducational Specialist      Signature:  DIONICIO Vital

## 2020-01-02 NOTE — GROUP NOTE
Group Therapy Note    Date: 1/2/2020    Group Start Time: 2000  Group End Time: 2100  Group Topic: Relaxation    [facility]  [address], RN        Group Therapy Note    Attendees: 8/12    Enjoyed watching television       Signature:  Mónica Kraft RN

## 2020-01-02 NOTE — PROGRESS NOTES
Medium    Tachycardia [R00.0]     Priority: Medium    Bipolar 1 disorder (HCC) [F31.9]    Depression with suicidal ideation [F32.9, R45.851]    Open comminuted fracture of waist of scaphoid bone of left wrist, initial encounter [S62.022B]    Closed fracture of shaft of left humerus [S42.302A]    Transscaphoid perilunate fracture dislocation of left wrist, open, initial encounter [S62.002B]    Displaced fracture of left radial styloid process, initial encounter for closed fracture [S52.512A]    T12 burst fracture ([address] [W59.868Y]    Injury resulting from fall from height [W17.89XA]    Closed displaced fracture of pelvis ([address] [S32. 9XXA]    Shock following injury ([address] Yvone Leer. 4XXA]    Pneumothorax, traumatic [S27. 0XXA]    Suicidal behavior with attempted self-injury ([address] Gali Troy    Respiratory failure following trauma ([address] [J96.90]    Trauma [T14.90XA]         Plan  · SIRS, unclear etiology: IVF bolus (now agreeable). Viral panel. UA and cx. CXR. Repeat labs. · Uncontrolled HTN, also w/ tachycardia: Cardizem increased. CTA chest neg for PE. WNL TSH. EKG revealed sinus tachycardia. · Attempted suicide (35-40 ft jump) w/ multiple fractures (early December): DC'ed by trauma. Psych now managing. · Generalized weakness from fx's: PT/OT. PM&R consulted-- pending DC to acute rehab. · Follow labs  · DVT prophylaxis. · Please see orders for further management and care. ·  for discharge planning  · Discharge plan: Acute rehab when precertification is obtained-- Please consult me there    Electronically signed by Aaron Galeano DO on 1/2/2020 at 9:32 AM    I can be reached through [place].

## 2020-01-02 NOTE — PROGRESS NOTES
Precert obtained will admit to [facility]. Orders written under misc nursing. Lisa Thorpe notified of admission to [facility].

## 2020-01-02 NOTE — LETTER
PORTABLE PATIENT PROFILE  Trudy Colindres  [id number]    MEDICAL DIAGNOSIS/CONDITION:   Patient Active Problem List   Diagnosis    Trauma    Injury resulting from fall from height    Closed displaced fracture of pelvis ([address]    Shock following injury ([facility]    Pneumothorax, traumatic    Suicidal behavior with attempted self-injury ([facility]    Respiratory failure following trauma ([address]    T12 burst fracture ([address]    Closed fracture of shaft of left humerus    Transscaphoid perilunate fracture dislocation of left wrist, open, initial encounter    Displaced fracture of left radial styloid process, initial encounter for closed fracture    Open comminuted fracture of waist of scaphoid bone of left wrist, initial encounter    Depression with suicidal ideation    Schizoaffective disorder, bipolar type ([facility]    Bipolar 1 disorder ([facility]    Uncontrolled hypertension    Tachycardia    Multiple trauma       INSURANCE INFORMATION:  Payor: [address] /  /  /     ADVANCED DIRECTIVES:   Advance Directives (For Healthcare)  Pre-existing DNR Comfort Care/DNR Arrest/DNI Order: No  Healthcare Directive: No, patient does not have an advance directive for healthcare treatment  Information on Healthcare Directives Requested: No  Patient Requests Assistance: No  Advance Directives: Pt. not interested at this time  [unfilled]     EMERGENCY CONTACT:       RISK FACTORS:   Social History     Tobacco Use    Smoking status: Light Tobacco Smoker     Types: Cigarettes   Substance Use Topics    Alcohol use: Never     Frequency: Never       ALLERGIES:  Allergies   Allergen Reactions    Depakote [Valproic Acid] Other (See Comments)     Feels bloated     Haldol [Haloperidol] Other (See Comments)     Intolerable side efffects    Invega Sustenna [Paliperidone Palmitate Er] Hives    Invega [Paliperidone] Hives       IMMUNIZATIONS:  Immunization History   Administered Date(s) Administered

## 2020-01-02 NOTE — DISCHARGE INSTR - COC
Assessment Other (Comment) 12/23/2019  2:13 AM   Drainage Amount None 12/26/2019 10:20 AM   Odor None 12/21/2019  8:00 PM   Number of days: 27        Elimination:  Continence:   · Bowel: Yes  · Bladder: Yes  Urinary Catheter: None   Colostomy/Ileostomy/Ileal Conduit: No       Date of Last BM: 1/1/20    Intake/Output Summary (Last 24 hours) at 1/2/2020 1155  Last data filed at 1/2/2020 1115  Gross per 24 hour   Intake 1240 ml   Output 1475 ml   Net -235 ml     I/O last 3 completed shifts: In: 480 [P.O.:480]  Out: 1475 [Urine:1475]    Safety Concerns: At Risk for Falls    Impairments/Disabilities:      None    Nutrition Therapy:  Current Nutrition Therapy:   - Oral Diet:  General    Routes of Feeding: Oral  Liquids: No Restrictions  Daily Fluid Restriction: no  Last Modified Barium Swallow with Video (Video Swallowing Test): not done    Treatments at the Time of Hospital Discharge:   Respiratory Treatments: N/A  Oxygen Therapy:  is not on home oxygen therapy.   Ventilator:    - No ventilator support    Rehab Therapies: Physical Therapy and Occupational Therapy  Weight Bearing Status/Restrictions: Non-weight bearing on left leg & arm  Other Medical Equipment (for information only, NOT a DME order):  wheelchair and braces (turtle shell, shoe)  Other Treatments: N/A    Patient's personal belongings (please select all that are sent with patient):  Collected and transferred with patient    RN SIGNATURE:  Electronically signed by Isabella Osuna RN on 1/2/20 at 12:08 PM    CASE MANAGEMENT/SOCIAL WORK SECTION    Inpatient Status Date: ***    Readmission Risk Assessment Score:  Readmission Risk              Risk of Unplanned Readmission:        18           Discharging to Facility/ Agency   · Name:   · Address:  · Phone:  · Fax:    Dialysis Facility (if applicable)   · Name:  · Address:  · Dialysis Schedule:  · Phone:  · Fax:    / signature: {Esignature:[id number]}    PHYSICIAN SECTION    Prognosis: {Prognosis:1053976105}    Condition at Discharge: Jacy Duarte Patient Condition:322370333}    Rehab Potential (if transferring to Rehab): {Prognosis:7746898462}    Recommended Labs or Other Treatments After Discharge: ***    Physician Certification: I certify the above information and transfer of Neva Dupont  is necessary for the continuing treatment of the diagnosis listed and that he requires {Admit to Appropriate Level of Care:28464} for {GREATER/LESS:766529528} 30 days.      Update Admission H&P: {CHP DME Changes in CJJQQ:596856419}    PHYSICIAN SIGNATURE:  {Esignature:404405612}

## 2020-01-02 NOTE — PROGRESS NOTES
Dr. Chapito Loya notified via [address] [place] [address] that patient is refusing fluid bolus because he does not want an IV to be inserted. Dr. Chapito Loya will be up to see patient.

## 2020-01-02 NOTE — PROGRESS NOTES
OT BEDSIDE TREATMENT NOTE      Date:2020  Patient Name: Deanne Quarles  MRN: [id number]  : 1995  Room: [address] [place] [address]-A       Evaluating OT: MIKHAIL Taylor, OTR/L #[id number]     AM-PAC Daily Activity Raw Score:    Recommended Adaptive Equipment:  LB AE, w/c; TBA     Reason for Admission:  Pt was admitted after jumping of a bridge     Diagnosis:  Trauma  Injuries Sustained:    - Left midshaft humerus fracture, closed. - Left transscaphoid perilunate open fracture dislocation, grade 2, with extruded proximal scaphoid and lunate.  - Complex 3 cm open wound, volar wrist/hand.  - Left radial styloid fracture. - Left pelvic ring fracture disruption, lateral compression type 2 pattern, with large crescent fracture and complete fracture through ileum into the anterior superior sacroiliac joint with comminution.  - Left anterior pelvic ring fracture, junctional rami, superiorly, and  comminuted inferior rami into symphysial body   - Right foot 1st digit distal phalanx avulsion fx  - T12 burst fracture     Procedures this admission:    19:    I&D Left Wrist, Placement of External Fixator  19:    ORIF Left Pelvis w/ Dr. Rosemary Ojeda  12-10-19:  T10-L1 Posterior Lateral Fusion w/ Dr. Bhargav Quiroz   19:  Left Humerus ORIF w/ Dr. Rosemary Ojeda     Pertinent Medical History:  Bipolar D/O     Precautions:    Falls             splint Left Wrist - NWB L UE  NWB L LE  WBAT R LE w/ surgical shoe per Dr. Geraldo Enriquez   Spinal Precautions - Custom TLSO when [address] Living: Pt lives with his Grandmother and her  in a 2-story house with 2 PETROS and 1 HR/s.  Bed/bath on the 2nd floor   Bathroom setup:  Tub-Shower, standard commode   Equipment owned:  None     Available Family Assist:  Grandmother     Prior Level of Function:  IND with ADLs, IADLs, Transfers and Mobility using No AD for ambulation.    Driving:  ??  Occupation:  ??     Pain Level: pelvis pain at bottom of brace, did not rate his pain    Cognition: A & O x 4, pleasant & cooperative   Able to Follow Multi-Step Commands with min cues              Memory:  good (-)              Sequencing: Fair              Problem solving: Fair-              Judgement/safety: Fair-        Functional Assessment:    Initial Eval Status  Date: 12-11-19 Treatment Status  Date: 1/2/20 Short Term Goals  Treatment frequency: PRN 2-4 x/week   Feeding Currently NPO for OR     Per report, Able to use R Dominant UE to feed self, required Good set up of tray, assist w/ positioning d/t limited elevation of HOB w/o TLSO Set up Mod I   Grooming Min A/Good Set up     Pt able to wash face, brush teeth w/ Good Set up, use of R UE, Required assist to wash hands d/t inability to use L UE for ax -bed level d/t position restrictions  Set up; To wipe face and hair while in sitting. SUP   UB Dressing Max A/Set up     Required Max A to don/doff garment in supine, assist to thread L UE, assist for bed mobility and assist to arrange garment around back, assist w/ all fasteners d/t inability to utilize L UE - in supine d/t position restrictions  Max A     Sanjay/doff TLSO brace  Mod A -Pull over shirt  Mod A   LB Dressing DEP     Max A to don/doff socks in supine  Max A of 1-2 to facilitate log-rolling + Max A of 1 to don/doff garment - bed level d/t WB and Position restrictions  Max A  Min A donning pants over his hips with pt using bridge technique.  Min cueing to follow of NWB to LLE precautions  Max A sanjay socks and R surgical shoe  Mod A of 2   Bathing DEP     Max A of 1-2 to facilitate log-rolling + Max A of 1 for bathing task in supine Mod A  Simulated task for LB bathing     Mod A of 2   Toileting DEP     Max A of 1-2 to facilitate log-rolling + Max A of 1 for placement of bedpan, bowel hygiene and clothing adjustment Mod A - hygiene      Indep with urinal bed level   Mod A of 2   Bed Mobility  Rolling:  Max A of 1-2  Repositioning:  Max A of 2 toward HOB in supine   Supine

## 2020-01-02 NOTE — PROGRESS NOTES
CLINICAL PHARMACY NOTE: MEDS TO [address] Select Patient?: No  Total # of Prescriptions Filled: 2   The following medications were delivered to the patient:  · effexor 37.5mg  · Nicotine 21mg patch  Total # of Interventions Completed: 3  Time Spent (min): 15    Additional Documentation:    Verified patient info

## 2020-01-02 NOTE — PLAN OF CARE
Problem: Suicide risk  Goal: Provide patient with safe environment  Description  Provide patient with safe environment  1/2/2020 0116 by Aryan Zamudio RN  Outcome: Met This Shift     Problem: Falls - Risk of:  Goal: Will remain free from falls  Description  Will remain free from falls  Outcome: Met This Shift     Problem: Falls - Risk of:  Goal: Absence of physical injury  Description  Absence of physical injury  Outcome: Met This Shift

## 2020-01-02 NOTE — PROGRESS NOTES
Patient oriented to room and new admission folder given. Patient guide reviewed and explanation of Patient Rights and Responsibilities completed.  Philipp Lopez  1/2/2020  3:17 PM

## 2020-01-02 NOTE — PROGRESS NOTES
Patient was sitting out in his wheelchair for approximately 2 hours this afternoon. Patient has been pleasant, calm, and cooperative and currently denies all. Patient denies any depression or anxiety and his only complaint is \"I'm in so much pain. \"  Patient accepted medications without issue. Patient states that \"I'm looking forward to rehab so I can skate again. \"  Patient is encouraged to continue to work towards discharge goal by complying with medications, attending groups and to seek staff if feelings are overwhelming. Environmental rounds completed per unit policy to maintain safety of everyone on the unit. Staff will offer support and interventions as requested or required.

## 2020-01-02 NOTE — PROGRESS NOTES
Called Mary Pierce and left message for her to call unit regarding a question the nurse needed to ask her regarding Zachery West mother calling and asking questions. Await return call.

## 2020-01-02 NOTE — PROGRESS NOTES
[facility]  Discharge Note    Pt discharged with followings belongings:   Dentures: None  Vision - Corrective Lenses: None  Hearing Aid: None  Jewelry: None  Clothing: Pants, Shirt(2 shirts. 1 pants)  Were All Patient Medications Collected?: Not Applicable  Other Valuables: None   Valuables sent to 54 with patient. Valuables retrieved from safe, Security envelope number: N/A. Patient education on aftercare instructions: Yes. Patient verbalize understanding of AVS: Yes, nurse to nurse called.     Status EXAM upon discharge:  Status and Exam  Normal: No  Facial Expression: Flat  Affect: Congruent  Level of Consciousness: Alert  Mood:Normal: No  Mood: Anxious, Sad  Motor Activity:Normal: No  Motor Activity: Decreased  Interview Behavior: Cooperative  Preception: [place] to Person, Kirk Medina to Time, [place] to Place, [place] to Situation  Attention:Normal: No  Attention: Distractible  Thought Processes: Circumstantial  Thought Content:Normal: No  Thought Content: Preoccupations  Hallucinations: None  Delusions: No  Delusions: (none)  Memory:Normal: Yes  Insight and Judgment: No  Insight and Judgment: Poor Insight  Present Suicidal Ideation: No  Present Homicidal Ideation: No      Metabolic Screening:    No results found for: LABA1C    No results found for: CHOL  No results found for: TRIG  No results found for: HDL  No components found for: LDLCAL  No results found for: Shira Stuart RN

## 2020-01-02 NOTE — PROGRESS NOTES
stand: Max A x2 (bed elevated) to a R hemipost walker  Stand to sit: Max A  Stand pivot: NT Mod a x2 from EOB to w/c   Min A   Ambulation   NT NT 20 feet with R hemiwalker with Min A   Stair negotiation: ascended and descended NT NT    AM-PAC Raw Score 7/24 9/24      Patient education  Pt was educated on importance of therapy    Patient response to education:   Pt verbalized understanding Pt demonstrated skill Pt requires further education in this area   x x x     Additional Comments: Pt supine upon entering room. Pt log rolled to julius TLSO. Donned cast shoe at EOB. Pt performed stand pivot to w/c without buckling in R LE however did not maintain NWB on L LE. Educated pt on importance of maintaining NWB on L LE during pivot. Pt propelled w/c using R UE and R LE from room to common area. O2 98%,  after stand pivot transfer;nursing aware. Pt asked therapist to return to room when passing by on unit;nursing attending to RRT. Pt performed low pivot from w/c >EOB. Pt requested assist for B LE upon returning to bed however pt can lift B LE without assist;pt self limiting and requires max encouragement. Assisted nursing with changing of bandage on back and pt left in bed with all needs met. Pt call light left by patient. Time in: 5403  1111  Time out: [phone]    Pt is making limited progress toward established Physical Therapy goals. Continue with physical therapy current plan of care.     Perfecto Grumbling PTA  License Number: [id number]

## 2020-01-02 NOTE — DISCHARGE SUMMARY
[] Abnormal Muscle Movement     Eye Contact:  [x] Good eye contact  [] Intermittent Eye Contact  [] Poor Eye Contact     Mood: [] Depressed  [] Anxious  [] Irritated  [x] Euthymic   [] Angry [] Restless    Affect:  [x] Congruent  [] Incongruent  [] Labile  [] Constricted  [] Flat  [] Bizarre     Thought Process and Association:  [] Logical [] Illogical       [x] Linear and Goal Directed  [] Tangential  [] Circumstantial     Thought Content:  [x] Denies [] Endorses [] Suicidal [] Homicidal  [] Delusional      [] Paranoid  [] Somatic  [] Grandiose    Perception: [x]  None  [] Auditory   [] Visual  [] tactile   [] olfactory  [] Illusions         Insight: [] Intact  [x] Fair  [] Limited    Judgement:  [] Intact  [x] Fair  [] Limited    Hospital Course:   Admit Date: 12/23/2019     Discharge Date: 1/2/2020  Admitted from:  [x]  Emergency Room  []  Home  []  Another facility   []  [facility]     Admitting diagnosis:   Patient Active Problem List   Diagnosis    Trauma    Injury resulting from fall from height    Closed displaced fracture of pelvis ([address]    Shock following injury ([address]    Pneumothorax, traumatic    Suicidal behavior with attempted self-injury ([address]    Respiratory failure following trauma ([address]    T12 burst fracture ([address]    Closed fracture of shaft of left humerus    Transscaphoid perilunate fracture dislocation of left wrist, open, initial encounter    Displaced fracture of left radial styloid process, initial encounter for closed fracture    Open comminuted fracture of waist of scaphoid bone of left wrist, initial encounter    Depression with suicidal ideation    Schizoaffective disorder, bipolar type ([address]    Bipolar 1 disorder ([address]    Uncontrolled hypertension    Tachycardia      Length of stay:  10 days              Jaycob Fischer was admitted in Psychiatric unit  from ER with depression and SA. Patient was treated            With the above . Patient responded well to the treatment. Discharge Summary Plan:     Discharge Status:    [x] Improved [] Unchanged    [] Worse       Discharge instructions given:  [x] Patient    [x] Family [] Other         Discharge disposition:  [] Home [] Step Down unit  [] Group Home []  [facility]                                                    [] [facility] RESIDENTIAL TREAT[facility] FACILITY    [] AMA  [x] Other           Prescriptions: Continue same medications, review with patient. Reason for more than one antipsychotic:  [x] N/A  [] 3 failed monotherapy(drugs tried):  [] Cross over to a new antipsychotic  [] Taper to monotherapy from polypharmacy  [] Augmentation of Clozapine therapy due to treatment resistance to single therapy      Diagnosis:        Patient Active Problem List   Diagnosis Code    Trauma T14.90XA    Injury resulting from fall from height W17.89XA    Closed displaced fracture of pelvis ([address] S32. 9XXA    Shock following injury ([address] T79. 4XXA    Pneumothorax, traumatic S27. 0XXA    Suicidal behavior with attempted self-injury ([address] T14.91XA    Respiratory failure following trauma ([address] J96.90    T12 burst fracture ([address] S22.081A    Closed fracture of shaft of left humerus S42.302A    Transscaphoid perilunate fracture dislocation of left wrist, open, initial encounter S62.002B    Displaced fracture of left radial styloid process, initial encounter for closed fracture S52.512A    Open comminuted fracture of waist of scaphoid bone of left wrist, initial encounter S62.022B    Depression with suicidal ideation F32.9, R45.851    Schizoaffective disorder, bipolar type (HCC) F25.0    Bipolar 1 disorder ([address] F31.9    Uncontrolled hypertension I10    Tachycardia R00.0       Education and Follow-up:  Counseled:  [x] Patient     [x] Family    [] Guardian      Signed:   Neville Bamberger   1/2/2020   8:54 AM

## 2020-01-03 ENCOUNTER — APPOINTMENT (OUTPATIENT)
Dept: GENERAL RADIOLOGY | Age: 25
DRG: 862 | End: 2020-01-03
Attending: PHYSICAL MEDICINE & REHABILITATION
Payer: MEDICAID

## 2020-01-03 PROCEDURE — 72170 X-RAY EXAM OF PELVIS: CPT

## 2020-01-03 PROCEDURE — 6360000002 HC RX W HCPCS: Performed by: INTERNAL MEDICINE

## 2020-01-03 PROCEDURE — 97530 THERAPEUTIC ACTIVITIES: CPT

## 2020-01-03 PROCEDURE — 6370000000 HC RX 637 (ALT 250 FOR IP): Performed by: NURSE PRACTITIONER

## 2020-01-03 PROCEDURE — 6370000000 HC RX 637 (ALT 250 FOR IP): Performed by: PHYSICAL MEDICINE & REHABILITATION

## 2020-01-03 PROCEDURE — 73110 X-RAY EXAM OF WRIST: CPT

## 2020-01-03 PROCEDURE — 73060 X-RAY EXAM OF HUMERUS: CPT

## 2020-01-03 PROCEDURE — 94640 AIRWAY INHALATION TREATMENT: CPT

## 2020-01-03 PROCEDURE — 6370000000 HC RX 637 (ALT 250 FOR IP): Performed by: INTERNAL MEDICINE

## 2020-01-03 PROCEDURE — 1280000000 HC REHAB R&B

## 2020-01-03 PROCEDURE — 97535 SELF CARE MNGMENT TRAINING: CPT

## 2020-01-03 PROCEDURE — 97110 THERAPEUTIC EXERCISES: CPT

## 2020-01-03 PROCEDURE — 97112 NEUROMUSCULAR REEDUCATION: CPT

## 2020-01-03 PROCEDURE — 97162 PT EVAL MOD COMPLEX 30 MIN: CPT

## 2020-01-03 PROCEDURE — 97167 OT EVAL HIGH COMPLEX 60 MIN: CPT

## 2020-01-03 RX ORDER — LEVOFLOXACIN 500 MG/1
500 TABLET, FILM COATED ORAL DAILY
Status: DISCONTINUED | OUTPATIENT
Start: 2020-01-03 | End: 2020-01-14

## 2020-01-03 RX ORDER — IPRATROPIUM BROMIDE AND ALBUTEROL SULFATE 2.5; .5 MG/3ML; MG/3ML
1 SOLUTION RESPIRATORY (INHALATION)
Status: DISCONTINUED | OUTPATIENT
Start: 2020-01-03 | End: 2020-01-07

## 2020-01-03 RX ADMIN — OXYCODONE 5 MG: 5 TABLET ORAL at 09:00

## 2020-01-03 RX ADMIN — IPRATROPIUM BROMIDE AND ALBUTEROL SULFATE 1 AMPULE: 2.5; .5 SOLUTION RESPIRATORY (INHALATION) at 12:32

## 2020-01-03 RX ADMIN — ENOXAPARIN SODIUM 30 MG: 30 INJECTION SUBCUTANEOUS at 20:30

## 2020-01-03 RX ADMIN — ACETAMINOPHEN 650 MG: 325 TABLET, FILM COATED ORAL at 09:04

## 2020-01-03 RX ADMIN — OXYCODONE 5 MG: 5 TABLET ORAL at 17:13

## 2020-01-03 RX ADMIN — IPRATROPIUM BROMIDE AND ALBUTEROL SULFATE 1 AMPULE: 2.5; .5 SOLUTION RESPIRATORY (INHALATION) at 17:17

## 2020-01-03 RX ADMIN — ACETAMINOPHEN 650 MG: 325 TABLET, FILM COATED ORAL at 17:10

## 2020-01-03 RX ADMIN — METHOCARBAMOL TABLETS 1500 MG: 750 TABLET, COATED ORAL at 12:56

## 2020-01-03 RX ADMIN — MELATONIN 3 MG ORAL TABLET 3 MG: 3 TABLET ORAL at 20:29

## 2020-01-03 RX ADMIN — QUETIAPINE FUMARATE 400 MG: 200 TABLET ORAL at 09:02

## 2020-01-03 RX ADMIN — VENLAFAXINE HYDROCHLORIDE 37.5 MG: 37.5 CAPSULE, EXTENDED RELEASE ORAL at 09:00

## 2020-01-03 RX ADMIN — PANTOPRAZOLE SODIUM 40 MG: 40 TABLET, DELAYED RELEASE ORAL at 06:30

## 2020-01-03 RX ADMIN — METHOCARBAMOL TABLETS 1500 MG: 750 TABLET, COATED ORAL at 09:01

## 2020-01-03 RX ADMIN — LEVOFLOXACIN 500 MG: 500 TABLET, FILM COATED ORAL at 12:56

## 2020-01-03 RX ADMIN — ACETAMINOPHEN 650 MG: 325 TABLET, FILM COATED ORAL at 12:56

## 2020-01-03 RX ADMIN — ACETAMINOPHEN 650 MG: 325 TABLET, FILM COATED ORAL at 22:00

## 2020-01-03 RX ADMIN — OXYCODONE HYDROCHLORIDE 10 MG: 10 TABLET, FILM COATED, EXTENDED RELEASE ORAL at 09:00

## 2020-01-03 RX ADMIN — METHOCARBAMOL TABLETS 1500 MG: 750 TABLET, COATED ORAL at 17:10

## 2020-01-03 RX ADMIN — ACETAMINOPHEN 650 MG: 325 TABLET, FILM COATED ORAL at 06:30

## 2020-01-03 RX ADMIN — DILTIAZEM HYDROCHLORIDE 180 MG: 180 CAPSULE, EXTENDED RELEASE ORAL at 09:01

## 2020-01-03 RX ADMIN — OXYCODONE 5 MG: 5 TABLET ORAL at 12:56

## 2020-01-03 RX ADMIN — OXYCODONE HYDROCHLORIDE 10 MG: 10 TABLET, FILM COATED, EXTENDED RELEASE ORAL at 20:29

## 2020-01-03 RX ADMIN — METHOCARBAMOL TABLETS 1500 MG: 750 TABLET, COATED ORAL at 20:29

## 2020-01-03 RX ADMIN — BENZTROPINE MESYLATE 1 MG: 1 TABLET ORAL at 09:02

## 2020-01-03 RX ADMIN — QUETIAPINE FUMARATE 400 MG: 200 TABLET ORAL at 20:30

## 2020-01-03 RX ADMIN — TAMSULOSIN HYDROCHLORIDE 0.4 MG: 0.4 CAPSULE ORAL at 09:00

## 2020-01-03 RX ADMIN — ENOXAPARIN SODIUM 30 MG: 30 INJECTION SUBCUTANEOUS at 09:03

## 2020-01-03 ASSESSMENT — PAIN DESCRIPTION - ONSET
ONSET: ON-GOING

## 2020-01-03 ASSESSMENT — PAIN DESCRIPTION - FREQUENCY
FREQUENCY: CONTINUOUS

## 2020-01-03 ASSESSMENT — PAIN DESCRIPTION - PAIN TYPE
TYPE: SURGICAL PAIN
TYPE: ACUTE PAIN
TYPE: ACUTE PAIN
TYPE: SURGICAL PAIN
TYPE: ACUTE PAIN
TYPE: ACUTE PAIN

## 2020-01-03 ASSESSMENT — PAIN - FUNCTIONAL ASSESSMENT
PAIN_FUNCTIONAL_ASSESSMENT: PREVENTS OR INTERFERES SOME ACTIVE ACTIVITIES AND ADLS

## 2020-01-03 ASSESSMENT — PAIN DESCRIPTION - LOCATION
LOCATION: HAND;BACK
LOCATION: ARM
LOCATION: ARM;BACK
LOCATION: ARM;WRIST
LOCATION: ARM;WRIST

## 2020-01-03 ASSESSMENT — PAIN DESCRIPTION - PROGRESSION
CLINICAL_PROGRESSION: NOT CHANGED

## 2020-01-03 ASSESSMENT — PAIN DESCRIPTION - DESCRIPTORS
DESCRIPTORS: ACHING;CONSTANT;DISCOMFORT
DESCRIPTORS: ACHING;DISCOMFORT
DESCRIPTORS: ACHING;DISCOMFORT;SORE;CONSTANT
DESCRIPTORS: ACHING;DISCOMFORT;SHARP;CONSTANT
DESCRIPTORS: ACHING
DESCRIPTORS: ACHING;CONSTANT;DISCOMFORT

## 2020-01-03 ASSESSMENT — PAIN SCALES - GENERAL
PAINLEVEL_OUTOF10: 4
PAINLEVEL_OUTOF10: 8
PAINLEVEL_OUTOF10: 0
PAINLEVEL_OUTOF10: 0
PAINLEVEL_OUTOF10: 9
PAINLEVEL_OUTOF10: 10
PAINLEVEL_OUTOF10: 10
PAINLEVEL_OUTOF10: 6
PAINLEVEL_OUTOF10: 10
PAINLEVEL_OUTOF10: 8
PAINLEVEL_OUTOF10: 0
PAINLEVEL_OUTOF10: 0
PAINLEVEL_OUTOF10: 10
PAINLEVEL_OUTOF10: 8

## 2020-01-03 ASSESSMENT — PAIN DESCRIPTION - ORIENTATION
ORIENTATION: LEFT

## 2020-01-03 ASSESSMENT — PAIN SCALES - WONG BAKER: WONGBAKER_NUMERICALRESPONSE: 0

## 2020-01-03 NOTE — CONSULTS
Family History   No family history on file.        General ROS: negative  Cardiovascular ROS: no chest pain or dyspnea on exertion  Respiratory ROS: no cough, shortness of breath, or wheezing  Gastrointestinal ROS: no abdominal pain, change in bowel habits, or black or bloody stools  Neurological ROS: no TIA or stroke symptoms  Musculoskeletal ROS: Per HPI     PHYSICAL EXAM:    VITALS:  BP (!) 159/100   Pulse 117   Temp 98.3 °F (36.8 °C) (Temporal)   Resp 20   Ht 5' 9\" (1.753 m)   Wt 165 lb 4.8 oz (75 kg)   SpO2 97%   BMI 24.41 kg/m²   CONSTITUTIONAL:  awake, alert, cooperative, no apparent distress, and appears stated age  MUSCULOSKELETAL:  LUE  · Incisions well approximated sutures and staples in place in wrist and humerus. No drainage or erythema. · Compartments soft and compressible  · +2 radial pulse  · SILT u/r nerves. diminished sensation over median nerve distribution. · Patient has very limited AROM of the wrist and digits secondary to contractures.      left lower extremity:     Skin intact  · Compartments soft and compressible, calf non-tender  · Palpable dorsalis pedis and posterior tibialis pulse, brisk cap refill to toes, foot warm and perfused  · Sensation intact to light touch in sural/deep peroneal/superficial peroneal/saphenous/posterior tibial nerve distributions to foot/ankle.   · Demonstrates active ankle plantar/dorsiflexion/great toe extension                 DATA:    CBC:         Lab Results   Component Value Date     WBC 9.9 01/02/2020     RBC 4.26 01/02/2020     HGB 12.8 01/02/2020     HCT 39.8 01/02/2020     MCV 93.4 01/02/2020     MCH 30.0 01/02/2020     MCHC 32.2 01/02/2020     RDW 14.1 01/02/2020      01/02/2020     MPV 9.3 01/02/2020      PT/INR:    Lab Results   Component Value Date     PROTIME 15.3 12/05/2019     INR 1.4 12/05/2019      Radiology Review:       XR left wrist: pending     XR left humerus: pending     XR pelvis: pending     IMPRESSION:  · S/p ORIF left

## 2020-01-03 NOTE — PROGRESS NOTES
sit back down and practice proper posture and form to stand. Pt's second stand was significantly better and did not require as much assistance from PT to stand and steady pt. Pt performed a pivot to his R to wheelchair with no AD but with his R hand on the wheelchair for balance. Pt was left sitting in wheelchair with nurse and doctor in room. Patient education  Pt educated on TLSO donning/soffing, weight bearing precautions, spinal precautions, sit<>stand technique, stand pivot technique, PT role in rehab    Patient response to education:   Pt verbalized understanding Pt demonstrated skill Pt requires further education in this area   yes Inconsistently yes     Rehab potential is Good for reaching above PT goals. Pts/ family goals   1. To return to PLOF, skateboarding    Patient and or family understand(s) diagnosis, prognosis, and plan of care: yes    PLAN  PT care will be provided in accordance with the objectives noted above. Whenever appropriate, clear delegation orders will be provided for nursing staff. Exercises and functional mobility practice will be used as well as appropriate assistive devices or modalities to obtain goals. Patient and family education will also be administered as needed. Frequency of treatments will be 2x/day M-F and 1x/day Sat or Sun x  14-21 days.     Time in: 0830  Time out: [address] [facility]  [id number]

## 2020-01-03 NOTE — PROGRESS NOTES
Mod A  Stand to sit: Mod A  Stand pivot: Mod A no AD, hand on wheelchair  Sit to stand: Mod<>Max A  Stand to sit: Mod A<>Max A  Stand pivot: Mod A no AD, hand on wheelchair SBA Mod I with AAD   Ambulation   NT, unable to ambulate   feet with AAD with SBA >250 feet with AAD with Mod I   Walking 10 feet on uneven surface NT  NT 10 feet with AAD with SBA 10 feet with AAD with Mod I   Wheel Chair Mobility NT  NT     Car Transfers NT  NT SBA Mod I   Stair negotiation: ascended and descended NT  NT 4 steps with 1 rail with SBA 12 steps with 1 rail with Mod I   Curb Step:   ascended and descended NT  NT 4 inch step with AAD and SBA 7.5 inch step AAD with and Mod I   Picking up object off the floor NT  NT Will  an object with SBA Will  an object with Mod I   BLE ROM WFL       BLE Strength RLE: 4-/5  LLE: 3+/5       Balance  Sitting: SBA  Standing: Min A no AD       Date Family Teach Completed TBA       Is additional Family Teaching Needed? Y or N Yes       Hindering Progress Pain, NWB LLE/LUE, psych complications       PT recommended ELOS 2-3 weeks       Team's Discharge Plan        Therapist at Team Meeting          Therapeutic Exercise:   PM:   - Functional mobility as noted above in chart  - Sit<> parallel bars x2 reps  - Static  parallel bars x2 reps 1 min ea. Rep  - Seated band hip abduction x15  - Seated band resisted ankle pumps x15/side  - LAQs x10/side  - Seated band hamstring curl 2x15    Additional Comments: Pt was found lying supine in bed, assisted in donning TLSO supine. Pt reported \"severe\" levels of pain in his low back area prior to and post therapy. Pt's initial stand out of bed was at a Mod A level. Pt performed two reps of sit<> parallel bars both at a Max A level with pain in low back throughout. Pt requires heavy cues and assistance during sit<>stand with very poor eccentric control during sit.  Pt states that he has difficulty standing due to pain and weakness. Pt demonstrates good standing balance in parallel bars with NWB LLE compliance. Pt tolerated all therapeutic exercise well with no significant increase in pain. Pt did have some observable clonus in B feet during band resisted ankle pumps. PT could not reproduce clonus with testing after pt finished exercise. Pt was encouraged to continue to participate in PT to return to PLOF. Patient/family education  Pt/family educated on therapeutic exercise, sit<>stand technique, pursed lip breathing    Patient/family response to education:   Pt/family verbalized understanding Pt/family demonstrated skill Pt/family requires further education in this area   yes inconsistently yes     PM  Time in: 1515  Time out: 1600    Pt is making good progress toward established Physical Therapy goals. Continue with physical therapy current plan of care.     Garcia Esparza DPT  [id number] WDL

## 2020-01-03 NOTE — PROGRESS NOTES
Occupational Therapy  OCCUPATIONAL THERAPY DAILY NOTE    Date:1/3/2020  Patient Name: Carol Torres  MRN: [id number]  : 1995  Room: 200/1-A       Diagnosis:  Trauma  Injuries Sustained:    - Left midshaft humerus fracture, closed. - Left transscaphoid perilunate open fracture dislocation, grade 2, with extruded proximal scaphoid and lunate.  - Complex 3 cm open wound, volar wrist/hand.  - Left radial styloid fracture. - Left pelvic ring fracture disruption, lateral compression type 2 pattern, with large crescent fracture and complete fracture through ileum into the anterior superior sacroiliac joint with comminution.  - Left anterior pelvic ring fracture, junctional rami, superiorly, and  comminuted inferior rami into symphysial body   - Right foot 1st digit distal phalanx avulsion fx  - T12 burst fracture     Procedures this admission:    19:    I&D Left Wrist, Placement of External Fixator  19:    ORIF Left Pelvis w/ Dr. Marletta Kanner  12-10-19:  T10-L1 Posterior Lateral Fusion w/ Dr. Naomi Malik   19:  Left Humerus ORIF w/ Dr. Marletta Kanner    Precautions: falls,NWB to LLE and LUE, spinal precautions,  Surgical Shoe to R Foot , TLSO , L Wrist Brace. Can do PROM exercises to digits of left hand but maintain NWB. Functional Assessment:   Date Status AE  Comments   Feeding 1/3/20 Setup      Grooming 1/3/20 Moderate Assist      Bathing 1/3/20 Maximal Assist      UB Dressing 1/3/20  Moderate Assist      LB Dressing 1/3/20  Maximal Assist   Assist to don pants on over feet while in bed.  Pt assisted in pulling up over hips    Homemaking 1/3/20  TBA        Functional Transfers / Balance:   Date Status DME  Comments   Sit Balance 1/3/20  Minimal Assist      Stand Balance 1/3/20  Dependent      [] Tub  [] Shower   Transfer 1/3/20  TBA     Commode   Transfer 1/3/20  Dependent      Functional   Mobility 1/3/20  Dependent       Other: bed mobility - rolling  1/3/20  Max A        Functional Exercises

## 2020-01-03 NOTE — CONSULTS
PSYCHIATRIC EVALUATION  (CONSULT)     CHIEF COMPLAINT:   [x] Mood Problems [] Anxiety Problems [] Psychosis                    [] Suicidal/Homicidal   [] Aggression  [] Other    HISTORY OF PRESENT ILLNESS: Amy Osborne  is a 25 y.o. male who has a previous psychiatric history of schizoaffective disorder and was discharged from [address] [place] [address] yesterday stable and ready for rehab treatment. Patient denies SI, HI, or AVH and is medication compliant.        Precipitating Factors:     [] Family Stress   [] Recent loss/grief Stress   [x] Health Stress   [] Relationship Stress    [] Legal Stress   [x] Environmental Stress    [] Occupational Stress   [] Financial Stress   [] Substance Abuse [] Other      PAST PSYCHIATRIC HISTORY:   History of psychiatric Hospitalization:    [] Denies    [x] yes  [x] Days ago     []  Weeks Ago    [] Months ago  [] Years ago              [x] [facility]  [] [facility]  [] Other:        [] Once  [x] More than once    Outpatient treatment:  [] Reymundo Stevens  [] [facility]  [] [facility]              [] [facility]  [] Sameera Murillo      [] [address] [place] [address] [] [facility]      [] [facility] [] [facility]  [] [facility] [] [facility]             [x] currently  [] in the past  [] Non-Compliant    [] Denies    Previous suicide attempt: []Denies            [x] yes  [] OD  [] Cutting  [] Hanging  [] Gun  [] Other    Previous psych medications:  [] Was prescribed               [x] Currently Taking       [] Never taken medications      PAST MEDICAL HISTORY:       Diagnosis Date    [facility]        FAMILY PSYCHIATRIC HISTORY:  No family history on file.        [x] Denies       [] Endorses               [] Father                [] Depression  [] Anxiety  [] Bipolar  [] Psychosis  []  Other                  [] Mother               [] Depression  [] Anxiety  [] Bipolar  [] Psychosis  []  Other                  [] Sibling               [] Depression  [] Anxiety  [] Bipolar  [] Psychosis  []  Other    Oral, 2 times per day  senna (SENOKOT) tablet 17.2 mg, 2 tablet, Oral, Nightly  hydrOXYzine (VISTARIL) capsule 50 mg, 50 mg, Oral, TID PRN  acetaminophen (TYLENOL) tablet 650 mg, 650 mg, Oral, Q4H  benztropine (COGENTIN) tablet 1 mg, 1 mg, Oral, BID  carBAMazepine (TEGRETOL XR) extended release tablet 300 mg, 300 mg, Oral, BID  melatonin tablet 3 mg, 3 mg, Oral, Nightly PRN  mineral oil-hydrophilic petrolatum (HYDROPHOR) ointment, , Topical, Daily  nicotine (NICODERM CQ) 14 MG/24HR 1 patch, 1 patch, Transdermal, Daily  pantoprazole (PROTONIX) tablet 40 mg, 40 mg, Oral, QAM AC  polyethylene glycol (GLYCOLAX) packet 17 g, 17 g, Oral, BID  QUEtiapine (SEROQUEL) tablet 400 mg, 400 mg, Oral, BID  tamsulosin (FLOMAX) capsule 0.4 mg, 0.4 mg, Oral, Daily  venlafaxine (EFFEXOR XR) extended release capsule 37.5 mg, 37.5 mg, Oral, Daily with breakfast  [START ON 1/9/2020] vitamin D (ERGOCALCIFEROL) capsule 50,000 Units, 50,000 Units, Oral, Weekly  acetaminophen (TYLENOL) tablet 650 mg, 650 mg, Oral, Q4H PRN  magnesium hydroxide (MILK OF MAGNESIA) 400 MG/5ML suspension 30 mL, 30 mL, Oral, Daily PRN  ammonium lactate (LAC-HYDRIN) 12 % lotion, , Topical, PRN    Medical Review of Systems:     All other than marked systmes have been reviewed and are all negative.     Constitutional Symptoms: []  fever []  Chills  Skin Symptoms: [] rash []  Pruritus   Eye Symptoms: [] Vision unchanged []  recent vision problems[] blurred vision   Respiratory Symptoms:[] shortness of breath [] cough  Cardiovascular Symptoms:  [] chest pain   [] palpitations   Gastrointestinal Symptoms: []  abdominal pain []  nausea []  vomiting []  diarrhea  Genitourinary Symptoms: []  dysuria  []  hematuria   Musculoskeletal Symptoms: []  back pain []  muscle pain []  joint pain  Neurologic Symptoms: []  headache []  dizziness  Hematolymphoid Symptoms: [] Adenopathy [] Bruises   [] Schimosis       VITALS: BP (!) 159/100   Pulse 117   Temp 98.3 °F (36.8 °C) (Temporal)   Resp 20   Ht 5' 9\" (1.753 m)   Wt 165 lb 4.8 oz (75 kg)   SpO2 97%   BMI 24.41 kg/m²     ALLERGIES: Depakote [valproic acid]; Haldol [haloperidol];  Invega sustenna [paliperidone palmitate er]; and Invega [paliperidone]                For further PE refer to ED note    MENTAL STATUS EXAM:       Mental Status Examination:    Cognition:      [x] Alert  [x] Awake  [x] Oriented  [x] Person  [x] Place [x] Time      [] drowsy  [] tired  [] lethargic  [] distractable  []     Attention/Concentration:   [x] Attentive  [] Distracted        Memory Recent and Remote: [x] Intact   [] Impaired [] Partially Impaired     Language: [] Able to recognize and name objects          [] Unable to recognize and name Objects    Fund of Knowledge:  [] Poor []  Fair  [x] Good    Speech: [x] Normal  [] Soft  [] Slow  [] Fast [] Pressured            [] Loud [] Dysarthria  [] Incoherent       Appearance: [] Well Groomed  [x] Casual Dressed  [] Unkept  [] Disheveled          [] Normal weight[] Thin  [] Overweight  [] Obese           Attitude: [] Positive  [] Hostile  [] Demanding  [] Guarded  [] Defensive         [x] Cooperative  []  Uncooperative      Behavior:  [] Normal Gait  [] Walks with Assistance  [] [address]    [] Walks with Vinicius Bravo  [x] In Hospital Bed  [] Sitting in Chair    Muscle-Skeletal:  [x] Normal Muscle Tone [] Muscle Atrophy       [] Abnormal Muscle Movement     Eye Contact:  [x] Good eye contact  [] Intermittent Eye Contact  [] Poor Eye Contact     Mood: [] Depressed  [] Anxious  [] Irritated  [x] Euthymic   [] Angry [] Restless    Affect:  [x] Congruent  [] Incongruent  [] Labile  [] Constricted  [] Flat  [] Bizarre     Thought Process and Association:  [] Logical [] Illogical       [x] Linear and Goal Directed  [] Tangential  [] Circumstantial     Thought Content:  [x] Denies [] Endorses [] Suicidal [] Homicidal  [] Delusional      [] Paranoid  [] Somatic  [] Grandiose    Perception: [x]  None  [] Auditory [] Visual  [] tactile   [] olfactory  [] Illusions         Insight: [] Intact  [x] Fair  [] Limited    Judgement:  [] Intact  [x] Fair  [] Limited        ASSESSMENT  Patient Active Problem List   Diagnosis    Trauma    Injury resulting from fall from height    [facility]    Shock following injury ([facility]    Pneumothorax, traumatic    Suicidal behavior with attempted self-injury ([facility]    Respiratory failure following trauma ([facility]    T12 burst fracture ([facility]    Closed fracture of shaft of left humerus    Transscaphoid perilunate fracture dislocation of left wrist, open, initial encounter    Displaced fracture of left radial styloid process, initial encounter for closed fracture    Open comminuted fracture of waist of scaphoid bone of left wrist, initial encounter    Depression with suicidal ideation    Schizoaffective disorder, bipolar type ([facility]    Bipolar 1 disorder ([facility]    Uncontrolled hypertension    Tachycardia    Multiple trauma     Patient is atiya for safety denies SI HI or AVH. Please set up with OP psych follow up. Patient discharged from psych yesterday and is compliant with medication and treatment. Will sign off.     Signed:  Liliana Hagan  1/3/2020  12:26 PM

## 2020-01-03 NOTE — CONSULTS
Department of Orthopedic Surgery  Resident Consult Note          CHIEF COMPLAINT:  Follow up left arm     HISTORY OF PRESENT ILLNESS:                The patient is a 25 y.o. male who presents s/p ORIF left humerus with Dr. Minh Villagomez on 12/11/19 and I&D of lunate dislocation/ scaphoid/ radial styloid EX fix. He is s/p Lunate and scaphoid ORIF and carpal tunnel release with Dr. Collins Monday on 12/19. He is now on acute rehab after being on the [facility] floor for his suicide attempt which lead to his injuries. Currently he is stable but complains of loss or range of motion in the hand.      Past Medical History:        Diagnosis Date    Bipolar 1 disorder [facility])      Past Surgical History:        Procedure Laterality Date    APPLICATION MULTIPLANE UNILATERAL EXTERNAL FIXATION SYSTEM Left 12/5/2019    LEFT PELVIS OPEN REDUCTION INTERNAL FIXATION performed by Jorge Reveles DO at [place] [address] HAND SURGERY Left 12/19/2019    LEFT HAND I & D, CARPAL TUNNEL WITH OPEN REDUCTION INTERNAL FIXATION SCAPHOID AND LUNATE performed by Jet Marcano MD at [address] [place] [address] Left 12/11/2019    LEFT HUMERUS OPEN REDUCTION INTERNAL FIXATION performed by Jorge Reveles DO at [place] [address] N/A 12/10/2019    T10-L1  POSTERIOR LATERAL FUSION -- GLOBUS -- NEEDS VIANEY TABLE, O-ARM performed by Jose Enrique Chen MD at [facility] Left 12/5/2019    I & D LEFT WRIST WITH EXTERNAL FIXATOR performed by Jorge Reveles DO at [facility] [place]     Current Medications:   Current Facility-Administered Medications: ipratropium-albuterol (DUONEB) nebulizer solution 1 ampule, 1 ampule, Inhalation, Q4H WA  levofloxacin (LEVAQUIN) tablet 500 mg, 500 mg, Oral, Daily  oxyCODONE (ROXICODONE) immediate release tablet 5 mg, 5 mg, Oral, Q4H PRN  docusate sodium (COLACE) capsule 200 mg, 200 mg, Oral, BID  diltiazem (CARDIZEM CD) extended release capsule 180 mg, 180 mg, Oral, Daily  enoxaparin (LOVENOX) injection 30 mg, 30 mg, Subcutaneous, BID  lidocaine 4 % external patch 1 patch, 1 patch, Transdermal, Daily  methocarbamol (ROBAXIN) tablet 1,500 mg, 1,500 mg, Oral, 4x Daily  oxyCODONE (OXYCONTIN) extended release tablet 10 mg, 10 mg, Oral, 2 times per day  senna (SENOKOT) tablet 17.2 mg, 2 tablet, Oral, Nightly  hydrOXYzine (VISTARIL) capsule 50 mg, 50 mg, Oral, TID PRN  acetaminophen (TYLENOL) tablet 650 mg, 650 mg, Oral, Q4H  benztropine (COGENTIN) tablet 1 mg, 1 mg, Oral, BID  carBAMazepine (TEGRETOL XR) extended release tablet 300 mg, 300 mg, Oral, BID  melatonin tablet 3 mg, 3 mg, Oral, Nightly PRN  mineral oil-hydrophilic petrolatum (HYDROPHOR) ointment, , Topical, Daily  nicotine (NICODERM CQ) 14 MG/24HR 1 patch, 1 patch, Transdermal, Daily  pantoprazole (PROTONIX) tablet 40 mg, 40 mg, Oral, QAM AC  polyethylene glycol (GLYCOLAX) packet 17 g, 17 g, Oral, BID  QUEtiapine (SEROQUEL) tablet 400 mg, 400 mg, Oral, BID  tamsulosin (FLOMAX) capsule 0.4 mg, 0.4 mg, Oral, Daily  venlafaxine (EFFEXOR XR) extended release capsule 37.5 mg, 37.5 mg, Oral, Daily with breakfast  [START ON 1/9/2020] vitamin D (ERGOCALCIFEROL) capsule 50,000 Units, 50,000 Units, Oral, Weekly  acetaminophen (TYLENOL) tablet 650 mg, 650 mg, Oral, Q4H PRN  magnesium hydroxide (MILK OF MAGNESIA) 400 MG/5ML suspension 30 mL, 30 mL, Oral, Daily PRN  ammonium lactate (LAC-HYDRIN) 12 % lotion, , Topical, PRN  Allergies:  Depakote [valproic acid]; Haldol [haloperidol]; Invega sustenna [paliperidone palmitate er]; and Invega [paliperidone]    Social History:   TOBACCO:   reports that he has been smoking cigarettes. He does not have any smokeless tobacco history on file. ETOH:   reports no history of alcohol use. DRUGS:   reports current drug use. Drug: Marijuana. ACTIVITIES OF DAILY LIVING:    OCCUPATION:    Family History:   No family history on file.     General ROS: negative  Cardiovascular ROS: no chest pain or dyspnea on exertion  Respiratory ROS: no cough, shortness of breath, or wheezing  Gastrointestinal ROS: no abdominal pain, change in bowel habits, or black or bloody stools  Neurological ROS: no TIA or stroke symptoms  Musculoskeletal ROS: Per HPI    PHYSICAL EXAM:    VITALS:  BP (!) 159/100   Pulse 117   Temp 98.3 °F (36.8 °C) (Temporal)   Resp 20   Ht 5' 9\" (1.753 m)   Wt 165 lb 4.8 oz (75 kg)   SpO2 97%   BMI 24.41 kg/m²   CONSTITUTIONAL:  awake, alert, cooperative, no apparent distress, and appears stated age  MUSCULOSKELETAL:  LUE  · Incisions well approximated sutures and staples in place in wrist and humerus. No drainage or erythema. · Compartments soft and compressible  · +2 radial pulse  · SILT u/r nerves. diminished sensation over median nerve distribution. · Patient has very limited AROM of the wrist and digits secondary to contractures. left lower extremity:    Skin intact  · Compartments soft and compressible, calf non-tender  · Palpable dorsalis pedis and posterior tibialis pulse, brisk cap refill to toes, foot warm and perfused  · Sensation intact to light touch in sural/deep peroneal/superficial peroneal/saphenous/posterior tibial nerve distributions to foot/ankle.   · Demonstrates active ankle plantar/dorsiflexion/great toe extension            DATA:    CBC:   Lab Results   Component Value Date    WBC 9.9 01/02/2020    RBC 4.26 01/02/2020    HGB 12.8 01/02/2020    HCT 39.8 01/02/2020    MCV 93.4 01/02/2020    MCH 30.0 01/02/2020    MCHC 32.2 01/02/2020    RDW 14.1 01/02/2020     01/02/2020    MPV 9.3 01/02/2020     PT/INR:    Lab Results   Component Value Date    PROTIME 15.3 12/05/2019    INR 1.4 12/05/2019     Radiology Review:      XR left wrist: pending    XR left humerus: pending    XR pelvis: pending    IMPRESSION:  · S/p ORIF left humerus 12/11  · S/p ORIF left lunate dislocation, scaphoid fracture and carpal tunnel release 12/19  · S/p ORIF left LC2 pelvic ring injury ORIF 12/5  · Right foot 1st digit distal phalanx avulsion fx    PLAN:  · NWB LUE  · NWB LLE   · WBAT RLE  · Continue medical optimization  · XR left wrist and humerus  · Splint taken down sutures removed  · Remove staples  · Removable brace ordered  · PT/OT recommended for contractures of hand  · Follow up in office. Call to schedule appointment at discharge- follow up with Dr. Eve Castro and Dr. Margaret Eagle   · Discussed with attendings.

## 2020-01-03 NOTE — PROGRESS NOTES
Occupational Therapy   Occupational Therapy Initial Assessment  Date: 1/3/2020   Patient Name: Sonido Grissom  MRN: [id number]     : 1995    Date of Service: 1/3/2020    Discharge Recommendations:  24 hour supervision or assist            Car[place]on  Injuries Sustained:    - Left midshaft humerus fracture, closed. - Left transscaphoid perilunate open fracture dislocation, grade 2, with extruded proximal scaphoid and lunate.  - Complex 3 cm open wound, volar wrist/hand.  - Left radial styloid fracture. - Left pelvic ring fracture disruption, lateral compression type 2 pattern, with large crescent fracture and complete fracture through ileum into the anterior superior sacroiliac joint with comminution.  - Left anterior pelvic ring fracture, junctional rami, superiorly, and  comminuted inferior rami into symphysial body   - Right foot 1st digit distal phalanx avulsion fx  - T12 burst fracture     Procedures this admission:    19:    I&D Left Wrist, Placement of External Fixator  19:    ORIF Left Pelvis w/ Dr. Oscar He  12-10-19:  T10-L1 Posterior Lateral Fusion w/ Dr. Cheyanne Parker   19:  Left Humerus ORIF w/ Dr. Oscar He    Patient Diagnosis(es):     has a past medical history of Bipolar 1 disorder ([facility]       Treatment Diagnosis: Multiple Trauma       Restrictions  Restrictions/Precautions  Restrictions/Precautions: Weight Bearing  Required Braces or Orthoses?: Yes  Lower Extremity Weight Bearing Restrictions  Left Lower Extremity Weight Bearing: Non Weight Bearing  Upper Extremity Weight Bearing Restrictions  Left Upper Extremity Weight Bearing: Non Weight Bearing  Required Braces or Orthoses  Spinal: Thoracic Lumbar Sacral Orthotics  Left Upper Extremity Brace/Splint: Wrist cock up  Position Activity Restriction  Other position/activity restrictions: spinal precautions     Subjective   General  Chart Reviewed: Yes  Family / Caregiver Present: No  Referring Practitioner: Dr. Ashley Olivier Trauma   Decision Making: High Complexity  Patient Education: pt was educated on non wt bearing in LUE and LLE during functional transfers   REQUIRES OT FOLLOW UP: Yes  Activity Tolerance  Activity Tolerance: Patient limited by pain        Plan   Plan  Times per week: 5-7  Times per day: Twice a day  Plan weeks: 2-3   Current Treatment Recommendations: Safety Education & Training, Balance Training, Self-Care / ADL, Patient/Caregiver Education & Training, Strengthening, Functional Mobility Training, Equipment Evaluation, Education, & procurement, Home Management Training, Neuromuscular Re-education, Endurance Training                                                                  Goals  Long term goals  Time Frame for Long term goals : 2-3 weeks   Long term goal 1: pt to complete feeding at independent   [facility] term [facility]: [facility]: pt to complete UB bath and dress at set up   Long term goal 4: pt to complete LB bath and dress at [address] [place] [address] with AE as needed   Long term goal 5: pt to complete toilet transfer at [address] [place] [address] with appropriate DME   Long term goals 6: pt to complete tub/shower transfer at [address] [place] [address] with appropriate DME   Long term goal 7: pt to tolerate PROM exercises to left hand to help prevent contractures   Long term goal 8: pt to demonstrate good follow through with wt bearing precautions  and spinal precautions during ADL's and functional activities   Long term goal 9: improve strength in RUE by 1/2 to 1 muscle grade   Patient Goals   Patient goals : get out of here soon        Therapy Time   Individual Concurrent Group Co-treatment   Time In 0915         Time Out 1000         Minutes      eval mins     tx mins  45       20      Syeda 1765, OTR/L [id number]

## 2020-01-03 NOTE — H&P
time.        Eliane Bowser MD    D: 01/03/2020 8:12:11       T: 01/03/2020 8:21:37     [id number]  Job#: [id number]     Doc#: [id number]    CC:

## 2020-01-04 ENCOUNTER — APPOINTMENT (OUTPATIENT)
Dept: GENERAL RADIOLOGY | Age: 25
DRG: 862 | End: 2020-01-04
Attending: PHYSICAL MEDICINE & REHABILITATION
Payer: MEDICAID

## 2020-01-04 LAB
ORGANISM: ABNORMAL
URINE CULTURE, ROUTINE: ABNORMAL

## 2020-01-04 PROCEDURE — 1280000000 HC REHAB R&B

## 2020-01-04 PROCEDURE — 6370000000 HC RX 637 (ALT 250 FOR IP): Performed by: PHYSICAL MEDICINE & REHABILITATION

## 2020-01-04 PROCEDURE — 97535 SELF CARE MNGMENT TRAINING: CPT

## 2020-01-04 PROCEDURE — 99232 SBSQ HOSP IP/OBS MODERATE 35: CPT | Performed by: PHYSICAL MEDICINE & REHABILITATION

## 2020-01-04 PROCEDURE — 6370000000 HC RX 637 (ALT 250 FOR IP): Performed by: INTERNAL MEDICINE

## 2020-01-04 PROCEDURE — 97110 THERAPEUTIC EXERCISES: CPT

## 2020-01-04 PROCEDURE — 97530 THERAPEUTIC ACTIVITIES: CPT

## 2020-01-04 PROCEDURE — 72190 X-RAY EXAM OF PELVIS: CPT

## 2020-01-04 PROCEDURE — 2580000003 HC RX 258

## 2020-01-04 PROCEDURE — 6360000002 HC RX W HCPCS: Performed by: INTERNAL MEDICINE

## 2020-01-04 PROCEDURE — 6370000000 HC RX 637 (ALT 250 FOR IP): Performed by: NURSE PRACTITIONER

## 2020-01-04 PROCEDURE — 94640 AIRWAY INHALATION TREATMENT: CPT

## 2020-01-04 RX ORDER — HYDRALAZINE HYDROCHLORIDE 25 MG/1
25 TABLET, FILM COATED ORAL EVERY 6 HOURS PRN
Status: DISCONTINUED | OUTPATIENT
Start: 2020-01-04 | End: 2020-01-07

## 2020-01-04 RX ORDER — SODIUM CHLORIDE 0.9 % (FLUSH) 0.9 %
SYRINGE (ML) INJECTION
Status: COMPLETED
Start: 2020-01-04 | End: 2020-01-04

## 2020-01-04 RX ADMIN — MELATONIN 3 MG ORAL TABLET 3 MG: 3 TABLET ORAL at 23:52

## 2020-01-04 RX ADMIN — SENNOSIDES 17.2 MG: 8.6 TABLET, FILM COATED ORAL at 21:02

## 2020-01-04 RX ADMIN — QUETIAPINE FUMARATE 400 MG: 200 TABLET ORAL at 08:31

## 2020-01-04 RX ADMIN — Medication: at 22:26

## 2020-01-04 RX ADMIN — ACETAMINOPHEN 650 MG: 325 TABLET, FILM COATED ORAL at 08:31

## 2020-01-04 RX ADMIN — OXYCODONE 5 MG: 5 TABLET ORAL at 03:54

## 2020-01-04 RX ADMIN — ACETAMINOPHEN 650 MG: 325 TABLET, FILM COATED ORAL at 23:52

## 2020-01-04 RX ADMIN — METHOCARBAMOL TABLETS 1500 MG: 750 TABLET, COATED ORAL at 16:49

## 2020-01-04 RX ADMIN — HYDROXYZINE PAMOATE 50 MG: 50 CAPSULE ORAL at 21:03

## 2020-01-04 RX ADMIN — OXYCODONE 5 MG: 5 TABLET ORAL at 23:52

## 2020-01-04 RX ADMIN — LEVOFLOXACIN 500 MG: 500 TABLET, FILM COATED ORAL at 08:31

## 2020-01-04 RX ADMIN — DOCUSATE SODIUM 200 MG: 100 CAPSULE, LIQUID FILLED ORAL at 21:04

## 2020-01-04 RX ADMIN — OXYCODONE HYDROCHLORIDE 10 MG: 10 TABLET, FILM COATED, EXTENDED RELEASE ORAL at 08:31

## 2020-01-04 RX ADMIN — OXYCODONE 5 MG: 5 TABLET ORAL at 12:09

## 2020-01-04 RX ADMIN — ACETAMINOPHEN 650 MG: 325 TABLET, FILM COATED ORAL at 16:50

## 2020-01-04 RX ADMIN — IPRATROPIUM BROMIDE AND ALBUTEROL SULFATE 1 AMPULE: 2.5; .5 SOLUTION RESPIRATORY (INHALATION) at 15:54

## 2020-01-04 RX ADMIN — OXYCODONE HYDROCHLORIDE 10 MG: 10 TABLET, FILM COATED, EXTENDED RELEASE ORAL at 21:03

## 2020-01-04 RX ADMIN — METHOCARBAMOL TABLETS 1500 MG: 750 TABLET, COATED ORAL at 08:30

## 2020-01-04 RX ADMIN — QUETIAPINE FUMARATE 400 MG: 200 TABLET ORAL at 21:05

## 2020-01-04 RX ADMIN — OXYCODONE 5 MG: 5 TABLET ORAL at 16:52

## 2020-01-04 RX ADMIN — ACETAMINOPHEN 650 MG: 325 TABLET, FILM COATED ORAL at 19:44

## 2020-01-04 RX ADMIN — IPRATROPIUM BROMIDE AND ALBUTEROL SULFATE 1 AMPULE: 2.5; .5 SOLUTION RESPIRATORY (INHALATION) at 09:32

## 2020-01-04 RX ADMIN — PANTOPRAZOLE SODIUM 40 MG: 40 TABLET, DELAYED RELEASE ORAL at 06:21

## 2020-01-04 RX ADMIN — BENZTROPINE MESYLATE 1 MG: 1 TABLET ORAL at 21:05

## 2020-01-04 RX ADMIN — IPRATROPIUM BROMIDE AND ALBUTEROL SULFATE 1 AMPULE: 2.5; .5 SOLUTION RESPIRATORY (INHALATION) at 12:42

## 2020-01-04 RX ADMIN — ACETAMINOPHEN 650 MG: 325 TABLET, FILM COATED ORAL at 03:54

## 2020-01-04 RX ADMIN — TAMSULOSIN HYDROCHLORIDE 0.4 MG: 0.4 CAPSULE ORAL at 08:31

## 2020-01-04 RX ADMIN — ACETAMINOPHEN 650 MG: 325 TABLET, FILM COATED ORAL at 12:09

## 2020-01-04 RX ADMIN — METHOCARBAMOL TABLETS 1500 MG: 750 TABLET, COATED ORAL at 12:08

## 2020-01-04 RX ADMIN — ENOXAPARIN SODIUM 30 MG: 30 INJECTION SUBCUTANEOUS at 21:05

## 2020-01-04 RX ADMIN — IPRATROPIUM BROMIDE AND ALBUTEROL SULFATE 1 AMPULE: 2.5; .5 SOLUTION RESPIRATORY (INHALATION) at 21:43

## 2020-01-04 RX ADMIN — METHOCARBAMOL TABLETS 1500 MG: 750 TABLET, COATED ORAL at 21:05

## 2020-01-04 RX ADMIN — DILTIAZEM HYDROCHLORIDE 180 MG: 180 CAPSULE, EXTENDED RELEASE ORAL at 08:31

## 2020-01-04 ASSESSMENT — PAIN DESCRIPTION - ONSET
ONSET: ON-GOING

## 2020-01-04 ASSESSMENT — PAIN DESCRIPTION - DESCRIPTORS
DESCRIPTORS: ACHING;CONSTANT;DISCOMFORT

## 2020-01-04 ASSESSMENT — PAIN SCALES - GENERAL
PAINLEVEL_OUTOF10: 6
PAINLEVEL_OUTOF10: 7
PAINLEVEL_OUTOF10: 7
PAINLEVEL_OUTOF10: 10
PAINLEVEL_OUTOF10: 4
PAINLEVEL_OUTOF10: 8
PAINLEVEL_OUTOF10: 6
PAINLEVEL_OUTOF10: 7
PAINLEVEL_OUTOF10: 0
PAINLEVEL_OUTOF10: 6
PAINLEVEL_OUTOF10: 7

## 2020-01-04 ASSESSMENT — PAIN DESCRIPTION - ORIENTATION
ORIENTATION: LEFT
ORIENTATION: LEFT
ORIENTATION: LOWER
ORIENTATION: LEFT;LOWER
ORIENTATION: LOWER
ORIENTATION: LOWER;LEFT

## 2020-01-04 ASSESSMENT — PAIN DESCRIPTION - LOCATION
LOCATION: BACK;ARM
LOCATION: BACK
LOCATION: ARM
LOCATION: ARM;BACK
LOCATION: BACK
LOCATION: BACK

## 2020-01-04 ASSESSMENT — PAIN DESCRIPTION - FREQUENCY
FREQUENCY: CONTINUOUS

## 2020-01-04 ASSESSMENT — PAIN DESCRIPTION - DIRECTION: RADIATING_TOWARDS: LUE

## 2020-01-04 ASSESSMENT — PAIN DESCRIPTION - PAIN TYPE
TYPE: SURGICAL PAIN
TYPE: ACUTE PAIN
TYPE: ACUTE PAIN;SURGICAL PAIN
TYPE: SURGICAL PAIN
TYPE: ACUTE PAIN
TYPE: SURGICAL PAIN
TYPE: SURGICAL PAIN

## 2020-01-04 ASSESSMENT — PAIN - FUNCTIONAL ASSESSMENT
PAIN_FUNCTIONAL_ASSESSMENT: PREVENTS OR INTERFERES SOME ACTIVE ACTIVITIES AND ADLS

## 2020-01-04 ASSESSMENT — PAIN DESCRIPTION - PROGRESSION
CLINICAL_PROGRESSION: NOT CHANGED

## 2020-01-04 NOTE — PROGRESS NOTES
Pt denies SI. He states \"I've been feeling better, I just can't take those antidepressants. \" Pt states some of his ordered medications were changed while on psych. Refuses to take some medications despite encouragement and education on the medications (see eMAR).     Electronically signed by Neri Zimmerman RN on 1/4/2020 at 12:17 PM

## 2020-01-04 NOTE — PROGRESS NOTES
Occupational Therapy  OCCUPATIONAL THERAPY DAILY NOTE    Date:2020  Patient Name: Amy Osborne  MRN: [id number]  : 1995  Room: 200/1-A       Diagnosis:  Trauma  Injuries Sustained:    - Left midshaft humerus fracture, closed. - Left transscaphoid perilunate open fracture dislocation, grade 2, with extruded proximal scaphoid and lunate.  - Complex 3 cm open wound, volar wrist/hand.  - Left radial styloid fracture. - Left pelvic ring fracture disruption, lateral compression type 2 pattern, with large crescent fracture and complete fracture through ileum into the anterior superior sacroiliac joint with comminution.  - Left anterior pelvic ring fracture, junctional rami, superiorly, and  comminuted inferior rami into symphysial body   - Right foot 1st digit distal phalanx avulsion fx  - T12 burst fracture     Procedures this admission:    19:    I&D Left Wrist, Placement of External Fixator  19:    ORIF Left Pelvis w/ Dr. Gary Sosa  12-10-19:  T10-L1 Posterior Lateral Fusion w/ Dr. Imelda Clements   19:  Left Humerus ORIF w/ Dr. Gary Sosa    Precautions: falls,NWB to LLE and LUE  spinal precautions,  Surgical Shoe to R Foot , TLSO when OOB or HOB at or > 45 degrees,  Suicide precautions , L Wrist Brace. Can do PROM exercises to digits of left hand but maintain NWB. Functional Assessment:   Date Status AE  Comments   Feeding 1/3/20 Setup      Grooming 20 Moderate Assist   To apply deodorant using one handed skills while in bed. Bathing 1/3/20 Maximal Assist      UB Dressing 20  Moderate Assist   To doff gown and julius t- shirt. Dep with back brace. LB Dressing 20  Maximal Assist   Pt needed assist to julius pants while in bed however bridged up hips to assist with garment over hips.      Homemaking 1/3/20  TBA        Functional Transfers / Balance:   Date Status DME  Comments   Sit Balance 20  Minimal Assist   Sitting on EOB and up in w/c    Stand Balance 20  Mod ADL, Patient/Caregiver Education & Training, Strengthening, Functional Mobility Training, Equipment Evaluation, Education, & procurement, Home Management Training, Neuromuscular Re-education, Endurance Training**      [x] Continue with current OT Plan of care.   [] Prepare for Discharge     DISCHARGE RECOMMENDATIONS  Recommended DME:    Post Discharge Care:   []Home Independently  []Home with 24hr Care / Supervision []Home with Partial Supervision []Home with Home Health OT []Home with Out Pt OT []Other: ___   Comments:         Time in Time out Tx Time Breakdown  Variance:   First Session  10:20am 11:15am [x] Individual Tx- 55mins  [] Concurrent Tx -  [] Co-Tx -   [] Group Tx -   [] Time Missed -     Second Session   [] Individual Tx-   [] Concurrent Tx -  [] Co-Tx -   [] Group Tx -   [] Time Missed -     Third Session    [] Individual Tx-   [] Concurrent Tx -  [] Co-Tx -   [] Group Tx -   [] Time Missed -         Total Tx Time:55 mins      Will CARTAGENA/KELSIE [id number]

## 2020-01-04 NOTE — PROGRESS NOTES
Laboratory:    Lab Results   Component Value Date    WBC 9.9 01/02/2020    HGB 12.8 01/02/2020    HCT 39.8 01/02/2020    MCV 93.4 01/02/2020     01/02/2020     Lab Results   Component Value Date     01/02/2020    K 4.3 01/02/2020    CL 94 01/02/2020    CO2 26 01/02/2020    BUN 7 01/02/2020    CREATININE 0.6 01/02/2020    GLUCOSE 116 01/02/2020    CALCIUM 10.3 01/02/2020      Lab Results   Component Value Date    ALT 15 01/02/2020    AST 17 01/02/2020    ALKPHOS 326 (H) 01/02/2020    BILITOT 0.3 01/02/2020       Lab Results   Component Value Date    COLORU Yellow 01/02/2020    NITRU POSITIVE 01/02/2020    GLUCOSEU Negative 01/02/2020    KETUA Negative 01/02/2020    UROBILINOGEN 0.2 01/02/2020    BILIRUBINUR Negative 01/02/2020     EKG: sinus tachycardia        Assessment/Plan:     25 y.o. male admitted to inpatient rehabilitation for multiple trauma.      -Stable condition, continue Acute rehab evaluations  -Cleared by Psych for rehab.  Currently denies SI.   -Encouraged compliance with prescribed psychiatric medications, patient refused this am.   -Follow up pelvic xrays stable  -On levaquin for UTI  -Continue DVT prophylaxis     Electronically signed by Elizabeth Talbot MD on 1/4/2020 at 12:20 PM

## 2020-01-05 PROCEDURE — 6360000002 HC RX W HCPCS: Performed by: INTERNAL MEDICINE

## 2020-01-05 PROCEDURE — 6370000000 HC RX 637 (ALT 250 FOR IP): Performed by: INTERNAL MEDICINE

## 2020-01-05 PROCEDURE — 6370000000 HC RX 637 (ALT 250 FOR IP): Performed by: NURSE PRACTITIONER

## 2020-01-05 PROCEDURE — 94640 AIRWAY INHALATION TREATMENT: CPT

## 2020-01-05 PROCEDURE — 6370000000 HC RX 637 (ALT 250 FOR IP): Performed by: PHYSICAL MEDICINE & REHABILITATION

## 2020-01-05 PROCEDURE — 1280000000 HC REHAB R&B

## 2020-01-05 PROCEDURE — 97530 THERAPEUTIC ACTIVITIES: CPT | Performed by: PHYSICAL THERAPIST

## 2020-01-05 RX ORDER — SODIUM CHLORIDE 0.9 % (FLUSH) 0.9 %
SYRINGE (ML) INJECTION
Status: DISPENSED
Start: 2020-01-05 | End: 2020-01-06

## 2020-01-05 RX ADMIN — OXYCODONE HYDROCHLORIDE 10 MG: 10 TABLET, FILM COATED, EXTENDED RELEASE ORAL at 08:57

## 2020-01-05 RX ADMIN — ACETAMINOPHEN 650 MG: 325 TABLET, FILM COATED ORAL at 16:02

## 2020-01-05 RX ADMIN — BENZTROPINE MESYLATE 1 MG: 1 TABLET ORAL at 09:00

## 2020-01-05 RX ADMIN — METHOCARBAMOL TABLETS 1500 MG: 750 TABLET, COATED ORAL at 16:56

## 2020-01-05 RX ADMIN — IPRATROPIUM BROMIDE AND ALBUTEROL SULFATE 1 AMPULE: 2.5; .5 SOLUTION RESPIRATORY (INHALATION) at 09:30

## 2020-01-05 RX ADMIN — OXYCODONE HYDROCHLORIDE 10 MG: 10 TABLET, FILM COATED, EXTENDED RELEASE ORAL at 21:08

## 2020-01-05 RX ADMIN — CARBAMAZEPINE 300 MG: 100 TABLET, EXTENDED RELEASE ORAL at 09:00

## 2020-01-05 RX ADMIN — OXYCODONE 5 MG: 5 TABLET ORAL at 08:57

## 2020-01-05 RX ADMIN — ACETAMINOPHEN 650 MG: 325 TABLET, FILM COATED ORAL at 19:58

## 2020-01-05 RX ADMIN — ACETAMINOPHEN 650 MG: 325 TABLET, FILM COATED ORAL at 04:01

## 2020-01-05 RX ADMIN — LEVOFLOXACIN 500 MG: 500 TABLET, FILM COATED ORAL at 12:22

## 2020-01-05 RX ADMIN — ACETAMINOPHEN 650 MG: 325 TABLET, FILM COATED ORAL at 08:57

## 2020-01-05 RX ADMIN — METHOCARBAMOL TABLETS 1500 MG: 750 TABLET, COATED ORAL at 21:08

## 2020-01-05 RX ADMIN — METHOCARBAMOL TABLETS 1500 MG: 750 TABLET, COATED ORAL at 12:22

## 2020-01-05 RX ADMIN — MELATONIN 3 MG ORAL TABLET 3 MG: 3 TABLET ORAL at 21:19

## 2020-01-05 RX ADMIN — CARBAMAZEPINE 300 MG: 100 TABLET, EXTENDED RELEASE ORAL at 21:08

## 2020-01-05 RX ADMIN — IPRATROPIUM BROMIDE AND ALBUTEROL SULFATE 1 AMPULE: 2.5; .5 SOLUTION RESPIRATORY (INHALATION) at 16:14

## 2020-01-05 RX ADMIN — OXYCODONE 5 MG: 5 TABLET ORAL at 15:48

## 2020-01-05 RX ADMIN — QUETIAPINE FUMARATE 400 MG: 200 TABLET ORAL at 21:08

## 2020-01-05 RX ADMIN — OXYCODONE 5 MG: 5 TABLET ORAL at 21:08

## 2020-01-05 RX ADMIN — QUETIAPINE FUMARATE 400 MG: 200 TABLET ORAL at 08:59

## 2020-01-05 RX ADMIN — BENZTROPINE MESYLATE 1 MG: 1 TABLET ORAL at 21:10

## 2020-01-05 RX ADMIN — DOCUSATE SODIUM 200 MG: 100 CAPSULE, LIQUID FILLED ORAL at 21:08

## 2020-01-05 RX ADMIN — OXYCODONE 5 MG: 5 TABLET ORAL at 04:01

## 2020-01-05 RX ADMIN — ACETAMINOPHEN 650 MG: 325 TABLET, FILM COATED ORAL at 12:22

## 2020-01-05 RX ADMIN — SENNOSIDES 17.2 MG: 8.6 TABLET, FILM COATED ORAL at 21:09

## 2020-01-05 RX ADMIN — TAMSULOSIN HYDROCHLORIDE 0.4 MG: 0.4 CAPSULE ORAL at 08:57

## 2020-01-05 RX ADMIN — DOCUSATE SODIUM 200 MG: 100 CAPSULE, LIQUID FILLED ORAL at 08:58

## 2020-01-05 RX ADMIN — METHOCARBAMOL TABLETS 1500 MG: 750 TABLET, COATED ORAL at 08:59

## 2020-01-05 RX ADMIN — ENOXAPARIN SODIUM 30 MG: 30 INJECTION SUBCUTANEOUS at 08:58

## 2020-01-05 RX ADMIN — DILTIAZEM HYDROCHLORIDE 180 MG: 180 CAPSULE, EXTENDED RELEASE ORAL at 08:59

## 2020-01-05 RX ADMIN — ENOXAPARIN SODIUM 30 MG: 30 INJECTION SUBCUTANEOUS at 21:09

## 2020-01-05 RX ADMIN — PANTOPRAZOLE SODIUM 40 MG: 40 TABLET, DELAYED RELEASE ORAL at 04:01

## 2020-01-05 ASSESSMENT — PAIN DESCRIPTION - FREQUENCY
FREQUENCY: CONTINUOUS

## 2020-01-05 ASSESSMENT — PAIN DESCRIPTION - PAIN TYPE
TYPE: CHRONIC PAIN
TYPE: ACUTE PAIN;SURGICAL PAIN
TYPE: CHRONIC PAIN

## 2020-01-05 ASSESSMENT — PAIN DESCRIPTION - ORIENTATION
ORIENTATION: LEFT;LOWER
ORIENTATION: LEFT

## 2020-01-05 ASSESSMENT — PAIN DESCRIPTION - DIRECTION
RADIATING_TOWARDS: LUE

## 2020-01-05 ASSESSMENT — PAIN - FUNCTIONAL ASSESSMENT
PAIN_FUNCTIONAL_ASSESSMENT: PREVENTS OR INTERFERES SOME ACTIVE ACTIVITIES AND ADLS

## 2020-01-05 ASSESSMENT — PAIN DESCRIPTION - ONSET
ONSET: ON-GOING

## 2020-01-05 ASSESSMENT — PAIN DESCRIPTION - DESCRIPTORS
DESCRIPTORS: ACHING;CONSTANT
DESCRIPTORS: ACHING;CONSTANT;DISCOMFORT;SHARP
DESCRIPTORS: ACHING;DISCOMFORT
DESCRIPTORS: ACHING;CONSTANT;DISCOMFORT
DESCRIPTORS: ACHING;DISCOMFORT

## 2020-01-05 ASSESSMENT — PAIN SCALES - GENERAL
PAINLEVEL_OUTOF10: 8
PAINLEVEL_OUTOF10: 8
PAINLEVEL_OUTOF10: 0
PAINLEVEL_OUTOF10: 8
PAINLEVEL_OUTOF10: 8
PAINLEVEL_OUTOF10: 6
PAINLEVEL_OUTOF10: 7
PAINLEVEL_OUTOF10: 8
PAINLEVEL_OUTOF10: 0
PAINLEVEL_OUTOF10: 10
PAINLEVEL_OUTOF10: 8

## 2020-01-05 ASSESSMENT — PAIN DESCRIPTION - PROGRESSION
CLINICAL_PROGRESSION: NOT CHANGED

## 2020-01-05 ASSESSMENT — PAIN DESCRIPTION - LOCATION
LOCATION: ARM;WRIST
LOCATION: BACK;ARM

## 2020-01-05 NOTE — PROGRESS NOTES
Mobility  Rolling: SBA  Supine to sit: SBA  Sit to supine: NT  Scooting: SBA to EOB NT  Supervision Mod I   Transfers Sit to stand: Mod A  Stand to sit: Mod A  Stand pivot: Mod A no AD, hand on wheelchair Sit to stand: Max A progressing to Mod A  Stand to sit: Max A  Stand pivot: Max A x2 no AD, hand on wheelchair  SBA Mod I with AAD   Ambulation   NT, unable to ambulate  6 feet with Mod A within // bars and w/c follow  100 feet with AAD with SBA >250 feet with AAD with Mod I   Walking 10 feet on uneven surface NT  NT  10 feet with AAD with SBA 10 feet with AAD with Mod I   Wheel Chair Mobility NT  NT        Car Transfers NT  NT  SBA Mod I   Stair negotiation: ascended and descended NT  NT  4 steps with 1 rail with SBA 12 steps with 1 rail with Mod I   Curb Step:   ascended and descended NT NT   4 inch step with AAD and SBA 7.5 inch step AAD with and Mod I   Picking up object off the floor NT  NT  Will  an object with SBA Will  an object with Mod I   BLE ROM WFL          BLE Strength RLE: 4-/5  LLE: 3+/5          Balance  Sitting: SBA  Standing: Min A no AD          Date Family Teach Completed TBA          Is additional Family Teaching Needed?   Y or N Yes  yes        Hindering Progress Pain, NWB LLE/LUE, psych complications Pain, NWB LLE/LUE, psych complications        PT recommended ELOS 2-3 weeks           Team's Discharge Plan             Therapist at Team Meeting                Therapeutic Exercise:   Sit<>Stand Max A completed 6x from w/c to // bars  Standing weight shift as pre-gait activity 1 minute x2 with Mod A and // bars    Patient education  Pt educated on maintaining spinal precautions, review of WB status of extremities, gait mechanics, safety with transfers, posture    Patient response to education:   Pt verbalized understanding Pt demonstrated skill Pt requires further education in this area   yes Partially with cues/assistance yes     Additional Comments: Pt presented to therapy gym via w/c, hard TLSO donned, brace to L wrist, and R surgical shoe. Pt reporting to therapist that he was attempting to roll in bed to get comfortable last night; while twisting felt several \"pops\" in his spine and stating he felt better after- nursing notified. Pt educated in importance of maintaining spinal precautions to facilitate max healing. Pt requires Max A for all sit<>stand transfers to assist with lift/lower, intermittent cues for maintaining NWB on L UE. Pt presents with strong R lateral trunk lean during standing, is able to correct with verbal and tactile cues to maximize neutral alignment, and with stepping closer to R side of // bars to allow improved R UE support. Pt progressed standing tolerance within session from 30 seconds to approximately 2 minutes including ambulation trial. Pt requires step-wise cues for all ambulation to complete weight shift and progress each LE. Pt requires Max A x2 this date for stand pivot transfer between w/c's, pt pausing midway through transfer and requiring assistance to regain balance and be seated in new w/c. Pt returned to room with all needs in reach at end of session. AM  Time in: 1130  Time out: 1200    Pt is making good progress toward established Physical Therapy goals. Continue with physical therapy current plan of care.     Tammy Galan, PT, DPT  [id number]

## 2020-01-06 PROCEDURE — 1280000000 HC REHAB R&B

## 2020-01-06 PROCEDURE — 97530 THERAPEUTIC ACTIVITIES: CPT

## 2020-01-06 PROCEDURE — 6370000000 HC RX 637 (ALT 250 FOR IP): Performed by: PHYSICAL MEDICINE & REHABILITATION

## 2020-01-06 PROCEDURE — 6370000000 HC RX 637 (ALT 250 FOR IP): Performed by: NURSE PRACTITIONER

## 2020-01-06 PROCEDURE — 6370000000 HC RX 637 (ALT 250 FOR IP): Performed by: INTERNAL MEDICINE

## 2020-01-06 PROCEDURE — 6360000002 HC RX W HCPCS: Performed by: INTERNAL MEDICINE

## 2020-01-06 PROCEDURE — 94640 AIRWAY INHALATION TREATMENT: CPT

## 2020-01-06 PROCEDURE — 97110 THERAPEUTIC EXERCISES: CPT

## 2020-01-06 RX ADMIN — IPRATROPIUM BROMIDE AND ALBUTEROL SULFATE 1 AMPULE: 2.5; .5 SOLUTION RESPIRATORY (INHALATION) at 11:23

## 2020-01-06 RX ADMIN — OXYCODONE 5 MG: 5 TABLET ORAL at 01:00

## 2020-01-06 RX ADMIN — ACETAMINOPHEN 650 MG: 325 TABLET, FILM COATED ORAL at 16:07

## 2020-01-06 RX ADMIN — ACETAMINOPHEN 650 MG: 325 TABLET, FILM COATED ORAL at 13:19

## 2020-01-06 RX ADMIN — OXYCODONE HYDROCHLORIDE 10 MG: 10 TABLET, FILM COATED, EXTENDED RELEASE ORAL at 21:02

## 2020-01-06 RX ADMIN — ACETAMINOPHEN 650 MG: 325 TABLET, FILM COATED ORAL at 05:06

## 2020-01-06 RX ADMIN — DOCUSATE SODIUM 200 MG: 100 CAPSULE, LIQUID FILLED ORAL at 10:06

## 2020-01-06 RX ADMIN — DOCUSATE SODIUM 200 MG: 100 CAPSULE, LIQUID FILLED ORAL at 21:01

## 2020-01-06 RX ADMIN — OXYCODONE 5 MG: 5 TABLET ORAL at 10:06

## 2020-01-06 RX ADMIN — QUETIAPINE FUMARATE 400 MG: 200 TABLET ORAL at 10:08

## 2020-01-06 RX ADMIN — CARBAMAZEPINE 300 MG: 100 TABLET, EXTENDED RELEASE ORAL at 10:06

## 2020-01-06 RX ADMIN — BENZTROPINE MESYLATE 1 MG: 1 TABLET ORAL at 21:02

## 2020-01-06 RX ADMIN — OXYCODONE 5 MG: 5 TABLET ORAL at 05:05

## 2020-01-06 RX ADMIN — ENOXAPARIN SODIUM 30 MG: 30 INJECTION SUBCUTANEOUS at 10:06

## 2020-01-06 RX ADMIN — ACETAMINOPHEN 650 MG: 325 TABLET, FILM COATED ORAL at 10:07

## 2020-01-06 RX ADMIN — LEVOFLOXACIN 500 MG: 500 TABLET, FILM COATED ORAL at 10:08

## 2020-01-06 RX ADMIN — DILTIAZEM HYDROCHLORIDE 180 MG: 180 CAPSULE, EXTENDED RELEASE ORAL at 10:08

## 2020-01-06 RX ADMIN — IPRATROPIUM BROMIDE AND ALBUTEROL SULFATE 1 AMPULE: 2.5; .5 SOLUTION RESPIRATORY (INHALATION) at 19:41

## 2020-01-06 RX ADMIN — TAMSULOSIN HYDROCHLORIDE 0.4 MG: 0.4 CAPSULE ORAL at 10:08

## 2020-01-06 RX ADMIN — BENZTROPINE MESYLATE 1 MG: 1 TABLET ORAL at 10:07

## 2020-01-06 RX ADMIN — ACETAMINOPHEN 650 MG: 325 TABLET, FILM COATED ORAL at 01:00

## 2020-01-06 RX ADMIN — IPRATROPIUM BROMIDE AND ALBUTEROL SULFATE 1 AMPULE: 2.5; .5 SOLUTION RESPIRATORY (INHALATION) at 08:03

## 2020-01-06 RX ADMIN — OXYCODONE HYDROCHLORIDE 10 MG: 10 TABLET, FILM COATED, EXTENDED RELEASE ORAL at 10:06

## 2020-01-06 RX ADMIN — METHOCARBAMOL TABLETS 1500 MG: 750 TABLET, COATED ORAL at 19:44

## 2020-01-06 RX ADMIN — SENNOSIDES 17.2 MG: 8.6 TABLET, FILM COATED ORAL at 21:01

## 2020-01-06 RX ADMIN — PANTOPRAZOLE SODIUM 40 MG: 40 TABLET, DELAYED RELEASE ORAL at 05:06

## 2020-01-06 ASSESSMENT — PAIN DESCRIPTION - ONSET
ONSET: ON-GOING

## 2020-01-06 ASSESSMENT — PAIN DESCRIPTION - PROGRESSION
CLINICAL_PROGRESSION: NOT CHANGED

## 2020-01-06 ASSESSMENT — PAIN DESCRIPTION - DESCRIPTORS
DESCRIPTORS: ACHING;DISCOMFORT;SORE
DESCRIPTORS: ACHING;DISCOMFORT;SORE
DESCRIPTORS: CONSTANT;DISCOMFORT;THROBBING;SHARP
DESCRIPTORS: ACHING;DISCOMFORT;SORE
DESCRIPTORS: CONSTANT
DESCRIPTORS: CONSTANT

## 2020-01-06 ASSESSMENT — PAIN SCALES - GENERAL
PAINLEVEL_OUTOF10: 6
PAINLEVEL_OUTOF10: 8
PAINLEVEL_OUTOF10: 6
PAINLEVEL_OUTOF10: 0
PAINLEVEL_OUTOF10: 10
PAINLEVEL_OUTOF10: 8
PAINLEVEL_OUTOF10: 8
PAINLEVEL_OUTOF10: 0
PAINLEVEL_OUTOF10: 4
PAINLEVEL_OUTOF10: 10
PAINLEVEL_OUTOF10: 6
PAINLEVEL_OUTOF10: 6
PAINLEVEL_OUTOF10: 0

## 2020-01-06 ASSESSMENT — PAIN DESCRIPTION - FREQUENCY
FREQUENCY: CONTINUOUS

## 2020-01-06 ASSESSMENT — PAIN DESCRIPTION - LOCATION
LOCATION: BACK
LOCATION: ARM;WRIST
LOCATION: ARM
LOCATION: BACK
LOCATION: ARM;WRIST

## 2020-01-06 ASSESSMENT — PAIN DESCRIPTION - PAIN TYPE
TYPE: CHRONIC PAIN
TYPE: SURGICAL PAIN
TYPE: CHRONIC PAIN

## 2020-01-06 ASSESSMENT — PAIN DESCRIPTION - ORIENTATION
ORIENTATION: LEFT

## 2020-01-06 NOTE — PROGRESS NOTES
Nightly PRN Lynette Teresa, APRN - CNP   3 mg at 01/05/20 2119    mineral oil-hydrophilic petrolatum (HYDROPHOR) ointment   Topical Daily [facility], APRN - CNP        nicotine (NICODERM CQ) 14 MG/24HR 1 patch  1 patch Transdermal Daily [facility], APRN - CNP        pantoprazole (PROTONIX) tablet 40 mg  40 mg Oral QAM AC [facility], APRN - CNP   40 mg at 01/06/20 0506    polyethylene glycol (GLYCOLAX) packet 17 g  17 g Oral BID [facility], APRN - CNP        QUEtiapine (SEROQUEL) tablet 400 mg  400 mg Oral BID [facility], APRN - CNP   400 mg at 01/05/20 2108    tamsulosin (FLOMAX) capsule 0.4 mg  0.4 mg Oral Daily [facility], APRN - CNP   0.4 mg at 01/05/20 0857    venlafaxine (EFFEXOR XR) extended release capsule 37.5 mg  37.5 mg Oral Daily with breakfast Lynette Teresa, APRN - CNP   37.5 mg at 01/03/20 0900    [START ON 1/9/2020] vitamin D (ERGOCALCIFEROL) capsule 50,000 Units  50,000 Units Oral Weekly [facility], APRN - CNP        acetaminophen (TYLENOL) tablet 650 mg  650 mg Oral Q4H PRN Rigo Hollins MD        magnesium hydroxide (MILK OF MAGNESIA) 400 MG/5ML suspension 30 mL  30 mL Oral Daily PRN Rigo Hollins MD        ammonium lactate (LAC-HYDRIN) 12 % lotion   Topical PRN Rigo Hollins MD         Allergies   Allergen Reactions    Depakote [Valproic Acid] Other (See Comments)     Feels bloated     Haldol [Haloperidol] Other (See Comments)     Intolerable side efffects    Invega Sustenna [Paliperidone Palmitate Er] Hives    Invega [Paliperidone] Hives     Active Problems:    Multiple trauma  Resolved Problems:    * No resolved hospital problems. *    Blood pressure 137/76, pulse 102, temperature 97.7 °F (36.5 °C), temperature source Oral, resp. rate 18, height 5' 9\" (1.753 m), weight 165 lb 4.8 oz (75 kg), SpO2 98 %. Subjective:  Symptoms:  Stable. He reports weakness. Diet:  Adequate intake. Activity level: Impaired due to weakness. Pain:  He complains of pain that is mild. Objective:  General Appearance: In no acute distress. Vital signs: (most recent): Blood pressure 137/76, pulse 102, temperature 97.7 °F (36.5 °C), temperature source Oral, resp. rate 18, height 5' 9\" (1.753 m), weight 165 lb 4.8 oz (75 kg), SpO2 98 %. Vital signs are normal.    Output: Producing urine and producing stool. Lungs:  Normal effort and normal respiratory rate. Breath sounds clear to auscultation. Heart: Normal rate. Regular rhythm. S1 normal and S2 normal.    Abdomen: Abdomen is soft. Bowel sounds are normal.   There is no abdominal tenderness. Extremities: Normal range of motion. Neurological: Patient is alert.  (3-4/5 lowers).                       Initial Evaluation  1/3/20 AM     PM    Short Term Goals Long Term Goals    Was pt agreeable to Eval/treatment? yes yes         Does pt have pain? 8/10 LUE pain, 10/10 low back pain 6/10 L UE          Bed Mobility  Rolling: SBA  Supine to sit: SBA  Sit to supine: NT  Scooting: SBA to EOB NT   Supervision Mod I   Transfers Sit to stand: Mod A  Stand to sit: Mod A  Stand pivot: Mod A no AD, hand on wheelchair Sit to stand: Max A progressing to Mod A  Stand to sit: Max A  Stand pivot:  Max A x2 no AD, hand on wheelchair   SBA Mod I with AAD   Ambulation   NT, unable to ambulate  6 feet with Mod A within // bars and w/c follow   100 feet with AAD with SBA >250 feet with AAD with Mod I   Walking 10 feet on uneven surface NT  NT   10 feet with AAD with SBA 10 feet with AAD with Mod I   Wheel Chair Mobility NT  NT         Car Transfers NT  NT   SBA Mod I   Stair negotiation: ascended and descended NT  NT   4 steps with 1 rail with SBA 12 steps with 1 rail with Mod I   Curb Step:   ascended and descended NT NT    4 inch step with AAD and SBA 7.5 inch step AAD with and Mod I   Picking up object off the floor NT  NT   Will  an object with SBA Will  an object with Mod I   BLE ROM WFL           BLE Strength RLE: 4-/5  LLE: 3+/5           Balance  Sitting: SBA  Standing: Min A no AD           Date Family Teach Completed TBA           Is additional Family Teaching Needed?  Y or N Yes  yes         Hindering Progress Pain, NWB LLE/LUE, psych complications Pain, NWB LLE/LUE, psych complications         PT recommended ELOS 2-3 weeks           Team's Discharge Plan             Therapist at Team Meeting                  Assessment:    Condition: In stable condition. Improving.   (Multiple trauma). Plan:   Encourage ambulation. (Controlling left side better  Tolerating therapy  Darting to stand  Limited by weightbearing status).        Agustín Reynolds MD  1/6/2020

## 2020-01-06 NOTE — PROGRESS NOTES
Physical Therapy  Weekly Team Note  Evaluating Therapist: Matheus Romo DPT    ROOM: [facility]  DIAGNOSIS: Multiple trauma  PRECAUTIONS: Falls, spinal precautions, TLSO when OOB or HOB at or >45 degrees, NWB LUE, ex fix LUE, suicide precautions, NWB LLE, WBAT RLE with post op shoe   PROCEDURE(S): 18/9/99 Application of left wrist spanning external fixation, 12/6/19 Left posterior pelvis fracture disruption through ilium and sacroiliac joint open reduction and internal fixation,12/10/19 T10-L1 posterior lateral fusion, 12/11/19 L humerus ORIF  HPI: Pt presented to ED on 12/5/19 sustaining multiple injuries from jumping off an overpass with suicide intentions. Injuries sustained are as followed:    - Left midshaft humerus fracture, closed. - Left transscaphoid perilunate open fracture dislocation, grade 2, with extruded proximal scaphoid and lunate. - Complex 3 cm open wound, volar wrist/hand.   - Left radial styloid fracture. - Left pelvic ring fracture disruption, lateral compression type 2 pattern, with large  crescent fracture and complete fracture through ileum into the anterior superior sacroiliac joint with  comminution.   - Left anterior pelvic ring fracture, junctional rami, superiorly, and   comminuted inferior rami into symphysial body   - T12 burst fracture    Social:  Pt lives with his grandparents in a 2 floor plan with 2 steps and 1 rail to enter. Bed/bathroom is on 2nd floor with a flight of stairs and 1 rail. Prior to admission pt was independent with no AD. Initial Evaluation  1/3/20 1/6/20    Comments    Short Term Goals Long Term Goals    Bed Mobility  Rolling: SBA  Supine to sit: SBA  Sit to supine: NT  Scooting: SBA to EOB Rolling: Supervision  Supine to sit: Supervision  Sit to supine: NT  Scooting: Supervision   Supervision Mod I   Transfers Sit to stand: Mod A  Stand to sit: Mod A  Stand pivot: Mod A no AD, hand on wheelchair Sit to stand:  Mod A  Stand to sit: Mod A  Stand pivot: Min A with standard hemiwalker  SBA Mod I with AAD   Ambulation   NT, unable to ambulate 100 feet with standard hemiwalker Min A  100 feet with AAD with SBA >250 feet with AAD with Mod I   Walking 10 feet on uneven surface NT NT  10 feet with AAD with SBA 10 feet with AAD with Mod I   Wheel Chair Mobility NT NT      Car Transfers NT NT  SBA Mod I   Stair negotiation: ascended and descended NT NT  4 steps with 1 rail with SBA 12 steps with 1 rail with Mod I   Curb Step:   ascended and descended NT NT  4 inch step with AAD and SBA 7.5 inch step AAD with and Mod I   Picking up object off the floor NT NT  Will  an object with SBA Will  an object with Mod I   BLE ROM WFL WFL      BLE Strength RLE: 4-/5  LLE: 3+/5 RLE: 4-/5  LLE: 3+/5      Balance  Sitting: SBA  Standing: Min A no AD Sitting: SBA  Static standing: CGA   Dynamic standing: Mod A       Date Family Teach Completed TBA TBA      Is additional Family Teaching Needed? Y or N Yes Yes      Hindering Progress Pain, NWB LLE/LUE, psych complications Pain, NWB LLE/LUE, psych complications      PT recommended ELOS 2-3 weeks 2 weeks      Team's Discharge Plan  2 weeks      Therapist at Team Meeting  Olivia Murillo PT, DPT [id number]          Date:  1/6/20  Supporting factors:  Family support, young age and high PLOF  Barriers to discharge: Pain, NWB LLE/LUE, psych complications  Additional comments:  Pt has been experiencing moderate to severe LBP throughout sessions. Pt has been progressing ambulation and has begun using standard hemiwalker to accommodate NWB LUE and MWB LLE. Pt has remained compliant with NWB LLE inconsistently during ambulation and pivots despite verbal and tactile cueing. Pt reports that he is not putting any weight down on his LLE during mobility but he is unable to hold it in the air to assure he is not pushing through it. Pt requires assistance in donning/doffing TLSO. Pain appears to be pt's limiting factor at this point in time.   DME: TBD  After Care:   Outpatient PT    Akin Wagner  [id number]

## 2020-01-06 NOTE — PROGRESS NOTES
Occupational Therapy  OCCUPATIONAL THERAPY DAILY NOTE    Date:2020  Patient Name: Elinor Henson  MRN: [id number]  : 1995  Room: 200/1-A       Diagnosis:  Trauma  Injuries Sustained:    - Left midshaft humerus fracture, closed. - Left transscaphoid perilunate open fracture dislocation, grade 2, with extruded proximal scaphoid and lunate.  - Complex 3 cm open wound, volar wrist/hand.  - Left radial styloid fracture. - Left pelvic ring fracture disruption, lateral compression type 2 pattern, with large crescent fracture and complete fracture through ileum into the anterior superior sacroiliac joint with comminution.  - Left anterior pelvic ring fracture, junctional rami, superiorly, and  comminuted inferior rami into symphysial body   - Right foot 1st digit distal phalanx avulsion fx  - T12 burst fracture     Procedures this admission:    19:    I&D Left Wrist, Placement of External Fixator  19:    ORIF Left Pelvis w/ Dr. Moses Wood  12-10-19:  T10-L1 Posterior Lateral Fusion w/ Dr. Jagdish Galindo   19:  Left Humerus ORIF w/ Dr. Moses Wood    Precautions: falls,NWB to LLE and LUE  spinal precautions,  Surgical Shoe to R Foot , TLSO when OOB or HOB at or > 45 degrees,   , L Wrist Brace. Can do PROM exercises to digits of left hand but maintain NWB.     Functional Assessment:   Date Status AE  Comments   Feeding 1/3/20 Setup      Grooming 20 Moderate Assist      Bathing 1/3/20 Maximal Assist      UB Dressing 20  Moderate Assist     MAX A (TLSO)      To julius/doff TLSO supine in bed v/c's for technique    LB Dressing 20  Maximal Assist   To julius/doff shoes seated EOB    Homemaking 1/3/20  TBA        Functional Transfers / Balance:   Date Status DME  Comments   Sit Balance 20  SBA  demo'd sitting EOB    Stand Balance 20  MAX A   demo'd during transfers    [] Tub  [] Shower   Transfer 1/3/20  TBA     Commode   Transfer 1/3/20  Dependent      Functional   Mobility 1/3/20 Supervision []Home with Home Health OT []Home with Out Pt OT []Other: ___   Comments:         Time in Time out Tx Time Breakdown  Variance:   First Session  0915 1000 [x] Individual Tx- 45mins  [] Concurrent Tx -  [] Co-Tx -   [] Group Tx -   [] Time Missed -     Second Session [id number] [x] Individual Tx- 45 min  [] Concurrent Tx -  [] Co-Tx -   [] Group Tx -   [] Time Missed -     Third Session    [] Individual Tx-   [] Concurrent Tx -  [] Co-Tx -   [] Group Tx -   [] Time Missed -         Total Tx Time: 90 mins      [address] [facility] [id number]  I have read the above note and agree with the documentation.   Lurdes Romo OTR/L [id number]

## 2020-01-07 ENCOUNTER — APPOINTMENT (OUTPATIENT)
Dept: GENERAL RADIOLOGY | Age: 25
DRG: 862 | End: 2020-01-07
Attending: PHYSICAL MEDICINE & REHABILITATION
Payer: MEDICAID

## 2020-01-07 PROCEDURE — 6370000000 HC RX 637 (ALT 250 FOR IP): Performed by: NURSE PRACTITIONER

## 2020-01-07 PROCEDURE — 1280000000 HC REHAB R&B

## 2020-01-07 PROCEDURE — 97110 THERAPEUTIC EXERCISES: CPT

## 2020-01-07 PROCEDURE — 97530 THERAPEUTIC ACTIVITIES: CPT

## 2020-01-07 PROCEDURE — 97535 SELF CARE MNGMENT TRAINING: CPT

## 2020-01-07 PROCEDURE — 6370000000 HC RX 637 (ALT 250 FOR IP): Performed by: PHYSICAL MEDICINE & REHABILITATION

## 2020-01-07 PROCEDURE — 6370000000 HC RX 637 (ALT 250 FOR IP): Performed by: INTERNAL MEDICINE

## 2020-01-07 PROCEDURE — 6360000002 HC RX W HCPCS: Performed by: INTERNAL MEDICINE

## 2020-01-07 PROCEDURE — 73120 X-RAY EXAM OF HAND: CPT

## 2020-01-07 RX ORDER — IPRATROPIUM BROMIDE AND ALBUTEROL SULFATE 2.5; .5 MG/3ML; MG/3ML
1 SOLUTION RESPIRATORY (INHALATION) EVERY 4 HOURS PRN
Status: DISCONTINUED | OUTPATIENT
Start: 2020-01-07 | End: 2020-01-23 | Stop reason: HOSPADM

## 2020-01-07 RX ORDER — MAGNESIUM HYDROXIDE/ALUMINUM HYDROXICE/SIMETHICONE 120; 1200; 1200 MG/30ML; MG/30ML; MG/30ML
30 SUSPENSION ORAL EVERY 6 HOURS PRN
Status: DISCONTINUED | OUTPATIENT
Start: 2020-01-07 | End: 2020-01-08

## 2020-01-07 RX ORDER — METHOCARBAMOL 500 MG/1
1000 TABLET, FILM COATED ORAL 4 TIMES DAILY
Status: DISCONTINUED | OUTPATIENT
Start: 2020-01-07 | End: 2020-01-23 | Stop reason: HOSPADM

## 2020-01-07 RX ORDER — ACETAMINOPHEN 325 MG/1
650 TABLET ORAL EVERY 4 HOURS PRN
Status: DISCONTINUED | OUTPATIENT
Start: 2020-01-07 | End: 2020-01-07 | Stop reason: SDUPTHER

## 2020-01-07 RX ADMIN — OXYCODONE 5 MG: 5 TABLET ORAL at 10:58

## 2020-01-07 RX ADMIN — DILTIAZEM HYDROCHLORIDE 180 MG: 180 CAPSULE, EXTENDED RELEASE ORAL at 10:07

## 2020-01-07 RX ADMIN — METHOCARBAMOL TABLETS 1000 MG: 500 TABLET, COATED ORAL at 13:31

## 2020-01-07 RX ADMIN — BENZTROPINE MESYLATE 1 MG: 1 TABLET ORAL at 10:05

## 2020-01-07 RX ADMIN — QUETIAPINE FUMARATE 400 MG: 200 TABLET ORAL at 10:08

## 2020-01-07 RX ADMIN — POLYETHYLENE GLYCOL 3350 17 G: 17 POWDER, FOR SOLUTION ORAL at 10:04

## 2020-01-07 RX ADMIN — SENNOSIDES 17.2 MG: 8.6 TABLET, FILM COATED ORAL at 21:32

## 2020-01-07 RX ADMIN — OXYCODONE 5 MG: 5 TABLET ORAL at 17:36

## 2020-01-07 RX ADMIN — DOCUSATE SODIUM 200 MG: 100 CAPSULE, LIQUID FILLED ORAL at 10:07

## 2020-01-07 RX ADMIN — PANTOPRAZOLE SODIUM 40 MG: 40 TABLET, DELAYED RELEASE ORAL at 05:07

## 2020-01-07 RX ADMIN — TAMSULOSIN HYDROCHLORIDE 0.4 MG: 0.4 CAPSULE ORAL at 10:07

## 2020-01-07 RX ADMIN — OXYCODONE 5 MG: 5 TABLET ORAL at 01:14

## 2020-01-07 RX ADMIN — OXYCODONE 5 MG: 5 TABLET ORAL at 05:07

## 2020-01-07 RX ADMIN — METHOCARBAMOL TABLETS 1000 MG: 500 TABLET, COATED ORAL at 21:32

## 2020-01-07 RX ADMIN — MELATONIN 3 MG ORAL TABLET 3 MG: 3 TABLET ORAL at 01:14

## 2020-01-07 RX ADMIN — ACETAMINOPHEN 650 MG: 325 TABLET, FILM COATED ORAL at 20:22

## 2020-01-07 RX ADMIN — ENOXAPARIN SODIUM 30 MG: 30 INJECTION SUBCUTANEOUS at 21:30

## 2020-01-07 RX ADMIN — OXYCODONE HYDROCHLORIDE 10 MG: 10 TABLET, FILM COATED, EXTENDED RELEASE ORAL at 10:05

## 2020-01-07 RX ADMIN — LEVOFLOXACIN 500 MG: 500 TABLET, FILM COATED ORAL at 10:06

## 2020-01-07 RX ADMIN — MELATONIN 3 MG ORAL TABLET 3 MG: 3 TABLET ORAL at 21:48

## 2020-01-07 RX ADMIN — QUETIAPINE FUMARATE 400 MG: 200 TABLET ORAL at 21:32

## 2020-01-07 RX ADMIN — OXYCODONE HYDROCHLORIDE 10 MG: 10 TABLET, FILM COATED, EXTENDED RELEASE ORAL at 21:33

## 2020-01-07 RX ADMIN — METHOCARBAMOL TABLETS 1000 MG: 500 TABLET, COATED ORAL at 17:31

## 2020-01-07 RX ADMIN — DOCUSATE SODIUM 200 MG: 100 CAPSULE, LIQUID FILLED ORAL at 21:32

## 2020-01-07 RX ADMIN — ALUMINUM HYDROXIDE, MAGNESIUM HYDROXIDE, AND SIMETHICONE 30 ML: 200; 200; 20 SUSPENSION ORAL at 21:29

## 2020-01-07 ASSESSMENT — PAIN DESCRIPTION - DESCRIPTORS
DESCRIPTORS: SHARP;ACHING
DESCRIPTORS: DISCOMFORT
DESCRIPTORS: ACHING;CONSTANT;DISCOMFORT
DESCRIPTORS: ACHING;CONSTANT;DISCOMFORT
DESCRIPTORS: CONSTANT;OTHER (COMMENT)

## 2020-01-07 ASSESSMENT — PAIN DESCRIPTION - PAIN TYPE
TYPE: ACUTE PAIN
TYPE: CHRONIC PAIN

## 2020-01-07 ASSESSMENT — PAIN DESCRIPTION - ORIENTATION
ORIENTATION: LEFT;LOWER
ORIENTATION: LEFT;LOWER
ORIENTATION: LEFT
ORIENTATION: LEFT;LOWER

## 2020-01-07 ASSESSMENT — PAIN DESCRIPTION - FREQUENCY
FREQUENCY: INTERMITTENT
FREQUENCY: CONTINUOUS

## 2020-01-07 ASSESSMENT — PAIN DESCRIPTION - ONSET
ONSET: ON-GOING

## 2020-01-07 ASSESSMENT — PAIN DESCRIPTION - LOCATION
LOCATION: BACK;HAND
LOCATION: BACK
LOCATION: PELVIS

## 2020-01-07 ASSESSMENT — PAIN SCALES - GENERAL
PAINLEVEL_OUTOF10: 8
PAINLEVEL_OUTOF10: 0
PAINLEVEL_OUTOF10: 8
PAINLEVEL_OUTOF10: 6
PAINLEVEL_OUTOF10: 7
PAINLEVEL_OUTOF10: 8
PAINLEVEL_OUTOF10: 6
PAINLEVEL_OUTOF10: 8
PAINLEVEL_OUTOF10: 6
PAINLEVEL_OUTOF10: 8
PAINLEVEL_OUTOF10: 4

## 2020-01-07 ASSESSMENT — PAIN DESCRIPTION - PROGRESSION: CLINICAL_PROGRESSION: NOT CHANGED

## 2020-01-07 NOTE — PROGRESS NOTES
01/07/20 1849   Attendance   Activity Games; Pet therapy  (Chess)   Participation Active participation   Therapeutic Recreation   Community Reintegration Demonstrates ability to complete social goals   Social Skills Demonstrates ability to listen to others;Does not initiate conversation or social interaction   Leisure Education Demonstrates knowledge of benefits of leisure involvement  Aris Baker stated \"Took my mind off pain & it was nice\" as benefits)   Time Spent With Patient   Minutes 30

## 2020-01-07 NOTE — PROGRESS NOTES
Therapeutic Recreation Assessment     LEISURE INTEREST SURVEY        Attendance  Activity: Games(Chess)  Participation: Active participation      Key: P = Past Interest C = Current Interest F = Future Interest     Arts/Crafts:  ___Mechanical  ___Woodworking    ___ Painting   ___Floral arranging ___ Ceramics         _ __ Knitting                                                     ___ Crocheting        ___Other: ___________      Cooking:  _ _Baking ___Cooking    ___   Other: _______   Comments:       Games:  _C__Cards  ___Darts  ___Board games    ___Word games  ___Crossword puzzles    ___Seek-n-find/jumble                   _C__Other: __Hold em / Chess_______      Horticulture:  _P__Vegetables  ___Flowers  ___House plants                                                     ___Other: ___________      House/Yard Care:  ___Ironing  ___Laundry  ___Cleaning                                                      ___Repairs              ___Lawn care                                                     ___Other: ___________      Music Listening/Playing:  ___Classical       ___Big Band       ___Rock-n-Roll                                                     ___Country          ___R&B             ___Gospel/Hymns                                                     C___Other: A variety___________      Outdoor Activities:  ___Hunting          ___Fishing          P/F___Camping                                                     ___Other: ___________      Pets/Animals:  ___Dogs             C___Cats                ___Fish                                                     ___Birds             ___Livestock         ___Other: _________    Reading:   ___Newspapers  ___Magazines ___Other:stories                                                     ___Other: ___________      Jain Activities:  Yazidism: ___________   Guadelupe Maser Activities: ___________      Shopping:   ___Grocery  ___Clothing  ___Flea market     ___Other: []Transportation  []Time Constraints  [x]Physical Ability  []Leisure Awareness  []Drug/Alcohol  []Other: ___________    Recommendations:  [x]Group Session  [x]Individual Session  []Client Refused Services  []No Therapy  []Other: ___________    Objectives:  []Establish adaptations for leisure involvement   [x]Increase Self worth/self esteem  []Community reintegration training   [x]Social interaction  [x]Leisure participation  []Other: ___________    Goals for Therapeutic Recreation Services:   Social Interaction:   Admission Functional Garrett Measure: ____3_______  Discharge Functional Garrett Measure: ____4_______   Other:________________________________      Leisure Choice/ Increase Nancy Quality of Life: To participate in 1 premorbid leisure activities of choice by discharge to increase leisure choice and independence thus increasing the overall quality of his/her life. Socialization/Social Interaction: To increase social interaction skills by introducing self 1 x per week at the beginning of TR session. Leisure Activity Tolerance: To increase leisure tolerance by attending 1 leisure activities per week for 20 minutes with active participation. Self Expression: To participate in 1 leisure activity(s) of choice, identifying 1 benefits to leisure participation. Confusion/Disorientation:  To display greater awareness of surroundings by participating in reality orientation 1 per week      Self esteem/confidence: To complete 1 leisure activities with success 1 x per week by discharge. Self esteem/confidence: To identify 1 positive quality/personal attributes during the TR session 1 by discharge          Tang Delaney

## 2020-01-07 NOTE — PROGRESS NOTES
 nicotine (NICODERM CQ) 14 MG/24HR 1 patch  1 patch Transdermal Daily [facility], APRN - CNP        pantoprazole (PROTONIX) tablet 40 mg  40 mg Oral QAM AC [facility], APRN - CNP   40 mg at 01/07/20 0507    polyethylene glycol (GLYCOLAX) packet 17 g  17 g Oral BID [facility], APRN - CNP        QUEtiapine (SEROQUEL) tablet 400 mg  400 mg Oral BID [facility], APRN - CNP   400 mg at 01/06/20 1008    tamsulosin (FLOMAX) capsule 0.4 mg  0.4 mg Oral Daily [facility], APRN - CNP   0.4 mg at 01/06/20 1008    venlafaxine (EFFEXOR XR) extended release capsule 37.5 mg  37.5 mg Oral Daily with breakfast Jenaro Zaman, APRN - CNP   37.5 mg at 01/03/20 0900    [START ON 1/9/2020] vitamin D (ERGOCALCIFEROL) capsule 50,000 Units  50,000 Units Oral Weekly [facility], APRN - CNP        acetaminophen (TYLENOL) tablet 650 mg  650 mg Oral Q4H PRN Mar Sadler MD        magnesium hydroxide (MILK OF MAGNESIA) 400 MG/5ML suspension 30 mL  30 mL Oral Daily PRN Mar Sadler MD        ammonium lactate (LAC-HYDRIN) 12 % lotion   Topical PRN Mar Sadler MD         Allergies   Allergen Reactions    Depakote [Valproic Acid] Other (See Comments)     Feels bloated     Haldol [Haloperidol] Other (See Comments)     Intolerable side efffects    Invega Sustenna [Paliperidone Palmitate Er] Hives    Invega [Paliperidone] Hives     Active Problems:    Multiple trauma  Resolved Problems:    * No resolved hospital problems. *    Blood pressure (!) 141/83, pulse 111, temperature 97.7 °F (36.5 °C), temperature source Temporal, resp. rate 18, height 5' 9\" (1.753 m), weight 165 lb 4.8 oz (75 kg), SpO2 97 %. Subjective:  Symptoms:  Stable. He reports weakness. Diet:  Adequate intake. Activity level: Impaired due to weakness. Pain:  He complains of pain that is moderate. Objective:  General Appearance: In no acute distress.     Vital signs: (most recent): Blood pressure (!) 141/83, pulse 111, temperature 97.7 °F (36.5

## 2020-01-07 NOTE — PATIENT CARE CONFERENCE
and self care are ongoing    PHYSICAL THERAPY    Bed mobility:   Current level: supervision  Short term bed mobility goal: supervision  Long term bed mobility goal: Modified independent    Chair/bed transfers:  Current level: min assist-mod assist with hemipost walker  Short term Chair/bed transfers goal: standby assist  Long term Chair/bed transfers goal: Modified independent      Ambulation:   Current level: 100 ft hemiwalker at min assist  Short term ambulation goal: standby assist, 100 ft.   Long term ambulation goal: 250 ft.  at Modified independent      Lower Extremity Strength Issues:  right lower 4-/5 left lower 3+/5 needs cues    OCCUPATIONAL THERAPY:    Tub/shower:   Current level: to be assessed      Feeding:  Current level: supervision  Short term feeding goal: Modified independent  Long term feeding goal: independent    Grooming:   Current level: mod assist  Short term grooming goal: min assist  Long term grooming goal: Modified independent    Bathing:  Current level: max assist overall  Short term bathing goal: mod assist  Long term bathing goal: min assist    Homemaking:   Current level: to be assessed    Upper body dressing:  Current level: max assist, help with TLSO brace  Short term upper body dressing goal: mod assist  Long term upper body dressing goal: min assist    Lower body dressing:  Current level: max assist  Short term lower body dressing goal: mod assist  Long term lower body dressing goal: min assist    Toilet transfer:   Current level: dependent, poor safety, problem solving  Short term toilet transfer goal: max assist  Long term toilet transfer goal: min assist    Safety awareness: poor +      Patient/family's personal goals: walk up steps  Factors supporting goal achievement:  prior level of function  Factors hindering goal achievement:  pain, psyche issues    Discharge Plan   Estimated Length of Stay: 2 weeks            Destination: home  Services at Discharge: to be assessed  Equipment at Discharge: to be assessed      INTERDISCIPLINARY TEAM/PHYSICIAN RECOMMENDATION AND/OR REVISIONS OF PLAN OF CARE:  continue therapy, pain control      I approve the established interdisciplinary plan of care as documented within the medical record of Priyank Fuller. Electronically signed by Franci Cerda RN  on 1/7/2020 at 9:08 AM  The following interdisciplinary team members were present:  ANNA Cervantes, DIONICIO Aguilar, PT, DPT  Adrian Rhoades, OTR

## 2020-01-07 NOTE — PROGRESS NOTES
Other: bed mobility - rolling    Supine>sit        EOB <> w/c     Sit<>stand  1/7/20 1/7/20 1/7/20 1/7/20 SBA     MIN A       MAX A       MAX A       Pt requiring assist to raise UB     V/c's for NWB precautions       V/c's for NWB precautions      Functional Exercises / Activity:  Pt seated engaged in leisure activity chess focusing on problem solving, R UE ROM to increase independence with ADL tasks; fair results     Pt engaged in R UE ex's with 3# dumb bell in all planes 3 x 10 reps focusing on strength/endurance to increase independence with transfers. Sensory / Neuromuscular Re-Education:      Cognitive Skills:   Status Comments   Problem   Solving poor +    Memory good     Sequencing fair     Safety poor +      Visual Perception:    Education:   Pt educated on importance about following of NWB  LUE and LLE for joint protection. Pt educated with safety during bed >w/c transfers due to balance and precautions. [] Family teach completed on:    Pain Level: 6/10 left wrist/LUE     Additional Notes:       Patient has made fair  progress during treatment sessions toward set goals. Therapy emphasis to obtain goals:Current Treatment Recommendations: Safety Education & Training, Balance Training, Self-Care / ADL, Patient/Caregiver Education & Training, Strengthening, Functional Mobility Training, Equipment Evaluation, Education, & procurement, Home Management Training, Neuromuscular Re-education, Endurance Training**      [x] Continue with current OT Plan of care.   [] Prepare for Discharge     DISCHARGE RECOMMENDATIONS  Recommended DME:    Post Discharge Care:   []Home Independently  []Home with 24hr Care / Supervision []Home with Partial Supervision []Home with Home Health OT []Home with Out Pt OT []Other: ___   Comments:         Time in Time out Tx Time Breakdown  Variance:   First Session  0915 1000 [x] Individual Tx- 45mins  [] Concurrent Tx -  [] Co-Tx -   [] Group Tx -   [] Time Missed -     Second Session [id number] [x] Individual Tx- 45 min  [] Concurrent Tx -  [] Co-Tx -   [] Group Tx -   [] Time Missed -     Third Session    [] Individual Tx-   [] Concurrent Tx -  [] Co-Tx -   [] Group Tx -   [] Time Missed -         Total Tx Time: 90 mins      [address] [facility] [id number]   I have read the above note and agree with the documentation.   Ben Ashraf OTR/L [id number]

## 2020-01-07 NOTE — PROGRESS NOTES
Physical Therapy  Daily Treatment Note  Evaluating Therapist: Abril Sotomayor DPT    ROOM: 85\[facility]\""  DIAGNOSIS: Multiple trauma  PRECAUTIONS: Falls, spinal precautions, TLSO when OOB or HOB at or >45 degrees, NWB LUE, ex fix LUE, suicide precautions, NWB LLE, WBAT RLE with post op shoe   PROCEDURE(S): 18/8/49 Application of left wrist spanning external fixation, 12/6/19 Left posterior pelvis fracture disruption through ilium and sacroiliac joint open reduction and internal fixation,12/10/19 T10-L1 posterior lateral fusion, 12/11/19 L humerus ORIF  HPI: Pt presented to ED on 12/5/19 sustaining multiple injuries from jumping off an overpass with suicide intentions. Injuries sustained are as followed:    - Left midshaft humerus fracture, closed. - Left transscaphoid perilunate open fracture dislocation, grade 2, with extruded proximal scaphoid and lunate. - Complex 3 cm open wound, volar wrist/hand.   - Left radial styloid fracture. - Left pelvic ring fracture disruption, lateral compression type 2 pattern, with large  crescent fracture and complete fracture through ileum into the anterior superior sacroiliac joint with  comminution.   - Left anterior pelvic ring fracture, junctional rami, superiorly, and   comminuted inferior rami into symphysial body   - T12 burst fracture    Social:  Pt lives with his grandparents in a 2 floor plan with 2 steps and 1 rail to enter. Bed/bathroom is on 2nd floor with a flight of stairs and 1 rail. Prior to admission pt was independent with no AD. Initial Evaluation  1/3/20 AM     PM    Short Term Goals Long Term Goals    Was pt agreeable to Eval/treatment? yes yes yes     Does pt have pain?  8/10 LUE pain, 10/10 low back pain LBP and L hand/forearm pain LBP and L hand/forearm pain     Bed Mobility  Rolling: SBA  Supine to sit: SBA  Sit to supine: NT  Scooting: SBA to EOB Rolling: Supervision  Supine to sit: Supervision  Sit to supine: NT  Scooting: Supervision  NT Supervision Mod I chart  - Stand pivot x4 with standard hemipost walker Min A<>Mod A    Additional Comments: Pt did not report any adverse signs/symptoms today. Pt continues to experience significant levels of pain in his L hand and low back. ROM and exercises for pt's L hand were performed today with improvements made in AROM and grasping made in session. Pt performed therapeutic exercise today to improve strength and aid in decreasing pain. Pt still requires significant levels of assistance to perform sit<>stand. Pt demonstrated poor dynamic standing balance as he had several LOB episodes with pivots and ambulation today while attempting to maintain NWB LLE. Pt is very inconsistent with NWB LLE despite verbal and physical cueing. For this reason pt was regressed from ambulating with standard hemipost walker to ambulating in parallel bars. Pt was able to maintain NWB LLE ambulating in parallel bars but had increased instability noted with this. Will continue to maintain strict NWB LLE during ambulation/transfers while emphasizing balance and control. Patient/family education  Pt/family educated on NWB LLE/LUE, safety with functional mobility, therapeutic exercise technique, hand exercises to perform in room    Patient/family response to education:   Pt/family verbalized understanding Pt/family demonstrated skill Pt/family requires further education in this area   yes inconsistently yes     AM  Time in: 0830  Time out: 0915  PM  Time in: 1515  Time out: 1600    Pt is making good progress toward established Physical Therapy goals. Continue with physical therapy current plan of care.     Jeanie Hale DPT  [id number]

## 2020-01-08 PROCEDURE — 1280000000 HC REHAB R&B

## 2020-01-08 PROCEDURE — 97530 THERAPEUTIC ACTIVITIES: CPT | Performed by: OCCUPATIONAL THERAPY ASSISTANT

## 2020-01-08 PROCEDURE — 6370000000 HC RX 637 (ALT 250 FOR IP): Performed by: NURSE PRACTITIONER

## 2020-01-08 PROCEDURE — 97535 SELF CARE MNGMENT TRAINING: CPT | Performed by: OCCUPATIONAL THERAPY ASSISTANT

## 2020-01-08 PROCEDURE — 6370000000 HC RX 637 (ALT 250 FOR IP): Performed by: PHYSICAL MEDICINE & REHABILITATION

## 2020-01-08 PROCEDURE — 97110 THERAPEUTIC EXERCISES: CPT | Performed by: OCCUPATIONAL THERAPY ASSISTANT

## 2020-01-08 PROCEDURE — 6370000000 HC RX 637 (ALT 250 FOR IP): Performed by: INTERNAL MEDICINE

## 2020-01-08 PROCEDURE — 97530 THERAPEUTIC ACTIVITIES: CPT

## 2020-01-08 RX ORDER — MAGNESIUM HYDROXIDE/ALUMINUM HYDROXICE/SIMETHICONE 120; 1200; 1200 MG/30ML; MG/30ML; MG/30ML
30 SUSPENSION ORAL EVERY 4 HOURS PRN
Status: DISCONTINUED | OUTPATIENT
Start: 2020-01-08 | End: 2020-01-23 | Stop reason: HOSPADM

## 2020-01-08 RX ORDER — CALCITONIN SALMON 200 [IU]/.09ML
1 SPRAY, METERED NASAL DAILY
Status: DISCONTINUED | OUTPATIENT
Start: 2020-01-08 | End: 2020-01-23 | Stop reason: HOSPADM

## 2020-01-08 RX ADMIN — DOCUSATE SODIUM 200 MG: 100 CAPSULE, LIQUID FILLED ORAL at 21:05

## 2020-01-08 RX ADMIN — QUETIAPINE FUMARATE 400 MG: 200 TABLET ORAL at 08:27

## 2020-01-08 RX ADMIN — QUETIAPINE FUMARATE 400 MG: 200 TABLET ORAL at 21:06

## 2020-01-08 RX ADMIN — DOCUSATE SODIUM 200 MG: 100 CAPSULE, LIQUID FILLED ORAL at 08:28

## 2020-01-08 RX ADMIN — OXYCODONE 5 MG: 5 TABLET ORAL at 06:09

## 2020-01-08 RX ADMIN — METHOCARBAMOL TABLETS 1000 MG: 500 TABLET, COATED ORAL at 21:06

## 2020-01-08 RX ADMIN — METHOCARBAMOL TABLETS 1000 MG: 500 TABLET, COATED ORAL at 16:47

## 2020-01-08 RX ADMIN — POLYETHYLENE GLYCOL 3350 17 G: 17 POWDER, FOR SOLUTION ORAL at 08:31

## 2020-01-08 RX ADMIN — SENNOSIDES 17.2 MG: 8.6 TABLET, FILM COATED ORAL at 21:06

## 2020-01-08 RX ADMIN — OXYCODONE 5 MG: 5 TABLET ORAL at 10:12

## 2020-01-08 RX ADMIN — METHOCARBAMOL TABLETS 1000 MG: 500 TABLET, COATED ORAL at 08:28

## 2020-01-08 RX ADMIN — TAMSULOSIN HYDROCHLORIDE 0.4 MG: 0.4 CAPSULE ORAL at 08:29

## 2020-01-08 RX ADMIN — PANTOPRAZOLE SODIUM 40 MG: 40 TABLET, DELAYED RELEASE ORAL at 06:10

## 2020-01-08 RX ADMIN — METHOCARBAMOL TABLETS 1000 MG: 500 TABLET, COATED ORAL at 13:54

## 2020-01-08 RX ADMIN — DILTIAZEM HYDROCHLORIDE 180 MG: 180 CAPSULE, EXTENDED RELEASE ORAL at 08:28

## 2020-01-08 RX ADMIN — LEVOFLOXACIN 500 MG: 500 TABLET, FILM COATED ORAL at 09:00

## 2020-01-08 RX ADMIN — OXYCODONE 5 MG: 5 TABLET ORAL at 16:47

## 2020-01-08 RX ADMIN — CALCITONIN SALMON 1 SPRAY: 200 SPRAY, METERED NASAL at 16:47

## 2020-01-08 RX ADMIN — OXYCODONE HYDROCHLORIDE 10 MG: 10 TABLET, FILM COATED, EXTENDED RELEASE ORAL at 21:06

## 2020-01-08 RX ADMIN — OXYCODONE HYDROCHLORIDE 10 MG: 10 TABLET, FILM COATED, EXTENDED RELEASE ORAL at 08:29

## 2020-01-08 ASSESSMENT — PAIN DESCRIPTION - ONSET
ONSET: ON-GOING

## 2020-01-08 ASSESSMENT — PAIN DESCRIPTION - PAIN TYPE
TYPE: ACUTE PAIN

## 2020-01-08 ASSESSMENT — PAIN SCALES - GENERAL
PAINLEVEL_OUTOF10: 7
PAINLEVEL_OUTOF10: 8
PAINLEVEL_OUTOF10: 8
PAINLEVEL_OUTOF10: 0
PAINLEVEL_OUTOF10: 10
PAINLEVEL_OUTOF10: 8
PAINLEVEL_OUTOF10: 0
PAINLEVEL_OUTOF10: 4

## 2020-01-08 ASSESSMENT — PAIN DESCRIPTION - DESCRIPTORS
DESCRIPTORS: CONSTANT
DESCRIPTORS: CONSTANT;DISCOMFORT

## 2020-01-08 ASSESSMENT — PAIN DESCRIPTION - ORIENTATION
ORIENTATION: LEFT

## 2020-01-08 ASSESSMENT — PAIN DESCRIPTION - FREQUENCY
FREQUENCY: CONTINUOUS

## 2020-01-08 ASSESSMENT — PAIN DESCRIPTION - LOCATION
LOCATION: HAND
LOCATION: BACK
LOCATION: HAND
LOCATION: HAND

## 2020-01-08 ASSESSMENT — PAIN DESCRIPTION - PROGRESSION: CLINICAL_PROGRESSION: NOT CHANGED

## 2020-01-08 ASSESSMENT — PAIN - FUNCTIONAL ASSESSMENT: PAIN_FUNCTIONAL_ASSESSMENT: PREVENTS OR INTERFERES SOME ACTIVE ACTIVITIES AND ADLS

## 2020-01-08 NOTE — PROGRESS NOTES
recent): Blood pressure (!) 145/90, pulse 104, temperature 98.1 °F (36.7 °C), temperature source Temporal, resp. rate 18, height 5' 9\" (1.753 m), weight 165 lb 4.8 oz (75 kg), SpO2 97 %. Vital signs are normal.    Output: Producing urine and producing stool. Lungs:  Normal effort and normal respiratory rate. Breath sounds clear to auscultation. Heart: Normal rate. Regular rhythm. S1 normal and S2 normal.    Abdomen: Abdomen is soft. Bowel sounds are normal.   There is no abdominal tenderness. Extremities: Decreased range of motion. There is deformity and local swelling. Neurological: Patient is alert. Assessment:    Condition: In stable condition. Improving.   (Multiple trauma). Plan:   Encourage ambulation. (Patient is moving better  Hand x-ray does not show any additional fractures  He has a lot of soft tissue swelling there  Should continue to improve with conservative treatment of the hand  We will add Miacalcin to help with bone pain at the pelvis).        Bernadette Hood MD  1/8/2020

## 2020-01-08 NOTE — PROGRESS NOTES
Physical Therapy  Daily Treatment Note  Evaluating Therapist: Marla Suarez DPT    ROOM: [facility]  DIAGNOSIS: Multiple trauma  PRECAUTIONS: Falls, spinal precautions, TLSO when OOB or HOB at or >45 degrees, NWB LUE, ex fix LUE, suicide precautions, NWB LLE, WBAT RLE with post op shoe   PROCEDURE(S): 61/4/73 Application of left wrist spanning external fixation, 12/6/19 Left posterior pelvis fracture disruption through ilium and sacroiliac joint open reduction and internal fixation,12/10/19 T10-L1 posterior lateral fusion, 12/11/19 L humerus ORIF  HPI: Pt presented to ED on 12/5/19 sustaining multiple injuries from jumping off an overpass with suicide intentions. Injuries sustained are as followed:    - Left midshaft humerus fracture, closed. - Left transscaphoid perilunate open fracture dislocation, grade 2, with extruded proximal scaphoid and lunate. - Complex 3 cm open wound, volar wrist/hand.   - Left radial styloid fracture. - Left pelvic ring fracture disruption, lateral compression type 2 pattern, with large  crescent fracture and complete fracture through ileum into the anterior superior sacroiliac joint with  comminution.   - Left anterior pelvic ring fracture, junctional rami, superiorly, and   comminuted inferior rami into symphysial body   - T12 burst fracture    Social:  Pt lives with his grandparents in a 2 floor plan with 2 steps and 1 rail to enter. Bed/bathroom is on 2nd floor with a flight of stairs and 1 rail. Prior to admission pt was independent with no AD. Initial Evaluation  1/3/20 AM     PM    Short Term Goals Long Term Goals    Was pt agreeable to Eval/treatment? yes yes yes     Does pt have pain?  8/10 LUE pain, 10/10 low back pain \"A lot of low back pain\" Significant LBP     Bed Mobility  Rolling: SBA  Supine to sit: SBA  Sit to supine: NT  Scooting: SBA to EOB Rolling: Supervision  Supine to sit: Supervision  Sit to supine: Supervision  Scooting: Supervision  NT Supervision Mod I significantly high levels of LBP. Pt continues to have difficulty standing up from wheelchair without PT assist despite verbal cues on proper technique and encouragement to perform task on own. Pt however did perform a sit to  PM session with CGA from wheelchair to hemipost walker. Pt has poor balance when performing sit<>stand and also has very poor eccentric control during stand to sit. During one of pt's stand pivot transfers today with standard hemipost walker PT attempted to not assist pt since he was improving in form and control with movement based off of previous reps performed. When PT removed hands from pt he buckled at his R knee and required Max A from PT to regain pt's balance and prevent him from falling. Pt has very inconsistent functional movement patterns that do not correlate with pt's strength levels. Pt does not follow cues well and has significant safety awareness impairments. Pt relies heavily on assistance to perform functional mobility. At the end of PM PT session pt threw up. Pt stated that \"these muscle relaxers and food do not mix well\". Pt was escorted back to room, pt's nurse was notified of pt's condition. Patient/family education  Pt/family educated on sit<>stand technique, safety with functional mobility, NWB LLE/LUE, progression to independence in functional mobility    Patient/family response to education:   Pt/family verbalized understanding Pt/family demonstrated skill Pt/family requires further education in this area   yes inconsistently yes     AM  Time in: 0915  Time out: 1000  PM  Time in: 1515  Time out: 1600    Pt is making good progress toward established Physical Therapy goals. Continue with physical therapy current plan of care.     Luis Burger DPT  [id number]

## 2020-01-09 PROCEDURE — 6370000000 HC RX 637 (ALT 250 FOR IP): Performed by: INTERNAL MEDICINE

## 2020-01-09 PROCEDURE — 97110 THERAPEUTIC EXERCISES: CPT

## 2020-01-09 PROCEDURE — 97530 THERAPEUTIC ACTIVITIES: CPT

## 2020-01-09 PROCEDURE — 6370000000 HC RX 637 (ALT 250 FOR IP): Performed by: PHYSICAL MEDICINE & REHABILITATION

## 2020-01-09 PROCEDURE — 6370000000 HC RX 637 (ALT 250 FOR IP): Performed by: NURSE PRACTITIONER

## 2020-01-09 PROCEDURE — 1280000000 HC REHAB R&B

## 2020-01-09 PROCEDURE — 97535 SELF CARE MNGMENT TRAINING: CPT

## 2020-01-09 RX ADMIN — DOCUSATE SODIUM 200 MG: 100 CAPSULE, LIQUID FILLED ORAL at 08:14

## 2020-01-09 RX ADMIN — OXYCODONE 5 MG: 5 TABLET ORAL at 06:26

## 2020-01-09 RX ADMIN — QUETIAPINE FUMARATE 400 MG: 200 TABLET ORAL at 20:32

## 2020-01-09 RX ADMIN — METHOCARBAMOL TABLETS 1000 MG: 500 TABLET, COATED ORAL at 12:43

## 2020-01-09 RX ADMIN — CALCITONIN SALMON 1 SPRAY: 200 SPRAY, METERED NASAL at 08:13

## 2020-01-09 RX ADMIN — SENNOSIDES 17.2 MG: 8.6 TABLET, FILM COATED ORAL at 20:30

## 2020-01-09 RX ADMIN — LEVOFLOXACIN 500 MG: 500 TABLET, FILM COATED ORAL at 08:14

## 2020-01-09 RX ADMIN — PANTOPRAZOLE SODIUM 40 MG: 40 TABLET, DELAYED RELEASE ORAL at 06:26

## 2020-01-09 RX ADMIN — TAMSULOSIN HYDROCHLORIDE 0.4 MG: 0.4 CAPSULE ORAL at 08:14

## 2020-01-09 RX ADMIN — ERGOCALCIFEROL 50000 UNITS: 1.25 CAPSULE ORAL at 08:14

## 2020-01-09 RX ADMIN — OXYCODONE 5 MG: 5 TABLET ORAL at 01:36

## 2020-01-09 RX ADMIN — METHOCARBAMOL TABLETS 1000 MG: 500 TABLET, COATED ORAL at 16:09

## 2020-01-09 RX ADMIN — METHOCARBAMOL TABLETS 1000 MG: 500 TABLET, COATED ORAL at 08:14

## 2020-01-09 RX ADMIN — OXYCODONE 5 MG: 5 TABLET ORAL at 11:03

## 2020-01-09 RX ADMIN — OXYCODONE 5 MG: 5 TABLET ORAL at 16:09

## 2020-01-09 RX ADMIN — OXYCODONE 5 MG: 5 TABLET ORAL at 20:31

## 2020-01-09 RX ADMIN — ALUMINUM HYDROXIDE, MAGNESIUM HYDROXIDE, AND SIMETHICONE 30 ML: 200; 200; 20 SUSPENSION ORAL at 17:35

## 2020-01-09 RX ADMIN — OXYCODONE HYDROCHLORIDE 10 MG: 10 TABLET, FILM COATED, EXTENDED RELEASE ORAL at 08:14

## 2020-01-09 RX ADMIN — DILTIAZEM HYDROCHLORIDE 180 MG: 180 CAPSULE, EXTENDED RELEASE ORAL at 08:14

## 2020-01-09 RX ADMIN — DOCUSATE SODIUM 200 MG: 100 CAPSULE, LIQUID FILLED ORAL at 20:30

## 2020-01-09 RX ADMIN — METHOCARBAMOL TABLETS 1000 MG: 500 TABLET, COATED ORAL at 20:32

## 2020-01-09 RX ADMIN — OXYCODONE HYDROCHLORIDE 10 MG: 10 TABLET, FILM COATED, EXTENDED RELEASE ORAL at 21:42

## 2020-01-09 ASSESSMENT — PAIN DESCRIPTION - ORIENTATION
ORIENTATION: LEFT

## 2020-01-09 ASSESSMENT — PAIN DESCRIPTION - DESCRIPTORS
DESCRIPTORS: ACHING;SHARP
DESCRIPTORS: CONSTANT
DESCRIPTORS: OTHER (COMMENT)
DESCRIPTORS: CONSTANT

## 2020-01-09 ASSESSMENT — PAIN SCALES - GENERAL
PAINLEVEL_OUTOF10: 6
PAINLEVEL_OUTOF10: 8
PAINLEVEL_OUTOF10: 8
PAINLEVEL_OUTOF10: 0
PAINLEVEL_OUTOF10: 8
PAINLEVEL_OUTOF10: 10
PAINLEVEL_OUTOF10: 8
PAINLEVEL_OUTOF10: 8
PAINLEVEL_OUTOF10: 0
PAINLEVEL_OUTOF10: 8
PAINLEVEL_OUTOF10: 7
PAINLEVEL_OUTOF10: 7
PAINLEVEL_OUTOF10: 6

## 2020-01-09 ASSESSMENT — PAIN DESCRIPTION - PROGRESSION
CLINICAL_PROGRESSION: NOT CHANGED

## 2020-01-09 ASSESSMENT — PAIN - FUNCTIONAL ASSESSMENT
PAIN_FUNCTIONAL_ASSESSMENT: PREVENTS OR INTERFERES SOME ACTIVE ACTIVITIES AND ADLS

## 2020-01-09 ASSESSMENT — PAIN DESCRIPTION - PAIN TYPE
TYPE: SURGICAL PAIN;ACUTE PAIN
TYPE: ACUTE PAIN;SURGICAL PAIN
TYPE: ACUTE PAIN
TYPE: ACUTE PAIN
TYPE: SURGICAL PAIN

## 2020-01-09 ASSESSMENT — PAIN DESCRIPTION - LOCATION
LOCATION: BACK
LOCATION: ARM

## 2020-01-09 ASSESSMENT — PAIN DESCRIPTION - FREQUENCY
FREQUENCY: CONTINUOUS
FREQUENCY: CONTINUOUS
FREQUENCY: INTERMITTENT

## 2020-01-09 ASSESSMENT — PAIN DESCRIPTION - ONSET
ONSET: ON-GOING

## 2020-01-09 NOTE — PROGRESS NOTES
Amanda Joiner is a 25 y.o. male patient.     Current Facility-Administered Medications   Medication Dose Route Frequency Provider Last Rate Last Dose    aluminum & magnesium hydroxide-simethicone (MAALOX) 200-200-20 MG/5ML suspension 30 mL  30 mL Oral Q4H PRN Vandana Conti MD        calcitonin (MIACALCIN) nasal spray 1 spray  1 spray Alternating Nares Daily Vandana Conti MD   1 spray at 01/09/20 0813    methocarbamol (ROBAXIN) tablet 1,000 mg  1,000 mg Oral 4x Daily Adrian Funes MD   1,000 mg at 01/09/20 0814    ipratropium-albuterol (DUONEB) nebulizer solution 1 ampule  1 ampule Inhalation Q4H PRN Vandana Conti MD        levofloxacin (LEVAQUIN) tablet 500 mg  500 mg Oral Daily Vandana Conti MD   500 mg at 01/09/20 0814    oxyCODONE (ROXICODONE) immediate release tablet 5 mg  5 mg Oral Q4H PRN Magdi Pappas, DO   5 mg at 01/09/20 2872    docusate sodium (COLACE) capsule 200 mg  200 mg Oral BID Magdi Pappas, DO   200 mg at 01/09/20 0567    diltiazem (CARDIZEM CD) extended release capsule 180 mg  180 mg Oral Daily Magdi Pappas, DO   180 mg at 01/09/20 0814    enoxaparin (LOVENOX) injection 30 mg  30 mg Subcutaneous BID Magdi Pappas, DO   30 mg at 01/07/20 2130    lidocaine 4 % external patch 1 patch  1 patch Transdermal Daily Magdi Pappas, DO   1 patch at 01/09/20 0813    oxyCODONE (OXYCONTIN) extended release tablet 10 mg  10 mg Oral 2 times per day Magdi Pappas, DO   10 mg at 01/09/20 0814    senna (SENOKOT) tablet 17.2 mg  2 tablet Oral Nightly Magdi Pappas, DO   17.2 mg at 01/08/20 2106    hydrOXYzine (VISTARIL) capsule 50 mg  50 mg Oral TID PRN Helene Hyman, APRN - CNP   50 mg at 01/04/20 2103    benztropine (COGENTIN) tablet 1 mg  1 mg Oral BID Helene Hyman APRN - CNP   1 mg at 01/07/20 1005    carBAMazepine (TEGRETOL XR) extended release tablet 300 mg  300 mg Oral BID MICH Ellis CNP   300 mg at 01/06/20 1006    melatonin tablet 3 mg  3 mg Oral Nightly some m eds).        Alfredo Gomez MD  1/9/2020

## 2020-01-09 NOTE — PROGRESS NOTES
Occupational Therapy  OCCUPATIONAL THERAPY DAILY NOTE    Date:2020  Patient Name: Kamala Quick  MRN: [id number]  : 1995  Room: 200/1-A       Diagnosis:  Trauma  Injuries Sustained:    - Left midshaft humerus fracture, closed. - Left transscaphoid perilunate open fracture dislocation, grade 2, with extruded proximal scaphoid and lunate.  - Complex 3 cm open wound, volar wrist/hand.  - Left radial styloid fracture. - Left pelvic ring fracture disruption, lateral compression type 2 pattern, with large crescent fracture and complete fracture through ileum into the anterior superior sacroiliac joint with comminution.  - Left anterior pelvic ring fracture, junctional rami, superiorly, and  comminuted inferior rami into symphysial body   - Right foot 1st digit distal phalanx avulsion fx  - T12 burst fracture     Procedures this admission:    19:    I&D Left Wrist, Placement of External Fixator  19:    ORIF Left Pelvis w/ Dr. Tamar Llamas  12-10-19:  T10-L1 Posterior Lateral Fusion w/ Dr. Martinez Rutledge   19:  Left Humerus ORIF w/ Dr. Tamar Llamas    Precautions: falls,NWB to LLE and LUE  spinal precautions,  Surgical Shoe to R Foot , TLSO when OOB or HOB at or > 45 degrees,   , L Wrist Brace. Can do PROM exercises to digits of left hand but maintain NWB.     Functional Assessment:   Date Status AE  Comments   Feeding 20 Setup      Grooming 20 Mod I     Bathing 20 S/U     UB Dressing 20 S/U    MAX A (TLSO)     LB Dressing 8920 Mod A Sock aide To julius/doff pants requiring assist to thread L LE, pull up around waist when standing, to maintain NWB precautions    Homemaking 1/3/20  TBA        Functional Transfers / Balance:   Date Status DME  Comments   Sit Balance 20  SBA  demo'd sitting BSC   Stand Balance 20  MAX A   demo'd during transfers    [x] Tub  [] Shower   Transfer 20  MAX A  Extended tub bench, fabienne cane Pt requiring assist to maintain standing balance, cane

## 2020-01-09 NOTE — PROGRESS NOTES
Physical Therapy  Daily Treatment Note  Evaluating Therapist: Ronit Murguia DPT    ROOM: [id number]  DIAGNOSIS: Multiple trauma  PRECAUTIONS: Falls, spinal precautions, TLSO when OOB or HOB at or >45 degrees, NWB LUE, ex fix LUE, suicide precautions, NWB LLE, WBAT RLE with post op shoe   PROCEDURE(S): 31/1/48 Application of left wrist spanning external fixation, 12/6/19 Left posterior pelvis fracture disruption through ilium and sacroiliac joint open reduction and internal fixation,12/10/19 T10-L1 posterior lateral fusion, 12/11/19 L humerus ORIF  HPI: Pt presented to ED on 12/5/19 sustaining multiple injuries from jumping off an overpass with suicide intentions. Injuries sustained are as followed:    - Left midshaft humerus fracture, closed. - Left transscaphoid perilunate open fracture dislocation, grade 2, with extruded proximal scaphoid and lunate. - Complex 3 cm open wound, volar wrist/hand.   - Left radial styloid fracture. - Left pelvic ring fracture disruption, lateral compression type 2 pattern, with large  crescent fracture and complete fracture through ileum into the anterior superior sacroiliac joint with  comminution.   - Left anterior pelvic ring fracture, junctional rami, superiorly, and   comminuted inferior rami into symphysial body   - T12 burst fracture    Social:  Pt lives with his grandparents in a 2 floor plan with 2 steps and 1 rail to enter. Bed/bathroom is on 2nd floor with a flight of stairs and 1 rail. Prior to admission pt was independent with no AD. Initial Evaluation  1/3/20 AM     PM    Short Term Goals Long Term Goals    Was pt agreeable to Eval/treatment? yes yes yes     Does pt have pain?  8/10 LUE pain, 10/10 low back pain Moderate to severe LBP and LUE pain Moderate to severe LBP and LUE pain     Bed Mobility  Rolling: SBA  Supine to sit: SBA  Sit to supine: NT  Scooting: SBA to EOB Rolling: Supervision  Supine to sit: Supervision  Sit to supine: Supervision  Scooting: Supervision  NT Supervision Mod I   Transfers Sit to stand: Mod A  Stand to sit: Mod A  Stand pivot: Mod A no AD, hand on wheelchair Sit to stand: CGA  Stand to sit: CGA<>Mod A  Stand pivot: CGA<>Mod A with standard hemipost walker Sit to stand: CGA  Stand to sit: CGA  Stand pivot: NT SBA Mod I with AAD   Ambulation   NT, unable to ambulate NT 15 feet x2 with R hemicane and CGA<>Min A + WC follow 100 feet with AAD with SBA >250 feet with AAD with Mod I   Walking 10 feet on uneven surface NT NT NT 10 feet with AAD with SBA 10 feet with AAD with Mod I   Wheel Chair Mobility  feet with R extremities Supervision 400 feet with R extremities Supervision  400 feet with R extremities Mod I   Car Transfers NT NT NT SBA Mod I   Stair negotiation: ascended and descended NT 4 steps with 1 rail and tub bench Mod A x2 NT 4 steps with 1 rail with SBA 12 steps with 1 rail with Mod I   Curb Step:   ascended and descended NT NT NT 4 inch step with AAD and SBA 7.5 inch step AAD with and Mod I   Picking up object off the floor NT NT NT Will  an object with SBA Will  an object with Mod I   BLE ROM WFL WFL      BLE Strength RLE: 4-/5  LLE: 3+/5 RLE: 4+/5  LLE: 4/5      Balance  Sitting: SBA  Standing: Min A no AD Sitting: SBA  Static standing: CGA   Dynamic standing: CGA<>Mod A with standard hemipost walker       Date Family Teach Completed TBA TBA      Is additional Family Teaching Needed?   Y or N Yes Yes      Hindering Progress Pain, NWB LLE/LUE, psych complications Pain, NWB LLE/LUE, psych complications      PT recommended ELOS 2-3 weeks       Team's Discharge Plan        Therapist at Team Meeting          Therapeutic Exercise:   AM:   - Functional mobility as noted above in chart  - Stand pivot x3 with standard hemipost walker   PM:   - Functional mobility as noted above in chart  - Seated band hamstring curls 2x15  - Seated band hip abduction 2x15    Additional Comments: Pt continues to have significant levels of LBP throughout session. Pt utilized hemipost walker in AM session for functional mobility and demonstrated poor control and sequencing with inconsistent ability to maintain NWB LLE. Pt hasd a LOB this AM while pivoting with hemipost walker that required significant amount of assist from PT to correct for. Pt utilized tub bench seat to negotiate stairs this AM. Pt had difficulty with balance and control during this task and required significant levels of assist from PT's. Pt utilized R hemicane for ambulation in PM and improved control/balance with this device as compared to hemipost walker. Pt did have one LOB episode at the end of ambulation in which his R knee buckled and required a significant amount of assist from PT to correct for. Pt stated that his back pain and the TLSO is what caused his knee to buckle. Pt was returned back to lying supine in bed after PM PT session and was assisted in doffing TLSO. Pt's nurse was notified of pt's high LBP levels. Patient/family education  Pt/family educated on sit<>stand technique, safety with functional mobility, NWB LLE/LUE, progression to independence in functional mobility, tub bench for stair negotiation    Patient/family response to education:   Pt/family verbalized understanding Pt/family demonstrated skill Pt/family requires further education in this area   yes inconsistently yes     AM  Time in: 0830  Time out: 0915  PM  Time in: 1515  Time out: 1600    Pt is making good progress toward established Physical Therapy goals. Continue with physical therapy current plan of care.     Lashae Carr, DPT  [id number]

## 2020-01-10 PROCEDURE — 1280000000 HC REHAB R&B

## 2020-01-10 PROCEDURE — 6370000000 HC RX 637 (ALT 250 FOR IP): Performed by: NURSE PRACTITIONER

## 2020-01-10 PROCEDURE — 6370000000 HC RX 637 (ALT 250 FOR IP): Performed by: PHYSICAL MEDICINE & REHABILITATION

## 2020-01-10 PROCEDURE — 6370000000 HC RX 637 (ALT 250 FOR IP): Performed by: INTERNAL MEDICINE

## 2020-01-10 PROCEDURE — 97530 THERAPEUTIC ACTIVITIES: CPT

## 2020-01-10 PROCEDURE — 6360000002 HC RX W HCPCS: Performed by: INTERNAL MEDICINE

## 2020-01-10 PROCEDURE — 97535 SELF CARE MNGMENT TRAINING: CPT

## 2020-01-10 PROCEDURE — 97110 THERAPEUTIC EXERCISES: CPT

## 2020-01-10 RX ADMIN — OXYCODONE 5 MG: 5 TABLET ORAL at 00:39

## 2020-01-10 RX ADMIN — DOCUSATE SODIUM 200 MG: 100 CAPSULE, LIQUID FILLED ORAL at 07:55

## 2020-01-10 RX ADMIN — QUETIAPINE FUMARATE 400 MG: 200 TABLET ORAL at 21:09

## 2020-01-10 RX ADMIN — METHOCARBAMOL TABLETS 1000 MG: 500 TABLET, COATED ORAL at 17:21

## 2020-01-10 RX ADMIN — OXYCODONE 5 MG: 5 TABLET ORAL at 13:16

## 2020-01-10 RX ADMIN — METHOCARBAMOL TABLETS 1000 MG: 500 TABLET, COATED ORAL at 21:09

## 2020-01-10 RX ADMIN — DOCUSATE SODIUM 200 MG: 100 CAPSULE, LIQUID FILLED ORAL at 21:09

## 2020-01-10 RX ADMIN — SENNOSIDES 17.2 MG: 8.6 TABLET, FILM COATED ORAL at 21:09

## 2020-01-10 RX ADMIN — OXYCODONE HYDROCHLORIDE 10 MG: 10 TABLET, FILM COATED, EXTENDED RELEASE ORAL at 07:56

## 2020-01-10 RX ADMIN — DILTIAZEM HYDROCHLORIDE 180 MG: 180 CAPSULE, EXTENDED RELEASE ORAL at 07:55

## 2020-01-10 RX ADMIN — OXYCODONE HYDROCHLORIDE 10 MG: 10 TABLET, FILM COATED, EXTENDED RELEASE ORAL at 21:13

## 2020-01-10 RX ADMIN — OXYCODONE 5 MG: 5 TABLET ORAL at 17:21

## 2020-01-10 RX ADMIN — METHOCARBAMOL TABLETS 1000 MG: 500 TABLET, COATED ORAL at 13:16

## 2020-01-10 RX ADMIN — LEVOFLOXACIN 500 MG: 500 TABLET, FILM COATED ORAL at 08:30

## 2020-01-10 RX ADMIN — METHOCARBAMOL TABLETS 1000 MG: 500 TABLET, COATED ORAL at 08:30

## 2020-01-10 RX ADMIN — TAMSULOSIN HYDROCHLORIDE 0.4 MG: 0.4 CAPSULE ORAL at 07:56

## 2020-01-10 RX ADMIN — OXYCODONE 5 MG: 5 TABLET ORAL at 06:32

## 2020-01-10 RX ADMIN — ENOXAPARIN SODIUM 30 MG: 30 INJECTION SUBCUTANEOUS at 21:08

## 2020-01-10 RX ADMIN — VENLAFAXINE HYDROCHLORIDE 37.5 MG: 37.5 CAPSULE, EXTENDED RELEASE ORAL at 07:56

## 2020-01-10 RX ADMIN — PANTOPRAZOLE SODIUM 40 MG: 40 TABLET, DELAYED RELEASE ORAL at 06:32

## 2020-01-10 ASSESSMENT — PAIN SCALES - GENERAL
PAINLEVEL_OUTOF10: 8
PAINLEVEL_OUTOF10: 6
PAINLEVEL_OUTOF10: 8
PAINLEVEL_OUTOF10: 6
PAINLEVEL_OUTOF10: 8
PAINLEVEL_OUTOF10: 8
PAINLEVEL_OUTOF10: 7

## 2020-01-10 ASSESSMENT — PAIN DESCRIPTION - PAIN TYPE: TYPE: ACUTE PAIN

## 2020-01-10 NOTE — PROGRESS NOTES
Boris Whatley is a 25 y.o. male patient.     Current Facility-Administered Medications   Medication Dose Route Frequency Provider Last Rate Last Dose    aluminum & magnesium hydroxide-simethicone (MAALOX) 200-200-20 MG/5ML suspension 30 mL  30 mL Oral Q4H PRN Jessica Ritchie MD   30 mL at 01/09/20 1735    calcitonin (MIACALCIN) nasal spray 1 spray  1 spray Alternating Nares Daily Jessica Ritchie MD   1 spray at 01/09/20 0813    methocarbamol (ROBAXIN) tablet 1,000 mg  1,000 mg Oral 4x Daily Jessica Ritchie MD   1,000 mg at 01/09/20 2032    ipratropium-albuterol (DUONEB) nebulizer solution 1 ampule  1 ampule Inhalation Q4H PRN Jessica Ritchie MD        levofloxacin (LEVAQUIN) tablet 500 mg  500 mg Oral Daily Jessica Ritchie MD   500 mg at 01/09/20 0814    oxyCODONE (ROXICODONE) immediate release tablet 5 mg  5 mg Oral Q4H PRN Magdi Pappas, DO   5 mg at 01/10/20 9798    docusate sodium (COLACE) capsule 200 mg  200 mg Oral BID Magdi Pappas, DO   200 mg at 01/10/20 0755    diltiazem (CARDIZEM CD) extended release capsule 180 mg  180 mg Oral Daily Magdi Pappas, DO   180 mg at 01/10/20 0755    enoxaparin (LOVENOX) injection 30 mg  30 mg Subcutaneous BID Magdi Pappas, DO   30 mg at 01/07/20 2130    lidocaine 4 % external patch 1 patch  1 patch Transdermal Daily Magdi Pappas, DO   1 patch at 01/10/20 0757    oxyCODONE (OXYCONTIN) extended release tablet 10 mg  10 mg Oral 2 times per day Magdi Pappas, DO   10 mg at 01/10/20 0756    senna (SENOKOT) tablet 17.2 mg  2 tablet Oral Nightly Magdi Pappas, DO   17.2 mg at 01/09/20 2030    hydrOXYzine (VISTARIL) capsule 50 mg  50 mg Oral TID PRN Helene Hyman APRJEFF - CNP   50 mg at 01/04/20 2103    benztropine (COGENTIN) tablet 1 mg  1 mg Oral BID Helene Hyman, APRN - CNP   1 mg at 01/07/20 1005    carBAMazepine (TEGRETOL XR) extended release tablet 300 mg  300 mg Oral BID Helene [place], APRN - CNP   300 mg at 01/06/20 1006    melatonin tablet 3 mg  3 mg Oral Nightly PRN [facility], APRN - CNP   3 mg at 01/07/20 2148    mineral oil-hydrophilic petrolatum (HYDROPHOR) ointment   Topical Daily [facility], APRN - CNP        nicotine (NICODERM CQ) 14 MG/24HR 1 patch  1 patch Transdermal Daily [facility], APRN - CNP        pantoprazole (PROTONIX) tablet 40 mg  40 mg Oral QAM AC [facility], APRN - CNP   40 mg at 01/10/20 6547    polyethylene glycol (GLYCOLAX) packet 17 g  17 g Oral BID Wyvonnia Nino, APRN - CNP   17 g at 01/08/20 0831    QUEtiapine (SEROQUEL) tablet 400 mg  400 mg Oral BID [facility], [facility] - [facility]   400 mg at 01/09/20 2032    tamsulosin (FLOMAX) capsule 0.4 mg  0.4 mg Oral Daily [facility] - [facility]   0.4 mg at 01/10/20 0756    venlafaxine (EFFEXOR XR) extended release capsule 37.5 mg  37.5 mg Oral Daily with breakfast [facility], [facility] - [facility]   37.5 mg at 01/10/20 0756    vitamin D (ERGOCALCIFEROL) capsule 50,000 Units  50,000 Units Oral Weekly Wyvoia Nino, APRN - CNP   50,000 Units at 01/09/20 2359    acetaminophen (TYLENOL) tablet 650 mg  650 mg Oral Q4H PRN Virgilio Cheek MD   650 mg at 01/07/20 2022    magnesium hydroxide (MILK OF MAGNESIA) 400 MG/5ML suspension 30 mL  30 mL Oral Daily PRN Virgilio Cheek MD        ammonium lactate (LAC-HYDRIN) 12 % lotion   Topical PRN Virgilio Cheek MD         Allergies   Allergen Reactions    Depakote [Valproic Acid] Other (See Comments)     Feels bloated     Haldol [Haloperidol] Other (See Comments)     Intolerable side efffects    Invega Sustenna [Paliperidone Palmitate Er] Hives    Invega [Paliperidone] Hives     Active Problems:    Multiple trauma  Resolved Problems:    * No resolved hospital problems. *    Blood pressure (!) 152/91, pulse 107, temperature 98.6 °F (37 °C), temperature source Temporal, resp. rate 18, height 5' 9\" (1.753 m), weight 165 lb 4.8 oz (75 kg), SpO2 98 %. Subjective:  Symptoms:  Stable. He reports weakness. Diet:  Adequate intake.     Activity level: Impaired due to weakness. Pain:  He complains of pain that is mild. Objective:  General Appearance: In no acute distress. Vital signs: (most recent): Blood pressure (!) 152/91, pulse 107, temperature 98.6 °F (37 °C), temperature source Temporal, resp. rate 18, height 5' 9\" (1.753 m), weight 165 lb 4.8 oz (75 kg), SpO2 98 %. Vital signs are normal.    Output: Producing urine and producing stool. Lungs:  Normal effort and normal respiratory rate. Breath sounds clear to auscultation. Heart: Normal rate. Regular rhythm. S1 normal and S2 normal.    Abdomen: Abdomen is soft. Bowel sounds are normal.   There is no abdominal tenderness. Extremities: Normal range of motion. Neurological: Patient is alert. (4/5 L). Assessment:      Date   Status   AE    Comments     Feeding   1/8/20   Setup              Grooming   1/8/20   Mod I             Bathing   1/8/20   S/U             UB Dressing   1/8/20   S/U       MAX A (TLSO)         LB Dressing   1/8920   Mod A   Sock aide   To julius/doff pants requiring assist to thread L LE, pull up around waist when standing, to maintain NWB precautions      Homemaking   1/3/20    TBA                   Functional Transfers / Balance:      Date Status DME  Comments   Sit Balance 1/9/20  SBA   demo'd sitting BSC   Stand Balance 1/9/20  MAX A    demo'd during transfers    [x]? Tub  []?  Shower   Transfer 1/9/20  MAX A  Extended tub bench, dawit cane Pt requiring assist to maintain standing balance, cane management, with v/c's to follow NWB precautions, pt improved following precautions this session    Commode   Transfer 1/9/20  MAX A  3 in 1, dawit cane  Pt requiring assist to maintain NWB L LE v/c's for precautions and hand placement    Functional   Mobility 1/9/20  MAX A   Dawit cane  Pt requiring assist to maintain balance, follow weight bearing precautions, v/c's for technique to follow NWB    Other: bed mobility - rolling     Supine>sit          EOB <> w/c

## 2020-01-10 NOTE — PROGRESS NOTES
Occupational Therapy  OCCUPATIONAL THERAPY DAILY NOTE    Date:1/10/2020  Patient Name: Marvin Osullivan  MRN: [id number]  : 1995   Room: 200/1-A       Diagnosis:  Trauma  Injuries Sustained:    - Left midshaft humerus fracture, closed. - Left transscaphoid perilunate open fracture dislocation, grade 2, with extruded proximal scaphoid and lunate.  - Complex 3 cm open wound, volar wrist/hand.  - Left radial styloid fracture. - Left pelvic ring fracture disruption, lateral compression type 2 pattern, with large crescent fracture and complete fracture through ileum into the anterior superior sacroiliac joint with comminution.  - Left anterior pelvic ring fracture, junctional rami, superiorly, and  comminuted inferior rami into symphysial body   - Right foot 1st digit distal phalanx avulsion fx  - T12 burst fracture     Procedures this admission:    19:    I&D Left Wrist, Placement of External Fixator  19:    ORIF Left Pelvis w/ Dr. Carson Dance  12-10-19:  T10-L1 Posterior Lateral Fusion w/ Dr. Niles Lowe   19:  Left Humerus ORIF w/ Dr. Carson Dance    Precautions: falls,NWB to LLE and LUE  spinal precautions,  Surgical Shoe to R Foot , TLSO when OOB or HOB at or > 45 degrees,   , L Wrist Brace. Can do PROM exercises to digits of left hand but maintain NWB.     Functional Assessment:   Date Status AE  Comments   Feeding 20 Setup      Grooming 1/10/20 Mod I  Pt seated to apply lotion in  B UE, wash R UE    Bathing 20 S/U     UB Dressing 20 S/U    MAX A (TLSO)     LB Dressing 8920 Mod A Sock aide    Homemaking 1/3/20  TBA        Functional Transfers / Balance:   Date Status DME  Comments   Sit Balance 20  SBA     Stand Balance 20  MAX A      [x] Tub  [] Shower   Transfer 20  MAX A  Extended tub bench, dawit cane    Commode   Transfer 20  MAX A  3 in 1, dawit cane     Functional   Mobility 20  MAX A   Dawit cane     Other: bed mobility - rolling    Supine>sit        EOB

## 2020-01-11 PROCEDURE — 6370000000 HC RX 637 (ALT 250 FOR IP): Performed by: NURSE PRACTITIONER

## 2020-01-11 PROCEDURE — 97530 THERAPEUTIC ACTIVITIES: CPT | Performed by: OCCUPATIONAL THERAPY ASSISTANT

## 2020-01-11 PROCEDURE — 1280000000 HC REHAB R&B

## 2020-01-11 PROCEDURE — 6370000000 HC RX 637 (ALT 250 FOR IP): Performed by: PHYSICAL MEDICINE & REHABILITATION

## 2020-01-11 PROCEDURE — 6370000000 HC RX 637 (ALT 250 FOR IP): Performed by: INTERNAL MEDICINE

## 2020-01-11 PROCEDURE — 6360000002 HC RX W HCPCS: Performed by: INTERNAL MEDICINE

## 2020-01-11 PROCEDURE — 97110 THERAPEUTIC EXERCISES: CPT | Performed by: OCCUPATIONAL THERAPY ASSISTANT

## 2020-01-11 RX ADMIN — CALCITONIN SALMON 1 SPRAY: 200 SPRAY, METERED NASAL at 08:25

## 2020-01-11 RX ADMIN — OXYCODONE 5 MG: 5 TABLET ORAL at 23:31

## 2020-01-11 RX ADMIN — ENOXAPARIN SODIUM 30 MG: 30 INJECTION SUBCUTANEOUS at 20:41

## 2020-01-11 RX ADMIN — TAMSULOSIN HYDROCHLORIDE 0.4 MG: 0.4 CAPSULE ORAL at 08:24

## 2020-01-11 RX ADMIN — QUETIAPINE FUMARATE 400 MG: 200 TABLET ORAL at 20:39

## 2020-01-11 RX ADMIN — SENNOSIDES 17.2 MG: 8.6 TABLET, FILM COATED ORAL at 20:38

## 2020-01-11 RX ADMIN — DOCUSATE SODIUM 200 MG: 100 CAPSULE, LIQUID FILLED ORAL at 08:24

## 2020-01-11 RX ADMIN — OXYCODONE HYDROCHLORIDE 10 MG: 10 TABLET, FILM COATED, EXTENDED RELEASE ORAL at 08:25

## 2020-01-11 RX ADMIN — LEVOFLOXACIN 500 MG: 500 TABLET, FILM COATED ORAL at 08:25

## 2020-01-11 RX ADMIN — OXYCODONE 5 MG: 5 TABLET ORAL at 16:15

## 2020-01-11 RX ADMIN — OXYCODONE 5 MG: 5 TABLET ORAL at 12:18

## 2020-01-11 RX ADMIN — METHOCARBAMOL TABLETS 1000 MG: 500 TABLET, COATED ORAL at 12:18

## 2020-01-11 RX ADMIN — OXYCODONE HYDROCHLORIDE 10 MG: 10 TABLET, FILM COATED, EXTENDED RELEASE ORAL at 20:40

## 2020-01-11 RX ADMIN — METHOCARBAMOL TABLETS 1000 MG: 500 TABLET, COATED ORAL at 08:24

## 2020-01-11 RX ADMIN — PANTOPRAZOLE SODIUM 40 MG: 40 TABLET, DELAYED RELEASE ORAL at 06:34

## 2020-01-11 RX ADMIN — METHOCARBAMOL TABLETS 1000 MG: 500 TABLET, COATED ORAL at 20:41

## 2020-01-11 RX ADMIN — OXYCODONE 5 MG: 5 TABLET ORAL at 06:34

## 2020-01-11 RX ADMIN — DOCUSATE SODIUM 200 MG: 100 CAPSULE, LIQUID FILLED ORAL at 20:39

## 2020-01-11 RX ADMIN — METHOCARBAMOL TABLETS 1000 MG: 500 TABLET, COATED ORAL at 16:15

## 2020-01-11 RX ADMIN — DILTIAZEM HYDROCHLORIDE 180 MG: 180 CAPSULE, EXTENDED RELEASE ORAL at 08:25

## 2020-01-11 ASSESSMENT — PAIN DESCRIPTION - PAIN TYPE
TYPE: ACUTE PAIN
TYPE: ACUTE PAIN;SURGICAL PAIN
TYPE: ACUTE PAIN

## 2020-01-11 ASSESSMENT — PAIN SCALES - GENERAL
PAINLEVEL_OUTOF10: 8
PAINLEVEL_OUTOF10: 10
PAINLEVEL_OUTOF10: 8
PAINLEVEL_OUTOF10: 5
PAINLEVEL_OUTOF10: 0
PAINLEVEL_OUTOF10: 6
PAINLEVEL_OUTOF10: 0
PAINLEVEL_OUTOF10: 9
PAINLEVEL_OUTOF10: 8

## 2020-01-11 ASSESSMENT — PAIN DESCRIPTION - DESCRIPTORS: DESCRIPTORS: ACHING;CONSTANT;SHARP

## 2020-01-11 ASSESSMENT — PAIN DESCRIPTION - ONSET: ONSET: ON-GOING

## 2020-01-11 ASSESSMENT — PAIN DESCRIPTION - LOCATION
LOCATION: BACK
LOCATION: BACK
LOCATION: ARM;BACK

## 2020-01-11 ASSESSMENT — PAIN DESCRIPTION - ORIENTATION: ORIENTATION: LEFT

## 2020-01-11 NOTE — PROGRESS NOTES
Progress Note - covering Dr. Char Oh    Subjective/   25y.o. year old male on the rehab unit for multiple trauma. No new complaints. Tolerating therapy. No SOB or chest pain. Bowel and bladder OK. Objective/   VITALS:  /80   Pulse 111   Temp 97.6 °F (36.4 °C) (Temporal)   Resp 18   Ht 5' 9\" (1.753 m)   Wt 165 lb 4.8 oz (75 kg)   SpO2 98%   BMI 24.41 kg/m²   24HR INTAKE/OUTPUT:      Intake/Output Summary (Last 24 hours) at 1/11/2020 1742  Last data filed at 1/11/2020 0730  Gross per 24 hour   Intake 720 ml   Output 1400 ml   Net -680 ml     Constitutional:  Alert, awake, no apparent distress   Cardiovascular:  S1, S2 without m/r/g   Respiratory:  CTA B without w/r/r   Abdomen: +BS  Ext: no pitting LE edema  Neuro: awake and alert, no tremor    Functional Level      Date   Status   AE    Comments     Feeding   1/8/20   Setup              Grooming   1/10/20   Mod I             Bathing   1/8/20   S/U             UB Dressing   1/8/20   S/U       MAX A (TLSO)         LB Dressing   1/8920   Mod A   Sock aide         Homemaking   1/3/20    TBA                   Functional Transfers / Balance:      Date Status DME  Comments   Sit Balance 1/9/20  SBA       Stand Balance 1/9/20  MAX A        [x]? Tub  []? Shower   Transfer 1/9/20  MAX A  Extended tub bench, dawit cane     Commode   Transfer 1/9/20  MAX A  3 in 1, dawit cane      Functional   Mobility 1/9/20  MAX A   Dawit cane      Other: bed mobility - rolling     Supine>sit          EOB <> w/c         Sit<>stand     1/11/20 1/11/20 1/11/20 1/11/20    SBA      MIN  A         Mod A        Mod A                    Dawit cane        Dawit can                    SPT      Functional Exercises / Activity:  Retrograde massage to decrease edema and increase ROM of L hand. Gentle PROM to all digits of LUE. Decreased ROM of all MP joints.    Place and holds to increase gross grasp of L hand   Dumbbell ex's with 3# wt to increase AROM and strength of RUE. 15 reps x3. Power hand gripper 35# to increase R hand strength. 50 reps        Sensory / Neuromuscular Re-Education:        Cognitive Skills:    Status Comments   Problem   Solving poor +     Memory good      Sequencing fair      Safety poor +            Scheduled Meds:   calcitonin  1 spray Alternating Nares Daily    methocarbamol  1,000 mg Oral 4x Daily    levofloxacin  500 mg Oral Daily    docusate sodium  200 mg Oral BID    diltiazem  180 mg Oral Daily    enoxaparin  30 mg Subcutaneous BID    lidocaine  1 patch Transdermal Daily    oxyCODONE  10 mg Oral 2 times per day    senna  2 tablet Oral Nightly    benztropine  1 mg Oral BID    carBAMazepine  300 mg Oral BID    mineral oil-hydrophilic petrolatum   Topical Daily    nicotine  1 patch Transdermal Daily    pantoprazole  40 mg Oral QAM AC    polyethylene glycol  17 g Oral BID    QUEtiapine  400 mg Oral BID    tamsulosin  0.4 mg Oral Daily    venlafaxine  37.5 mg Oral Daily with breakfast    vitamin D  50,000 Units Oral Weekly     Continuous Infusions:  PRN Meds:aluminum & magnesium hydroxide-simethicone, ipratropium-albuterol, oxyCODONE, hydrOXYzine, melatonin, acetaminophen, magnesium hydroxide, ammonium lactate  I/O last 3 completed shifts: In: 5 [P.O.:720]  Out: 1950 [Urine:1950]  No intake/output data recorded. Labs reviewed  CBC: No results for input(s): WBC, HGB, PLT in the last 72 hours. BMP:  No results for input(s): NA, K, CL, CO2, BUN, CREATININE, GLUCOSE in the last 72 hours. Hepatic: No results for input(s): AST, ALT, ALB, BILITOT, ALKPHOS in the last 72 hours. BNP: No results for input(s): BNP in the last 72 hours. Lipids: No results for input(s): CHOL, HDL in the last 72 hours. Invalid input(s): LDLCALCU  INR: No results for input(s): INR in the last 72 hours.     Assessment/  Patient Active Problem List:     Trauma     Injury resulting from fall from height     Closed displaced fracture of pelvis ([address]     Shock following injury ([address]     Pneumothorax, traumatic     Suicidal behavior with attempted self-injury ([address]     Respiratory failure following trauma ([address]     T12 burst fracture ([address]     Closed fracture of shaft of left humerus     Transscaphoid perilunate fracture dislocation of left wrist, open, initial encounter     Displaced fracture of left radial styloid process, initial encounter for closed fracture     Open comminuted fracture of waist of scaphoid bone of left wrist, initial encounter     Depression with suicidal ideation     Schizoaffective disorder, bipolar type ([address]     Bipolar 1 disorder ([address]     Uncontrolled hypertension     Tachycardia     Multiple trauma      Plan/  1. Continue precautions per ortho  2. Continue rehab program  3. Increase activity as able  4. Continue dvt prophylaxis  5. Continue pain control  6.  Notify ortho of his wrist pain complaints          Aleyda Valadez MD

## 2020-01-11 NOTE — PROGRESS NOTES
1/11/20 1/11/20 1/11/20 1/11/20   SBA     MIN  A       Mod A      Mod A              Dawit cane      Dawit can              SPT     Functional Exercises / Activity:  Retrograde massage to decrease edema and increase ROM of L hand. Gentle PROM to all digits of LUE. Decreased ROM of all MP joints. Place and holds to increase gross grasp of L hand   Dumbbell ex's with 3# wt to increase AROM and strength of RUE. 15 reps x3. Power hand gripper 35# to increase R hand strength. 50 reps      Sensory / Neuromuscular Re-Education:      Cognitive Skills:   Status Comments   Problem   Solving poor +    Memory good     Sequencing fair     Safety poor +      Visual Perception:    Education:  Pt. Educated on safety with transfers. [] Family teach completed on:    Pain Level: 6/10 left wrist/LUE     Additional Notes:       Patient has made fair  progress during treatment sessions toward set goals. Therapy emphasis to obtain goals:Current Treatment Recommendations: Safety Education & Training, Balance Training, Self-Care / ADL, Patient/Caregiver Education & Training, Strengthening, Functional Mobility Training, Equipment Evaluation, Education, & procurement, Home Management Training, Neuromuscular Re-education, Endurance Training**      [x] Continue with current OT Plan of care.   [] Prepare for Discharge     DISCHARGE RECOMMENDATIONS  Recommended DME:    Post Discharge Care:   []Home Independently  []Home with 24hr Care / Supervision []Home with Partial Supervision []Home with Home Health OT []Home with Out Pt OT []Other: ___   Comments:         Time in Time out Tx Time Breakdown  Variance:   First Session  [id number] [x] Individual Tx- 40mins  [] Concurrent Tx -  [] Co-Tx -   [] Group Tx -   [] Time Missed -     Second Session   [] Individual Tx-   [] Concurrent Tx -  [] Co-Tx -   [] Group Tx -   [] Time Missed -     Third Session    [] Individual Tx-   [] Concurrent Tx -  [] Co-Tx -   [] Group Tx -   [] Time Missed -         Total Tx Time: 40 mins      Chetan Neat Rising   CARTAGENA/L [id number]        I have read & agree with the above status.     Yesi Burrell OTR/L [id number]

## 2020-01-12 PROCEDURE — 6370000000 HC RX 637 (ALT 250 FOR IP): Performed by: NURSE PRACTITIONER

## 2020-01-12 PROCEDURE — 97535 SELF CARE MNGMENT TRAINING: CPT | Performed by: OCCUPATIONAL THERAPY ASSISTANT

## 2020-01-12 PROCEDURE — 97530 THERAPEUTIC ACTIVITIES: CPT

## 2020-01-12 PROCEDURE — 6370000000 HC RX 637 (ALT 250 FOR IP): Performed by: PHYSICAL MEDICINE & REHABILITATION

## 2020-01-12 PROCEDURE — 1280000000 HC REHAB R&B

## 2020-01-12 PROCEDURE — 6370000000 HC RX 637 (ALT 250 FOR IP): Performed by: INTERNAL MEDICINE

## 2020-01-12 RX ADMIN — OXYCODONE 5 MG: 5 TABLET ORAL at 21:22

## 2020-01-12 RX ADMIN — OXYCODONE HYDROCHLORIDE 10 MG: 10 TABLET, FILM COATED, EXTENDED RELEASE ORAL at 20:23

## 2020-01-12 RX ADMIN — OXYCODONE 5 MG: 5 TABLET ORAL at 09:04

## 2020-01-12 RX ADMIN — METHOCARBAMOL TABLETS 1000 MG: 500 TABLET, COATED ORAL at 20:23

## 2020-01-12 RX ADMIN — QUETIAPINE FUMARATE 400 MG: 200 TABLET ORAL at 20:28

## 2020-01-12 RX ADMIN — DOCUSATE SODIUM 200 MG: 100 CAPSULE, LIQUID FILLED ORAL at 20:23

## 2020-01-12 RX ADMIN — TAMSULOSIN HYDROCHLORIDE 0.4 MG: 0.4 CAPSULE ORAL at 08:58

## 2020-01-12 RX ADMIN — PANTOPRAZOLE SODIUM 40 MG: 40 TABLET, DELAYED RELEASE ORAL at 05:06

## 2020-01-12 RX ADMIN — OXYCODONE 5 MG: 5 TABLET ORAL at 13:04

## 2020-01-12 RX ADMIN — CALCITONIN SALMON 1 SPRAY: 200 SPRAY, METERED NASAL at 09:01

## 2020-01-12 RX ADMIN — LEVOFLOXACIN 500 MG: 500 TABLET, FILM COATED ORAL at 08:57

## 2020-01-12 RX ADMIN — DILTIAZEM HYDROCHLORIDE 180 MG: 180 CAPSULE, EXTENDED RELEASE ORAL at 08:57

## 2020-01-12 RX ADMIN — OXYCODONE HYDROCHLORIDE 10 MG: 10 TABLET, FILM COATED, EXTENDED RELEASE ORAL at 08:58

## 2020-01-12 RX ADMIN — SENNOSIDES 17.2 MG: 8.6 TABLET, FILM COATED ORAL at 20:23

## 2020-01-12 RX ADMIN — METHOCARBAMOL TABLETS 1000 MG: 500 TABLET, COATED ORAL at 08:57

## 2020-01-12 RX ADMIN — METHOCARBAMOL TABLETS 1000 MG: 500 TABLET, COATED ORAL at 13:04

## 2020-01-12 RX ADMIN — METHOCARBAMOL TABLETS 1000 MG: 500 TABLET, COATED ORAL at 17:09

## 2020-01-12 RX ADMIN — DOCUSATE SODIUM 200 MG: 100 CAPSULE, LIQUID FILLED ORAL at 08:58

## 2020-01-12 RX ADMIN — OXYCODONE 5 MG: 5 TABLET ORAL at 17:09

## 2020-01-12 RX ADMIN — OXYCODONE 5 MG: 5 TABLET ORAL at 05:06

## 2020-01-12 ASSESSMENT — PAIN DESCRIPTION - DESCRIPTORS
DESCRIPTORS: ACHING;CONSTANT;DISCOMFORT
DESCRIPTORS: ACHING;CONSTANT
DESCRIPTORS: ACHING;CONSTANT;DISCOMFORT
DESCRIPTORS: ACHING;CONSTANT;DISCOMFORT

## 2020-01-12 ASSESSMENT — PAIN DESCRIPTION - PROGRESSION
CLINICAL_PROGRESSION: GRADUALLY IMPROVING
CLINICAL_PROGRESSION: NOT CHANGED
CLINICAL_PROGRESSION: GRADUALLY IMPROVING

## 2020-01-12 ASSESSMENT — PAIN DESCRIPTION - PAIN TYPE
TYPE: ACUTE PAIN

## 2020-01-12 ASSESSMENT — PAIN DESCRIPTION - FREQUENCY
FREQUENCY: CONTINUOUS

## 2020-01-12 ASSESSMENT — PAIN SCALES - GENERAL
PAINLEVEL_OUTOF10: 6
PAINLEVEL_OUTOF10: 10
PAINLEVEL_OUTOF10: 6
PAINLEVEL_OUTOF10: 5
PAINLEVEL_OUTOF10: 0
PAINLEVEL_OUTOF10: 0
PAINLEVEL_OUTOF10: 6
PAINLEVEL_OUTOF10: 8
PAINLEVEL_OUTOF10: 7
PAINLEVEL_OUTOF10: 8
PAINLEVEL_OUTOF10: 10

## 2020-01-12 ASSESSMENT — PAIN DESCRIPTION - ORIENTATION
ORIENTATION: LEFT

## 2020-01-12 ASSESSMENT — PAIN - FUNCTIONAL ASSESSMENT
PAIN_FUNCTIONAL_ASSESSMENT: PREVENTS OR INTERFERES SOME ACTIVE ACTIVITIES AND ADLS

## 2020-01-12 ASSESSMENT — PAIN DESCRIPTION - ONSET
ONSET: ON-GOING

## 2020-01-12 ASSESSMENT — PAIN DESCRIPTION - LOCATION
LOCATION: ARM

## 2020-01-12 NOTE — PROGRESS NOTES
Physical Therapy  Daily Treatment Note  Evaluating Therapist: Kristopher Del Rosario DPT    ROOM: 8467M  DIAGNOSIS: Multiple trauma  PRECAUTIONS: Falls, spinal precautions, TLSO when OOB or HOB at or >45 degrees, NWB LUE, ex fix LUE, suicide precautions, NWB LLE, WBAT RLE with post op shoe   PROCEDURE(S): 01/1/13 Application of left wrist spanning external fixation, 12/6/19 Left posterior pelvis fracture disruption through ilium and sacroiliac joint open reduction and internal fixation,12/10/19 T10-L1 posterior lateral fusion, 12/11/19 L humerus ORIF  HPI: Pt presented to ED on 12/5/19 sustaining multiple injuries from jumping off an overpass with suicide intentions. Injuries sustained are as followed:    - Left midshaft humerus fracture, closed. - Left transscaphoid perilunate open fracture dislocation, grade 2, with extruded proximal scaphoid and lunate. - Complex 3 cm open wound, volar wrist/hand.   - Left radial styloid fracture. - Left pelvic ring fracture disruption, lateral compression type 2 pattern, with large  crescent fracture and complete fracture through ileum into the anterior superior sacroiliac joint with  comminution.   - Left anterior pelvic ring fracture, junctional rami, superiorly, and   comminuted inferior rami into symphysial body   - T12 burst fracture    Social:  Pt lives with his grandparents in a 2 floor plan with 2 steps and 1 rail to enter. Bed/bathroom is on 2nd floor with a flight of stairs and 1 rail. Prior to admission pt was independent with no AD. Initial Evaluation  1/3/20 AM     PM    Short Term Goals Long Term Goals    Was pt agreeable to Eval/treatment? yes yes      Does pt have pain?  8/10 LUE pain, 10/10 low back pain Moderate to severe LBP and LUE pain      Bed Mobility  Rolling: SBA  Supine to sit: SBA  Sit to supine: NT  Scooting: SBA to EOB Rolling: Supervision  Supine to sit: Supervision  Sit to supine: Supervision  Scooting: Supervision   Supervision Mod I   Transfers Sit to stand: Mod A  Stand to sit: Mod A  Stand pivot: Mod A no AD, hand on wheelchair Sit to stand: CGA<>Min A  Stand to sit: CGA<>Min A  Stand pivot: CGA<>Min A with hemiwalker  SBA Mod I with AAD   Ambulation   NT, unable to ambulate 15 feet with R hemicane with Dorene (NWB LLE)  100 feet with AAD with SBA >250 feet with AAD with Mod I   Walking 10 feet on uneven surface NT NT  10 feet with AAD with SBA 10 feet with AAD with Mod I   Wheel Chair Mobility  feet x1 with R extremities Supervision   400 feet with R extremities Mod I   Car Transfers NT NT  SBA Mod I   Stair negotiation: ascended and descended NT 4 steps with R rail and modified tub bench with Dorene  4 steps with 1 rail with SBA 12 steps with 1 rail with Mod I   Curb Step:   ascended and descended NT NT  4 inch step with AAD and SBA 7.5 inch step AAD with and Mod I   Picking up object off the floor NT NT  Will  an object with SBA Will  an object with Mod I   BLE ROM WFL WFL      BLE Strength RLE: 4-/5  LLE: 3+/5 RLE: 4+/5  LLE: 4/5      Balance  Sitting: SBA  Standing: Min A no AD Sitting: SBA  Static standing: CGA   Dynamic standing: CGA<>Min A with hemiwalker       Date Family Teach Completed TBA TBA      Is additional Family Teaching Needed? Y or N Yes Yes      Hindering Progress Pain, NWB LLE/LUE, psych complications Pain, NWB LLE/LUE, psych complications      PT recommended ELOS 2-3 weeks       Team's Discharge Plan        Therapist at Team Meeting          Therapeutic Exercise:   AM:   Sit<>Stand transfer x 5 reps from [facility]  Stand-pivot transfer x 6 reps with R hemicane  Supine<>Sit transfer x 2 reps  Supine PROM to B  Hips/knees to tolerance    Additional Comments: Pt continues to report significant low back pain/discomfort during PT sessions. Pt requires cueing to complete turn prior to sitting in chair Pt requires therapist assistance to steady himself while standing during stair negotiation with modified tub bench. Patient/family education  Pt/family educated on safety with functional mobility, pivot with hemiwalker, importance of progression to independence with functional mobility    Patient/family response to education:   Pt/family verbalized understanding Pt/family demonstrated skill Pt/family requires further education in this area   yes inconsistently yes     AM  Time in: 1000  Time out: 1030    Pt is making good progress toward established Physical Therapy goals. Continue with physical therapy current plan of care.     Scooter Jordan, PT, DPT   [id number]

## 2020-01-13 PROCEDURE — 6370000000 HC RX 637 (ALT 250 FOR IP): Performed by: PHYSICAL MEDICINE & REHABILITATION

## 2020-01-13 PROCEDURE — 97110 THERAPEUTIC EXERCISES: CPT

## 2020-01-13 PROCEDURE — 97535 SELF CARE MNGMENT TRAINING: CPT

## 2020-01-13 PROCEDURE — 6370000000 HC RX 637 (ALT 250 FOR IP): Performed by: INTERNAL MEDICINE

## 2020-01-13 PROCEDURE — 1280000000 HC REHAB R&B

## 2020-01-13 PROCEDURE — 6370000000 HC RX 637 (ALT 250 FOR IP): Performed by: NURSE PRACTITIONER

## 2020-01-13 PROCEDURE — 97530 THERAPEUTIC ACTIVITIES: CPT

## 2020-01-13 RX ADMIN — CALCITONIN SALMON 1 SPRAY: 200 SPRAY, METERED NASAL at 08:24

## 2020-01-13 RX ADMIN — DOCUSATE SODIUM 200 MG: 100 CAPSULE, LIQUID FILLED ORAL at 08:18

## 2020-01-13 RX ADMIN — Medication: at 09:56

## 2020-01-13 RX ADMIN — METHOCARBAMOL TABLETS 1000 MG: 500 TABLET, COATED ORAL at 12:51

## 2020-01-13 RX ADMIN — OXYCODONE 5 MG: 5 TABLET ORAL at 03:39

## 2020-01-13 RX ADMIN — OXYCODONE 5 MG: 5 TABLET ORAL at 21:36

## 2020-01-13 RX ADMIN — SENNOSIDES 17.2 MG: 8.6 TABLET, FILM COATED ORAL at 21:31

## 2020-01-13 RX ADMIN — OXYCODONE 5 MG: 5 TABLET ORAL at 17:16

## 2020-01-13 RX ADMIN — QUETIAPINE FUMARATE 400 MG: 200 TABLET ORAL at 21:31

## 2020-01-13 RX ADMIN — METHOCARBAMOL TABLETS 1000 MG: 500 TABLET, COATED ORAL at 17:16

## 2020-01-13 RX ADMIN — LEVOFLOXACIN 500 MG: 500 TABLET, FILM COATED ORAL at 08:19

## 2020-01-13 RX ADMIN — TAMSULOSIN HYDROCHLORIDE 0.4 MG: 0.4 CAPSULE ORAL at 08:18

## 2020-01-13 RX ADMIN — METHOCARBAMOL TABLETS 1000 MG: 500 TABLET, COATED ORAL at 08:19

## 2020-01-13 RX ADMIN — PANTOPRAZOLE SODIUM 40 MG: 40 TABLET, DELAYED RELEASE ORAL at 06:03

## 2020-01-13 RX ADMIN — DOCUSATE SODIUM 200 MG: 100 CAPSULE, LIQUID FILLED ORAL at 21:30

## 2020-01-13 RX ADMIN — OXYCODONE 5 MG: 5 TABLET ORAL at 08:18

## 2020-01-13 RX ADMIN — DILTIAZEM HYDROCHLORIDE 180 MG: 180 CAPSULE, EXTENDED RELEASE ORAL at 08:21

## 2020-01-13 RX ADMIN — METHOCARBAMOL TABLETS 1000 MG: 500 TABLET, COATED ORAL at 21:31

## 2020-01-13 RX ADMIN — OXYCODONE 5 MG: 5 TABLET ORAL at 12:50

## 2020-01-13 ASSESSMENT — PAIN SCALES - GENERAL
PAINLEVEL_OUTOF10: 8
PAINLEVEL_OUTOF10: 10
PAINLEVEL_OUTOF10: 0
PAINLEVEL_OUTOF10: 10
PAINLEVEL_OUTOF10: 8
PAINLEVEL_OUTOF10: 6
PAINLEVEL_OUTOF10: 8
PAINLEVEL_OUTOF10: 8

## 2020-01-13 ASSESSMENT — PAIN DESCRIPTION - PAIN TYPE
TYPE: ACUTE PAIN
TYPE: SURGICAL PAIN
TYPE: ACUTE PAIN

## 2020-01-13 ASSESSMENT — PAIN DESCRIPTION - DESCRIPTORS
DESCRIPTORS: ACHING;CONSTANT;DISCOMFORT
DESCRIPTORS: BURNING
DESCRIPTORS: ACHING;CONSTANT;DISCOMFORT
DESCRIPTORS: BURNING

## 2020-01-13 ASSESSMENT — PAIN DESCRIPTION - FREQUENCY
FREQUENCY: CONTINUOUS

## 2020-01-13 ASSESSMENT — PAIN DESCRIPTION - PROGRESSION
CLINICAL_PROGRESSION: NOT CHANGED
CLINICAL_PROGRESSION: NOT CHANGED

## 2020-01-13 ASSESSMENT — PAIN DESCRIPTION - ORIENTATION
ORIENTATION: LOWER
ORIENTATION: LEFT
ORIENTATION: LOWER
ORIENTATION: LOWER

## 2020-01-13 ASSESSMENT — PAIN DESCRIPTION - LOCATION
LOCATION: BACK
LOCATION: BACK;ARM
LOCATION: BACK
LOCATION: ARM;WRIST
LOCATION: BACK
LOCATION: BACK

## 2020-01-13 ASSESSMENT — PAIN DESCRIPTION - ONSET
ONSET: ON-GOING

## 2020-01-13 ASSESSMENT — PAIN DESCRIPTION - DIRECTION: RADIATING_TOWARDS: LUE

## 2020-01-13 NOTE — PROGRESS NOTES
Amanda Joiner is a 25 y.o. male patient.     Current Facility-Administered Medications   Medication Dose Route Frequency Provider Last Rate Last Dose    nicotine (NICODERM CQ) 7 MG/24HR 1 patch  1 patch Transdermal Daily Vandana Conti MD        aluminum & magnesium hydroxide-simethicone (MAALOX) 200-200-20 MG/5ML suspension 30 mL  30 mL Oral Q4H PRN Vandana Conti MD   30 mL at 01/09/20 1735    calcitonin (MIACALCIN) nasal spray 1 spray  1 spray Alternating Nares Daily Vandana Conti MD   1 spray at 01/12/20 0901    methocarbamol (ROBAXIN) tablet 1,000 mg  1,000 mg Oral 4x Daily Adrian Funes MD   1,000 mg at 01/12/20 2023    ipratropium-albuterol (DUONEB) nebulizer solution 1 ampule  1 ampule Inhalation Q4H PRN Vandana Conti MD        levofloxacin (LEVAQUIN) tablet 500 mg  500 mg Oral Daily Vandana Conti MD   500 mg at 01/12/20 0857    oxyCODONE (ROXICODONE) immediate release tablet 5 mg  5 mg Oral Q4H PRN Vandana Conti MD   5 mg at 01/13/20 0339    docusate sodium (COLACE) capsule 200 mg  200 mg Oral BID Magdi Pappas, DO   200 mg at 01/12/20 2023    diltiazem (CARDIZEM CD) extended release capsule 180 mg  180 mg Oral Daily Magdi Pappas, DO   180 mg at 01/12/20 0857    enoxaparin (LOVENOX) injection 30 mg  30 mg Subcutaneous BID Magdi Pappas, DO   30 mg at 01/11/20 2041    lidocaine 4 % external patch 1 patch  1 patch Transdermal Daily Magdi REECE Volmihir, DO   1 patch at 01/12/20 0858    senna (SENOKOT) tablet 17.2 mg  2 tablet Oral Nightly Magdi Pappas, DO   17.2 mg at 01/12/20 2023    hydrOXYzine (VISTARIL) capsule 50 mg  50 mg Oral TID PRN Helene Hyman, APRN - CNP   50 mg at 01/04/20 2103    benztropine (COGENTIN) tablet 1 mg  1 mg Oral BID Helene Hyman, APRN - CNP   1 mg at 01/07/20 1005    carBAMazepine (TEGRETOL XR) extended release tablet 300 mg  300 mg Oral BID Helene Hyman, APRN - CNP   300 mg at 01/06/20 1006    melatonin tablet 3 mg  3 mg Oral Nightly PRN Helene Dallas, APRN - CNP   3 mg at 01/07/20 2148    mineral oil-hydrophilic petrolatum (HYDROPHOR) ointment   Topical Daily [facility], APRN - CNP        pantoprazole (PROTONIX) tablet 40 mg  40 mg Oral QAM AC [facility], APRN - CNP   40 mg at 01/13/20 0603    polyethylene glycol (GLYCOLAX) packet 17 g  17 g Oral BID Osvaldo PETRONA PerezN - CNP   17 g at 01/08/20 0831    QUEtiapine (SEROQUEL) tablet 400 mg  400 mg Oral BID [facility], APRN - CNP   400 mg at 01/12/20 2028    tamsulosin (FLOMAX) capsule 0.4 mg  0.4 mg Oral Daily [facility], APRN - CNP   0.4 mg at 01/12/20 0858    venlafaxine (EFFEXOR XR) extended release capsule 37.5 mg  37.5 mg Oral Daily with breakfast [facility] Perez - CNP   37.5 mg at 01/10/20 0756    vitamin D (ERGOCALCIFEROL) capsule 50,000 Units  50,000 Units Oral Weekly [facility] MICH Perez - CNP   50,000 Units at 01/09/20 7285    acetaminophen (TYLENOL) tablet 650 mg  650 mg Oral Q4H PRN Gabriela Jackson MD   650 mg at 01/07/20 2022    magnesium hydroxide (MILK OF MAGNESIA) 400 MG/5ML suspension 30 mL  30 mL Oral Daily PRN Gabriela Jackson MD        ammonium lactate (LAC-HYDRIN) 12 % lotion   Topical PRN Gabriela Jackson MD         Allergies   Allergen Reactions    Depakote [Valproic Acid] Other (See Comments)     Feels bloated     Haldol [Haloperidol] Other (See Comments)     Intolerable side efffects    Invega Sustenna [Paliperidone Palmitate Er] Hives    Invega [Paliperidone] Hives     Active Problems:    Multiple trauma  Resolved Problems:    * No resolved hospital problems. *    Blood pressure (!) 161/91, pulse 118, temperature 98.2 °F (36.8 °C), temperature source Temporal, resp. rate 18, height 5' 9\" (1.753 m), weight 163 lb 9.6 oz (74.2 kg), SpO2 98 %. Subjective:  Symptoms:  Stable. He reports weakness. Diet:  Adequate intake. Activity level: Impaired due to weakness. Pain:  He complains of pain that is mild. Objective:  General Appearance: In no acute distress. Vital signs: (most recent): Blood pressure (!) 161/91, pulse 118, temperature 98.2 °F (36.8 °C), temperature source Temporal, resp. rate 18, height 5' 9\" (1.753 m), weight 163 lb 9.6 oz (74.2 kg), SpO2 98 %. Vital signs are normal.    Output: Producing urine and producing stool. Lungs:  Normal effort and normal respiratory rate. Breath sounds clear to auscultation. Heart: Normal rate. Regular rhythm. S1 normal and S2 normal.    Abdomen: Abdomen is soft. Bowel sounds are normal.   There is no abdominal tenderness. Extremities: Decreased range of motion. Neurological: Patient is alert. (4/5 str).   Initial Evaluation  1/3/20 AM     PM    Short Term Goals Long Term Goals    Was pt agreeable to Eval/treatment? yes yes         Does pt have pain? 8/10 LUE pain, 10/10 low back pain Moderate to severe LBP and LUE pain         Bed Mobility  Rolling: SBA  Supine to sit: SBA  Sit to supine: NT  Scooting: SBA to EOB Rolling: Supervision  Supine to sit: Supervision  Sit to supine: Supervision  Scooting: Supervision    Supervision Mod I   Transfers Sit to stand: Mod A  Stand to sit: Mod A  Stand pivot:  Mod A no AD, hand on wheelchair Sit to stand: CGA<>Min A  Stand to sit: CGA<>Min A  Stand pivot: CGA<>Min A with hemiwalker   SBA Mod I with AAD   Ambulation   NT, unable to ambulate 15 feet with R hemicane with Dorene (NWB LLE)   100 feet with AAD with SBA >250 feet with AAD with Mod I   Walking 10 feet on uneven surface NT NT   10 feet with AAD with SBA 10 feet with AAD with Mod I   Wheel Chair Mobility  feet x1 with R extremities Supervision     400 feet with R extremities Mod I   Car Transfers NT NT   SBA Mod I   Stair negotiation: ascended and descended NT 4 steps with R rail and modified tub bench with Dorene   4 steps with 1 rail with SBA 12 steps with 1 rail with Mod I   Curb Step:   ascended and descended NT NT   4 inch step with AAD and SBA 7.5 inch step AAD with and Mod I   Picking up object off the floor NT NT   Will  an object with SBA Will  an object with Mod I   BLE ROM WFL WFL         BLE Strength RLE: 4-/5  LLE: 3+/5 RLE: 4+/5  LLE: 4/5           Assessment:    Condition: In stable condition. Improving.   (Multiple trauma). Plan:   Encourage ambulation. (Improving in therapy  Swelling in hand is improved  Pain is improved  I am going to stop the OxyContin  Decrease the NicoDerm patch).        Misael Torres MD  1/13/2020

## 2020-01-13 NOTE — PROGRESS NOTES
MOD A   Extended tub bench, dawit cane Pt requiring assist to descend to seated, and to bring L LE into tub    Commode   Transfer 1/9/20  MAX A  3 in 1, dawit cane     Functional   Mobility 1/9/20  MAX A   Dawit cane     Other: bed mobility - rolling      Supine>sit          EOB <> w/c       Sit<>stand    1/13/20 1/13/20 1/13/20 1/13/20   SBA       MIN  A         Mod A      Mod A                  Dawit cane      Dawit can    demo'd during UE dressing/bathing tasks     V/c's for hand placement and technique following spinal precautions     Pr requiring assist to maintain balance during SPT     Pt requiring assist to push from surface to come to complete standing position      Functional Exercises / Activity:  Pt engaged in R UE ex's ergometer 5 minutes forward/backward focusing on UE strength/endurance to increase independence with transfers and ADL tasks; fair results     Sensory / Neuromuscular Re-Education:      Cognitive Skills:   Status Comments   Problem   Solving fair-    Memory good     Sequencing fair     Safety poor + demo'd during transfers requiring v/c's for weight bearing      Visual Perception:    Education:  Pt educated with regards to functional transfer safety/technique, UE/LE dressing     [x] Family teach completed on: 1/13/2020: pt and grandmother present participated/observed in tub transfer, bed mobility, LE dressing techniques. Pt/grandmother demo'd fair understanding of safety techniques during transfers. Pain Level: 6/10 left wrist/LUE     Additional Notes:       Patient has made fair  progress during treatment sessions toward set goals.  Therapy emphasis to obtain goals:Current Treatment Recommendations: Safety Education & Training, Balance Training, Self-Care / ADL, Patient/Caregiver Education & Training, Strengthening, Functional Mobility Training, Equipment Evaluation, Education, & procurement, Home Management Training, Neuromuscular Re-education, Endurance Training** [x] Continue with current OT Plan of care. [] Prepare for Discharge     DISCHARGE RECOMMENDATIONS  Recommended DME:    Post Discharge Care:   []Home Independently  []Home with 24hr Care / Supervision []Home with Partial Supervision []Home with Home Health OT []Home with Out Pt OT []Other: ___   Comments:         Time in Time out Tx Time Breakdown  Variance:   First Session  0915 1000 [x] Individual Tx- 45 mins  [] Concurrent Tx -  [] Co-Tx -   [] Group Tx -   [] Time Missed -     Second Session [id number] [x] Individual Tx-  45 min  [] Concurrent Tx -  [] Co-Tx -   [] Group Tx -   [] Time Missed -     Third Session    [] Individual Tx-   [] Concurrent Tx -  [] Co-Tx -   [] Group Tx -   [] Time Missed -         Total Tx Time:  90 mins      Angus Reyes [id number]  I have read the above note and agree with the documentation.   Matthieu Pruett OTR/L [id number]

## 2020-01-13 NOTE — PROGRESS NOTES
Physical Therapy  Daily Treatment Note  Evaluating Therapist: Matheus Romo DPT    ROOM: [facility]  DIAGNOSIS: Multiple trauma  PRECAUTIONS: Falls, spinal precautions, TLSO when OOB or HOB at or >45 degrees, NWB LUE, ex fix LUE, suicide precautions, NWB LLE, WBAT RLE with post op shoe   PROCEDURE(S): 68/6/57 Application of left wrist spanning external fixation, 12/6/19 Left posterior pelvis fracture disruption through ilium and sacroiliac joint open reduction and internal fixation,12/10/19 T10-L1 posterior lateral fusion, 12/11/19 L humerus ORIF  HPI: Pt presented to ED on 12/5/19 sustaining multiple injuries from jumping off an overpass with suicide intentions. Injuries sustained are as followed:    - Left midshaft humerus fracture, closed. - Left transscaphoid perilunate open fracture dislocation, grade 2, with extruded proximal scaphoid and lunate. - Complex 3 cm open wound, volar wrist/hand.   - Left radial styloid fracture. - Left pelvic ring fracture disruption, lateral compression type 2 pattern, with large  crescent fracture and complete fracture through ileum into the anterior superior sacroiliac joint with  comminution.   - Left anterior pelvic ring fracture, junctional rami, superiorly, and   comminuted inferior rami into symphysial body   - T12 burst fracture    Social:  Pt lives with his grandparents in a 2 floor plan with 2 steps and 1 rail to enter. Bed/bathroom is on 2nd floor with a flight of stairs and 1 rail. Prior to admission pt was independent with no AD. Initial Evaluation  1/3/20 AM     PM    Short Term Goals Long Term Goals    Was pt agreeable to Eval/treatment? yes yes yes     Does pt have pain?  8/10 LUE pain, 10/10 low back pain 10/10 LBP  Moderate to severe LBP and LUE pain     Bed Mobility  Rolling: SBA  Supine to sit: SBA  Sit to supine: NT  Scooting: SBA to EOB Rolling: Supervision  Supine to sit: Supervision  Sit to supine: Supervision  Scooting: Supervision  Rolling:

## 2020-01-13 NOTE — PROGRESS NOTES
Physical Therapy  Weekly Team Note  Evaluating Therapist: Sergey Hughes DPT    ROOM: [facility]  DIAGNOSIS: Multiple trauma  PRECAUTIONS: Falls, spinal precautions, TLSO when OOB or HOB at or >45 degrees, NWB LUE, ex fix LUE, suicide precautions, NWB LLE, WBAT RLE with post op shoe   PROCEDURE(S): 20/6/16 Application of left wrist spanning external fixation, 12/6/19 Left posterior pelvis fracture disruption through ilium and sacroiliac joint open reduction and internal fixation,12/10/19 T10-L1 posterior lateral fusion, 12/11/19 L humerus ORIF  HPI: Pt presented to ED on 12/5/19 sustaining multiple injuries from jumping off an overpass with suicide intentions. Injuries sustained are as followed:    - Left midshaft humerus fracture, closed. - Left transscaphoid perilunate open fracture dislocation, grade 2, with extruded proximal scaphoid and lunate. - Complex 3 cm open wound, volar wrist/hand.   - Left radial styloid fracture. - Left pelvic ring fracture disruption, lateral compression type 2 pattern, with large  crescent fracture and complete fracture through ileum into the anterior superior sacroiliac joint with  comminution.   - Left anterior pelvic ring fracture, junctional rami, superiorly, and   comminuted inferior rami into symphysial body   - T12 burst fracture    Social:  Pt lives with his grandparents in a 2 floor plan with 2 steps and 1 rail to enter. Bed/bathroom is on 2nd floor with a flight of stairs and 1 rail. Prior to admission pt was independent with no AD. Initial Evaluation  1/3/20 1/6/20    1/13/20 Comments    Short Term Goals Long Term Goals    Bed Mobility  Rolling: SBA  Supine to sit: SBA  Sit to supine: NT  Scooting: SBA to EOB Rolling: Supervision  Supine to sit: Supervision  Sit to supine: NT  Scooting: Supervision  Supervision all components  Supervision Mod I   Transfers Sit to stand: Mod A  Stand to sit: Mod A  Stand pivot: Mod A no AD, hand on wheelchair Sit to stand:  Mod A  Stand PLOF  Barriers to discharge: Pain, NWB LLE/LUE, psych complications  Additional comments:  Pt has been experiencing moderate to severe LBP throughout sessions. Pt has been progressing ambulation and has begun using standard hemiwalker to accommodate NWB LUE and MWB LLE. Pt has remained compliant with NWB LLE inconsistently during ambulation and pivots despite verbal and tactile cueing. Pt reports that he is not putting any weight down on his LLE during mobility but he is unable to hold it in the air to assure he is not pushing through it. Pt requires assistance in donning/doffing TLSO. Pain appears to be pt's limiting factor at this point in time. DME:  TBD  After Care: Outpatient PT  Faisal Peresite, [facility] [address] [place] [address]  [id number]    Date:  1/13/20  Supporting factors:  Family support, young age and high PLOF  Barriers to discharge: Pain, NWB LLE/LUE, psych complications  Additional comments:  Pt is only able to ambulate short distances while maintaining NWB LLE/LUE. Pt continues to have significant levels of LBP and L wrist/forearm pain. Pt is impulsive at times and has poor safety awareness. Pt has progressed in functional mobility but continues to have unsteadiness and poor sequencing with activities, thus requiring CGA. Pt occasionally has a LOB due to his R knee buckling. Pt and pt's grandmother were educated on adopting a first floor set up for pt on the couch with a bedside commode until pt's functional mobility improves as he has difficulty and fatigue performing just 4 steps with a tub bench when he would need to negotiate 12+ to get up and down to his bed/bathroom. Pt has been utilizing hemiwalker to increase stability and accommodate to NWB LLE/LUE. At this time it is necessary for pt to go home with a wheelchair due to his limited ambulation distance, impaired endurance/strength, and unsteadiness with functional mobility. Pt's grandmother reports pt has a court date next week.    DME:  Hemiwalker, wheelchair, tub bench for stair negotiation, would benefit from a bedside commode for first floor set up  After Care:  Outpatient PT once NWB precautions are removed  Mary Malik DPT  [id number]

## 2020-01-14 PROCEDURE — 1280000000 HC REHAB R&B

## 2020-01-14 PROCEDURE — 6370000000 HC RX 637 (ALT 250 FOR IP): Performed by: NURSE PRACTITIONER

## 2020-01-14 PROCEDURE — 97535 SELF CARE MNGMENT TRAINING: CPT

## 2020-01-14 PROCEDURE — 6370000000 HC RX 637 (ALT 250 FOR IP): Performed by: PHYSICAL MEDICINE & REHABILITATION

## 2020-01-14 PROCEDURE — 97110 THERAPEUTIC EXERCISES: CPT

## 2020-01-14 PROCEDURE — 6370000000 HC RX 637 (ALT 250 FOR IP): Performed by: INTERNAL MEDICINE

## 2020-01-14 PROCEDURE — 97530 THERAPEUTIC ACTIVITIES: CPT

## 2020-01-14 RX ADMIN — LEVOFLOXACIN 500 MG: 500 TABLET, FILM COATED ORAL at 08:15

## 2020-01-14 RX ADMIN — DOCUSATE SODIUM 200 MG: 100 CAPSULE, LIQUID FILLED ORAL at 08:16

## 2020-01-14 RX ADMIN — METHOCARBAMOL TABLETS 1000 MG: 500 TABLET, COATED ORAL at 16:33

## 2020-01-14 RX ADMIN — OXYCODONE 5 MG: 5 TABLET ORAL at 08:16

## 2020-01-14 RX ADMIN — DOCUSATE SODIUM 200 MG: 100 CAPSULE, LIQUID FILLED ORAL at 20:21

## 2020-01-14 RX ADMIN — CALCITONIN SALMON 1 SPRAY: 200 SPRAY, METERED NASAL at 08:15

## 2020-01-14 RX ADMIN — QUETIAPINE FUMARATE 400 MG: 200 TABLET ORAL at 20:21

## 2020-01-14 RX ADMIN — SENNOSIDES 17.2 MG: 8.6 TABLET, FILM COATED ORAL at 20:21

## 2020-01-14 RX ADMIN — OXYCODONE 5 MG: 5 TABLET ORAL at 16:33

## 2020-01-14 RX ADMIN — METHOCARBAMOL TABLETS 1000 MG: 500 TABLET, COATED ORAL at 20:21

## 2020-01-14 RX ADMIN — TAMSULOSIN HYDROCHLORIDE 0.4 MG: 0.4 CAPSULE ORAL at 08:16

## 2020-01-14 RX ADMIN — METHOCARBAMOL TABLETS 1000 MG: 500 TABLET, COATED ORAL at 08:16

## 2020-01-14 RX ADMIN — OXYCODONE 5 MG: 5 TABLET ORAL at 03:25

## 2020-01-14 RX ADMIN — DILTIAZEM HYDROCHLORIDE 180 MG: 180 CAPSULE, EXTENDED RELEASE ORAL at 08:15

## 2020-01-14 RX ADMIN — METHOCARBAMOL TABLETS 1000 MG: 500 TABLET, COATED ORAL at 12:10

## 2020-01-14 RX ADMIN — OXYCODONE 5 MG: 5 TABLET ORAL at 12:13

## 2020-01-14 RX ADMIN — OXYCODONE 5 MG: 5 TABLET ORAL at 20:21

## 2020-01-14 ASSESSMENT — PAIN DESCRIPTION - PROGRESSION
CLINICAL_PROGRESSION: NOT CHANGED

## 2020-01-14 ASSESSMENT — PAIN SCALES - GENERAL
PAINLEVEL_OUTOF10: 10
PAINLEVEL_OUTOF10: 0
PAINLEVEL_OUTOF10: 8
PAINLEVEL_OUTOF10: 8
PAINLEVEL_OUTOF10: 5
PAINLEVEL_OUTOF10: 8
PAINLEVEL_OUTOF10: 8
PAINLEVEL_OUTOF10: 10
PAINLEVEL_OUTOF10: 9

## 2020-01-14 ASSESSMENT — PAIN DESCRIPTION - ONSET
ONSET: ON-GOING
ONSET: ON-GOING

## 2020-01-14 ASSESSMENT — PAIN DESCRIPTION - PAIN TYPE
TYPE: ACUTE PAIN
TYPE: ACUTE PAIN

## 2020-01-14 ASSESSMENT — PAIN DESCRIPTION - ORIENTATION
ORIENTATION: LOWER
ORIENTATION: LOWER

## 2020-01-14 ASSESSMENT — PAIN DESCRIPTION - LOCATION
LOCATION: BACK
LOCATION: BACK

## 2020-01-14 ASSESSMENT — PAIN DESCRIPTION - FREQUENCY
FREQUENCY: CONTINUOUS
FREQUENCY: CONTINUOUS

## 2020-01-14 ASSESSMENT — PAIN DESCRIPTION - DESCRIPTORS
DESCRIPTORS: CONSTANT;DISCOMFORT
DESCRIPTORS: ACHING;CONSTANT;DISCOMFORT

## 2020-01-14 NOTE — PROGRESS NOTES
Nathaneil Seip is a 25 y.o. male patient.     Current Facility-Administered Medications   Medication Dose Route Frequency Provider Last Rate Last Dose    nicotine (NICODERM CQ) 7 MG/24HR 1 patch  1 patch Transdermal Daily Malaika Cespedes MD        aluminum & magnesium hydroxide-simethicone (MAALOX) 200-200-20 MG/5ML suspension 30 mL  30 mL Oral Q4H PRN Malaika Cespedes MD   30 mL at 01/09/20 1735    calcitonin (MIACALCIN) nasal spray 1 spray  1 spray Alternating Nares Daily Malaika Cespedes MD   1 spray at 01/13/20 0824    methocarbamol (ROBAXIN) tablet 1,000 mg  1,000 mg Oral 4x Daily Malaika Cespedes MD   1,000 mg at 01/13/20 2131    ipratropium-albuterol (DUONEB) nebulizer solution 1 ampule  1 ampule Inhalation Q4H PRN Malaika Cespedes MD        levofloxacin (LEVAQUIN) tablet 500 mg  500 mg Oral Daily Malaika Cespedes MD   500 mg at 01/13/20 0819    oxyCODONE (ROXICODONE) immediate release tablet 5 mg  5 mg Oral Q4H PRN Malaika Cespedes MD   5 mg at 01/14/20 0325    docusate sodium (COLACE) capsule 200 mg  200 mg Oral BID Magdi Pappas, DO   200 mg at 01/13/20 2130    diltiazem (CARDIZEM CD) extended release capsule 180 mg  180 mg Oral Daily Magdi Pappas, DO   180 mg at 01/13/20 5260    enoxaparin (LOVENOX) injection 30 mg  30 mg Subcutaneous BID Magdi Pappas, DO   30 mg at 01/11/20 2041    lidocaine 4 % external patch 1 patch  1 patch Transdermal Daily Magdi REECE Volmihir, DO   1 patch at 01/13/20 0818    senna (SENOKOT) tablet 17.2 mg  2 tablet Oral Nightly Magdi Pappas, DO   17.2 mg at 01/13/20 2131    hydrOXYzine (VISTARIL) capsule 50 mg  50 mg Oral TID PRN Helene Hyman, APRN - CNP   50 mg at 01/04/20 2103    benztropine (COGENTIN) tablet 1 mg  1 mg Oral BID Helenebea Hyman, APRN - CNP   1 mg at 01/07/20 1005    carBAMazepine (TEGRETOL XR) extended release tablet 300 mg  300 mg Oral BID Helene Hyman, APRN - CNP   300 mg at 01/06/20 1006    melatonin tablet 3 mg  3 mg Oral Nightly PRN Helene Vital signs: (most recent): Blood pressure (!) 149/92, pulse 114, temperature 98.3 °F (36.8 °C), temperature source Oral, resp. rate 18, height 5' 9\" (1.753 m), weight 163 lb 9.6 oz (74.2 kg), SpO2 98 %. Vital signs are normal.    Output: Producing urine and producing stool. Lungs:  Normal effort and normal respiratory rate. Breath sounds clear to auscultation. Heart: Normal rate. Regular rhythm. S1 normal and S2 normal.    Abdomen: Abdomen is soft. Bowel sounds are normal.   There is no abdominal tenderness. Extremities: Decreased range of motion. There is deformity. Neurological: Patient is alert. (4/5 L). Assessment:    Condition: In stable condition. Improving.   (Multiple trauma). Plan:   Encourage ambulation. (Tolerating therapy well  He is upset about being taking off of OxyContin  He should be able to manage with just the oxycodone for pain  He is still refusing his psych meds  Will review in team today).        Cuco Darden MD  1/14/2020

## 2020-01-14 NOTE — PROGRESS NOTES
stand: Mod A  Stand to sit: Mod A  Stand pivot: Mod A no AD, hand on wheelchair Sit to stand: min/cga  Stand to sit:min/cga  Stand pivot: Min A with fabienne cane   SBA Mod I with AAD   Ambulation   NT, unable to ambulate NT  100 feet with AAD with SBA >250 feet with AAD with Mod I   Walking 10 feet on uneven surface NT NT  10 feet with AAD with SBA 10 feet with AAD with Mod I   Wheel Chair Mobility NT NT   400 feet with R extremities Mod I   Car Transfers NT NT  SBA Mod I   Stair negotiation: ascended and descended NT NT  4 steps with 1 rail with SBA 12 steps with 1 rail with Mod I   Curb Step:   ascended and descended NT NT  4 inch step with AAD and SBA 7.5 inch step AAD with and Mod I   Picking up object off the floor NT NT  Will  an object with SBA Will  an object with Mod I   BLE ROM WFL WFL      BLE Strength RLE: 4-/5  LLE: 3+/5 RLE: 4+/5  LLE: 4/5      Balance  Sitting: SBA  Standing: Min A no AD Sitting: SBA  Static standing: CGA   Dynamic standing: CGA<>Min A with hemiwalker       Date Family Teach Completed TBA 1/13/20 Grandma hands on      Is additional Family Teaching Needed? Y or N Yes Yes      Hindering Progress Pain, NWB LLE/LUE, psych complications Pain, NWB LLE/LUE, psych complications      PT recommended ELOS 2-3 weeks       Team's Discharge Plan        Therapist at Team Meeting          Therapeutic Exercise:   AM:   - BLE HS stretching x3 minutes/side  - BLE PROM to tolerance x5 minutes  - SAQ 2x15    Additional Comments: Pt arrived late due to not being ready for therapy and nursing notified. Increased difficulty with bed mobility. Verbal cues for increased safety with sit <>stand transfer and stand pivot.      Patient/family education  Pt/family educated on safety with functional mobility     Patient/family response to education:   Pt/family verbalized understanding Pt/family demonstrated skill Pt/family requires further education in this area   x inconsistently x     AM  Time in:

## 2020-01-14 NOTE — PROGRESS NOTES
Commode   Transfer 1/9/20  MAX A  3 in 1, dawit cane     Functional   Mobility 1/9/20  MAX A   Dawit cane     Other: bed mobility - rolling      Supine>sit          EOB <> w/c       Sit<>stand    1/14/20 1/14/20 1/14/20 1/14/20   SBA       MIN  A         Mod A      Mod A                  Dawit cane      Dawit can; HHA    demo'd during UE dressing/bathing tasks     V/c's for hand placement and technique following spinal precautions     Pr requiring assist to maintain balance during SPT     Pt requiring assist to push from surface to come to complete standing position      Functional Exercises / Activity:  Pt engaged in leisure activity seated/standing at high/low table focusing on static/dynamic standing tolerance and balance with fair- results requiring MOD A to maintain standing balance without R UE support: MIN A with R UE support; Pt seated participated in leisure activity focusing on R UE ROM reaching across midline, forward to increase independence with ADL tasks; good results; Pt seated completed R UE ex's focusing on strength/endurance to increase independence with transfers/mobility and ADL tasks;   3# dumb bell in all planes 3 x 10; Min resistant can do bar 1 x 15 reps;     L UE PROM digits 1-5 flexion/extension 3 x 10 reps focusing on joint protection/ROM, edema management to increase independence with ADL tasks; fair tolerance increased from previous ROM ex's, fair results; Sensory / Neuromuscular Re-Education:      Cognitive Skills:   Status Comments   Problem   Solving fair-    Memory good     Sequencing fair     Safety poor + demo'd during transfers requiring v/c's for weight bearing      Visual Perception:    Education:  Pt educated with regards to functional transfer safety/technique, UE/LE dressing     [x] Family teach completed on: 1/13/2020: pt and grandmother present participated/observed in tub transfer, bed mobility, LE dressing techniques.  Pt/grandmother demo'd fair understanding of safety techniques during transfers. Pain Level: 6/10 left wrist/LUE     Additional Notes:       Patient has made fair  progress during treatment sessions toward set goals. Therapy emphasis to obtain goals:Current Treatment Recommendations: Safety Education & Training, Balance Training, Self-Care / ADL, Patient/Caregiver Education & Training, Strengthening, Functional Mobility Training, Equipment Evaluation, Education, & procurement, Home Management Training, Neuromuscular Re-education, Endurance Training**      [x] Continue with current OT Plan of care. [] Prepare for Discharge     DISCHARGE RECOMMENDATIONS  Recommended DME:    Post Discharge Care:   []Home Independently  []Home with 24hr Care / Supervision []Home with Partial Supervision []Home with Home Health OT []Home with Out Pt OT []Other: ___   Comments:         Time in Time out Tx Time Breakdown  Variance:   First Session  [phone] [x] Individual Tx- 55 mins  [] Concurrent Tx -  [] Co-Tx -   [] Group Tx -   [] Time Missed -     Second Session [phone] [x] Individual Tx-  45 min  [] Concurrent Tx -  [] Co-Tx -   [] Group Tx -   [] Time Missed -     Third Session    [] Individual Tx-   [] Concurrent Tx -  [] Co-Tx -   [] Group Tx -   [] Time Missed -         Total Tx Time: 100 mins      Angus Reyes [id number]  I have read the above note and agree with the documentation.   Terrance Sheets OTR/L [id number]

## 2020-01-14 NOTE — PATIENT CARE CONFERENCE
dressing goal: standby assist  Long term lower body dressing goal: standby assist    Toilet transfer:   Current level: max assist  Short term toilet transfer goal: min assist  Long term toilet transfer goal: min assist      Safety awareness: poor +    Comments: participate in recreation therapy    Patient/family's personal goals: go home  Factors supporting goal achievement:  young, making gains  Factors hindering goal achievement:  refusing psyche meds, wt. bearing restrictions on the left upper and left lower    Discharge Plan   Estimated Length of Stay: 1/23            Destination: home with grandparents  Services at Discharge: home with home health for therapies, nursing, aide  Equipment at Discharge: Hospital bed, bedside commode, hemicane,  tub bench      INTERDISCIPLINARY TEAM/PHYSICIAN RECOMMENDATION AND/OR REVISIONS OF PLAN OF CARE:  continue education on wt. bearing restrictions, additional family education with grandparents      I approve the established interdisciplinary plan of care as documented within the medical record of Sarahy Constantino. Electronically signed by Margret Hernandez RN  on 1/14/2020 at 9:02 AM  The following interdisciplinary team members were present:  ANNA Gar RN Verlie Hylan, MSSA,LSW  Eriberto Katz.  Jeff, PT, DPT  KARINA Meyers

## 2020-01-14 NOTE — PROGRESS NOTES
safety awareness impairments that are met with cues/education from PT. Pt had difficulty maintaining balance during single leg stance holds as he would continuously reach for R parallel bar to steady himself. Pt will continue to benefit from skilled PT services to improve safety with functional mobility. Patient/family education  Pt/family educated on safety with functional mobility, pivot with hemiwalker, importance of progression to independence with functional mobility    Patient/family response to education:   Pt/family verbalized understanding Pt/family demonstrated skill Pt/family requires further education in this area   yes inconsistently yes     PM  Time in: 1515  Time out: 1600    Pt is making good progress toward established Physical Therapy goals. Continue with physical therapy current plan of care.     Lamberto Pearson DPT  [id number]

## 2020-01-15 PROCEDURE — 6370000000 HC RX 637 (ALT 250 FOR IP): Performed by: NURSE PRACTITIONER

## 2020-01-15 PROCEDURE — 97535 SELF CARE MNGMENT TRAINING: CPT | Performed by: OCCUPATIONAL THERAPY ASSISTANT

## 2020-01-15 PROCEDURE — 97535 SELF CARE MNGMENT TRAINING: CPT

## 2020-01-15 PROCEDURE — 97530 THERAPEUTIC ACTIVITIES: CPT

## 2020-01-15 PROCEDURE — 97110 THERAPEUTIC EXERCISES: CPT

## 2020-01-15 PROCEDURE — 97530 THERAPEUTIC ACTIVITIES: CPT | Performed by: OCCUPATIONAL THERAPY ASSISTANT

## 2020-01-15 PROCEDURE — 6370000000 HC RX 637 (ALT 250 FOR IP): Performed by: PHYSICAL MEDICINE & REHABILITATION

## 2020-01-15 PROCEDURE — 6370000000 HC RX 637 (ALT 250 FOR IP): Performed by: INTERNAL MEDICINE

## 2020-01-15 PROCEDURE — 1280000000 HC REHAB R&B

## 2020-01-15 PROCEDURE — 97110 THERAPEUTIC EXERCISES: CPT | Performed by: OCCUPATIONAL THERAPY ASSISTANT

## 2020-01-15 RX ADMIN — DOCUSATE SODIUM 200 MG: 100 CAPSULE, LIQUID FILLED ORAL at 08:46

## 2020-01-15 RX ADMIN — OXYCODONE 5 MG: 5 TABLET ORAL at 06:48

## 2020-01-15 RX ADMIN — METHOCARBAMOL TABLETS 1000 MG: 500 TABLET, COATED ORAL at 08:47

## 2020-01-15 RX ADMIN — METHOCARBAMOL TABLETS 1000 MG: 500 TABLET, COATED ORAL at 14:00

## 2020-01-15 RX ADMIN — CALCITONIN SALMON 1 SPRAY: 200 SPRAY, METERED NASAL at 08:50

## 2020-01-15 RX ADMIN — SENNOSIDES 17.2 MG: 8.6 TABLET, FILM COATED ORAL at 21:05

## 2020-01-15 RX ADMIN — METHOCARBAMOL TABLETS 1000 MG: 500 TABLET, COATED ORAL at 21:05

## 2020-01-15 RX ADMIN — OXYCODONE 5 MG: 5 TABLET ORAL at 11:09

## 2020-01-15 RX ADMIN — OXYCODONE 5 MG: 5 TABLET ORAL at 16:45

## 2020-01-15 RX ADMIN — DOCUSATE SODIUM 200 MG: 100 CAPSULE, LIQUID FILLED ORAL at 21:05

## 2020-01-15 RX ADMIN — OXYCODONE 5 MG: 5 TABLET ORAL at 21:08

## 2020-01-15 RX ADMIN — METHOCARBAMOL TABLETS 1000 MG: 500 TABLET, COATED ORAL at 16:41

## 2020-01-15 RX ADMIN — OXYCODONE 5 MG: 5 TABLET ORAL at 02:32

## 2020-01-15 RX ADMIN — DILTIAZEM HYDROCHLORIDE 180 MG: 180 CAPSULE, EXTENDED RELEASE ORAL at 08:48

## 2020-01-15 RX ADMIN — PANTOPRAZOLE SODIUM 40 MG: 40 TABLET, DELAYED RELEASE ORAL at 06:48

## 2020-01-15 ASSESSMENT — PAIN DESCRIPTION - DESCRIPTORS
DESCRIPTORS: ACHING

## 2020-01-15 ASSESSMENT — PAIN DESCRIPTION - PROGRESSION
CLINICAL_PROGRESSION: NOT CHANGED
CLINICAL_PROGRESSION: NOT CHANGED

## 2020-01-15 ASSESSMENT — PAIN DESCRIPTION - ONSET
ONSET: ON-GOING

## 2020-01-15 ASSESSMENT — PAIN DESCRIPTION - PAIN TYPE
TYPE: ACUTE PAIN

## 2020-01-15 ASSESSMENT — PAIN DESCRIPTION - FREQUENCY
FREQUENCY: CONTINUOUS

## 2020-01-15 ASSESSMENT — PAIN SCALES - GENERAL
PAINLEVEL_OUTOF10: 0
PAINLEVEL_OUTOF10: 6
PAINLEVEL_OUTOF10: 8
PAINLEVEL_OUTOF10: 7
PAINLEVEL_OUTOF10: 9
PAINLEVEL_OUTOF10: 8
PAINLEVEL_OUTOF10: 10
PAINLEVEL_OUTOF10: 6

## 2020-01-15 ASSESSMENT — PAIN DESCRIPTION - LOCATION
LOCATION: BACK

## 2020-01-15 ASSESSMENT — PAIN DESCRIPTION - ORIENTATION
ORIENTATION: LOWER

## 2020-01-15 NOTE — PROGRESS NOTES
Physical Therapy  Daily Treatment Note  Evaluating Therapist: Lamberto Pearson DPT    ROOM: [facility]  DIAGNOSIS: Multiple trauma  PRECAUTIONS: Falls, spinal precautions, TLSO when OOB or HOB at or >45 degrees, NWB LUE, ex fix LUE, suicide precautions, NWB LLE, WBAT RLE with post op shoe   PROCEDURE(S): 75/4/19 Application of left wrist spanning external fixation, 12/6/19 Left posterior pelvis fracture disruption through ilium and sacroiliac joint open reduction and internal fixation,12/10/19 T10-L1 posterior lateral fusion, 12/11/19 L humerus ORIF  HPI: Pt presented to ED on 12/5/19 sustaining multiple injuries from jumping off an overpass with suicide intentions. Injuries sustained are as followed:    - Left midshaft humerus fracture, closed. - Left transscaphoid perilunate open fracture dislocation, grade 2, with extruded proximal scaphoid and lunate. - Complex 3 cm open wound, volar wrist/hand.   - Left radial styloid fracture. - Left pelvic ring fracture disruption, lateral compression type 2 pattern, with large  crescent fracture and complete fracture through ileum into the anterior superior sacroiliac joint with  comminution.   - Left anterior pelvic ring fracture, junctional rami, superiorly, and   comminuted inferior rami into symphysial body   - T12 burst fracture    Social:  Pt lives with his grandparents in a 2 floor plan with 2 steps and 1 rail to enter. Bed/bathroom is on 2nd floor with a flight of stairs and 1 rail. Prior to admission pt was independent with no AD. Initial Evaluation  1/3/20 AM     PM    Short Term Goals Long Term Goals    Was pt agreeable to Eval/treatment? yes yes yes     Does pt have pain?  8/10 LUE pain, 10/10 low back pain LBP that increased with activity Moderate LBP     Bed Mobility  Rolling: SBA  Supine to sit: SBA  Sit to supine: NT  Scooting: SBA to EOB Rolling: Supervision  Supine to sit: Supervision  Sit to supine: Supervision  Scooting: Supervision NT Supervision Mod I pivot x7 reps with R hemicane  - Static RLE single leg stance in parallel bars for 1 minute and 30 seconds    Additional Comments: Pt continues to have significant levels of LBP prior to, during, and post PT. Pt requires assistance and cueing during stair negotiation. Pt has been utilizing a tub seat bench for stair negotiation as this is the safest option for the pt at this time considering his current level of functional mobility and impairments. Pt practiced stand pivots today with R hemicane with an emphasis on control/stability. Pt requires close CGA during transfers as well as verbal cueing for safety as he tends to have unsafe and impulsive movements. Patient/family education  Pt/family educated on safety with functional mobility, pivot with hemiwalker, importance of progression to independence with functional mobility    Patient/family response to education:   Pt/family verbalized understanding Pt/family demonstrated skill Pt/family requires further education in this area   yes inconsistently yes     AM  Time in: 0830  Time out: 0915  PM  Time in: 1515  Time out: 1600    Pt is making good progress toward established Physical Therapy goals. Continue with physical therapy current plan of care.     Hellen Borges DPT  [id number]

## 2020-01-16 LAB
ALBUMIN SERPL-MCNC: 3.9 G/DL (ref 3.5–5.2)
ALP BLD-CCNC: 243 U/L (ref 40–129)
ALT SERPL-CCNC: 14 U/L (ref 0–40)
ANION GAP SERPL CALCULATED.3IONS-SCNC: 17 MMOL/L (ref 7–16)
AST SERPL-CCNC: 13 U/L (ref 0–39)
BASOPHILS ABSOLUTE: 0.04 E9/L (ref 0–0.2)
BASOPHILS RELATIVE PERCENT: 0.7 % (ref 0–2)
BILIRUB SERPL-MCNC: 0.3 MG/DL (ref 0–1.2)
BUN BLDV-MCNC: 11 MG/DL (ref 6–20)
CALCIUM SERPL-MCNC: 10.1 MG/DL (ref 8.6–10.2)
CHLORIDE BLD-SCNC: 99 MMOL/L (ref 98–107)
CO2: 26 MMOL/L (ref 22–29)
CREAT SERPL-MCNC: 0.7 MG/DL (ref 0.7–1.2)
EOSINOPHILS ABSOLUTE: 0.42 E9/L (ref 0.05–0.5)
EOSINOPHILS RELATIVE PERCENT: 7.6 % (ref 0–6)
GFR AFRICAN AMERICAN: >60
GFR NON-AFRICAN AMERICAN: >60 ML/MIN/1.73
GLUCOSE BLD-MCNC: 93 MG/DL (ref 74–99)
HCT VFR BLD CALC: 43.8 % (ref 37–54)
HEMOGLOBIN: 13.9 G/DL (ref 12.5–16.5)
IMMATURE GRANULOCYTES #: 0.02 E9/L
IMMATURE GRANULOCYTES %: 0.4 % (ref 0–5)
LYMPHOCYTES ABSOLUTE: 1.71 E9/L (ref 1.5–4)
LYMPHOCYTES RELATIVE PERCENT: 31 % (ref 20–42)
MCH RBC QN AUTO: 30.1 PG (ref 26–35)
MCHC RBC AUTO-ENTMCNC: 31.7 % (ref 32–34.5)
MCV RBC AUTO: 94.8 FL (ref 80–99.9)
MONOCYTES ABSOLUTE: 0.44 E9/L (ref 0.1–0.95)
MONOCYTES RELATIVE PERCENT: 8 % (ref 2–12)
NEUTROPHILS ABSOLUTE: 2.89 E9/L (ref 1.8–7.3)
NEUTROPHILS RELATIVE PERCENT: 52.3 % (ref 43–80)
PDW BLD-RTO: 13.3 FL (ref 11.5–15)
PLATELET # BLD: 333 E9/L (ref 130–450)
PMV BLD AUTO: 9.7 FL (ref 7–12)
POTASSIUM SERPL-SCNC: 3.9 MMOL/L (ref 3.5–5)
RBC # BLD: 4.62 E12/L (ref 3.8–5.8)
SODIUM BLD-SCNC: 142 MMOL/L (ref 132–146)
TOTAL PROTEIN: 7.1 G/DL (ref 6.4–8.3)
WBC # BLD: 5.5 E9/L (ref 4.5–11.5)

## 2020-01-16 PROCEDURE — 85025 COMPLETE CBC W/AUTO DIFF WBC: CPT

## 2020-01-16 PROCEDURE — 1280000000 HC REHAB R&B

## 2020-01-16 PROCEDURE — 97530 THERAPEUTIC ACTIVITIES: CPT | Performed by: PHYSICAL THERAPIST

## 2020-01-16 PROCEDURE — 97110 THERAPEUTIC EXERCISES: CPT

## 2020-01-16 PROCEDURE — 80053 COMPREHEN METABOLIC PANEL: CPT

## 2020-01-16 PROCEDURE — 97535 SELF CARE MNGMENT TRAINING: CPT

## 2020-01-16 PROCEDURE — 36415 COLL VENOUS BLD VENIPUNCTURE: CPT

## 2020-01-16 PROCEDURE — 6370000000 HC RX 637 (ALT 250 FOR IP): Performed by: PHYSICAL MEDICINE & REHABILITATION

## 2020-01-16 PROCEDURE — 6370000000 HC RX 637 (ALT 250 FOR IP): Performed by: NURSE PRACTITIONER

## 2020-01-16 PROCEDURE — 6370000000 HC RX 637 (ALT 250 FOR IP): Performed by: INTERNAL MEDICINE

## 2020-01-16 PROCEDURE — 97530 THERAPEUTIC ACTIVITIES: CPT

## 2020-01-16 RX ADMIN — DOCUSATE SODIUM 200 MG: 100 CAPSULE, LIQUID FILLED ORAL at 20:49

## 2020-01-16 RX ADMIN — METHOCARBAMOL TABLETS 1000 MG: 500 TABLET, COATED ORAL at 12:16

## 2020-01-16 RX ADMIN — METHOCARBAMOL TABLETS 1000 MG: 500 TABLET, COATED ORAL at 20:49

## 2020-01-16 RX ADMIN — METHOCARBAMOL TABLETS 1000 MG: 500 TABLET, COATED ORAL at 16:15

## 2020-01-16 RX ADMIN — OXYCODONE 5 MG: 5 TABLET ORAL at 12:16

## 2020-01-16 RX ADMIN — PANTOPRAZOLE SODIUM 40 MG: 40 TABLET, DELAYED RELEASE ORAL at 06:45

## 2020-01-16 RX ADMIN — OXYCODONE 5 MG: 5 TABLET ORAL at 02:09

## 2020-01-16 RX ADMIN — OXYCODONE 5 MG: 5 TABLET ORAL at 16:16

## 2020-01-16 RX ADMIN — QUETIAPINE FUMARATE 400 MG: 200 TABLET ORAL at 20:49

## 2020-01-16 RX ADMIN — OXYCODONE 5 MG: 5 TABLET ORAL at 06:45

## 2020-01-16 RX ADMIN — SENNOSIDES 17.2 MG: 8.6 TABLET, FILM COATED ORAL at 20:49

## 2020-01-16 RX ADMIN — OXYCODONE 5 MG: 5 TABLET ORAL at 20:49

## 2020-01-16 ASSESSMENT — PAIN DESCRIPTION - ORIENTATION
ORIENTATION: LOWER
ORIENTATION: LEFT;LOWER
ORIENTATION: LOWER

## 2020-01-16 ASSESSMENT — PAIN SCALES - GENERAL
PAINLEVEL_OUTOF10: 8
PAINLEVEL_OUTOF10: 6
PAINLEVEL_OUTOF10: 0
PAINLEVEL_OUTOF10: 0
PAINLEVEL_OUTOF10: 7
PAINLEVEL_OUTOF10: 8
PAINLEVEL_OUTOF10: 10

## 2020-01-16 ASSESSMENT — PAIN DESCRIPTION - FREQUENCY
FREQUENCY: CONTINUOUS
FREQUENCY: CONTINUOUS

## 2020-01-16 ASSESSMENT — PAIN DESCRIPTION - PROGRESSION
CLINICAL_PROGRESSION: NOT CHANGED
CLINICAL_PROGRESSION: NOT CHANGED
CLINICAL_PROGRESSION: GRADUALLY IMPROVING

## 2020-01-16 ASSESSMENT — PAIN DESCRIPTION - PAIN TYPE
TYPE: ACUTE PAIN
TYPE: SURGICAL PAIN
TYPE: SURGICAL PAIN

## 2020-01-16 ASSESSMENT — PAIN DESCRIPTION - DESCRIPTORS
DESCRIPTORS: OTHER (COMMENT)
DESCRIPTORS: ACHING;CONSTANT;DISCOMFORT
DESCRIPTORS: OTHER (COMMENT)

## 2020-01-16 ASSESSMENT — PAIN DESCRIPTION - LOCATION
LOCATION: HAND;BACK
LOCATION: BACK
LOCATION: BACK

## 2020-01-16 ASSESSMENT — PAIN DESCRIPTION - ONSET: ONSET: ON-GOING

## 2020-01-16 NOTE — PROGRESS NOTES
Training, Strengthening, Functional Mobility Training, Equipment Evaluation, Education, & procurement, Home Management Training, Neuromuscular Re-education, Endurance Training**      [x] Continue with current OT Plan of care. [] Prepare for Discharge     DISCHARGE RECOMMENDATIONS  Recommended DME:    Post Discharge Care:   []Home Independently  []Home with 24hr Care / Supervision []Home with Partial Supervision []Home with Home Health OT []Home with Out Pt OT []Other: ___   Comments:         Time in Time out Tx Time Breakdown  Variance:   First Session  [phone] [x] Individual Tx- 55 mins  [] Concurrent Tx -  [] Co-Tx -   [] Group Tx -   [] Time Missed -     Second Session [phone] [x] Individual Tx-  45 min  [] Concurrent Tx -  [] Co-Tx -   [] Group Tx -   [] Time Missed -     Third Session    [] Individual Tx-   [] Concurrent Tx -  [] Co-Tx -   [] Group Tx -   [] Time Missed -         Total Tx Time: 100 mins      Angus Reyes [id number]  I have read the above note and agree with the documentation.   Lina Cedeno OTR/L [id number]

## 2020-01-16 NOTE — CARE COORDINATION
Pt. Will now need a 18\" WC along with a shower seat without back. [facility]- Meghan Jones [phone].     Nikolas Perera  1/16/2020

## 2020-01-16 NOTE — PROGRESS NOTES
reporting discomfort in L achilles near end of session 6/10 L hand pain       Bed Mobility  Rolling: SBA  Supine to sit: SBA  Sit to supine: NT  Scooting: SBA to EOB Rolling: NT  Supine to sit: Min A  Sit to supine: SBA  Scooting: Supervision NT Supervision Mod I   Transfers Sit to stand: Mod A  Stand to sit: Mod A  Stand pivot: Mod A no AD, hand on wheelchair Sit to stand: Min A  Stand to sit: Min/Mod A  Stand pivot: CGA with R hemicane Sit to stand: Min/Mod A from w/c, Min A from mat table. Stand to sit: Min A  Stand pivot: Min A with R hemicane SBA Mod I with AAD   Ambulation   NT, unable to ambulate 10 feet, 20 feet with R hemicane CGA 10 feet x2 with R fabienne cane and Min A 100 feet with AAD with SBA   50 feet with Supervision   Walking 10 feet on uneven surface NT NT NT 10 feet with AAD with SBA 10 feet with AAD with Mod I   Wheel Chair Mobility  feet with R extremities Mod I 400 feet with R extremities Mod I 400 feet with R extremities Mod I 400 feet with R extremities Mod I   Car Transfers NT NT NT SBA Mod I   Stair negotiation: ascended and descended NT NT 4 steps 1 rail with tub bench Min A 4 steps with 1 rail with SBA   4 steps with 1 rail SBA   Curb Step:   ascended and descended NT NT NT 4 inch step with AAD and SBA 7.5 inch step AAD with and Mod I   Picking up object off the floor NT NT NT Will  an object with SBA Will  an object with Mod I   BLE ROM WFL WFL         BLE Strength RLE: 4-/5  LLE: 3+/5          Balance  Sitting: SBA  Standing: Min A no AD Sitting: SBA  Static standing: CGA   Dynamic standing: CGA<>Min A with hemiwalker  Sitting: SBA  Static standing: CGA   Dynamic standing: Min A with hemiwalker        Date Family Teach Completed TBA 1/13/20 Grandma hands on         Is additional Family Teaching Needed?   Y or N Yes Yes         Hindering Progress Pain, NWB LLE/LUE, psych complications Pain, NWB LLE/LUE, psych complications         PT recommended ELOS 2-3 weeks         Team's Discharge Plan             Therapist at Team Meeting                Therapeutic Exercise:   AM:  Mobility training as documented above in grid  Ankle pumps bilateral x30  Quad sets bilateral x30  Hamstring stretch by therapist 30 sec x4 each LE      PM:  Functional mobility as noted above in grid  Stand pivot transfer with R hemicane x4  Sit<>Stand x5      Patient education  Pt educated on maintaining NWB during sit<>Stand transfers on L LE    Patient response to education:   Pt verbalized understanding Pt demonstrated skill Pt requires further education in this area   yes Yes with cues yes     Additional Comments: Pt presents to therapy gym via w/c wearing custom TLSO, L wrist brace, and surgical shoe to R LE. Hands on assistance required for completing bed mobility on mat table using log rolling technique. Pt continues to present with impulsive movements particularly during stand pivot transfers. Pt educated in safety considerations during set up for stand pivot transfer He was able to progress ambulation distance this date with Min A vs CGA due to variable needs for balance support. Pt demonstrates improved eccentric control during stand>sit transfer in PM session. He requires cues to slow pace for improved balance with stair negotiation this date as pt very impulsive with movements and unsteadiness upon initial standing. Pt encouraged to remain OOB in w/c to promote increased upright tolerance at end of session, all needs in reach. AM  Time in: 1045  Time out: 1130    PM  Time in: 1515  Time out: 1600    Pt is making good progress toward established Physical Therapy goals. Continue with physical therapy current plan of care.     Carol Gonsalez, PT, DPT  [id number]

## 2020-01-17 PROCEDURE — 6370000000 HC RX 637 (ALT 250 FOR IP): Performed by: PHYSICAL MEDICINE & REHABILITATION

## 2020-01-17 PROCEDURE — 97530 THERAPEUTIC ACTIVITIES: CPT

## 2020-01-17 PROCEDURE — 6370000000 HC RX 637 (ALT 250 FOR IP): Performed by: INTERNAL MEDICINE

## 2020-01-17 PROCEDURE — 1280000000 HC REHAB R&B

## 2020-01-17 PROCEDURE — 6370000000 HC RX 637 (ALT 250 FOR IP): Performed by: NURSE PRACTITIONER

## 2020-01-17 PROCEDURE — 97110 THERAPEUTIC EXERCISES: CPT

## 2020-01-17 PROCEDURE — 97535 SELF CARE MNGMENT TRAINING: CPT

## 2020-01-17 RX ADMIN — SENNOSIDES 17.2 MG: 8.6 TABLET, FILM COATED ORAL at 21:49

## 2020-01-17 RX ADMIN — DOCUSATE SODIUM 200 MG: 100 CAPSULE, LIQUID FILLED ORAL at 21:49

## 2020-01-17 RX ADMIN — DOCUSATE SODIUM 200 MG: 100 CAPSULE, LIQUID FILLED ORAL at 08:17

## 2020-01-17 RX ADMIN — QUETIAPINE FUMARATE 400 MG: 200 TABLET ORAL at 21:49

## 2020-01-17 RX ADMIN — METHOCARBAMOL TABLETS 1000 MG: 500 TABLET, COATED ORAL at 17:37

## 2020-01-17 RX ADMIN — METHOCARBAMOL TABLETS 1000 MG: 500 TABLET, COATED ORAL at 08:17

## 2020-01-17 RX ADMIN — PANTOPRAZOLE SODIUM 40 MG: 40 TABLET, DELAYED RELEASE ORAL at 06:40

## 2020-01-17 RX ADMIN — CALCITONIN SALMON 1 SPRAY: 200 SPRAY, METERED NASAL at 08:16

## 2020-01-17 RX ADMIN — METHOCARBAMOL TABLETS 1000 MG: 500 TABLET, COATED ORAL at 12:39

## 2020-01-17 RX ADMIN — OXYCODONE 5 MG: 5 TABLET ORAL at 17:37

## 2020-01-17 RX ADMIN — OXYCODONE 5 MG: 5 TABLET ORAL at 21:49

## 2020-01-17 RX ADMIN — DILTIAZEM HYDROCHLORIDE 180 MG: 180 CAPSULE, EXTENDED RELEASE ORAL at 08:17

## 2020-01-17 RX ADMIN — METHOCARBAMOL TABLETS 1000 MG: 500 TABLET, COATED ORAL at 21:48

## 2020-01-17 RX ADMIN — OXYCODONE 5 MG: 5 TABLET ORAL at 06:40

## 2020-01-17 ASSESSMENT — PAIN DESCRIPTION - PROGRESSION
CLINICAL_PROGRESSION: NOT CHANGED

## 2020-01-17 ASSESSMENT — PAIN DESCRIPTION - LOCATION
LOCATION: HAND
LOCATION: HAND;BACK

## 2020-01-17 ASSESSMENT — PAIN DESCRIPTION - ONSET
ONSET: ON-GOING
ONSET: ON-GOING

## 2020-01-17 ASSESSMENT — PAIN DESCRIPTION - PAIN TYPE
TYPE: SURGICAL PAIN
TYPE: SURGICAL PAIN

## 2020-01-17 ASSESSMENT — PAIN SCALES - GENERAL
PAINLEVEL_OUTOF10: 8
PAINLEVEL_OUTOF10: 7
PAINLEVEL_OUTOF10: 0
PAINLEVEL_OUTOF10: 10
PAINLEVEL_OUTOF10: 7
PAINLEVEL_OUTOF10: 0
PAINLEVEL_OUTOF10: 0

## 2020-01-17 ASSESSMENT — PAIN DESCRIPTION - FREQUENCY
FREQUENCY: CONTINUOUS
FREQUENCY: CONTINUOUS

## 2020-01-17 ASSESSMENT — PAIN DESCRIPTION - DESCRIPTORS
DESCRIPTORS: ACHING;DISCOMFORT
DESCRIPTORS: ACHING;DISCOMFORT

## 2020-01-17 ASSESSMENT — PAIN DESCRIPTION - ORIENTATION
ORIENTATION: LEFT
ORIENTATION: LEFT;LOWER

## 2020-01-17 NOTE — PROGRESS NOTES
Occupational Therapy  OCCUPATIONAL THERAPY DAILY NOTE    Date:2020  Patient Name: Deanne Quarles  MRN: [id number]  : 1995   Room: 200/1-A       Diagnosis:  Trauma  Injuries Sustained:    - Left midshaft humerus fracture, closed. - Left transscaphoid perilunate open fracture dislocation, grade 2, with extruded proximal scaphoid and lunate.  - Complex 3 cm open wound, volar wrist/hand.  - Left radial styloid fracture. - Left pelvic ring fracture disruption, lateral compression type 2 pattern, with large crescent fracture and complete fracture through ileum into the anterior superior sacroiliac joint with comminution.  - Left anterior pelvic ring fracture, junctional rami, superiorly, and  comminuted inferior rami into symphysial body   - Right foot 1st digit distal phalanx avulsion fx  - T12 burst fracture     Procedures this admission:    19:    I&D Left Wrist, Placement of External Fixator  19:    ORIF Left Pelvis w/ Dr. Rosemary Ojeda  12-10-19:  T10-L1 Posterior Lateral Fusion w/ Dr. Bhargav Quiroz   19:  Left Humerus ORIF w/ Dr. Rosemary Ojeda    Precautions: falls,NWB to LLE and LUE  spinal precautions,  Surgical Shoe to R Foot , TLSO when OOB or HOB at or > 45 degrees,   , L Wrist Brace. Can do PROM exercises to digits of left hand but maintain NWB.     Functional Assessment:   Date Status AE  Comments   Feeding 20 Setup      Grooming 1/15/20 Set up     Bathing 20 S/U     UB Dressing 1/15/20        1/16/20 S/U        MAX A (TLSO)     LB Dressing 1/15/20 MOD A  Sock aide    Homemaking 20  MIN A  W/c  Pt completed kitchen mobility at w/c level v/c's for technique; item retrieval from w/c v/c's for technique      Functional Transfers / Balance:   Date Status DME  Comments   Sit Balance 20  SBA Extended tub bench     Stand Balance 20  Min A Dawit cane     [x] Tub  [] Shower   Transfer 20  MIN A  Extended tub bench, dawit cane    Commode   Transfer 20  Min A [facility]

## 2020-01-17 NOTE — PROGRESS NOTES
Physical Therapy  Facility/Department: [address] [facility]  Daily Treatment Note  NAME: Klever Ballard  : 1995  MRN: [id number]    Date of Service: 2020    Evaluating Therapist: Zhao Gray DPT     ROOM: 50T  DIAGNOSIS: Multiple trauma  PRECAUTIONS: Falls, spinal precautions, TLSO when OOB or HOB at or >45 degrees, NWB LUE, ex fix LUE, suicide precautions, NWB LLE, WBAT RLE with post op shoe   PROCEDURE(S):  Application of left wrist spanning external fixation, 19 Left posterior pelvis fracture disruption through ilium and sacroiliac joint open reduction and internal fixation,12/10/19 T10-L1 posterior lateral fusion, 19 L humerus ORIF  HPI: Pt presented to ED on 19 sustaining multiple injuries from jumping off an overpass with suicide intentions. Injuries sustained are as followed:              - Left midshaft humerus fracture, closed. - Left transscaphoid perilunate open fracture dislocation, grade 2, with extruded proximal scaphoid and lunate. - Complex 3 cm open wound, volar wrist/hand.             - Left radial styloid fracture. - Left pelvic ring fracture disruption, lateral compression type 2 pattern, with large     crescent fracture and complete fracture through ileum into the anterior superior sacroiliac joint with      comminution.             - Left anterior pelvic ring fracture, junctional rami, superiorly, and             comminuted inferior rami into symphysial body              - T12 burst fracture     Social:  Pt lives with his grandparents in a 2 floor plan with 2 steps and 1 rail to enter. Bed/bathroom is on 2nd floor with a flight of stairs and 1 rail. Prior to admission pt was independent with no AD.                 Initial Evaluation  1/3/20 AM     PM    Short Term Goals Long Term Goals    Was pt agreeable to Eval/treatment? yes yes yes       Does pt have pain?  8/10 LUE pain, 10/10 low back pain LBP and L hand pain minutes  - SAQ 2x15  - Hook-lying alternating marching with emphasis on TA/core activation 2x8/side  - STS x3 from wheelchair    PM:  - Functional mobility as noted above in chart  - L calf/heel cord stretch x3 minutes  - BLE HS stretch x3 minutes/side  - Band-resisted ankle pumps 2x20/side  - SLS toe raises x15  - Stand pivot x4     Patient education  Pt educated on safety with functional mobility, TLSO donning/doffing, stand pivot safety/technique with hemicane    Patient response to education:   Pt verbalized understanding Pt demonstrated skill Pt requires further education in this area   yes Yes with cues yes     Additional Comments: Pt continues to report significant levels of pain in his low back and L hand. Pt requires verbal cues for safety and sequencing during stand pivots as he has impulsive behavior and does not retain cues/education from previous sessions. Pt ambulates while maintaining NWB RLE but has unsteadiness at times. Pt was educated on proper car transfer technique so as to not break spinal or weight bearing precautions. Pt was assisted back into bed post PM PT session and was assisted in doffing TLSO. AM  Time in: 0830  Time out: 0915    PM  Time in: 1515  Time out: 1600    Pt is making good progress toward established Physical Therapy goals. Continue with physical therapy current plan of care.     Cecil Kate DPT  [id number]

## 2020-01-18 PROCEDURE — 1280000000 HC REHAB R&B

## 2020-01-18 PROCEDURE — 97535 SELF CARE MNGMENT TRAINING: CPT

## 2020-01-18 PROCEDURE — 6370000000 HC RX 637 (ALT 250 FOR IP): Performed by: PHYSICAL MEDICINE & REHABILITATION

## 2020-01-18 PROCEDURE — 99233 SBSQ HOSP IP/OBS HIGH 50: CPT | Performed by: PHYSICAL MEDICINE & REHABILITATION

## 2020-01-18 PROCEDURE — 6370000000 HC RX 637 (ALT 250 FOR IP): Performed by: INTERNAL MEDICINE

## 2020-01-18 PROCEDURE — 6370000000 HC RX 637 (ALT 250 FOR IP): Performed by: NURSE PRACTITIONER

## 2020-01-18 PROCEDURE — 97530 THERAPEUTIC ACTIVITIES: CPT

## 2020-01-18 RX ADMIN — OXYCODONE 5 MG: 5 TABLET ORAL at 22:47

## 2020-01-18 RX ADMIN — OXYCODONE 5 MG: 5 TABLET ORAL at 06:16

## 2020-01-18 RX ADMIN — SENNOSIDES 17.2 MG: 8.6 TABLET, FILM COATED ORAL at 20:39

## 2020-01-18 RX ADMIN — BENZTROPINE MESYLATE 1 MG: 1 TABLET ORAL at 09:27

## 2020-01-18 RX ADMIN — METHOCARBAMOL TABLETS 1000 MG: 500 TABLET, COATED ORAL at 13:07

## 2020-01-18 RX ADMIN — DOCUSATE SODIUM 200 MG: 100 CAPSULE, LIQUID FILLED ORAL at 20:39

## 2020-01-18 RX ADMIN — METHOCARBAMOL TABLETS 1000 MG: 500 TABLET, COATED ORAL at 20:39

## 2020-01-18 RX ADMIN — DILTIAZEM HYDROCHLORIDE 180 MG: 180 CAPSULE, EXTENDED RELEASE ORAL at 09:26

## 2020-01-18 RX ADMIN — QUETIAPINE FUMARATE 400 MG: 200 TABLET ORAL at 20:39

## 2020-01-18 RX ADMIN — ACETAMINOPHEN 650 MG: 325 TABLET, FILM COATED ORAL at 09:26

## 2020-01-18 RX ADMIN — PANTOPRAZOLE SODIUM 40 MG: 40 TABLET, DELAYED RELEASE ORAL at 06:12

## 2020-01-18 RX ADMIN — OXYCODONE 5 MG: 5 TABLET ORAL at 13:07

## 2020-01-18 RX ADMIN — METHOCARBAMOL TABLETS 1000 MG: 500 TABLET, COATED ORAL at 09:27

## 2020-01-18 RX ADMIN — METHOCARBAMOL TABLETS 1000 MG: 500 TABLET, COATED ORAL at 17:24

## 2020-01-18 RX ADMIN — OXYCODONE 5 MG: 5 TABLET ORAL at 17:26

## 2020-01-18 RX ADMIN — HYDROXYZINE PAMOATE 50 MG: 50 CAPSULE ORAL at 22:52

## 2020-01-18 RX ADMIN — MELATONIN 3 MG ORAL TABLET 3 MG: 3 TABLET ORAL at 20:51

## 2020-01-18 ASSESSMENT — PAIN DESCRIPTION - FREQUENCY
FREQUENCY: CONTINUOUS

## 2020-01-18 ASSESSMENT — PAIN DESCRIPTION - PROGRESSION
CLINICAL_PROGRESSION: NOT CHANGED

## 2020-01-18 ASSESSMENT — PAIN SCALES - GENERAL
PAINLEVEL_OUTOF10: 0
PAINLEVEL_OUTOF10: 8
PAINLEVEL_OUTOF10: 0
PAINLEVEL_OUTOF10: 0
PAINLEVEL_OUTOF10: 8
PAINLEVEL_OUTOF10: 9
PAINLEVEL_OUTOF10: 9
PAINLEVEL_OUTOF10: 8
PAINLEVEL_OUTOF10: 0

## 2020-01-18 ASSESSMENT — PAIN DESCRIPTION - ORIENTATION
ORIENTATION: LEFT

## 2020-01-18 ASSESSMENT — PAIN - FUNCTIONAL ASSESSMENT: PAIN_FUNCTIONAL_ASSESSMENT: PREVENTS OR INTERFERES SOME ACTIVE ACTIVITIES AND ADLS

## 2020-01-18 ASSESSMENT — PAIN DESCRIPTION - DIRECTION: RADIATING_TOWARDS: LUE

## 2020-01-18 ASSESSMENT — PAIN DESCRIPTION - LOCATION
LOCATION: ARM;HAND
LOCATION: ARM;HAND
LOCATION: HAND;WRIST

## 2020-01-18 ASSESSMENT — PAIN DESCRIPTION - ONSET
ONSET: ON-GOING
ONSET: ON-GOING

## 2020-01-18 ASSESSMENT — PAIN DESCRIPTION - DESCRIPTORS
DESCRIPTORS: TENDER;SQUEEZING
DESCRIPTORS: CONSTANT;DISCOMFORT;SHARP
DESCRIPTORS: TINGLING;ACHING

## 2020-01-18 ASSESSMENT — PAIN DESCRIPTION - PAIN TYPE
TYPE: SURGICAL PAIN
TYPE: SURGICAL PAIN;CHRONIC PAIN
TYPE: SURGICAL PAIN

## 2020-01-18 NOTE — PROGRESS NOTES
Occupational Therapy  OCCUPATIONAL THERAPY DAILY NOTE    Date:2020  Patient Name: Tiera Mercado  MRN: [id number]  : 1995   Room: 200/1-A       Diagnosis:  Trauma  Injuries Sustained:    - Left midshaft humerus fracture, closed. - Left transscaphoid perilunate open fracture dislocation, grade 2, with extruded proximal scaphoid and lunate.  - Complex 3 cm open wound, volar wrist/hand.  - Left radial styloid fracture. - Left pelvic ring fracture disruption, lateral compression type 2 pattern, with large crescent fracture and complete fracture through ileum into the anterior superior sacroiliac joint with comminution.  - Left anterior pelvic ring fracture, junctional rami, superiorly, and  comminuted inferior rami into symphysial body   - Right foot 1st digit distal phalanx avulsion fx  - T12 burst fracture     Procedures this admission:    19:    I&D Left Wrist, Placement of External Fixator  19:    ORIF Left Pelvis w/ Dr. Margaret Eagle  12-10-19:  T10-L1 Posterior Lateral Fusion w/ Dr. Yolanda Fernandez   19:  Left Humerus ORIF w/ Dr. Margaret Eagle    Precautions: falls, NWB to LLE and LUE, Surgical Shoe to R Foot, spinal precautions, TLSO when OOB or HOB at or > 45 degrees,  , L Wrist Brace. Can do PROM exercises to digits of left hand but maintain NWB.     Functional Assessment:   Date Status AE  Comments   Feeding 20 Setup   Required SUP/Min VCs for adaptive techs to feed self w/ utensils, cut food, drink from cup, assist to open 1 container, pt ed re: elevation of HOB w/ and w/o TLSO   Grooming 20 Set up  Able to comb hair, wash face, brush teeth w/ 1-handed tech seated at sink, pt ed for adaptive techs   Bathing 20 S/U     UB Dressing 20 Min A          MAX A (TLSO)  Required Min A/VCs/pt ed for adaptive tech to don pull-over shirt in supine  Required Mod A to don brace, pt familiar w/ good tech, able to roll side to side w/ use of side-rail w/ SUP, able to Perception:    Education:  Pt ed re: Spinal Precautions, NWB status, adherence to precautions w/ functional ax/transfers, techs to don TLSO, Techs for safety PROM/SROM of L Digits, transfer safety and adaptive techs for ADLs/one-handed techs - will review     [x] Family teach completed on: 1/13/2020: pt and grandmother present participated/observed in tub transfer, bed mobility, LE dressing techniques. Pt/grandmother demo'd fair understanding of safety techniques during transfers. Pain Level: 9/10 left wrist/LUE, c/o Muscle spasms in back while seated in w/c - Nsg notified     Additional Notes:       Patient has made fair  progress during treatment sessions toward set goals. Therapy emphasis to obtain goals:Current Treatment Recommendations: Safety Education & Training, Balance Training, Self-Care / ADL, Patient/Caregiver Education & Training, Strengthening, Functional Mobility Training, Equipment Evaluation, Education, & procurement, Home Management Training, Neuromuscular Re-education, Endurance Training**      [x] Continue with current OT Plan of care.   [] Prepare for Discharge     DISCHARGE RECOMMENDATIONS  Recommended DME:    Post Discharge Care:   []Home Independently  []Home with 24hr Care / Supervision []Home with Partial Supervision []Home with Home Health OT []Home with Out Pt OT []Other: ___   Comments:         Time in Time out Tx Time Breakdown  Variance:   First Session  [id number] [x] Individual Tx- 70 mins  [] Concurrent Tx -  [] Co-Tx -   [] Group Tx -   [] Time Missed -     Second Session   [] Individual Tx-   min  [] Concurrent Tx -  [] Co-Tx -   [] Group Tx -   [] Time Missed -     Third Session    [] Individual Tx-   [] Concurrent Tx -  [] Co-Tx -   [] Group Tx -   [] Time Missed -         Total Tx Time: 70 mins        [place], [place], OTR/L  # [id number]

## 2020-01-18 NOTE — PROGRESS NOTES
PM&R Weekend Progress Note  Patient: Boris Whatley  Age/sex: 25 y.o. male  Medical Record #: [id number]  Date: 1/18/2020    Subjective: Patient seen and examined in his room this morning. No issues overnight. Patient complains of left wrist pain today after therapies. Per nursing staff, continues to refuse psychiatric medications. Denies abdominal pain, chest pain, shortness of breath. ROS:     Respiratory: Denies SOB or cough     Cardiovascular: Denies CP, palpitations, edema      Gastrointestinal: Denies abdominal pain, N/V, constipation, or diarrhea    Genitourinary: Denies urinary symptoms    Neurologic: See HPI.     MSK: See HPI. Past Medical History:   Diagnosis Date    Bipolar 1 disorder [facility])        Past Surgical History:   Procedure Laterality Date    APPLICATION MULTIPLANE UNILATERAL EXTERNAL FIXATION SYSTEM Left 12/5/2019    LEFT PELVIS OPEN REDUCTION INTERNAL FIXATION performed by Jorge Reveles DO at [place] [address] HAND SURGERY Left 12/19/2019    LEFT HAND I & D, CARPAL TUNNEL WITH OPEN REDUCTION INTERNAL FIXATION SCAPHOID AND LUNATE performed by Jet Marcano MD at [address] [place] [address] Left 12/11/2019    LEFT HUMERUS OPEN REDUCTION INTERNAL FIXATION performed by Jorge Reveles DO at [address] SURGERY N/A 12/10/2019    T10-L1  POSTERIOR LATERAL FUSION -- GLOBUS -- NEEDS [place] TABLE, O-ARM performed by Jose Enrique Chen MD at [facility] Left 12/5/2019    I & D LEFT WRIST WITH EXTERNAL FIXATOR performed by Jorge Reveles DO at [facility] OR       No family history on file.     Objective:  Vitals:    01/17/20 1004 01/17/20 1018 01/17/20 1630 01/18/20 0920   BP:  (!) 152/85  (!) 141/84   Pulse:  104 94 105   Resp: 24 20 18   Temp:  97.2 °F (36.2 °C)  97.2 °F (36.2 °C)   TempSrc:  Temporal  Temporal   SpO2:   99%    Weight:       Height:         01/17 0701 - 01/18 0700  In: 760 [P.O.:760]  Out: 1000 [Urine:1000]    GEN: Alert, conversational, NAD  AFFECT: Pleasant  EYES: EOMI  CV: no distress, RRR  RESP: breathing comfortably, CTAB, no R/R/W  ABD: Soft, non-tender, non-distended, + BS  MSK: Left wrist with Ace wrap and splint. Underneath splinting, swelling and scar present. Perfusion normal, able to wiggle fingers. Labs reviewed  CBC:   Recent Labs     01/16/20  0510   WBC 5.5   HGB 13.9        BMP:    Recent Labs     01/16/20  0510      K 3.9   CL 99   CO2 26   BUN 11   CREATININE 0.7   GLUCOSE 93     Hepatic:   Recent Labs     01/16/20  0510   AST 13   ALT 14   BILITOT 0.3   ALKPHOS 243*     BNP: No results for input(s): BNP in the last 72 hours. Lipids: No results for input(s): CHOL, HDL in the last 72 hours. Invalid input(s): LDLCALCU  INR: No results for input(s): INR in the last 72 hours. A/P  This is 25 y.o. male with polytrauma 2/2 jump from bridge in suicide attempt    Rehab: Continue extensive rehabilitation. Continue physical therapy, occupational therapy, and speech-language pathology. No change in medications. Continue current management. Lon Camarena, DO  Physical Medicine and Rehabilitation     Please note that the above documentation was prepared using voice recognition software. Every attempt was made to ensure accuracy but there may be spelling, grammatical, and contextual errors.

## 2020-01-18 NOTE — PROGRESS NOTES
feet on uneven surface NT NT NT 10 feet with AAD with SBA 10 feet with AAD with Mod I   Wheel Chair Mobility  feet with R extremities Mod I 400 feet with R extremities Mod I 400 feet with R extremities Mod I 400 feet with R extremities Mod I   Car Transfers NT NT CGA/Min A SBA Mod I   Stair negotiation: ascended and descended NT NT 4 steps 1 rail with tub bench Min A 4 steps with 1 rail with SBA   4 steps with 1 rail SBA   Curb Step:   ascended and descended NT NT NT 4 inch step with AAD and SBA 7.5 inch step AAD with and Mod I   Picking up object off the floor NT NT NT Will  an object with SBA Will  an object with Mod I   BLE ROM WFL WFL         BLE Strength RLE: 4-/5  LLE: 3+/5 RLE: 4+/5  LLE: 4/5         Balance  Sitting: SBA  Standing: Min A no AD Sitting: SBA  Static standing: CGA   Dynamic standing: CGA<>Min A with hemicane               Scheduled Meds:   nicotine  1 patch Transdermal Daily    calcitonin  1 spray Alternating Nares Daily    methocarbamol  1,000 mg Oral 4x Daily    docusate sodium  200 mg Oral BID    diltiazem  180 mg Oral Daily    lidocaine  1 patch Transdermal Daily    senna  2 tablet Oral Nightly    benztropine  1 mg Oral BID    carBAMazepine  300 mg Oral BID    mineral oil-hydrophilic petrolatum   Topical Daily    pantoprazole  40 mg Oral QAM AC    polyethylene glycol  17 g Oral BID    QUEtiapine  400 mg Oral BID    venlafaxine  37.5 mg Oral Daily with breakfast    vitamin D  50,000 Units Oral Weekly     Continuous Infusions:  PRN Meds:aluminum & magnesium hydroxide-simethicone, ipratropium-albuterol, oxyCODONE, hydrOXYzine, melatonin, acetaminophen, magnesium hydroxide, ammonium lactate  I/O last 3 completed shifts:   In: 760 [P.O.:760]  Out: 700 [Urine:700]  I/O this shift:  In: -   Out: 1000 [Urine:1000]    Labs reviewed  CBC:   Recent Labs     01/16/20  0510   WBC 5.5   HGB 13.9        BMP:    Recent Labs     01/16/20  0510      K 3.9   CL

## 2020-01-19 PROCEDURE — 97530 THERAPEUTIC ACTIVITIES: CPT

## 2020-01-19 PROCEDURE — 1280000000 HC REHAB R&B

## 2020-01-19 PROCEDURE — 6370000000 HC RX 637 (ALT 250 FOR IP): Performed by: NURSE PRACTITIONER

## 2020-01-19 PROCEDURE — 6370000000 HC RX 637 (ALT 250 FOR IP): Performed by: INTERNAL MEDICINE

## 2020-01-19 PROCEDURE — 6370000000 HC RX 637 (ALT 250 FOR IP): Performed by: PHYSICAL MEDICINE & REHABILITATION

## 2020-01-19 RX ADMIN — OXYCODONE 5 MG: 5 TABLET ORAL at 21:17

## 2020-01-19 RX ADMIN — DOCUSATE SODIUM 200 MG: 100 CAPSULE, LIQUID FILLED ORAL at 21:07

## 2020-01-19 RX ADMIN — METHOCARBAMOL TABLETS 1000 MG: 500 TABLET, COATED ORAL at 16:51

## 2020-01-19 RX ADMIN — BENZTROPINE MESYLATE 1 MG: 1 TABLET ORAL at 12:43

## 2020-01-19 RX ADMIN — SENNOSIDES 17.2 MG: 8.6 TABLET, FILM COATED ORAL at 21:07

## 2020-01-19 RX ADMIN — OXYCODONE 5 MG: 5 TABLET ORAL at 05:48

## 2020-01-19 RX ADMIN — OXYCODONE 5 MG: 5 TABLET ORAL at 12:42

## 2020-01-19 RX ADMIN — METHOCARBAMOL TABLETS 1000 MG: 500 TABLET, COATED ORAL at 12:42

## 2020-01-19 RX ADMIN — DILTIAZEM HYDROCHLORIDE 180 MG: 180 CAPSULE, EXTENDED RELEASE ORAL at 12:42

## 2020-01-19 RX ADMIN — PANTOPRAZOLE SODIUM 40 MG: 40 TABLET, DELAYED RELEASE ORAL at 05:48

## 2020-01-19 RX ADMIN — OXYCODONE 5 MG: 5 TABLET ORAL at 16:54

## 2020-01-19 RX ADMIN — MELATONIN 3 MG ORAL TABLET 3 MG: 3 TABLET ORAL at 21:07

## 2020-01-19 RX ADMIN — METHOCARBAMOL TABLETS 1000 MG: 500 TABLET, COATED ORAL at 21:07

## 2020-01-19 RX ADMIN — QUETIAPINE FUMARATE 400 MG: 200 TABLET ORAL at 21:07

## 2020-01-19 ASSESSMENT — PAIN DESCRIPTION - FREQUENCY
FREQUENCY: CONTINUOUS
FREQUENCY: INTERMITTENT
FREQUENCY: CONTINUOUS
FREQUENCY: CONTINUOUS

## 2020-01-19 ASSESSMENT — PAIN DESCRIPTION - ORIENTATION
ORIENTATION: LEFT

## 2020-01-19 ASSESSMENT — PAIN DESCRIPTION - LOCATION
LOCATION: HAND;WRIST
LOCATION: ARM;HAND
LOCATION: HAND;PELVIS
LOCATION: HAND

## 2020-01-19 ASSESSMENT — PAIN DESCRIPTION - PAIN TYPE
TYPE: ACUTE PAIN;SURGICAL PAIN
TYPE: SURGICAL PAIN;ACUTE PAIN
TYPE: SURGICAL PAIN;ACUTE PAIN
TYPE: ACUTE PAIN;SURGICAL PAIN

## 2020-01-19 ASSESSMENT — PAIN SCALES - GENERAL
PAINLEVEL_OUTOF10: 0
PAINLEVEL_OUTOF10: 7
PAINLEVEL_OUTOF10: 5
PAINLEVEL_OUTOF10: 8
PAINLEVEL_OUTOF10: 7
PAINLEVEL_OUTOF10: 6
PAINLEVEL_OUTOF10: 0

## 2020-01-19 ASSESSMENT — PAIN DESCRIPTION - DESCRIPTORS
DESCRIPTORS: THROBBING;SPASM
DESCRIPTORS: ACHING;TINGLING
DESCRIPTORS: SHARP;THROBBING

## 2020-01-19 ASSESSMENT — PAIN DESCRIPTION - PROGRESSION
CLINICAL_PROGRESSION: NOT CHANGED
CLINICAL_PROGRESSION: NOT CHANGED

## 2020-01-19 ASSESSMENT — PAIN DESCRIPTION - ONSET
ONSET: ON-GOING
ONSET: ON-GOING

## 2020-01-19 ASSESSMENT — PAIN - FUNCTIONAL ASSESSMENT: PAIN_FUNCTIONAL_ASSESSMENT: PREVENTS OR INTERFERES SOME ACTIVE ACTIVITIES AND ADLS

## 2020-01-19 NOTE — PROGRESS NOTES
Rolling: SBA  Supine to sit: SBA  Sit to supine: NT  Scooting: SBA to EOB Supervision all components  Supervision Mod I   Transfers Sit to stand: Mod A  Stand to sit: Mod A  Stand pivot: Mod A no AD, hand on wheelchair Sit to stand: CGA  Stand to sit: Min A  Stand pivot: CGA with R hemicane; CGA no AD   SBA Mod I with AAD   Ambulation   NT, unable to ambulate 15 feet with R hemicane CGA  100 feet with AAD with SBA   50 feet with Supervision   Walking 10 feet on uneven surface NT NT  10 feet with AAD with SBA 10 feet with AAD with Mod I   Wheel Chair Mobility  feet x2 with R extremities Mod I  400 feet with R extremities Mod I 400 feet with R extremities Mod I   Car Transfers NT NT  SBA Mod I   Stair negotiation: ascended and descended NT NT  4 steps with 1 rail with SBA   4 steps with 1 rail SBA   Curb Step:   ascended and descended NT NT  4 inch step with AAD and SBA 7.5 inch step AAD with and Mod I   Picking up object off the floor NT NT  Will  an object with SBA Will  an object with Mod I   BLE ROM WFL WFL         BLE Strength RLE: 4-/5  LLE: 3+/5 RLE: 4+/5  LLE: 4/5         Balance  Sitting: SBA  Standing: Min A no AD Sitting: SBA  Static standing: CGA   Dynamic standing: CGA<>Min A with hemicane        Date Family Teach Completed TBA 1/13/20 Grandma hands on         Is additional Family Teaching Needed?   Y or N Yes Yes         Hindering Progress Pain, NWB LLE/LUE, psych complications Pain, NWB LLE/LUE, psych complications         PT recommended ELOS 2-3 weeks           Team's Discharge Plan             Therapist at Team Meeting                Therapeutic Exercise:   AM:  - Functional mobility as noted above in chart  - STS x5 from mat table  - Stand pivot x4   - SLS with no UE support for 2 minutes with intermittent R hand touching parallel bar for balance    Patient education  Pt educated on safety with functional mobility, TLSO donning/doffing, stand pivot safety/technique with hemicane,

## 2020-01-20 PROCEDURE — 1280000000 HC REHAB R&B

## 2020-01-20 PROCEDURE — 97530 THERAPEUTIC ACTIVITIES: CPT

## 2020-01-20 PROCEDURE — 97110 THERAPEUTIC EXERCISES: CPT | Performed by: OCCUPATIONAL THERAPY ASSISTANT

## 2020-01-20 PROCEDURE — 6370000000 HC RX 637 (ALT 250 FOR IP): Performed by: PHYSICAL MEDICINE & REHABILITATION

## 2020-01-20 PROCEDURE — 6370000000 HC RX 637 (ALT 250 FOR IP): Performed by: INTERNAL MEDICINE

## 2020-01-20 PROCEDURE — 97530 THERAPEUTIC ACTIVITIES: CPT | Performed by: OCCUPATIONAL THERAPY ASSISTANT

## 2020-01-20 PROCEDURE — 6370000000 HC RX 637 (ALT 250 FOR IP): Performed by: NURSE PRACTITIONER

## 2020-01-20 PROCEDURE — 97110 THERAPEUTIC EXERCISES: CPT

## 2020-01-20 RX ADMIN — METHOCARBAMOL TABLETS 1000 MG: 500 TABLET, COATED ORAL at 20:12

## 2020-01-20 RX ADMIN — METHOCARBAMOL TABLETS 1000 MG: 500 TABLET, COATED ORAL at 14:17

## 2020-01-20 RX ADMIN — OXYCODONE 5 MG: 5 TABLET ORAL at 18:26

## 2020-01-20 RX ADMIN — OXYCODONE 5 MG: 5 TABLET ORAL at 14:19

## 2020-01-20 RX ADMIN — OXYCODONE 5 MG: 5 TABLET ORAL at 06:25

## 2020-01-20 RX ADMIN — QUETIAPINE FUMARATE 400 MG: 200 TABLET ORAL at 20:12

## 2020-01-20 RX ADMIN — CALCITONIN SALMON 1 SPRAY: 200 SPRAY, METERED NASAL at 10:19

## 2020-01-20 RX ADMIN — METHOCARBAMOL TABLETS 1000 MG: 500 TABLET, COATED ORAL at 16:59

## 2020-01-20 RX ADMIN — DOCUSATE SODIUM 200 MG: 100 CAPSULE, LIQUID FILLED ORAL at 10:10

## 2020-01-20 RX ADMIN — METHOCARBAMOL TABLETS 1000 MG: 500 TABLET, COATED ORAL at 10:07

## 2020-01-20 RX ADMIN — PANTOPRAZOLE SODIUM 40 MG: 40 TABLET, DELAYED RELEASE ORAL at 06:25

## 2020-01-20 RX ADMIN — DILTIAZEM HYDROCHLORIDE 180 MG: 180 CAPSULE, EXTENDED RELEASE ORAL at 10:11

## 2020-01-20 RX ADMIN — OXYCODONE 5 MG: 5 TABLET ORAL at 10:24

## 2020-01-20 ASSESSMENT — PAIN DESCRIPTION - PAIN TYPE
TYPE: CHRONIC PAIN
TYPE: ACUTE PAIN;SURGICAL PAIN
TYPE: CHRONIC PAIN
TYPE: CHRONIC PAIN

## 2020-01-20 ASSESSMENT — PAIN DESCRIPTION - ORIENTATION
ORIENTATION: LOWER;LEFT
ORIENTATION: LOWER
ORIENTATION: LEFT;LOWER
ORIENTATION: LEFT

## 2020-01-20 ASSESSMENT — PAIN SCALES - GENERAL
PAINLEVEL_OUTOF10: 0
PAINLEVEL_OUTOF10: 7
PAINLEVEL_OUTOF10: 6
PAINLEVEL_OUTOF10: 0
PAINLEVEL_OUTOF10: 8
PAINLEVEL_OUTOF10: 6
PAINLEVEL_OUTOF10: 6
PAINLEVEL_OUTOF10: 7
PAINLEVEL_OUTOF10: 0
PAINLEVEL_OUTOF10: 8
PAINLEVEL_OUTOF10: 6

## 2020-01-20 ASSESSMENT — PAIN DESCRIPTION - FREQUENCY
FREQUENCY: CONTINUOUS

## 2020-01-20 ASSESSMENT — PAIN DESCRIPTION - PROGRESSION
CLINICAL_PROGRESSION: NOT CHANGED

## 2020-01-20 ASSESSMENT — PAIN DESCRIPTION - ONSET
ONSET: ON-GOING

## 2020-01-20 ASSESSMENT — PAIN - FUNCTIONAL ASSESSMENT
PAIN_FUNCTIONAL_ASSESSMENT: PREVENTS OR INTERFERES SOME ACTIVE ACTIVITIES AND ADLS

## 2020-01-20 ASSESSMENT — PAIN DESCRIPTION - LOCATION
LOCATION: BACK;HAND
LOCATION: BACK
LOCATION: BACK;HAND
LOCATION: HAND;WRIST

## 2020-01-20 ASSESSMENT — PAIN DESCRIPTION - DESCRIPTORS
DESCRIPTORS: ACHING;JABBING
DESCRIPTORS: ACHING;CONSTANT;DISCOMFORT
DESCRIPTORS: ACHING;DISCOMFORT
DESCRIPTORS: ACHING;DISCOMFORT

## 2020-01-20 NOTE — PROGRESS NOTES
Mobility  Rolling: SBA  Supine to sit: SBA  Sit to supine: NT  Scooting: SBA to EOB Supervision all components Supervision all components Supervision Mod I   Transfers Sit to stand: Mod A  Stand to sit: Mod A  Stand pivot: Mod A no AD, hand on wheelchair Sit to stand: CGA  Stand to sit: Min A  Stand pivot: CGA with R hemicane; CGA no AD  Sit to stand: CGA  Stand to sit: CGA<>Min A  Stand pivot: CGA with R hemicane; CGA no AD  SBA  Supervision with AAD   Ambulation   NT, unable to ambulate 15 feet with R hemicane CGA 15 feet x2 with R hemicane CGA  50 feet with AAD with SBA   50 feet with Supervision   Walking 10 feet on uneven surface NT NT NT 10 feet with AAD with SBA 10 feet with AAD with Mod I   Wheel Chair Mobility  feet x2 with R extremities Mod I 400 feet x2 with R extremities Mod I 400 feet with R extremities Mod I 400 feet with R extremities Mod I   Car Transfers NT CGA NT SBA Mod I   Stair negotiation: ascended and descended NT NT - see comments NT 4 steps with 1 rail with SBA   4 steps with 1 rail SBA   Curb Step:   ascended and descended NT NT - see comments NT 4 inch step with AAD and SBA 7.5 inch step AAD with and Mod I   Picking up object off the floor NT NT NT Will  an object with SBA Will  an object with Mod I   BLE ROM WFL WFL         BLE Strength RLE: 4-/5  LLE: 3+/5 RLE: 4+/5  LLE: 4/5         Balance  Sitting: SBA  Standing: Min A no AD Sitting: SBA  Static standing: CGA   Dynamic standing: CGA<>Min A with hemicane        Date Family Teach Completed TBA 1/13/20, 1/20/20 Grandma hands on         Is additional Family Teaching Needed?   Y or N Yes IF requested by family/pt         Hindering Progress Pain, NWB LLE/LUE, psych complications Pain, NWB LLE/LUE, psych complications         PT recommended ELOS 2-3 weeks           Team's Discharge Plan             Therapist at Team Meeting                Therapeutic Exercise:   AM:  - Functional mobility as noted above in chart  PM:  - gym and the grandmother did not have any questions regarding the technique. AM  Time in: 0830  Time out: 0915  PM  Time in: 1515  Time out: 1600      Pt is making good progress toward established Physical Therapy goals. Continue with physical therapy current plan of care.     Russell Morris DPT  [id number]

## 2020-01-20 NOTE — PROGRESS NOTES
Occupational Therapy  OCCUPATIONAL THERAPY DAILY NOTE    Date:2020  Patient Name: Jaycob Fischer  MRN: [id number]  : 1995   Room: 200/1-A       Diagnosis:  Trauma  Injuries Sustained:    - Left midshaft humerus fracture, closed. - Left transscaphoid perilunate open fracture dislocation, grade 2, with extruded proximal scaphoid and lunate.  - Complex 3 cm open wound, volar wrist/hand.  - Left radial styloid fracture. - Left pelvic ring fracture disruption, lateral compression type 2 pattern, with large crescent fracture and complete fracture through ileum into the anterior superior sacroiliac joint with comminution.  - Left anterior pelvic ring fracture, junctional rami, superiorly, and  comminuted inferior rami into symphysial body   - Right foot 1st digit distal phalanx avulsion fx  - T12 burst fracture     Procedures this admission:    19:    I&D Left Wrist, Placement of External Fixator  19:    ORIF Left Pelvis w/ Dr. Ilsa Corbin  12-10-19:  T10-L1 Posterior Lateral Fusion w/ Dr. Tyler Oreilly   19:  Left Humerus ORIF w/ Dr. Ilsa Corbin    Precautions: falls, NWB to LLE and LUE, Surgical Shoe to R Foot, spinal precautions, TLSO when OOB or HOB at or > 45 degrees,  , L Wrist Brace. Can do PROM exercises to digits of left hand but maintain NWB. Functional Assessment:   Date Status AE  Comments   Feeding 20 Setup      Grooming 20 Set up     Bathing 20 S/U     UB Dressing 20 Min A          MAX A (TLSO)          Pt. Rolls side to side to assist with donning of TLSO. LB Dressing 20 Max A  Sock aide    Homemaking 20  MIN A  W/c       Functional Transfers / Balance:   Date Status DME  Comments   Sit Balance 20  SBA     Stand Balance 20  Min A Dawit cane    [x] Tub  [] Shower   Transfer 20  Min A  Extended tub bench, dawit cane    Commode   Transfer 20  Min A BSC over standard toilet, dawit cane  Ambulating with dawit cane. RECOMMENDATIONS  Recommended DME:    Post Discharge Care:   []Home Independently  []Home with 24hr Care / Supervision []Home with Partial Supervision []Home with Home Health OT []Home with Out Pt OT []Other: ___   Comments:         Time in Time out Tx Time Breakdown  Variance:   First Session  0915 1000 [x] Individual Tx- 15 mins  [x] Concurrent Tx -30 mins  [] Co-Tx -   [] Group Tx -   [] Time Missed -     Second Session [id number] [x] Individual Tx- 45  min  [] Concurrent Tx -  [] Co-Tx -   [] Group Tx -   [] Time Missed -     Third Session    [] Individual Tx-   [] Concurrent Tx -  [] Co-Tx -   [] Group Tx -   [] Time Missed -         Total Tx Time: 90 mins        Reid De La Cruz Rising CARTAGENA/L [id number]  I have read the above note and agree with the documentation.   Francis Sharma OTR/L [id number]

## 2020-01-20 NOTE — PROGRESS NOTES
Nutrition Assessment (Low Risk)    Type and Reason for Visit: Reassess    Nutrition Recommendations: Continue current diet and ONS as ordered. Will continue to monitor and follow. Nutrition Assessment:  Patient assessed for nutritional risk. Deemed to be at low risk at this time. Will continue to monitor for changes in status. patient continues w/ stable nutritional status w/ good po intake and consuming ONS. will continue to monitor and follow.      Malnutrition Assessment:  · Malnutrition Status: No malnutrition    Nutrition Risk Level   Risk Level: Low    Nutrition Diagnosis:   · Problem: Increased nutrient needs  · Etiology: Increased demand for energy/nutrients(2/2 healing )    Signs and symptoms: Presence of wounds    Nutrition Intervention:  Food and/or Delivery: Continue current diet, Continue current ONS  Nutrition Education/Counseling/Coordination of Care:  Continued Inpatient Monitoring, Coordination of Care, No recommendations at this time      Electronically signed by Maribeth Bryson RD, LD on 1/20/20 at 1:09 PM    Contact Number: x [phone]

## 2020-01-20 NOTE — PROGRESS NOTES
01/20/20 1321   Attendance   Activity Games  ([facility])   Participation Active participation   Therapeutic Recreation   Community Reintegration Demonstrates ability to complete social goals   Social Skills Demonstrates ability to listen to others   Leisure Education Demonstrates knowledge of benefits of leisure involvement  Klever Greco identified forgot about pain as a benefit.)   Time Spent With Patient   Minutes 40

## 2020-01-21 PROCEDURE — 97110 THERAPEUTIC EXERCISES: CPT

## 2020-01-21 PROCEDURE — 6370000000 HC RX 637 (ALT 250 FOR IP): Performed by: INTERNAL MEDICINE

## 2020-01-21 PROCEDURE — 6370000000 HC RX 637 (ALT 250 FOR IP): Performed by: NURSE PRACTITIONER

## 2020-01-21 PROCEDURE — 97530 THERAPEUTIC ACTIVITIES: CPT

## 2020-01-21 PROCEDURE — 97535 SELF CARE MNGMENT TRAINING: CPT

## 2020-01-21 PROCEDURE — 6370000000 HC RX 637 (ALT 250 FOR IP): Performed by: PHYSICAL MEDICINE & REHABILITATION

## 2020-01-21 PROCEDURE — 1280000000 HC REHAB R&B

## 2020-01-21 RX ADMIN — METHOCARBAMOL TABLETS 1000 MG: 500 TABLET, COATED ORAL at 16:51

## 2020-01-21 RX ADMIN — PANTOPRAZOLE SODIUM 40 MG: 40 TABLET, DELAYED RELEASE ORAL at 06:57

## 2020-01-21 RX ADMIN — QUETIAPINE FUMARATE 400 MG: 200 TABLET ORAL at 21:06

## 2020-01-21 RX ADMIN — OXYCODONE 5 MG: 5 TABLET ORAL at 16:51

## 2020-01-21 RX ADMIN — METHOCARBAMOL TABLETS 1000 MG: 500 TABLET, COATED ORAL at 21:02

## 2020-01-21 RX ADMIN — OXYCODONE 5 MG: 5 TABLET ORAL at 21:02

## 2020-01-21 RX ADMIN — SENNOSIDES 17.2 MG: 8.6 TABLET, FILM COATED ORAL at 21:01

## 2020-01-21 RX ADMIN — DOCUSATE SODIUM 200 MG: 100 CAPSULE, LIQUID FILLED ORAL at 08:53

## 2020-01-21 RX ADMIN — OXYCODONE 5 MG: 5 TABLET ORAL at 00:08

## 2020-01-21 RX ADMIN — OXYCODONE 5 MG: 5 TABLET ORAL at 06:57

## 2020-01-21 RX ADMIN — METHOCARBAMOL TABLETS 1000 MG: 500 TABLET, COATED ORAL at 08:53

## 2020-01-21 RX ADMIN — DILTIAZEM HYDROCHLORIDE 180 MG: 180 CAPSULE, EXTENDED RELEASE ORAL at 08:52

## 2020-01-21 RX ADMIN — MELATONIN 3 MG ORAL TABLET 3 MG: 3 TABLET ORAL at 21:02

## 2020-01-21 RX ADMIN — CALCITONIN SALMON 1 SPRAY: 200 SPRAY, METERED NASAL at 08:55

## 2020-01-21 RX ADMIN — DOCUSATE SODIUM 200 MG: 100 CAPSULE, LIQUID FILLED ORAL at 21:02

## 2020-01-21 RX ADMIN — OXYCODONE 5 MG: 5 TABLET ORAL at 11:57

## 2020-01-21 RX ADMIN — METHOCARBAMOL TABLETS 1000 MG: 500 TABLET, COATED ORAL at 13:32

## 2020-01-21 ASSESSMENT — PAIN DESCRIPTION - FREQUENCY
FREQUENCY: CONTINUOUS
FREQUENCY: CONTINUOUS

## 2020-01-21 ASSESSMENT — PAIN SCALES - GENERAL
PAINLEVEL_OUTOF10: 8
PAINLEVEL_OUTOF10: 8
PAINLEVEL_OUTOF10: 10
PAINLEVEL_OUTOF10: 8
PAINLEVEL_OUTOF10: 4
PAINLEVEL_OUTOF10: 3
PAINLEVEL_OUTOF10: 8
PAINLEVEL_OUTOF10: 0

## 2020-01-21 ASSESSMENT — PAIN DESCRIPTION - ONSET
ONSET: ON-GOING
ONSET: ON-GOING

## 2020-01-21 ASSESSMENT — PAIN DESCRIPTION - PROGRESSION
CLINICAL_PROGRESSION: NOT CHANGED
CLINICAL_PROGRESSION: NOT CHANGED

## 2020-01-21 ASSESSMENT — PAIN DESCRIPTION - ORIENTATION
ORIENTATION: LEFT
ORIENTATION: LEFT

## 2020-01-21 ASSESSMENT — PAIN DESCRIPTION - DESCRIPTORS
DESCRIPTORS: ACHING;CONSTANT;DISCOMFORT
DESCRIPTORS: ACHING

## 2020-01-21 ASSESSMENT — PAIN DESCRIPTION - PAIN TYPE
TYPE: CHRONIC PAIN
TYPE: CHRONIC PAIN

## 2020-01-21 ASSESSMENT — PAIN DESCRIPTION - LOCATION
LOCATION: HAND
LOCATION: HAND

## 2020-01-21 NOTE — PROGRESS NOTES
Physical Therapy  Facility/Department: [address] [facility]  Daily Treatment Note  NAME: Corrina Zamorano  : 1995  MRN: [id number]    Date of Service: 2020    Evaluating Therapist: Arlyn Castro DPT     ROOM: [facility]  DIAGNOSIS: Multiple trauma  PRECAUTIONS: Falls, spinal precautions, TLSO when OOB or HOB at or >45 degrees, NWB LUE, ex fix LUE, suicide precautions, NWB LLE, WBAT RLE with post op shoe   PROCEDURE(S): 53/1/10 Application of left wrist spanning external fixation, 19 Left posterior pelvis fracture disruption through ilium and sacroiliac joint open reduction and internal fixation,12/10/19 T10-L1 posterior lateral fusion, 19 L humerus ORIF  HPI: Pt presented to ED on 19 sustaining multiple injuries from jumping off an overpass with suicide intentions. Injuries sustained are as followed:              - Left midshaft humerus fracture, closed. - Left transscaphoid perilunate open fracture dislocation, grade 2, with extruded proximal scaphoid and lunate. - Complex 3 cm open wound, volar wrist/hand.             - Left radial styloid fracture. - Left pelvic ring fracture disruption, lateral compression type 2 pattern, with large     crescent fracture and complete fracture through ileum into the anterior superior sacroiliac joint with      comminution.             - Left anterior pelvic ring fracture, junctional rami, superiorly, and             comminuted inferior rami into symphysial body              - T12 burst fracture     Social:  Pt lives with his grandparents in a 2 floor plan with 2 steps and 1 rail to enter. Bed/bathroom is on 2nd floor with a flight of stairs and 1 rail. Prior to admission pt was independent with no AD.                 Initial Evaluation  1/3/20 AM     PM    Short Term Goals Long Term Goals    Was pt agreeable to Eval/treatment? yes yes yes       Does pt have pain?  8/10 LUE pain, 10/10 low back pain LBP  LBP       Bed Mobility  Rolling: SBA  Supine to sit: SBA  Sit to supine: NT  Scooting: SBA to EOB Supervision all components Supervision all components Supervision Mod I   Transfers Sit to stand: Mod A  Stand to sit: Mod A  Stand pivot: Mod A no AD, hand on wheelchair Sit to stand: CGA  Stand to sit: CGA<>Min A  Stand pivot: CGA with R hemicane; CGA no AD  Sit to stand: CGA  Stand to sit: CGA<>Min A  Stand pivot: CGA with R hemicane; CGA no AD  SBA  Supervision with AAD   Ambulation   NT, unable to ambulate 20 feet with R hemicane CGA NT  50 feet with AAD with SBA   50 feet with Supervision   Walking 10 feet on uneven surface NT NT NT 10 feet with AAD with SBA 10 feet with AAD with Mod I   Wheel Chair Mobility  feet x2 with R extremities Mod I 400 feet x2 with R extremities Mod I 400 feet with R extremities Mod I 400 feet with R extremities Mod I   Car Transfers NT NT NT SBA Mod I   Stair negotiation: ascended and descended NT NT 4 steps with tub bench Min A 4 steps with 1 rail with SBA   4 steps with 1 rail SBA   Curb Step:   ascended and descended NT 7.5 inch curb step with a stand to sit to chair CGA NT 4 inch step with AAD and SBA 7.5 inch step AAD with and Mod I   Picking up object off the floor NT NT NT Will  an object with SBA Will  an object with Mod I   BLE ROM WFL WFL         BLE Strength RLE: 4-/5  LLE: 3+/5 RLE: 4+/5  LLE: 4/5         Balance  Sitting: SBA  Standing: Min A no AD Sitting: SBA  Static standing: CGA   Dynamic standing: CGA<>Min A with hemicane        Date Family Teach Completed TBA 1/13/20, 1/20/20 Grandma hands on         Is additional Family Teaching Needed?   Y or N Yes IF requested by family/pt         Hindering Progress Pain, NWB LLE/LUE, psych complications Pain, NWB LLE/LUE, psych complications         PT recommended ELOS 2-3 weeks           Team's Discharge Plan             Therapist at Team Meeting                Therapeutic Exercise:   AM:  - Functional mobility as noted above in chart  - Supine BLE hamstring stretching x3 minutes/side  - Supine BLE PROM to tolerance x3 minutes/side  PM:  - Functional mobility as noted above in chart  - Seated single leg band hamstring curl 2x15/side  - LAQ x10/side with 2lb ankle weight  - Stand pivot x2 reps with R hemicane      Patient education  Pt educated on safety with functional mobility, simulating entrance into home    Patient response to education:   Pt verbalized understanding Pt demonstrated skill Pt requires further education in this area   yes With cues/assistance yes     Additional Comments: Pt continues to report significant levels of LBP prior to, during, and after PT sessions. Pt practiced a simulated way to get into pt's house in PT gym this morning with good understanding of concept. Pt performed stair negotiation with tub bench and required assistance to help pt stand and steady himself. Pt continues to have impairments in strength, safety, and functional mobility at this time which should improve with continued PT services upon discharge, specifically once pt's NWB precautions are removed. Equipment recommendation: Pt would benefit from a wheelchair at discharge as this is the safest means of mobility for the pt at this time. Pt is NWB on the LLE and LUE and this significantly impairs pt's ability to ambulate. Pt is Mod Independent with wheelchair mobility. AM  Time in: 0830  Time out: 0915  PM  Time in: 1515  Time out: 1600      Pt is making good progress toward established Physical Therapy goals. Continue with physical therapy current plan of care.     Petty Corea DPT  [id number]

## 2020-01-21 NOTE — CARE COORDINATION
Pt. Inquired with MARCO ANTONIO about changing his OP Psych Counselor. He goes to [facility]. [phone]. MARCO ANTONIO consulted with Yesica Sun, [address] [place] [address]. Due to the fact his counseling was court ordered, he needs to handle switching on his own after he consults with the .     Juanita Cano  1/21/2020

## 2020-01-21 NOTE — PROGRESS NOTES
Mod I   Car Transfers NT CGA NT SBA Mod I   Stair negotiation: ascended and descended NT NT - see comments NT 4 steps with 1 rail with SBA   4 steps with 1 rail SBA   Curb Step:   ascended and descended NT NT - see comments NT 4 inch step with AAD and SBA 7.5 inch step AAD with and Mod I   Picking up object off the floor NT NT NT Will  an object with SBA Will  an object with Mod I   BLE ROM WFL WFL         BLE Strength RLE: 4-/5  LLE: 3+/5 RLE: 4+/5  LLE: 4/5         Balance  Sitting: SBA  Standing: Min A no AD Sitting: SBA  Static standing: CGA   Dynamic standing: CGA<>Min A with hemicane               Scheduled Meds:   nicotine  1 patch Transdermal Daily    calcitonin  1 spray Alternating Nares Daily    methocarbamol  1,000 mg Oral 4x Daily    docusate sodium  200 mg Oral BID    diltiazem  180 mg Oral Daily    lidocaine  1 patch Transdermal Daily    senna  2 tablet Oral Nightly    benztropine  1 mg Oral BID    carBAMazepine  300 mg Oral BID    mineral oil-hydrophilic petrolatum   Topical Daily    pantoprazole  40 mg Oral QAM AC    polyethylene glycol  17 g Oral BID    QUEtiapine  400 mg Oral BID    venlafaxine  37.5 mg Oral Daily with breakfast    vitamin D  50,000 Units Oral Weekly     Continuous Infusions:  PRN Meds:aluminum & magnesium hydroxide-simethicone, ipratropium-albuterol, oxyCODONE, hydrOXYzine, melatonin, acetaminophen, magnesium hydroxide, ammonium lactate  I/O last 3 completed shifts: In: 12 [P.O.:960]  Out: 2550 [Urine:2550]  I/O this shift:  In: 240 [P.O.:240]  Out: 650 [Urine:650]    Labs reviewed  CBC: No results for input(s): WBC, HGB, PLT in the last 72 hours. BMP:  No results for input(s): NA, K, CL, CO2, BUN, CREATININE, GLUCOSE in the last 72 hours. Hepatic: No results for input(s): AST, ALT, ALB, BILITOT, ALKPHOS in the last 72 hours. BNP: No results for input(s): BNP in the last 72 hours.   Lipids: No results for input(s): CHOL, HDL in the last 72 hours.    Invalid input(s): LDLCALCU  INR: No results for input(s): INR in the last 72 hours. Assessment/  Patient Active Problem List:     Trauma     Injury resulting from fall from height     Closed displaced fracture of pelvis ([address]     Shock following injury ([address]     Pneumothorax, traumatic     Suicidal behavior with attempted self-injury ([address]     Respiratory failure following trauma ([address]     T12 burst fracture ([address]     Closed fracture of shaft of left humerus     Transscaphoid perilunate fracture dislocation of left wrist, open, initial encounter     Displaced fracture of left radial styloid process, initial encounter for closed fracture     Open comminuted fracture of waist of scaphoid bone of left wrist, initial encounter     Depression with suicidal ideation     Schizoaffective disorder, bipolar type ([address]     Bipolar 1 disorder ([address]     Uncontrolled hypertension     Tachycardia     Multiple trauma      Plan/  1. Continue rehab program with focus on improved mobility and independence  2. Continue dvt prophylaxis  3. Continue pain control  4. Maintain precautions as per ortho  5.  Continue psychiatric medications - note he is refusing medications at times          Isela Solano MD

## 2020-01-21 NOTE — PROGRESS NOTES
Occupational Therapy  OCCUPATIONAL THERAPY DAILY NOTE    Date:2020  Patient Name: Early Harada  MRN: [id number]  : 1995   Room: 200/1-A       Diagnosis:  Trauma  Injuries Sustained:    - Left midshaft humerus fracture, closed. - Left transscaphoid perilunate open fracture dislocation, grade 2, with extruded proximal scaphoid and lunate.  - Complex 3 cm open wound, volar wrist/hand.  - Left radial styloid fracture. - Left pelvic ring fracture disruption, lateral compression type 2 pattern, with large crescent fracture and complete fracture through ileum into the anterior superior sacroiliac joint with comminution.  - Left anterior pelvic ring fracture, junctional rami, superiorly, and  comminuted inferior rami into symphysial body   - Right foot 1st digit distal phalanx avulsion fx  - T12 burst fracture     Procedures this admission:    19:    I&D Left Wrist, Placement of External Fixator  19:    ORIF Left Pelvis w/ Dr. Monica Blair  12-10-19:  T10-L1 Posterior Lateral Fusion w/ Dr. Dominique Glass   19:  Left Humerus ORIF w/ Dr. Monica Blair    Precautions: falls, NWB to LLE and LUE, Surgical Shoe to R Foot, spinal precautions, TLSO when OOB or HOB at or > 45 degrees,  , L Wrist Brace. Can do PROM exercises to digits of left hand but maintain NWB. Functional Assessment:   Date Status AE  Comments   Feeding 20 Setup      Grooming 20 Set up  Set up to wipe off face/hands with wet wipe while in bed. Bathing 20 S/U     UB Dressing 20 CGA/min assist          MAX A (TLSO)  Pt donned t- shirt while in bed needing assist to pull garment down over back and left side. Pt. Rolls side to side to assist with donning of TLSO.     LB Dressing 20 Max A  Sock aide Max assist to [employer] post op shoe and L slipper while seated on EOB   Homemaking 20  MIN A  W/c level  And reacher Pt used reacher to  paper towels off floor using one handed skills and opening/closing coffee cup cabinet. Pt able to retrieve items from fridge needing occ cues to lock w/c brakes for safety. Pt able to retrieve ice using cup with one handed skills due to NWB LUE. Functional Transfers / Balance:   Date Status DME  Comments   Sit Balance 1/21/20  SBA  Sitting balance on EOB and up in w/c    Stand Balance 1/21/20  CGA/Min A Dawit cane Standing balance with transfers from bed<>w/c following NWB LLE and one cue for maintaining NWB LUE. [x] Tub  [] Shower   Transfer 1/16/20  Min A  Extended tub bench, dawit cane    Commode   Transfer 1/20/20  Min A BSC over standard toilet, dawit cane     Functional   Mobility     1/20/20 1/21/20 Min A            Sup     Dawit cane            W/c propelling           Pt able to propel w/c using RUE only              Other: bed mobility - rolling      Supine<>sit          EOB <> w/c       Sit<>stand    1/21/20 1/21/20 1/21/20 1/21/20   SBA       MIN  A         Min assist      Min assist                 Hand Held Assist      HHA  Pt able to roll side to side in bed to julius/doff TLSO back brace   Min assist with supine<> EOB transfers due to NWB LUE/LLE and EOB<>w/c due to precautions. Functional Exercises / Activity:  AM: Pt engaged in simple kitchen/hmkg mobility training at w/c level due to NWB LLE/LUE and used reacher to open/close cabinet and  paper towels off floor needing min assist with vc's for safety. Pt tolerated LUE gentle ROM ex's for digits only per protocol with no pain reported. Pt completed w/c>bed transfers needing min assist for safety and NWB LLE and LUE including sit to supine. PM: Pt completed simple grooming, UB/LB dressing in and on EOB to increase ADL skills. Pt engaged in RUE ex's during pet therapy to increase strength/endurance for ADL;s, mobility and transfers.    Pt completed supine>EOB and EOB into w/c using HHA with occ cues for pacing self and NWB

## 2020-01-22 ENCOUNTER — APPOINTMENT (OUTPATIENT)
Dept: GENERAL RADIOLOGY | Age: 25
DRG: 862 | End: 2020-01-22
Attending: PHYSICAL MEDICINE & REHABILITATION
Payer: MEDICAID

## 2020-01-22 PROCEDURE — 97530 THERAPEUTIC ACTIVITIES: CPT | Performed by: OCCUPATIONAL THERAPY ASSISTANT

## 2020-01-22 PROCEDURE — 6370000000 HC RX 637 (ALT 250 FOR IP): Performed by: PHYSICAL MEDICINE & REHABILITATION

## 2020-01-22 PROCEDURE — 73110 X-RAY EXAM OF WRIST: CPT

## 2020-01-22 PROCEDURE — 6370000000 HC RX 637 (ALT 250 FOR IP): Performed by: NURSE PRACTITIONER

## 2020-01-22 PROCEDURE — 72170 X-RAY EXAM OF PELVIS: CPT

## 2020-01-22 PROCEDURE — 73060 X-RAY EXAM OF HUMERUS: CPT

## 2020-01-22 PROCEDURE — 97110 THERAPEUTIC EXERCISES: CPT

## 2020-01-22 PROCEDURE — 99253 IP/OBS CNSLTJ NEW/EST LOW 45: CPT | Performed by: PSYCHIATRY & NEUROLOGY

## 2020-01-22 PROCEDURE — 1280000000 HC REHAB R&B

## 2020-01-22 PROCEDURE — 97530 THERAPEUTIC ACTIVITIES: CPT

## 2020-01-22 PROCEDURE — 97110 THERAPEUTIC EXERCISES: CPT | Performed by: OCCUPATIONAL THERAPY ASSISTANT

## 2020-01-22 PROCEDURE — 6370000000 HC RX 637 (ALT 250 FOR IP): Performed by: INTERNAL MEDICINE

## 2020-01-22 RX ADMIN — OXYCODONE 5 MG: 5 TABLET ORAL at 16:27

## 2020-01-22 RX ADMIN — OXYCODONE 5 MG: 5 TABLET ORAL at 11:08

## 2020-01-22 RX ADMIN — SENNOSIDES 17.2 MG: 8.6 TABLET, FILM COATED ORAL at 21:34

## 2020-01-22 RX ADMIN — OXYCODONE 5 MG: 5 TABLET ORAL at 21:34

## 2020-01-22 RX ADMIN — OXYCODONE 5 MG: 5 TABLET ORAL at 06:56

## 2020-01-22 RX ADMIN — DILTIAZEM HYDROCHLORIDE 180 MG: 180 CAPSULE, EXTENDED RELEASE ORAL at 10:41

## 2020-01-22 RX ADMIN — METHOCARBAMOL TABLETS 1000 MG: 500 TABLET, COATED ORAL at 21:34

## 2020-01-22 RX ADMIN — METHOCARBAMOL TABLETS 1000 MG: 500 TABLET, COATED ORAL at 08:45

## 2020-01-22 RX ADMIN — QUETIAPINE FUMARATE 400 MG: 200 TABLET ORAL at 21:34

## 2020-01-22 RX ADMIN — MELATONIN 3 MG ORAL TABLET 3 MG: 3 TABLET ORAL at 21:34

## 2020-01-22 RX ADMIN — DOCUSATE SODIUM 200 MG: 100 CAPSULE, LIQUID FILLED ORAL at 21:34

## 2020-01-22 RX ADMIN — CALCITONIN SALMON 1 SPRAY: 200 SPRAY, METERED NASAL at 08:49

## 2020-01-22 RX ADMIN — PANTOPRAZOLE SODIUM 40 MG: 40 TABLET, DELAYED RELEASE ORAL at 06:56

## 2020-01-22 RX ADMIN — METHOCARBAMOL TABLETS 1000 MG: 500 TABLET, COATED ORAL at 16:27

## 2020-01-22 RX ADMIN — OXYCODONE 5 MG: 5 TABLET ORAL at 02:41

## 2020-01-22 RX ADMIN — METHOCARBAMOL TABLETS 1000 MG: 500 TABLET, COATED ORAL at 13:00

## 2020-01-22 RX ADMIN — DOCUSATE SODIUM 200 MG: 100 CAPSULE, LIQUID FILLED ORAL at 08:43

## 2020-01-22 ASSESSMENT — PAIN SCALES - GENERAL
PAINLEVEL_OUTOF10: 6
PAINLEVEL_OUTOF10: 8
PAINLEVEL_OUTOF10: 6
PAINLEVEL_OUTOF10: 8
PAINLEVEL_OUTOF10: 7
PAINLEVEL_OUTOF10: 8
PAINLEVEL_OUTOF10: 6
PAINLEVEL_OUTOF10: 5
PAINLEVEL_OUTOF10: 8
PAINLEVEL_OUTOF10: 5

## 2020-01-22 ASSESSMENT — PAIN DESCRIPTION - PAIN TYPE
TYPE: CHRONIC PAIN
TYPE: CHRONIC PAIN
TYPE: CHRONIC PAIN;SURGICAL PAIN
TYPE: CHRONIC PAIN;SURGICAL PAIN

## 2020-01-22 ASSESSMENT — PAIN DESCRIPTION - FREQUENCY
FREQUENCY: CONTINUOUS

## 2020-01-22 ASSESSMENT — PAIN DESCRIPTION - DESCRIPTORS
DESCRIPTORS: ACHING;DISCOMFORT;OTHER (COMMENT)
DESCRIPTORS: ACHING;DISCOMFORT
DESCRIPTORS: ACHING;DISCOMFORT
DESCRIPTORS: ACHING;CONSTANT;DISCOMFORT

## 2020-01-22 ASSESSMENT — PAIN DESCRIPTION - ORIENTATION
ORIENTATION: LEFT

## 2020-01-22 ASSESSMENT — PAIN DESCRIPTION - LOCATION
LOCATION: HAND

## 2020-01-22 ASSESSMENT — PAIN DESCRIPTION - PROGRESSION
CLINICAL_PROGRESSION: NOT CHANGED
CLINICAL_PROGRESSION: NOT CHANGED

## 2020-01-22 ASSESSMENT — PAIN DESCRIPTION - ONSET
ONSET: ON-GOING

## 2020-01-22 ASSESSMENT — PAIN - FUNCTIONAL ASSESSMENT
PAIN_FUNCTIONAL_ASSESSMENT: ACTIVITIES ARE NOT PREVENTED
PAIN_FUNCTIONAL_ASSESSMENT: PREVENTS OR INTERFERES SOME ACTIVE ACTIVITIES AND ADLS

## 2020-01-22 ASSESSMENT — PAIN DESCRIPTION - DIRECTION: RADIATING_TOWARDS: LUE

## 2020-01-22 NOTE — PROGRESS NOTES
Progress Note    Subjective/   25y.o. year old male on the rehab unit for multiple trauma. Today notes some pain in his (L) hip. Per therapist he was quite active yesterday. No numbness or tingling. Bowel and bladder OK. Objective/   VITALS:  BP (!) 146/64   Pulse 95   Temp 98.2 °F (36.8 °C) (Temporal)   Resp 18   Ht 5' 9\" (1.753 m)   Wt 163 lb 9.6 oz (74.2 kg)   SpO2 99%   BMI 24.16 kg/m²   24HR INTAKE/OUTPUT:      Intake/Output Summary (Last 24 hours) at 1/21/2020 2027  Last data filed at 1/21/2020 1800  Gross per 24 hour   Intake 1080 ml   Output 1725 ml   Net -645 ml     Constitutional:  Alert, awake, no apparent distress   Cardiovascular:  S1, S2 without m/r/g   Respiratory:  CTA B without w/r/r   Abdomen: +BS  Ext: no pitting LE edema, splint (L) UE  Neuro: aaox3, no tremor. Flat affect persists    Functional Level      Date   Status   AE    Comments     Feeding   1/18/20   Setup              Grooming   1/21/20   Set up       Set up to wipe off face/hands with wet wipe while in bed. Bathing   1/13/20   S/U             UB Dressing   1/21/20 1/21/20 CGA/min assist                MAX A (TLSO)     Pt donned t- shirt while in bed needing assist to pull garment down over back and left side.            Pt. Rolls side to side to assist with donning of TLSO. LB Dressing   1/21/20   Max A    Sock aide   Max assist to [employer] post op shoe and L slipper while seated on EOB     Homemaking   1/21/20    MIN A    W/c level  And reacher   Pt used reacher to  paper towels off floor using one handed skills and opening/closing coffee cup cabinet. Pt able to retrieve items from fridge needing occ cues to lock w/c brakes for safety.    Pt able to retrieve ice using cup with one handed skills due to NWB LUE.           Functional Transfers / Balance:      Date Status DME  Comments   Sit Balance 1/21/20  SBA   Sitting balance on EOB and up in w/c    Stand Balance 1/21/20  CGA/Min A Dawit cane Standing balance with transfers from bed<>w/c following NWB LLE and one cue for maintaining NWB LUE. [x]? Tub  []? Shower   Transfer 1/16/20  Min A  Extended tub bench, dawit cane     Commode   Transfer 1/20/20  Min A BSC over standard toilet, dawit cane      Functional   Mobility       1/20/20 1/21/20 Min A                 Sup       Dawit cane                 W/c propelling                Pt able to propel w/c using RUE only                   Other: bed mobility - rolling        Supine<>sit             EOB <> w/c         Sit<>stand     1/21/20 1/21/20 1/21/20 1/21/20    SBA         MIN  A            Min assist        Min assist                         Hand Held Assist        HHA  Pt able to roll side to side in bed to julius/doff TLSO back brace   Min assist with supine<> EOB transfers due to NWB LUE/LLE and EOB<>w/c due to precautions.       Functional Exercises / Activity:  AM: Pt engaged in simple kitchen/hmkg mobility training at w/c level due to NWB LLE/LUE and used reacher to open/close cabinet and  paper towels off floor needing min assist with vc's for safety. Pt tolerated LUE gentle ROM ex's for digits only per protocol with no pain reported. Pt completed w/c>bed transfers needing min assist for safety and NWB LLE and LUE including sit to supine. PM: Pt completed simple grooming, UB/LB dressing in and on EOB to increase ADL skills. Pt engaged in RUE ex's during pet therapy to increase strength/endurance for ADL;s, mobility and transfers.    Pt completed supine>EOB and EOB into w/c using HHA with occ cues for pacing self and NWB LUE/LLE.     Sensory / Neuromuscular Re-Education:        Cognitive Skills:    Status Comments   Problem   Solving Fair (+)     Memory good      Sequencing fair (+)     Safety Fair (+) Occ cues  to adhere to NWB status L UE/LE w/ ax/transfers            Scheduled Meds:   nicotine  1 patch Transdermal Daily    calcitonin

## 2020-01-22 NOTE — PROGRESS NOTES
Mobility  Rolling: SBA  Supine to sit: SBA  Sit to supine: NT  Scooting: SBA to EOB Supervision all components Supervision all components Supervision Mod I   Transfers Sit to stand: Mod A  Stand to sit: Mod A  Stand pivot: Mod A no AD, hand on wheelchair Sit to stand: CGA  Stand to sit: CGA<>Min A  Stand pivot: CGA with R hemicane; CGA no AD  Sit to stand: CGA  Stand to sit: CGA<>Min A  Stand pivot: CGA with R hemicane; CGA no AD  SBA  Supervision with AAD   Ambulation   NT, unable to ambulate 20 feet x2 reps with R hemicane CGA NT  50 feet with AAD with SBA   50 feet with Supervision   Walking 10 feet on uneven surface NT NT NT 10 feet with AAD with SBA 10 feet with AAD with Mod I   Wheel Chair Mobility  feet x2 with R extremities Mod I 400 feet x2 with R extremities Mod I 400 feet with R extremities Mod I 400 feet with R extremities Mod I   Car Transfers NT NT NT SBA Mod I   Stair negotiation: ascended and descended NT 4 steps with tub bench Min A NT 4 steps with 1 rail with SBA   4 steps with 1 rail SBA   Curb Step:   ascended and descended NT 7.5 inch curb step with a stand to sit to chair CGA NT 4 inch step with AAD and SBA 7.5 inch step AAD with and Mod I   Picking up object off the floor NT NT NT Will  an object with SBA Will  an object with Mod I   BLE ROM WFL WFL         BLE Strength RLE: 4-/5  LLE: 3+/5 RLE: 4+/5  LLE: 4/5         Balance  Sitting: SBA  Standing: Min A no AD Sitting: SBA  Static standing: CGA   Dynamic standing: CGA<>Min A with hemicane        Date Family Teach Completed TBA 1/13/20, 1/20/20 Grandma hands on         Is additional Family Teaching Needed?   Y or N Yes IF requested by family/pt         Hindering Progress Pain, NWB LLE/LUE, psych complications Pain, NWB LLE/LUE, psych complications         PT recommended ELOS 2-3 weeks           Team's Discharge Plan             Therapist at Team Meeting                Therapeutic Exercise:   AM:  - Functional mobility as

## 2020-01-22 NOTE — PROGRESS NOTES
Occupational Therapy  OCCUPATIONAL THERAPY DAILY NOTE    Date:2020  Patient Name: Corrina Zamorano  MRN: [id number]  : 1995   Room: [facility]/1-A       Diagnosis:  Trauma  Injuries Sustained:    - Left midshaft humerus fracture, closed. - Left transscaphoid perilunate open fracture dislocation, grade 2, with extruded proximal scaphoid and lunate.  - Complex 3 cm open wound, volar wrist/hand.  - Left radial styloid fracture. - Left pelvic ring fracture disruption, lateral compression type 2 pattern, with large crescent fracture and complete fracture through ileum into the anterior superior sacroiliac joint with comminution.  - Left anterior pelvic ring fracture, junctional rami, superiorly, and  comminuted inferior rami into symphysial body   - Right foot 1st digit distal phalanx avulsion fx  - T12 burst fracture     Procedures this admission:    19:    I&D Left Wrist, Placement of External Fixator  19:    ORIF Left Pelvis w/ Dr. Gloria Parker  12-10-19:  T10-L1 Posterior Lateral Fusion w/ Dr. Sandro Morris   19:  Left Humerus ORIF w/ Dr. Gloria Parker    Precautions: falls, NWB to LLE and LUE, Surgical Shoe to R Foot, spinal precautions, TLSO when OOB or HOB at or > 45 degrees,  , L Wrist Brace. Can do PROM exercises to digits of left hand but maintain NWB.     Functional Assessment:   Date Status AE  Comments   Feeding 20 Setup      Grooming 20 Set up     Bathing 20 S/U     UB Dressing 20 CGA/min assist          MAX A (TLSO)        Pt rolled side to side and attempted to strap TLSO    LB Dressing 20 Min  A-shorts and R sock  Max A- L sock and both shoes     Homemaking 20  MIN A  W/c level  And reacher      Functional Transfers / Balance:   Date Status DME  Comments   Sit Balance 20  SBA     Stand Balance 20  CGA Dawit cane    [x] Tub  [] Shower   Transfer 20  Min A  Extended tub bench, dawit cane    Commode   Transfer 20  Min A [facility] over standard toilet, dawit cane     Functional   Mobility     1/20/20 1/21/20 Min A            Sup     Dawit cane            W/c propelling    Other: bed mobility - rolling      Supine<>sit          EOB <> w/c       Sit<>stand    1/21/20 1/21/20 1/21/20 1/21/20   SBA       MIN  A         Min assist      Min assist                 Hand Held Assist      HHA       Functional Exercises / Activity:  Power hand gripper 35# to increase R hand strength. 50 reps  Func standing ax to increase bal/end while performing FM leisure ax to increase [facility]. Bal-F end- 4-5 mins at a time. Retrograde massage to decrease edema and increase AROM of L hand. PROM ex's to all digits of L hand to increase ROM and prevent contractures. Dumbbell ex's with 3# wt to increase AROM and strength of RUE. 10 reps x3. Sensory / Neuromuscular Re-Education:      Cognitive Skills:   Status Comments   Problem   Solving Fair (+)    Memory good     Sequencing fair (+)    Safety Fair (+)       Visual Perception:    Education:  Pt. Educated on safety maintaining NWB LUE/LLE during supine<> EOB and EOB <>w/c and bed repositioning. [x] Family teach completed on: 1/13/2020: pt and grandmother present participated/observed in tub transfer, bed mobility, LE dressing techniques. Pt/grandmother demo'd fair understanding of safety techniques during transfers. Pain Level: 7/10 in left wrist     Additional Notes:       Patient has made fair  progress during treatment sessions toward set goals. Therapy emphasis to obtain goals:Current Treatment Recommendations: Safety Education & Training, Balance Training, Self-Care / ADL, Patient/Caregiver Education & Training, Strengthening, Functional Mobility Training, Equipment Evaluation, Education, & procurement, Home Management Training, Neuromuscular Re-education, Endurance Training**      [x] Continue with current OT Plan of care.   [] Prepare for Discharge     DISCHARGE RECOMMENDATIONS  Recommended DME:    Post Discharge Care:   []Home Independently  []Home with 24hr Care / Supervision []Home with Partial Supervision []Home with Home Health OT []Home with Out Pt OT []Other: ___   Comments:         Time in Time out Tx Time Breakdown  Variance:   First Session  0915 1000 [] Individual Tx-   [x] Concurrent Tx -45 mins  [] Co-Tx -   [] Group Tx -   [] Time Missed -     Second Session [id number] [x] Individual Tx- 45  min  [] Concurrent Tx -  [] Co-Tx -   [] Group Tx -   [] Time Missed -     Third Session    [] Individual Tx-   [] Concurrent Tx -  [] Co-Tx -   [] Group Tx -   [] Time Missed -         Total Tx Time: 90 mins    Alesha Moreno CARTAGENA/L [id number]      I have read & agree with the above status.     Sander Gardner OTR/L [id number]

## 2020-01-23 VITALS
TEMPERATURE: 97.9 F | BODY MASS INDEX: 24.23 KG/M2 | WEIGHT: 163.6 LBS | DIASTOLIC BLOOD PRESSURE: 86 MMHG | RESPIRATION RATE: 16 BRPM | HEIGHT: 69 IN | OXYGEN SATURATION: 98 % | SYSTOLIC BLOOD PRESSURE: 145 MMHG | HEART RATE: 90 BPM

## 2020-01-23 PROBLEM — I10 UNCONTROLLED HYPERTENSION: Status: RESOLVED | Noted: 2020-01-01 | Resolved: 2020-01-23

## 2020-01-23 PROBLEM — R00.0 TACHYCARDIA: Status: RESOLVED | Noted: 2020-01-01 | Resolved: 2020-01-23

## 2020-01-23 PROCEDURE — 97535 SELF CARE MNGMENT TRAINING: CPT

## 2020-01-23 PROCEDURE — 6370000000 HC RX 637 (ALT 250 FOR IP): Performed by: PHYSICAL MEDICINE & REHABILITATION

## 2020-01-23 PROCEDURE — 6370000000 HC RX 637 (ALT 250 FOR IP): Performed by: NURSE PRACTITIONER

## 2020-01-23 PROCEDURE — 6370000000 HC RX 637 (ALT 250 FOR IP): Performed by: INTERNAL MEDICINE

## 2020-01-23 PROCEDURE — 97110 THERAPEUTIC EXERCISES: CPT

## 2020-01-23 PROCEDURE — 97530 THERAPEUTIC ACTIVITIES: CPT

## 2020-01-23 RX ORDER — QUETIAPINE FUMARATE 200 MG/1
400 TABLET, FILM COATED ORAL 2 TIMES DAILY
Qty: 60 TABLET | Refills: 3 | Status: SHIPPED | OUTPATIENT
Start: 2020-01-23 | End: 2020-02-28 | Stop reason: ALTCHOICE

## 2020-01-23 RX ORDER — OXYCODONE HYDROCHLORIDE 5 MG/1
5 TABLET ORAL EVERY 4 HOURS PRN
Qty: 42 TABLET | Refills: 0 | Status: SHIPPED | OUTPATIENT
Start: 2020-01-23 | End: 2020-02-03 | Stop reason: SDUPTHER

## 2020-01-23 RX ORDER — BENZTROPINE MESYLATE 1 MG/1
1 TABLET ORAL 2 TIMES DAILY
Qty: 60 TABLET | Refills: 3 | Status: SHIPPED | OUTPATIENT
Start: 2020-01-23 | End: 2020-01-23

## 2020-01-23 RX ORDER — AMMONIUM LACTATE 12 G/100G
LOTION TOPICAL
Qty: 1 BOTTLE | Refills: 0 | Status: SHIPPED | OUTPATIENT
Start: 2020-01-23 | End: 2020-02-28 | Stop reason: ALTCHOICE

## 2020-01-23 RX ORDER — PANTOPRAZOLE SODIUM 40 MG/1
40 TABLET, DELAYED RELEASE ORAL
Qty: 30 TABLET | Refills: 1 | Status: SHIPPED | OUTPATIENT
Start: 2020-01-24 | End: 2020-02-28 | Stop reason: ALTCHOICE

## 2020-01-23 RX ORDER — SENNA PLUS 8.6 MG/1
2 TABLET ORAL NIGHTLY
Qty: 60 TABLET | Refills: 0 | Status: SHIPPED | OUTPATIENT
Start: 2020-01-23 | End: 2020-02-22

## 2020-01-23 RX ORDER — DILTIAZEM HYDROCHLORIDE 180 MG/1
180 CAPSULE, COATED, EXTENDED RELEASE ORAL DAILY
Qty: 30 CAPSULE | Refills: 3 | Status: SHIPPED | OUTPATIENT
Start: 2020-01-24

## 2020-01-23 RX ORDER — PSEUDOEPHEDRINE HCL 30 MG
200 TABLET ORAL 2 TIMES DAILY
Qty: 60 CAPSULE | Refills: 0 | Status: SHIPPED | OUTPATIENT
Start: 2020-01-23 | End: 2020-02-28 | Stop reason: ALTCHOICE

## 2020-01-23 RX ORDER — CARBAMAZEPINE 100 MG/1
300 TABLET, EXTENDED RELEASE ORAL 2 TIMES DAILY
Qty: 60 TABLET | Refills: 3 | Status: SHIPPED | OUTPATIENT
Start: 2020-01-23

## 2020-01-23 RX ORDER — LANOLIN ALCOHOL/MO/W.PET/CERES
3 CREAM (GRAM) TOPICAL NIGHTLY PRN
Refills: 3 | COMMUNITY
Start: 2020-01-23 | End: 2020-02-28 | Stop reason: ALTCHOICE

## 2020-01-23 RX ORDER — QUETIAPINE FUMARATE 200 MG/1
400 TABLET, FILM COATED ORAL 2 TIMES DAILY
Qty: 60 TABLET | Refills: 3 | Status: SHIPPED | OUTPATIENT
Start: 2020-01-23 | End: 2020-01-23

## 2020-01-23 RX ORDER — BENZTROPINE MESYLATE 1 MG/1
1 TABLET ORAL 2 TIMES DAILY
Qty: 60 TABLET | Refills: 3 | Status: SHIPPED | OUTPATIENT
Start: 2020-01-23 | End: 2020-02-28 | Stop reason: ALTCHOICE

## 2020-01-23 RX ORDER — METHOCARBAMOL 500 MG/1
1000 TABLET, FILM COATED ORAL 4 TIMES DAILY PRN
Qty: 80 TABLET | Refills: 0 | Status: SHIPPED | OUTPATIENT
Start: 2020-01-23 | End: 2020-02-12 | Stop reason: SDUPTHER

## 2020-01-23 RX ORDER — ERGOCALCIFEROL 1.25 MG/1
50000 CAPSULE ORAL WEEKLY
Qty: 5 CAPSULE | Refills: 1 | Status: SHIPPED | OUTPATIENT
Start: 2020-01-30 | End: 2020-09-11

## 2020-01-23 RX ORDER — CALCITONIN SALMON 200 [IU]/.09ML
1 SPRAY, METERED NASAL DAILY
Qty: 1 BOTTLE | Refills: 0 | Status: SHIPPED | OUTPATIENT
Start: 2020-01-24 | End: 2020-09-11

## 2020-01-23 RX ORDER — MAGNESIUM HYDROXIDE/ALUMINUM HYDROXICE/SIMETHICONE 120; 1200; 1200 MG/30ML; MG/30ML; MG/30ML
30 SUSPENSION ORAL EVERY 4 HOURS PRN
Qty: 1 BOTTLE | Refills: 0 | COMMUNITY
Start: 2020-01-23 | End: 2020-09-11

## 2020-01-23 RX ORDER — LIDOCAINE 4 G/G
1 PATCH TOPICAL DAILY
Qty: 30 PATCH | Refills: 1 | Status: SHIPPED | OUTPATIENT
Start: 2020-01-24 | End: 2020-02-28 | Stop reason: ALTCHOICE

## 2020-01-23 RX ORDER — VENLAFAXINE HYDROCHLORIDE 37.5 MG/1
37.5 CAPSULE, EXTENDED RELEASE ORAL
Qty: 30 CAPSULE | Refills: 3 | Status: SHIPPED | OUTPATIENT
Start: 2020-01-24

## 2020-01-23 RX ORDER — PETROLATUM 42 G/100G
OINTMENT TOPICAL
Qty: 1 TUBE | Refills: 5 | Status: SHIPPED | OUTPATIENT
Start: 2020-01-24 | End: 2020-02-28 | Stop reason: ALTCHOICE

## 2020-01-23 RX ORDER — HYDROXYZINE PAMOATE 50 MG/1
50 CAPSULE ORAL 3 TIMES DAILY PRN
Qty: 30 CAPSULE | Refills: 0 | Status: SHIPPED | OUTPATIENT
Start: 2020-01-23 | End: 2020-02-06

## 2020-01-23 RX ADMIN — OXYCODONE 5 MG: 5 TABLET ORAL at 07:18

## 2020-01-23 RX ADMIN — METHOCARBAMOL TABLETS 1000 MG: 500 TABLET, COATED ORAL at 08:07

## 2020-01-23 RX ADMIN — DOCUSATE SODIUM 200 MG: 100 CAPSULE, LIQUID FILLED ORAL at 08:07

## 2020-01-23 RX ADMIN — DILTIAZEM HYDROCHLORIDE 180 MG: 180 CAPSULE, EXTENDED RELEASE ORAL at 08:07

## 2020-01-23 RX ADMIN — PANTOPRAZOLE SODIUM 40 MG: 40 TABLET, DELAYED RELEASE ORAL at 07:18

## 2020-01-23 RX ADMIN — QUETIAPINE FUMARATE 400 MG: 200 TABLET ORAL at 08:07

## 2020-01-23 RX ADMIN — CALCITONIN SALMON 1 SPRAY: 200 SPRAY, METERED NASAL at 08:07

## 2020-01-23 ASSESSMENT — PAIN DESCRIPTION - PROGRESSION
CLINICAL_PROGRESSION: NOT CHANGED
CLINICAL_PROGRESSION: NOT CHANGED

## 2020-01-23 ASSESSMENT — PAIN DESCRIPTION - LOCATION: LOCATION: HAND

## 2020-01-23 ASSESSMENT — PAIN DESCRIPTION - DESCRIPTORS: DESCRIPTORS: ACHING;DISCOMFORT

## 2020-01-23 ASSESSMENT — PAIN DESCRIPTION - FREQUENCY: FREQUENCY: CONTINUOUS

## 2020-01-23 ASSESSMENT — PAIN DESCRIPTION - ONSET: ONSET: ON-GOING

## 2020-01-23 ASSESSMENT — PAIN DESCRIPTION - ORIENTATION: ORIENTATION: LEFT

## 2020-01-23 ASSESSMENT — PAIN DESCRIPTION - PAIN TYPE: TYPE: CHRONIC PAIN

## 2020-01-23 ASSESSMENT — PAIN SCALES - GENERAL
PAINLEVEL_OUTOF10: 0
PAINLEVEL_OUTOF10: 8

## 2020-01-23 ASSESSMENT — PAIN - FUNCTIONAL ASSESSMENT: PAIN_FUNCTIONAL_ASSESSMENT: PREVENTS OR INTERFERES SOME ACTIVE ACTIVITIES AND ADLS

## 2020-01-23 NOTE — DISCHARGE SUMMARY
Transfers  Toilet - Technique: Stand pivot  Toilet Transfer: Dependent/Total  ADL  Feeding: Setup  Grooming: Moderate assistance  UE Bathing: Maximum assistance  LE Bathing: Maximum assistance  UE Dressing: Moderate assistance  LE Dressing: Maximum assistance  Coordination  Movements Are Fluid And Coordinated: Yes  Vision - Basic Assessment  Prior Vision: No visual deficits  Cognition  Overall Cognitive Status: WFL  Perception  Overall Perceptual Status: WFL  Sensation  Overall Sensation Status: WFL         LUE AROM (degrees)  LUE AROM : Exceptions  LUE General AROM: L wrist in brace all digits held in flexed contracture pattern   RUE AROM (degrees)  RUE AROM : WFL  LUE Strength  LUE Strength Comment: 3+/5  RUE Strength  RUE Strength Comment: 4/5  Hand Dominance  Hand Dominance: Right       Discharge Functional Status:      Initial Evaluation  1/3/20 1/23/20     Comments    Short Term Goals Long Term Goals    Bed Mobility  Rolling: SBA  Supine to sit: SBA  Sit to supine: NT  Scooting: SBA to EOB Supervision  Maintained neutral spine  Supervision Mod I   Transfers Sit to stand: Mod A  Stand to sit: Mod A  Stand pivot:  Mod A no AD, hand on wheelchair Sit to stand: cgA  Stand to sit: cgA/Min A  Stand pivot: cgA with hemicane (to right side) Frequent cueing, impulsive SBA  Supervision with AAD   Ambulation   NT, unable to ambulate 20 feet x2 reps with R hemicane CGA Frequent cueing, impulsive, maintains NWB LLE  50 feet with AAD with SBA   50 feet with Supervision   Walking 10 feet on uneven surface NT NT Unsafe  10 feet with AAD with SBA 10 feet with AAD with Mod I   Wheel Chair Mobility  feet  with R extremities Mod I   400 feet with R extremities Mod I 400 feet with R extremities Mod I   Car Transfers NT CGA/Min A impulsive movements SBA Mod I   Stair negotiation: ascended and descended NT 4 steps with tub bench Min A Assistance in moving bench 4 steps with 1 rail with SBA   4 steps with 1 rail SBA   Curb Step: ascended and descended NT 7.5 inch curb step with a stand to sit to chair CGA Modified entrance into home 4 inch step with AAD and SBA 7.5 inch step AAD with and Mod I   Picking up object off the floor NT NT   Will  an object with SBA Will  an object with Mod I   BLE ROM WFL WFL         BLE Strength RLE: 4-/5  LLE: 3+/5 RLE: 4+/5  LLE: 4/5         Balance  Sitting: SBA  Standing: Min A no AD Sitting: SBA  Static standing: CGA   Dynamic standing: CGA<>Min A with hemicane              Date   Status   AE    Comments     Feeding   1/18/20   Setup              Grooming   1/21/20   Set up             Bathing   1/13/20   S/U             UB Dressing   1/23/20 1/21/20 SBA/CGA                MAX A (TLSO)        Pt donned t- shirt over TLSO threading LUE first needing SBA  then doffed garment requiring SBA/CGA      LB Dressing   1/21/20   Min  A-shorts and R sock    Max A- L sock and both shoes           Homemaking   1/23/20    CGA/MIN A    W/c level  And reacher   Pt completed fridge task retrieving water bottle while seated in w/c using one handed skills.           Functional Transfers / Balance:      Date Status DME  Comments   Sit Balance 1/23/20  SBA   Up in w/c during LUE ROM ex's and UB dressing. Stand Balance 1/22/20  CGA Dawit cane     [x]? Tub  []?  Shower   Transfer 1/16/20  Min A  Extended tub bench, dawit cane     Commode   Transfer 1/20/20  Min A BSC over standard toilet, dawit cane      Functional   Mobility       1/20/20 1/23/20 Min A                 Sup       Dawit cane                 W/c propelling                Pt able to propel w/c using RUE only    Other: bed mobility - rolling        Supine<>sit             EOB <> w/c         Sit<>stand     1/21/20 1/21/20 1/21/20 1/21/20    SBA         MIN  A            Min assist        Min assist                         Hand Held Assist        HHA         Functional Exercises / Activity:  LUE

## 2020-01-23 NOTE — PROGRESS NOTES
a hemicane during pivots and ambulation as this was the safest option for him at this time considering being NWB LUE/LLE. Pt demonstrated impulsive movements with functional mobility and has very poor carry over between sessions with cues/education given. Pt is unsafe with functional mobility and requires hands on assistance to ensure safety. Pt practice a modified set up to enter pt's home in PT gym with pt's grandmother present and participating in the activity as she will be the primary caregiver. Pt will utilize a first floor set up when returning home which is in the pt's best interest and safety as he is unsafe at this time negotiating the required steps to get upstairs to his room. Pt will have a hospital bed delivered to his home to accommodate this. Pt was discharged with a wheelchair as he is unable to ambulate safely nor can he ambulate a far distance. Pt should continue with PT services after discharge to continue to improve pt's safety with functional mobility. Pt was unable to meet his goals that were established at the initial eval as he was unsafe with functional mobility and requires hands on assistance.      Bhaskar Angel, DPT  [id number]

## 2020-01-23 NOTE — CONSULTS
Cognition was intact. ASSESSMENT:    1.  Schizoaffective disorder by history. 2.  History of polysubstance abuse. PLAN:    1. Continue current medication including the psychotropic medication  that he is on. He seems to be doing pretty well and stable. 2.  The patient needed a psychiatric followup appointment upon  Discharge. 3.  Prior to the discharge, please talk with the patient's family  regarding safety issues. If the patient's family expresses some safety  concern, please give us a call to reevaluate. If the patient's family  does not express any safety concern, the patient should be able to go  home. At this moment, the patient does not meet the criteria for inpatient  psychiatric hospitalization. We will sign off on this patient. If  there is any question or concern, please give us a call.         Denver Burns MD    D: 01/22/2020 20:11:03       T: 01/22/2020 20:17:45     [email or web address]  Job#: [id number]     Doc#: [id number]    CC:

## 2020-01-23 NOTE — PROGRESS NOTES
Mobility  Rolling: SBA  Supine to sit: SBA  Sit to supine: NT  Scooting: SBA to EOB Supervision   Supervision Mod I   Transfers Sit to stand: Mod A  Stand to sit: Mod A  Stand pivot: Mod A no AD, hand on wheelchair Sit to stand: cgA  Stand to sit: cgA/Min A  Stand pivot: cgA with hemicane (to right side)  SBA  Supervision with AAD   Ambulation   NT, unable to ambulate NT   50 feet with AAD with SBA   50 feet with Supervision   Walking 10 feet on uneven surface NT NT  10 feet with AAD with SBA 10 feet with AAD with Mod I   Wheel Chair Mobility  feet  with R extremities Mod I  400 feet with R extremities Mod I 400 feet with R extremities Mod I   Car Transfers NT NT  SBA Mod I   Stair negotiation: ascended and descended NT   4 steps with 1 rail with SBA   4 steps with 1 rail SBA   Curb Step:   ascended and descended NT   4 inch step with AAD and SBA 7.5 inch step AAD with and Mod I   Picking up object off the floor NT NT  Will  an object with SBA Will  an object with Mod I   BLE ROM WFL WFL        BLE Strength RLE: 4-/5  LLE: 3+/5 RLE: 4+/5  LLE: 4/5        Balance  Sitting: SBA  Standing: Min A no AD Sitting: SBA  Static standing: CGA   Dynamic standing: CGA<>Min A with hemicane        Date Family Teach Completed TBA 1/13/20, 1/20/20 Grandma hands on        Is additional Family Teaching Needed?   Y or N Yes IF requested by family/pt        Hindering Progress Pain, NWB LLE/LUE, psych complications Pain, NWB LLE/LUE, psych complications         PT recommended ELOS 2-3 weeks           Team's Discharge Plan             Therapist at Team Meeting                Therapeutic Exercise:   AM: supine SAQ, GS, AP x30  Stretching to tolerance within precautions x3 minutes each LE    PM:    Patient education  Pt educated on safety with SPT with hemicane, eccentric control from standing, log roll technique    Patient response to education:   Pt verbalized understanding Pt demonstrated skill Pt requires further

## 2020-01-23 NOTE — PROGRESS NOTES
CLINICAL PHARMACY NOTE: MEDS TO [address] Select Patient?: No  Total # of Prescriptions Filled: 13   The following medications were delivered to the patient:  · Carbamazepine er 100  · Senna 0.6  · Diltiazem er 180  · Benztropine 1  · Ammonium lactate 12%  · aquaphor ointment  · Stool softner 100  · Hydroxyzine pamoate 50  · Vitamin d 45113  · Methocarbamol 500  · Quetiapine 400  · Oxycodone 5  · Pantoprazole 40  Total # of Interventions Completed: 3  Time Spent (min): 30    Additional Documentation:  Refill too soon: venlafaxine  Calcitonin nasal spray  Nicotine patches  Not covered:lidocaine patch

## 2020-01-23 NOTE — PROGRESS NOTES
Discharge instructions given and explained to patient and patient's grandmother. Both demonstrated understanding of material given. Medications delivered by outpatient pharmacy. All belongings accounted for.

## 2020-01-23 NOTE — PROGRESS NOTES
Progress Note    Subjective/   25y.o. year old male on the rehab unit for multiple trauma. He is seen in OT working with therapist to improve ROM and reduce swelling of the (L) hand. He is ready for discharge tomorrow. Psychiatry is concerned about his refusal of medications and a new consult was ordered. Objective/   VITALS:  /75   Pulse 97   Temp 98 °F (36.7 °C) (Temporal)   Resp 18   Ht 5' 9\" (1.753 m)   Wt 163 lb 9.6 oz (74.2 kg)   SpO2 98%   BMI 24.16 kg/m²   24HR INTAKE/OUTPUT:      Intake/Output Summary (Last 24 hours) at 1/22/2020 2210  Last data filed at 1/22/2020 1820  Gross per 24 hour   Intake 240 ml   Output 900 ml   Net -660 ml     Constitutional:  Alert, awake, no apparent distress   Cardiovascular:  S1, S2 without m/r/g   Respiratory:  CTA B without w/r/r   Abdomen: +BS  Ext: no pitting LE edema, swelling (L) hand persists  Neuro: awake and alert, oriented x3    Functional Level                   Initial Evaluation  1/3/20 AM     PM    Short Term Goals Long Term Goals    Was pt agreeable to Eval/treatment? yes yes yes       Does pt have pain? 8/10 LUE pain, 10/10 low back pain LBP  LBP       Bed Mobility  Rolling: SBA  Supine to sit: SBA  Sit to supine: NT  Scooting: SBA to EOB Supervision all components Supervision all components Supervision Mod I   Transfers Sit to stand: Mod A  Stand to sit: Mod A  Stand pivot:  Mod A no AD, hand on wheelchair Sit to stand: CGA  Stand to sit: CGA<>Min A  Stand pivot: CGA with R hemicane; CGA no AD  Sit to stand: CGA  Stand to sit: CGA<>Min A  Stand pivot: CGA with R hemicane; CGA no AD  SBA  Supervision with AAD   Ambulation   NT, unable to ambulate 20 feet x2 reps with R hemicane CGA NT  50 feet with AAD with SBA   50 feet with Supervision   Walking 10 feet on uneven surface NT NT NT 10 feet with AAD with SBA 10 feet with AAD with Mod I   Wheel Chair Mobility  feet x2 with R extremities Mod I 400 feet x2 with R extremities Mod I 400 feet with R extremities Mod I 400 feet with R extremities Mod I   Car Transfers NT NT NT SBA Mod I   Stair negotiation: ascended and descended NT 4 steps with tub bench Min A NT 4 steps with 1 rail with SBA   4 steps with 1 rail SBA   Curb Step:   ascended and descended NT 7.5 inch curb step with a stand to sit to chair CGA NT 4 inch step with AAD and SBA 7.5 inch step AAD with and Mod I   Picking up object off the floor NT NT NT Will  an object with SBA Will  an object with Mod I   BLE ROM WFL WFL         BLE Strength RLE: 4-/5  LLE: 3+/5 RLE: 4+/5  LLE: 4/5         Balance  Sitting: SBA  Standing: Min A no AD Sitting: SBA  Static standing: CGA   Dynamic standing: CGA<>Min A with hemicane               Scheduled Meds:   nicotine  1 patch Transdermal Daily    calcitonin  1 spray Alternating Nares Daily    methocarbamol  1,000 mg Oral 4x Daily    docusate sodium  200 mg Oral BID    diltiazem  180 mg Oral Daily    lidocaine  1 patch Transdermal Daily    senna  2 tablet Oral Nightly    benztropine  1 mg Oral BID    carBAMazepine  300 mg Oral BID    mineral oil-hydrophilic petrolatum   Topical Daily    pantoprazole  40 mg Oral QAM AC    polyethylene glycol  17 g Oral BID    QUEtiapine  400 mg Oral BID    venlafaxine  37.5 mg Oral Daily with breakfast    vitamin D  50,000 Units Oral Weekly     Continuous Infusions:  PRN Meds:aluminum & magnesium hydroxide-simethicone, ipratropium-albuterol, oxyCODONE, hydrOXYzine, melatonin, acetaminophen, magnesium hydroxide, ammonium lactate  I/O last 3 completed shifts: In: 600 [P.O.:600]  Out: 4188 [Urine:1575]  I/O this shift:  In: -   Out: 500 [Urine:500]    Labs reviewed  CBC: No results for input(s): WBC, HGB, PLT in the last 72 hours. BMP:  No results for input(s): NA, K, CL, CO2, BUN, CREATININE, GLUCOSE in the last 72 hours. Hepatic: No results for input(s): AST, ALT, ALB, BILITOT, ALKPHOS in the last 72 hours.   BNP: No results for input(s): BNP in the last 72 hours. Lipids: No results for input(s): CHOL, HDL in the last 72 hours. Invalid input(s): LDLCALCU  INR: No results for input(s): INR in the last 72 hours. Assessment/  Patient Active Problem List:     Trauma     Injury resulting from fall from height     Closed displaced fracture of pelvis ([address]     Shock following injury ([address]     Pneumothorax, traumatic     Suicidal behavior with attempted self-injury ([address]     Respiratory failure following trauma ([address]     T12 burst fracture ([address]     Closed fracture of shaft of left humerus     Transscaphoid perilunate fracture dislocation of left wrist, open, initial encounter     Displaced fracture of left radial styloid process, initial encounter for closed fracture     Open comminuted fracture of waist of scaphoid bone of left wrist, initial encounter     Depression with suicidal ideation     Schizoaffective disorder, bipolar type ([address]     Bipolar 1 disorder ([address]     Uncontrolled hypertension     Tachycardia     Multiple trauma      Plan/  1. No change in medications  2. Psychiatry to re-evaluate and address non-compliance  3. Continue rehab program  4. Continue focus on improved mobility   5.  For discharge tomorrow        Kira Lopez MD

## 2020-01-27 ENCOUNTER — TELEPHONE (OUTPATIENT)
Dept: ORTHOPEDIC SURGERY | Age: 25
End: 2020-01-27

## 2020-01-27 NOTE — TELEPHONE ENCOUNTER
Martie Cogan went out this past weekend to evaluate patient. They will see him 3x's a week for two weeks, then 2x a week for one week. He had three falls at home on Saturday. He didn't injure himself. He is having a difficult time maintain NWB on LLE. He has very poor motor planing ability. She is unsure if it is because of his attention span. He is a risk for falls. She encouraged him to use wheelchair at home for mobility. She talked with his grandparents who are his caregivers. They will do their best to keep him safe at home and NWB on LUE and LLE. Any questions call [phone].

## 2020-01-31 ENCOUNTER — TELEPHONE (OUTPATIENT)
Dept: ORTHOPEDIC SURGERY | Age: 25
End: 2020-01-31

## 2020-01-31 NOTE — TELEPHONE ENCOUNTER
Please draft letter for signature.   Electronically signed by Margarita Taylor PA-C on 1/31/2020 at 10:43 AM

## 2020-02-03 ENCOUNTER — TELEPHONE (OUTPATIENT)
Dept: NEUROSURGERY | Age: 25
End: 2020-02-03

## 2020-02-03 RX ORDER — OXYCODONE HYDROCHLORIDE 5 MG/1
5 TABLET ORAL EVERY 4 HOURS PRN
Qty: 42 TABLET | Refills: 0 | Status: SHIPPED | OUTPATIENT
Start: 2020-02-03 | End: 2020-02-12 | Stop reason: SDUPTHER

## 2020-02-03 NOTE — TELEPHONE ENCOUNTER
Patient has been out of pain medication. Please send refill to [facility] in [facility].

## 2020-02-04 ENCOUNTER — TELEPHONE (OUTPATIENT)
Dept: ORTHOPEDIC SURGERY | Age: 25
End: 2020-02-04

## 2020-02-05 ENCOUNTER — TELEPHONE (OUTPATIENT)
Dept: ORTHOPEDIC SURGERY | Age: 25
End: 2020-02-05

## 2020-02-06 ENCOUNTER — OFFICE VISIT (OUTPATIENT)
Dept: ORTHOPEDIC SURGERY | Age: 25
End: 2020-02-06

## 2020-02-06 VITALS — HEART RATE: 80 BPM | SYSTOLIC BLOOD PRESSURE: 134 MMHG | DIASTOLIC BLOOD PRESSURE: 84 MMHG | RESPIRATION RATE: 16 BRPM

## 2020-02-06 PROCEDURE — 99024 POSTOP FOLLOW-UP VISIT: CPT | Performed by: ORTHOPAEDIC SURGERY

## 2020-02-12 ENCOUNTER — TELEPHONE (OUTPATIENT)
Dept: NEUROSURGERY | Age: 25
End: 2020-02-12

## 2020-02-12 RX ORDER — METHOCARBAMOL 500 MG/1
1000 TABLET, FILM COATED ORAL 4 TIMES DAILY PRN
Qty: 40 TABLET | Refills: 0 | Status: SHIPPED
Start: 2020-02-12 | End: 2020-02-20 | Stop reason: SDUPTHER

## 2020-02-12 RX ORDER — OXYCODONE HYDROCHLORIDE 5 MG/1
5 TABLET ORAL EVERY 4 HOURS PRN
Qty: 42 TABLET | Refills: 0 | Status: SHIPPED
Start: 2020-02-12 | End: 2020-02-20 | Stop reason: SDUPTHER

## 2020-02-18 ENCOUNTER — PREP FOR PROCEDURE (OUTPATIENT)
Dept: ORTHOPEDIC SURGERY | Age: 25
End: 2020-02-18

## 2020-02-18 RX ORDER — SODIUM CHLORIDE 0.9 % (FLUSH) 0.9 %
10 SYRINGE (ML) INJECTION PRN
Status: CANCELLED | OUTPATIENT
Start: 2020-02-18

## 2020-02-18 RX ORDER — SODIUM CHLORIDE 0.9 % (FLUSH) 0.9 %
10 SYRINGE (ML) INJECTION EVERY 12 HOURS SCHEDULED
Status: CANCELLED | OUTPATIENT
Start: 2020-02-18

## 2020-02-18 RX ORDER — SODIUM CHLORIDE 9 MG/ML
INJECTION, SOLUTION INTRAVENOUS CONTINUOUS
Status: CANCELLED | OUTPATIENT
Start: 2020-02-18

## 2020-02-20 ENCOUNTER — TELEPHONE (OUTPATIENT)
Dept: NEUROSURGERY | Age: 25
End: 2020-02-20

## 2020-02-20 RX ORDER — OXYCODONE HYDROCHLORIDE 5 MG/1
5 TABLET ORAL EVERY 4 HOURS PRN
Qty: 42 TABLET | Refills: 0 | Status: SHIPPED | OUTPATIENT
Start: 2020-02-20 | End: 2020-02-27

## 2020-02-20 RX ORDER — METHOCARBAMOL 500 MG/1
1000 TABLET, FILM COATED ORAL 4 TIMES DAILY PRN
Qty: 40 TABLET | Refills: 0 | Status: SHIPPED | OUTPATIENT
Start: 2020-02-20 | End: 2020-03-01

## 2020-02-20 NOTE — TELEPHONE ENCOUNTER
Patient requesting Oxycodone and Robaxin refills to [facility] [address]

## 2020-02-21 ENCOUNTER — TELEPHONE (OUTPATIENT)
Dept: ORTHOPEDIC SURGERY | Age: 25
End: 2020-02-21

## 2020-02-21 NOTE — TELEPHONE ENCOUNTER
Alena Leventhal would like to know how long patient is to be NWB on the left leg. He is not maintaining WB status.

## 2020-02-28 RX ORDER — OXYCODONE HYDROCHLORIDE 5 MG/1
5 CAPSULE ORAL 2 TIMES DAILY
Status: ON HOLD | COMMUNITY
End: 2020-03-04 | Stop reason: HOSPADM

## 2020-03-03 ENCOUNTER — OFFICE VISIT (OUTPATIENT)
Dept: NEUROSURGERY | Age: 25
End: 2020-03-03

## 2020-03-03 ENCOUNTER — HOSPITAL ENCOUNTER (OUTPATIENT)
Dept: GENERAL RADIOLOGY | Age: 25
Discharge: HOME | End: 2020-03-05
Payer: MEDICAID

## 2020-03-03 ENCOUNTER — HOSPITAL ENCOUNTER (OUTPATIENT)
Age: 25
Discharge: HOME | End: 2020-03-05
Payer: MEDICAID

## 2020-03-03 VITALS
WEIGHT: 182.8 LBS | HEART RATE: 93 BPM | DIASTOLIC BLOOD PRESSURE: 83 MMHG | BODY MASS INDEX: 27.08 KG/M2 | SYSTOLIC BLOOD PRESSURE: 128 MMHG | HEIGHT: 69 IN

## 2020-03-03 PROCEDURE — 72100 X-RAY EXAM L-S SPINE 2/3 VWS: CPT

## 2020-03-03 PROCEDURE — 72070 X-RAY EXAM THORAC SPINE 2VWS: CPT

## 2020-03-03 PROCEDURE — 99024 POSTOP FOLLOW-UP VISIT: CPT | Performed by: PHYSICIAN ASSISTANT

## 2020-03-03 NOTE — PROGRESS NOTES
Post Operative Follow-up    Patient is status post:  T12 fx, T10-L1 fx,  Minimal back pain. Physical Exam  Alert and Oriented X 3  PERRLA, EOMI  RITTER 5/5  Wound: C/D/I    A/P: patient is s/p T12 fx and T10-L1 fx.  X-rays are stable. D/c brace. No restrictions. F/u 9 months.

## 2020-03-04 ENCOUNTER — ANESTHESIA (OUTPATIENT)
Dept: OPERATING ROOM | Age: 25
End: 2020-03-04
Payer: MEDICAID

## 2020-03-04 ENCOUNTER — APPOINTMENT (OUTPATIENT)
Dept: GENERAL RADIOLOGY | Age: 25
End: 2020-03-04
Attending: ORTHOPAEDIC SURGERY
Payer: MEDICAID

## 2020-03-04 ENCOUNTER — ANESTHESIA EVENT (OUTPATIENT)
Dept: OPERATING ROOM | Age: 25
End: 2020-03-04
Payer: MEDICAID

## 2020-03-04 ENCOUNTER — HOSPITAL ENCOUNTER (OUTPATIENT)
Age: 25
Setting detail: OUTPATIENT SURGERY
Discharge: HOME | End: 2020-03-04
Attending: ORTHOPAEDIC SURGERY | Admitting: ORTHOPAEDIC SURGERY
Payer: MEDICAID

## 2020-03-04 VITALS
RESPIRATION RATE: 18 BRPM | SYSTOLIC BLOOD PRESSURE: 104 MMHG | OXYGEN SATURATION: 97 % | WEIGHT: 180 LBS | HEIGHT: 69 IN | DIASTOLIC BLOOD PRESSURE: 57 MMHG | TEMPERATURE: 97.6 F | HEART RATE: 72 BPM | BODY MASS INDEX: 26.66 KG/M2

## 2020-03-04 VITALS
RESPIRATION RATE: 18 BRPM | OXYGEN SATURATION: 99 % | DIASTOLIC BLOOD PRESSURE: 59 MMHG | SYSTOLIC BLOOD PRESSURE: 119 MMHG

## 2020-03-04 PROCEDURE — 3600000002 HC SURGERY LEVEL 2 BASE: Performed by: ORTHOPAEDIC SURGERY

## 2020-03-04 PROCEDURE — 6360000002 HC RX W HCPCS: Performed by: NURSE ANESTHETIST, CERTIFIED REGISTERED

## 2020-03-04 PROCEDURE — 2500000003 HC RX 250 WO HCPCS: Performed by: ORTHOPAEDIC SURGERY

## 2020-03-04 PROCEDURE — 7100000011 HC PHASE II RECOVERY - ADDTL 15 MIN: Performed by: ORTHOPAEDIC SURGERY

## 2020-03-04 PROCEDURE — 3209999900 FLUORO FOR SURGICAL PROCEDURES

## 2020-03-04 PROCEDURE — 6360000002 HC RX W HCPCS: Performed by: PHYSICIAN ASSISTANT

## 2020-03-04 PROCEDURE — 2500000003 HC RX 250 WO HCPCS: Performed by: NURSE ANESTHETIST, CERTIFIED REGISTERED

## 2020-03-04 PROCEDURE — 2580000003 HC RX 258: Performed by: PHYSICIAN ASSISTANT

## 2020-03-04 PROCEDURE — 6370000000 HC RX 637 (ALT 250 FOR IP): Performed by: ANESTHESIOLOGY

## 2020-03-04 PROCEDURE — 3700000001 HC ADD 15 MINUTES (ANESTHESIA): Performed by: ORTHOPAEDIC SURGERY

## 2020-03-04 PROCEDURE — 20680 REMOVAL OF IMPLANT DEEP: CPT | Performed by: ORTHOPAEDIC SURGERY

## 2020-03-04 PROCEDURE — 3600000012 HC SURGERY LEVEL 2 ADDTL 15MIN: Performed by: ORTHOPAEDIC SURGERY

## 2020-03-04 PROCEDURE — 3700000000 HC ANESTHESIA ATTENDED CARE: Performed by: ORTHOPAEDIC SURGERY

## 2020-03-04 PROCEDURE — 7100000010 HC PHASE II RECOVERY - FIRST 15 MIN: Performed by: ORTHOPAEDIC SURGERY

## 2020-03-04 PROCEDURE — 2709999900 HC NON-CHARGEABLE SUPPLY: Performed by: ORTHOPAEDIC SURGERY

## 2020-03-04 RX ORDER — LIDOCAINE HYDROCHLORIDE 20 MG/ML
INJECTION, SOLUTION EPIDURAL; INFILTRATION; INTRACAUDAL; PERINEURAL PRN
Status: DISCONTINUED | OUTPATIENT
Start: 2020-03-04 | End: 2020-03-04 | Stop reason: SDUPTHER

## 2020-03-04 RX ORDER — ONDANSETRON 2 MG/ML
INJECTION INTRAMUSCULAR; INTRAVENOUS PRN
Status: DISCONTINUED | OUTPATIENT
Start: 2020-03-04 | End: 2020-03-04 | Stop reason: SDUPTHER

## 2020-03-04 RX ORDER — PROPOFOL 10 MG/ML
INJECTION, EMULSION INTRAVENOUS PRN
Status: DISCONTINUED | OUTPATIENT
Start: 2020-03-04 | End: 2020-03-04 | Stop reason: SDUPTHER

## 2020-03-04 RX ORDER — SODIUM CHLORIDE 0.9 % (FLUSH) 0.9 %
10 SYRINGE (ML) INJECTION PRN
Status: DISCONTINUED | OUTPATIENT
Start: 2020-03-04 | End: 2020-03-04 | Stop reason: HOSPADM

## 2020-03-04 RX ORDER — PROPOFOL 10 MG/ML
INJECTION, EMULSION INTRAVENOUS CONTINUOUS PRN
Status: DISCONTINUED | OUTPATIENT
Start: 2020-03-04 | End: 2020-03-04 | Stop reason: SDUPTHER

## 2020-03-04 RX ORDER — SODIUM CHLORIDE 0.9 % (FLUSH) 0.9 %
10 SYRINGE (ML) INJECTION EVERY 12 HOURS SCHEDULED
Status: DISCONTINUED | OUTPATIENT
Start: 2020-03-04 | End: 2020-03-04 | Stop reason: HOSPADM

## 2020-03-04 RX ORDER — SODIUM CHLORIDE 9 MG/ML
INJECTION, SOLUTION INTRAVENOUS CONTINUOUS
Status: DISCONTINUED | OUTPATIENT
Start: 2020-03-04 | End: 2020-03-04 | Stop reason: HOSPADM

## 2020-03-04 RX ORDER — FENTANYL CITRATE 50 UG/ML
INJECTION, SOLUTION INTRAMUSCULAR; INTRAVENOUS PRN
Status: DISCONTINUED | OUTPATIENT
Start: 2020-03-04 | End: 2020-03-04 | Stop reason: SDUPTHER

## 2020-03-04 RX ORDER — HYDROCODONE BITARTRATE AND ACETAMINOPHEN 5; 325 MG/1; MG/1
1 TABLET ORAL EVERY 6 HOURS PRN
Qty: 15 TABLET | Refills: 0 | Status: SHIPPED | OUTPATIENT
Start: 2020-03-04 | End: 2020-04-16

## 2020-03-04 RX ORDER — MIDAZOLAM HYDROCHLORIDE 1 MG/ML
INJECTION INTRAMUSCULAR; INTRAVENOUS PRN
Status: DISCONTINUED | OUTPATIENT
Start: 2020-03-04 | End: 2020-03-04 | Stop reason: SDUPTHER

## 2020-03-04 RX ORDER — HYDROCODONE BITARTRATE AND ACETAMINOPHEN 5; 325 MG/1; MG/1
2 TABLET ORAL PRN
Status: COMPLETED | OUTPATIENT
Start: 2020-03-04 | End: 2020-03-04

## 2020-03-04 RX ORDER — CEFAZOLIN SODIUM 2 G/50ML
2 SOLUTION INTRAVENOUS
Status: COMPLETED | OUTPATIENT
Start: 2020-03-04 | End: 2020-03-04

## 2020-03-04 RX ORDER — LIDOCAINE HYDROCHLORIDE AND EPINEPHRINE 10; 10 MG/ML; UG/ML
INJECTION, SOLUTION INFILTRATION; PERINEURAL PRN
Status: DISCONTINUED | OUTPATIENT
Start: 2020-03-04 | End: 2020-03-04 | Stop reason: ALTCHOICE

## 2020-03-04 RX ORDER — HYDROCODONE BITARTRATE AND ACETAMINOPHEN 5; 325 MG/1; MG/1
1 TABLET ORAL PRN
Status: COMPLETED | OUTPATIENT
Start: 2020-03-04 | End: 2020-03-04

## 2020-03-04 RX ORDER — DEXAMETHASONE SODIUM PHOSPHATE 4 MG/ML
INJECTION, SOLUTION INTRA-ARTICULAR; INTRALESIONAL; INTRAMUSCULAR; INTRAVENOUS; SOFT TISSUE PRN
Status: DISCONTINUED | OUTPATIENT
Start: 2020-03-04 | End: 2020-03-04 | Stop reason: SDUPTHER

## 2020-03-04 RX ADMIN — PROPOFOL 100 MG: 10 INJECTION, EMULSION INTRAVENOUS at 08:24

## 2020-03-04 RX ADMIN — HYDROCODONE BITARTRATE AND ACETAMINOPHEN 1 TABLET: 5; 325 TABLET ORAL at 09:47

## 2020-03-04 RX ADMIN — FENTANYL CITRATE 75 MCG: 50 INJECTION, SOLUTION INTRAMUSCULAR; INTRAVENOUS at 08:24

## 2020-03-04 RX ADMIN — MIDAZOLAM 2 MG: 1 INJECTION INTRAMUSCULAR; INTRAVENOUS at 08:22

## 2020-03-04 RX ADMIN — LIDOCAINE HYDROCHLORIDE 40 MG: 20 INJECTION, SOLUTION EPIDURAL; INFILTRATION; INTRACAUDAL; PERINEURAL at 08:24

## 2020-03-04 RX ADMIN — PROPOFOL 130 MCG/KG/MIN: 10 INJECTION, EMULSION INTRAVENOUS at 08:24

## 2020-03-04 RX ADMIN — FENTANYL CITRATE 25 MCG: 50 INJECTION, SOLUTION INTRAMUSCULAR; INTRAVENOUS at 08:35

## 2020-03-04 RX ADMIN — ONDANSETRON HYDROCHLORIDE 4 MG: 2 INJECTION, SOLUTION INTRAMUSCULAR; INTRAVENOUS at 08:29

## 2020-03-04 RX ADMIN — CEFAZOLIN SODIUM 2 G: 2 SOLUTION INTRAVENOUS at 08:22

## 2020-03-04 RX ADMIN — DEXAMETHASONE SODIUM PHOSPHATE 10 MG: 4 INJECTION, SOLUTION INTRAMUSCULAR; INTRAVENOUS at 08:29

## 2020-03-04 RX ADMIN — SODIUM CHLORIDE: 9 INJECTION, SOLUTION INTRAVENOUS at 08:22

## 2020-03-04 ASSESSMENT — PULMONARY FUNCTION TESTS
PIF_VALUE: 1
PIF_VALUE: 0
PIF_VALUE: 1
PIF_VALUE: 0
PIF_VALUE: 1
PIF_VALUE: 0
PIF_VALUE: 1
PIF_VALUE: 0

## 2020-03-04 ASSESSMENT — PAIN DESCRIPTION - LOCATION
LOCATION: WRIST

## 2020-03-04 ASSESSMENT — PAIN SCALES - GENERAL
PAINLEVEL_OUTOF10: 7
PAINLEVEL_OUTOF10: 8
PAINLEVEL_OUTOF10: 7
PAINLEVEL_OUTOF10: 7

## 2020-03-04 ASSESSMENT — PAIN DESCRIPTION - ORIENTATION
ORIENTATION: LEFT

## 2020-03-04 ASSESSMENT — PAIN - FUNCTIONAL ASSESSMENT: PAIN_FUNCTIONAL_ASSESSMENT: 0-10

## 2020-03-04 ASSESSMENT — PAIN DESCRIPTION - PAIN TYPE
TYPE: SURGICAL PAIN
TYPE: SURGICAL PAIN

## 2020-03-04 NOTE — OP NOTE
brought in. Multiple fluoroscopic images in both the AP, lateral, and scaphoid views  as well as oblique views were used to confirm maintained carpal  alignment with wrist flexion and extension. Extension was improved to  about 30 degrees. No carpal instability was appreciated on gentle  stress testing. The scaphoid did appear to have good union of the ulnar  cortex. There still remained a deficiency of the radial cortex. The  wounds were copiously irrigated out. The tourniquet was deflated. Hemostasis achieved with bipolar cautery. Nylon sutures were placed  followed by a soft sterile dressing.         Brittany Bernard MD    D: 03/04/2020 8:56:58       T: 03/04/2020 9:03:55     [email or web address]  Job#: [id number]     Doc#: [id number]    CC:

## 2020-03-04 NOTE — H&P
Department of Orthopedic Surgery  History and Physical        CHIEF COMPLAINT:  Left wrist pain/stiffness     HISTORY OF PRESENT ILLNESS:          HPI: Alanis Villalobos is a 25y.o. year old  who presents for follow up of 7 Weeks S/P ORIF open left wrist transradial styloid, transcaphoid perilunate fracture dislocation with extrusion of the lunate. Patient is still having significant pain and swelling to the wrist. He is having stiffness in the hand. He has been working with PT but has not been doing well with the hand due to the pain and stiffness           Past Medical History:    Past Medical History             Diagnosis Date    B[facility] disorder ([facility]      Depression      Hypertension      Severe manic bipolar 1 disorder with psychotic behavior ([facility] 10/28/2017         Past Surgical History:    Past Surgical History             Procedure Laterality Date    APPLICATION MULTIPLANE UNILATERAL EXTERNAL FIXATION SYSTEM Left 12/5/2019     LEFT PELVIS OPEN REDUCTION INTERNAL FIXATION performed by DO Lety at [facility] HAND SURGERY Left 12/19/2019     LEFT HAND I & D, CARPAL TUNNEL WITH OPEN REDUCTION INTERNAL FIXATION SCAPHOID AND LUNATE performed by Luana Quinn MD at [address] [place] [address] Left 12/11/2019     LEFT HUMERUS OPEN REDUCTION INTERNAL FIXATION performed by DO Lety at [address] [place] [address] Dr AGUILAR/BOOM 12/10/2019     T10-L1  POSTERIOR LATERAL FUSION -- GLOBUS -- NEEDS VIANEY TABLE, O-ARM performed by Burt Calabrese MD at [facility] Left 12/5/2019     I & D LEFT WRIST WITH EXTERNAL FIXATOR performed by DO Lety at [address] [place] [address]         Current Medications:   Current Hospital Medications   No current facility-administered medications for this visit. Allergies:  Depakote [divalproex sodium]; Depakote [valproic acid]; Haldol [haloperidol]; Haldol [haloperidol];  Invega sustenna [paliperidone palmitate er]; Invega [paliperidone er]; and Invega [paliperidone]     Social History:   TOBACCO:   reports that he has been smoking cigarettes. He has never used smokeless tobacco.  ETOH:   reports no history of alcohol use. DRUGS:   reports current drug use. Drug: Marijuana. ACTIVITIES OF DAILY LIVING:    OCCUPATION:    Family History:   Family History   History reviewed. No pertinent family history.        REVIEW OF SYSTEMS:  CONSTITUTIONAL:  negative  EYES:  negative  HEENT:  negative  RESPIRATORY:  negative  CARDIOVASCULAR:  negative  GASTROINTESTINAL:  negative  GENITOURINARY:  negative  INTEGUMENT/BREAST:  negative  HEMATOLOGIC/LYMPHATIC:  negative  ALLERGIC/IMMUNOLOGIC:  negative  ENDOCRINE:  negative  MUSCULOSKELETAL:  Back, pelvis, and wrist surgery  NEUROLOGICAL:  negative  BEHAVIOR/PSYCH:  negative     PHYSICAL EXAM:    VITALS:  /84 (Site: Left Upper Arm, Position: Sitting, Cuff Size: Medium Adult)   Pulse 80   Resp 16   CONSTITUTIONAL:  awake, alert, cooperative, no apparent distress, and appears stated age  EYES:  Lids and lashes normal, pupils equal, round and reactive to light, extra ocular muscles intact, sclera clear, conjunctiva normal  ENT:  Normocephalic, without obvious abnormality, atraumatic, sinuses nontender on palpation, external ears without lesions, oral pharynx with moist mucus membranes, tonsils without erythema or exudates, gums normal and good dentition. NECK:  Supple, symmetrical, trachea midline, no adenopathy, thyroid symmetric, not enlarged and no tenderness, skin normal  LUNGS:  cta  CARDIOVASCULAR:  rrr  ABDOMEN:  soft  CHEST/BREASTS:  atruamtic  GENITAL/URINARY:  deferred  NEUROLOGIC:  Awake, alert, oriented to name, place and time. Cranial nerves II-XII are grossly intact. Motor is 5 out of 5 bilaterally. MUSCULOSKELETAL:     Physical exam:  Left upper extremity there is some purplish discoloration to the hand.  Patient is very stiff with motion of the finger and wrist. + TTP

## 2020-03-04 NOTE — LETTER
[facility]  [address] [facility] [address]  Phone: [phone]            March 4, 2020     Patient: Christie Freeman   YOB: 1995   Date of Visit: 3/4/2020       To Whom it May Concern:    Christie Freeman had surgery on 3/4/2020. His guardian Annie Posey was present. If you have any questions or concerns, please don't hesitate to call.     Sincerely,     Sunita Lowe RN

## 2020-03-04 NOTE — PROGRESS NOTES
Patient discharge instructions provided. VSS. Pain medication rx sent with patient. Nutrition provided. IV removed.  Patient and family have no further concerns at this time
products on the day of surgery    [x] If not already done, you can expect a call from registration    [x] You can expect a call the business day prior to procedure to notify you if your arrival time changes    [x] If you receive a survey after surgery we would greatly appreciate your comments    [] Parent/guardian of a minor must accompany their child and remain on the premises  the entire time they are under our care     [] Pediatric patients may bring favorite toy, blanket or comfort item with them    [] A caregiver or family member must remain with the patient during their stay if they are mentally handicapped, have dementia, disoriented or unable to use a call light or would be a safety concern if left unattended    [x] Please notify surgeon if you develop any illness between now and time of surgery (cold, cough, sore throat, fever, nausea, vomiting) or any signs of infections  including skin, wounds, and dental.    [x]  The Outpatient Pharmacy is available to fill your prescription here on your day of surgery, ask your preop nurse for details    [] Other instructions  EDUCATIONAL MATERIALS PROVIDED:    [] PAT Preoperative Education Packet/Booklet     [] Medication List    [] Fluoroscopy Information Pamphlet    [] Transfusion bracelet applied with instructions    [] Joint replacement video reviewed    [] Shower with soap, lather and rinse well, and use CHG wipes provided the evening before surgery as instructed

## 2020-03-04 NOTE — ANESTHESIA PRE PROCEDURE
GAURANG Porter        sodium chloride flush 0.9 % injection 10 mL  10 mL Intravenous PRN GAURANG Porter        HYDROcodone-acetaminophen [facility]) 5-325 MG per tablet 1 tablet  1 tablet Oral PRN Denver Andreas, MD        Or    HYDROcodone-acetaminophen ([facility]) 5-325 MG per tablet 2 tablet  2 tablet Oral PRN Denver Andreas, MD           Allergies: Allergies   Allergen Reactions    Depakote [Divalproex Sodium]     Depakote [Valproic Acid] Other (See Comments)     Feels bloated     Haldol [Haloperidol]      Stroke like symptoms per grandmother    Haldol [Haloperidol] Other (See Comments)     Intolerable side efffects    Invega Sustenna [Paliperidone Palmitate Er] Hives    Invega [Paliperidone Er]    [employer] [Paliperidone] Hives       Problem List:    Patient Active Problem List   Diagnosis Code    Acute psychosis ([address] F23    Schizoaffective disorder, bipolar type ([address] F25.0    Bipolar 1 disorder, manic, moderate ([address] F31.12    Severe manic bipolar 1 disorder with psychotic behavior ([address] F31.2    Anxiety state F41.1    Cannabis use disorder, severe, dependence ([facility]) F12.20    Bipolar 1 disorder (HCC) F31.9    Bipolar affective disorder, current episode mixed, without psychotic features ([address] F31.60    Drug overdose T50.901A    Seizure ([address] R56.9    Schizoaffective disorder (HCC) F25.9    Tobacco abuse Z72.0    Marijuana abuse F12.10    Trauma T14.90XA    Injury resulting from fall from height W17.89XA    Closed displaced fracture of pelvis ([address] S32. 9XXA    Shock following injury ([address] T79. 4XXA    Pneumothorax, traumatic S27. 0XXA    Suicidal behavior with attempted self-injury ([address] T14.91XA    Respiratory failure following trauma ([address] J96.90    T12 burst fracture ([address] S22.081A    Closed fracture of shaft of left humerus S42.302A    Transscaphoid perilunate fracture dislocation of left wrist, open, initial encounter S62.002B    Displaced fracture of left radial styloid process, initial encounter for closed fracture S52.512A    Open comminuted fracture of waist of scaphoid bone of left wrist, initial encounter S62.022B    Depression with suicidal ideation F32.9, R45.851    Schizoaffective disorder, bipolar type ([facility]) F25.0    Bipolar 1 disorder ([facility]) F31.9    Multiple trauma T07. Flora Alvarez       Past Medical History:        Diagnosis Date    [facility]     Depression     GERD (gastroesophageal reflux disease)     Hypertension     Severe manic bipolar 1 disorder with psychotic behavior ([facility] 10/28/2017    Suicide attempt ([facility] 12/2019    jumped off bridge, per grandmother not currently suicidal       Past Surgical History:        Procedure Laterality Date    APPLICATION MULTIPLANE UNILATERAL EXTERNAL FIXATION SYSTEM Left 12/5/2019    LEFT PELVIS OPEN REDUCTION INTERNAL FIXATION performed by Rianna Melgar DO at [facility] HAND SURGERY Left 12/19/2019    LEFT HAND I & D, CARPAL TUNNEL WITH OPEN REDUCTION INTERNAL FIXATION SCAPHOID AND LUNATE performed by Astrid Cabrera MD at [address] [place] [address] Left 12/11/2019    LEFT HUMERUS OPEN REDUCTION INTERNAL FIXATION performed by Rianna Melgar DO at [place] [address] N/A 12/10/2019    T10-L1  POSTERIOR LATERAL FUSION -- GLOBUS -- NEEDS VIANEY TABLE, O-ARM [facility] 12/5/2019    I & D LEFT WRIST WITH EXTERNAL FIXATOR performed by Rianna Melgar DO at [address] [place] [address] History:    Social History     Tobacco Use    Smoking status: Former Smoker     Types: Cigarettes    Smokeless tobacco: Never Used   Substance Use Topics    Alcohol use: Never     Frequency: Never                                Counseling given: Not Answered      Vital Signs (Current): There were no vitals filed for this visit.                                            BP Readings from Last 3 Encounters:   03/04/20 120/65   03/03/20

## 2020-03-05 ENCOUNTER — OFFICE VISIT (OUTPATIENT)
Dept: ORTHOPEDIC SURGERY | Age: 25
End: 2020-03-05
Payer: MEDICAID

## 2020-03-05 ENCOUNTER — HOSPITAL ENCOUNTER (OUTPATIENT)
Dept: GENERAL RADIOLOGY | Age: 25
Discharge: HOME | End: 2020-03-07
Payer: MEDICAID

## 2020-03-05 VITALS
DIASTOLIC BLOOD PRESSURE: 69 MMHG | WEIGHT: 180 LBS | HEART RATE: 83 BPM | BODY MASS INDEX: 26.66 KG/M2 | SYSTOLIC BLOOD PRESSURE: 116 MMHG | HEIGHT: 69 IN

## 2020-03-05 PROCEDURE — 99024 POSTOP FOLLOW-UP VISIT: CPT | Performed by: ORTHOPAEDIC SURGERY

## 2020-03-05 PROCEDURE — 73060 X-RAY EXAM OF HUMERUS: CPT

## 2020-03-05 PROCEDURE — 72190 X-RAY EXAM OF PELVIS: CPT

## 2020-03-05 PROCEDURE — 99212 OFFICE O/P EST SF 10 MIN: CPT | Performed by: ORTHOPAEDIC SURGERY

## 2020-03-05 NOTE — PATIENT INSTRUCTIONS
Weightbearing as tolerated to bilateral lower extremities  Restrictions to left hand as ordered by Dr. Griselda Saha for physical therapy written    Follow up 2 months  Call with questions or concerns

## 2020-03-11 ENCOUNTER — TELEPHONE (OUTPATIENT)
Dept: ORTHOPEDIC SURGERY | Age: 25
End: 2020-03-11

## 2020-03-11 NOTE — PROGRESS NOTES
Orthopaedic Clinic Note     DATE OF PROCEDURE:  12/06/2019     OPERATIVE SURGEON:  Taylor Michaels DO     ASSISTANT:  Magaly Woo DO; Viviana Aranda, DO     PREOPERATIVE DIAGNOSES:  1.  Polytrauma. 2.  Left midshaft humerus fracture, closed. 3.  Left transscaphoid perilunate open fracture dislocation, grade 2,  with extruded proximal scaphoid and lunate. 4.  Complex 3 cm open wound, volar wrist/hand. 5.  Left radial styloid fracture. 6.  Left pelvic ring fracture disruption, lateral compression type 2  pattern, with large crescent fracture and complete fracture through  ileum into the anterior superior sacroiliac joint with comminution. 7.  Left anterior pelvic ring fracture, junctional rami, superiorly, and  comminuted inferior rami into symphysial body.     POSTOPERATIVE DIAGNOSES:  1.  Polytrauma. 2.  Left midshaft humerus fracture, closed. 3.  Left transscaphoid perilunate open fracture dislocation, grade 2,  with extruded proximal scaphoid and lunate. 4.  Complex 3 cm open wound, volar wrist/hand. 5.  Left radial styloid fracture. 6.  Left pelvic ring fracture disruption, lateral compression type 2  pattern, with large crescent fracture and complete fracture through  ileum into the anterior superior sacroiliac joint with comminution. 7.  Left anterior pelvic ring fracture, junctional rami, superiorly, and  comminuted inferior rami into symphysial body. 8.  FDS tendon disruption to index and middle fingers, myotendinous  junction.     PROCEDURES:  1. Excisional irrigation, debridement of open fracture site, left  radiocarpal joint including skin, subcu tissue, muscle and bone. 2.  Open treatment, left transscaphoid perilunate open fracture  dislocation. 3.  Application of left wrist spanning external fixation. 4.  Closure of complex 3 cm wound, left volar wrist/hand. 5.  Left posterior pelvis fracture disruption through ilium and  sacroiliac joint open reduction and internal fixation.     DATE OF PROCEDURE:  12/11/2019     OPERATING SURGEON:  Myranda Hernandez DO     ASSISTANTS:  Ravinder Allison DO; Lynette Banerjee DO; Diann Charles DO     PREOPERATIVE DIAGNOSES:  1.  Polytrauma. 2.  Left midshaft humerus fracture, closed.     POSTOPERATIVE DIAGNOSES:  1.  Polytrauma. 2.  Left midshaft humerus fracture, closed.     OPERATION PERFORMED:  Left humerus shaft fracture open reduction and  internal fixation. S: Harlan Rosario is a 25 y.o. male, he is here today for his follow-up from above mentioned surgery. Patient states that he has been doing well. Still complaining of global pain everywhere rather nonspecific. Does follow with Dr. Lou Garrido for his hand had recent procedure. States he is able to use the shoulder and elbow of the left arm without any major issue. Is immobilized into the hand currently. States his whole side of his pelvis is still painful at times. Has been able to bear weight. His grandmother accompanies him today, feels that he is doing well and needs to push himself further and is agreeable to having him start outpatient therapy at this point. .  States she tries to ensure that he gets out of the house on a daily basis and tries encourage him to be more active as he has become quite sedentary since his injuries. ROS:  Denies fever, chills and recent illness. Denies CP, SOB and calf pain. Denies issues with bowel or bladder.     Patient Active Problem List   Diagnosis    Acute psychosis ([address]    Schizoaffective disorder, bipolar type ([address]    Bipolar 1 disorder, manic, moderate (HCC)    Severe manic bipolar 1 disorder with psychotic behavior ([address]    Anxiety state    Cannabis use disorder, severe, dependence ([address]    Bipolar 1 disorder ([address]    Bipolar affective disorder, current episode mixed, without psychotic features ([address]    Drug overdose    Seizure ([address]    Schizoaffective disorder ([address]    Tobacco abuse    Marijuana abuse    Trauma    Injury resulting

## 2020-03-12 RX ORDER — HYDROCODONE BITARTRATE AND ACETAMINOPHEN 5; 325 MG/1; MG/1
1 TABLET ORAL EVERY 6 HOURS PRN
Qty: 28 TABLET | Refills: 0 | Status: SHIPPED
Start: 2020-03-12 | End: 2020-09-11

## 2020-03-13 ENCOUNTER — TELEPHONE (OUTPATIENT)
Dept: ORTHOPEDIC SURGERY | Age: 25
End: 2020-03-13

## 2020-03-16 ENCOUNTER — OFFICE VISIT (OUTPATIENT)
Dept: ORTHOPEDIC SURGERY | Age: 25
End: 2020-03-16

## 2020-03-16 VITALS — HEART RATE: 112 BPM | DIASTOLIC BLOOD PRESSURE: 90 MMHG | SYSTOLIC BLOOD PRESSURE: 138 MMHG | RESPIRATION RATE: 18 BRPM

## 2020-03-16 PROCEDURE — 99024 POSTOP FOLLOW-UP VISIT: CPT | Performed by: ORTHOPAEDIC SURGERY

## 2020-03-23 ENCOUNTER — HOSPITAL ENCOUNTER (OUTPATIENT)
Dept: OCCUPATIONAL THERAPY | Age: 25
Setting detail: THERAPIES SERIES
Discharge: HOME | End: 2020-03-23
Payer: MEDICAID

## 2020-03-23 PROCEDURE — 97140 MANUAL THERAPY 1/> REGIONS: CPT | Performed by: OCCUPATIONAL THERAPIST

## 2020-03-23 PROCEDURE — 97110 THERAPEUTIC EXERCISES: CPT | Performed by: OCCUPATIONAL THERAPIST

## 2020-03-23 PROCEDURE — 97165 OT EVAL LOW COMPLEX 30 MIN: CPT | Performed by: OCCUPATIONAL THERAPIST

## 2020-03-23 NOTE — PROGRESS NOTES
Donnie Lange MD at [facility] 12/5/2019    I & D LEFT WRIST WITH EXTERNAL FIXATOR performed by Alanis Salomon DO at [address] [place] [address]       Reason for Referral: Pt. Jumped form [address] [place] and suffering from multiple fx's including left wrist and humeral fx. Pt. Reports stiffness pain and decreased functional use of the left hand. Home Living: lives with grandparents  Prior Level of Function: ind.    Cognition:   Alert/Oriented x3     IADL STATUS:      Ind  Mod I  Min A  Mod A  Max A  Dep  N/A    Homemaking Responsibility:  []   []   []   [x]   []   []  []  Shopping Responsibility:  []   []   []   [x]   []   []  []  Mode of Transportation:  []   []   []   []   []   [x]  []  Leisure & Hobbies:   []   []   []   [x]   []   []  []  Work:     []   []   []   []   []   []  [x]  Comments:     ADL STATUS:    Ind      Mod I    Min A  Mod A  Max A  Dep  N/A  Feeding:  [x]   []   []   []   []   []  []  Grooming:  []   [x]   []   []   []   []  []  Bathing:  []   [x]   []   []   []   []  []  UE Dressing:  []   []   [x]   []   []   []  []  LE Dressing:  []   []   [x]   []   []   []  []  Toileting:  []   [x]   []   []   []   []  []  Transfer:  [x]   []   []   []   []   []  []  Comments:    Pain Level: 8 on scale of 1-10, aching    UE Assessment:  Active wrist ext/flex: -15/35 degrees. Radial/ulnar dev 10/0  Active finger ext/flex: 3/4 active finger ext. /1/4 active finger flexion. Comment: Hand Dominance is right handed    Sensation:   SEMMES-MCKAYLA Median N Ulnar N Radial N Other   Normal Touch  Size: 2.83       Diminished Light Touch   Size: 3.61  Ring and small digits L     Diminished Protective Sense  Size: 4.31 Thumb, index, middle L      Loss of Protective Sense   Size: 4.56       Loss of Sensation  Size: 6.65         Edema Description/Circumferential Measurements:    Mod swelling throughout  Dynamometer (setting 2):     Left: deferred      Right: deferred    Pinch Meter:   Lateral: Left= deferred,Right= deferred    Palmar 3 point: Left= deferred, Right= deferred  9 Hole Peg Test:   Left: unable   Right: wnl    QuickDASH Score: (Scale 100% full disability, 0 no disability): 65 percent    Intervention: mhp, arom, prom, soft tissue mobilization, beginning hep provided. Assessment of current deficits   Functional mobility [x]  ADLs [x] Strength [x]  Cognition []  Functional transfers  [] IADLs [] Safety Awareness [x]  Endurance [x]  Fine Motor Coordination [x] Balance [] Vision/perception [] Sensation [x]   Gross Motor Coordination [x] ROM [x]     Eval Complexity: low  Profile and History- low  Assessment of Occupational Performance and Identification of Deficits- low   Clinical Decision Making- low    Rehab Potential:                                 [x] Good  [] Fair  [] Poor        Suggested Professional Referral:       [x] No  [] Yes:  Barriers to Goal Achievement[de-identified]          [x] No  [] Yes:  Domestic Concerns:                           [x] No  [] Yes:     Goal Formulation: Patient  Time In: 1          Time Out: 2                      Timed Code Treatment Minutes: 60 minutes        PLAN      Treatment to include:   [x] Instruction in HEP                   Modalities:  [x] Therapeutic Exercise        [] Ultrasound               [] Electrical Stimulation/Attended  [x] PROM/Stretching                    [x] Fluidotherapy          [x]  Paraffin                   [x] AAROM  [x] AROM                 [] Iontophoresis: 4 mg/mL;  Dexamethasone Sodium                                        [] Neuromuscular Re-education [x] Splinting         [] Desensitization          [x] Therapeutic Activity       [] Pain Management with/without modalities PRN                 [x] Manual Therapy/Fascial release                            [x] Tendon Glides        []Joint Protection/Training  []Ergonomics                             [x] Joint Mobilization        [] Adaptive Equipment Assessment/Training

## 2020-03-26 ENCOUNTER — HOSPITAL ENCOUNTER (OUTPATIENT)
Dept: OCCUPATIONAL THERAPY | Age: 25
Setting detail: THERAPIES SERIES
Discharge: HOME | End: 2020-03-26
Payer: MEDICAID

## 2020-03-26 PROCEDURE — 97140 MANUAL THERAPY 1/> REGIONS: CPT | Performed by: OCCUPATIONAL THERAPIST

## 2020-03-26 PROCEDURE — 97110 THERAPEUTIC EXERCISES: CPT | Performed by: OCCUPATIONAL THERAPIST

## 2020-03-30 ENCOUNTER — HOSPITAL ENCOUNTER (OUTPATIENT)
Dept: OCCUPATIONAL THERAPY | Age: 25
Setting detail: THERAPIES SERIES
Discharge: HOME | End: 2020-03-30
Payer: MEDICAID

## 2020-03-30 PROCEDURE — 97110 THERAPEUTIC EXERCISES: CPT | Performed by: OCCUPATIONAL THERAPIST

## 2020-03-30 PROCEDURE — 97760 ORTHOTIC MGMT&TRAING 1ST ENC: CPT | Performed by: OCCUPATIONAL THERAPIST

## 2020-03-30 PROCEDURE — 97140 MANUAL THERAPY 1/> REGIONS: CPT | Performed by: OCCUPATIONAL THERAPIST

## 2020-04-02 ENCOUNTER — HOSPITAL ENCOUNTER (OUTPATIENT)
Dept: OCCUPATIONAL THERAPY | Age: 25
Setting detail: THERAPIES SERIES
Discharge: HOME | End: 2020-04-02
Payer: MEDICAID

## 2020-04-02 PROCEDURE — 97140 MANUAL THERAPY 1/> REGIONS: CPT | Performed by: OCCUPATIONAL THERAPIST

## 2020-04-02 PROCEDURE — 97022 WHIRLPOOL THERAPY: CPT | Performed by: OCCUPATIONAL THERAPIST

## 2020-04-02 PROCEDURE — 97110 THERAPEUTIC EXERCISES: CPT | Performed by: OCCUPATIONAL THERAPIST

## 2020-04-02 NOTE — PROGRESS NOTES
OCCUPATIONAL THERAPY PROGRESS NOTE    Date:  2020     Initial Evaluation Date: 3/23/21                                Evaluating Therapist: Selena Cassidy     Patient Name:  Eve Hamilton                        :  1995     Restrictions/Precautions:  Per protocol, low fall risk  Diagnosis:  orif distal radius L                                                             Insurance/Certification information:  [facility]/community  Referring Practitioner:  Dr. Klever Hogan  Referring Practitioner specific orders: rom, strengthening, modalities prn, splint michael prn  Date of Surgery/Injury: date of injury 19,  Surgery 19,  pin removal 3/4/20  Plan of care signed (Y/N):  no  Visit# / total visits:    Pain Level: 7 on scale of 1-10, aching, knife-like, needle-like and throbbing    Subjective: \"Really glad that I have been able to use my hand for basic stuff. \"     Objective:  Updated POC to be completed by . INTERVENTION: COMPLETED: SPECIFICS/COMMENTS:   Modality:     mhp  10 min   fluidotherapy x 10 min   AROM:     Bean  , towel   x To increase active grasp, 10 min   Large pegs  and place x 15 min   Yellow clothespins x 2 sets   Wrist, fingers all planes x 15x3 to improve arom        AAROM:               PROM/Stretching:     Light wrist and fingers as chaparro x 15 min        Scar Mass/Edema Control:     Soft tissue mobilization x 10 min        Strengthening:               Other:     Desensitization ex's x 5 min. Instructed to cont with hep   Sponge provided for hep x      Assessment/Comments: Pt is making Good progress toward stated plan of care. Sign progress today with less swelling, improving circulation with better coloring, improving arom wrist and fingers. Pt. Now using hand with video games and basic tasks.      Units allowed through:  20    CODE         Mins          Units Total Used/Total Authorized  51111 Manual 15 1    76870 Therapeutic Ex 15 2  11381 Therapeutic Activity   1/96   87583 Fluidotherapy 10 1 1/24   71483 ADL/COMP Tech Train   0/96   42706 Neuromuscular Re-Ed          96    Orthotic management   2/96    OTHER      Total           Goals: Goals for pt can be seen on initial evaluation. Plan:   [x]  Continues Plan of care: Treatment covered based on POC and graduated to patient's progress. Pt education continues at each visit to obtain maximum benefits from skilled OT intervention.   []  Alter Plan of care:   []  Discharge:      Anson Goldberg OT/L, [address]

## 2020-04-07 ENCOUNTER — HOSPITAL ENCOUNTER (OUTPATIENT)
Dept: OCCUPATIONAL THERAPY | Age: 25
Setting detail: THERAPIES SERIES
Discharge: HOME | End: 2020-04-07
Payer: MEDICAID

## 2020-04-07 PROCEDURE — 97022 WHIRLPOOL THERAPY: CPT | Performed by: OCCUPATIONAL THERAPIST

## 2020-04-07 PROCEDURE — 97110 THERAPEUTIC EXERCISES: CPT | Performed by: OCCUPATIONAL THERAPIST

## 2020-04-07 PROCEDURE — 97140 MANUAL THERAPY 1/> REGIONS: CPT | Performed by: OCCUPATIONAL THERAPIST

## 2020-04-09 ENCOUNTER — TELEPHONE (OUTPATIENT)
Dept: ORTHOPEDIC SURGERY | Age: 25
End: 2020-04-09

## 2020-04-14 ENCOUNTER — HOSPITAL ENCOUNTER (OUTPATIENT)
Dept: OCCUPATIONAL THERAPY | Age: 25
Setting detail: THERAPIES SERIES
Discharge: HOME | End: 2020-04-14
Payer: MEDICAID

## 2020-04-14 PROCEDURE — 97140 MANUAL THERAPY 1/> REGIONS: CPT | Performed by: OCCUPATIONAL THERAPIST

## 2020-04-14 PROCEDURE — 97110 THERAPEUTIC EXERCISES: CPT | Performed by: OCCUPATIONAL THERAPIST

## 2020-04-14 PROCEDURE — 97022 WHIRLPOOL THERAPY: CPT | Performed by: OCCUPATIONAL THERAPIST

## 2020-04-14 NOTE — PROGRESS NOTES
OCCUPATIONAL THERAPY PROGRESS NOTE    Date:  2020     Initial Evaluation Date: 3/23/21                                Evaluating Therapist: Anaya Johnson     Patient Name:  Nic Crouch                        :  1995     Restrictions/Precautions:  Per protocol, low fall risk  Diagnosis:  orif distal radius L                                                             Insurance/Certification information:  [facility]/community  Referring Practitioner:  Dr. Nguyen Abernathy  Referring Practitioner specific orders: rom, strengthening, modalities prn, splint michael prn  Date of Surgery/Injury: date of injury 19,  Surgery 19,  pin removal 3/4/20  Plan of care signed (Y/N):  no  Visit# / total visits:    Pain Level: 7 on scale of 1-10, aching, knife-like, needle-like and throbbing    Subjective: \"nerves are very sensitive in the hand and wrist. Trying to use hand more, but it hurts. \"     Objective:  Updated POC to be completed by . INTERVENTION: COMPLETED: SPECIFICS/COMMENTS:   Modality:     mhp  10 min   fluidotherapy x 10 min   AROM:     Bean  , towel   x To increase active grasp, desensitize     10 min   Large pegs  and place x 15 min   Small pegs x 5 pegs placed. Wrist, fingers all planes x 15x3 to improve arom    x    AAROM:               PROM/Stretching:      wrist and fingers as chaparro x 15 min        Scar Mass/Edema Control:     Soft tissue mobilization/desensitizing massage x 10 min        Strengthening:     Clothespins  x Yellow and red for pinch strength. Other:     Fes finger flexors  10 min   Desensitization ex's x 5 min rubbing on different textures. Instructed to cont with hep   Sponge provided for hep x      Assessment/Comments: Pt is making Good progress toward stated plan of care. Progressing with decreasing hypersensitivity and progressing rom. Sign palm hypersensitivity cont's. Doing better, tightness and weakness cont wrist extensors.

## 2020-04-16 ENCOUNTER — OFFICE VISIT (OUTPATIENT)
Dept: ORTHOPEDIC SURGERY | Age: 25
End: 2020-04-16

## 2020-04-16 ENCOUNTER — HOSPITAL ENCOUNTER (OUTPATIENT)
Dept: OCCUPATIONAL THERAPY | Age: 25
Setting detail: THERAPIES SERIES
Discharge: HOME | End: 2020-04-16
Payer: MEDICAID

## 2020-04-16 VITALS — BODY MASS INDEX: 25.18 KG/M2 | TEMPERATURE: 98.3 F | WEIGHT: 170 LBS | HEIGHT: 69 IN

## 2020-04-16 PROCEDURE — 97140 MANUAL THERAPY 1/> REGIONS: CPT | Performed by: OCCUPATIONAL THERAPIST

## 2020-04-16 PROCEDURE — 99024 POSTOP FOLLOW-UP VISIT: CPT | Performed by: ORTHOPAEDIC SURGERY

## 2020-04-16 PROCEDURE — 97022 WHIRLPOOL THERAPY: CPT | Performed by: OCCUPATIONAL THERAPIST

## 2020-04-16 PROCEDURE — 97110 THERAPEUTIC EXERCISES: CPT | Performed by: OCCUPATIONAL THERAPIST

## 2020-04-16 NOTE — PROGRESS NOTES
OCCUPATIONAL THERAPY PROGRESS NOTE    Date:  2020     Initial Evaluation Date: 3/23/21                                Evaluating Therapist: Zully Cerda     Patient Name:  Lazaro Quintero                        :  1995     Restrictions/Precautions:  Per protocol, low fall risk  Diagnosis:  orif distal radius L                                                             Insurance/Certification information:  [facility]/community  Referring Practitioner:  Dr. Kai Lawson  Referring Practitioner specific orders: rom, strengthening, modalities prn, splint michael prn  Date of Surgery/Injury: date of injury 19,  Surgery 19,  pin removal 3/4/20  Plan of care signed (Y/N):  no  Visit# / total visits:    Pain Level: 7 on scale of 1-10, aching, knife-like, needle-like and throbbing    Subjective: \"finger tip really feel swollen and painful. \"     Objective:  Updated POC to be completed by . INTERVENTION: COMPLETED: SPECIFICS/COMMENTS:   Modality:     mhp  10 min   fluidotherapy x 10 min   AROM:     Bean  , towel   x To increase active grasp, desensitize     10 min   Large pegs  and place x 15 min   Small pegs x 5 pegs placed. Wrist, fingers all planes x 15x3 to improve arom    x    AAROM:               PROM/Stretching:      wrist and fingers as chaparro x 15 min        Scar Mass/Edema Control:     Soft tissue mobilization/desensitizing massage x 10 min        Strengthening:     Clothespins  x Yellow and red for pinch strength. Other:     Fes finger flexors x 10 min   Desensitization ex's x 5 min rubbing on different textures. Instructed to cont with hep   Sponge provided for hep x      Assessment/Comments: Pt is making Good progress toward stated plan of care. Some swelling cont's with bluish coloring, but overall sign less hypersensitivity with soft tissue. Sign. improved touch to the palm today.  Pt. Encouraged to increase use of the left hand and to hold hand up

## 2020-04-23 ENCOUNTER — TELEPHONE (OUTPATIENT)
Dept: ADMINISTRATIVE | Age: 25
End: 2020-04-23

## 2020-04-23 ENCOUNTER — HOSPITAL ENCOUNTER (OUTPATIENT)
Dept: OCCUPATIONAL THERAPY | Age: 25
Setting detail: THERAPIES SERIES
Discharge: HOME | End: 2020-04-23
Payer: MEDICAID

## 2020-04-23 PROCEDURE — 97140 MANUAL THERAPY 1/> REGIONS: CPT | Performed by: OCCUPATIONAL THERAPIST

## 2020-04-23 PROCEDURE — 97110 THERAPEUTIC EXERCISES: CPT | Performed by: OCCUPATIONAL THERAPIST

## 2020-04-23 PROCEDURE — 97022 WHIRLPOOL THERAPY: CPT | Performed by: OCCUPATIONAL THERAPIST

## 2020-04-23 RX ORDER — GABAPENTIN 100 MG/1
100 CAPSULE ORAL 3 TIMES DAILY
Qty: 90 CAPSULE | Refills: 0 | Status: SHIPPED
Start: 2020-04-23 | End: 2020-06-26 | Stop reason: SDUPTHER

## 2020-04-23 NOTE — TELEPHONE ENCOUNTER
Pt's grandmother cancelled his 6 wk post op appt today w/Dick, b/c he wouldn't get out of the care. Call was transferred to navigator line, b/c 2nd cancellation. She would like an appt for next week.  Please call her to r/s at [phone]

## 2020-04-24 ENCOUNTER — HOSPITAL ENCOUNTER (EMERGENCY)
Age: 25
Discharge: OTHER INSTITUTION | End: 2020-04-25
Attending: EMERGENCY MEDICINE
Payer: MEDICAID

## 2020-04-24 ENCOUNTER — VIRTUAL VISIT (OUTPATIENT)
Dept: PAIN MANAGEMENT | Age: 25
End: 2020-04-24
Payer: MEDICAID

## 2020-04-24 VITALS
RESPIRATION RATE: 16 BRPM | OXYGEN SATURATION: 99 % | TEMPERATURE: 98.7 F | WEIGHT: 170 LBS | SYSTOLIC BLOOD PRESSURE: 139 MMHG | HEIGHT: 69 IN | DIASTOLIC BLOOD PRESSURE: 85 MMHG | BODY MASS INDEX: 25.18 KG/M2 | HEART RATE: 95 BPM

## 2020-04-24 LAB
ACETAMINOPHEN LEVEL: <5 MCG/ML (ref 10–30)
ALBUMIN SERPL-MCNC: 4.5 G/DL (ref 3.5–5.2)
ALP BLD-CCNC: 155 U/L (ref 40–129)
ALT SERPL-CCNC: 22 U/L (ref 0–40)
AMPHETAMINE SCREEN, URINE: NOT DETECTED
ANION GAP SERPL CALCULATED.3IONS-SCNC: 15 MMOL/L (ref 7–16)
AST SERPL-CCNC: 25 U/L (ref 0–39)
BARBITURATE SCREEN URINE: NOT DETECTED
BASOPHILS ABSOLUTE: 0.03 E9/L (ref 0–0.2)
BASOPHILS RELATIVE PERCENT: 0.3 % (ref 0–2)
BENZODIAZEPINE SCREEN, URINE: NOT DETECTED
BILIRUB SERPL-MCNC: 0.6 MG/DL (ref 0–1.2)
BILIRUBIN URINE: NEGATIVE
BLOOD, URINE: NEGATIVE
BUN BLDV-MCNC: 8 MG/DL (ref 6–20)
CALCIUM SERPL-MCNC: 10.1 MG/DL (ref 8.6–10.2)
CANNABINOID SCREEN URINE: POSITIVE
CHLORIDE BLD-SCNC: 102 MMOL/L (ref 98–107)
CLARITY: CLEAR
CO2: 24 MMOL/L (ref 22–29)
COCAINE METABOLITE SCREEN URINE: NOT DETECTED
COLOR: YELLOW
CREAT SERPL-MCNC: 0.7 MG/DL (ref 0.7–1.2)
EKG ATRIAL RATE: 88 BPM
EKG P AXIS: 60 DEGREES
EKG P-R INTERVAL: 140 MS
EKG Q-T INTERVAL: 348 MS
EKG QRS DURATION: 92 MS
EKG QTC CALCULATION (BAZETT): 421 MS
EKG R AXIS: 23 DEGREES
EKG T AXIS: 48 DEGREES
EKG VENTRICULAR RATE: 88 BPM
EOSINOPHILS ABSOLUTE: 0.08 E9/L (ref 0.05–0.5)
EOSINOPHILS RELATIVE PERCENT: 0.8 % (ref 0–6)
ETHANOL: <10 MG/DL (ref 0–0.08)
FENTANYL SCREEN, URINE: NOT DETECTED
GFR AFRICAN AMERICAN: >60
GFR NON-AFRICAN AMERICAN: >60 ML/MIN/1.73
GLUCOSE BLD-MCNC: 101 MG/DL (ref 74–99)
GLUCOSE URINE: NEGATIVE MG/DL
HCT VFR BLD CALC: 45.5 % (ref 37–54)
HEMOGLOBIN: 14.9 G/DL (ref 12.5–16.5)
IMMATURE GRANULOCYTES #: 0.03 E9/L
IMMATURE GRANULOCYTES %: 0.3 % (ref 0–5)
KETONES, URINE: 15 MG/DL
LEUKOCYTE ESTERASE, URINE: NEGATIVE
LYMPHOCYTES ABSOLUTE: 2.14 E9/L (ref 1.5–4)
LYMPHOCYTES RELATIVE PERCENT: 22.4 % (ref 20–42)
Lab: ABNORMAL
MCH RBC QN AUTO: 29.9 PG (ref 26–35)
MCHC RBC AUTO-ENTMCNC: 32.7 % (ref 32–34.5)
MCV RBC AUTO: 91.2 FL (ref 80–99.9)
METHADONE SCREEN, URINE: NOT DETECTED
MONOCYTES ABSOLUTE: 0.82 E9/L (ref 0.1–0.95)
MONOCYTES RELATIVE PERCENT: 8.6 % (ref 2–12)
NEUTROPHILS ABSOLUTE: 6.46 E9/L (ref 1.8–7.3)
NEUTROPHILS RELATIVE PERCENT: 67.6 % (ref 43–80)
NITRITE, URINE: NEGATIVE
OPIATE SCREEN URINE: NOT DETECTED
OXYCODONE URINE: NOT DETECTED
PDW BLD-RTO: 13.6 FL (ref 11.5–15)
PH UA: 6 (ref 5–9)
PHENCYCLIDINE SCREEN URINE: NOT DETECTED
PLATELET # BLD: 269 E9/L (ref 130–450)
PMV BLD AUTO: 10 FL (ref 7–12)
POTASSIUM SERPL-SCNC: 4.1 MMOL/L (ref 3.5–5)
PROTEIN UA: NEGATIVE MG/DL
RBC # BLD: 4.99 E12/L (ref 3.8–5.8)
SALICYLATE, SERUM: <0.3 MG/DL (ref 0–30)
SODIUM BLD-SCNC: 141 MMOL/L (ref 132–146)
SPECIFIC GRAVITY UA: 1.01 (ref 1–1.03)
TOTAL PROTEIN: 7.7 G/DL (ref 6.4–8.3)
TRICYCLIC ANTIDEPRESSANTS SCREEN SERUM: NEGATIVE NG/ML
UROBILINOGEN, URINE: 0.2 E.U./DL
WBC # BLD: 9.6 E9/L (ref 4.5–11.5)

## 2020-04-24 PROCEDURE — 6360000002 HC RX W HCPCS: Performed by: EMERGENCY MEDICINE

## 2020-04-24 PROCEDURE — 93010 ELECTROCARDIOGRAM REPORT: CPT | Performed by: INTERNAL MEDICINE

## 2020-04-24 PROCEDURE — 80307 DRUG TEST PRSMV CHEM ANLYZR: CPT

## 2020-04-24 PROCEDURE — G8427 DOCREV CUR MEDS BY ELIG CLIN: HCPCS | Performed by: PAIN MEDICINE

## 2020-04-24 PROCEDURE — G0480 DRUG TEST DEF 1-7 CLASSES: HCPCS

## 2020-04-24 PROCEDURE — 99285 EMERGENCY DEPT VISIT HI MDM: CPT

## 2020-04-24 PROCEDURE — 80053 COMPREHEN METABOLIC PANEL: CPT

## 2020-04-24 PROCEDURE — 93005 ELECTROCARDIOGRAM TRACING: CPT | Performed by: EMERGENCY MEDICINE

## 2020-04-24 PROCEDURE — 85025 COMPLETE CBC W/AUTO DIFF WBC: CPT

## 2020-04-24 PROCEDURE — 81003 URINALYSIS AUTO W/O SCOPE: CPT

## 2020-04-24 PROCEDURE — 99204 OFFICE O/P NEW MOD 45 MIN: CPT | Performed by: PAIN MEDICINE

## 2020-04-24 PROCEDURE — 96372 THER/PROPH/DIAG INJ SC/IM: CPT

## 2020-04-24 RX ORDER — ZIPRASIDONE MESYLATE 20 MG/ML
20 INJECTION, POWDER, LYOPHILIZED, FOR SOLUTION INTRAMUSCULAR ONCE
Status: COMPLETED | OUTPATIENT
Start: 2020-04-24 | End: 2020-04-24

## 2020-04-24 RX ORDER — LORAZEPAM 2 MG/ML
2 INJECTION INTRAMUSCULAR ONCE
Status: COMPLETED | OUTPATIENT
Start: 2020-04-24 | End: 2020-04-24

## 2020-04-24 RX ORDER — DIPHENHYDRAMINE HYDROCHLORIDE 50 MG/ML
50 INJECTION INTRAMUSCULAR; INTRAVENOUS ONCE
Status: COMPLETED | OUTPATIENT
Start: 2020-04-24 | End: 2020-04-24

## 2020-04-24 RX ADMIN — LORAZEPAM 2 MG: 2 INJECTION INTRAMUSCULAR; INTRAVENOUS at 15:01

## 2020-04-24 RX ADMIN — ZIPRASIDONE MESYLATE 20 MG: 20 INJECTION, POWDER, LYOPHILIZED, FOR SOLUTION INTRAMUSCULAR at 15:01

## 2020-04-24 RX ADMIN — DIPHENHYDRAMINE HYDROCHLORIDE 50 MG: 50 INJECTION, SOLUTION INTRAMUSCULAR; INTRAVENOUS at 14:56

## 2020-04-24 ASSESSMENT — PAIN SCALES - GENERAL: PAINLEVEL_OUTOF10: 10

## 2020-04-24 ASSESSMENT — PAIN DESCRIPTION - LOCATION: LOCATION: GENERALIZED

## 2020-04-24 ASSESSMENT — PAIN DESCRIPTION - PAIN TYPE: TYPE: ACUTE PAIN

## 2020-04-24 ASSESSMENT — PAIN DESCRIPTION - DESCRIPTORS: DESCRIPTORS: ACHING

## 2020-04-24 NOTE — ED NOTES
Patient yelling out in hallway. Hitting fists off bed.  Relocated to room [address] [place] [address] Street, RN  04/24/20 4344

## 2020-04-24 NOTE — ED NOTES
Bed:   Expected date:   Expected time:   Means of arrival:   Comments:  LANES [place] Back, RN  04/24/20 4759

## 2020-04-24 NOTE — ED NOTES
SW met with pt to complete assessment.     Pt is a 24 y/o male pesenting to the ED for an evaluation due to reportedly hallucinating, combative, and acting out. When SW asked pt chief complaint he states \"trying to get feeling back in my hands and feet cause I jumped off a bridge. \" SW tried to clarify when pt jumped off the bridge and pt unable to provide information, poor historian. SW noted in chart that this occurred 1/2/20. Pt states \"my legs hurt that's all. \"      Pt did report that he is currently staying with his grandparents and treats at [facility]. Pt denies substance abuse although does then state that he smoke marijuana. Unable to obtain further information from pt.     Pt alert, mood labile, affect congruent, thought process appears disorganized and disorganized speech pattern as well, loose association, and poor eye contact. Pt denies current SI/HI. Pt muttering and unable to decipher what pt was stating at times.     Pt is pink slipped by ED physician.      SW will pursue inpatient psychiatric hospitalization for safety/stabilization.      Ross Garcia, MSW, LSW  04/24/20 2364

## 2020-04-24 NOTE — ED PROVIDER NOTES
Normal    The patients available past medical records and past encounters were reviewed. ------------------------------ ED COURSE/MEDICAL DECISION MAKING----------------------  Medications   ziprasidone (GEODON) injection 20 mg (20 mg Intramuscular Given 20 1501)   diphenhydrAMINE (BENADRYL) injection 50 mg (50 mg Intramuscular Given 20 1456)   LORazepam (ATIVAN) injection 2 mg (2 mg Intramuscular Given 20 1501)         ED COURSE:   EKG, Urine Drug Screen, Serum Drug Screen  CBC, CMP  Benadryl 20 mg, Geodon 50 mg, Ativan 2 mg  Pink-slip form filled    Medical Decision Makinyear old man with psychiatric history of MDD, Bipolar 1 with psychosis, previous attempts to suicide by jumping off bridge and drug OD presents for combative behavior. He was listless during physical exam, and uncooperative. He got agitated periodically in the ER, and was given Benadryl, Geodon, Ativan to calm him down. Work-up revealed a urine drug screen pending, CBC, CMP unremarkable. A pink slip was filled out. Pt is medically cleared for transfer to in-patient psychiatry. This patient's ED course included: a personal history and physicial examination    This patient has remained hemodynamically stable during their ED course. Re-Evaluations:             Re-evaluation. Patients symptoms show no change    Re-examination  20   1:24 PM EDT          Vital Signs:   Vitals:    20 1300   BP: 131/78   Pulse: 93   Resp: 16   Temp: 97.3 °F (36.3 °C)   TempSrc: Oral   SpO2: 96%   Weight: 170 lb (77.1 kg)   Height: 5' 9\" (1.753 m)     Card/Pulm:  Rhythm: normal rate. Heart Sounds: Normal S1, S2 and no murmurs, gallops, or rubs. clear to auscultation, no wheezes or rales and unlabored breathing. Capillary Refill: normal.  Radial Pulse:  present 2+. Skin:  Warm. Counseling:    The emergency provider has spoken with the patient and discussed todays results, in addition to providing specific

## 2020-04-25 NOTE — ED NOTES
Called [facility] to inquire if pink slip was received via fax. Per Intake at [facility] [employer], pink slip was received and accepting information given. Patient was accepted to the Dual Diagnosis Unit, room 305-B, by Dr. Pietro Lazaro. N2N is [phone]. [facility] 3147. Please direct Carlos Fent the patient is going to the Dual Diagnosis Unit that is now located in the [address]. ANNA Hurst updated.             Micah Trimble  04/25/20 1602

## 2020-04-25 NOTE — ED NOTES
Call placed to [facility] Catrachito Husain at [phone], no answer.  Voicemail left for return call in order to make referral.      Blas Colón  04/24/20 0322

## 2020-04-25 NOTE — ED NOTES
Nurse to Nurse called to Jeffrey Miranda RN at [facility]. Made aware of estimated  time for pt.   All questions answered     Alban Guzman RN  04/25/20 4765

## 2020-04-25 NOTE — ED NOTES
This nurse in room to assess pt. Pt provided name and  but when asked other questions, pt started having word salad and answers did not correspond to questions asked. Pt allegedly having hallucinations, pt denies a/v/t hallucinations to this nurse. Pt denies SI/HI. Respirations are easy and unlabored. No signs of acute distress noted at this time.   Will continue to monitor     Alban Guzman RN  20 0028

## 2020-04-25 NOTE — ED NOTES
Pt has been declined for admission to [address] [place] [address] due to unit acuity per [facility] nurse Sarah. Aleyda Balderas referring pt to [address] [place] [address] for placement.       [address], [facility], Carmelita Hughes  04/24/20 5193

## 2020-04-30 ENCOUNTER — HOSPITAL ENCOUNTER (OUTPATIENT)
Dept: OCCUPATIONAL THERAPY | Age: 25
Setting detail: THERAPIES SERIES
End: 2020-04-30
Payer: MEDICAID

## 2020-05-04 ENCOUNTER — HOSPITAL ENCOUNTER (OUTPATIENT)
Dept: OCCUPATIONAL THERAPY | Age: 25
Setting detail: THERAPIES SERIES
Discharge: HOME | End: 2020-05-04
Payer: MEDICAID

## 2020-05-04 PROCEDURE — 97140 MANUAL THERAPY 1/> REGIONS: CPT | Performed by: OCCUPATIONAL THERAPIST

## 2020-05-04 PROCEDURE — 97022 WHIRLPOOL THERAPY: CPT | Performed by: OCCUPATIONAL THERAPIST

## 2020-05-04 PROCEDURE — 97110 THERAPEUTIC EXERCISES: CPT | Performed by: OCCUPATIONAL THERAPIST

## 2020-05-07 ENCOUNTER — HOSPITAL ENCOUNTER (OUTPATIENT)
Dept: OCCUPATIONAL THERAPY | Age: 25
Setting detail: THERAPIES SERIES
Discharge: HOME | End: 2020-05-07
Payer: MEDICAID

## 2020-05-07 PROCEDURE — 97022 WHIRLPOOL THERAPY: CPT | Performed by: OCCUPATIONAL THERAPIST

## 2020-05-07 PROCEDURE — 97530 THERAPEUTIC ACTIVITIES: CPT | Performed by: OCCUPATIONAL THERAPIST

## 2020-05-07 PROCEDURE — 97140 MANUAL THERAPY 1/> REGIONS: CPT | Performed by: OCCUPATIONAL THERAPIST

## 2020-05-07 PROCEDURE — 97110 THERAPEUTIC EXERCISES: CPT | Performed by: OCCUPATIONAL THERAPIST

## 2020-05-14 ENCOUNTER — HOSPITAL ENCOUNTER (OUTPATIENT)
Dept: OCCUPATIONAL THERAPY | Age: 25
Setting detail: THERAPIES SERIES
Discharge: HOME | End: 2020-05-14
Payer: MEDICAID

## 2020-05-14 PROCEDURE — 97022 WHIRLPOOL THERAPY: CPT | Performed by: OCCUPATIONAL THERAPIST

## 2020-05-14 PROCEDURE — 97110 THERAPEUTIC EXERCISES: CPT | Performed by: OCCUPATIONAL THERAPIST

## 2020-05-14 PROCEDURE — 97140 MANUAL THERAPY 1/> REGIONS: CPT | Performed by: OCCUPATIONAL THERAPIST

## 2020-05-14 NOTE — PROGRESS NOTES
OCCUPATIONAL THERAPY PROGRESS NOTE    Date:  2020     Initial Evaluation Date: 3/23/21                                Evaluating Therapist: Anaya Johnson     Patient Name:  Nic Crouch                        :  1995     Restrictions/Precautions:  Per protocol, low fall risk  Diagnosis:  orif distal radius L                                                             Insurance/Certification information:  [facility]/community  Referring Practitioner:  Dr. Nguyen Abernathy  Referring Practitioner specific orders: rom, strengthening, modalities prn, splint michael prn  Date of Surgery/Injury: date of injury 19,  Surgery 19,  pin removal 3/4/20  Plan of care signed (Y/N):  no  Visit# / total visits:    Pain Level: 7 on scale of 1-10, aching, knife-like, needle-like and throbbing    Subjective: Pt. reports my hand is painfull. \" PT. Very quiet today, having psych difficulties and needs medicine adjustment. Objective:  Updated POC to be completed by . INTERVENTION: COMPLETED: SPECIFICS/COMMENTS:   Modality:     mhp  10 min   fluidotherapy x 10 min   AROM:     Bean  , towel    To increase active grasp, desensitize     10 min   Large pegs  and place x 15 min   Small pegs x  pegs placed. Wrist, fingers all planes x 15x3 to improve arom   Ball toss x 10 min   AAROM:               PROM/Stretching:      wrist and fingers as chaparro x 15 min        Scar Mass/Edema Control:     Soft tissue mobilization/desensitizing massage x 10 min        Strengthening:     Clothespins  x Yellow and red for pinch strength. Red flexbar x 15x2   Other:     Fes finger flexors x 10 min   Desensitization ex's x 5 min rubbing on different textures. Instructed to cont with hep   Sponge provided for hep x      Assessment/Comments: Pt is making Good progress toward stated plan of care. Pt. Not wanting to perform many exercises today. Pt. Taking Neurontin at night only.  Pain and hypersensitivity noted today with soft tissue in the palm area. Pt. [place] with hand in complete extension and using hand less this past week with increased stiffness and bluish coloring. Pt. Encouraged to increase use. Spoke iwht grandmother about psych issues and medication adjustment. . Noted5/4/20  Left  measured at 60#, R  7#    Units allowed through:  12/31/20    CODE         Mins          Units Total Used/Total Authorized  98186 Manual 15 1 10/96   46820 Therapeutic Ex 15 2 18/96   64625 Therapeutic Activity   1/96   41163 Fluidotherapy 10 1 9/24   56727 ADL/COMP Tech Train   0/96   80465 Neuromuscular Re-Ed          96    Orthotic management   2/96    OTHER      Total           Goals: Goals for pt can be seen on initial evaluation. Plan:   [x]  Continues Plan of care: Treatment covered based on POC and graduated to patient's progress. Pt education continues at each visit to obtain maximum benefits from skilled OT intervention.   []  Alter Plan of care:   []  Discharge:      Jb Vega OT/L, [address]

## 2020-05-18 ENCOUNTER — HOSPITAL ENCOUNTER (OUTPATIENT)
Dept: OCCUPATIONAL THERAPY | Age: 25
Setting detail: THERAPIES SERIES
Discharge: HOME | End: 2020-05-18
Payer: MEDICAID

## 2020-05-18 PROCEDURE — 97022 WHIRLPOOL THERAPY: CPT | Performed by: OCCUPATIONAL THERAPIST

## 2020-05-18 PROCEDURE — 97110 THERAPEUTIC EXERCISES: CPT | Performed by: OCCUPATIONAL THERAPIST

## 2020-05-18 PROCEDURE — 97140 MANUAL THERAPY 1/> REGIONS: CPT | Performed by: OCCUPATIONAL THERAPIST

## 2020-05-18 NOTE — PROGRESS NOTES
flexion cont's. Pt. Encouraged to  sponge throughout day. 5/4/20  Left  measured at 60#, R  7#    Units allowed through:  12/31/20    CODE         Mins          Units Total Used/Total Authorized  43138 Manual 15 1 11/96   13314 Therapeutic Ex 15 2 20/96   49893 Therapeutic Activity   1/96   63558 Fluidotherapy 10 1 10/24   50047 ADL/COMP Tech Train   0/96   56560 Neuromuscular Re-Ed          96    Orthotic management   2/96    OTHER      Total           Goals: Goals for pt can be seen on initial evaluation. Plan:   [x]  Continues Plan of care: Treatment covered based on POC and graduated to patient's progress. Pt education continues at each visit to obtain maximum benefits from skilled OT intervention.   []  Alter Plan of care:   []  Discharge:      Rubi Lizama OT/KELSIE, [address]

## 2020-05-21 ENCOUNTER — HOSPITAL ENCOUNTER (OUTPATIENT)
Dept: OCCUPATIONAL THERAPY | Age: 25
Setting detail: THERAPIES SERIES
Discharge: HOME | End: 2020-05-21
Payer: MEDICAID

## 2020-05-21 PROCEDURE — 97110 THERAPEUTIC EXERCISES: CPT | Performed by: OCCUPATIONAL THERAPIST

## 2020-05-21 PROCEDURE — 97140 MANUAL THERAPY 1/> REGIONS: CPT | Performed by: OCCUPATIONAL THERAPIST

## 2020-05-29 ENCOUNTER — APPOINTMENT (OUTPATIENT)
Dept: OCCUPATIONAL THERAPY | Age: 25
End: 2020-05-29
Payer: MEDICAID

## 2020-06-26 RX ORDER — GABAPENTIN 100 MG/1
300 CAPSULE ORAL 3 TIMES DAILY
Qty: 270 CAPSULE | Refills: 0 | Status: SHIPPED
Start: 2020-06-26 | End: 2020-09-11

## 2020-07-08 ENCOUNTER — APPOINTMENT (OUTPATIENT)
Dept: CT IMAGING | Age: 25
End: 2020-07-08
Payer: MEDICAID

## 2020-07-08 ENCOUNTER — HOSPITAL ENCOUNTER (EMERGENCY)
Age: 25
Discharge: HOME | End: 2020-07-08
Attending: EMERGENCY MEDICINE
Payer: MEDICAID

## 2020-07-08 VITALS
SYSTOLIC BLOOD PRESSURE: 138 MMHG | RESPIRATION RATE: 17 BRPM | BODY MASS INDEX: 25.18 KG/M2 | OXYGEN SATURATION: 96 % | HEIGHT: 69 IN | HEART RATE: 79 BPM | WEIGHT: 170 LBS | DIASTOLIC BLOOD PRESSURE: 76 MMHG | TEMPERATURE: 98 F

## 2020-07-08 PROBLEM — S02.2XXA CLOSED FRACTURE OF NASAL BONES: Status: ACTIVE | Noted: 2020-07-08

## 2020-07-08 PROCEDURE — 70450 CT HEAD/BRAIN W/O DYE: CPT

## 2020-07-08 PROCEDURE — 70486 CT MAXILLOFACIAL W/O DYE: CPT

## 2020-07-08 PROCEDURE — 71250 CT THORAX DX C-: CPT

## 2020-07-08 PROCEDURE — 99284 EMERGENCY DEPT VISIT MOD MDM: CPT

## 2020-07-08 PROCEDURE — 74176 CT ABD & PELVIS W/O CONTRAST: CPT

## 2020-07-08 PROCEDURE — 72125 CT NECK SPINE W/O DYE: CPT

## 2020-07-08 ASSESSMENT — ENCOUNTER SYMPTOMS
EYE PAIN: 0
WHEEZING: 0
EYE REDNESS: 0
VOMITING: 0
BACK PAIN: 0
SINUS PRESSURE: 0
NAUSEA: 0
SHORTNESS OF BREATH: 0
EYE DISCHARGE: 0
SORE THROAT: 0
DIARRHEA: 0
COUGH: 0
ABDOMINAL PAIN: 0

## 2020-07-08 NOTE — ED PROVIDER NOTES
History:  has a past medical history of Bipolar 1 disorder ([facility] [address] Depression, GERD (gastroesophageal reflux disease), Hypertension, Severe manic bipolar 1 disorder with psychotic behavior ([facility] [address] and Suicide attempt ([facility] Past Surgical History:  has a past surgical history that includes Wrist fracture surgery (Left, 12/5/2019); APPLICATION MULTIPLANE UNILATERAL EXTERNAL FIXATION SYSTEM (Left, 12/5/2019); Lumbar spine surgery (N/A, 12/10/2019); Humerus fracture surgery (Left, 12/11/2019); Hand surgery (Left, 12/19/2019); and Hand surgery (Left, 3/4/2020). Social History:  reports that he has been smoking cigarettes. He has never used smokeless tobacco. He reports current drug use. Drug: Marijuana. He reports that he does not drink alcohol. Family History: family history is not on file. The patients home medications have been reviewed. Allergies: Depakote [divalproex sodium]; Depakote [valproic acid]; Haldol [haloperidol]; Haldol [haloperidol]; Invega sustenna [paliperidone palmitate er]; Invega [paliperidone er]; and Invega [paliperidone]    -------------------------------------------------- RESULTS -------------------------------------------------  No results found for this visit on 07/08/20. CT Head WO Contrast   Final Result   No acute intracranial abnormality. This report has been electronically signed by Yessy Masters MD.      CT Cervical Spine WO Contrast   Final Result   No acute findings. This report has been electronically signed by Yessy Masters MD.      [address] [facility]   Final Result   1. Fracture of the anterior nasal septum. 2. Nasal fracture. This report has been electronically signed by Yessy Masters MD.      [address]   Final Result   No acute findings. This report has been electronically signed by Kd Denny MD.      [address]   Final Result   No acute findings.        This report has been electronically signed by Shawn Jacek BRYSON.          ------------------------- NURSING NOTES AND VITALS REVIEWED ---------------------------   The nursing notes within the ED encounter and vital signs as below have been reviewed. BP (!) 148/75   Pulse 75   Temp 97.5 °F (36.4 °C)   Resp 16   Ht 5' 9\" (1.753 m)   Wt 170 lb (77.1 kg)   SpO2 98%   BMI 25.10 kg/m²   Oxygen Saturation Interpretation: Normal      ------------------------------------------ PROGRESS NOTES ------------------------------------------   I have spoken with the patient and discussed todays results, in addition to providing specific details for the plan of care and counseling regarding the diagnosis and prognosis. Their questions are answered at this time and they are agreeable with the plan.      --------------------------------- ADDITIONAL PROVIDER NOTES ---------------------------------          This patient is stable for discharge. I have shared the specific conditions for return, as well as the importance of follow-up.           --------------------------------- IMPRESSION AND DISPOSITION ---------------------------------    IMPRESSION  1. Multiple trauma    2.  Closed fracture of nasal bone, initial encounter        DISPOSITION  Disposition: Discharge to home  Patient condition is stable               Adrienne Choi MD  07/08/20 57507 Angus Kasper MD  07/08/20 3260

## 2020-07-22 ENCOUNTER — TELEPHONE (OUTPATIENT)
Dept: ORTHOPEDIC SURGERY | Age: 25
End: 2020-07-22

## 2020-07-22 NOTE — TELEPHONE ENCOUNTER
[facility] long term called to schedule pt for an ED f/u fracture of of nasal bone. Please call Jackelyn at [phone]. Thank you.

## 2020-07-28 ENCOUNTER — HOSPITAL ENCOUNTER (OUTPATIENT)
Dept: OCCUPATIONAL THERAPY | Age: 25
Setting detail: THERAPIES SERIES
Discharge: HOME | End: 2020-07-28
Payer: COMMERCIAL

## 2020-07-30 ENCOUNTER — TELEPHONE (OUTPATIENT)
Dept: SURGERY | Age: 25
End: 2020-07-30

## 2020-08-03 ENCOUNTER — OFFICE VISIT (OUTPATIENT)
Dept: SURGERY | Age: 25
End: 2020-08-03
Payer: COMMERCIAL

## 2020-08-03 VITALS — TEMPERATURE: 98.1 F

## 2020-08-03 PROCEDURE — 99203 OFFICE O/P NEW LOW 30 MIN: CPT | Performed by: PHYSICIAN ASSISTANT
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
## 2020-09-11 ENCOUNTER — HOSPITAL ENCOUNTER (EMERGENCY)
Age: 25
Discharge: HOME | End: 2020-09-11
Attending: EMERGENCY MEDICINE
Payer: COMMERCIAL

## 2020-09-11 ENCOUNTER — APPOINTMENT (OUTPATIENT)
Dept: CT IMAGING | Age: 25
End: 2020-09-11
Payer: COMMERCIAL

## 2020-09-11 VITALS
BODY MASS INDEX: 25.18 KG/M2 | WEIGHT: 170 LBS | TEMPERATURE: 97.8 F | DIASTOLIC BLOOD PRESSURE: 80 MMHG | OXYGEN SATURATION: 98 % | HEART RATE: 89 BPM | RESPIRATION RATE: 16 BRPM | SYSTOLIC BLOOD PRESSURE: 137 MMHG | HEIGHT: 69 IN

## 2020-09-11 LAB
ACETAMINOPHEN LEVEL: <5 MCG/ML (ref 10–30)
ALBUMIN SERPL-MCNC: 4.4 G/DL (ref 3.5–5.2)
ALP BLD-CCNC: 156 U/L (ref 40–129)
ALT SERPL-CCNC: 13 U/L (ref 0–40)
AMPHETAMINE SCREEN, URINE: NOT DETECTED
ANION GAP SERPL CALCULATED.3IONS-SCNC: 13 MMOL/L (ref 7–16)
AST SERPL-CCNC: 14 U/L (ref 0–39)
BACTERIA: NORMAL /HPF
BARBITURATE SCREEN URINE: NOT DETECTED
BASOPHILS ABSOLUTE: 0.05 E9/L (ref 0–0.2)
BASOPHILS RELATIVE PERCENT: 0.9 % (ref 0–2)
BENZODIAZEPINE SCREEN, URINE: NOT DETECTED
BILIRUB SERPL-MCNC: 0.4 MG/DL (ref 0–1.2)
BILIRUBIN URINE: NEGATIVE
BLOOD, URINE: NEGATIVE
BUN BLDV-MCNC: 11 MG/DL (ref 6–20)
CALCIUM SERPL-MCNC: 9.7 MG/DL (ref 8.6–10.2)
CANNABINOID SCREEN URINE: NOT DETECTED
CHLORIDE BLD-SCNC: 101 MMOL/L (ref 98–107)
CLARITY: CLEAR
CO2: 26 MMOL/L (ref 22–29)
COCAINE METABOLITE SCREEN URINE: NOT DETECTED
COLOR: YELLOW
CREAT SERPL-MCNC: 0.8 MG/DL (ref 0.7–1.2)
EOSINOPHILS ABSOLUTE: 0.5 E9/L (ref 0.05–0.5)
EOSINOPHILS RELATIVE PERCENT: 8.5 % (ref 0–6)
ETHANOL: <10 MG/DL (ref 0–0.08)
FENTANYL SCREEN, URINE: NOT DETECTED
GFR AFRICAN AMERICAN: >60
GFR NON-AFRICAN AMERICAN: >60 ML/MIN/1.73
GLUCOSE BLD-MCNC: 110 MG/DL (ref 74–99)
GLUCOSE URINE: NEGATIVE MG/DL
HCT VFR BLD CALC: 46.8 % (ref 37–54)
HEMOGLOBIN: 16 G/DL (ref 12.5–16.5)
IMMATURE GRANULOCYTES #: 0.01 E9/L
IMMATURE GRANULOCYTES %: 0.2 % (ref 0–5)
KETONES, URINE: NEGATIVE MG/DL
LEUKOCYTE ESTERASE, URINE: NEGATIVE
LYMPHOCYTES ABSOLUTE: 1.8 E9/L (ref 1.5–4)
LYMPHOCYTES RELATIVE PERCENT: 30.7 % (ref 20–42)
Lab: NORMAL
MCH RBC QN AUTO: 30.2 PG (ref 26–35)
MCHC RBC AUTO-ENTMCNC: 34.2 % (ref 32–34.5)
MCV RBC AUTO: 88.5 FL (ref 80–99.9)
METHADONE SCREEN, URINE: NOT DETECTED
MONOCYTES ABSOLUTE: 0.31 E9/L (ref 0.1–0.95)
MONOCYTES RELATIVE PERCENT: 5.3 % (ref 2–12)
NEUTROPHILS ABSOLUTE: 3.19 E9/L (ref 1.8–7.3)
NEUTROPHILS RELATIVE PERCENT: 54.4 % (ref 43–80)
NITRITE, URINE: NEGATIVE
OPIATE SCREEN URINE: NOT DETECTED
OXYCODONE URINE: NOT DETECTED
PDW BLD-RTO: 12.6 FL (ref 11.5–15)
PH UA: 6 (ref 5–9)
PHENCYCLIDINE SCREEN URINE: NOT DETECTED
PLATELET # BLD: 217 E9/L (ref 130–450)
PMV BLD AUTO: 11 FL (ref 7–12)
POTASSIUM SERPL-SCNC: 4 MMOL/L (ref 3.5–5)
PROTEIN UA: NEGATIVE MG/DL
RBC # BLD: 5.29 E12/L (ref 3.8–5.8)
RBC UA: NORMAL /HPF (ref 0–2)
SALICYLATE, SERUM: <0.3 MG/DL (ref 0–30)
SARS-COV-2, NAAT: NOT DETECTED
SODIUM BLD-SCNC: 140 MMOL/L (ref 132–146)
SPECIFIC GRAVITY UA: >=1.03 (ref 1–1.03)
TOTAL PROTEIN: 7.5 G/DL (ref 6.4–8.3)
TRICYCLIC ANTIDEPRESSANTS SCREEN SERUM: NEGATIVE NG/ML
UROBILINOGEN, URINE: 0.2 E.U./DL
WBC # BLD: 5.9 E9/L (ref 4.5–11.5)
WBC UA: NORMAL /HPF (ref 0–5)

## 2020-09-11 PROCEDURE — 93005 ELECTROCARDIOGRAM TRACING: CPT | Performed by: EMERGENCY MEDICINE

## 2020-09-11 PROCEDURE — 99284 EMERGENCY DEPT VISIT MOD MDM: CPT

## 2020-09-11 PROCEDURE — 99285 EMERGENCY DEPT VISIT HI MDM: CPT

## 2020-09-11 PROCEDURE — U0002 COVID-19 LAB TEST NON-CDC: HCPCS

## 2020-09-11 PROCEDURE — 70450 CT HEAD/BRAIN W/O DYE: CPT

## 2020-09-11 PROCEDURE — 81001 URINALYSIS AUTO W/SCOPE: CPT

## 2020-09-11 PROCEDURE — 80307 DRUG TEST PRSMV CHEM ANLYZR: CPT

## 2020-09-11 PROCEDURE — 6360000002 HC RX W HCPCS

## 2020-09-11 PROCEDURE — 2580000003 HC RX 258

## 2020-09-11 PROCEDURE — G0480 DRUG TEST DEF 1-7 CLASSES: HCPCS

## 2020-09-11 PROCEDURE — 96372 THER/PROPH/DIAG INJ SC/IM: CPT

## 2020-09-11 PROCEDURE — 96374 THER/PROPH/DIAG INJ IV PUSH: CPT

## 2020-09-11 PROCEDURE — 72125 CT NECK SPINE W/O DYE: CPT

## 2020-09-11 PROCEDURE — 85025 COMPLETE CBC W/AUTO DIFF WBC: CPT

## 2020-09-11 PROCEDURE — 80053 COMPREHEN METABOLIC PANEL: CPT

## 2020-09-11 RX ORDER — FLUPHENAZINE HYDROCHLORIDE 10 MG/1
5 TABLET ORAL 3 TIMES DAILY
COMMUNITY

## 2020-09-11 RX ORDER — ZIPRASIDONE MESYLATE 20 MG/ML
INJECTION, POWDER, LYOPHILIZED, FOR SOLUTION INTRAMUSCULAR
Status: COMPLETED
Start: 2020-09-11 | End: 2020-09-11

## 2020-09-11 RX ORDER — ONDANSETRON 2 MG/ML
8 INJECTION INTRAMUSCULAR; INTRAVENOUS ONCE
Status: DISCONTINUED | OUTPATIENT
Start: 2020-09-11 | End: 2020-09-11 | Stop reason: HOSPADM

## 2020-09-11 RX ORDER — ZIPRASIDONE MESYLATE 20 MG/ML
20 INJECTION, POWDER, LYOPHILIZED, FOR SOLUTION INTRAMUSCULAR ONCE
Status: COMPLETED | OUTPATIENT
Start: 2020-09-11 | End: 2020-09-11

## 2020-09-11 RX ORDER — KETAMINE HYDROCHLORIDE 100 MG/ML
INJECTION, SOLUTION INTRAMUSCULAR; INTRAVENOUS
Status: DISCONTINUED
Start: 2020-09-11 | End: 2020-09-11 | Stop reason: HOSPADM

## 2020-09-11 RX ADMIN — ZIPRASIDONE MESYLATE 20 MG: 20 INJECTION, POWDER, LYOPHILIZED, FOR SOLUTION INTRAMUSCULAR at 13:14

## 2020-09-11 RX ADMIN — WATER 10 ML: 1 INJECTION INTRAMUSCULAR; INTRAVENOUS; SUBCUTANEOUS at 13:14
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
## 2020-09-11 NOTE — ED PROVIDER NOTES
EXTERNAL FIXATION SYSTEM (Left, 12/5/2019); Lumbar spine surgery (N/A, 12/10/2019); Humerus fracture surgery (Left, 12/11/2019); Hand surgery (Left, 12/19/2019); and Hand surgery (Left, 3/4/2020). Social History:  reports that he has been smoking cigarettes. He has never used smokeless tobacco. He reports previous alcohol use. He reports current drug use. Drug: Marijuana. Family History: family history is not on file. Allergies: Depakote [divalproex sodium]; Depakote [valproic acid]; Haldol [haloperidol]; Haldol [haloperidol]; Invega sustenna [paliperidone palmitate er]; Invega [paliperidone er]; Invega [paliperidone]; and Latuda [lurasidone hcl]    Physical Exam           ED Triage Vitals   BP Temp Temp src Pulse Resp SpO2 Height Weight   -- -- -- -- -- -- -- --      Oxygen Saturation Interpretation: Normal.    General Appearance:  well-appearing. Constitutional:   Level of Consciousness: Awake and alert. ETOH: No.          Distress: none. Cooperativeness: cooperative. Eyes:  PERRL, EOMI, no discharge or conjunctival injection. Ears:  External ears without lesions. Throat:  Pharynx without injection, exudate, or tonsillar hypertrophy. Airway patient. Neck:  Normal ROM. Supple. Respiratory:  Clear to auscultation and breath sounds equal.  CV:  Regular rate and rhythm, normal heart sounds, without pathological murmurs, ectopy, gallops, or rubs. GI:  Abdomen Soft, nontender, good bowel sounds. No firm or pulsatile mass. Back:  No costovertebral tenderness. Integument:  Normal turgor. Warm, dry, without visible rash, unless noted elsewhere. Lymphatics: No lymphangitis or adenopathy noted. Neurological:  Oriented. Motor functions intact. Psychiatric:        Thought Process:       Coherent:  Yes. Delusions / Paranoia: no evidence of paranoia. Flight of ideas:  Yes. Rambling conversation:  Yes. Affect: anxious.        Suicidal ideation:  no suicidal ideation. Homicidal ideation:  no homicidal ideation. Perceptions:  denies any perceptual disturbance present. Insight: below average. Judgement: below average.     Lab / Imaging Results   (All laboratory and radiology results have been personally reviewed by myself)  Labs:  Results for orders placed or performed during the hospital encounter of 09/11/20   Comprehensive Metabolic Panel   Result Value Ref Range    Sodium 140 132 - 146 mmol/L    Potassium 4.0 3.5 - 5.0 mmol/L    Chloride 101 98 - 107 mmol/L    CO2 26 22 - 29 mmol/L    Anion Gap 13 7 - 16 mmol/L    Glucose 110 (H) 74 - 99 mg/dL    BUN 11 6 - 20 mg/dL    CREATININE 0.8 0.7 - 1.2 mg/dL    GFR Non-African American >60 >=60 mL/min/1.73    GFR African American >60     Calcium 9.7 8.6 - 10.2 mg/dL    Total Protein 7.5 6.4 - 8.3 g/dL    Alb 4.4 3.5 - 5.2 g/dL    Total Bilirubin 0.4 0.0 - 1.2 mg/dL    Alkaline Phosphatase 156 (H) 40 - 129 U/L    ALT 13 0 - 40 U/L    AST 14 0 - 39 U/L   CBC Auto Differential   Result Value Ref Range    WBC 5.9 4.5 - 11.5 E9/L    RBC 5.29 3.80 - 5.80 E12/L    Hemoglobin 16.0 12.5 - 16.5 g/dL    Hematocrit 46.8 37.0 - 54.0 %    MCV 88.5 80.0 - 99.9 fL    MCH 30.2 26.0 - 35.0 pg    MCHC 34.2 32.0 - 34.5 %    RDW 12.6 11.5 - 15.0 fL    Platelets 609 257 - 158 E9/L    MPV 11.0 7.0 - 12.0 fL    Neutrophils % 54.4 43.0 - 80.0 %    Immature Granulocytes % 0.2 0.0 - 5.0 %    Lymphocytes % 30.7 20.0 - 42.0 %    Monocytes % 5.3 2.0 - 12.0 %    Eosinophils % 8.5 (H) 0.0 - 6.0 %    Basophils % 0.9 0.0 - 2.0 %    Neutrophils Absolute 3.19 1.80 - 7.30 E9/L    Immature Granulocytes # 0.01 E9/L    Lymphocytes Absolute 1.80 1.50 - 4.00 E9/L    Monocytes Absolute 0.31 0.10 - 0.95 E9/L    Eosinophils Absolute 0.50 0.05 - 0.50 E9/L    Basophils Absolute 0.05 0.00 - 0.20 E9/L   Urine Drug Screen   Result Value Ref Range    Drug Screen Comment: see below    Serum Drug Screen   Result Value Ref Range    Ethanol Lvl <10 Patient had a full psychiatric work-up including labs as well as a CT scan of the head and neck due to the patient becoming extremely aggressive and needing to have chemical sedation with Geodon and ketamine. Patient is in restraints. Patient's CT of the head and neck were found to be completely unremarkable. At this point patient is medically clear for further psychiatric evaluation    Counseling: The emergency provider has spoken with the patient and discussed todays results, in addition to providing specific details for the plan of care and counseling regarding the diagnosis and prognosis. Questions are answered at this time and they are agreeable with the plan. Assessment      1. Schizoaffective disorder, bipolar type ([facility] [address]      Plan   Admit to mental health unit - medically cleared for admission  Patient condition is good    New Medications     New Prescriptions    No medications on file     Electronically signed by Kian Cabral PA-C   DD: 9/11/20  **This report was transcribed using voice recognition software. Every effort was made to ensure accuracy; however, inadvertent computerized transcription errors may be present.   END OF ED PROVIDER NOTE        Kian Cabral PA-C  09/11/20 5953

## 2020-09-13 LAB
EKG ATRIAL RATE: 84 BPM
EKG P AXIS: 76 DEGREES
EKG P-R INTERVAL: 154 MS
EKG Q-T INTERVAL: 360 MS
EKG QRS DURATION: 92 MS
EKG QTC CALCULATION (BAZETT): 425 MS
EKG R AXIS: 68 DEGREES
EKG T AXIS: 0 DEGREES
EKG VENTRICULAR RATE: 84 BPM

## 2020-09-13 PROCEDURE — 93010 ELECTROCARDIOGRAM REPORT: CPT | Performed by: INTERNAL MEDICINE

## 2020-12-11 ENCOUNTER — TELEPHONE (OUTPATIENT)
Dept: ORTHOPEDIC SURGERY | Age: 25
End: 2020-12-11

## 2020-12-11 NOTE — TELEPHONE ENCOUNTER
Patient's grandmother called office requesting new script for physical therapy. Last OV: 03/11/2020  No future appointments. Advised grandmother that patient would need reevaluated in office or OK to receive script from PCP. Grandmother requesting appt. appt scheduled at this time.     Future Appointments   Date Time Provider Simone Winter   1/21/2021 12:30 PM Rosibel Alfonso DO [facility]

## 2021-01-15 DIAGNOSIS — S42.302D CLOSED FRACTURE OF SHAFT OF LEFT HUMERUS WITH ROUTINE HEALING, UNSPECIFIED FRACTURE MORPHOLOGY, SUBSEQUENT ENCOUNTER: Primary | ICD-10-CM

## 2021-01-15 DIAGNOSIS — S32.509D: ICD-10-CM
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
## 2022-08-04 ENCOUNTER — HOSPITAL ENCOUNTER (EMERGENCY)
Age: 27
Discharge: HOME | End: 2022-08-04
Attending: EMERGENCY MEDICINE
Payer: MEDICAID

## 2022-08-04 VITALS
HEART RATE: 96 BPM | RESPIRATION RATE: 16 BRPM | TEMPERATURE: 98.9 F | OXYGEN SATURATION: 96 % | DIASTOLIC BLOOD PRESSURE: 92 MMHG | SYSTOLIC BLOOD PRESSURE: 138 MMHG | WEIGHT: 170 LBS | BODY MASS INDEX: 25.1 KG/M2

## 2022-08-04 DIAGNOSIS — G47.00 INSOMNIA, UNSPECIFIED TYPE: ICD-10-CM

## 2022-08-04 DIAGNOSIS — Z76.89 ENCOUNTER FOR PSYCHIATRIC ASSESSMENT: Primary | ICD-10-CM

## 2022-08-04 DIAGNOSIS — F23 ACUTE PSYCHOSIS (HCC): ICD-10-CM

## 2022-08-04 LAB
ACETAMINOPHEN LEVEL: <5 MCG/ML (ref 10–30)
ALBUMIN SERPL-MCNC: 4.7 G/DL (ref 3.5–5.2)
ALP BLD-CCNC: 196 U/L (ref 40–129)
ALT SERPL-CCNC: 16 U/L (ref 0–40)
AMPHETAMINE SCREEN, URINE: NOT DETECTED
ANION GAP SERPL CALCULATED.3IONS-SCNC: 12 MMOL/L (ref 7–16)
AST SERPL-CCNC: 24 U/L (ref 0–39)
BACTERIA: ABNORMAL /HPF
BARBITURATE SCREEN URINE: NOT DETECTED
BASOPHILS ABSOLUTE: 0.05 E9/L (ref 0–0.2)
BASOPHILS RELATIVE PERCENT: 0.5 % (ref 0–2)
BENZODIAZEPINE SCREEN, URINE: NOT DETECTED
BILIRUB SERPL-MCNC: 0.4 MG/DL (ref 0–1.2)
BILIRUBIN URINE: NEGATIVE
BLOOD, URINE: NEGATIVE
BUN BLDV-MCNC: 9 MG/DL (ref 6–20)
CALCIUM SERPL-MCNC: 9.9 MG/DL (ref 8.6–10.2)
CANNABINOID SCREEN URINE: POSITIVE
CHLORIDE BLD-SCNC: 101 MMOL/L (ref 98–107)
CLARITY: CLEAR
CO2: 29 MMOL/L (ref 22–29)
COCAINE METABOLITE SCREEN URINE: NOT DETECTED
COLOR: YELLOW
CREAT SERPL-MCNC: 0.9 MG/DL (ref 0.7–1.2)
EKG ATRIAL RATE: 95 BPM
EKG P AXIS: 67 DEGREES
EKG P-R INTERVAL: 150 MS
EKG Q-T INTERVAL: 350 MS
EKG QRS DURATION: 92 MS
EKG QTC CALCULATION (BAZETT): 439 MS
EKG R AXIS: 27 DEGREES
EKG T AXIS: 38 DEGREES
EKG VENTRICULAR RATE: 95 BPM
EOSINOPHILS ABSOLUTE: 0.6 E9/L (ref 0.05–0.5)
EOSINOPHILS RELATIVE PERCENT: 6.5 % (ref 0–6)
EPITHELIAL CELLS, UA: ABNORMAL /HPF
ETHANOL: <10 MG/DL (ref 0–0.08)
FENTANYL SCREEN, URINE: NOT DETECTED
GFR AFRICAN AMERICAN: >60
GFR NON-AFRICAN AMERICAN: >60 ML/MIN/1.73
GLUCOSE BLD-MCNC: 82 MG/DL (ref 74–99)
GLUCOSE URINE: NEGATIVE MG/DL
HCT VFR BLD CALC: 47.4 % (ref 37–54)
HEMOGLOBIN: 16.2 G/DL (ref 12.5–16.5)
IMMATURE GRANULOCYTES #: 0.05 E9/L
IMMATURE GRANULOCYTES %: 0.5 % (ref 0–5)
INFLUENZA A: NOT DETECTED
INFLUENZA B: NOT DETECTED
KETONES, URINE: NEGATIVE MG/DL
LEUKOCYTE ESTERASE, URINE: ABNORMAL
LITHIUM DOSE AMOUNT: ABNORMAL
LITHIUM LEVEL: 0.42 MMOL/L (ref 0.5–1.5)
LYMPHOCYTES ABSOLUTE: 1.78 E9/L (ref 1.5–4)
LYMPHOCYTES RELATIVE PERCENT: 19.4 % (ref 20–42)
Lab: ABNORMAL
MCH RBC QN AUTO: 30.5 PG (ref 26–35)
MCHC RBC AUTO-ENTMCNC: 34.2 % (ref 32–34.5)
MCV RBC AUTO: 89.1 FL (ref 80–99.9)
METHADONE SCREEN, URINE: NOT DETECTED
MONOCYTES ABSOLUTE: 0.82 E9/L (ref 0.1–0.95)
MONOCYTES RELATIVE PERCENT: 8.9 % (ref 2–12)
NEUTROPHILS ABSOLUTE: 5.87 E9/L (ref 1.8–7.3)
NEUTROPHILS RELATIVE PERCENT: 64.2 % (ref 43–80)
NITRITE, URINE: NEGATIVE
OPIATE SCREEN URINE: NOT DETECTED
OXYCODONE URINE: NOT DETECTED
PDW BLD-RTO: 12.9 FL (ref 11.5–15)
PH UA: 5.5 (ref 5–9)
PHENCYCLIDINE SCREEN URINE: NOT DETECTED
PLATELET # BLD: 271 E9/L (ref 130–450)
PMV BLD AUTO: 10.8 FL (ref 7–12)
POTASSIUM SERPL-SCNC: 4.1 MMOL/L (ref 3.5–5)
PROTEIN UA: NEGATIVE MG/DL
RBC # BLD: 5.32 E12/L (ref 3.8–5.8)
RBC UA: ABNORMAL /HPF (ref 0–2)
SALICYLATE, SERUM: <0.3 MG/DL (ref 0–30)
SARS-COV-2 RNA, RT PCR: NOT DETECTED
SODIUM BLD-SCNC: 142 MMOL/L (ref 132–146)
SPECIFIC GRAVITY UA: <=1.005 (ref 1–1.03)
TOTAL PROTEIN: 8 G/DL (ref 6.4–8.3)
TRICYCLIC ANTIDEPRESSANTS SCREEN SERUM: NEGATIVE NG/ML
UROBILINOGEN, URINE: 0.2 E.U./DL
WBC # BLD: 9.2 E9/L (ref 4.5–11.5)
WBC UA: ABNORMAL /HPF (ref 0–5)

## 2022-08-04 PROCEDURE — 80143 DRUG ASSAY ACETAMINOPHEN: CPT

## 2022-08-04 PROCEDURE — 80178 ASSAY OF LITHIUM: CPT

## 2022-08-04 PROCEDURE — 6370000000 HC RX 637 (ALT 250 FOR IP): Performed by: EMERGENCY MEDICINE

## 2022-08-04 PROCEDURE — 93005 ELECTROCARDIOGRAM TRACING: CPT | Performed by: PHYSICIAN ASSISTANT

## 2022-08-04 PROCEDURE — 82077 ASSAY SPEC XCP UR&BREATH IA: CPT

## 2022-08-04 PROCEDURE — 80053 COMPREHEN METABOLIC PANEL: CPT

## 2022-08-04 PROCEDURE — 80307 DRUG TEST PRSMV CHEM ANLYZR: CPT

## 2022-08-04 PROCEDURE — 81001 URINALYSIS AUTO W/SCOPE: CPT

## 2022-08-04 PROCEDURE — 85025 COMPLETE CBC W/AUTO DIFF WBC: CPT

## 2022-08-04 PROCEDURE — 80179 DRUG ASSAY SALICYLATE: CPT

## 2022-08-04 PROCEDURE — 99284 EMERGENCY DEPT VISIT MOD MDM: CPT

## 2022-08-04 PROCEDURE — 87636 SARSCOV2 & INF A&B AMP PRB: CPT

## 2022-08-04 RX ORDER — DIAZEPAM 5 MG/1
5 TABLET ORAL ONCE
Status: COMPLETED | OUTPATIENT
Start: 2022-08-04 | End: 2022-08-04

## 2022-08-04 RX ORDER — OLANZAPINE 10 MG/1
10 INJECTION, POWDER, LYOPHILIZED, FOR SOLUTION INTRAMUSCULAR
Status: DISCONTINUED | OUTPATIENT
Start: 2022-08-04 | End: 2022-08-04 | Stop reason: HOSPADM

## 2022-08-04 RX ORDER — OLANZAPINE 5 MG/1
10 TABLET, ORALLY DISINTEGRATING ORAL ONCE
Status: COMPLETED | OUTPATIENT
Start: 2022-08-04 | End: 2022-08-04

## 2022-08-04 RX ORDER — LITHIUM CARBONATE 300 MG/1
300 CAPSULE ORAL
COMMUNITY

## 2022-08-04 RX ADMIN — OLANZAPINE 10 MG: 5 TABLET, ORALLY DISINTEGRATING ORAL at 11:36

## 2022-08-04 RX ADMIN — DIAZEPAM 5 MG: 5 TABLET ORAL at 11:37

## 2022-08-04 ASSESSMENT — PAIN - FUNCTIONAL ASSESSMENT
PAIN_FUNCTIONAL_ASSESSMENT: NONE - DENIES PAIN
PAIN_FUNCTIONAL_ASSESSMENT: NONE - DENIES PAIN

## 2022-08-04 NOTE — ED PROVIDER NOTES
HPI: Pankaj Hankins 32 y.o. male presents with a complaint of psychiatric evaluation. Longstanding history of psychiatric disorder. Had previously been well controlled on his medications while he was in penitentiary, however once he got out there is a mixup with the pharmacy to his medications. States compliance with his lithium however was not able to take his prolixin. Accompanied by his mother, has been unable to sleep for the last 3 days having worsening hallucinations talking to himself.      --------------------------------------------- PAST HISTORY ---------------------------------------------  Past Medical History:  has a past medical history of Bipolar 1 disorder ([facility] Depression, GERD (gastroesophageal reflux disease), Hypertension, Severe manic bipolar 1 disorder with psychotic behavior ([facility] and Suicide attempt ([facility] Past Surgical History:  has a past surgical history that includes Wrist fracture surgery (Left, 12/5/2019); APPLICATION MULTIPLANE UNILATERAL EXTERNAL FIXATION SYSTEM (Left, 12/5/2019); Lumbar spine surgery (N/A, 12/10/2019); Humerus fracture surgery (Left, 12/11/2019); Hand surgery (Left, 12/19/2019); and Hand surgery (Left, 3/4/2020). Social History:  reports that he has been smoking cigarettes. He has never used smokeless tobacco. He reports that he does not currently use alcohol. He reports current drug use. Drug: Marijuana Rios Javid). Family History: family history is not on file. The patients home medications have been reviewed.     Allergies: Depakote [divalproex sodium], Depakote [valproic acid], Haldol [haloperidol], Haldol [haloperidol], Invega sustenna [paliperidone palmitate er], Invega [paliperidone er], Invega [paliperidone], and Latuda [lurasidone hcl]    -------------------------------------------------- RESULTS -------------------------------------------------    LABS:  Results for orders placed or performed during the hospital encounter of 08/04/22 COVID-19 & Influenza Combo    Specimen: Nasopharyngeal Swab   Result Value Ref Range    SARS-CoV-2 RNA, RT PCR NOT DETECTED NOT DETECTED    INFLUENZA A NOT DETECTED NOT DETECTED    INFLUENZA B NOT DETECTED NOT DETECTED   CBC with Auto Differential   Result Value Ref Range    WBC 9.2 4.5 - 11.5 E9/L    RBC 5.32 3.80 - 5.80 E12/L    Hemoglobin 16.2 12.5 - 16.5 g/dL    Hematocrit 47.4 37.0 - 54.0 %    MCV 89.1 80.0 - 99.9 fL    MCH 30.5 26.0 - 35.0 pg    MCHC 34.2 32.0 - 34.5 %    RDW 12.9 11.5 - 15.0 fL    Platelets 964 885 - 151 E9/L    MPV 10.8 7.0 - 12.0 fL    Neutrophils % 64.2 43.0 - 80.0 %    Immature Granulocytes % 0.5 0.0 - 5.0 %    Lymphocytes % 19.4 (L) 20.0 - 42.0 %    Monocytes % 8.9 2.0 - 12.0 %    Eosinophils % 6.5 (H) 0.0 - 6.0 %    Basophils % 0.5 0.0 - 2.0 %    Neutrophils Absolute 5.87 1.80 - 7.30 E9/L    Immature Granulocytes # 0.05 E9/L    Lymphocytes Absolute 1.78 1.50 - 4.00 E9/L    Monocytes Absolute 0.82 0.10 - 0.95 E9/L    Eosinophils Absolute 0.60 (H) 0.05 - 0.50 E9/L    Basophils Absolute 0.05 0.00 - 0.20 E9/L   Comprehensive Metabolic Panel   Result Value Ref Range    Sodium 142 132 - 146 mmol/L    Potassium 4.1 3.5 - 5.0 mmol/L    Chloride 101 98 - 107 mmol/L    CO2 29 22 - 29 mmol/L    Anion Gap 12 7 - 16 mmol/L    Glucose 82 74 - 99 mg/dL    BUN 9 6 - 20 mg/dL    Creatinine 0.9 0.7 - 1.2 mg/dL    GFR Non-African American >60 >=60 mL/min/1.73    GFR African American >60     Calcium 9.9 8.6 - 10.2 mg/dL    Total Protein 8.0 6.4 - 8.3 g/dL    Albumin 4.7 3.5 - 5.2 g/dL    Total Bilirubin 0.4 0.0 - 1.2 mg/dL    Alkaline Phosphatase 196 (H) 40 - 129 U/L    ALT 16 0 - 40 U/L    AST 24 0 - 39 U/L   Lithium Level   Result Value Ref Range    Lithium Dose Amount Unknown    Urinalysis with Microscopic   Result Value Ref Range    Color, UA Yellow Straw/Yellow    Clarity, UA Clear Clear    Glucose, Ur Negative Negative mg/dL    Bilirubin Urine Negative Negative    Ketones, Urine Negative Negative mg/dL    Specific Gravity, UA <=1.005 1.005 - 1.030    Blood, Urine Negative Negative    pH, UA 5.5 5.0 - 9.0    Protein, UA Negative Negative mg/dL    Urobilinogen, Urine 0.2 <2.0 E.U./dL    Nitrite, Urine Negative Negative    Leukocyte Esterase, Urine TRACE (A) Negative    WBC, UA 0-1 0 - 5 /HPF    RBC, UA NONE 0 - 2 /HPF    Epithelial Cells, UA RARE /HPF    Bacteria, UA RARE (A) None Seen /HPF   URINE DRUG SCREEN   Result Value Ref Range    Amphetamine Screen, Urine NOT DETECTED Negative <1000 ng/mL    Barbiturate Screen, Ur NOT DETECTED Negative < 200 ng/mL    Benzodiazepine Screen, Urine NOT DETECTED Negative < 200 ng/mL    Cannabinoid Scrn, Ur POSITIVE (A) Negative < 50ng/mL    Cocaine Metabolite Screen, Urine NOT DETECTED Negative < 300 ng/mL    Opiate Scrn, Ur NOT DETECTED Negative < 300ng/mL    PCP Screen, Urine NOT DETECTED Negative < 25 ng/mL    Methadone Screen, Urine NOT DETECTED Negative <300 ng/mL    Oxycodone Urine NOT DETECTED Negative <100 ng/mL    FENTANYL SCREEN, URINE NOT DETECTED Negative <1 ng/mL    Drug Screen Comment: see below    Serum Drug Screen   Result Value Ref Range    Ethanol Lvl <10 mg/dL    Acetaminophen Level <5.0 (L) 10.0 - 20.8 mcg/mL    Salicylate, Serum <2.8 0.0 - 30.0 mg/dL    TCA Scrn NEGATIVE Cutoff:300 ng/mL       RADIOLOGY:  Interpreted by Radiologist.  No orders to display       EKG @ 1134: This EKG is signed and interpreted by the EP. Rate: 95  Rhythm:  This rhythm sinus arrhythmia  Interpretation: Sinus rhythm with sinus arrhythmia, IL is 150, QRS is 92, QTc is 439, no other acute findings and stable compared to prior  Comparison: stable as compared to patient's most recent EKG    --------------------------------------------- PAST HISTORY ---------------------------------------------  Past Medical History:  has a past medical history of Bipolar 1 disorder ([address] Depression, GERD (gastroesophageal reflux disease), Hypertension, Severe manic bipolar 1 disorder with psychotic behavior ([facility] and Suicide attempt ([facility] Past Surgical History:  has a past surgical history that includes Wrist fracture surgery (Left, 12/5/2019); APPLICATION MULTIPLANE UNILATERAL EXTERNAL FIXATION SYSTEM (Left, 12/5/2019); Lumbar spine surgery (N/A, 12/10/2019); Humerus fracture surgery (Left, 12/11/2019); Hand surgery (Left, 12/19/2019); and Hand surgery (Left, 3/4/2020). Social History:  reports that he has been smoking cigarettes. He has never used smokeless tobacco. He reports that he does not currently use alcohol. He reports current drug use. Drug: Marijuana Darryle Pimple). Family History: family history is not on file. The patients home medications have been reviewed. Allergies: Depakote [divalproex sodium], Depakote [valproic acid], Haldol [haloperidol], Haldol [haloperidol], Invega sustenna [paliperidone palmitate er], Invega [paliperidone er], Sara Honey [paliperidone], and Latuda [lurasidone hcl]        ENCOUNTER LIMITATION:    Please note that the HPI, ROS, Past History, and Physical Examination are limited due to this patients mental illness. Review of Systems:   A complete review of systems was unable to be performed secondary to the limitations noted above        ------------------------- NURSING NOTES AND VITALS REVIEWED ---------------------------   The nursing notes within the ED encounter and vital signs as below have been reviewed. BP (!) 166/119   Pulse (!) 126   Temp 98.9 °F (37.2 °C) (Oral)   Resp 18   Wt 170 lb (77.1 kg)   SpO2 100%   BMI 25.10 kg/m²   Oxygen Saturation Interpretation: Normal        ---------------------------------------------------PHYSICAL EXAM--------------------------------------      Constitutional/General: Alert and oriented x3, patient talking to himself with labile affect.   Overall disheveled   Head: NC/AT  Eyes: PERRL, EOMI  Mouth: Oropharynx clear, handling secretions, no trismus  Neck: Supple, full ROM, non tender to palpation in the midline, no stridor, no crepitus, no meningeal signs  Pulmonary: Lungs clear to auscultation bilaterally, no wheezes, rales, or rhonchi. Not in respiratory distress  Cardiovascular: Tachycardic and regular 2+ distal pulses  Abdomen: Soft, non tender, non distended, +BS, no rebound, guarding, or rigidity. No pulsatile masses appreciated  Extremities: Moves all extremities x 4. Warm and well perfused, no clubbing, cyanosis, or edema. Capillary refill <3 seconds  Skin: warm and dry without rash  Neurologic: GCS 15, CN 2-12 grossly intact,   Psych: Cooperative affect      ------------------------------------------ PROGRESS NOTES ------------------------------------------     Consultations:   Social work     Re-Evaluations:    Time: 1300  Re-evaluation. Patients symptoms are improving  Repeat physical examination is improved      Counseling: The emergency provider has spoken with thepatient and discussed todays results, in addition to providing specific details for the plan of care and counseling regarding the diagnosis and prognosis. Questions are answered at this time and they are agreeable with the plan.      --------------------------------- ADDITIONAL PROVIDER NOTES ---------------------------------     This patient's ED course included: a personal history and physicial eaxmination, multiple bedside re-evaluations, IV medications, cardiac monitoring, continuous pulse oximetry and complex medical decision making and emergency management    This patient has been closely monitored during their ED course.     --------------------------------- IMPRESSION AND DISPOSITION ---------------------------------    IMPRESSION  1. Encounter for psychiatric assessment    2. [facility] ([facility]    3.  Insomnia, unspecified type        DISPOSITION  Disposition: as per consultation - medically clear for admission to psychiatric facility  Patient condition is stable    [unfilled]     Please note this note was transcribed using voice recognition software.  Every effort was to ensure accuracy however inadvertent transcription errors may be present       Manoj Sierra DO  08/04/22 9188

## 2022-08-04 NOTE — ED NOTES
Pt observe yelling out in his room instructed pt to keep voice down per mother pt reacting to voices he hears.      Andre De Leon RN  08/04/22 6250

## 2022-08-04 NOTE — ED NOTES
FIRST PROVIDER CONTACT ASSESSMENT NOTE       Department of Emergency Medicine                 First Provider Note            22  10:04 AM EDT    Date of Encounter: No admission date for patient encounter. Patient Name: Bryan Boss  : 1995  MRN: [id number]    Chief Complaint: Psychiatric Evaluation (Has not slep in 3 days ago, denies SI/HI)      History of Present Illness:   Bryan Boss is a 32 y.o. male who presents to the ED for not sleeping x 3 days. Denies SI/HI. Focused Physical Exam:  VS:    ED Triage Vitals   BP Temp Temp Source Heart Rate Resp SpO2 Height Weight   22 1004 22 0953 22 0953 22 0953 22 1004 22 0953 -- 22 1004   (!) 166/119 98.9 °F (37.2 °C) Oral (!) 126 18 100 %  170 lb (77.1 kg)        Physical Ex: Constitutional: Alert and non-toxic. Medical History:  has a past medical history of Bipolar 1 disorder ([address] Depression, GERD (gastroesophageal reflux disease), Hypertension, Severe manic bipolar 1 disorder with psychotic behavior ([address] and Suicide attempt ([facility] Surgical History:  has a past surgical history that includes Wrist fracture surgery (Left, 2019); APPLICATION MULTIPLANE UNILATERAL EXTERNAL FIXATION SYSTEM (Left, 2019); Lumbar spine surgery (N/A, 12/10/2019); Humerus fracture surgery (Left, 2019); Hand surgery (Left, 2019); and Hand surgery (Left, 3/4/2020). Social History:  reports that he has been smoking cigarettes. He has never used smokeless tobacco. He reports that he does not currently use alcohol. He reports current drug use. Drug: Marijuana Simmie Lang). Family History: family history is not on file.     Allergies: Depakote [divalproex sodium], Depakote [valproic acid], Haldol [haloperidol], Haldol [haloperidol], Invega sustenna [paliperidone palmitate er], Invega [paliperidone er], Invega [paliperidone], and Latuda [lurasidone hcl]     Initial Plan of Care: Initiate Treatment-Testing, Proceed toTreatment Area When Bed Available for ED Attending/MLP to Continue Care      ---END OF FIRST PROVIDER CONTACT ASSESSMENT NOTE---  Electronically signed by GAURANG Griffiths   DD: 8/4/22       Jason Elizondo [place]  08/04/22 1005

## 2022-08-04 NOTE — ED NOTES
Behavioral Health Crisis Assessment      Chief Complaint: \"I haven't slept in 3 days. I just got out of retirement a few days ago and the pharmacy didn't have one of my prescriptions for a few days and it threw me off. \"    Mental Status Exam:     Legal Status  [x] Voluntary:  [] Involuntary, Issued by:    Gender  [x] Male [] Female [] Transgender  [] Other    Sexual Orientation    [x] Heterosexual [] Homosexual [] Bisexual [] Other    Brief Clinical Summary: Pt is a 32 y.o. male presenting to the ED for a of psychiatric evaluation due to insomnia. Pt reports his mental health has been previously well controlled on his medications while he was in retirement, he just spent 6 months incarcerated. He was released  8 days ago and once he got released there was a mixup with the pharmacy and his medications. Pt states he is compliance with his lithium however was not able to take his prolixin for a few days. Mother is his guardian. Pt is accompanied by his mother, who validates his inability to sleep over the past few days. Pt's mother was first requesting for ED to monitor his symptoms because he has been \"out of sorts\" over the past few days, but now she is requesting to take him home and monitor his symptoms. SW spoke with Qasim Martin. Melissa Boland DO who is in agreance with this plan to discharge home and to follow up with mental health provider as soon as possible. Pt follows with [facility] in [facility]. Collateral Information: Mother states since pt did not have his medication for a couple of days, it seemed to mess him up and he's been talking to himself more so.      Risk Factors:  Mental health diagnosis  Recent med change  History of trauma  Lack of housing  Lack of self care  Multiple psych admissions  Trouble with the law    Protective Factors:  Strong family support  Outpatient provider- [address]  Help seeking behavior  Has access to essential needs  Steady income  No access to weapons    Suicidal Ideations:   [] Reports:    [] Past [] Present   [x] Denies    Suicide Attempts:  [x] Reports: jumped off bridge in December of 2020  [] Denies    C-SSRS Screening Completed by RN: Current Suicide Risk:  [x] No Risk [] Low [] Moderate [] High    Homicidal Ideations  [] Reports:   [] Past [] Present   [x] Denies     Self Injurious/Self Mutilation Behaviors:   [x] Reports:    [x] Past [] Present got home made tattoos when ex girlfriend attempted to slit her wrists  [] Denies    Hallucinations/Delusions   [] Reports:   [x] Denies     Substance Use/Alcohol Use/Addiction:   [x] Reports: drinks alcohol 1-2x a week  [] Denies   [x] SBIRT Screen Complete. Current or Past Substance Abuse Treatment  [] Yes, When and Where:  [x] No    Current or Past Mental Health Treatment:  [x] Yes, When and Where: [facility] multiple times  [] No    Legal Issues:  [x]  Yes (Specify) on parole for violation of Protection Order. []  No    Access to Weapons:  []  Yes (Specify)  [x]  No    Trauma History  [x] Reports: jumping off bridge in attempt to commit suicide  [] Denies     Living Situation: homeless- stays with grandma and dad at times    Employment: SSDI    Education Level: graduated highschool    Violence Risk Screening:        Have you ever thought about hurting someone? []  No  [x]  Yes (Ask the questions listed below)   When? Did you follow through with the thoughts? [x] No     [] Yes- When and what happened? 2.  Have you ever threatened anyone? [x]  No  []  Yes (Ask the questions listed below)   When and what happened? Have you ever threatened someone with a gun, knife or other weapon? [x]  No  []  Yes - When and what happened? 2. Have you ever had an order of protection taken out against you? [x]  Yes []  No  3. Have you ever been arrested due to violence? [x]  Yes []  No  4. Have you ever been cruel to animals?  []  Yes [x]  No    After consideration of C-SSRS screening results, C-SSRS assessments, and this professional's assessment the patient's overall suicide risk assessed to be:  [x] No Risk  [] Low   [] Moderate   [] High     [x] Discussed current suicide risk, protective and risk factors with RN and ED Physician     Disposition   [x] Home:   [] Outpatient Provider:   [] Crisis Unit:   [] Inpatient Psychiatric Unit:  [] Other:                    Norma Dixon [facility]  08/04/22 2908

## 2022-10-26 ENCOUNTER — HOSPITAL ENCOUNTER (EMERGENCY)
Age: 27
Discharge: PSYCHIATRIC OR SUBSTANCE USE FACILITY | End: 2022-10-27
Attending: EMERGENCY MEDICINE
Payer: MEDICAID

## 2022-10-26 VITALS
BODY MASS INDEX: 22.32 KG/M2 | DIASTOLIC BLOOD PRESSURE: 101 MMHG | SYSTOLIC BLOOD PRESSURE: 144 MMHG | RESPIRATION RATE: 18 BRPM | OXYGEN SATURATION: 100 % | HEART RATE: 86 BPM | HEIGHT: 77 IN | TEMPERATURE: 98.7 F | WEIGHT: 189 LBS

## 2022-10-26 DIAGNOSIS — F29 PSYCHOSIS, UNSPECIFIED PSYCHOSIS TYPE (HCC): Primary | ICD-10-CM

## 2022-10-26 LAB
ACETAMINOPHEN LEVEL: <5 MCG/ML (ref 10–30)
ALBUMIN SERPL-MCNC: 4.3 G/DL (ref 3.5–5.2)
ALP BLD-CCNC: 136 U/L (ref 40–129)
ALT SERPL-CCNC: 23 U/L (ref 0–40)
AMPHETAMINE SCREEN, URINE: NOT DETECTED
ANION GAP SERPL CALCULATED.3IONS-SCNC: 13 MMOL/L (ref 7–16)
AST SERPL-CCNC: 24 U/L (ref 0–39)
BARBITURATE SCREEN URINE: NOT DETECTED
BASOPHILS ABSOLUTE: 0.04 E9/L (ref 0–0.2)
BASOPHILS RELATIVE PERCENT: 0.5 % (ref 0–2)
BENZODIAZEPINE SCREEN, URINE: POSITIVE
BILIRUB SERPL-MCNC: 0.2 MG/DL (ref 0–1.2)
BUN BLDV-MCNC: 6 MG/DL (ref 6–20)
CALCIUM SERPL-MCNC: 9.5 MG/DL (ref 8.6–10.2)
CANNABINOID SCREEN URINE: POSITIVE
CHLORIDE BLD-SCNC: 104 MMOL/L (ref 98–107)
CO2: 23 MMOL/L (ref 22–29)
COCAINE METABOLITE SCREEN URINE: POSITIVE
CREAT SERPL-MCNC: 0.8 MG/DL (ref 0.7–1.2)
EOSINOPHILS ABSOLUTE: 0.27 E9/L (ref 0.05–0.5)
EOSINOPHILS RELATIVE PERCENT: 3.6 % (ref 0–6)
ETHANOL: <10 MG/DL (ref 0–0.08)
FENTANYL SCREEN, URINE: NOT DETECTED
GFR SERPL CREATININE-BSD FRML MDRD: >60 ML/MIN/1.73
GLUCOSE BLD-MCNC: 96 MG/DL (ref 74–99)
HCT VFR BLD CALC: 45.6 % (ref 37–54)
HEMOGLOBIN: 15.6 G/DL (ref 12.5–16.5)
IMMATURE GRANULOCYTES #: 0.03 E9/L
IMMATURE GRANULOCYTES %: 0.4 % (ref 0–5)
INFLUENZA A: NOT DETECTED
INFLUENZA B: NOT DETECTED
LITHIUM DOSE AMOUNT: ABNORMAL
LITHIUM LEVEL: 0.49 MMOL/L (ref 0.5–1.5)
LYMPHOCYTES ABSOLUTE: 1.8 E9/L (ref 1.5–4)
LYMPHOCYTES RELATIVE PERCENT: 24 % (ref 20–42)
Lab: ABNORMAL
MCH RBC QN AUTO: 30.9 PG (ref 26–35)
MCHC RBC AUTO-ENTMCNC: 34.2 % (ref 32–34.5)
MCV RBC AUTO: 90.3 FL (ref 80–99.9)
METHADONE SCREEN, URINE: NOT DETECTED
MONOCYTES ABSOLUTE: 0.41 E9/L (ref 0.1–0.95)
MONOCYTES RELATIVE PERCENT: 5.5 % (ref 2–12)
NEUTROPHILS ABSOLUTE: 4.96 E9/L (ref 1.8–7.3)
NEUTROPHILS RELATIVE PERCENT: 66 % (ref 43–80)
OPIATE SCREEN URINE: NOT DETECTED
OXYCODONE URINE: NOT DETECTED
PDW BLD-RTO: 13 FL (ref 11.5–15)
PHENCYCLIDINE SCREEN URINE: NOT DETECTED
PLATELET # BLD: 236 E9/L (ref 130–450)
PMV BLD AUTO: 10.5 FL (ref 7–12)
POTASSIUM SERPL-SCNC: 3.6 MMOL/L (ref 3.5–5)
RBC # BLD: 5.05 E12/L (ref 3.8–5.8)
SALICYLATE, SERUM: <0.3 MG/DL (ref 0–30)
SARS-COV-2 RNA, RT PCR: NOT DETECTED
SODIUM BLD-SCNC: 140 MMOL/L (ref 132–146)
TOTAL CK: 571 U/L (ref 20–200)
TOTAL PROTEIN: 7.4 G/DL (ref 6.4–8.3)
TRICYCLIC ANTIDEPRESSANTS SCREEN SERUM: NEGATIVE NG/ML
WBC # BLD: 7.5 E9/L (ref 4.5–11.5)

## 2022-10-26 PROCEDURE — 85025 COMPLETE CBC W/AUTO DIFF WBC: CPT

## 2022-10-26 PROCEDURE — 80178 ASSAY OF LITHIUM: CPT

## 2022-10-26 PROCEDURE — 80179 DRUG ASSAY SALICYLATE: CPT

## 2022-10-26 PROCEDURE — 82077 ASSAY SPEC XCP UR&BREATH IA: CPT

## 2022-10-26 PROCEDURE — 6360000002 HC RX W HCPCS: Performed by: EMERGENCY MEDICINE

## 2022-10-26 PROCEDURE — 96372 THER/PROPH/DIAG INJ SC/IM: CPT

## 2022-10-26 PROCEDURE — 80053 COMPREHEN METABOLIC PANEL: CPT

## 2022-10-26 PROCEDURE — 87636 SARSCOV2 & INF A&B AMP PRB: CPT

## 2022-10-26 PROCEDURE — 82550 ASSAY OF CK (CPK): CPT

## 2022-10-26 PROCEDURE — 93005 ELECTROCARDIOGRAM TRACING: CPT

## 2022-10-26 PROCEDURE — 99285 EMERGENCY DEPT VISIT HI MDM: CPT

## 2022-10-26 PROCEDURE — 36415 COLL VENOUS BLD VENIPUNCTURE: CPT

## 2022-10-26 PROCEDURE — 80307 DRUG TEST PRSMV CHEM ANLYZR: CPT

## 2022-10-26 PROCEDURE — 80143 DRUG ASSAY ACETAMINOPHEN: CPT

## 2022-10-26 RX ORDER — DIPHENHYDRAMINE HYDROCHLORIDE 50 MG/ML
50 INJECTION INTRAMUSCULAR; INTRAVENOUS ONCE
Status: COMPLETED | OUTPATIENT
Start: 2022-10-26 | End: 2022-10-26

## 2022-10-26 RX ORDER — DROPERIDOL 2.5 MG/ML
1.25 INJECTION, SOLUTION INTRAMUSCULAR; INTRAVENOUS EVERY 6 HOURS PRN
Status: DISCONTINUED | OUTPATIENT
Start: 2022-10-26 | End: 2022-10-26

## 2022-10-26 RX ORDER — DROPERIDOL 2.5 MG/ML
1.25 INJECTION, SOLUTION INTRAMUSCULAR; INTRAVENOUS ONCE
Status: COMPLETED | OUTPATIENT
Start: 2022-10-26 | End: 2022-10-26

## 2022-10-26 RX ORDER — DROPERIDOL 2.5 MG/ML
5 INJECTION, SOLUTION INTRAMUSCULAR; INTRAVENOUS ONCE
Status: COMPLETED | OUTPATIENT
Start: 2022-10-26 | End: 2022-10-26

## 2022-10-26 RX ORDER — LORAZEPAM 2 MG/ML
2 INJECTION INTRAMUSCULAR ONCE
Status: COMPLETED | OUTPATIENT
Start: 2022-10-26 | End: 2022-10-26

## 2022-10-26 RX ORDER — MIDAZOLAM HYDROCHLORIDE 2 MG/2ML
2 INJECTION, SOLUTION INTRAMUSCULAR; INTRAVENOUS ONCE
Status: COMPLETED | OUTPATIENT
Start: 2022-10-26 | End: 2022-10-26

## 2022-10-26 RX ADMIN — LORAZEPAM 2 MG: 2 INJECTION INTRAMUSCULAR; INTRAVENOUS at 23:05

## 2022-10-26 RX ADMIN — DROPERIDOL 5 MG: 2.5 INJECTION, SOLUTION INTRAMUSCULAR; INTRAVENOUS at 11:30

## 2022-10-26 RX ADMIN — DIPHENHYDRAMINE HYDROCHLORIDE 50 MG: 50 INJECTION, SOLUTION INTRAMUSCULAR; INTRAVENOUS at 23:05

## 2022-10-26 RX ADMIN — MIDAZOLAM HYDROCHLORIDE 2 MG: 1 INJECTION, SOLUTION INTRAMUSCULAR; INTRAVENOUS at 11:30

## 2022-10-26 RX ADMIN — DROPERIDOL 1.25 MG: 2.5 INJECTION, SOLUTION INTRAMUSCULAR; INTRAVENOUS at 23:05

## 2022-10-26 ASSESSMENT — PAIN - FUNCTIONAL ASSESSMENT: PAIN_FUNCTIONAL_ASSESSMENT: NONE - DENIES PAIN

## 2022-10-26 NOTE — ED NOTES
Received report from Taye Lazcano [facility].        Milly Dutta RN  10/26/22 7608

## 2022-10-26 NOTE — ED NOTES
Call from pt grandmother Gloria Ashley [phone] who states she is pt's guardian. Reports pt has been increasingly agitated though out the day, yelling and screaming, hitting things. \" Reports pt is \". .. talking nonsense, he can't talk straight. \" States earlier in the day pt jumped in the running shower with his clothes on several times making a large mess in the bathroom and hallway. Reports he has been very demanding of things \". .. he's so rude. . vulgar with horrible language', also reports pt has been giving his possessions to strangers. Grandmother reports pt was released from [facility] about a week ago, released and went to custodial, out since Friday. Reports his behavior cannot be controlled and she cannot manage him. Reports currently prescribed Lithium 300mg, Fluoxitine 5mg. Advised WILLIAM nurse Luciano Roberts of medication information.           [address] [place] [address], [facility]  10/26/22 8654 [place], [place]  10/26/22 8857

## 2022-10-26 NOTE — ED NOTES
Patient gave this writer a \"urine sample\"  That was clear and cold to touch. Asked patient for another urine sample as this one appears to be water.      Roz Hastings RN  10/26/22 9682

## 2022-10-27 LAB
BILIRUBIN URINE: NEGATIVE
BLOOD, URINE: NEGATIVE
CLARITY: CLEAR
COLOR: YELLOW
GLUCOSE URINE: NEGATIVE MG/DL
KETONES, URINE: NEGATIVE MG/DL
LEUKOCYTE ESTERASE, URINE: NEGATIVE
NITRITE, URINE: NEGATIVE
PH UA: 6.5 (ref 5–9)
PROTEIN UA: NEGATIVE MG/DL
SPECIFIC GRAVITY UA: 1.01 (ref 1–1.03)
UROBILINOGEN, URINE: 0.2 E.U./DL

## 2022-10-27 PROCEDURE — 2580000003 HC RX 258

## 2022-10-27 PROCEDURE — 81003 URINALYSIS AUTO W/O SCOPE: CPT

## 2022-10-27 PROCEDURE — 96372 THER/PROPH/DIAG INJ SC/IM: CPT

## 2022-10-27 PROCEDURE — 6360000002 HC RX W HCPCS: Performed by: EMERGENCY MEDICINE

## 2022-10-27 RX ORDER — WATER 1000 ML/1000ML
INJECTION, SOLUTION INTRAVENOUS
Status: COMPLETED
Start: 2022-10-27 | End: 2022-10-27

## 2022-10-27 RX ORDER — ZIPRASIDONE MESYLATE 20 MG/ML
20 INJECTION, POWDER, LYOPHILIZED, FOR SOLUTION INTRAMUSCULAR ONCE
Status: COMPLETED | OUTPATIENT
Start: 2022-10-27 | End: 2022-10-27

## 2022-10-27 RX ADMIN — WATER 1.2 ML: 1 INJECTION INTRAMUSCULAR; INTRAVENOUS; SUBCUTANEOUS at 03:18

## 2022-10-27 RX ADMIN — ZIPRASIDONE MESYLATE 20 MG: 20 INJECTION, POWDER, LYOPHILIZED, FOR SOLUTION INTRAMUSCULAR at 03:18

## 2022-10-27 NOTE — ED PROVIDER NOTES
HPI:  10/26/22, Time: 8:02 PM EDT         Ariel Albert is a 32 y.o. male presenting to the ED for psychiatric evaluation. Patient has extensive psychiatric history. He was recent released from care home. He has been deteriorating at home. It is reported he is compliant with his meds. He has been increasingly agitated over the past couple of days. He is been screaming, hitting things. Talking nonsense. He has had this in the past.  Currently, patient is agitated, does not provide any other history. Review of Systems:   Unable to evaluate due to the patient's current mental status      --------------------------------------------- PAST HISTORY ---------------------------------------------  Past Medical History:  has a past medical history of Bipolar 1 disorder ([facility] Depression, GERD (gastroesophageal reflux disease), Hypertension, Severe manic bipolar 1 disorder with psychotic behavior ([facility] and Suicide attempt ([facility] Past Surgical History:  has a past surgical history that includes Wrist fracture surgery (Left, 12/5/2019); APPLICATION MULTIPLANE UNILATERAL EXTERNAL FIXATION SYSTEM (Left, 12/5/2019); Lumbar spine surgery (N/A, 12/10/2019); Humerus fracture surgery (Left, 12/11/2019); Hand surgery (Left, 12/19/2019); and Hand surgery (Left, 3/4/2020). Social History:  reports that he has been smoking cigarettes. He has never used smokeless tobacco. He reports that he does not currently use alcohol. He reports current drug use. Drug: Marijuana [employer]). Family History: family history is not on file. The patients home medications have been reviewed.     Allergies: Depakote [divalproex sodium], Depakote [valproic acid], Haldol [haloperidol], Haldol [haloperidol], Invega sustenna [paliperidone palmitate er], Invega [paliperidone er], Invega [paliperidone], and Latuda [lurasidone hcl]    -------------------------------------------------- RESULTS -------------------------------------------------  All laboratory and radiology results have been personally reviewed by myself   LABS:  Results for orders placed or performed during the hospital encounter of 10/26/22   COVID-19 & Influenza Combo    Specimen: Nasopharyngeal Swab   Result Value Ref Range    SARS-CoV-2 RNA, RT PCR NOT DETECTED NOT DETECTED    INFLUENZA A NOT DETECTED NOT DETECTED    INFLUENZA B NOT DETECTED NOT DETECTED   CBC with Auto Differential   Result Value Ref Range    WBC 7.5 4.5 - 11.5 E9/L    RBC 5.05 3.80 - 5.80 E12/L    Hemoglobin 15.6 12.5 - 16.5 g/dL    Hematocrit 45.6 37.0 - 54.0 %    MCV 90.3 80.0 - 99.9 fL    MCH 30.9 26.0 - 35.0 pg    MCHC 34.2 32.0 - 34.5 %    RDW 13.0 11.5 - 15.0 fL    Platelets 739 561 - 599 E9/L    MPV 10.5 7.0 - 12.0 fL    Neutrophils % 66.0 43.0 - 80.0 %    Immature Granulocytes % 0.4 0.0 - 5.0 %    Lymphocytes % 24.0 20.0 - 42.0 %    Monocytes % 5.5 2.0 - 12.0 %    Eosinophils % 3.6 0.0 - 6.0 %    Basophils % 0.5 0.0 - 2.0 %    Neutrophils Absolute 4.96 1.80 - 7.30 E9/L    Immature Granulocytes # 0.03 E9/L    Lymphocytes Absolute 1.80 1.50 - 4.00 E9/L    Monocytes Absolute 0.41 0.10 - 0.95 E9/L    Eosinophils Absolute 0.27 0.05 - 0.50 E9/L    Basophils Absolute 0.04 0.00 - 0.20 E9/L   Comprehensive Metabolic Panel   Result Value Ref Range    Sodium 140 132 - 146 mmol/L    Potassium 3.6 3.5 - 5.0 mmol/L    Chloride 104 98 - 107 mmol/L    CO2 23 22 - 29 mmol/L    Anion Gap 13 7 - 16 mmol/L    Glucose 96 74 - 99 mg/dL    BUN 6 6 - 20 mg/dL    Creatinine 0.8 0.7 - 1.2 mg/dL    Est, Glom Filt Rate >60 >=60 mL/min/1.73    Calcium 9.5 8.6 - 10.2 mg/dL    Total Protein 7.4 6.4 - 8.3 g/dL    Albumin 4.3 3.5 - 5.2 g/dL    Total Bilirubin 0.2 0.0 - 1.2 mg/dL    Alkaline Phosphatase 136 (H) 40 - 129 U/L    ALT 23 0 - 40 U/L    AST 24 0 - 39 U/L   Serum Drug Screen   Result Value Ref Range    Ethanol Lvl <10 mg/dL    Acetaminophen Level <5.0 (L) 10.0 - 30.0 mcg/mL Salicylate, Serum <8.7 0.0 - 30.0 mg/dL    TCA Scrn NEGATIVE Cutoff:300 ng/mL   Urine Drug Screen   Result Value Ref Range    Amphetamine Screen, Urine NOT DETECTED Negative <1000 ng/mL    Barbiturate Screen, Ur NOT DETECTED Negative < 200 ng/mL    Benzodiazepine Screen, Urine POSITIVE (A) Negative < 200 ng/mL    Cannabinoid Scrn, Ur POSITIVE (A) Negative < 50ng/mL    Cocaine Metabolite Screen, Urine POSITIVE (A) Negative < 300 ng/mL    Opiate Scrn, Ur NOT DETECTED Negative < 300ng/mL    PCP Screen, Urine NOT DETECTED Negative < 25 ng/mL    Methadone Screen, Urine NOT DETECTED Negative <300 ng/mL    Oxycodone Urine NOT DETECTED Negative <100 ng/mL    FENTANYL SCREEN, URINE NOT DETECTED Negative <1 ng/mL    Drug Screen Comment: see below    Lithium Level   Result Value Ref Range    Lithium Lvl 0.49 (L) 0.50 - 1.50 mmol/L    Lithium Dose Amount Unknown    CK   Result Value Ref Range    Total  (H) 20 - 200 U/L       RADIOLOGY:  Interpreted by Radiologist.  No orders to display       ------------------------- NURSING NOTES AND VITALS REVIEWED ---------------------------   The nursing notes within the ED encounter and vital signs as below have been reviewed. /75   Pulse 99   Temp 98.9 °F (37.2 °C) (Oral)   Resp 16   Ht 6' 5\" (1.956 m)   Wt 189 lb (85.7 kg)   SpO2 99%   BMI 22.41 kg/m²   Oxygen Saturation Interpretation: Normal      ---------------------------------------------------PHYSICAL EXAM--------------------------------------      Constitutional/General: Alert and oriented x3, well appearing, non toxic in NAD  Head: Normocephalic and atraumatic  Eyes: PERRL, EOMI  Mouth: Oropharynx clear, handling secretions, no trismus  Neck: Supple, full ROM,   Pulmonary: Lungs clear to auscultation bilaterally, no wheezes, rales, or rhonchi. Not in respiratory distress  Cardiovascular:  Regular rate and rhythm, no murmurs, gallops, or rubs.  2+ distal pulses  Abdomen: Soft, non tender, non distended, Extremities: Moves all extremities x 4. Warm and well perfused  Skin: warm and dry without rash  Neurologic: GCS 15,  Psych: Agitated    ------------------------------ ED COURSE/MEDICAL DECISION MAKING----------------------  Medications   midazolam PF (VERSED) injection 2 mg (2 mg IntraMUSCular Given 10/26/22 1130)   droperidol (INAPSINE) injection 5 mg (5 mg IntraMUSCular Given 10/26/22 1130)     EKG: Sinus rhythm, rate 79, no STEMI, no ischemic change    ED COURSE:       Medical Decision Making: On arrival, the patient was very agitated. He required medications as above. Labs reviewed. Patient is medically cleared. Please note that the withdrawal or failure to initiate urgent interventions for this patient would likely result in a life threatening deterioration or permanent disability. Accordingly this patient received 30 minutes of critical care time, excluding separately billable procedures. Counseling: The emergency provider has spoken with the patient and discussed todays results, in addition to providing specific details for the plan of care and counseling regarding the diagnosis and prognosis. Questions are answered at this time and they are agreeable with the plan.      --------------------------------- IMPRESSION AND DISPOSITION ---------------------------------    IMPRESSION  1. Psychosis, unspecified psychosis type ([facility]        DISPOSITION  Disposition: Admit to mental health unit - medically cleared for admission  Patient condition is stable      NOTE: This report was transcribed using voice recognition software.  Every effort was made to ensure accuracy; however, inadvertent computerized transcription errors may be present        Mina Bone MD  10/26/22 2005

## 2022-10-27 NOTE — ED NOTES
Patient standing at 's desk.  verbally redirected patient back to room. Observed patient throw empty juice cup at television.       Alma Buck RN  10/26/22 6189
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
## 2022-10-27 NOTE — ED NOTES
Entered WILLIAM, observed patient sitting on his pillow on the floor in front of 's desk. Verbally redirected patient back to room.       Holland Bence, RN  10/27/22 0176

## 2022-10-27 NOTE — ED NOTES
Observed patient drinking out of urinal. Asked patient what is in the urinal and patient stated \"Water. I'm staying hydrated\". Provided patient with a specimen cup and instructed patient that only urine is to be put into the cup.       Razia Mijares RN  10/27/22 8547

## 2022-10-27 NOTE — ED NOTES
Dr. Lenka Amezcua notified of patient's current behavior, waiting for new orders.       Roxy Sierra RN  10/27/22 8855

## 2022-10-27 NOTE — ED NOTES
Patient going into other patient's room and upsetting other patient. Patient taking items off 's desk. Once again verbally redirected patient back to his own room.       Pee Fernandes RN  10/27/22 [address] [place] [address],[address], RN  10/27/22 2050

## 2022-10-27 NOTE — ED NOTES
No EKG result from this visit found in [facility]. Found EKG dated 10- in patient's paper chart. QTC was 456, Dr. Sarah Spear given copy of EKG.       Rubina Jansen RN  10/26/22 0613

## 2022-10-27 NOTE — ED NOTES
Pt abruptly woke up and pacing the hallway, requiring constant redirection, making nonsensical comments, internally stimulated, odd behavior and going through the trash, very intrusive with staff and other patients, going into other patients rooms, not following directions, disruptive to immediate surroundings and the entire unit.       DARINEL Gonzalez, [place]  10/27/22 0780

## 2022-10-27 NOTE — ED NOTES
Per 's request patient referred to the [facility], spoke to  Tashia Izquierdo RN  10/27/22 9317

## 2022-10-27 NOTE — ED NOTES
Updated accepting information with [facility]. Spoke with Arlette.       Marvin Cantor  10/27/22 0758

## 2022-10-27 NOTE — ED NOTES
Patient verbally redirected back to room by [facility] police. Officers at bedside for safety but patient was cooperative for medication and police assistance was not required.       Razia Mijares RN  10/27/22 5356

## 2022-10-27 NOTE — ED NOTES
PT accepted to [facility] by Dr. Mohini Henderson 111[address]    PAS by Ben[address] carreon  10/27/22 6288

## 2022-10-27 NOTE — ED NOTES
Behavioral Health Crisis Assessment      Due to severely compromised mental status pt is not able to be interviewed fully. Collateral info was taken from pt mother. Chief Complaint: Pt is a 31 yo male who is brought to the ED by his parole office who reports bizarre behavior. Mother reports in phone call increasing agitation, yelling screaming inappropriate statements, hitting walls, furniture, threatening people in household with bodily harm, \". .. talking gibberish.'  Pt states he has no idea why he is here, appears unaware he is in a hospital.     Mental Status Exam: Pt is mildly agitated at times, requires constant re-direction, invasive of body space, touching everything, taking other pt's used pudding cups out of the garbage and licking these clean. Speech is nonsensical, random screaming. Clearly responding to unseen others at times. Legal Status  [] Voluntary:  [x] Involuntary, Issued by: ED doc    Gender  [x] Male [] Female [] Transgender  [] Other    Sexual Orientation    [x] Heterosexual [] Homosexual [] Bisexual [] Other    Brief Clinical Summary: Mother reports pt has follow up appointments upon discharge from [facility], not currently open for services yet, hx of admission [address] 12/2019, hx of [facility] admission, hx of bipolar dx. Collateral Information: Please see separate note re: mother info on recent behavior. Risk Factors:  Hx of mental health dx: Bipolar Disorder  Substance use  Limited family/friend support  Recent conflict with family  Hx of in-patient psychiatric admissions  Has no out-patient provider  Off prescribed psych meds    Protective Factors:  No access to weapons      Suicidal Ideations:   [] Reports:    [x] Past [] Present   [] Denies    Suicide Attempts:  [x] Reports: Mother reports attempts, no time frame  [] Denies    C-SSRS Screening Completed by RN: Current Suicide Risk:  [] No Risk [] Low [] Moderate [] High    Homicidal Ideations - Unknown.   Has threatened bodily harm to family members  [] Reports:   [] Past [] Present   [] Denies     Self Injurious/Self Mutilation Behaviors:  - Unknown  [] Reports:    [] Past [] Present   [] Denies    Hallucinations/Delusions   [x] Reports: Appears to be responding to unseen others  [] Denies     Substance Use/Alcohol Use/Addiction:   [x] Reports: Positive for Benzos, Cannabis, Cocaine  [] Denies   [] SBIRT Screen Complete. Current or Past Substance Abuse Treatment - Unknown  [] Yes, When and Where:  [] No    Current or Past Mental Health Treatment:   []Yes, When and Where: [facility], [address]  [] No    Legal Issues:  [x]  Yes (Specify) Hx in half-way  []  No    Access to Weapons - Unknown  []  Yes (Specify)  []  No    Trauma History - Unknown  [] Reports:  [] Denies     Living Situation: With family after half-way discharge    Employment: None    Education Level: Unknown    Violence Risk Screening: - Unable to complete        Have you ever thought about hurting someone? []  No  []  Yes (Ask the questions listed below)   When? Did you follow through with the thoughts? [] No     [] Yes- When and what happened? 2.  Have you ever threatened anyone? []  No  []  Yes (Ask the questions listed below)   When and what happened? Have you ever threatened someone with a gun, knife or other weapon? []  No  []  Yes - When and what happened? 2. Have you ever had an order of protection taken out against you? []  Yes []  No  3. Have you ever been arrested due to violence? []  Yes []  No  4. Have you ever been cruel to animals?  []  Yes []  No    After consideration of C-SSRS screening results, C-SSRS assessments, and this professional's assessment the patient's overall suicide risk assessed to be:  [] No Risk  [x] Low   [] Moderate   [] High     [x] Discussed current suicide risk, protective and risk factors with RN and ED Physician     Disposition   [] Home:   [] Outpatient Provider:   [] Crisis Unit:   [x] Inpatient Psychiatric Unit: Due to   [] Other:                    Pricila Dixon, MSRU, [facility]  10/26/22 [address], MSW, [place]  10/26/22 3231

## 2022-10-27 NOTE — ED NOTES
Patient pacing unit and requires constant verbal redirection back to room. Patient laughing inappropriately. Patient loud and disruptive to entire unit.       Stella Deasi RN  10/26/22 2185

## 2022-10-27 NOTE — ED NOTES
Observed patient pulling empty juice containers out of trash and attempting to drink them. Patient has his own juice containers in his room. Trash bag removed and patient verbally redirected back to his room.       Clarke Kanner, RN  10/27/22 9854

## 2022-10-27 NOTE — ED NOTES
MARCO ANTONIO faxed over UA to [facility]. Awaiting a call back.       DARINEL Alvarado, [place]  10/27/22 2669

## 2022-10-27 NOTE — ED NOTES
Noted rubber band wrapped tightly around right wrist with deep indentation in skin but no bleeding noted. Rubber band removed and taken away from patient for safety. Patient cooperative at this time.       Stella Desai RN  10/26/22 4077

## 2022-10-27 NOTE — ED NOTES
Observed patient in restroom as he voided in specimen cup and then proceeded to urinate all over the floor.       Stella Desai RN  10/27/22 7065

## 2022-10-27 NOTE — ED NOTES
Pt awakened to be placed on cart for transfer to [facility] officer on site to assist     Linda Gentile RN  10/27/22 2558

## 2022-10-27 NOTE — ED NOTES
[facility] police at bedside for safety but patient was cooperative for medications and police assistance was not required.       Les Sauceda RN  10/26/22 0678

## 2022-10-27 NOTE — ED NOTES
SW spoke to [facility] LPN from the access center and requested for EKG and UA to be faxed over to [facility]. RN is aware of needing UA.       Garth Kenyon, MSRU, [place]  10/27/22 1020

## 2022-10-31 LAB
EKG ATRIAL RATE: 79 BPM
EKG P AXIS: 19 DEGREES
EKG P-R INTERVAL: 152 MS
EKG Q-T INTERVAL: 398 MS
EKG QRS DURATION: 92 MS
EKG QTC CALCULATION (BAZETT): 456 MS
EKG R AXIS: 47 DEGREES
EKG T AXIS: 32 DEGREES
EKG VENTRICULAR RATE: 79 BPM
LITHIUM DOSE AMOUNT: ABNORMAL
LITHIUM LEVEL: 0.31 MMOL/L (ref 0.5–1.5)

## 2022-11-11 LAB
ALBUMIN SERPL-MCNC: 4 G/DL (ref 3.5–5.2)
ALP BLD-CCNC: 123 U/L (ref 40–129)
ALT SERPL-CCNC: 27 U/L (ref 0–40)
ANION GAP SERPL CALCULATED.3IONS-SCNC: 13 MMOL/L (ref 7–16)
AST SERPL-CCNC: 19 U/L (ref 0–39)
BASOPHILS ABSOLUTE: 0.06 E9/L (ref 0–0.2)
BASOPHILS RELATIVE PERCENT: 0.8 % (ref 0–2)
BILIRUB SERPL-MCNC: 0.3 MG/DL (ref 0–1.2)
BUN BLDV-MCNC: 9 MG/DL (ref 6–20)
CALCIUM SERPL-MCNC: 9.3 MG/DL (ref 8.6–10.2)
CHLORIDE BLD-SCNC: 105 MMOL/L (ref 98–107)
CO2: 23 MMOL/L (ref 22–29)
CREAT SERPL-MCNC: 0.8 MG/DL (ref 0.7–1.2)
EOSINOPHILS ABSOLUTE: 0.55 E9/L (ref 0.05–0.5)
EOSINOPHILS RELATIVE PERCENT: 7.3 % (ref 0–6)
GFR SERPL CREATININE-BSD FRML MDRD: >60 ML/MIN/1.73
GLUCOSE BLD-MCNC: 156 MG/DL (ref 74–99)
HCT VFR BLD CALC: 48.7 % (ref 37–54)
HEMOGLOBIN: 15.9 G/DL (ref 12.5–16.5)
IMMATURE GRANULOCYTES #: 0.04 E9/L
IMMATURE GRANULOCYTES %: 0.5 % (ref 0–5)
LITHIUM DOSE AMOUNT: ABNORMAL
LITHIUM LEVEL: 0.35 MMOL/L (ref 0.5–1.5)
LYMPHOCYTES ABSOLUTE: 2.19 E9/L (ref 1.5–4)
LYMPHOCYTES RELATIVE PERCENT: 29.1 % (ref 20–42)
MCH RBC QN AUTO: 30.2 PG (ref 26–35)
MCHC RBC AUTO-ENTMCNC: 32.6 % (ref 32–34.5)
MCV RBC AUTO: 92.6 FL (ref 80–99.9)
MONOCYTES ABSOLUTE: 0.4 E9/L (ref 0.1–0.95)
MONOCYTES RELATIVE PERCENT: 5.3 % (ref 2–12)
NEUTROPHILS ABSOLUTE: 4.29 E9/L (ref 1.8–7.3)
NEUTROPHILS RELATIVE PERCENT: 57 % (ref 43–80)
PDW BLD-RTO: 12.8 FL (ref 11.5–15)
PLATELET # BLD: 214 E9/L (ref 130–450)
PMV BLD AUTO: 10.8 FL (ref 7–12)
POTASSIUM SERPL-SCNC: 3.9 MMOL/L (ref 3.5–5)
RBC # BLD: 5.26 E12/L (ref 3.8–5.8)
SODIUM BLD-SCNC: 141 MMOL/L (ref 132–146)
TOTAL PROTEIN: 6.7 G/DL (ref 6.4–8.3)
TSH SERPL DL<=0.05 MIU/L-ACNC: 1.59 UIU/ML (ref 0.27–4.2)
WBC # BLD: 7.5 E9/L (ref 4.5–11.5)

## 2023-06-08 LAB
ALBUMIN SERPL-MCNC: 4.4 G/DL (ref 3.5–5.2)
ALP SERPL-CCNC: 137 U/L (ref 40–129)
ALT SERPL-CCNC: 17 U/L (ref 0–40)
ANION GAP SERPL CALCULATED.3IONS-SCNC: 14 MMOL/L (ref 7–16)
AST SERPL-CCNC: 19 U/L (ref 0–39)
BASOPHILS # BLD: 0.08 E9/L (ref 0–0.2)
BASOPHILS NFR BLD: 1.1 % (ref 0–2)
BILIRUB SERPL-MCNC: 0.4 MG/DL (ref 0–1.2)
BUN SERPL-MCNC: 10 MG/DL (ref 6–20)
CALCIUM SERPL-MCNC: 9.3 MG/DL (ref 8.6–10.2)
CHLORIDE SERPL-SCNC: 104 MMOL/L (ref 98–107)
CHOLESTEROL, FASTING: 179 MG/DL (ref 0–199)
CHOLESTEROL, TOTAL: 179 MG/DL (ref 0–199)
CO2 SERPL-SCNC: 21 MMOL/L (ref 22–29)
CREAT SERPL-MCNC: 0.9 MG/DL (ref 0.7–1.2)
EOSINOPHIL # BLD: 0.87 E9/L (ref 0.05–0.5)
EOSINOPHIL NFR BLD: 12.1 % (ref 0–6)
ERYTHROCYTE [DISTWIDTH] IN BLOOD BY AUTOMATED COUNT: 13.3 FL (ref 11.5–15)
GLUCOSE SERPL-MCNC: 90 MG/DL (ref 74–99)
HBA1C MFR BLD: 5.3 % (ref 4–5.6)
HCT VFR BLD AUTO: 51.1 % (ref 37–54)
HDLC SERPL-MCNC: 26 MG/DL
HDLC SERPL-MCNC: 26 MG/DL
HGB BLD-MCNC: 16.4 G/DL (ref 12.5–16.5)
IMM GRANULOCYTES # BLD: 0.03 E9/L
IMM GRANULOCYTES NFR BLD: 0.4 % (ref 0–5)
LDL CHOLESTEROL CALCULATED: 104 MG/DL (ref 0–99)
LDLC SERPL CALC-MCNC: 104 MG/DL (ref 0–99)
LITHIUM DOSE AMOUNT: ABNORMAL
LITHIUM SERPL-MCNC: 0.35 MMOL/L (ref 0.5–1.5)
LYMPHOCYTES # BLD: 2.17 E9/L (ref 1.5–4)
LYMPHOCYTES NFR BLD: 30.2 % (ref 20–42)
MCH RBC QN AUTO: 30.1 PG (ref 26–35)
MCHC RBC AUTO-ENTMCNC: 32.1 % (ref 32–34.5)
MCV RBC AUTO: 93.8 FL (ref 80–99.9)
MONOCYTES # BLD: 0.5 E9/L (ref 0.1–0.95)
MONOCYTES NFR BLD: 7 % (ref 2–12)
NEUTROPHILS # BLD: 3.53 E9/L (ref 1.8–7.3)
NEUTS SEG NFR BLD: 49.2 % (ref 43–80)
PLATELET # BLD AUTO: 269 E9/L (ref 130–450)
PMV BLD AUTO: 11.1 FL (ref 7–12)
POTASSIUM SERPL-SCNC: 4.3 MMOL/L (ref 3.5–5)
PROLACTIN SERPL-MCNC: 26.52 NG/ML
PROT SERPL-MCNC: 7.5 G/DL (ref 6.4–8.3)
RBC # BLD AUTO: 5.45 E12/L (ref 3.8–5.8)
SODIUM SERPL-SCNC: 139 MMOL/L (ref 132–146)
TRIGL SERPL-MCNC: 247 MG/DL (ref 0–149)
TRIGLYCERIDE, FASTING: 247 MG/DL (ref 0–149)
TSH SERPL-MCNC: 2.01 UIU/ML (ref 0.27–4.2)
VIT B12 SERPL-MCNC: 432 PG/ML (ref 211–946)
VITAMIN D 25-HYDROXY: 25 NG/ML (ref 30–100)
VLDLC SERPL CALC-MCNC: 49 MG/DL
VLDLC SERPL CALC-MCNC: 49 MG/DL
WBC # BLD: 7.2 E9/L (ref 4.5–11.5)

## 2023-07-25 ENCOUNTER — HOSPITAL ENCOUNTER (INPATIENT)
Age: 28
LOS: 5 days | Discharge: ANOTHER HOSPITAL | DRG: 750 | End: 2023-07-31
Attending: EMERGENCY MEDICINE | Admitting: PSYCHIATRY & NEUROLOGY
Payer: MEDICAID

## 2023-07-25 ENCOUNTER — APPOINTMENT (OUTPATIENT)
Dept: GENERAL RADIOLOGY | Age: 28
DRG: 750 | End: 2023-07-25
Payer: MEDICAID

## 2023-07-25 DIAGNOSIS — F19.10 POLYSUBSTANCE ABUSE (HCC): ICD-10-CM

## 2023-07-25 DIAGNOSIS — R45.851 DEPRESSION WITH SUICIDAL IDEATION: Primary | ICD-10-CM

## 2023-07-25 DIAGNOSIS — F32.A DEPRESSION WITH SUICIDAL IDEATION: Primary | ICD-10-CM

## 2023-07-25 LAB
ALBUMIN SERPL-MCNC: 4.9 G/DL (ref 3.5–5.2)
ALP SERPL-CCNC: 147 U/L (ref 40–129)
ALT SERPL-CCNC: 17 U/L (ref 0–40)
ANION GAP SERPL CALCULATED.3IONS-SCNC: 9 MMOL/L (ref 7–16)
APAP SERPL-MCNC: <5 UG/ML (ref 10–30)
AST SERPL-CCNC: 17 U/L (ref 0–39)
BASOPHILS # BLD: 0.06 K/UL (ref 0–0.2)
BASOPHILS NFR BLD: 1 % (ref 0–2)
BILIRUB SERPL-MCNC: 0.4 MG/DL (ref 0–1.2)
BUN SERPL-MCNC: 15 MG/DL (ref 6–20)
CALCIUM SERPL-MCNC: 10.3 MG/DL (ref 8.6–10.2)
CHLORIDE SERPL-SCNC: 108 MMOL/L (ref 98–107)
CK SERPL-CCNC: 129 U/L (ref 20–200)
CO2 SERPL-SCNC: 26 MMOL/L (ref 22–29)
CREAT SERPL-MCNC: 1 MG/DL (ref 0.7–1.2)
EOSINOPHIL # BLD: 0.69 K/UL (ref 0.05–0.5)
EOSINOPHILS RELATIVE PERCENT: 7 % (ref 0–6)
ERYTHROCYTE [DISTWIDTH] IN BLOOD BY AUTOMATED COUNT: 13 % (ref 11.5–15)
ETHANOLAMINE SERPL-MCNC: <10 MG/DL
GFR SERPL CREATININE-BSD FRML MDRD: >60 ML/MIN/1.73M2
GLUCOSE SERPL-MCNC: 107 MG/DL (ref 74–99)
HCT VFR BLD AUTO: 49 % (ref 37–54)
HGB BLD-MCNC: 16.6 G/DL (ref 12.5–16.5)
IMM GRANULOCYTES # BLD AUTO: 0.04 K/UL (ref 0–0.58)
IMM GRANULOCYTES NFR BLD: 0 % (ref 0–5)
LITHIUM DATE LAST DOSE: NORMAL
LITHIUM DOSE AMOUNT: NORMAL
LITHIUM DOSE TIME: NORMAL
LITHIUM LEVEL: 0.9 MMOL/L (ref 0.5–1.5)
LYMPHOCYTES NFR BLD: 1.39 K/UL (ref 1.5–4)
LYMPHOCYTES RELATIVE PERCENT: 14 % (ref 20–42)
MCH RBC QN AUTO: 30.5 PG (ref 26–35)
MCHC RBC AUTO-ENTMCNC: 33.9 G/DL (ref 32–34.5)
MCV RBC AUTO: 90.1 FL (ref 80–99.9)
MONOCYTES NFR BLD: 0.4 K/UL (ref 0.1–0.95)
MONOCYTES NFR BLD: 4 % (ref 2–12)
NEUTROPHILS NFR BLD: 74 % (ref 43–80)
NEUTS SEG NFR BLD: 7.16 K/UL (ref 1.8–7.3)
PLATELET # BLD AUTO: 258 K/UL (ref 130–450)
PMV BLD AUTO: 11 FL (ref 7–12)
POTASSIUM SERPL-SCNC: 4.3 MMOL/L (ref 3.5–5)
PROT SERPL-MCNC: 8.2 G/DL (ref 6.4–8.3)
RBC # BLD AUTO: 5.44 M/UL (ref 3.8–5.8)
SALICYLATES SERPL-MCNC: <0.3 MG/DL (ref 0–30)
SODIUM SERPL-SCNC: 143 MMOL/L (ref 132–146)
TOXIC TRICYCLIC SC,BLOOD: NEGATIVE
TROPONIN I SERPL HS-MCNC: <6 NG/L (ref 0–11)
TSH SERPL DL<=0.05 MIU/L-ACNC: 2.05 UIU/ML (ref 0.27–4.2)
WBC OTHER # BLD: 9.7 K/UL (ref 4.5–11.5)

## 2023-07-25 PROCEDURE — 93005 ELECTROCARDIOGRAM TRACING: CPT

## 2023-07-25 PROCEDURE — 74019 RADEX ABDOMEN 2 VIEWS: CPT

## 2023-07-25 PROCEDURE — 82550 ASSAY OF CK (CPK): CPT

## 2023-07-25 PROCEDURE — 84443 ASSAY THYROID STIM HORMONE: CPT

## 2023-07-25 PROCEDURE — 84484 ASSAY OF TROPONIN QUANT: CPT

## 2023-07-25 PROCEDURE — 71045 X-RAY EXAM CHEST 1 VIEW: CPT

## 2023-07-25 PROCEDURE — 85027 COMPLETE CBC AUTOMATED: CPT

## 2023-07-25 PROCEDURE — 80307 DRUG TEST PRSMV CHEM ANLYZR: CPT

## 2023-07-25 PROCEDURE — 80179 DRUG ASSAY SALICYLATE: CPT

## 2023-07-25 PROCEDURE — 80053 COMPREHEN METABOLIC PANEL: CPT

## 2023-07-25 PROCEDURE — 80178 ASSAY OF LITHIUM: CPT

## 2023-07-25 PROCEDURE — 84439 ASSAY OF FREE THYROXINE: CPT

## 2023-07-25 PROCEDURE — 99285 EMERGENCY DEPT VISIT HI MDM: CPT

## 2023-07-25 PROCEDURE — G0480 DRUG TEST DEF 1-7 CLASSES: HCPCS

## 2023-07-25 PROCEDURE — 80143 DRUG ASSAY ACETAMINOPHEN: CPT

## 2023-07-26 LAB
AMPHET UR QL SCN: NEGATIVE
BARBITURATES UR QL SCN: NEGATIVE
BENZODIAZ UR QL: NEGATIVE
BILIRUB UR QL STRIP: NEGATIVE
BUPRENORPHINE UR QL: NEGATIVE
CANNABINOIDS UR QL SCN: POSITIVE
CLARITY UR: CLEAR
COCAINE UR QL SCN: POSITIVE
COLOR UR: YELLOW
COMMENT: NORMAL
EKG ATRIAL RATE: 93 BPM
EKG P AXIS: 35 DEGREES
EKG P-R INTERVAL: 166 MS
EKG Q-T INTERVAL: 356 MS
EKG QRS DURATION: 96 MS
EKG QTC CALCULATION (BAZETT): 442 MS
EKG R AXIS: -10 DEGREES
EKG T AXIS: 48 DEGREES
EKG VENTRICULAR RATE: 93 BPM
FENTANYL UR QL: NEGATIVE
GLUCOSE UR STRIP-MCNC: NEGATIVE MG/DL
HGB UR QL STRIP.AUTO: NEGATIVE
KETONES UR STRIP-MCNC: NEGATIVE MG/DL
LEUKOCYTE ESTERASE UR QL STRIP: NEGATIVE
METHADONE UR QL: NEGATIVE
NITRITE UR QL STRIP: NEGATIVE
OPIATES UR QL SCN: NEGATIVE
OXYCODONE UR QL SCN: NEGATIVE
PCP UR QL SCN: NEGATIVE
PH UR STRIP: 6.5 [PH] (ref 5–9)
PROT UR STRIP-MCNC: NEGATIVE MG/DL
SP GR UR STRIP: 1.02 (ref 1–1.03)
T4 FREE SERPL-MCNC: 2.2 NG/DL (ref 0.9–1.7)
TEST INFORMATION: ABNORMAL
UROBILINOGEN UR STRIP-ACNC: 0.2 EU/DL (ref 0–1)

## 2023-07-26 PROCEDURE — 6370000000 HC RX 637 (ALT 250 FOR IP): Performed by: PSYCHIATRY & NEUROLOGY

## 2023-07-26 PROCEDURE — 6370000000 HC RX 637 (ALT 250 FOR IP): Performed by: NURSE PRACTITIONER

## 2023-07-26 PROCEDURE — 81003 URINALYSIS AUTO W/O SCOPE: CPT

## 2023-07-26 PROCEDURE — 90792 PSYCH DIAG EVAL W/MED SRVCS: CPT | Performed by: NURSE PRACTITIONER

## 2023-07-26 PROCEDURE — 80307 DRUG TEST PRSMV CHEM ANLYZR: CPT

## 2023-07-26 PROCEDURE — 1240000000 HC EMOTIONAL WELLNESS R&B

## 2023-07-26 PROCEDURE — 93010 ELECTROCARDIOGRAM REPORT: CPT | Performed by: INTERNAL MEDICINE

## 2023-07-26 PROCEDURE — 6370000000 HC RX 637 (ALT 250 FOR IP): Performed by: EMERGENCY MEDICINE

## 2023-07-26 RX ORDER — OLANZAPINE 5 MG/1
5 TABLET ORAL EVERY 6 HOURS PRN
Status: DISCONTINUED | OUTPATIENT
Start: 2023-07-26 | End: 2023-07-31 | Stop reason: HOSPADM

## 2023-07-26 RX ORDER — GABAPENTIN 300 MG/1
300 CAPSULE ORAL 2 TIMES DAILY
COMMUNITY

## 2023-07-26 RX ORDER — ACETAMINOPHEN 325 MG/1
650 TABLET ORAL EVERY 6 HOURS PRN
Status: DISCONTINUED | OUTPATIENT
Start: 2023-07-26 | End: 2023-07-31 | Stop reason: HOSPADM

## 2023-07-26 RX ORDER — FLUPHENAZINE HYDROCHLORIDE 5 MG/1
5 TABLET ORAL 2 TIMES DAILY
Status: DISCONTINUED | OUTPATIENT
Start: 2023-07-26 | End: 2023-07-31 | Stop reason: HOSPADM

## 2023-07-26 RX ORDER — NICOTINE 21 MG/24HR
1 PATCH, TRANSDERMAL 24 HOURS TRANSDERMAL DAILY
Status: DISCONTINUED | OUTPATIENT
Start: 2023-07-26 | End: 2023-07-31 | Stop reason: HOSPADM

## 2023-07-26 RX ORDER — PROPRANOLOL HYDROCHLORIDE 20 MG/1
40 TABLET ORAL 2 TIMES DAILY
Status: DISCONTINUED | OUTPATIENT
Start: 2023-07-26 | End: 2023-07-31 | Stop reason: HOSPADM

## 2023-07-26 RX ORDER — PRAZOSIN HYDROCHLORIDE 2 MG/1
2 CAPSULE ORAL NIGHTLY
COMMUNITY

## 2023-07-26 RX ORDER — MAGNESIUM HYDROXIDE/ALUMINUM HYDROXICE/SIMETHICONE 120; 1200; 1200 MG/30ML; MG/30ML; MG/30ML
30 SUSPENSION ORAL PRN
Status: DISCONTINUED | OUTPATIENT
Start: 2023-07-26 | End: 2023-07-31 | Stop reason: HOSPADM

## 2023-07-26 RX ORDER — PROPRANOLOL HYDROCHLORIDE 40 MG/1
40 TABLET ORAL 2 TIMES DAILY
COMMUNITY

## 2023-07-26 RX ORDER — DIPHENHYDRAMINE HCL 25 MG
50 TABLET ORAL ONCE
Status: COMPLETED | OUTPATIENT
Start: 2023-07-26 | End: 2023-07-26

## 2023-07-26 RX ORDER — PRAZOSIN HYDROCHLORIDE 2 MG/1
2 CAPSULE ORAL NIGHTLY
Status: DISCONTINUED | OUTPATIENT
Start: 2023-07-26 | End: 2023-07-31 | Stop reason: HOSPADM

## 2023-07-26 RX ORDER — OLANZAPINE 10 MG/1
5 INJECTION, POWDER, LYOPHILIZED, FOR SOLUTION INTRAMUSCULAR EVERY 6 HOURS PRN
Status: DISCONTINUED | OUTPATIENT
Start: 2023-07-26 | End: 2023-07-31 | Stop reason: HOSPADM

## 2023-07-26 RX ORDER — BENZTROPINE MESYLATE 0.5 MG/1
1.5 TABLET ORAL 2 TIMES DAILY
COMMUNITY

## 2023-07-26 RX ADMIN — BENZTROPINE MESYLATE 1.5 MG: 1 TABLET ORAL at 21:37

## 2023-07-26 RX ADMIN — PROPRANOLOL HYDROCHLORIDE 40 MG: 20 TABLET ORAL at 21:38

## 2023-07-26 RX ADMIN — BENZTROPINE MESYLATE 1.5 MG: 1 TABLET ORAL at 13:54

## 2023-07-26 RX ADMIN — PRAZOSIN HYDROCHLORIDE 2 MG: 2 CAPSULE ORAL at 21:38

## 2023-07-26 RX ADMIN — LITHIUM CARBONATE 450 MG: 300 CAPSULE ORAL at 17:27

## 2023-07-26 RX ADMIN — FLUPHENAZINE HYDROCHLORIDE 5 MG: 5 TABLET, FILM COATED ORAL at 13:54

## 2023-07-26 RX ADMIN — FLUPHENAZINE HYDROCHLORIDE 5 MG: 5 TABLET, FILM COATED ORAL at 21:37

## 2023-07-26 RX ADMIN — DIPHENHYDRAMINE HYDROCHLORIDE 50 MG: 25 TABLET ORAL at 00:43

## 2023-07-26 ASSESSMENT — PAIN SCALES - GENERAL
PAINLEVEL_OUTOF10: 0
PAINLEVEL_OUTOF10: 0

## 2023-07-26 ASSESSMENT — SLEEP AND FATIGUE QUESTIONNAIRES
AVERAGE NUMBER OF SLEEP HOURS: 6
SLEEP PATTERN: NIGHTMARES/TERRORS
AVERAGE NUMBER OF SLEEP HOURS: 5
DO YOU USE A SLEEP AID: YES
SLEEP PATTERN: NIGHTMARES/TERRORS
DO YOU HAVE DIFFICULTY SLEEPING: YES
DO YOU USE A SLEEP AID: YES
DO YOU HAVE DIFFICULTY SLEEPING: NO

## 2023-07-26 ASSESSMENT — LIFESTYLE VARIABLES
HOW OFTEN DO YOU HAVE A DRINK CONTAINING ALCOHOL: NEVER
HOW MANY STANDARD DRINKS CONTAINING ALCOHOL DO YOU HAVE ON A TYPICAL DAY: PATIENT DOES NOT DRINK
HOW OFTEN DO YOU HAVE A DRINK CONTAINING ALCOHOL: NEVER
HOW OFTEN DO YOU HAVE A DRINK CONTAINING ALCOHOL: NEVER
HOW MANY STANDARD DRINKS CONTAINING ALCOHOL DO YOU HAVE ON A TYPICAL DAY: PATIENT DOES NOT DRINK
HOW MANY STANDARD DRINKS CONTAINING ALCOHOL DO YOU HAVE ON A TYPICAL DAY: PATIENT DOES NOT DRINK

## 2023-07-26 ASSESSMENT — PATIENT HEALTH QUESTIONNAIRE - PHQ9: SUM OF ALL RESPONSES TO PHQ QUESTIONS 1-9: 27

## 2023-07-26 ASSESSMENT — PAIN - FUNCTIONAL ASSESSMENT: PAIN_FUNCTIONAL_ASSESSMENT: NONE - DENIES PAIN
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
## 2023-07-26 NOTE — GROUP NOTE
Group Therapy Note    Date: 7/26/2023    Group Start Time: 1110  Group End Time: 1200  Group Topic: Psychotherapy    [facility]SE ACUTE BH 1    DARINEL Jin, DIONICIO        Group Therapy Note    Attendees: 6       Patient's Goal:  To increase socialization and improve interpersonal relationships. Notes:  Pt was an active participant in group discussion. Status After Intervention:  Unchanged    Participation Level:  Active Listener    Participation Quality: Appropriate and Attentive      Speech:  normal      Thought Process/Content: Logical      Affective Functioning: Blunted and Flat      Mood: depressed      Level of consciousness:  Alert and Oriented x4      Response to Learning: Able to verbalize current knowledge/experience      Endings: None Reported    Modes of Intervention: Education, Support, and Socialization      Discipline Responsible: /Counselor      Signature:  DARINEL Tillman, DIONICIO
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
## 2023-07-26 NOTE — CARE COORDINATION
Biopsychosocial Assessment Note    Social work met with patient to complete the biopsychosocial assessment and C-SSRS. Chief Complaint: pt reports \"my friend said I ingested crack cocaine in front of the police. \"     Mental Status Exam: pt alert&oriented x4. Pt cooperative, withdrawn, guarded. Pt mood depressed, anxious, blunt affect. Pt eye contact fair, speech clear. Pt thoughts impaired, poverty of content, preoccupied. Pt insight/judgement poor. Pt denied SI/HI/AVH. Clinical Summary: pt reports his friend said he ingested crack cocaine in front of the police. Pt did not ingest crack cocaine but he did snort it. Pt does not know why his friend would say this. Pt reports he said he was going to hurt himself to the police because he didn't want his fiance to breakup with him. Pt thought his fiance would breakup with him because of the drug use but she did not. Pt has a hx of several inpatient psychiatric admissions, last admission at [facility]  on10/27/2022. Pt is active with [employer] and takes his medications as prescribed. Pt reports having suicide thoughts daily that he can control. Pt got a tattoo of suicide to remind him of it daily. Pt reports three suicide attempts in his lifetime. Pt first attempted suicide in 2019 by eating a poisonous red mushroom and taking a bunch of pills. Pt second attempt was also in 2019 where he drank rodriguez oil soap. Pt last attempted suicide December 5, 2020 by jumping off of the [employer] bridge. Pt denied any hx of self-injurious behaviors. Pt denied alcohol use. Pt has a medical marijuana card and smokes marijuana daily. Pt reports using crack cocaine once a month. Pt denied any other drug use hx. Pt UDS positive for cannabis and cocaine. Pt reports legal hx of menacing, destruction of property, and protection order violation. Pt is currently on parole. Pt denied any hx of abuse.  Pt reports trauma hx of losing his friend to suicide a few months

## 2023-07-26 NOTE — CARE COORDINATION
Received communication from [address] patient will be permitted to return back to [facility]. They are requesting that patient steps down to [facility] after inpatient. He will transition back to [facility] from [facility].     Electronically signed by DARINEL Yusuf, LENOREW on 7/26/2023 at 12:35 PM

## 2023-07-26 NOTE — GROUP NOTE
Group Therapy Note    Date: 7/26/2023    Group Start Time: 4963  Group End Time: 0983  Group Topic: Psychoeducation    [facility] ACUTE BH 2    Simone Shah                                                                        Group Therapy Note    Date: 7/26/2023  Start Time: 6226  End Time:  1050  Number of Participants: 16    Type of Group: Psychoeducation    Wellness Binder Information  Module Name:  Grounding and 55457 Method    Patient's Goal:  Increase awareness of different types of grounding techniques. Demonstrate knowledge of 46231 method. Notes:  CTRS discussed different grounding techniques and demonstrated the 92456 method. Patient engaged in discussion and was receptive of handout. Status After Intervention:  Unchanged    Participation Level:  Active Listener    Participation Quality: Appropriate      Speech:  normal      Thought Process/Content: Logical      Affective Functioning: Congruent      Mood: depressed      Level of consciousness:  Alert and Attentive      Response to Learning: Able to verbalize current knowledge/experience and Able to verbalize/acknowledge new learning      Endings: None Reported    Modes of Intervention: Education, Support, and Clarifying      Discipline Responsible: Psychoeducational Specialist      Signature:  Simone Shah

## 2023-07-26 NOTE — H&P
errors may be present. Behavioral Services  Medicare Certification Upon Admission    I certify that this patient's inpatient psychiatric hospital admission is medically necessary for:    [x] (1) Treatment which could reasonably be expected to improve this patient's condition,       [x] (2) Or for diagnostic study;     AND     [x](2) The inpatient psychiatric services are provided while the individual is under the care of a physician and are included in the individualized plan of care.     Estimated length of stay/service  5 - 7 days based on stability     Plan for post-hospital care follow with OP provider     Electronically signed by MICH Ware CNP on 7/26/2023 at 9:26 AM          Electronically signed by MICH Ware CNP on 7/26/2023 at 9:26 AM

## 2023-07-27 PROCEDURE — 6370000000 HC RX 637 (ALT 250 FOR IP): Performed by: NURSE PRACTITIONER

## 2023-07-27 PROCEDURE — 1240000000 HC EMOTIONAL WELLNESS R&B

## 2023-07-27 PROCEDURE — 99232 SBSQ HOSP IP/OBS MODERATE 35: CPT | Performed by: NURSE PRACTITIONER

## 2023-07-27 PROCEDURE — 6370000000 HC RX 637 (ALT 250 FOR IP): Performed by: PSYCHIATRY & NEUROLOGY

## 2023-07-27 RX ORDER — GABAPENTIN 300 MG/1
300 CAPSULE ORAL 2 TIMES DAILY
Status: DISCONTINUED | OUTPATIENT
Start: 2023-07-27 | End: 2023-07-31 | Stop reason: HOSPADM

## 2023-07-27 RX ADMIN — OLANZAPINE 5 MG: 5 TABLET, FILM COATED ORAL at 19:12

## 2023-07-27 RX ADMIN — PRAZOSIN HYDROCHLORIDE 2 MG: 2 CAPSULE ORAL at 20:48

## 2023-07-27 RX ADMIN — FLUPHENAZINE HYDROCHLORIDE 5 MG: 5 TABLET, FILM COATED ORAL at 09:41

## 2023-07-27 RX ADMIN — PROPRANOLOL HYDROCHLORIDE 40 MG: 20 TABLET ORAL at 09:41

## 2023-07-27 RX ADMIN — GABAPENTIN 300 MG: 300 CAPSULE ORAL at 10:38

## 2023-07-27 RX ADMIN — BENZTROPINE MESYLATE 1.5 MG: 1 TABLET ORAL at 20:47

## 2023-07-27 RX ADMIN — LITHIUM CARBONATE 450 MG: 300 CAPSULE ORAL at 09:41

## 2023-07-27 RX ADMIN — BENZTROPINE MESYLATE 1.5 MG: 1 TABLET ORAL at 09:41

## 2023-07-27 RX ADMIN — LITHIUM CARBONATE 450 MG: 300 CAPSULE ORAL at 16:38

## 2023-07-27 RX ADMIN — PROPRANOLOL HYDROCHLORIDE 40 MG: 20 TABLET ORAL at 20:48

## 2023-07-27 RX ADMIN — FLUPHENAZINE HYDROCHLORIDE 5 MG: 5 TABLET, FILM COATED ORAL at 20:47

## 2023-07-27 RX ADMIN — GABAPENTIN 300 MG: 300 CAPSULE ORAL at 20:48

## 2023-07-27 ASSESSMENT — PAIN SCALES - GENERAL
PAINLEVEL_OUTOF10: 0

## 2023-07-28 PROCEDURE — 99232 SBSQ HOSP IP/OBS MODERATE 35: CPT | Performed by: NURSE PRACTITIONER

## 2023-07-28 PROCEDURE — 6370000000 HC RX 637 (ALT 250 FOR IP): Performed by: NURSE PRACTITIONER

## 2023-07-28 PROCEDURE — 1240000000 HC EMOTIONAL WELLNESS R&B

## 2023-07-28 PROCEDURE — 6370000000 HC RX 637 (ALT 250 FOR IP): Performed by: PSYCHIATRY & NEUROLOGY

## 2023-07-28 RX ADMIN — GABAPENTIN 300 MG: 300 CAPSULE ORAL at 20:48

## 2023-07-28 RX ADMIN — LITHIUM CARBONATE 450 MG: 300 CAPSULE ORAL at 17:20

## 2023-07-28 RX ADMIN — GABAPENTIN 300 MG: 300 CAPSULE ORAL at 08:45

## 2023-07-28 RX ADMIN — OLANZAPINE 5 MG: 5 TABLET, FILM COATED ORAL at 22:50

## 2023-07-28 RX ADMIN — OLANZAPINE 5 MG: 5 TABLET, FILM COATED ORAL at 13:56

## 2023-07-28 RX ADMIN — ALUMINUM HYDROXIDE, MAGNESIUM HYDROXIDE, AND SIMETHICONE 30 ML: 200; 200; 20 SUSPENSION ORAL at 16:10

## 2023-07-28 RX ADMIN — BENZTROPINE MESYLATE 1.5 MG: 1 TABLET ORAL at 08:45

## 2023-07-28 RX ADMIN — PROPRANOLOL HYDROCHLORIDE 40 MG: 20 TABLET ORAL at 08:45

## 2023-07-28 RX ADMIN — BENZTROPINE MESYLATE 1.5 MG: 1 TABLET ORAL at 20:47

## 2023-07-28 RX ADMIN — LITHIUM CARBONATE 450 MG: 300 CAPSULE ORAL at 08:45

## 2023-07-28 RX ADMIN — PRAZOSIN HYDROCHLORIDE 2 MG: 2 CAPSULE ORAL at 20:47

## 2023-07-28 RX ADMIN — FLUPHENAZINE HYDROCHLORIDE 5 MG: 5 TABLET, FILM COATED ORAL at 20:48

## 2023-07-28 RX ADMIN — FLUPHENAZINE HYDROCHLORIDE 5 MG: 5 TABLET, FILM COATED ORAL at 08:46

## 2023-07-28 RX ADMIN — PROPRANOLOL HYDROCHLORIDE 40 MG: 20 TABLET ORAL at 20:48

## 2023-07-28 ASSESSMENT — PAIN SCALES - GENERAL
PAINLEVEL_OUTOF10: 0
PAINLEVEL_OUTOF10: 0

## 2023-07-28 NOTE — CARE COORDINATION
MARCO ANTONIO was advised during treatment team that pt grandma/legal guardian would like for pt to go to the crisis unit at time of discharge but she does not want him going via taxi. MARCO ANTONIO contacted [facility] [phone] and spoke with Christophe Wooten. Christophe Wooten stated one of the  can transport pt to the crisis unit on day of discharge. MARCO ANTONIO contacted grandmother/ legal guardian [phone] to discuss the above information. No answer, a voicemail was left.

## 2023-07-28 NOTE — CARE COORDINATION
MARCO ANTONIO contacted grandmother/ legal guardian Kwame Gutierrez [phone] to discuss pt transportation to the crisis unit. Kwame Gutierrez does not want pt transported to the crisis unit via taxi because he will jump out. Kwame Gutierrez is agreeable to a [facility]  him on day of discharge. Kwame Gutierrez stated she dropped off clothes for pt at the main entrance. MARCO ANTONIO informed RN.

## 2023-07-29 PROCEDURE — 99232 SBSQ HOSP IP/OBS MODERATE 35: CPT | Performed by: NURSE PRACTITIONER

## 2023-07-29 PROCEDURE — 6370000000 HC RX 637 (ALT 250 FOR IP): Performed by: NURSE PRACTITIONER

## 2023-07-29 PROCEDURE — 6370000000 HC RX 637 (ALT 250 FOR IP): Performed by: PSYCHIATRY & NEUROLOGY

## 2023-07-29 PROCEDURE — 1240000000 HC EMOTIONAL WELLNESS R&B

## 2023-07-29 RX ORDER — LANOLIN ALCOHOL/MO/W.PET/CERES
3 CREAM (GRAM) TOPICAL NIGHTLY PRN
Status: DISCONTINUED | OUTPATIENT
Start: 2023-07-29 | End: 2023-07-31 | Stop reason: HOSPADM

## 2023-07-29 RX ORDER — HYDROXYZINE PAMOATE 50 MG/1
50 CAPSULE ORAL 3 TIMES DAILY PRN
Status: DISCONTINUED | OUTPATIENT
Start: 2023-07-29 | End: 2023-07-31 | Stop reason: HOSPADM

## 2023-07-29 RX ADMIN — GABAPENTIN 300 MG: 300 CAPSULE ORAL at 21:15

## 2023-07-29 RX ADMIN — FLUPHENAZINE HYDROCHLORIDE 5 MG: 5 TABLET, FILM COATED ORAL at 09:15

## 2023-07-29 RX ADMIN — OLANZAPINE 5 MG: 5 TABLET, FILM COATED ORAL at 17:51

## 2023-07-29 RX ADMIN — PROPRANOLOL HYDROCHLORIDE 40 MG: 20 TABLET ORAL at 09:25

## 2023-07-29 RX ADMIN — PRAZOSIN HYDROCHLORIDE 2 MG: 2 CAPSULE ORAL at 21:15

## 2023-07-29 RX ADMIN — GABAPENTIN 300 MG: 300 CAPSULE ORAL at 09:16

## 2023-07-29 RX ADMIN — LITHIUM CARBONATE 450 MG: 300 CAPSULE ORAL at 09:15

## 2023-07-29 RX ADMIN — BENZTROPINE MESYLATE 1.5 MG: 1 TABLET ORAL at 09:15

## 2023-07-29 RX ADMIN — FLUPHENAZINE HYDROCHLORIDE 5 MG: 5 TABLET, FILM COATED ORAL at 21:16

## 2023-07-29 RX ADMIN — PROPRANOLOL HYDROCHLORIDE 40 MG: 20 TABLET ORAL at 21:16

## 2023-07-29 RX ADMIN — BENZTROPINE MESYLATE 1.5 MG: 1 TABLET ORAL at 21:15

## 2023-07-29 RX ADMIN — LITHIUM CARBONATE 450 MG: 300 CAPSULE ORAL at 17:44

## 2023-07-29 ASSESSMENT — PAIN SCALES - GENERAL
PAINLEVEL_OUTOF10: 0

## 2023-07-29 NOTE — BH NOTE
Pt has been out on the unit but he keeps to himself. He walks slowly around the unit and uses the phone off and on. His affect is constricted to sad and at times looks anxious. He denies any harmful ideations and hallucinations. He rates his anxiety 7/10.

## 2023-07-30 PROCEDURE — 6370000000 HC RX 637 (ALT 250 FOR IP): Performed by: NURSE PRACTITIONER

## 2023-07-30 PROCEDURE — 6370000000 HC RX 637 (ALT 250 FOR IP): Performed by: PSYCHIATRY & NEUROLOGY

## 2023-07-30 PROCEDURE — 99232 SBSQ HOSP IP/OBS MODERATE 35: CPT | Performed by: NURSE PRACTITIONER

## 2023-07-30 PROCEDURE — 1240000000 HC EMOTIONAL WELLNESS R&B

## 2023-07-30 RX ADMIN — LITHIUM CARBONATE 450 MG: 300 CAPSULE ORAL at 17:53

## 2023-07-30 RX ADMIN — BENZTROPINE MESYLATE 1.5 MG: 1 TABLET ORAL at 09:43

## 2023-07-30 RX ADMIN — ACETAMINOPHEN 650 MG: 325 TABLET ORAL at 16:33

## 2023-07-30 RX ADMIN — HYDROXYZINE PAMOATE 50 MG: 50 CAPSULE ORAL at 16:24

## 2023-07-30 RX ADMIN — LITHIUM CARBONATE 450 MG: 300 CAPSULE ORAL at 09:43

## 2023-07-30 RX ADMIN — FLUPHENAZINE HYDROCHLORIDE 5 MG: 5 TABLET, FILM COATED ORAL at 09:43

## 2023-07-30 RX ADMIN — GABAPENTIN 300 MG: 300 CAPSULE ORAL at 11:09

## 2023-07-30 RX ADMIN — OLANZAPINE 5 MG: 5 TABLET, FILM COATED ORAL at 10:58

## 2023-07-30 RX ADMIN — PROPRANOLOL HYDROCHLORIDE 40 MG: 20 TABLET ORAL at 20:37

## 2023-07-30 RX ADMIN — BENZTROPINE MESYLATE 1.5 MG: 1 TABLET ORAL at 20:37

## 2023-07-30 RX ADMIN — PROPRANOLOL HYDROCHLORIDE 40 MG: 20 TABLET ORAL at 11:10

## 2023-07-30 RX ADMIN — GABAPENTIN 300 MG: 300 CAPSULE ORAL at 20:37

## 2023-07-30 RX ADMIN — PRAZOSIN HYDROCHLORIDE 2 MG: 2 CAPSULE ORAL at 20:37

## 2023-07-30 RX ADMIN — FLUPHENAZINE HYDROCHLORIDE 5 MG: 5 TABLET, FILM COATED ORAL at 20:37

## 2023-07-30 RX ADMIN — OLANZAPINE 5 MG: 5 TABLET, FILM COATED ORAL at 20:37

## 2023-07-30 ASSESSMENT — PAIN SCALES - GENERAL
PAINLEVEL_OUTOF10: 0
PAINLEVEL_OUTOF10: 7

## 2023-07-30 ASSESSMENT — PAIN DESCRIPTION - DESCRIPTORS: DESCRIPTORS: ACHING;DISCOMFORT;SORE

## 2023-07-30 ASSESSMENT — PAIN SCALES - WONG BAKER: WONGBAKER_NUMERICALRESPONSE: 0

## 2023-07-30 ASSESSMENT — PAIN - FUNCTIONAL ASSESSMENT: PAIN_FUNCTIONAL_ASSESSMENT: ACTIVITIES ARE NOT PREVENTED

## 2023-07-30 ASSESSMENT — PAIN DESCRIPTION - LOCATION: LOCATION: GENERALIZED

## 2023-07-30 NOTE — GROUP NOTE
Group Therapy Note    Date: 7/30/2023  Start Time: 3667  End Time:  5010  Number of Participants: 10    Type of Group: Psychoeducation    Name: Stress vs. Anxiety    Patient's Goal: Identify what is stress, identify what is anxiety. Identify at least 1 coping skill to utilize for stress/anxiety. Notes: CTRS educated patients on differences between stress and anxiety. Discussed good stress/bad stress. Discussed positive coping skills to utilize when feeling overwhelmed with stress/anxiety. Status After Intervention:  Improved    Participation Level:  Active Listener and Interactive    Participation Quality: Appropriate, Attentive, and Sharing    Speech:  normal    Thought Process/Content: Logical  Linear    Affective Functioning: Congruent    Mood:  Appropriate    Level of consciousness:  Alert and Attentive    Response to Learning: Able to verbalize current knowledge/experience, Able to verbalize/acknowledge new learning, and Able to retain information    Endings: None Reported    Modes of Intervention: Education    Discipline Responsible: Psychoeducational Specialist      Signature:  Simone Doan

## 2023-07-30 NOTE — CARE COORDINATION
Nurse informed sw that pt was on phone with grandparents and become extremely agitated while taking to them. Pt disclosed to nurse that he is residing in a group home but his grandmother / guardian is stealing his disability check. Printed out paperwork from [employer] about group home payments and the number for disability rights advocate in [place]. When sw went to deliver the information I stated that a nurse overheard him on the phone upset with his grandparents about them stealing his check and he replied, \"not necessarily. \"  Gave info and pt thanked sw with no further assistance needed at this time.

## 2023-07-31 VITALS
HEIGHT: 69 IN | RESPIRATION RATE: 16 BRPM | SYSTOLIC BLOOD PRESSURE: 121 MMHG | BODY MASS INDEX: 28.14 KG/M2 | DIASTOLIC BLOOD PRESSURE: 76 MMHG | WEIGHT: 190 LBS | HEART RATE: 54 BPM | TEMPERATURE: 97.5 F | OXYGEN SATURATION: 98 %

## 2023-07-31 LAB
LITHIUM DATE LAST DOSE: NORMAL
LITHIUM DOSE AMOUNT: NORMAL
LITHIUM DOSE TIME: NORMAL
LITHIUM LEVEL: 0.6 MMOL/L (ref 0.5–1.5)

## 2023-07-31 PROCEDURE — 6370000000 HC RX 637 (ALT 250 FOR IP): Performed by: NURSE PRACTITIONER

## 2023-07-31 PROCEDURE — 6370000000 HC RX 637 (ALT 250 FOR IP): Performed by: PSYCHIATRY & NEUROLOGY

## 2023-07-31 PROCEDURE — 80178 ASSAY OF LITHIUM: CPT

## 2023-07-31 PROCEDURE — 36415 COLL VENOUS BLD VENIPUNCTURE: CPT

## 2023-07-31 PROCEDURE — 99239 HOSP IP/OBS DSCHRG MGMT >30: CPT | Performed by: NURSE PRACTITIONER

## 2023-07-31 RX ORDER — NICOTINE 21 MG/24HR
1 PATCH, TRANSDERMAL 24 HOURS TRANSDERMAL DAILY
Qty: 30 PATCH | Refills: 0
Start: 2023-08-01 | End: 2023-08-31

## 2023-07-31 RX ADMIN — BENZTROPINE MESYLATE 1.5 MG: 1 TABLET ORAL at 09:38

## 2023-07-31 RX ADMIN — GABAPENTIN 300 MG: 300 CAPSULE ORAL at 09:38

## 2023-07-31 RX ADMIN — PROPRANOLOL HYDROCHLORIDE 40 MG: 20 TABLET ORAL at 09:38

## 2023-07-31 RX ADMIN — FLUPHENAZINE HYDROCHLORIDE 5 MG: 5 TABLET, FILM COATED ORAL at 09:38

## 2023-07-31 RX ADMIN — LITHIUM CARBONATE 450 MG: 300 CAPSULE ORAL at 09:38

## 2023-07-31 RX ADMIN — OLANZAPINE 5 MG: 5 TABLET, FILM COATED ORAL at 14:25

## 2023-07-31 ASSESSMENT — PAIN SCALES - GENERAL: PAINLEVEL_OUTOF10: 0

## 2023-07-31 NOTE — CARE COORDINATION
MARCO ANTONIO contacted [facility] [phone] and spoke with Winter Norwood. Winter Norwood stated one of the  are on their way. MARCO ANTONIO informed RN.

## 2023-07-31 NOTE — DISCHARGE SUMMARY
demonstrated understanding of this and has the capacity understand this    Patient is counseled hisr mental health treatment will be difficult to optimize with ongoing use of drugs or alcohol he demonstrate understanding of this as the past understand this     Patient is counseled that he he uses any amount of opiates after any clean time he could have an unintentional overdose he demonstrated understanding of this and has the capacity to understand this    Patient is counseled he must remain compliant with all medications outpatient follow-up ointments    Patient is discharged home in stable condition      TIME SPEND - 35 MINUTES TO COMPLETE THE EVALUATION, DISCHARGE SUMMARY, MEDICATION RECONCILIATION AND FOLLOW UP CARE     Signed:  MICH Coughlin - CNP  2/77/2187  4:31 PM

## 2023-07-31 NOTE — CARE COORDINATION
SW contacted [facility]  and was advised they are still reviewing pt referral. SW following. SW contacted [facility] [phone] and spoke with David Aguilar. David Aguilar advised SW that pt has a follow up appointment already scheduled on 8/15. SW to call David Aguilar to schedule transportation to the crisis unit.

## 2023-07-31 NOTE — CARE COORDINATION
MARCO ANTONIO contacted [facility]  and was advised pt has been accepted. They had questions about pt medications, MARCO ANTONIO transferred the call to the nurses station. MARCO ANTONIO met with pt to discuss his discharge. Pt reports feeling good today, denied SI/HI/AVH. Pt expressed feeling ready to discharge to the crisis unit and is aware transportation will be provided for him. Pt will continue to treat with [facility] at time of discharge. Collateral was gained from pt grandma/legal guardian who confirmed pt does not have access to any guns or weapons. Pt has access to strong support, safe/stable housing, essential needs, steady income, outpatient provider, legal guardian, and help-seeking behavior. Pt cooperative, pleasant, with good eye contact, clear speech, and improved insight/judgement. MARCO ANTONIO contacted grandmother/ legal guardian Mary Vigil [phone]. Mary Vigil is agreeable to pt discharging to the [facility] today and is aware that a [facility]  will be able to transport him. Alicia's only concern for discharge is making sure pt doesn't run off. MARCO ANTONIO was advised by RN that transportation can be scheduled. MARCO ANTONIO contacted [facility] [phone] and spoke with [employer]. MARCO ANTONIO advised [employer] that pt is ready to be transported to the crisis unit at this time. [employer] will call MARCO ANTONIO back with a tentative  time.  [employer] is aware that the  will need to go to the main entrance and call the nurses station upon arrival.     In order to ensure appropriate transition and discharge planning is in place, the following documents have been transmitted to [facility], as the new outpatient provider:    The d/c diagnosis was transmitted to the next care provider  The reason for hospitalization was transmitted to the next care provider  The d/c medications (dosage and indication) were transmitted to the next care provider   The continuing care plan was transmitted to the next care
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
## 2023-07-31 NOTE — PROGRESS NOTES
4 Eyes Skin Assessment     NAME:  Cristi Apodaca  YOB: 1995  MEDICAL RECORD NUMBER:  [id number]    The patient is being assessed for  Admission    I agree that at least one RN has performed a thorough Head to Toe Skin Assessment on the patient. ALL assessment sites listed below have been assessed. Areas assessed by both nurses:    Head, Face, Ears, Shoulders, Back, Chest, Arms, Elbows, Hands, Sacrum. Buttock, Coccyx, Ischium, and Legs. Feet and Heels        Does the Patient have a Wound?  No noted wound(s)       James Prevention initiated by RN: Yes  Wound Care Orders initiated by RN: No    Pressure Injury (Stage 3,4, Unstageable, DTI, NWPT, and Complex wounds) if present, place Wound referral order by RN under : No    New Ostomies, if present place, Ostomy referral order under : No     Nurse 1 eSignature: Electronically signed by Pb Farrar RN on 7/26/23 at 5:07 AM EDT    **SHARE this note so that the co-signing nurse can place an eSignature**    Nurse 2 eSignature: {Esignature:[id number]}
[facility]  Initial Interdisciplinary Treatment Plan NOTE    Review Date & Time:  7/26/23 1000    Patient was in treatment team    Admission Type:   Admission Type:  Involuntary    Reason for admission:  Reason for Admission: \"Got pulled over by some police officers and my friens said I swallowed crack when I didn't.\"      Estimated Length of Stay Update:   5 DAYS  Estimated Discharge Date Update:  MONDAY    EDUCATION:   Learner Progress Toward Treatment Goals: Reviewed results and recommendations of this team    Method: Small group    Outcome: Verbalized understanding and Needs reinforcement    PATIENT GOALS: \"START WORKING ON GETTING TO REHAB\"    PLAN/TREATMENT RECOMMENDATIONS UPDATE: AOD REFERRAL WITH GUARDIAN PERMISSION, SUICIDE RISK ASSESSMENTS, GROUPS, MEDICATIONS, SUPPORTIVE CARE, DISCHARGE PLANING AND FOLLOW UP    GOALS UPDATE:   Time frame for Short-Term Goals:  5 DAYS    Tamy Villalobos RN
[facility]  Discharge Note    Pt discharged with followings belongings:   Clothing: Undergarments, Shirt, Shorts, Socks   Valuables sent with pt. To the crisis unit. All personal items from locker were  returned to patient. Patient educated on aftercare instructions: completed, reviewed and signed. Information faxed to the crisis unit and ACT team by . Patient verbalize understanding of AVS:   yes with stated potential to comply to same. Status EXAM upon discharge:  Mental Status and Behavioral Exam    Level of Assistance: Independent/Self  Facial Expression: Avoids Gaze  Affect: Blunted  Level of Consciousness: Alert  Frequency of Checks: 4 times per hour, close  Mood:Normal: Yes  Mood: Other (comment) (CALM)  Motor Activity:Normal: yes  Eye Contact: fair  Observed Behavior:  Preoccupied  Sexual Misconduct History: Current - no  Preception: [place] to person, [place] to time, [place] to place, [place] to situation  Attention: Distractible  Thought Processes: Other (comment) (IMPOVERISHED)  Thought Content: Poverty of content  Depression Symptoms: Loss of interest  Anxiety Symptoms: minimal, generalized  Ange Symptoms: No problems reported or observed. Hallucinations: None  Delusions: No  Delusions:  Other (comment) (NONE VOICED)  Memory: IMPROVED  Insight and Judgment: IMPROVED    Tobacco Screening:  Practical Counseling, on admission, henry X, if applicable and completed (first 3 are required if patient doesn't refuse):            ( ) Recognizing danger situations (included triggers and roadblocks)                    ( ) Coping skills (new ways to manage stress,relaxation techniques, changing routine, distraction)                                                           ( ) Basic information about quitting (benefits of quitting, techniques in how to quit, available resources  ( ) Referral for counseling faxed to [facility]
BEHAVIORAL HEALTH FOLLOW-UP NOTE     7/27/2023     Patient was seen and examined in person, Chart reviewed   Patient's case discussed with staff/team    Chief Complaint: \"I am okay. \"    Interim History: I saw patient in his room this morning. He is in his bed offers little conversation denies suicidal ideations intent or plan denies any auditory or visual hallucinations. States he is eating well sleeping well his affect is flat and blunted he attends select groups. Staff indicates that he did visit with his girlfriend in the afternoon. Limited insight and judgment minimizing circumstance of hospitalization need for treatment      Appetite: [x] Normal/Unchanged  [] Increased  [] Decreased      Sleep:       [x] Normal/Unchanged  [] Fair       [] Poor              Energy:    [x] Normal/Unchanged  [] Increased  [] Decreased        SI [] Present  [x] Absent    HI  []Present  [x] Absent     Aggression:  [] yes  [x] no    Patient is [x] able  [] unable to CONTRACT FOR SAFETY     PAST MEDICAL/PSYCHIATRIC HISTORY:   Past Medical History:   Diagnosis Date    Bipolar 1 disorder ([facility])     Depression     GERD (gastroesophageal reflux disease)     Hypertension     Severe manic bipolar 1 disorder with psychotic behavior ([facility]) 10/28/2017    Suicide attempt ([facility]) 12/2019    jumped off bridge, per grandmother not currently suicidal       FAMILY/SOCIAL HISTORY:  History reviewed. No pertinent family history.   Social History     Socioeconomic History    Marital status: Single     Spouse name: Not on file    Number of children: Not on file    Years of education: Not on file    Highest education level: Not on file   Occupational History     Employer: NONE   Tobacco Use    Smoking status: Some Days     Types: Cigarettes    Smokeless tobacco: Never   Vaping Use    Vaping Use: Some days    Substances: Never   Substance and Sexual Activity    Alcohol use: Not Currently    Drug use: Yes     Types: Marijuana Shabbir Sida)    Sexual activity: Not
BEHAVIORAL HEALTH FOLLOW-UP NOTE     7/28/2023     Patient was seen and examined in person, Chart reviewed   Patient's case discussed with staff/team    Chief Complaint: \"I am okay. \"    Interim History: Patient came to treatment team today. He continues to deny SI/HI intent or plan denies auditory or visual hallucinations only attending select groups. He understands that he will be stepping out of the crisis unit discharge. States he is eating well sleeping well tends to be isolative on the unit not socializing with peers only attending select groups. Limited insight and judgment    Appetite: [x] Normal/Unchanged  [] Increased  [] Decreased      Sleep:       [x] Normal/Unchanged  [] Fair       [] Poor              Energy:    [x] Normal/Unchanged  [] Increased  [] Decreased        SI [] Present  [x] Absent    HI  []Present  [x] Absent     Aggression:  [] yes  [x] no    Patient is [x] able  [] unable to CONTRACT FOR SAFETY     PAST MEDICAL/PSYCHIATRIC HISTORY:   Past Medical History:   Diagnosis Date    Bipolar 1 disorder ([facility])     Depression     GERD (gastroesophageal reflux disease)     Hypertension     Severe manic bipolar 1 disorder with psychotic behavior ([facility]) 10/28/2017    Suicide attempt ([facility]) 12/2019    jumped off bridge, per grandmother not currently suicidal       FAMILY/SOCIAL HISTORY:  History reviewed. No pertinent family history.   Social History     Socioeconomic History    Marital status: Single     Spouse name: Not on file    Number of children: Not on file    Years of education: Not on file    Highest education level: Not on file   Occupational History     Employer: NONE   Tobacco Use    Smoking status: Some Days     Types: Cigarettes    Smokeless tobacco: Never   Vaping Use    Vaping Use: Some days    Substances: Never   Substance and Sexual Activity    Alcohol use: Not Currently    Drug use: Yes     Types: Marijuana Jacquenette Pun)    Sexual activity: Not Currently   Other Topics Concern    Not on file
BEHAVIORAL HEALTH FOLLOW-UP NOTE     7/29/2023     Patient was seen and examined in person, Chart reviewed   Patient's case discussed with staff/team    Chief Complaint: \"I am okay. \"    Interim History: I saw patient's morning out in the milieu he is going to eat breakfast.  He states he is no longer suicidal and states he was prior to his admission but now he is feeling better and his affect is flat and blunted he offers little conversation tends to keep to himself on the unit not socializing with peers and attending groups offers little conversation         appetite: [x] Normal/Unchanged  [] Increased  [] Decreased      Sleep:       [x] Normal/Unchanged  [] Fair       [] Poor              Energy:    [x] Normal/Unchanged  [] Increased  [] Decreased        SI [] Present  [x] Absent    HI  []Present  [x] Absent     Aggression:  [] yes  [x] no    Patient is [x] able  [] unable to CONTRACT FOR SAFETY     PAST MEDICAL/PSYCHIATRIC HISTORY:   Past Medical History:   Diagnosis Date    Bipolar 1 disorder ([facility])     Depression     GERD (gastroesophageal reflux disease)     Hypertension     Severe manic bipolar 1 disorder with psychotic behavior ([facility]) 10/28/2017    Suicide attempt ([facility]) 12/2019    jumped off bridge, per grandmother not currently suicidal       FAMILY/SOCIAL HISTORY:  History reviewed. No pertinent family history.   Social History     Socioeconomic History    Marital status: Single     Spouse name: Not on file    Number of children: Not on file    Years of education: Not on file    Highest education level: Not on file   Occupational History     Employer: NONE   Tobacco Use    Smoking status: Some Days     Types: Cigarettes    Smokeless tobacco: Never   Vaping Use    Vaping Use: Some days    Substances: Never   Substance and Sexual Activity    Alcohol use: Not Currently    Drug use: Yes     Types: Marijuana Brooklyn Hussein)    Sexual activity: Not Currently   Other Topics Concern    Not on file   Social History Narrative
GRANDMOTHER/LEGAL GUARDIAN, DUNIA FOUNTAIN, GAVE PERMISSION VIA PHONE FOR RELEASE OF INFORMATION FOR [facility] GROUP HOME AND FOR Sarahcorrina Ordoñez, GIRLFRIEND TO VISIT.
Isolative to room this evening denies any SI HI or leon emotional support given
Leisure assessment complete.
PT.'S GUARDIAN , DUNIA FOUNTAIN, APPROVED VIA PHONE AFTER REVIEW ALL ORDERED SCHEDULED AND PRN MEDICATION ON EMAR.
Patient attended afternoon recreation activity, Patient's choice. Patient chose to watch the movie X-Men. Patient calm and cooperative. Patient was 1 of 7 in attendance.
Patient attended community meeting   Was updated on expectations of the unit, staffing, and programming  Patient shared goal for today as \"start working on getting to rehab\"
Patient attended community meeting. Was updated on expectations of the unit, staffing, and programming. Patient shared goal for today as \"Sleep the pain away. \"  Patient was 1 of 10 in attendance.
Patient declined invitation to community meeting. Patient will continue to be provided with opportunities to enhance leisure skills/interests and/or coping mechanisms.
Patient declined invitation to the following group    education    Patient will continue to be provided with opportunities to enhance leisure skills/interests and/or coping mechanisms.
Patient declined invitation to the following groups:    Community meeting  Education    Patient will continue to be provided with opportunities to enhance leisure skills/interests and/or coping mechanisms.
Patient declined invitation to the following groups:    [employer] Activity    Patient will continue to be provided with opportunities to enhance leisure skills/interests and/or coping mechanisms.
Patient is resting quietly in bed with eyes closed at this time. No signs of distress or discomfort noted. No PRN medications given thus far. Safety needs met. No unit problems reported. Purposeful rounding continued.
Pt alert, calm, and cooperative. Pt denies SI, HI, and AVH. Pt was out on the unit and social or walking the unit. Pt made the statement that he wanted to go to rehab after discharge. He then changed his mind within moments that he did not want to go because he had a job to go to. Pt appears preoccupied. Pt denies any needs or issues. States his day was good and he is eating and sleeping well.  Will continue to monitor
Pt currently sleeping.  Q 15 minute rounding for safety continued
Rested well this shift no distress
Resting quietly in bed with eyes closed q 15minute checks continued throughout shift
SW encouraged pt to attend psychotherapy. Pt declined to attend group therapy today.
Spiritual Support Group Note    Number of Participants in Group:   10                     Time: 2    Goal: Relief from isolation and loneliness             Anjelica Sharing             Self-understanding and gain insight              Acceptance and belonging            Recognize they are not alone                Socialization             Empowerment       Encouragement    Topic:  [] Spiritual Wellness and Self Care                  [x] Hope                     [x] Connecting with Divine/Others        [] Thankfulness and Gratitude               [x]  Meaningfulness and Purpose               [] Forgiveness               [] Peace               [] Connect to [employer]      [] Other    Participation Level:   [x] Active Listener   [] Minimal   [] Monopolizing   [] Interactive   [] No Participation   []  Other:     Attention:   [x] Alert   [] Distractible   [] Drowsy   [] Poor   [] Other:    Manner:   [x] Cooperative   [] Suspicious   [] Withdrawn   [] Guarded   [] Irritable   [] Inhospitable   [] Other:     Others Comments from Group:
VISITED WITH GIRLFRIEND ON UNIT WITHOUT EVENT.
Behavior:   Addictive Behavior  In the Past 3 Months, Have You Felt or Has Someone Told You That You Have a Problem With  : Other (comment)    Medical Problems:   Past Medical History:   Diagnosis Date    Bipolar 1 disorder ([address])     Depression     GERD (gastroesophageal reflux disease)     Hypertension     Severe manic bipolar 1 disorder with psychotic behavior ([address]) 10/28/2017    Suicide attempt ([address]) 12/2019    jumped off bridge, per grandmother not currently suicidal       Status EXAM:  Mental Status and Behavioral Exam  Normal: No  Level of Assistance: Independent/Self  Facial Expression: Flat  Affect: Blunt  Level of Consciousness: Alert  Frequency of Checks: 4 times per hour, close  Mood:Normal: No  Mood: Depressed, Anxious  Motor Activity:Normal: No  Motor Activity: Unusual posture/gait, Repetitive acts, Other (comment) (Rocking back and forth)  Eye Contact: Fair  Observed Behavior: Preoccupied, Guarded, Cooperative, Friendly  Sexual Misconduct History: Current - no  Preception: [place] to person, [place] to time, [place] to place, [place] to situation  Attention:Normal: No  Attention: Distractible, Unable to concentrate  Thought Processes: Circumstantial, Other (comment) (Impaired.)  Thought Content:Normal: No  Thought Content: Preoccupations, Other (comment) (Impaired.)  Depression Symptoms: Impaired concentration, Isolative, Loss of interest, Change in energy level  Anxiety Symptoms: Generalized  Ange Symptoms: Poor judgment  Hallucinations: None  Delusions: No  Memory:Normal: No  Memory: Poor recent  Insight and Judgment: No  Insight and Judgment: Poor judgment, Poor insight, Other (comment) (Impaired.)    Tobacco Screening:  Practical Counseling, on admission, henry X, if applicable and completed (first 3 are required if patient doesn't refuse):            (x) Recognizing danger situations (included triggers and roadblocks)                    (x) Coping skills (new ways to manage stress,relaxation
hours. No results for input(s): NA, K, CL, CO2, BUN, CREATININE, GLUCOSE in the last 72 hours. No results for input(s): BILITOT, ALKPHOS, AST, ALT in the last 72 hours. Lab Results   Component Value Date/Time    LABAMPH NOT DETECTED 10/26/2022 02:53 PM    BARBSCNU NEGATIVE 07/26/2023 12:26 AM    LABBENZ NEGATIVE 07/26/2023 12:26 AM    LABMETH NEGATIVE 07/26/2023 12:26 AM    OPIATESCREENURINE NOT DETECTED 10/26/2022 02:53 PM    PHENCYCLIDINESCREENURINE NOT DETECTED 10/26/2022 02:53 PM    ETOH <10 10/26/2022 11:57 AM     Lab Results   Component Value Date/Time    TSH 2.05 07/25/2023 10:48 PM     Lab Results   Component Value Date    LITHIUM 0.9 07/25/2023     Lab Results   Component Value Date    VALPROATE 43 (L) 10/17/2016           Treatment Plan:  Reviewed current Medications with the patient. Risks, benefits, side effects, drug-to-drug interactions and alternatives to treatment were discussed. Collateral information:   CD evaluation  Encourage patient to attend group and other milieu activities.   Discharge planning discussed with the patient and treatment team.    Continue Cogentin 1.5 mg twice daily  Continue Prolixin 5 mg twice daily  Continue Neurontin 300 mg twice daily  Continue prazosin 2 mg at bedtime  Continue lithium 450 mg twice daily    PSYCHOTHERAPY/COUNSELING:  [x] Therapeutic interview  [x] Supportive  [] CBT  [] Ongoing  [] Other    [x] Patient continues to need, on a daily basis, active treatment furnished directly by or requiring the supervision of inpatient psychiatric personnel      Anticipated Length of stay: 3 to 7 days based on stability            Electronically signed by MICH Lazo CNP on 8/87/9473 at 9:58 AM

## 2023-07-31 NOTE — DISCHARGE INSTRUCTIONS
Follow up for Tobacco Cessation at:    [facility] Tobacco Treatment                                 Date:  Friday 8/4 at [facility].  [address]   ([address] [place] [address] elevators to 7th floor)   Phone: [phone]   Fax: [phone]

## 2023-08-01 NOTE — CARE COORDINATION
MARCO ANTONIO contacted pt for post follow up post discharge phone call. Per Jayde Beaver at [facility] pt is there doing well.  No further concerns noted

## 2024-01-23 LAB
ALBUMIN SERPL-MCNC: 4.3 G/DL (ref 3.5–5.2)
ALP SERPL-CCNC: 153 U/L (ref 40–129)
ALT SERPL-CCNC: 31 U/L (ref 0–40)
ANION GAP SERPL CALCULATED.3IONS-SCNC: 14 MMOL/L (ref 7–16)
AST SERPL-CCNC: 22 U/L (ref 0–39)
BASOPHILS # BLD: 0.07 K/UL (ref 0–0.2)
BASOPHILS NFR BLD: 1 % (ref 0–2)
BILIRUB SERPL-MCNC: 0.3 MG/DL (ref 0–1.2)
BUN SERPL-MCNC: 12 MG/DL (ref 6–20)
CALCIUM SERPL-MCNC: 9.4 MG/DL (ref 8.6–10.2)
CHLORIDE SERPL-SCNC: 104 MMOL/L (ref 98–107)
CO2 SERPL-SCNC: 23 MMOL/L (ref 22–29)
CREAT SERPL-MCNC: 1.1 MG/DL (ref 0.7–1.2)
EOSINOPHIL # BLD: 1.01 K/UL (ref 0.05–0.5)
EOSINOPHILS RELATIVE PERCENT: 10 % (ref 0–6)
ERYTHROCYTE [DISTWIDTH] IN BLOOD BY AUTOMATED COUNT: 12.8 % (ref 11.5–15)
GFR SERPL CREATININE-BSD FRML MDRD: >60 ML/MIN/1.73M2
GLUCOSE SERPL-MCNC: 126 MG/DL (ref 74–99)
HCT VFR BLD AUTO: 48.2 % (ref 37–54)
HGB BLD-MCNC: 16.2 G/DL (ref 12.5–16.5)
IMM GRANULOCYTES # BLD AUTO: <0.03 K/UL (ref 0–0.58)
IMM GRANULOCYTES NFR BLD: 0 % (ref 0–5)
LITHIUM LEVEL: 0.4 MMOL/L (ref 0.5–1.5)
LYMPHOCYTES NFR BLD: 2.69 K/UL (ref 1.5–4)
LYMPHOCYTES RELATIVE PERCENT: 27 % (ref 20–42)
MCH RBC QN AUTO: 30.3 PG (ref 26–35)
MCHC RBC AUTO-ENTMCNC: 33.6 G/DL (ref 32–34.5)
MCV RBC AUTO: 90.1 FL (ref 80–99.9)
MONOCYTES NFR BLD: 0.7 K/UL (ref 0.1–0.95)
MONOCYTES NFR BLD: 7 % (ref 2–12)
NEUTROPHILS NFR BLD: 55 % (ref 43–80)
NEUTS SEG NFR BLD: 5.41 K/UL (ref 1.8–7.3)
PLATELET # BLD AUTO: 270 K/UL (ref 130–450)
PMV BLD AUTO: 11.2 FL (ref 7–12)
POTASSIUM SERPL-SCNC: 4.1 MMOL/L (ref 3.5–5)
PROT SERPL-MCNC: 7.5 G/DL (ref 6.4–8.3)
RBC # BLD AUTO: 5.35 M/UL (ref 3.8–5.8)
SODIUM SERPL-SCNC: 141 MMOL/L (ref 132–146)
TSH SERPL DL<=0.05 MIU/L-ACNC: 1.09 UIU/ML (ref 0.27–4.2)
WBC OTHER # BLD: 9.9 K/UL (ref 4.5–11.5)

## 2024-07-22 LAB
LITHIUM DATE LAST DOSE: ABNORMAL
LITHIUM DOSE AMOUNT: ABNORMAL
LITHIUM DOSE TIME: ABNORMAL
LITHIUM LEVEL: <0.1 MMOL/L (ref 0.5–1.5)
PROLACTIN SERPL-MCNC: 7.16 NG/ML

## 2024-10-15 ENCOUNTER — HOSPITAL ENCOUNTER (INPATIENT)
Age: 29
LOS: 7 days | Discharge: HOME | DRG: 761 | End: 2024-10-23
Attending: STUDENT IN AN ORGANIZED HEALTH CARE EDUCATION/TRAINING PROGRAM | Admitting: PSYCHIATRY & NEUROLOGY
Payer: MEDICAID

## 2024-10-15 DIAGNOSIS — F29 PSYCHOSIS, UNSPECIFIED PSYCHOSIS TYPE (HCC): Primary | ICD-10-CM

## 2024-10-15 LAB
ALBUMIN SERPL-MCNC: 4.3 G/DL (ref 3.5–5.2)
ALP SERPL-CCNC: 148 U/L (ref 40–129)
ALT SERPL-CCNC: 21 U/L (ref 0–40)
ANION GAP SERPL CALCULATED.3IONS-SCNC: 14 MMOL/L (ref 7–16)
APAP SERPL-MCNC: <5 UG/ML (ref 10–30)
AST SERPL-CCNC: 29 U/L (ref 0–39)
BASOPHILS # BLD: 0.05 K/UL (ref 0–0.2)
BASOPHILS NFR BLD: 0 % (ref 0–2)
BILIRUB SERPL-MCNC: 1.1 MG/DL (ref 0–1.2)
BUN SERPL-MCNC: 10 MG/DL (ref 6–20)
CALCIUM SERPL-MCNC: 9.1 MG/DL (ref 8.6–10.2)
CHLORIDE SERPL-SCNC: 106 MMOL/L (ref 98–107)
CK SERPL-CCNC: 749 U/L (ref 20–200)
CK SERPL-CCNC: 788 U/L (ref 20–200)
CO2 SERPL-SCNC: 18 MMOL/L (ref 22–29)
CREAT SERPL-MCNC: 0.8 MG/DL (ref 0.7–1.2)
EOSINOPHIL # BLD: 0.11 K/UL (ref 0.05–0.5)
EOSINOPHILS RELATIVE PERCENT: 1 % (ref 0–6)
ERYTHROCYTE [DISTWIDTH] IN BLOOD BY AUTOMATED COUNT: 12.8 % (ref 11.5–15)
ETHANOLAMINE SERPL-MCNC: <10 MG/DL (ref 0–0.08)
GFR, ESTIMATED: >90 ML/MIN/1.73M2
GLUCOSE SERPL-MCNC: 113 MG/DL (ref 74–99)
HCT VFR BLD AUTO: 45.7 % (ref 37–54)
HGB BLD-MCNC: 15.9 G/DL (ref 12.5–16.5)
IMM GRANULOCYTES # BLD AUTO: 0.05 K/UL (ref 0–0.58)
IMM GRANULOCYTES NFR BLD: 0 % (ref 0–5)
LITHIUM DATE LAST DOSE: ABNORMAL
LITHIUM DOSE AMOUNT: ABNORMAL
LITHIUM DOSE TIME: ABNORMAL
LITHIUM LEVEL: 0.3 MMOL/L (ref 0.5–1.5)
LYMPHOCYTES NFR BLD: 0.91 K/UL (ref 1.5–4)
LYMPHOCYTES RELATIVE PERCENT: 8 % (ref 20–42)
MCH RBC QN AUTO: 29.8 PG (ref 26–35)
MCHC RBC AUTO-ENTMCNC: 34.8 G/DL (ref 32–34.5)
MCV RBC AUTO: 85.6 FL (ref 80–99.9)
MONOCYTES NFR BLD: 0.87 K/UL (ref 0.1–0.95)
MONOCYTES NFR BLD: 7 % (ref 2–12)
NEUTROPHILS NFR BLD: 83 % (ref 43–80)
NEUTS SEG NFR BLD: 9.88 K/UL (ref 1.8–7.3)
PLATELET # BLD AUTO: 237 K/UL (ref 130–450)
PMV BLD AUTO: 10.8 FL (ref 7–12)
POTASSIUM SERPL-SCNC: 3.6 MMOL/L (ref 3.5–5)
PROT SERPL-MCNC: 7.5 G/DL (ref 6.4–8.3)
RBC # BLD AUTO: 5.34 M/UL (ref 3.8–5.8)
SALICYLATES SERPL-MCNC: <0.3 MG/DL (ref 0–30)
SODIUM SERPL-SCNC: 138 MMOL/L (ref 132–146)
TOXIC TRICYCLIC SC,BLOOD: NEGATIVE
WBC OTHER # BLD: 11.9 K/UL (ref 4.5–11.5)

## 2024-10-15 PROCEDURE — 96360 HYDRATION IV INFUSION INIT: CPT

## 2024-10-15 PROCEDURE — 6360000002 HC RX W HCPCS

## 2024-10-15 PROCEDURE — 96361 HYDRATE IV INFUSION ADD-ON: CPT

## 2024-10-15 PROCEDURE — 2580000003 HC RX 258

## 2024-10-15 PROCEDURE — 2580000003 HC RX 258: Performed by: STUDENT IN AN ORGANIZED HEALTH CARE EDUCATION/TRAINING PROGRAM

## 2024-10-15 PROCEDURE — 99285 EMERGENCY DEPT VISIT HI MDM: CPT

## 2024-10-15 PROCEDURE — 80307 DRUG TEST PRSMV CHEM ANLYZR: CPT

## 2024-10-15 PROCEDURE — 80143 DRUG ASSAY ACETAMINOPHEN: CPT

## 2024-10-15 PROCEDURE — 85025 COMPLETE CBC W/AUTO DIFF WBC: CPT

## 2024-10-15 PROCEDURE — 96372 THER/PROPH/DIAG INJ SC/IM: CPT

## 2024-10-15 PROCEDURE — 80053 COMPREHEN METABOLIC PANEL: CPT

## 2024-10-15 PROCEDURE — 6360000002 HC RX W HCPCS: Performed by: STUDENT IN AN ORGANIZED HEALTH CARE EDUCATION/TRAINING PROGRAM

## 2024-10-15 PROCEDURE — 80179 DRUG ASSAY SALICYLATE: CPT

## 2024-10-15 PROCEDURE — G0480 DRUG TEST DEF 1-7 CLASSES: HCPCS

## 2024-10-15 PROCEDURE — 80178 ASSAY OF LITHIUM: CPT

## 2024-10-15 PROCEDURE — 82550 ASSAY OF CK (CPK): CPT

## 2024-10-15 RX ORDER — WATER 10 ML/10ML
INJECTION INTRAMUSCULAR; INTRAVENOUS; SUBCUTANEOUS
Status: COMPLETED
Start: 2024-10-15 | End: 2024-10-15

## 2024-10-15 RX ORDER — MIDAZOLAM HYDROCHLORIDE 1 MG/ML
INJECTION, SOLUTION INTRAMUSCULAR; INTRAVENOUS
Status: COMPLETED
Start: 2024-10-15 | End: 2024-10-15

## 2024-10-15 RX ORDER — DROPERIDOL 2.5 MG/ML
INJECTION, SOLUTION INTRAMUSCULAR; INTRAVENOUS
Status: COMPLETED
Start: 2024-10-15 | End: 2024-10-15

## 2024-10-15 RX ORDER — ZIPRASIDONE MESYLATE 20 MG/ML
20 INJECTION, POWDER, LYOPHILIZED, FOR SOLUTION INTRAMUSCULAR ONCE
Status: COMPLETED | OUTPATIENT
Start: 2024-10-15 | End: 2024-10-15

## 2024-10-15 RX ORDER — 0.9 % SODIUM CHLORIDE 0.9 %
1000 INTRAVENOUS SOLUTION INTRAVENOUS ONCE
Status: COMPLETED | OUTPATIENT
Start: 2024-10-15 | End: 2024-10-15

## 2024-10-15 RX ORDER — DROPERIDOL 2.5 MG/ML
2.5 INJECTION, SOLUTION INTRAMUSCULAR; INTRAVENOUS ONCE
Status: COMPLETED | OUTPATIENT
Start: 2024-10-15 | End: 2024-10-15

## 2024-10-15 RX ORDER — MIDAZOLAM HYDROCHLORIDE 2 MG/2ML
2 INJECTION, SOLUTION INTRAMUSCULAR; INTRAVENOUS ONCE
Status: COMPLETED | OUTPATIENT
Start: 2024-10-15 | End: 2024-10-15

## 2024-10-15 RX ADMIN — MIDAZOLAM 2 MG: 1 INJECTION INTRAMUSCULAR; INTRAVENOUS at 12:11

## 2024-10-15 RX ADMIN — SODIUM CHLORIDE 1000 ML: 9 INJECTION, SOLUTION INTRAVENOUS at 20:03

## 2024-10-15 RX ADMIN — MIDAZOLAM HYDROCHLORIDE 2 MG: 2 INJECTION, SOLUTION INTRAMUSCULAR; INTRAVENOUS at 12:11

## 2024-10-15 RX ADMIN — WATER 1.2 ML: 1 INJECTION INTRAMUSCULAR; INTRAVENOUS; SUBCUTANEOUS at 18:07

## 2024-10-15 RX ADMIN — ZIPRASIDONE MESYLATE 20 MG: 20 INJECTION, POWDER, LYOPHILIZED, FOR SOLUTION INTRAMUSCULAR at 18:07

## 2024-10-15 RX ADMIN — DROPERIDOL 5 MG: 2.5 INJECTION, SOLUTION INTRAMUSCULAR; INTRAVENOUS at 12:09

## 2024-10-15 ASSESSMENT — PAIN - FUNCTIONAL ASSESSMENT: PAIN_FUNCTIONAL_ASSESSMENT: NONE - DENIES PAIN

## 2024-10-15 ASSESSMENT — LIFESTYLE VARIABLES
HOW MANY STANDARD DRINKS CONTAINING ALCOHOL DO YOU HAVE ON A TYPICAL DAY: PATIENT DOES NOT DRINK
HOW OFTEN DO YOU HAVE A DRINK CONTAINING ALCOHOL: NEVER

## 2024-10-15 NOTE — ED NOTES
PT LOUD BELLIGERENT AND TAUNTING TRACY OFFICER WHOM IS WITH ANOTHER PT IN 29.  PT WALKS BY OFFICER ASKING HIM TO TAKE HIM TO [facility] \"SHACKLE ME NOW\" THIS PT NEEDED DIRECTED MANY TIME BACK TO THIS ROOM. DR CHINCHILLA AWARE ORDERS GIVEN PT MEDICATED.

## 2024-10-15 NOTE — ED NOTES
Pt given copy of involuntary hold, pt ripped it up and threw it around. Pt yelling nonsensical words and phrases. Pt refused to change into hospital attire and then threw clothes in trash can.

## 2024-10-15 NOTE — ED NOTES
Observed patient walking to restroom. This RN attempted to ask patient for urine specimen, patient stated \"I know\" and pushed the restroom door closed as this RN asked to turn light on for him. Patient did not provide urine specimen per request.

## 2024-10-15 NOTE — ED PROVIDER NOTES
changes.  Laboratory obtained CBC demonstrate minimal leukocytosis 11.9 CMP demonstrate mild dehydration CO2 of 18 lithium undetectable serum drug screen negative, CK level 749.  Patient remedicated with 20 mg of IM Geodon.  Find consistent acute psychosis.  Patient medically cleared for inpatient psychiatric evaluation pending improvement in CK level as well as urine drug screen.    FINAL IMPRESSION      1. Psychosis, unspecified psychosis type (HCC)          DISPOSITION/PLAN     DISPOSITION Behavioral Health Hold 10/15/2024 07:50:03 PM    PATIENT REFERRED TO:  No follow-up provider specified.    DISCHARGE MEDICATIONS:  New Prescriptions    No medications on file       DISCONTINUED MEDICATIONS:  Discontinued Medications    No medications on file            (Please note that portions of this note were completed with a voice recognition program.  Efforts were made to edit the dictations but occasionally words are mis-transcribed.)    Jagdish Phoenix DO (electronically signed)       Jagdish Phoenix DO  10/15/24 1957

## 2024-10-15 NOTE — ED NOTES
Patient yelling out, restless, passing back and forth in section G. Screaming into the phone without dialing a number. Yelling out random words. \"[facility].\" \"Dr. Aguero.\"

## 2024-10-15 NOTE — ED NOTES
Patient yelling into the phone, \"where's my laptop, what's going on?? You better hope its ok.  I'm going to f-- you up in the morning.\" Moaning out loudly in the bed. Nurse tried to start IV he said \"where are you putting that? In my penis, up my butt? Let me shoot up orange juice.\"

## 2024-10-15 NOTE — ED NOTES
Patient's grandmother brought patient to the ED for a psychiatric evaluation. When he arrived in the parking lot, he attempted to run and [place] [employer] stopped him and placed him on an involuntary hold. Patient is nonsensical, yelling and is difficult to redirect. Patient is hyper focused on Police. He denied drinking alcohol today, stated that he took \"happy pills\" that his grandmother gave him.     Telepsych will need consulted once appropriate.    DARINEL Escobar, CYN[facility]  Behavioral Health

## 2024-10-16 PROBLEM — F60.89 CLUSTER B PERSONALITY DISORDER (HCC): Status: ACTIVE | Noted: 2024-10-16

## 2024-10-16 LAB
AMPHET UR QL SCN: NEGATIVE
BARBITURATES UR QL SCN: NEGATIVE
BENZODIAZ UR QL: POSITIVE
BILIRUB UR QL STRIP: NEGATIVE
BUPRENORPHINE UR QL: NEGATIVE
CANNABINOIDS UR QL SCN: POSITIVE
CK SERPL-CCNC: 723 U/L (ref 20–200)
CLARITY UR: CLEAR
COCAINE UR QL SCN: NEGATIVE
COLOR UR: YELLOW
FENTANYL UR QL: NEGATIVE
GLUCOSE UR STRIP-MCNC: NEGATIVE MG/DL
HGB UR QL STRIP.AUTO: NEGATIVE
KETONES UR STRIP-MCNC: 15 MG/DL
LEUKOCYTE ESTERASE UR QL STRIP: NEGATIVE
METHADONE UR QL: NEGATIVE
MUCOUS THREADS URNS QL MICRO: PRESENT
NITRITE UR QL STRIP: NEGATIVE
OPIATES UR QL SCN: NEGATIVE
OXYCODONE UR QL SCN: NEGATIVE
PCP UR QL SCN: NEGATIVE
PH UR STRIP: 6 [PH] (ref 5–9)
PROT UR STRIP-MCNC: NEGATIVE MG/DL
RBC #/AREA URNS HPF: ABNORMAL /HPF
SP GR UR STRIP: 1.02 (ref 1–1.03)
TEST INFORMATION: ABNORMAL
UROBILINOGEN UR STRIP-ACNC: 0.2 EU/DL (ref 0–1)
WBC #/AREA URNS HPF: ABNORMAL /HPF

## 2024-10-16 PROCEDURE — 90791 PSYCH DIAGNOSTIC EVALUATION: CPT | Performed by: SOCIAL WORKER

## 2024-10-16 PROCEDURE — 82550 ASSAY OF CK (CPK): CPT

## 2024-10-16 PROCEDURE — 96360 HYDRATION IV INFUSION INIT: CPT

## 2024-10-16 PROCEDURE — 6360000002 HC RX W HCPCS: Performed by: PSYCHIATRY & NEUROLOGY

## 2024-10-16 PROCEDURE — 80307 DRUG TEST PRSMV CHEM ANLYZR: CPT

## 2024-10-16 PROCEDURE — 6370000000 HC RX 637 (ALT 250 FOR IP): Performed by: NURSE PRACTITIONER

## 2024-10-16 PROCEDURE — 96372 THER/PROPH/DIAG INJ SC/IM: CPT

## 2024-10-16 PROCEDURE — 2580000003 HC RX 258: Performed by: STUDENT IN AN ORGANIZED HEALTH CARE EDUCATION/TRAINING PROGRAM

## 2024-10-16 PROCEDURE — 1240000000 HC EMOTIONAL WELLNESS R&B

## 2024-10-16 PROCEDURE — 6360000002 HC RX W HCPCS: Performed by: STUDENT IN AN ORGANIZED HEALTH CARE EDUCATION/TRAINING PROGRAM

## 2024-10-16 PROCEDURE — 2580000003 HC RX 258

## 2024-10-16 PROCEDURE — 96361 HYDRATE IV INFUSION ADD-ON: CPT

## 2024-10-16 PROCEDURE — 81001 URINALYSIS AUTO W/SCOPE: CPT

## 2024-10-16 RX ORDER — MECOBALAMIN 5000 MCG
5 TABLET,DISINTEGRATING ORAL NIGHTLY
Status: DISCONTINUED | OUTPATIENT
Start: 2024-10-16 | End: 2024-10-23 | Stop reason: HOSPADM

## 2024-10-16 RX ORDER — MIDAZOLAM HYDROCHLORIDE 2 MG/2ML
2 INJECTION, SOLUTION INTRAMUSCULAR; INTRAVENOUS ONCE
Status: COMPLETED | OUTPATIENT
Start: 2024-10-16 | End: 2024-10-16

## 2024-10-16 RX ORDER — PROPRANOLOL HCL 20 MG
40 TABLET ORAL 2 TIMES DAILY
Status: DISCONTINUED | OUTPATIENT
Start: 2024-10-16 | End: 2024-10-23 | Stop reason: HOSPADM

## 2024-10-16 RX ORDER — LORAZEPAM 1 MG/1
1 TABLET ORAL 2 TIMES DAILY
Status: COMPLETED | OUTPATIENT
Start: 2024-10-16 | End: 2024-10-17

## 2024-10-16 RX ORDER — 0.9 % SODIUM CHLORIDE 0.9 %
1000 INTRAVENOUS SOLUTION INTRAVENOUS ONCE
Status: COMPLETED | OUTPATIENT
Start: 2024-10-16 | End: 2024-10-16

## 2024-10-16 RX ORDER — LORAZEPAM 2 MG/ML
2 INJECTION INTRAMUSCULAR EVERY 6 HOURS PRN
Status: DISCONTINUED | OUTPATIENT
Start: 2024-10-16 | End: 2024-10-23 | Stop reason: HOSPADM

## 2024-10-16 RX ORDER — LORAZEPAM 1 MG/1
1 TABLET ORAL 2 TIMES DAILY
Status: DISCONTINUED | OUTPATIENT
Start: 2024-10-16 | End: 2024-10-16

## 2024-10-16 RX ORDER — FLUPHENAZINE HYDROCHLORIDE 5 MG/1
5 TABLET ORAL 2 TIMES DAILY
Status: DISCONTINUED | OUTPATIENT
Start: 2024-10-16 | End: 2024-10-16

## 2024-10-16 RX ORDER — POLYETHYLENE GLYCOL 3350 17 G/17G
17 POWDER, FOR SOLUTION ORAL DAILY PRN
Status: DISCONTINUED | OUTPATIENT
Start: 2024-10-16 | End: 2024-10-23 | Stop reason: HOSPADM

## 2024-10-16 RX ORDER — DROPERIDOL 2.5 MG/ML
2.5 INJECTION, SOLUTION INTRAMUSCULAR; INTRAVENOUS ONCE
Status: COMPLETED | OUTPATIENT
Start: 2024-10-16 | End: 2024-10-16

## 2024-10-16 RX ORDER — LITHIUM CARBONATE 150 MG/1
450 CAPSULE ORAL 2 TIMES DAILY WITH MEALS
Status: DISCONTINUED | OUTPATIENT
Start: 2024-10-17 | End: 2024-10-23 | Stop reason: HOSPADM

## 2024-10-16 RX ORDER — LITHIUM CARBONATE 300 MG/1
300 CAPSULE ORAL
Status: DISCONTINUED | OUTPATIENT
Start: 2024-10-16 | End: 2024-10-16

## 2024-10-16 RX ORDER — BENZTROPINE MESYLATE 1 MG/1
1 TABLET ORAL 2 TIMES DAILY
Status: DISCONTINUED | OUTPATIENT
Start: 2024-10-16 | End: 2024-10-23 | Stop reason: HOSPADM

## 2024-10-16 RX ORDER — ACETAMINOPHEN 325 MG/1
650 TABLET ORAL EVERY 4 HOURS PRN
Status: DISCONTINUED | OUTPATIENT
Start: 2024-10-16 | End: 2024-10-23 | Stop reason: HOSPADM

## 2024-10-16 RX ORDER — WATER 10 ML/10ML
INJECTION INTRAMUSCULAR; INTRAVENOUS; SUBCUTANEOUS
Status: DISPENSED
Start: 2024-10-16 | End: 2024-10-16

## 2024-10-16 RX ORDER — LORAZEPAM 1 MG/1
1 TABLET ORAL EVERY 4 HOURS PRN
Status: DISCONTINUED | OUTPATIENT
Start: 2024-10-16 | End: 2024-10-16

## 2024-10-16 RX ORDER — DIPHENHYDRAMINE HYDROCHLORIDE 50 MG/ML
50 INJECTION INTRAMUSCULAR; INTRAVENOUS EVERY 6 HOURS PRN
Status: DISCONTINUED | OUTPATIENT
Start: 2024-10-16 | End: 2024-10-16

## 2024-10-16 RX ORDER — FLUPHENAZINE HYDROCHLORIDE 5 MG/1
5 TABLET ORAL 2 TIMES DAILY
Status: DISCONTINUED | OUTPATIENT
Start: 2024-10-16 | End: 2024-10-17

## 2024-10-16 RX ORDER — WATER 10 ML/10ML
INJECTION INTRAMUSCULAR; INTRAVENOUS; SUBCUTANEOUS
Status: COMPLETED
Start: 2024-10-16 | End: 2024-10-16

## 2024-10-16 RX ORDER — GABAPENTIN 300 MG/1
300 CAPSULE ORAL 2 TIMES DAILY
Status: DISCONTINUED | OUTPATIENT
Start: 2024-10-16 | End: 2024-10-23 | Stop reason: HOSPADM

## 2024-10-16 RX ORDER — ZIPRASIDONE MESYLATE 20 MG/ML
20 INJECTION, POWDER, LYOPHILIZED, FOR SOLUTION INTRAMUSCULAR ONCE
Status: COMPLETED | OUTPATIENT
Start: 2024-10-16 | End: 2024-10-16

## 2024-10-16 RX ADMIN — SODIUM CHLORIDE 1000 ML: 9 INJECTION, SOLUTION INTRAVENOUS at 00:50

## 2024-10-16 RX ADMIN — LORAZEPAM 2 MG: 2 INJECTION INTRAMUSCULAR; INTRAVENOUS at 12:10

## 2024-10-16 RX ADMIN — Medication 5 MG: at 20:52

## 2024-10-16 RX ADMIN — DROPERIDOL 2.5 MG: 2.5 INJECTION, SOLUTION INTRAMUSCULAR; INTRAVENOUS at 04:22

## 2024-10-16 RX ADMIN — MIDAZOLAM 2 MG: 1 INJECTION INTRAMUSCULAR; INTRAVENOUS at 04:22

## 2024-10-16 RX ADMIN — GABAPENTIN 300 MG: 300 CAPSULE ORAL at 20:52

## 2024-10-16 RX ADMIN — LORAZEPAM 1 MG: 1 TABLET ORAL at 20:52

## 2024-10-16 RX ADMIN — ZIPRASIDONE MESYLATE 20 MG: 20 INJECTION, POWDER, LYOPHILIZED, FOR SOLUTION INTRAMUSCULAR at 12:10

## 2024-10-16 RX ADMIN — WATER: 1 INJECTION INTRAMUSCULAR; INTRAVENOUS; SUBCUTANEOUS at 12:15

## 2024-10-16 NOTE — ED NOTES
Patient given boxed lunch per request, patient yelled \"[place]\" loudly and began laughing as he threw himself over the bed rail onto the bed. Patient then quickly got out of bed and began pacing and laughing inappropriately.

## 2024-10-16 NOTE — ED NOTES
Patient occasionally thrashing on cart, nonsensical speech- stated to this RN \"You aren't killing too many dogs are you?\" when this RN was checking IV site.

## 2024-10-16 NOTE — ED NOTES
Observed patient walk out of restroom, asked patient if he provided urine specimen, patient stated \"I need to\" and went back to restroom with a specimen cup.

## 2024-10-16 NOTE — ED NOTES
Patient exhibiting erratic behavior-very disruptive, yelling, pacing. Patient not able to be redirected. RN sought assistance, [place] [employer] arrived to assist.

## 2024-10-16 NOTE — VIRTUAL HEALTH
[facility] Consult to Tele-Psych  Consult performed by: Camila Pena APRN - CNP  Consult ordered by: Deepak Ballesteros MD        Reason for Cancel: TelePsych Reason for Cancel: Patient unable to participate    Spoke with Juanis tech who reports pt had to be medicated and  is sleeping. Reports unable to participate at this time. Consult will be cancelled. Advised to reorder consult when patient is awake. Will attempt to evaluate patient and obtain collateral at that time.     --MICH Reid CNP on 10/16/2024 at 7:28 AM    An electronic signature was used to authenticate this note.    
normal rate and normal volume , Tone: normal tone  Mood: within normal limits  Affect: mood congruent  Thought Processes:  linear.   Thought Content: Delusions:  grandiose  Hallucinations:  Hallucinations: +Auditory Hallucinations  Cognition:  disoriented   Concentration: poor  Memory: intact, though not formally tested.  Insight: poor   Judgement: poor   Fund of Knowledge: limited    Overall Level Suicide Risk: TelePsych CSSRS Risk Level: Low Risk   CSSR-S Screening: Patient reported no Sucidal ideation at this time. Patient reported no plan or intent to kill himself or others.      Brief ClinicalSummary:   Patient is a 28 y.o.  male who presents for Psychosis.    Per chart review, Patient presents with chief complaint of acute psychosis. According to patient's grandmother patient has been agitated for the last few days he has been having flight of ideas and has been preoccupied religiously. On arrival to the hospital patient jumped out of the car tried to run in front of traffic and was tackled by [place] police.     Patient presents with psychosis, flight of ideas and illogical thought patterns. Writer talked to the patient, patient reported that there was a \"miscommunications between police\". Patient reported that he is , and lives with his wife of three years, patient reported that he did not have his wife number (grandmother later reported the patient is not ). During conversation patient reported that he has hallucinations of whales and currently hears command hallucinations.     Collateral: Writer called Grandmother, grandmother reported that the patient was doing good, but has been off medication for 3 weeks, and since then has been declining with stability. Grandmother reports patient has been declining mentally since not using his medications. Grandmother reported that the patient is in need of a psychiatric inpatient admission at this time to help with stabilization, and for the

## 2024-10-16 NOTE — H&P
Department of Psychiatry  History and Physical - Adult     CHIEF COMPLAINT:    Chief Complaint   Patient presents with    Psychiatric Evaluation     PT'S GRANDMOTHER WAS TRANSPORTING PT TO ED WHEN [facility] AND RAN INTO TRAFFIC PT ESCORTED BY [facility] OFFICERS TO SECTION G PT RAMBLING AND RELIGIOUSLY PREOCCUPIED. DENIES SI/ HI/ DENIES HALLUCINATIONS.  PT REPORTS HIS GRANDMOTHER GAVE ME BUNCHES OF PILLS THEN SENT ME IN.       Patient was seen after discussing with the treatment team and reviewing the chart    CIRCUMSTANCES OF ADMISSION:     Patient name: Lalo Valderrama  Patient's past mental health and addiction history: Schizoaffective disorder and cluster B personality disorder  Patient's presentation to the ED and why the patient needs admission: presented to the ED brought in by his grandmother while on the way patient jumped out of the car was running into traffic required being escorted to the ED by [place] police officers found to be rambling religiously preoccupied requiring IM injections.  Legal status:  []  Voluntary  [x]  Involuntary  []  Probate  Triggering/precipitating events: Unknown  Duration of triggering/precipitating events: Unknown    HISTORY OF PRESENT ILLNESS:      The patient is a 28 y.o. male with significant past history of schizoaffective disorder and cluster B personality traits presented to the ED brought in by his grandmother while on the way patient jumped out of the car was running into traffic required being escorted to the ED by [place] police officers found to be rambling religiously preoccupied requiring IM injections.  In the ED his urine drug screen is positive benzodiazepines his blood alcohol level is negative he was medically cleared admitted to  S. adult psychiatric unit for further psychiatric assessment stabilization and treatment      Upon evaluation today patient is lying lying in his bed he offers little conversation after requiring stat

## 2024-10-16 NOTE — ED NOTES
Observed patient quickly jump up and out of bed, patient asked for water. Patient provided with water. Patient drank some water, wrapped self in blanket, and quickly got back on cart.

## 2024-10-16 NOTE — ED NOTES
Called lab to question why UDS that was sent to lab at 0400 is still not resulted, was transferred to chemistry in lab where there was no answer.

## 2024-10-16 NOTE — ED NOTES
[facility] police at bedside for safety. Patient loud and disruptive, nonsensical speech, pacing. Asked patient to return to room for medications. Patient pulled down his pants, threw himself on cart, and screamed \"give it to me put it in my asshole\" as he was slapping his buttocks. Patient then while lying prone on cart, began aggressively thrusting his pelvis on the cart and screaming. Patient medicated per order. Patient 's pants and shorts removed due to he is still wearing his own clothing. Patient provided with proper hospital attire.

## 2024-10-16 NOTE — ED NOTES
Notified current ED attending Dr. Angeles of patient's behaviors. Physician asked for EKG due to no EKG resulted in Epic at this time. Found patient's EKG dated 15-Oct-2024 12:47:46 in patient's paper chart. QTC was 459. EKG shown to Dr. Angeles and placed back in patient's paper chart.    Levar Osorio, Roque Agustin, Alfonso

## 2024-10-16 NOTE — ED NOTES
Noted puddle of fluid on floor and IV cathter lying on floor. IV fluids paused, new IV will need inserted.
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
## 2024-10-16 NOTE — CARE COORDINATION
SW attempted to meet with the pt to complete biopsychosocial assessment. Pt observed laying in bed sleeping soundly. SW will try again at a later time.

## 2024-10-16 NOTE — ED NOTES
Patient woke up and began attempting to push buttons on IV pump. Asked patient to stop. Patient yelled out \"[facility] needs to change the crosses on the wall\".

## 2024-10-17 LAB
ALBUMIN SERPL-MCNC: 4.2 G/DL (ref 3.5–5.2)
ALP SERPL-CCNC: 135 U/L (ref 40–129)
ALT SERPL-CCNC: 24 U/L (ref 0–40)
ANION GAP SERPL CALCULATED.3IONS-SCNC: 12 MMOL/L (ref 7–16)
AST SERPL-CCNC: 37 U/L (ref 0–39)
BASOPHILS # BLD: 0.04 K/UL (ref 0–0.2)
BASOPHILS NFR BLD: 0 % (ref 0–2)
BILIRUB SERPL-MCNC: 0.6 MG/DL (ref 0–1.2)
BUN SERPL-MCNC: 9 MG/DL (ref 6–20)
CALCIUM SERPL-MCNC: 9.5 MG/DL (ref 8.6–10.2)
CHLORIDE SERPL-SCNC: 105 MMOL/L (ref 98–107)
CO2 SERPL-SCNC: 22 MMOL/L (ref 22–29)
CREAT SERPL-MCNC: 0.7 MG/DL (ref 0.7–1.2)
EOSINOPHIL # BLD: 0.19 K/UL (ref 0.05–0.5)
EOSINOPHILS RELATIVE PERCENT: 2 % (ref 0–6)
ERYTHROCYTE [DISTWIDTH] IN BLOOD BY AUTOMATED COUNT: 13.3 % (ref 11.5–15)
GFR, ESTIMATED: >90 ML/MIN/1.73M2
GLUCOSE SERPL-MCNC: 130 MG/DL (ref 74–99)
HCT VFR BLD AUTO: 44 % (ref 37–54)
HGB BLD-MCNC: 14.7 G/DL (ref 12.5–16.5)
IMM GRANULOCYTES # BLD AUTO: 0.04 K/UL (ref 0–0.58)
IMM GRANULOCYTES NFR BLD: 0 % (ref 0–5)
LYMPHOCYTES NFR BLD: 1.14 K/UL (ref 1.5–4)
LYMPHOCYTES RELATIVE PERCENT: 11 % (ref 20–42)
MAGNESIUM SERPL-MCNC: 1.9 MG/DL (ref 1.6–2.6)
MCH RBC QN AUTO: 29.6 PG (ref 26–35)
MCHC RBC AUTO-ENTMCNC: 33.4 G/DL (ref 32–34.5)
MCV RBC AUTO: 88.7 FL (ref 80–99.9)
MONOCYTES NFR BLD: 0.44 K/UL (ref 0.1–0.95)
MONOCYTES NFR BLD: 4 % (ref 2–12)
NEUTROPHILS NFR BLD: 83 % (ref 43–80)
NEUTS SEG NFR BLD: 8.86 K/UL (ref 1.8–7.3)
PLATELET # BLD AUTO: 254 K/UL (ref 130–450)
PMV BLD AUTO: 11.5 FL (ref 7–12)
POTASSIUM SERPL-SCNC: 4.4 MMOL/L (ref 3.5–5)
PROT SERPL-MCNC: 7.2 G/DL (ref 6.4–8.3)
RBC # BLD AUTO: 4.96 M/UL (ref 3.8–5.8)
SODIUM SERPL-SCNC: 139 MMOL/L (ref 132–146)
WBC OTHER # BLD: 10.7 K/UL (ref 4.5–11.5)

## 2024-10-17 PROCEDURE — 36415 COLL VENOUS BLD VENIPUNCTURE: CPT

## 2024-10-17 PROCEDURE — 83735 ASSAY OF MAGNESIUM: CPT

## 2024-10-17 PROCEDURE — 80053 COMPREHEN METABOLIC PANEL: CPT

## 2024-10-17 PROCEDURE — 6360000002 HC RX W HCPCS: Performed by: NURSE PRACTITIONER

## 2024-10-17 PROCEDURE — 6370000000 HC RX 637 (ALT 250 FOR IP): Performed by: NURSE PRACTITIONER

## 2024-10-17 PROCEDURE — 1240000000 HC EMOTIONAL WELLNESS R&B

## 2024-10-17 PROCEDURE — 99232 SBSQ HOSP IP/OBS MODERATE 35: CPT | Performed by: NURSE PRACTITIONER

## 2024-10-17 PROCEDURE — 2580000003 HC RX 258: Performed by: NURSE PRACTITIONER

## 2024-10-17 PROCEDURE — 6370000000 HC RX 637 (ALT 250 FOR IP): Performed by: PSYCHIATRY & NEUROLOGY

## 2024-10-17 PROCEDURE — 6360000002 HC RX W HCPCS: Performed by: PSYCHIATRY & NEUROLOGY

## 2024-10-17 PROCEDURE — 85025 COMPLETE CBC W/AUTO DIFF WBC: CPT

## 2024-10-17 RX ORDER — NICOTINE 21 MG/24HR
1 PATCH, TRANSDERMAL 24 HOURS TRANSDERMAL DAILY
Status: DISCONTINUED | OUTPATIENT
Start: 2024-10-17 | End: 2024-10-23 | Stop reason: HOSPADM

## 2024-10-17 RX ORDER — FLUPHENAZINE HYDROCHLORIDE 5 MG/1
5 TABLET ORAL 2 TIMES DAILY
Status: DISCONTINUED | OUTPATIENT
Start: 2024-10-17 | End: 2024-10-19

## 2024-10-17 RX ORDER — ONDANSETRON 4 MG/1
4 TABLET, ORALLY DISINTEGRATING ORAL ONCE
Status: COMPLETED | OUTPATIENT
Start: 2024-10-17 | End: 2024-10-17

## 2024-10-17 RX ORDER — PROCHLORPERAZINE EDISYLATE 5 MG/ML
10 INJECTION INTRAMUSCULAR; INTRAVENOUS EVERY 6 HOURS PRN
Status: DISCONTINUED | OUTPATIENT
Start: 2024-10-17 | End: 2024-10-23 | Stop reason: HOSPADM

## 2024-10-17 RX ADMIN — BENZTROPINE MESYLATE 1 MG: 1 TABLET ORAL at 20:33

## 2024-10-17 RX ADMIN — LITHIUM CARBONATE 450 MG: 150 CAPSULE, GELATIN COATED ORAL at 08:45

## 2024-10-17 RX ADMIN — Medication 5 MG: at 20:34

## 2024-10-17 RX ADMIN — PROPRANOLOL HYDROCHLORIDE 40 MG: 20 TABLET ORAL at 08:45

## 2024-10-17 RX ADMIN — FLUPHENAZINE HYDROCHLORIDE 5 MG: 5 TABLET ORAL at 20:34

## 2024-10-17 RX ADMIN — ZIPRASIDONE MESYLATE 10 MG: 20 INJECTION, POWDER, LYOPHILIZED, FOR SOLUTION INTRAMUSCULAR at 11:02

## 2024-10-17 RX ADMIN — BENZTROPINE MESYLATE 1 MG: 1 TABLET ORAL at 08:45

## 2024-10-17 RX ADMIN — PROPRANOLOL HYDROCHLORIDE 40 MG: 20 TABLET ORAL at 20:34

## 2024-10-17 RX ADMIN — GABAPENTIN 300 MG: 300 CAPSULE ORAL at 08:45

## 2024-10-17 RX ADMIN — GABAPENTIN 300 MG: 300 CAPSULE ORAL at 20:34

## 2024-10-17 RX ADMIN — LORAZEPAM 1 MG: 1 TABLET ORAL at 08:44

## 2024-10-17 RX ADMIN — LITHIUM CARBONATE 450 MG: 150 CAPSULE, GELATIN COATED ORAL at 17:29

## 2024-10-17 RX ADMIN — FLUPHENAZINE HYDROCHLORIDE 5 MG: 5 TABLET ORAL at 10:21

## 2024-10-17 RX ADMIN — LORAZEPAM 2 MG: 2 INJECTION INTRAMUSCULAR; INTRAVENOUS at 11:06

## 2024-10-17 RX ADMIN — ONDANSETRON 4 MG: 4 TABLET, ORALLY DISINTEGRATING ORAL at 18:53

## 2024-10-17 ASSESSMENT — PATIENT HEALTH QUESTIONNAIRE - PHQ9
SUM OF ALL RESPONSES TO PHQ QUESTIONS 1-9: 0
SUM OF ALL RESPONSES TO PHQ QUESTIONS 1-9: 0
1. LITTLE INTEREST OR PLEASURE IN DOING THINGS: NOT AT ALL
SUM OF ALL RESPONSES TO PHQ QUESTIONS 1-9: 0
SUM OF ALL RESPONSES TO PHQ QUESTIONS 1-9: 0

## 2024-10-17 ASSESSMENT — SLEEP AND FATIGUE QUESTIONNAIRES
AVERAGE NUMBER OF SLEEP HOURS: 6
DO YOU USE A SLEEP AID: YES
SLEEP PATTERN: NORMAL
DO YOU HAVE DIFFICULTY SLEEPING: NO
DO YOU USE A SLEEP AID: NO
AVERAGE NUMBER OF SLEEP HOURS: 10
DO YOU HAVE DIFFICULTY SLEEPING: NO

## 2024-10-17 ASSESSMENT — PAIN SCALES - GENERAL
PAINLEVEL_OUTOF10: 0
PAINLEVEL_OUTOF10: 0

## 2024-10-17 NOTE — PLAN OF CARE
Problem: Risk for Elopement  Goal: Patient will not exit the unit/facility without proper excort  Outcome: Progressing     Problem: Pain  Goal: Verbalizes/displays adequate comfort level or baseline comfort level  Outcome: Progressing     Problem: Ange  Goal: Will exhibit normal sleep and speech and no impulsivity  Description: INTERVENTIONS:  1. Administer medication as ordered  2. Set limits on impulsive behavior  3. Make attempts to decrease external stimuli as possible  Outcome: Not Progressing     Problem: Psychosis  Goal: Will report no hallucinations or delusions  Description: INTERVENTIONS:  1. Administer medication as  ordered  2. Assist with reality testing to support increasing orientation  3. Assess if patient's hallucinations or delusions are encouraging self harm or harm to others and intervene as appropriate  Outcome: Not Progressing     Problem: Anxiety  Goal: Will report anxiety at manageable levels  Description: INTERVENTIONS:  1. Administer medication as ordered  2. Teach and rehearse alternative coping skills  3. Provide emotional support with 1:1 interaction with staff  Outcome: Not Progressing     Problem: Involuntary Admit  Goal: Will cooperate with staff recommendations and doctor's orders and will demonstrate appropriate behavior  Description: INTERVENTIONS:  1. Treat underlying conditions and offer medication as ordered  2. Educate regarding involuntary admission procedures and rules  3. Contain excessive/inappropriate behavior per unit and hospital policies  Outcome: Not Progressing
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
## 2024-10-17 NOTE — CARE COORDINATION
Pt approached SW office and asked for it to be documented that he no longer wants to have a guardian and or that he wants his guardianship to be changed. Within two minutes of the pt telling SW this the pt approached SW again and asked for an update on his guardianship. SW informed the pt we will not be able to change his guardianship here. Pt walked away from  at this time.

## 2024-10-17 NOTE — PLAN OF CARE
Problem: Risk for Elopement  Goal: Patient will not exit the unit/facility without proper excort  Outcome: Not Progressing     Problem: Ange  Goal: Will exhibit normal sleep and speech and no impulsivity  Description: INTERVENTIONS:  1. Administer medication as ordered  2. Set limits on impulsive behavior  3. Make attempts to decrease external stimuli as possible  Outcome: Not Progressing     Problem: Psychosis  Goal: Will report no hallucinations or delusions  Description: INTERVENTIONS:  1. Administer medication as  ordered  2. Assist with reality testing to support increasing orientation  3. Assess if patient's hallucinations or delusions are encouraging self harm or harm to others and intervene as appropriate  Outcome: Not Progressing     Problem: Anxiety  Goal: Will report anxiety at manageable levels  Description: INTERVENTIONS:  1. Administer medication as ordered  2. Teach and rehearse alternative coping skills  3. Provide emotional support with 1:1 interaction with staff  Outcome: Not Progressing     Problem: Involuntary Admit  Goal: Will cooperate with staff recommendations and doctor's orders and will demonstrate appropriate behavior  Description: INTERVENTIONS:  1. Treat underlying conditions and offer medication as ordered  2. Educate regarding involuntary admission procedures and rules  3. Contain excessive/inappropriate behavior per unit and hospital policies  Outcome: Not Progressing  Patient in leon upon approach. Patient denied SI/HI. Patient denied anxiety and depression. Patient did refuse Prolixin and Cogentin but did take other medications. Patient visible on unit and behavior remains in control. Patient has been seclusive to self no interaction with peers. Safety rounds done q15 minutes. Will continue to monitor.

## 2024-10-17 NOTE — CARE COORDINATION
[facility] Patient entered under “Lalo Palumbo”. Guardianship case remains active. Patient grandmother, Alicia Valderrama, is appointed guardian.     Electronically signed by DARINEL Minor, DIONICIO on 10/18/2024 at 12:51 PM

## 2024-10-17 NOTE — CARE COORDINATION
Pt has approached SW several times this morning asking for the extended release LOERA and then to be discharged. Pt stated he was given the extended release long acting injection and then discharged to [facility] before and he wants this to happen today. Pt also spoke about his pain medication doctors. As SW was walking to treatment team the pt stated \"If anyone pays you for a hit contract on me just ignore it.\"

## 2024-10-17 NOTE — GROUP NOTE
Group Therapy Note    Date: 10/17/2024    Group Start Time: 0945  Group End Time: 1020  Group Topic: Psychoeducation    [facility] AC[facility] 1    Ning Williamson, CTRS    Date: 10/17/2024    Type of Group: Psychoeducation    Wellness Binder Information  Module Name:  over thinking     Patient's Goal:  pt will be able to id key steps to not overthinking and how to manage decisions in a heathy manner.     Notes:  pleasant and engaged in group, able to share with others and accepting of  handout.     Status After Intervention:  Improved    Participation Level: Active Listener and Interactive    Participation Quality: Appropriate, Attentive, and Sharing      Speech:  normal      Thought Process/Content: Logical      Affective Functioning: Congruent      Mood: euthymic      Level of consciousness:  Alert, Oriented x4, and Attentive      Response to Learning: Able to verbalize/acknowledge new learning      Endings: None Reported    Modes of Intervention: Education, Support, Socialization, and Clarifying      Discipline Responsible: Psychoeducational Specialist      Signature:  Ning Williamson, CTRS

## 2024-10-17 NOTE — CARE COORDINATION
Leisure assessment completed.    10/17/24 2657   Activities of Daily Living   Patient Requires assistance with daily self-care activities? No   Leisure Activity 1   3 Favorite Leisure Activities exercising freelance realitor   Frequency > 2 hours/day   Last time this month   Barriers to participating  motivation;social (ie. no one to do it with)   Leisure Activity 2   Favorite Leisure Activities  same   Leisure Activity 3   Favorite Leisure Activities  same   Social   Patient reports spending the majority of their free time with one other person   Patient verbalizes a preference for spending free time with one other person   Patient’s perception of support system less healthy   Patient’s perception of barriers to socializing with others include(s) lack of comfort;lack of skills;lack of motivation/interest   Beliefs & Coping   Has difficulty dealing with feelings   Yes   Internalizes feelings/Keeps feelings in Yes   Externalizes feelings through aggressiveness or poor temper control  Yes   Feels uncomfortable around others  Yes   Has difficulty talking to others  Yes   Depends on others for direction or decisions No   Difficulty dealing with anger of others  Yes   Difficulty dealing with own anger  Yes   Difficulty managing stress Yes   Frequently has difficulty with relationships  No   Has recently perceived/experienced loss, disappointment, humiliation or failure  NO   Attitude about abilities occasionally fails   Locus of Control  most of the time   Belief about recovery Recovery is possible   Patient Identified Strengths  taking care of my house   Patient Identified Limitations  my grandpa   Perception of most stressful event prior to hospitalization my grandma wants me to be here   Perception of changes needed get my meds   Strengths and Limitations   Strengths Independent in basic self-care activities   Limitations Multiple barriers to leisure interests
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
## 2024-10-17 NOTE — BH NOTE
Patient vomiting on floor of hallway by nurse's station. Staff tending to the patient and cleaning up at this time.

## 2024-10-17 NOTE — GROUP NOTE
Shared goal for the day as to turn into rainman.                                                                       Group Therapy Note    Date: 10/17/2024    Group Start Time: 0930  Group End Time: 0945  Group Topic: Community Meeting    [facility]  1    Ning Williamson CTRS    Type of Group: Community Meeting      Patient's Goal:  Patient will be able to id staffing assignments, expectations of patients, and general information re: floor rules. Will be prompted to share goal for the day.     Notes:  Patient appeared to be an active listener, taking in information presented and was prompted to share goal for the day.    Status After Intervention:  Improved    Participation Level: Active Listener and Interactive    Participation Quality: Appropriate, Attentive, and Sharing      Speech:  normal      Thought Process/Content: Logical      Affective Functioning: Congruent      Mood: euthymic      Level of consciousness:  Alert, Oriented x4, and Attentive      Response to Learning: Able to verbalize/acknowledge new learning, Able to retain information, and Progressing to goal      Endings: None Reported    Modes of Intervention: Education, Support, and Socialization      Discipline Responsible: Psychoeducational Specialist      Signature:  HERB Gardner

## 2024-10-17 NOTE — BH NOTE
Asked patient how he was feeling after approximately 10 minutes have gone by since vomiting in the hallway. Patient said that he feels better. I asked patient if he feels sick from the recently given Lithium dose. Patient said \"No It's from that Geodon, that's what made me sick.\"

## 2024-10-17 NOTE — DISCHARGE INSTRUCTIONS
Patient was offered a referral to substance abuse treatment, patient declined referral at this time.      Follow up for Tobacco Cessation at:    [facility]                                 Date:  Friday 10/25 at 10am              [address] [address]   ([facility]    take B elevators to 7th floor)   Phone: [phone]   Fax: [phone]
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
## 2024-10-18 LAB
CHOLEST SERPL-MCNC: 127 MG/DL
HBA1C MFR BLD: 5.2 % (ref 4–5.6)
HDLC SERPL-MCNC: 32 MG/DL
LDLC SERPL CALC-MCNC: 80 MG/DL
TRIGL SERPL-MCNC: 74 MG/DL
VLDLC SERPL CALC-MCNC: 15 MG/DL

## 2024-10-18 PROCEDURE — 80061 LIPID PANEL: CPT

## 2024-10-18 PROCEDURE — 6360000002 HC RX W HCPCS: Performed by: NURSE PRACTITIONER

## 2024-10-18 PROCEDURE — 36415 COLL VENOUS BLD VENIPUNCTURE: CPT

## 2024-10-18 PROCEDURE — 6360000002 HC RX W HCPCS: Performed by: PSYCHIATRY & NEUROLOGY

## 2024-10-18 PROCEDURE — 1240000000 HC EMOTIONAL WELLNESS R&B

## 2024-10-18 PROCEDURE — 6370000000 HC RX 637 (ALT 250 FOR IP): Performed by: NURSE PRACTITIONER

## 2024-10-18 PROCEDURE — 83036 HEMOGLOBIN GLYCOSYLATED A1C: CPT

## 2024-10-18 PROCEDURE — 2580000003 HC RX 258: Performed by: NURSE PRACTITIONER

## 2024-10-18 PROCEDURE — 99232 SBSQ HOSP IP/OBS MODERATE 35: CPT | Performed by: NURSE PRACTITIONER

## 2024-10-18 PROCEDURE — 99221 1ST HOSP IP/OBS SF/LOW 40: CPT | Performed by: NURSE PRACTITIONER

## 2024-10-18 RX ADMIN — LORAZEPAM 2 MG: 2 INJECTION INTRAMUSCULAR; INTRAVENOUS at 10:25

## 2024-10-18 RX ADMIN — GABAPENTIN 300 MG: 300 CAPSULE ORAL at 20:46

## 2024-10-18 RX ADMIN — BENZTROPINE MESYLATE 1 MG: 1 TABLET ORAL at 20:47

## 2024-10-18 RX ADMIN — GABAPENTIN 300 MG: 300 CAPSULE ORAL at 08:33

## 2024-10-18 RX ADMIN — PROPRANOLOL HYDROCHLORIDE 40 MG: 20 TABLET ORAL at 20:47

## 2024-10-18 RX ADMIN — LORAZEPAM 2 MG: 2 INJECTION INTRAMUSCULAR; INTRAVENOUS at 00:24

## 2024-10-18 RX ADMIN — Medication 5 MG: at 20:48

## 2024-10-18 RX ADMIN — LITHIUM CARBONATE 450 MG: 150 CAPSULE, GELATIN COATED ORAL at 08:33

## 2024-10-18 RX ADMIN — LITHIUM CARBONATE 450 MG: 150 CAPSULE, GELATIN COATED ORAL at 16:46

## 2024-10-18 RX ADMIN — FLUPHENAZINE HYDROCHLORIDE 5 MG: 5 TABLET ORAL at 20:47

## 2024-10-18 RX ADMIN — FLUPHENAZINE HYDROCHLORIDE 5 MG: 5 TABLET ORAL at 08:33

## 2024-10-18 RX ADMIN — ZIPRASIDONE MESYLATE 10 MG: 20 INJECTION, POWDER, LYOPHILIZED, FOR SOLUTION INTRAMUSCULAR at 00:25

## 2024-10-18 RX ADMIN — BENZTROPINE MESYLATE 1 MG: 1 TABLET ORAL at 08:33

## 2024-10-18 RX ADMIN — PROPRANOLOL HYDROCHLORIDE 40 MG: 20 TABLET ORAL at 08:33

## 2024-10-18 ASSESSMENT — PAIN SCALES - GENERAL
PAINLEVEL_OUTOF10: 0
PAINLEVEL_OUTOF10: 0

## 2024-10-18 NOTE — GROUP NOTE
Group Therapy Note    Date: 10/18/2024    Group Start Time: 0945  Group End Time: 1015  Group Topic: Psychoeducation    Cat Masters CTRS    Group Therapy Note    Attendees: 11    Date: 10/18/2024  Start Time: 0945  End Time:  1015  Number of Participants: 11    Type of Group: Psychoeducation    Name:  Stress and Wellbeing    Patient's Goal:  Identify types of stress, how stress affects the body and healthy ways to manage/cope with stress.    Notes:  CTRS led educational group discussion on stress and wellbeing. Encouraged patients to share their experiences. Patient often entered and exited group. Patient engaged minimally in group discussion while at group.    Status After Intervention:  Unchanged    Participation Level: Minimal    Participation Quality: Sharing      Speech:  normal      Thought Process/Content: Linear      Affective Functioning: Congruent      Mood: anxious      Level of consciousness:  Inattentive      Response to Learning: Resistant      Endings: None Reported    Modes of Intervention: Education, Support, Socialization, Exploration, Clarifying, and Problem-solving      Discipline Responsible: Psychoeducational Specialist      Signature:  HERB Wyatt

## 2024-10-18 NOTE — PLAN OF CARE
Problem: Risk for Elopement  Goal: Patient will not exit the unit/facility without proper excort  10/17/2024 2139 by Josué Barnes RN  Outcome: Progressing     Problem: Ange  Goal: Will exhibit normal sleep and speech and no impulsivity  Description: INTERVENTIONS:  1. Administer medication as ordered  2. Set limits on impulsive behavior  3. Make attempts to decrease external stimuli as possible  10/17/2024 2139 by Josué Barnes RN  Outcome: Progressing     Problem: Psychosis  Goal: Will report no hallucinations or delusions  Description: INTERVENTIONS:  1. Administer medication as  ordered  2. Assist with reality testing to support increasing orientation  3. Assess if patient's hallucinations or delusions are encouraging self harm or harm to others and intervene as appropriate  10/17/2024 2139 by Josué Barnes RN  Outcome: Progressing     Problem: Anxiety  Goal: Will report anxiety at manageable levels  Description: INTERVENTIONS:  1. Administer medication as ordered  2. Teach and rehearse alternative coping skills  3. Provide emotional support with 1:1 interaction with staff  10/17/2024 2139 by Josué Barnes RN  Outcome: Progressing     Problem: Involuntary Admit  Goal: Will cooperate with staff recommendations and doctor's orders and will demonstrate appropriate behavior  Description: INTERVENTIONS:  1. Treat underlying conditions and offer medication as ordered  2. Educate regarding involuntary admission procedures and rules  3. Contain excessive/inappropriate behavior per unit and hospital policies  10/17/2024 2139 by Josué Barnes RN  Outcome: Progressing     Problem: Pain  Goal: Verbalizes/displays adequate comfort level or baseline comfort level  10/17/2024 2139 by Josué Barnes RN  Outcome: Progressing

## 2024-10-18 NOTE — PLAN OF CARE
Problem: Risk for Elopement  Goal: Patient will not exit the unit/facility without proper excort  Outcome: Progressing     Problem: Ange  Goal: Will exhibit normal sleep and speech and no impulsivity  Description: INTERVENTIONS:  1. Administer medication as ordered  2. Set limits on impulsive behavior  3. Make attempts to decrease external stimuli as possible  Outcome: Progressing     Problem: Psychosis  Goal: Will report no hallucinations or delusions  Description: INTERVENTIONS:  1. Administer medication as  ordered  2. Assist with reality testing to support increasing orientation  3. Assess if patient's hallucinations or delusions are encouraging self harm or harm to others and intervene as appropriate  Outcome: Progressing     Problem: Involuntary Admit  Goal: Will cooperate with staff recommendations and doctor's orders and will demonstrate appropriate behavior  Description: INTERVENTIONS:  1. Treat underlying conditions and offer medication as ordered  2. Educate regarding involuntary admission procedures and rules  3. Contain excessive/inappropriate behavior per unit and hospital policies  Outcome: Progressing     Problem: Pain  Goal: Verbalizes/displays adequate comfort level or baseline comfort level  Outcome: Progressing     Problem: Anxiety  Goal: Will report anxiety at manageable levels  Description: INTERVENTIONS:  1. Administer medication as ordered  2. Teach and rehearse alternative coping skills  3. Provide emotional support with 1:1 interaction with staff  Outcome: Not Progressing    [facility] [place] [facility]  Day 3 Interdisciplinary Treatment Plan NOTE    Review Date & Time: 10/18/24 1000    Patient was in treatment team    Estimated Length of Stay Update:  1 WEEK  Estimated Discharge Date Update:  WED.    EDUCATION:   Learner Progress Toward Treatment Goals: Reviewed results and recommendations of this team    Method: Small group    Outcome: Needs reinforcement    PATIENT GOALS: \"FOLLOW UP

## 2024-10-18 NOTE — CONSULTS
[facility] Hospitalist Group   Consult for Medical Management      Reason for Consult:  Nausea/Vomiting     History of Present Illness: This is a 20-year-old male with past medical history of bipolar disorder, depression, GERD, and hypertension who is currently admitted to the behavioral health unit.  Hospitalist group has been consulted for recurrent nausea and vomiting.  Patient seen on  unit.  He is alert and in no distress.  Pt seems anxious. Currently denies any nausea or vomiting. He denies any abdominal pain.  He is moving his bowels.  Patient states he was able to eat breakfast this morning without difficulty.    Informant(s) for H&P:Patient    REVIEW OF SYSTEMS:  A comprehensive review of systems was negative except for: what is in the HPI      PMH:  Past Medical History:   Diagnosis Date    Bipolar 1 disorder ([facility])     Depression     GERD (gastroesophageal reflux disease)     Hypertension     Severe manic bipolar 1 disorder with psychotic behavior ([facility]) 10/28/2017    Suicide attempt ([facility]) 12/2019    jumped off bridge, per grandmother not currently suicidal       Surgical History:  Past Surgical History:   Procedure Laterality Date    APPLICATION MULTIPLANE UNILATERAL EXTERNAL FIXATION SYSTEM Left 12/5/2019    LEFT PELVIS OPEN REDUCTION INTERNAL FIXATION performed by Baudilio Jennings DO at [facility]    HAND SURGERY Left 12/19/2019    LEFT HAND I & D, CARPAL TUNNEL WITH OPEN REDUCTION INTERNAL FIXATION SCAPHOID AND LUNATE performed by Fco Loo MD at [facility]    HAND SURGERY Left 3/4/2020    LEFT WRIST PIN REMOVAL performed by Fco Loo MD at [facility] OR    HUMERUS FRACTURE SURGERY Left 12/11/2019    LEFT HUMERUS OPEN REDUCTION INTERNAL FIXATION performed by Baudilio Jennings DO at [facility]    LUMBAR SPINE SURGERY N/A 12/10/2019    T10-L1  POSTERIOR LATERAL FUSION -- GLOBUS -- NEEDS VIANEY TABLE, O-ARM performed by Yue Garnett MD at [facility]    WRIST FRACTURE SURGERY Left

## 2024-10-18 NOTE — BH NOTE
Patient in dayroom pacing making delusional statements. Patient continued to pace leon voice getting higher as he was pacing unit. Patient becoming disruptive to unit so IM Geodon and Ativan given to patient at 0024. Patient is currently resting at this time. Will continue to monitor.

## 2024-10-18 NOTE — GROUP NOTE
Group Therapy Note    Date: 10/18/2024    Group Start Time: 0930  Group End Time: 0945  Group Topic: Community Meeting    Cat Masters CTRS    Group Therapy Note    Attendees: 12    Date: 10/18/2024  Start Time: 0930  End Time:  0945  Number of Participants: 12    Type of Group: Community Meeting    Patient's Goal:  Increased awareness of functions of the unit, staffing and programming. Identified goal for the day.    Notes:  Patient was an active listener in group. Patient shared goal for the day as, \"Follow up with my NP, Lalo Denny.\"    Status After Intervention:  Improved    Participation Level: Active Listener and Interactive    Participation Quality: Appropriate, Attentive, and Sharing      Speech:  normal      Thought Process/Content: Logical  Linear      Affective Functioning: Congruent      Mood:  Appropriate      Level of consciousness:  Alert and Attentive      Response to Learning: Able to verbalize current knowledge/experience, Able to verbalize/acknowledge new learning, Able to retain information, Capable of insight, Able to change behavior, and Progressing to goal      Endings: None Reported    Modes of Intervention: Education, Support, Socialization, Exploration, Clarifying, and Problem-solving      Discipline Responsible: Psychoeducational Specialist      Signature:  HERB Wyatt

## 2024-10-19 LAB
LITHIUM DATE LAST DOSE: NORMAL
LITHIUM DOSE AMOUNT: NORMAL
LITHIUM DOSE TIME: NORMAL
LITHIUM LEVEL: 0.5 MMOL/L (ref 0.5–1.5)

## 2024-10-19 PROCEDURE — 1240000000 HC EMOTIONAL WELLNESS R&B

## 2024-10-19 PROCEDURE — 80178 ASSAY OF LITHIUM: CPT

## 2024-10-19 PROCEDURE — 36415 COLL VENOUS BLD VENIPUNCTURE: CPT

## 2024-10-19 PROCEDURE — 99232 SBSQ HOSP IP/OBS MODERATE 35: CPT | Performed by: NURSE PRACTITIONER

## 2024-10-19 PROCEDURE — 6370000000 HC RX 637 (ALT 250 FOR IP): Performed by: NURSE PRACTITIONER

## 2024-10-19 RX ORDER — FLUPHENAZINE HYDROCHLORIDE 5 MG/1
10 TABLET ORAL 2 TIMES DAILY
Status: DISCONTINUED | OUTPATIENT
Start: 2024-10-19 | End: 2024-10-23 | Stop reason: HOSPADM

## 2024-10-19 RX ADMIN — GABAPENTIN 300 MG: 300 CAPSULE ORAL at 20:27

## 2024-10-19 RX ADMIN — PROPRANOLOL HYDROCHLORIDE 40 MG: 20 TABLET ORAL at 20:52

## 2024-10-19 RX ADMIN — LITHIUM CARBONATE 450 MG: 150 CAPSULE, GELATIN COATED ORAL at 10:34

## 2024-10-19 RX ADMIN — LITHIUM CARBONATE 450 MG: 150 CAPSULE, GELATIN COATED ORAL at 16:44

## 2024-10-19 RX ADMIN — GABAPENTIN 300 MG: 300 CAPSULE ORAL at 10:34

## 2024-10-19 RX ADMIN — FLUPHENAZINE HYDROCHLORIDE 10 MG: 5 TABLET ORAL at 20:28

## 2024-10-19 RX ADMIN — BENZTROPINE MESYLATE 1 MG: 1 TABLET ORAL at 20:27

## 2024-10-19 RX ADMIN — PROPRANOLOL HYDROCHLORIDE 40 MG: 20 TABLET ORAL at 10:34

## 2024-10-19 RX ADMIN — BENZTROPINE MESYLATE 1 MG: 1 TABLET ORAL at 10:34

## 2024-10-19 RX ADMIN — Medication 5 MG: at 20:28

## 2024-10-19 RX ADMIN — FLUPHENAZINE HYDROCHLORIDE 10 MG: 5 TABLET ORAL at 10:33

## 2024-10-19 ASSESSMENT — PAIN SCALES - GENERAL
PAINLEVEL_OUTOF10: 0
PAINLEVEL_OUTOF10: 0

## 2024-10-19 NOTE — GROUP NOTE
Group Therapy Note    Date: 10/19/2024    Group Start Time: 0945  Group End Time: 1015  Group Topic: Psychoeducation    Cat Masters CTRS    Group Therapy Note    Attendees: 17    Date: 10/19/2024  Start Time: 0945  End Time:  1015  Number of Participants: 17    Type of Group: Psychoeducation    Name:  Sleep Hygiene    Patient's Goal:  Increased awareness of how sleep affects mental health, stages of sleep, how to improve sleep habits.    Notes:  CTRS led educational group discussion on sleep hygiene. Encouraged patients to share their experiences. Patient arrived at group late and left group before it concluded. Patient did not add to group discussion.    Status After Intervention:  Unchanged    Participation Level: None    Participation Quality: Resistant      Speech:  None      Thought Process/Content: None observed      Affective Functioning: Blunted      Mood:  Flat      Level of consciousness:  Inattentive      Response to Learning: Resistant      Endings: None Reported    Modes of Intervention: Education, Support, Socialization, Exploration, Clarifying, and Problem-solving      Discipline Responsible: Psychoeducational Specialist      Signature:  HERB Wyatt

## 2024-10-19 NOTE — PLAN OF CARE
Problem: Risk for Elopement  Goal: Patient will not exit the unit/facility without proper excort  10/18/2024 2131 by Isaura Govea RN  Outcome: Progressing  10/18/2024 1327 by Halima Harris RN  Outcome: Progressing     Problem: Ange  Goal: Will exhibit normal sleep and speech and no impulsivity  Description: INTERVENTIONS:  1. Administer medication as ordered  2. Set limits on impulsive behavior  3. Make attempts to decrease external stimuli as possible  10/18/2024 2131 by Isaura Govea RN  Outcome: Progressing  10/18/2024 1327 by Halima Harris RN  Outcome: Progressing     Problem: Psychosis  Goal: Will report no hallucinations or delusions  Description: INTERVENTIONS:  1. Administer medication as  ordered  2. Assist with reality testing to support increasing orientation  3. Assess if patient's hallucinations or delusions are encouraging self harm or harm to others and intervene as appropriate  10/18/2024 2131 by Isaura Govea RN  Outcome: Progressing  10/18/2024 1327 by Halima Harris RN  Outcome: Progressing     Problem: Anxiety  Goal: Will report anxiety at manageable levels  Description: INTERVENTIONS:  1. Administer medication as ordered  2. Teach and rehearse alternative coping skills  3. Provide emotional support with 1:1 interaction with staff  10/18/2024 2131 by Isaura Govea RN  Outcome: Progressing  10/18/2024 1327 by Halima Harris RN  Outcome: Not Progressing     Problem: Involuntary Admit  Goal: Will cooperate with staff recommendations and doctor's orders and will demonstrate appropriate behavior  Description: INTERVENTIONS:  1. Treat underlying conditions and offer medication as ordered  2. Educate regarding involuntary admission procedures and rules  3. Contain excessive/inappropriate behavior per unit and hospital policies  10/18/2024 2131 by Isaura Govea RN  Outcome: Progressing  10/18/2024 1327 by Halima Harris RN  Outcome: Progressing     Problem: Pain  Goal: Verbalizes/displays

## 2024-10-19 NOTE — PLAN OF CARE
Problem: Risk for Elopement  Goal: Patient will not exit the unit/facility without proper excort  Outcome: Progressing     Problem: Ange  Goal: Will exhibit normal sleep and speech and no impulsivity  Description: INTERVENTIONS:  1. Administer medication as ordered  2. Set limits on impulsive behavior  3. Make attempts to decrease external stimuli as possible  Outcome: Progressing     Problem: Psychosis  Goal: Will report no hallucinations or delusions  Description: INTERVENTIONS:  1. Administer medication as  ordered  2. Assist with reality testing to support increasing orientation  3. Assess if patient's hallucinations or delusions are encouraging self harm or harm to others and intervene as appropriate  Outcome: Progressing     Problem: Anxiety  Goal: Will report anxiety at manageable levels  Description: INTERVENTIONS:  1. Administer medication as ordered  2. Teach and rehearse alternative coping skills  3. Provide emotional support with 1:1 interaction with staff  Outcome: Progressing     Problem: Involuntary Admit  Goal: Will cooperate with staff recommendations and doctor's orders and will demonstrate appropriate behavior  Description: INTERVENTIONS:  1. Treat underlying conditions and offer medication as ordered  2. Educate regarding involuntary admission procedures and rules  3. Contain excessive/inappropriate behavior per unit and hospital policies  Outcome: Progressing     Problem: Pain  Goal: Verbalizes/displays adequate comfort level or baseline comfort level  Outcome: Progressing

## 2024-10-20 PROCEDURE — 6370000000 HC RX 637 (ALT 250 FOR IP): Performed by: NURSE PRACTITIONER

## 2024-10-20 PROCEDURE — 99232 SBSQ HOSP IP/OBS MODERATE 35: CPT | Performed by: NURSE PRACTITIONER

## 2024-10-20 PROCEDURE — 1240000000 HC EMOTIONAL WELLNESS R&B

## 2024-10-20 RX ADMIN — GABAPENTIN 300 MG: 300 CAPSULE ORAL at 09:48

## 2024-10-20 RX ADMIN — BENZTROPINE MESYLATE 1 MG: 1 TABLET ORAL at 09:48

## 2024-10-20 RX ADMIN — LITHIUM CARBONATE 450 MG: 150 CAPSULE, GELATIN COATED ORAL at 17:08

## 2024-10-20 RX ADMIN — GABAPENTIN 300 MG: 300 CAPSULE ORAL at 20:55

## 2024-10-20 RX ADMIN — PROPRANOLOL HYDROCHLORIDE 40 MG: 20 TABLET ORAL at 12:17

## 2024-10-20 RX ADMIN — Medication 5 MG: at 20:55

## 2024-10-20 RX ADMIN — FLUPHENAZINE HYDROCHLORIDE 10 MG: 5 TABLET ORAL at 20:55

## 2024-10-20 RX ADMIN — PROPRANOLOL HYDROCHLORIDE 40 MG: 20 TABLET ORAL at 20:55

## 2024-10-20 RX ADMIN — BENZTROPINE MESYLATE 1 MG: 1 TABLET ORAL at 20:55

## 2024-10-20 RX ADMIN — LITHIUM CARBONATE 450 MG: 150 CAPSULE, GELATIN COATED ORAL at 09:48

## 2024-10-20 RX ADMIN — FLUPHENAZINE HYDROCHLORIDE 10 MG: 5 TABLET ORAL at 09:48

## 2024-10-20 ASSESSMENT — PAIN SCALES - GENERAL: PAINLEVEL_OUTOF10: 0

## 2024-10-20 NOTE — GROUP NOTE
Group Therapy Note    Date: 10/20/2024    Group Start Time: 0930  Group End Time: 0945  Group Topic: Community Meeting    Cat Masters CTRS    Group Therapy Note    Attendees: 15    Date: 10/20/2024  Start Time: 0930  End Time:  0945  Number of Participants: 15    Type of Group: Community Meeting    Patient's Goal:  Increased awareness of functions of the unit, staffing and programming. Identified goal for the day.    Notes:  Patient often entered and exited group. Patient shared goal for the day as, \"Stay warrior smart.\"    Status After Intervention:  Unchanged    Participation Level: Interactive    Participation Quality: Sharing      Speech:  normal      Thought Process/Content: Flight of ideas      Affective Functioning: Congruent      Mood: anxious      Level of consciousness:  Inattentive      Response to Learning: Resistant      Endings: None Reported    Modes of Intervention: Education, Support, Socialization, Exploration, Clarifying, and Problem-solving      Discipline Responsible: Psychoeducational Specialist      Signature:  HERB Wyatt

## 2024-10-20 NOTE — BH NOTE
Patient alert and oriented x4. Patient stated he is anxious about leaving eventually. Patient showered this morning. Patient denies suicidal/homicidal ideation. Patient denies hallucinations/delusions. Patient rates depression a 0/10, anxiety 5/10 and anger a 0/10

## 2024-10-20 NOTE — PLAN OF CARE
Problem: Risk for Elopement  Goal: Patient will not exit the unit/facility without proper excort  10/20/2024 1134 by Lester Fuller RN  Outcome: Progressing  10/20/2024 0540 by Eve Barillas RN  Outcome: Progressing  10/19/2024 2259 by Josué Barnes RN  Outcome: Progressing     Problem: Ange  Goal: Will exhibit normal sleep and speech and no impulsivity  Description: INTERVENTIONS:  1. Administer medication as ordered  2. Set limits on impulsive behavior  3. Make attempts to decrease external stimuli as possible  10/20/2024 1134 by Lester Fuller RN  Outcome: Progressing  10/20/2024 0540 by Eve Barillas RN  Outcome: Progressing  10/19/2024 2259 by Josué Barnes RN  Outcome: Progressing     Problem: Psychosis  Goal: Will report no hallucinations or delusions  Description: INTERVENTIONS:  1. Administer medication as  ordered  2. Assist with reality testing to support increasing orientation  3. Assess if patient's hallucinations or delusions are encouraging self harm or harm to others and intervene as appropriate  10/20/2024 0540 by Eve Barillas RN  Outcome: Progressing  10/19/2024 2259 by Josué Barnes RN  Outcome: Progressing     Problem: Anxiety  Goal: Will report anxiety at manageable levels  Description: INTERVENTIONS:  1. Administer medication as ordered  2. Teach and rehearse alternative coping skills  3. Provide emotional support with 1:1 interaction with staff  10/20/2024 1134 by Lester Fuller RN  Outcome: Progressing  10/20/2024 0540 by Eve Barillas RN  Outcome: Progressing  10/19/2024 2259 by Josué Barnes RN  Outcome: Progressing     Problem: Involuntary Admit  Goal: Will cooperate with staff recommendations and doctor's orders and will demonstrate appropriate behavior  Description: INTERVENTIONS:  1. Treat underlying conditions and offer medication as ordered  2. Educate regarding involuntary admission procedures and rules  3. Contain excessive/inappropriate behavior per unit and hospital

## 2024-10-20 NOTE — GROUP NOTE
Group Therapy Note    Date: 10/20/2024    Group Start Time: 0945  Group End Time: 1015  Group Topic: Psychoeducation    Cat Masters CTRS    Group Therapy Note    Attendees: 16    Date: 10/20/2024  Start Time: 0945  End Time:  1015  Number of Participants: 16    Type of Group: Psychoeducation    Name:  Improving Mood    Patient's Goal:  Increased awareness of what is mood, factors that affect mood, types of mood disorders and healthy ways to improve mood.    Notes:  CTRS led educational group discussion on improving mood. Encouraged patients to share their experiences. Patient often entered and exited group. Patient's responses were often negative. Patient reported feeling angry about lack of speciality rooms being opened on the unit.    Status After Intervention:  Unchanged    Participation Level: Minimal    Participation Quality: Resistant      Speech:  loud      Thought Process/Content: Flight of ideas      Affective Functioning: Congruent      Mood: anxious and irritable      Level of consciousness:  Inattentive      Response to Learning: Resistant      Endings: None Reported    Modes of Intervention: Education, Support, Socialization, Exploration, Clarifying, and Problem-solving      Discipline Responsible: Psychoeducational Specialist      Signature:  HERB Wyatt

## 2024-10-20 NOTE — GROUP NOTE
Group Therapy Note    Date: 10/20/2024    Group Start Time: 1045  Group End Time: 1140  Group Topic: Cognitive Skills    [facility]    Shayy Wilks MSW, DIONICIO    Group Therapy Note    Attendees:14    Patient's Goal:  Identifying ways to increase happiness throughout the day utilizing mindfulness, grounding and cognitive restructuring    Notes: pt was an active participant and able to identify ways to improve mood, bizarre statements at times but able to redirect    Status After Intervention:  Improved    Participation Level: Active Listener and Interactive    Participation Quality: Appropriate, Attentive, Sharing, and Supportive      Speech:  normal      Thought Process/Content: Logical      Affective Functioning: Congruent      Mood: euthymic      Level of consciousness:  Alert, Oriented x4, and Attentive      Response to Learning: Able to verbalize current knowledge/experience and Able to retain information      Endings: None Reported    Modes of Intervention: Education, Support, Socialization, Exploration, Clarifying, and Problem-solving      Discipline Responsible: /Counselor      Signature:  DARINEL Monzon LSW

## 2024-10-20 NOTE — PLAN OF CARE
Patient is medication compliant. Patient is attending group activities. Patient is eating meals. Patient denies any suicidal ideations/homicidal ideations/audio or visual hallucinations. Patient visible internally stimulated and has voiced delusions of famous people being after him. Patient has displayed odd behavior. Patient is redirectable.      Problem: Risk for Elopement  Goal: Patient will not exit the unit/facility without proper excort  10/19/2024 2259 by Josué Barnes RN  Outcome: Progressing     Problem: Ange  Goal: Will exhibit normal sleep and speech and no impulsivity  Description: INTERVENTIONS:  1. Administer medication as ordered  2. Set limits on impulsive behavior  3. Make attempts to decrease external stimuli as possible  10/19/2024 2259 by Josué Barnes RN  Outcome: Progressing     Problem: Psychosis  Goal: Will report no hallucinations or delusions  Description: INTERVENTIONS:  1. Administer medication as  ordered  2. Assist with reality testing to support increasing orientation  3. Assess if patient's hallucinations or delusions are encouraging self harm or harm to others and intervene as appropriate  10/19/2024 2259 by Josué Barnes RN  Outcome: Progressing     Problem: Anxiety  Goal: Will report anxiety at manageable levels  Description: INTERVENTIONS:  1. Administer medication as ordered  2. Teach and rehearse alternative coping skills  3. Provide emotional support with 1:1 interaction with staff  10/19/2024 2259 by Josué Barnes RN  Outcome: Progressing     Problem: Involuntary Admit  Goal: Will cooperate with staff recommendations and doctor's orders and will demonstrate appropriate behavior  Description: INTERVENTIONS:  1. Treat underlying conditions and offer medication as ordered  2. Educate regarding involuntary admission procedures and rules  3. Contain excessive/inappropriate behavior per unit and hospital policies  10/19/2024 2259 by Josué Barnes RN  Outcome: Progressing

## 2024-10-21 PROCEDURE — 99232 SBSQ HOSP IP/OBS MODERATE 35: CPT | Performed by: NURSE PRACTITIONER

## 2024-10-21 PROCEDURE — 1240000000 HC EMOTIONAL WELLNESS R&B

## 2024-10-21 PROCEDURE — 6370000000 HC RX 637 (ALT 250 FOR IP): Performed by: NURSE PRACTITIONER

## 2024-10-21 RX ORDER — LIDOCAINE 4 G/G
1 PATCH TOPICAL DAILY
Status: DISCONTINUED | OUTPATIENT
Start: 2024-10-21 | End: 2024-10-23 | Stop reason: HOSPADM

## 2024-10-21 RX ADMIN — BENZTROPINE MESYLATE 1 MG: 1 TABLET ORAL at 22:06

## 2024-10-21 RX ADMIN — FLUPHENAZINE HYDROCHLORIDE 10 MG: 5 TABLET ORAL at 22:05

## 2024-10-21 RX ADMIN — Medication 5 MG: at 22:05

## 2024-10-21 RX ADMIN — LITHIUM CARBONATE 450 MG: 150 CAPSULE, GELATIN COATED ORAL at 09:31

## 2024-10-21 RX ADMIN — FLUPHENAZINE HYDROCHLORIDE 10 MG: 5 TABLET ORAL at 09:31

## 2024-10-21 RX ADMIN — LITHIUM CARBONATE 450 MG: 150 CAPSULE, GELATIN COATED ORAL at 16:52

## 2024-10-21 RX ADMIN — GABAPENTIN 300 MG: 300 CAPSULE ORAL at 09:31

## 2024-10-21 RX ADMIN — GABAPENTIN 300 MG: 300 CAPSULE ORAL at 22:07

## 2024-10-21 RX ADMIN — BENZTROPINE MESYLATE 1 MG: 1 TABLET ORAL at 09:31

## 2024-10-21 RX ADMIN — PROPRANOLOL HYDROCHLORIDE 40 MG: 20 TABLET ORAL at 09:31

## 2024-10-21 RX ADMIN — PROPRANOLOL HYDROCHLORIDE 40 MG: 20 TABLET ORAL at 22:06

## 2024-10-21 ASSESSMENT — PAIN SCALES - GENERAL
PAINLEVEL_OUTOF10: 0

## 2024-10-21 NOTE — CARE COORDINATION
MARCO ANTONIO contacted [facility] [phone] and was advised the pt had an appointment with Lalo today. MARCO ANTONIO rescheduled this appointment for 11/13 which is the soonest appointment available. The pt is scheduled to see Lalo's nurse on 11/7 for his next injection.

## 2024-10-21 NOTE — GROUP NOTE
Shared goal for the day as to follow up with my np.                                                                       Group Therapy Note    Date: 10/21/2024    Group Start Time: 0935  Group End Time: 0950  Group Topic: Community Meeting    [facility]    Ning Williamson, HERB    Type of Group: Community Meeting      Patient's Goal:  Patient will be able to id staffing assignments, expectations of patients, and general information re: floor rules. Will be prompted to share goal for the day.     Notes:  Patient appeared to be an active listener, taking in information presented and was prompted to share goal for the day.    Status After Intervention:  Improved    Participation Level: Active Listener and Interactive    Participation Quality: Appropriate, Attentive, and Sharing      Speech:  normal      Thought Process/Content: Logical      Affective Functioning: Congruent      Mood: euthymic      Level of consciousness:  Alert and Oriented x4      Response to Learning: Able to verbalize/acknowledge new learning, Able to retain information, and Progressing to goal      Endings: None Reported    Modes of Intervention: Education, Support, and Clarifying      Discipline Responsible: Psychoeducational Specialist      Signature:  HERB Gardner

## 2024-10-21 NOTE — PLAN OF CARE
Problem: Risk for Elopement  Goal: Patient will not exit the unit/facility without proper excort  10/20/2024 2118 by Isaura Govea RN  Outcome: Progressing  10/20/2024 1134 by Lester Fuller RN  Outcome: Progressing     Problem: Ange  Goal: Will exhibit normal sleep and speech and no impulsivity  Description: INTERVENTIONS:  1. Administer medication as ordered  2. Set limits on impulsive behavior  3. Make attempts to decrease external stimuli as possible  10/20/2024 2118 by Isaura Govea RN  Outcome: Progressing  10/20/2024 1134 by Lester Fuller RN  Outcome: Progressing     Problem: Psychosis  Goal: Will report no hallucinations or delusions  Description: INTERVENTIONS:  1. Administer medication as  ordered  2. Assist with reality testing to support increasing orientation  3. Assess if patient's hallucinations or delusions are encouraging self harm or harm to others and intervene as appropriate  Outcome: Progressing     Problem: Anxiety  Goal: Will report anxiety at manageable levels  Description: INTERVENTIONS:  1. Administer medication as ordered  2. Teach and rehearse alternative coping skills  3. Provide emotional support with 1:1 interaction with staff  10/20/2024 2118 by Isaura Govea RN  Outcome: Progressing  10/20/2024 1134 by Lester Fuller RN  Outcome: Progressing     Problem: Pain  Goal: Verbalizes/displays adequate comfort level or baseline comfort level  Outcome: Progressing

## 2024-10-21 NOTE — GROUP NOTE
Group Therapy Note    Date: 10/21/2024    Group Start Time: 1400  Group End Time: 1445  Group Topic: Recreational    [facility]    Ning Williamson, CTRS    Date: 10/21/2024  Type of Group: Recreational    Wellness Binder Information  Module Name:  Hypothetical ?'s     Patient's Goal:  Pt will be able to answer questions and be supportive of others in group atmosphere.     Notes:  Pleasant and engaged in group, sharing when prompted.     Status After Intervention:  Improved    Participation Level: Active Listener and Interactive    Participation Quality: Appropriate, Attentive, and Sharing      Speech:  normal      Thought Process/Content: Logical      Affective Functioning: Congruent      Mood: euthymic      Level of consciousness:  Alert, Oriented x4, and Attentive      Response to Learning: Able to verbalize/acknowledge new learning, Able to retain information, and Progressing to goal      Endings: None Reported    Modes of Intervention: Support, Socialization, and Activity      Discipline Responsible: Psychoeducational Specialist      Signature:  Ning Williamson, CTRS

## 2024-10-22 PROCEDURE — 6370000000 HC RX 637 (ALT 250 FOR IP): Performed by: NURSE PRACTITIONER

## 2024-10-22 PROCEDURE — 6370000000 HC RX 637 (ALT 250 FOR IP): Performed by: PSYCHIATRY & NEUROLOGY

## 2024-10-22 PROCEDURE — 99232 SBSQ HOSP IP/OBS MODERATE 35: CPT | Performed by: NURSE PRACTITIONER

## 2024-10-22 PROCEDURE — 1240000000 HC EMOTIONAL WELLNESS R&B

## 2024-10-22 RX ADMIN — LITHIUM CARBONATE 450 MG: 150 CAPSULE, GELATIN COATED ORAL at 16:52

## 2024-10-22 RX ADMIN — ACETAMINOPHEN 650 MG: 325 TABLET ORAL at 17:11

## 2024-10-22 RX ADMIN — FLUPHENAZINE HYDROCHLORIDE 10 MG: 5 TABLET ORAL at 09:15

## 2024-10-22 RX ADMIN — FLUPHENAZINE HYDROCHLORIDE 10 MG: 5 TABLET ORAL at 21:26

## 2024-10-22 RX ADMIN — BENZTROPINE MESYLATE 1 MG: 1 TABLET ORAL at 21:27

## 2024-10-22 RX ADMIN — PROPRANOLOL HYDROCHLORIDE 40 MG: 20 TABLET ORAL at 21:24

## 2024-10-22 RX ADMIN — Medication 5 MG: at 21:27

## 2024-10-22 RX ADMIN — LITHIUM CARBONATE 450 MG: 150 CAPSULE, GELATIN COATED ORAL at 09:14

## 2024-10-22 RX ADMIN — GABAPENTIN 300 MG: 300 CAPSULE ORAL at 21:26

## 2024-10-22 RX ADMIN — GABAPENTIN 300 MG: 300 CAPSULE ORAL at 09:15

## 2024-10-22 RX ADMIN — BENZTROPINE MESYLATE 1 MG: 1 TABLET ORAL at 09:15

## 2024-10-22 ASSESSMENT — PAIN SCALES - GENERAL
PAINLEVEL_OUTOF10: 0
PAINLEVEL_OUTOF10: 0
PAINLEVEL_OUTOF10: 6
PAINLEVEL_OUTOF10: 0

## 2024-10-22 ASSESSMENT — PAIN DESCRIPTION - DESCRIPTORS: DESCRIPTORS: ACHING

## 2024-10-22 ASSESSMENT — PAIN DESCRIPTION - LOCATION: LOCATION: HAND

## 2024-10-22 ASSESSMENT — PAIN DESCRIPTION - ORIENTATION: ORIENTATION: LEFT

## 2024-10-22 NOTE — PLAN OF CARE
Problem: Risk for Elopement  Goal: Patient will not exit the unit/facility without proper excort  Outcome: Progressing     Problem: Ange  Goal: Will exhibit normal sleep and speech and no impulsivity  Description: INTERVENTIONS:  1. Administer medication as ordered  2. Set limits on impulsive behavior  3. Make attempts to decrease external stimuli as possible  Outcome: Progressing     Problem: Psychosis  Goal: Will report no hallucinations or delusions  Description: INTERVENTIONS:  1. Administer medication as  ordered  2. Assist with reality testing to support increasing orientation  3. Assess if patient's hallucinations or delusions are encouraging self harm or harm to others and intervene as appropriate  Outcome: Progressing     Problem: Anxiety  Goal: Will report anxiety at manageable levels  Description: INTERVENTIONS:  1. Administer medication as ordered  2. Teach and rehearse alternative coping skills  3. Provide emotional support with 1:1 interaction with staff  Outcome: Progressing     Problem: Involuntary Admit  Goal: Will cooperate with staff recommendations and doctor's orders and will demonstrate appropriate behavior  Description: INTERVENTIONS:  1. Treat underlying conditions and offer medication as ordered  2. Educate regarding involuntary admission procedures and rules  3. Contain excessive/inappropriate behavior per unit and hospital policies  Outcome: Progressing     Problem: Pain  Goal: Verbalizes/displays adequate comfort level or baseline comfort level  Outcome: Progressing    PT. HAS BEEN IN CONTROL WITH NO UNIT PROBLEMS. MEDICATION COMPLAINT AND WAS ENCOURAGED TO ATTEND GROUPS. PT. HAS BEEN USING PHONE APPROPRIATELY. APPEARANCE IS DISHEVELED.    PT. DENIED SUICIDAL IDEATIONS, HOMICIDAL IDEATIONS AND HALLUCINATIONS AND VOICED NO OVERT DELUSIONS ON SHIFT ASSESSMENT.

## 2024-10-22 NOTE — PLAN OF CARE
Problem: Risk for Elopement  Goal: Patient will not exit the unit/facility without proper excort  10/21/2024 2106 by Isaura Govea RN  Outcome: Progressing  10/21/2024 1203 by Halima Harris RN  Outcome: Progressing     Problem: Ange  Goal: Will exhibit normal sleep and speech and no impulsivity  Description: INTERVENTIONS:  1. Administer medication as ordered  2. Set limits on impulsive behavior  3. Make attempts to decrease external stimuli as possible  10/21/2024 2106 by Isaura Govea RN  Outcome: Progressing  10/21/2024 1203 by Halima Harris RN  Outcome: Progressing     Problem: Psychosis  Goal: Will report no hallucinations or delusions  Description: INTERVENTIONS:  1. Administer medication as  ordered  2. Assist with reality testing to support increasing orientation  3. Assess if patient's hallucinations or delusions are encouraging self harm or harm to others and intervene as appropriate  10/21/2024 2106 by Isaura Govea RN  Outcome: Progressing  10/21/2024 1203 by Halima Harris RN  Outcome: Progressing     Problem: Anxiety  Goal: Will report anxiety at manageable levels  Description: INTERVENTIONS:  1. Administer medication as ordered  2. Teach and rehearse alternative coping skills  3. Provide emotional support with 1:1 interaction with staff  10/21/2024 2106 by Isaura Govea RN  Outcome: Progressing  10/21/2024 1203 by Halima Harris RN  Outcome: Progressing     Problem: Involuntary Admit  Goal: Will cooperate with staff recommendations and doctor's orders and will demonstrate appropriate behavior  Description: INTERVENTIONS:  1. Treat underlying conditions and offer medication as ordered  2. Educate regarding involuntary admission procedures and rules  3. Contain excessive/inappropriate behavior per unit and hospital policies  10/21/2024 1203 by Halima Harris RN  Outcome: Progressing     Problem: Pain  Goal: Verbalizes/displays adequate comfort level or baseline comfort level  10/21/2024

## 2024-10-22 NOTE — GROUP NOTE
Group Therapy Note    Date: 10/22/2024    Group Start Time: 1040  Group End Time: 1120  Group Topic: Psychoeducation    [facility]    Lena Nguyen, MSW, LSW        Group Therapy Note    Attendees: 9       Patient's Goal:  To increase social interaction and improve relationships with others.      Notes:  Pt was anxious in group and left shortly after it started.     Status After Intervention:  Unchanged    Participation Level: Minimal    Participation Quality: Resistant      Speech:  mute      Thought Process/Content: Logical      Affective Functioning: Exaggerated      Mood: anxious      Level of consciousness:  Preoccupied and Inattentive      Response to Learning: Resistant      Endings: None Reported    Modes of Intervention: Education, Support, Socialization, Exploration, Clarifying, and Problem-solving      Discipline Responsible: /Counselor      Signature:  Lena Nguyen, MSW, LSW

## 2024-10-22 NOTE — CARE COORDINATION
Pt approached SW and asked when he is discharging. He feels that he has been here a long time and does not want to be admitted longer. SW informed pt that SW will inform treatment team that he would like to discharge, but that SW is currently unaware of a discharge date. He verbalized understanding.

## 2024-10-23 VITALS
WEIGHT: 185 LBS | HEART RATE: 67 BPM | SYSTOLIC BLOOD PRESSURE: 120 MMHG | HEIGHT: 66 IN | DIASTOLIC BLOOD PRESSURE: 68 MMHG | BODY MASS INDEX: 29.73 KG/M2 | TEMPERATURE: 97.1 F | OXYGEN SATURATION: 96 % | RESPIRATION RATE: 14 BRPM

## 2024-10-23 PROCEDURE — 99239 HOSP IP/OBS DSCHRG MGMT >30: CPT | Performed by: NURSE PRACTITIONER

## 2024-10-23 PROCEDURE — 6360000002 HC RX W HCPCS: Performed by: NURSE PRACTITIONER

## 2024-10-23 PROCEDURE — 6370000000 HC RX 637 (ALT 250 FOR IP): Performed by: NURSE PRACTITIONER

## 2024-10-23 RX ORDER — LITHIUM CARBONATE 150 MG/1
450 CAPSULE ORAL 2 TIMES DAILY WITH MEALS
Qty: 180 CAPSULE | Refills: 0 | Status: SHIPPED | OUTPATIENT
Start: 2024-10-23 | End: 2024-11-22

## 2024-10-23 RX ORDER — NICOTINE 21 MG/24HR
1 PATCH, TRANSDERMAL 24 HOURS TRANSDERMAL DAILY
COMMUNITY
Start: 2024-10-24 | End: 2024-11-23

## 2024-10-23 RX ORDER — FLUPHENAZINE HYDROCHLORIDE 10 MG/1
10 TABLET ORAL 2 TIMES DAILY
Qty: 60 TABLET | Refills: 0 | Status: SHIPPED | OUTPATIENT
Start: 2024-10-23 | End: 2024-11-22

## 2024-10-23 RX ORDER — BENZTROPINE MESYLATE 1 MG/1
1 TABLET ORAL 2 TIMES DAILY
Qty: 60 TABLET | Refills: 0 | Status: SHIPPED | OUTPATIENT
Start: 2024-10-23 | End: 2024-11-22

## 2024-10-23 RX ORDER — PROPRANOLOL HYDROCHLORIDE 40 MG/1
40 TABLET ORAL 2 TIMES DAILY
Qty: 60 TABLET | Refills: 0 | Status: SHIPPED | OUTPATIENT
Start: 2024-10-23 | End: 2024-11-22

## 2024-10-23 RX ORDER — FLUPHENAZINE DECANOATE 25 MG/ML
25 INJECTION, SOLUTION INTRAMUSCULAR; SUBCUTANEOUS
Qty: 1 ML | Refills: 0 | Status: SHIPPED | OUTPATIENT
Start: 2024-10-23 | End: 2024-10-24

## 2024-10-23 RX ORDER — FLUPHENAZINE DECANOATE 25 MG/ML
25 INJECTION, SOLUTION INTRAMUSCULAR; SUBCUTANEOUS
Status: DISCONTINUED | OUTPATIENT
Start: 2024-10-23 | End: 2024-10-23 | Stop reason: HOSPADM

## 2024-10-23 RX ADMIN — BENZTROPINE MESYLATE 1 MG: 1 TABLET ORAL at 08:49

## 2024-10-23 RX ADMIN — FLUPHENAZINE DECANOATE 25 MG: 25 INJECTION, SOLUTION INTRAMUSCULAR; SUBCUTANEOUS at 14:03

## 2024-10-23 RX ADMIN — FLUPHENAZINE HYDROCHLORIDE 10 MG: 5 TABLET ORAL at 08:49

## 2024-10-23 RX ADMIN — GABAPENTIN 300 MG: 300 CAPSULE ORAL at 08:49

## 2024-10-23 RX ADMIN — LITHIUM CARBONATE 450 MG: 150 CAPSULE, GELATIN COATED ORAL at 08:49

## 2024-10-23 RX ADMIN — PROPRANOLOL HYDROCHLORIDE 40 MG: 20 TABLET ORAL at 08:48

## 2024-10-23 ASSESSMENT — PAIN SCALES - GENERAL
PAINLEVEL_OUTOF10: 0
PAINLEVEL_OUTOF10: 0

## 2024-10-23 NOTE — CARE COORDINATION
Pt was seen during treatment team. He is discharge focused and is hopeful to discharge home with his wife soon. He states that he last spoke with his grandmother this morning. Pt denied SI/HI/AVH.     SW contacted pt's grandmother/legal guardian Alicia Chovious [phone] to gain updated collateral. No answer, MARCO ANTONIO left voicemail.

## 2024-10-23 NOTE — CARE COORDINATION
MARCO ANTONIO contacted pt's grandmother/legal guardian Alicia Chovious [phone] and informed her of pt discharge for today. She states no concerns for this.     MARCO ANTONIO informed RN that pt grandmother will be transporting him home today.     In order to ensure appropriate transition and discharge planning is in place, the following documents have been transmitted to [facility], as the new outpatient provider:    The d/c diagnosis was transmitted to the next care provider  The reason for hospitalization was transmitted to the next care provider  The d/c medications (dosage and indication) were transmitted to the next care provider   The continuing care plan was transmitted to the next care provider

## 2024-10-23 NOTE — DISCHARGE SUMMARY
DISCHARGE SUMMARY      Patient ID:  Lalo Valderrama  [id number]  29 y.o.  1995    Admit date: 10/15/2024    Discharge date and time: 10/23/2024    Admitting Physician: Yon Perez MD     Discharge Physician: Dr Ana BRYSON    Discharge Diagnoses:   Patient Active Problem List   Diagnosis    Acute psychosis (HCC)    Schizoaffective disorder, bipolar type ([facility])    Bipolar 1 disorder, manic, moderate (HCC)    Severe manic bipolar 1 disorder with psychotic behavior ([facility])    Anxiety state    Cannabis use disorder, severe, dependence ([facility])    Bipolar 1 disorder ([facility])    Bipolar affective disorder, current episode mixed, without psychotic features ([facility])    Drug overdose    Seizure ([facility])    Schizoaffective disorder ([facility])    Tobacco abuse    Marijuana abuse    Trauma    Injury resulting from fall from height    Closed displaced fracture of pelvis ([facility])    Shock following injury ([facility])    Pneumothorax, traumatic    Suicidal behavior with attempted self-injury ([facility])    Respiratory failure following trauma    T12 burst fracture ([facility])    Closed fracture of shaft of left humerus    Transscaphoid perilunate fracture dislocation of left wrist, open, initial encounter    Displaced fracture of left radial styloid process, initial encounter for closed fracture    Open comminuted fracture of waist of scaphoid bone of left wrist, initial encounter    Depression with suicidal ideation    Schizoaffective disorder, bipolar type ([facility])    Bipolar 1 disorder ([facility])    Multiple trauma    Post-operative pain    Closed fracture of nasal bones    Cluster B personality disorder ([facility])       Admission Condition: poor    Discharged Condition: stable    Admission Circumstance:   Patient name: Lalo Valderrama  Patient's past mental health and addiction history: Schizoaffective disorder and cluster B personality disorder  Patient's presentation to the ED and why the patient needs admission: presented to the ED brought in by

## 2024-10-23 NOTE — BH NOTE
Patient approached this nurse, feeling anxiety, not knowing if he is discharged or not. Patient was easily talked to therapeutically by this staff, patient able to keep control and is now more calm, awaiting news on possible discharge. No PRN's needed, patient tone of voice and affect appropriate, eating well. Friendly and polite with this staff.

## 2024-10-23 NOTE — PLAN OF CARE
Problem: Risk for Elopement  Goal: Patient will not exit the unit/facility without proper excort  10/22/2024 2218 by Kiesha Singh RN  Outcome: Progressing     Problem: Ange  Goal: Will exhibit normal sleep and speech and no impulsivity  Description: INTERVENTIONS:  1. Administer medication as ordered  2. Set limits on impulsive behavior  3. Make attempts to decrease external stimuli as possible  10/22/2024 2218 by Kiesha Singh, RN  Outcome: Progressing     Problem: Psychosis  Goal: Will report no hallucinations or delusions  Description: INTERVENTIONS:  1. Administer medication as  ordered  2. Assist with reality testing to support increasing orientation  3. Assess if patient's hallucinations or delusions are encouraging self harm or harm to others and intervene as appropriate  10/22/2024 2218 by Kiesha Singh, RN  Outcome: Progressing     Problem: Anxiety  Goal: Will report anxiety at manageable levels  Description: INTERVENTIONS:  1. Administer medication as ordered  2. Teach and rehearse alternative coping skills  3. Provide emotional support with 1:1 interaction with staff  10/22/2024 2218 by Kiesha Singh, RN  Outcome: Progressing     Problem: Involuntary Admit  Goal: Will cooperate with staff recommendations and doctor's orders and will demonstrate appropriate behavior  Description: INTERVENTIONS:  1. Treat underlying conditions and offer medication as ordered  2. Educate regarding involuntary admission procedures and rules  3. Contain excessive/inappropriate behavior per unit and hospital policies  10/22/2024 2218 by Kiesha Singh, RN  Outcome: Progressing     Problem: Pain  Goal: Verbalizes/displays adequate comfort level or baseline comfort level  10/22/2024 2218 by Kiesha Singh, RN  Outcome: Progressing    Pt states no suicidal, homicidal, hallucinations. Pt has been cooperative, taking his medications. Pt is not attending groups.

## 2024-10-23 NOTE — CARE COORDINATION
MARCO ANTONIO contacted pt's grandmother/legal guardian Alicia Chovious [phone] to gain updated collateral. She states that pt is doing better and she states that she would feel comfortable with pt discharge today if this is what doctor decides. She is aware that we are not sure of discharge date at this time, but that we will keep her updated. She feels that he would be ready to come home \"if he behaves\". She will transport.

## 2024-10-23 NOTE — GROUP NOTE
Shared goal for the day as to contemplate having a cigarette.                                                                       Group Therapy Note    Date: 10/23/2024    Group Start Time: 0935  Group End Time: 0955  Group Topic: Community Meeting    [facility]    Ning Williamson, HERB    Type of Group: Community Meeting      Patient's Goal:  Patient will be able to id staffing assignments, expectations of patients, and general information re: floor rules. Will be prompted to share goal for the day.     Notes:  Patient appeared to be an active listener, taking in information presented and was prompted to share goal for the day.    Status After Intervention:  Improved    Participation Level: Active Listener and Interactive    Participation Quality: Appropriate, Attentive, and Sharing      Speech:  normal      Thought Process/Content: Logical      Affective Functioning: Congruent      Mood: euthymic      Level of consciousness:  Alert, Oriented x4, and Attentive      Response to Learning: Able to verbalize/acknowledge new learning, Able to retain information, and Progressing to goal      Endings: None Reported    Modes of Intervention: Education, Support, Socialization, and Problem-solving      Discipline Responsible: Psychoeducational Specialist      Signature:  DC GardnerS

## 2024-10-24 NOTE — PROGRESS NOTES
HOSPITALIST PROGRESS NOTES     ADMITTING DATE AND TIME: 10/15/2024 11:35 AM  had concerns including Psychiatric Evaluation (PT'S GRANDMOTHER WAS TRANSPORTING PT TO ED WHEN PT JUMPED OUT OF THE CAR ON [facility] AND RAN INTO TRAFFIC PT ESCORTED BY [facility] OFFICERS TO SECTION G PT RAMBLING AND RELIGIOUSLY PREOCCUPIED. DENIES SI/ HI/ DENIES HALLUCINATIONS.  PT REPORTS HIS GRANDMOTHER GAVE ME BUNCHES OF PILLS THEN SENT ME IN.).    ADMIT DX: Schizoaffective disorder, bipolar type (HCC) [F25.0]  Psychosis, unspecified psychosis type (HCC) [F29]      SUBJECTIVE:  Patient is being followed for Schizoaffective disorder, bipolar type (HCC) [F25.0]  Psychosis, unspecified psychosis type (HCC) [F29]     Patient was seen examined and evaluated  Recent lab results, charts and pertinent diagnostic imaging reviewed   Ancillary service notes reviewed   NAD    Care reviewed with nursing staff, medical and surgical specialty care, primary care and the patient's family as available.   ROS: denies fever, chills, cp, sob, n/v, HA unless stated above.      fluPHENAZine HCl  10 mg Oral BID    influenza virus vaccine  0.5 mL IntraMUSCular Prior to discharge    nicotine  1 patch TransDERmal Daily    benztropine  1 mg Oral BID    lithium  450 mg Oral BID WC    gabapentin  300 mg Oral BID    melatonin  5 mg Oral Nightly    propranolol  40 mg Oral BID     prochlorperazine, 10 mg, Q6H PRN  acetaminophen, 650 mg, Q4H PRN  polyethylene glycol, 17 g, Daily PRN  LORazepam, 2 mg, Q6H PRN  ziprasidone (GEODON) 10 mg in sterile water 0.5 mL injection, 10 mg, Q12H PRN         OBJECTIVE:    /62   Pulse 65   Temp 98 °F (36.7 °C) (Oral)   Resp 16   Ht 1.676 m (5' 6\")   Wt 83.9 kg (185 lb)   SpO2 99%   BMI 29.86 kg/m²     General Appearance: alert and oriented to person, place and time and in no acute distress  Skin: warm 
 CLINICAL PHARMACY NOTE: MEDS TO BEDS    Total # of Prescriptions Filled: 2   The following medications were delivered to the patient:  Lithium carbonate 150  Propanolol 40    Additional Documentation:  Pts mom picked up in pharmacy   
BEHAVIORAL HEALTH FOLLOW-UP NOTE     10/17/2024     Patient was seen and examined in person, Chart reviewed   Patient's case discussed with staff/team    Chief Complaint: Manic behavior and agitation    Interim History:   Patient seen this morning upon the unit.  He states he \"wants a 3-month shot and then to discharge.\"  He was later heard out the unit using racially offensive language per staff he was delusional about proper little Joel raping his girlfriend he reported IM injections due to agitation and threatening behavior     Appetite: [x] Normal/Unchanged  [] Increased  [] Decreased      Sleep:       [x] Normal/Unchanged  [] Fair       [] Poor              Energy:    [x] Normal/Unchanged  [] Increased  [] Decreased        SI [] Present  [x] Absent    HI  []Present  [x] Absent     Aggression:  [] yes  [x] no    Patient is [x] able  [] unable to CONTRACT FOR SAFETY     PAST MEDICAL/PSYCHIATRIC HISTORY:   Past Medical History:   Diagnosis Date    Bipolar 1 disorder ([facility])     Depression     GERD (gastroesophageal reflux disease)     Hypertension     Severe manic bipolar 1 disorder with psychotic behavior ([facility]) 10/28/2017    Suicide attempt ([facility]) 12/2019    jumped off bridge, per grandmother not currently suicidal       FAMILY/SOCIAL HISTORY:  No family history on file.  Social History     Socioeconomic History    Marital status: Single     Spouse name: Not on file    Number of children: Not on file    Years of education: Not on file    Highest education level: Not on file   Occupational History     Employer: NONE   Tobacco Use    Smoking status: Some Days     Types: Cigarettes    Smokeless tobacco: Never   Vaping Use    Vaping status: Some Days    Substances: Never   Substance and Sexual Activity    Alcohol use: Not Currently    Drug use: Yes     Types: Marijuana (Weed)    Sexual activity: Not Currently   Other Topics Concern    Not on file   Social History Narrative    ** Merged History Encounter **      
BEHAVIORAL HEALTH FOLLOW-UP NOTE     10/18/2024     Patient was seen and examined in person, Chart reviewed   Patient's case discussed with staff/team    Chief Complaint: Manic behavior and agitation    Interim History:   Patient seen today up on the unit.  He is impulsive easily agitated he is yelling out.  He becomes easily agitated misinterprets he denies suicidal homicidal ideations intent or plan he has been asking for Geodon injections he said that Geodon \"helps me.\"  He wants \"my shot so I can be discharged.\"  He states he wants a new guardian states he does not want a guardian.  He is impulsive misinterprets guarded paranoid      Appetite: [x] Normal/Unchanged  [] Increased  [] Decreased      Sleep:       [x] Normal/Unchanged  [] Fair       [] Poor              Energy:    [x] Normal/Unchanged  [] Increased  [] Decreased        SI [] Present  [x] Absent    HI  []Present  [x] Absent     Aggression:  [] yes  [x] no    Patient is [x] able  [] unable to CONTRACT FOR SAFETY     PAST MEDICAL/PSYCHIATRIC HISTORY:   Past Medical History:   Diagnosis Date    Bipolar 1 disorder ([facility])     Depression     GERD (gastroesophageal reflux disease)     Hypertension     Severe manic bipolar 1 disorder with psychotic behavior ([facility]) 10/28/2017    Suicide attempt ([facility]) 12/2019    jumped off bridge, per grandmother not currently suicidal       FAMILY/SOCIAL HISTORY:  No family history on file.  Social History     Socioeconomic History    Marital status: Single     Spouse name: Not on file    Number of children: Not on file    Years of education: Not on file    Highest education level: Not on file   Occupational History     Employer: NONE   Tobacco Use    Smoking status: Some Days     Types: Cigarettes    Smokeless tobacco: Never   Vaping Use    Vaping status: Some Days    Substances: Never   Substance and Sexual Activity    Alcohol use: Not Currently    Drug use: Yes     Types: Marijuana (Weed)    Sexual activity: Not Currently 
BEHAVIORAL HEALTH FOLLOW-UP NOTE     10/19/2024     Patient was seen and examined in person, Chart reviewed   Patient's case discussed with staff/team    Chief Complaint: Manic behavior and agitation    Interim History:   Patient seen up on the unit this morning.  He is awake he had blood drawn this morning.  He is irritable rapid pressured speech intrusive.  He is hyperverbal rapid pressured misinterpreting.  He talks at wanting his job and wants to be discharged.  He believes the medication is cancer.  He states he wants a shot and wants to \"get home to my wife.\"  Poor insight and judgment irritable easily agitated disorganized thoughts misinterpreting        Appetite: [x] Normal/Unchanged  [] Increased  [] Decreased      Sleep:       [x] Normal/Unchanged  [] Fair       [] Poor              Energy:    [x] Normal/Unchanged  [] Increased  [] Decreased        SI [] Present  [x] Absent    HI  []Present  [x] Absent     Aggression:  [] yes  [x] no    Patient is [x] able  [] unable to CONTRACT FOR SAFETY     PAST MEDICAL/PSYCHIATRIC HISTORY:   Past Medical History:   Diagnosis Date    Bipolar 1 disorder ([facility])     Depression     GERD (gastroesophageal reflux disease)     Hypertension     Severe manic bipolar 1 disorder with psychotic behavior ([facility]) 10/28/2017    Suicide attempt ([facility]) 12/2019    jumped off bridge, per grandmother not currently suicidal       FAMILY/SOCIAL HISTORY:  No family history on file.  Social History     Socioeconomic History    Marital status: Single     Spouse name: Not on file    Number of children: Not on file    Years of education: Not on file    Highest education level: Not on file   Occupational History     Employer: NONE   Tobacco Use    Smoking status: Some Days     Types: Cigarettes    Smokeless tobacco: Never   Vaping Use    Vaping status: Some Days    Substances: Never   Substance and Sexual Activity    Alcohol use: Not Currently    Drug use: Yes     Types: Marijuana (Weed)    Sexual 
Behavioral Health [place]  Discharge Note    Pt discharged with followings belongings:   Dental Appliances: None  Vision - Corrective Lenses: None  Hearing Aid: None  Jewelry: None  Body Piercings Removed: N/A  Clothing: Pants, Shirt, Socks, Undergarments (3 shirts, 2 blue sweat pants, 1 uw, and 2 pr socks)  Other Valuables: Sent home   Valuables sent home with patient from [facility], along with safety plan, filled prescriptions and copy of AVS. Patient and pt.'s grandma were educated on aftercare instructions:  completed, reviewed.  Information faxed to [facility] by  . .Patient's guardian/grandmother verbalized understanding of AVS:  yes with stated potential to comply to same.    Status EXAM upon discharge:  Mental Status and Behavioral Exam    Level of Assistance: Independent/Self  Facial Expression: Avoids Gaze  Affect: Blunted  Level of Consciousness: Alert  Frequency of Checks: 4 times per hour, close  Mood: Anxious, Labile  Motor Activity: Increased, Repetitive acts  Eye Contact: Fair  Observed Behavior: Hypermobile, Preoccupied, Impulsive  Sexual Misconduct History: Current - no  Preception: [place] to person, [place] to time, [place] to place, [place] to situation  Attention: Distractible  Thought Processes: clear  Thought Content:  Preoccupations  Depression Symptoms: Increased irritability, Impaired concentration  Anxiety Symptoms: Generalized  Ange Symptoms: impaired judgment, Increased energy, Labile  Hallucinations: None  Delusions: No  Delusions: Other (comment) (none)  Memory: impaired  Insight and Judgment: impaired    Tobacco Screening:  Practical Counseling, on admission, henry X, if applicable and completed (first 3 are required if patient doesn't refuse):            ( ) Recognizing danger situations (included triggers and roadblocks)                    ( ) Coping skills (new ways to manage stress,relaxation techniques, changing routine, distraction)                                        
Behavioral Health Transition Record    Patient Name: Lalo Valderrama  YOB: 1995   Medical Record Number: [id number]  Date of Admission: 10/15/2024 11:35 AM   Date of Discharge: 10/23/24    Attending Provider: Yon Camacho MD   Discharging Provider:  SCOTT CAMACHO MD  To contact this individual call  and ask the  to page.  If unavailable, ask to be transferred to Behavioral Health Provider on call.  A Behavioral Health Provider will be available on call 24/7 and during holidays.    Primary Care Provider: Montserrat Matias MD    Allergies   Allergen Reactions    Depakote [Divalproex Sodium]     Depakote [Valproic Acid] Other (See Comments)     Feels bloated     Haldol [Haloperidol]      Stroke like symptoms per grandmother    Haldol [Haloperidol] Other (See Comments)     Intolerable side efffects    Invega Sustenna [Paliperidone Palmitate Er] Hives    Invega [Paliperidone Er]     Invega [Paliperidone] Hives    Latuda [Lurasidone Hcl]        Reason for Admission:   CIRCUMSTANCES OF ADMISSION:      Patient name: Lalo Valderrama  Patient's past mental health and addiction history: Schizoaffective disorder and cluster B personality disorder  Patient's presentation to the ED and why the patient needs admission: presented to the ED brought in by his grandmother while on the way patient jumped out of the car was running into traffic required being escorted to the ED by [employer] police officers found to be rambling religiously preoccupied requiring IM injections.  Legal status:  []  Voluntary  [x]  Involuntary  []  Probate  Triggering/precipitating events: Unknown  Duration of triggering/precipitating events: Unknown       Admission Diagnosis: Schizoaffective disorder, bipolar type (HCC) [F25.0]  Psychosis, unspecified psychosis type (HCC) [F29]    * No surgery found *    Results for orders placed or performed during the hospital encounter of 10/15/24   CBC with Auto 
CLINICAL PHARMACY NOTE: MEDS TO BEDS    Total # of Prescriptions Filled: 2   The following medications were delivered to the patient:  Benztropine 1 mg  Fluphenazine 10 mg    Additional Documentation:   Pt with mom picked up in pharmacy  
In bed, eyes closed post injections of Geodon and Ativan at 1100 for yelling racial slurs in community area, kicking wall and yelling on phone, slamming phone down repeatedly. \"[facility]".  
LOWER PROFILE AFTER LUNCH.  
PT. HAS BEEN UP ON UNIT TO EAT AND USE PHONE. PT. HAS BEEN IN CONTROL WITH NO OUTBURSTS OR UNIT PROBLEMS. MOOD IS ANXIOUS, BUT HAS BEEN PLEASANT WITH STAFF. PT. DENIED SUICIDAL IDEATIONS, HOMICIDAL IDEATIONS AND HALLUCINATIONS AND VOICED NO OVERT DELUSIONS ON SHIFT ASSESSMENT.  
PT.'S GRANDMA/GUARDIAN DUNIA FOUNTAIN, NOTIFIED VIA PHONE OF ADMINISTRATION OF I.M. MEDICATIONS FOR AGITATION.  
PT.'S GRANDMOTHER, DUNIA FOUNTAIN, GAVE VERBAL PERMISSION TO ADMINISTER PROLIXIN DECANOATE AND A FLU SHOT. SHE WAS NOTIFIED OF A POTENTIAL DISCHARGE LATER TODAY AFTER SAME IF APPROVED BT THE DOCTOR AND SHE REPORTED NO CONCERNS ABOUT THIS PLAN.  
Patient declined invitation to the following groups:    Community Meeting    Patient will continue to be provided with opportunities to enhance leisure skills/interests and/or coping mechanisms.  
Patient declined invitation to the following groups:    Community Meeting  Education  Recreation Activity    Patient will continue to be provided with opportunities to enhance leisure skills/interests and/or coping mechanisms.  
Patient declined invitation to the following groups:    Smoking Cessation    Patient will continue to be provided with opportunities to enhance leisure skills/interests and/or coping mechanisms.  
Patient declined to attend the following groups:    Peer Recovery     Will continue to encourage patient to attend programming.     
Patient declined to attend the following groups:    Spiritual Care     Will continue to encourage patient to attend programming.     
Patient denies suicidal ideation, homicidal ideations and AVH.  Patient rates anxiety 6 and depression 4 out of 10 due to \"being here.\"  Patient appears exaggerated, anxious, labile, paranoid, suspicious with rapid/pressure speech and fair eye contact.  Patient appears guarded, hypermobile, impulsive and preoccupied.  Patient presents tangential with flights of ideas and multiple delusions statements regarding medications and having a hit out on him.  Patient states he \"jumped off bridge because of those shots, Depakote and Invega.\"  Patient upset Rx: Ativan was discontinued stating that is the only thing that helps with pain, tried to educate patient on medications but did not appear to be receptive of information.  Patient walked away from the nurses station and came back a little bit later asking this nurse \"can you quote me on this?  Stop the judgement, start the healing, I want my fucking Ativan.\"  Patient did calm down and apologized to this nurse.  Presents calm and cooperative during assessment.  Patient is out on the unit and is social with select peers.  Medications taken without issue.  No complaints or concerns verbalized at this time.  No unit problems reported.  Will continue to observe and support.          
Patient denies suicidal ideation, homicidal ideations and AVH.  Patient reports he is feeling \"sleepy.\"  Patient denies depression but rates anxiety 3 out of 10 due to \"being here.\"  Patient appears flat, anxious, irritable, labile with blunt responses and fair eye contact. Patient appears guarded, paranoid, preoccupied and withdrawn.  Presents calm and cooperative during assessment.  Patient is out on the unit intermittently and is social with select peers.  Medications taken without issue.  No complaints or concerns verbalized at this time.  No unit problems reported.  Will continue to observe and support.          
Patient denies suicidal ideation, homicidal ideations and AVH.  Patient states he is \"fine\" when asked how he is feeling.  Patient denies depression but rates anxiety a 4 out of 10 due to \"just want to get out of here.\"  Patient appears dismissive, irritable, guarded with blunt responses and fair eye contact.  Presents calm and cooperative during assessment.  Patient is out on the unit intermittently but does not appear to be  social with peers.  Medications taken without issue.  No complaints or concerns verbalized at this time.  No unit problems reported.  Will continue to observe and support.          
Patient is new admission to [facility]. Patient arrived to the unit floridly psychotic, yelling out nonsensical. Reports he is now  and living with his wife in an apartment in [place] [facility] [place]. He states they are expecting a child. He is disorganized. Unable to carry out a full thought. Flight of ideas. Irritable but willing to accept IM injections. He denies SI/HI/AVH.     Consent for treatment and medications obtained from patient's guardian.  
Patient is resting quietly in bed with eyes closed at this time.  No signs of distress or discomfort noted.  No PRN medications given thus far.  Safety needs met.  No unit problems reported.  Purposeful rounding continued.    
Pt asking specifically for ativan in a \"shot.\" Pt also stated \"Get me some Vicodin while you are at it.\" Encouraged pt to use coping skills. Pt irritable and dismissive seeking specific medications. In control thus far. Will continue to monitor.   
RX. FOR PROLIXIN DEC. FAXED TO [facility] IN [place].  
Unable to complete assessment at this time, pt is sleeping since arriving up on unit. Will try again once pt is awake.   
VOLUNTARY CONSENT OBTAINED FROM GUARDIAN/GRANDMA DUNIA FOUNTAIN VIA PHONE.    PT. GIVEN I.M. ATIVAN AFTER LOUD YELLING ON PHONE REPEATEDLY, SLAMMING PHONE DOWN WHEN REDIRECTED. PACING AND YELLING ABOUT THE UNIT, \"WERNER ARTEAGA HAS A 2 MILLION DOLLAR HIT OUT ON ME!\"    PT. DENIED SUICIDAL IDEATIONS, HOMICIDAL IDEATIONS AND HALLUCINATIONS ON SHIFT ASSESSMENT.  
History Narrative    ** Merged History Encounter **          Social Determinants of Health     Financial Resource Strain: Not on file   Food Insecurity: No Food Insecurity (10/16/2024)    Hunger Vital Sign     Worried About Running Out of Food in the Last Year: Never true     Ran Out of Food in the Last Year: Never true   Transportation Needs: No Transportation Needs (10/16/2024)    PRAPARE - Transportation     Lack of Transportation (Medical): No     Lack of Transportation (Non-Medical): No   Physical Activity: Not on file   Stress: Not on file   Social Connections: Not on file   Intimate Partner Violence: Not on file   Housing Stability: Low Risk  (10/16/2024)    Housing Stability Vital Sign     Unable to Pay for Housing in the Last Year: No     Number of Times Moved in the Last Year: 1     Homeless in the Last Year: No           ROS:  [x] All negative/unchanged except if checked. Explain positive(checked items) below:  [] Constitutional  [] Eyes  [] Ear/Nose/Mouth/Throat  [] Respiratory  [] CV  [] GI  []   [] Musculoskeletal  [] Skin/Breast  [] Neurological  [] Endocrine  [] Heme/Lymph  [] Allergic/Immunologic    Explanation:     MEDICATIONS:    Current Facility-Administered Medications:     fluPHENAZine HCl (PROLIXIN) tablet 10 mg, 10 mg, Oral, BID, Nely Esparza APRN - CNP, 10 mg at 10/19/24 2028    influenza tiss-cult vaccine (FLUCELVAX) injection 0.5 mL, 0.5 mL, IntraMUSCular, Prior to discharge, Yon Perez MD    nicotine (NICODERM CQ) 21 MG/24HR 1 patch, 1 patch, TransDERmal, Daily, Nely Esparza APRN - CNP, 1 patch at 10/19/24 1101    prochlorperazine (COMPAZINE) injection 10 mg, 10 mg, IntraMUSCular, Q6H PRN, Juan Ramon Rivera APRN - CNP    acetaminophen (TYLENOL) tablet 650 mg, 650 mg, Oral, Q4H PRN, Yon Perez MD    polyethylene glycol (GLYCOLAX) packet 17 g, 17 g, Oral, Daily PRN, Yon Perez MD    [DISCONTINUED] LORazepam (ATIVAN) tablet 1 mg, 1 mg, Oral, Q4H PRN **OR** 
prochlorperazine (COMPAZINE) injection 10 mg, 10 mg, IntraMUSCular, Q6H PRN, Juan Ramon Rivera, APRN - CNP    acetaminophen (TYLENOL) tablet 650 mg, 650 mg, Oral, Q4H PRN, Yon Perez MD    polyethylene glycol (GLYCOLAX) packet 17 g, 17 g, Oral, Daily PRN, Yon Perez MD    [DISCONTINUED] LORazepam (ATIVAN) tablet 1 mg, 1 mg, Oral, Q4H PRN **OR** LORazepam (ATIVAN) injection 2 mg, 2 mg, IntraMUSCular, Q6H PRN, Yon Perez MD, 2 mg at 10/18/24 1025    benztropine (COGENTIN) tablet 1 mg, 1 mg, Oral, BID, Dellick, Nely B, APRN - CNP, 1 mg at 10/22/24 0915    lithium capsule 450 mg, 450 mg, Oral, BID WC, Dellick, Nely B, APRN - CNP, 450 mg at 10/22/24 0914    gabapentin (NEURONTIN) capsule 300 mg, 300 mg, Oral, BID, Dellick, Nely B, APRN - CNP, 300 mg at 10/22/24 0915    melatonin disintegrating tablet 5 mg, 5 mg, Oral, Nightly, Dellick, Nely B, APRN - CNP, 5 mg at 10/21/24 2205    propranolol (INDERAL) tablet 40 mg, 40 mg, Oral, BID, Dellick, Nely B, APRN - CNP, 40 mg at 10/21/24 2206    ziprasidone (GEODON) 10 mg in sterile water 0.5 mL injection, 10 mg, IntraMUSCular, Q12H PRN, Dellick, Nely B, APRN - CNP, 10 mg at 10/18/24 0025      Examination:  BP 98/67   Pulse 60   Temp 98.1 °F (36.7 °C) (Oral)   Resp 16   Ht 1.676 m (5' 6\")   Wt 83.9 kg (185 lb)   SpO2 99%   BMI 29.86 kg/m²   Gait - steady  Medication side effects(SE):     Mental Status Examination:    Level of consciousness:  within normal limits   Appearance:  fair grooming and fair hygiene  Behavior/Motor:  no abnormalities noted  Attitude toward examiner:  cooperative  Speech: Normal rate rhythm and tone   mood: \" I am feeling better\"  Affect: Appropriate   thought processes: More linear  Thought content: Denies auditory or visual hallucinations to some again delusional statements denies suicide homicidal ideations intent or plan  Language: able to name objects and repeate phrases  Remote Memory: intact  Recent Memory: 
10/21/2024 at 9:52 AM

## 2024-10-25 LAB
EKG ATRIAL RATE: 105 BPM
EKG P AXIS: 62 DEGREES
EKG P-R INTERVAL: 138 MS
EKG Q-T INTERVAL: 348 MS
EKG QRS DURATION: 98 MS
EKG QTC CALCULATION (BAZETT): 459 MS
EKG R AXIS: -57 DEGREES
EKG T AXIS: 42 DEGREES
EKG VENTRICULAR RATE: 105 BPM

## 2024-11-09 ENCOUNTER — HOSPITAL ENCOUNTER (EMERGENCY)
Age: 29
Discharge: LEFT BEFORE DISCHARGE | DRG: 761 | End: 2024-11-09
Payer: MEDICAID

## 2024-11-09 ENCOUNTER — HOSPITAL ENCOUNTER (EMERGENCY)
Age: 29
Discharge: LEFT BEFORE DISCHARGE | End: 2024-11-09

## 2024-11-09 VITALS
DIASTOLIC BLOOD PRESSURE: 93 MMHG | OXYGEN SATURATION: 99 % | RESPIRATION RATE: 16 BRPM | TEMPERATURE: 98 F | WEIGHT: 200 LBS | BODY MASS INDEX: 28 KG/M2 | HEIGHT: 71 IN | SYSTOLIC BLOOD PRESSURE: 149 MMHG | HEART RATE: 85 BPM

## 2024-11-09 VITALS
TEMPERATURE: 97.9 F | DIASTOLIC BLOOD PRESSURE: 86 MMHG | HEART RATE: 93 BPM | SYSTOLIC BLOOD PRESSURE: 136 MMHG | RESPIRATION RATE: 16 BRPM | OXYGEN SATURATION: 97 %

## 2024-11-09 DIAGNOSIS — S80.12XA CONTUSION OF LEFT LOWER EXTREMITY, INITIAL ENCOUNTER: Primary | ICD-10-CM

## 2024-11-09 PROCEDURE — 6360000002 HC RX W HCPCS: Performed by: NURSE PRACTITIONER

## 2024-11-09 PROCEDURE — 99284 EMERGENCY DEPT VISIT MOD MDM: CPT

## 2024-11-09 PROCEDURE — 96372 THER/PROPH/DIAG INJ SC/IM: CPT

## 2024-11-09 RX ORDER — KETOROLAC TROMETHAMINE 30 MG/ML
30 INJECTION, SOLUTION INTRAMUSCULAR; INTRAVENOUS ONCE
Status: COMPLETED | OUTPATIENT
Start: 2024-11-09 | End: 2024-11-09

## 2024-11-09 RX ADMIN — KETOROLAC TROMETHAMINE 30 MG: 30 INJECTION, SOLUTION INTRAMUSCULAR at 12:16

## 2024-11-09 ASSESSMENT — PAIN SCALES - GENERAL
PAINLEVEL_OUTOF10: 3
PAINLEVEL_OUTOF10: 8

## 2024-11-09 ASSESSMENT — PAIN - FUNCTIONAL ASSESSMENT: PAIN_FUNCTIONAL_ASSESSMENT: 0-10

## 2024-11-09 ASSESSMENT — LIFESTYLE VARIABLES
HOW MANY STANDARD DRINKS CONTAINING ALCOHOL DO YOU HAVE ON A TYPICAL DAY: 3 OR 4
HOW OFTEN DO YOU HAVE A DRINK CONTAINING ALCOHOL: 2-3 TIMES A WEEK

## 2024-11-09 ASSESSMENT — PAIN DESCRIPTION - DESCRIPTORS
DESCRIPTORS: ACHING;NAGGING;THROBBING
DESCRIPTORS: DISCOMFORT

## 2024-11-09 ASSESSMENT — PAIN DESCRIPTION - ORIENTATION: ORIENTATION: LEFT

## 2024-11-09 ASSESSMENT — PAIN DESCRIPTION - LOCATION
LOCATION: HIP
LOCATION: BACK;HIP;PELVIS

## 2024-11-09 NOTE — ED NOTES
Amanda RN witnessed the patient and girlfriend walk out. Patient stated he was leaving    Additional Notes: Patient consent was obtained to proceed with the visit and recommended plan of care after discussion of all risks and benefits, including the risks of COVID-19 exposure. Detail Level: Simple Additional Notes: ED&C 2 spots on the forehead ...no charge Additional Notes: Consulted patient on scheduling an excision

## 2024-11-09 NOTE — ED PROVIDER NOTES
Independent MICHELLE Visit.        [facility]  Department of Emergency Medicine   ED  Encounter Note  Admit Date/RoomTime: 2024 11:26 AM  ED Room: [place] [facility]/    NAME: Lalo Valderrama  : 1995  MRN: [id number]     Chief Complaint:  Fall (Patient states he fell down th steps yesterday. C/o left leg pain) and Psychiatric Evaluation (Pt sitting in [place] and smelled chair, pt stated,\"Did people die here, it smells like they did.\"  Also pt says he tried to extract air from girlfriend to help her, gf in section G.)    History of Present Illness       Lalo Valderrama is a 29 y.o. old male who presents to the emergency department with complaints of pain to his left leg and hip following a fall down some stairs.  Patient does have a history of schizoaffective disorder, bipolar disorder, anxiety, apparently in triage he was behaving strangely for the nurses, apparently he was sitting in the rash chair and smelled the chair stating did people die here as well as like they did he also tried to state that they are trying to extract air from his girlfriend, apparently his girlfriend is over the psychiatric department in the ED.  The patient himself at this time admits that he sometimes behaves strangely due to his schizoaffective disorder but is denying any suicidal or homicidal ideation, states that he has been managing his medications well at home and does not feel that he has any threats to himself or others.  ROS   Pertinent positives and negatives are stated within HPI, all other systems reviewed and are negative.    Past Medical History:  has a past medical history of Bipolar 1 disorder (HCC), Depression, GERD (gastroesophageal reflux disease), Hypertension, Severe manic bipolar 1 disorder with psychotic behavior (HCC), and Suicide attempt (HCC).    Surgical History:  has a past surgical history that includes Wrist fracture surgery (Left, 2019); APPLICATION MULTIPLANE  visible rash, unless noted elsewhere.  Lymphatic: no lymphadenopathy noted  Neurological:  Oriented x3, GCS 15.  Motor functions intact.     Lab / Imaging Results   (All laboratory and radiology results have been personally reviewed by myself)  Labs:  No results found for this visit on 11/09/24.    Imaging:  All Radiology results interpreted by Radiologist unless otherwise noted.  XR HIP 2-3 VW W PELVIS RIGHT    (Results Pending)   XR HIP 2-3 VW W PELVIS LEFT    (Results Pending)     ED Course / Medical Decision Making     Medications   ketorolac (TORADOL) injection 30 mg (30 mg IntraMUSCular Given 11/9/24 1216)        R  Consult(s):   None    Procedure(s):  None    MDM:   Briefly this is a 29-year-old male patient with significant psychiatric overlay who is presenting with pain to his left hip and upper thigh following a mechanical fall.  There was nursing concern for a possible psychiatric evaluation as patient was behaving strangely in the triage area.  On my evaluation patient is awake, alert, oriented, he is able to answer questions appropriately, he is denying any suicidal or homicidal ideation, states that he is feeling in his usual state of health.  He does have some mild ecchymosis noted to the lateral aspect of the left hip with normal range of motion, no shortening or internal/external rotation.  He is able to ambulate.  No evidence of compartment syndrome.  I offered patient psychiatric evaluation, patient states that he is stable on his current medications and is feeling in his usual state of health and declines psychiatric evaluation.  I ordered x-rays of both hips as he is complaining of bilateral hip pain however patient stated that he was going to leave to nursing staff.  I attempted to reevaluate patient but he eloped from the emergency department.    Plan of Care/Counseling:  MICH Graf CNP reviewed today's visit with the patient in addition to providing specific details for the plan

## 2024-11-12 ENCOUNTER — HOSPITAL ENCOUNTER (INPATIENT)
Age: 29
LOS: 15 days | Discharge: HOME | DRG: 761 | End: 2024-11-27
Attending: STUDENT IN AN ORGANIZED HEALTH CARE EDUCATION/TRAINING PROGRAM | Admitting: PSYCHIATRY & NEUROLOGY
Payer: MEDICAID

## 2024-11-12 DIAGNOSIS — F29 PSYCHOSIS, UNSPECIFIED PSYCHOSIS TYPE (HCC): ICD-10-CM

## 2024-11-12 DIAGNOSIS — F25.0 SCHIZOAFFECTIVE DISORDER, BIPOLAR TYPE (HCC): Primary | ICD-10-CM

## 2024-11-12 LAB
ALBUMIN SERPL-MCNC: 4.3 G/DL (ref 3.5–5.2)
ALP SERPL-CCNC: 170 U/L (ref 40–129)
ALT SERPL-CCNC: 27 U/L (ref 0–40)
AMPHET UR QL SCN: NEGATIVE
ANION GAP SERPL CALCULATED.3IONS-SCNC: 10 MMOL/L (ref 7–16)
APAP SERPL-MCNC: <5 UG/ML (ref 10–30)
AST SERPL-CCNC: 24 U/L (ref 0–39)
BARBITURATES UR QL SCN: NEGATIVE
BASOPHILS # BLD: 0.07 K/UL (ref 0–0.2)
BASOPHILS NFR BLD: 1 % (ref 0–2)
BENZODIAZ UR QL: NEGATIVE
BILIRUB SERPL-MCNC: 0.4 MG/DL (ref 0–1.2)
BUN SERPL-MCNC: 10 MG/DL (ref 6–20)
BUPRENORPHINE UR QL: NEGATIVE
CALCIUM SERPL-MCNC: 9.5 MG/DL (ref 8.6–10.2)
CANNABINOIDS UR QL SCN: POSITIVE
CHLORIDE SERPL-SCNC: 100 MMOL/L (ref 98–107)
CK SERPL-CCNC: 407 U/L (ref 20–200)
CO2 SERPL-SCNC: 26 MMOL/L (ref 22–29)
COCAINE UR QL SCN: NEGATIVE
CREAT SERPL-MCNC: 0.7 MG/DL (ref 0.7–1.2)
EOSINOPHIL # BLD: 0.67 K/UL (ref 0.05–0.5)
EOSINOPHILS RELATIVE PERCENT: 6 % (ref 0–6)
ERYTHROCYTE [DISTWIDTH] IN BLOOD BY AUTOMATED COUNT: 14.2 % (ref 11.5–15)
ETHANOLAMINE SERPL-MCNC: <10 MG/DL (ref 0–0.08)
FENTANYL UR QL: NEGATIVE
GFR, ESTIMATED: >90 ML/MIN/1.73M2
GLUCOSE SERPL-MCNC: 102 MG/DL (ref 74–99)
HCT VFR BLD AUTO: 46.6 % (ref 37–54)
HGB BLD-MCNC: 15.6 G/DL (ref 12.5–16.5)
IMM GRANULOCYTES # BLD AUTO: 0.03 K/UL (ref 0–0.58)
IMM GRANULOCYTES NFR BLD: 0 % (ref 0–5)
LITHIUM DATE LAST DOSE: ABNORMAL
LITHIUM DOSE AMOUNT: ABNORMAL
LITHIUM DOSE TIME: ABNORMAL
LITHIUM LEVEL: 0.4 MMOL/L (ref 0.5–1.5)
LYMPHOCYTES NFR BLD: 2.35 K/UL (ref 1.5–4)
LYMPHOCYTES RELATIVE PERCENT: 23 % (ref 20–42)
MCH RBC QN AUTO: 30.2 PG (ref 26–35)
MCHC RBC AUTO-ENTMCNC: 33.5 G/DL (ref 32–34.5)
MCV RBC AUTO: 90.3 FL (ref 80–99.9)
METHADONE UR QL: NEGATIVE
MONOCYTES NFR BLD: 0.86 K/UL (ref 0.1–0.95)
MONOCYTES NFR BLD: 8 % (ref 2–12)
NEUTROPHILS NFR BLD: 62 % (ref 43–80)
NEUTS SEG NFR BLD: 6.47 K/UL (ref 1.8–7.3)
OPIATES UR QL SCN: NEGATIVE
OXYCODONE UR QL SCN: NEGATIVE
PCP UR QL SCN: NEGATIVE
PLATELET # BLD AUTO: 280 K/UL (ref 130–450)
PMV BLD AUTO: 10.4 FL (ref 7–12)
POTASSIUM SERPL-SCNC: 3.7 MMOL/L (ref 3.5–5)
PROT SERPL-MCNC: 7.5 G/DL (ref 6.4–8.3)
RBC # BLD AUTO: 5.16 M/UL (ref 3.8–5.8)
SALICYLATES SERPL-MCNC: <0.3 MG/DL (ref 0–30)
SODIUM SERPL-SCNC: 136 MMOL/L (ref 132–146)
TEST INFORMATION: ABNORMAL
TOXIC TRICYCLIC SC,BLOOD: NEGATIVE
WBC OTHER # BLD: 10.5 K/UL (ref 4.5–11.5)

## 2024-11-12 PROCEDURE — 6360000002 HC RX W HCPCS

## 2024-11-12 PROCEDURE — 6360000002 HC RX W HCPCS: Performed by: PHYSICIAN ASSISTANT

## 2024-11-12 PROCEDURE — 80143 DRUG ASSAY ACETAMINOPHEN: CPT

## 2024-11-12 PROCEDURE — 80053 COMPREHEN METABOLIC PANEL: CPT

## 2024-11-12 PROCEDURE — 96372 THER/PROPH/DIAG INJ SC/IM: CPT

## 2024-11-12 PROCEDURE — 90791 PSYCH DIAGNOSTIC EVALUATION: CPT | Performed by: SOCIAL WORKER

## 2024-11-12 PROCEDURE — 6370000000 HC RX 637 (ALT 250 FOR IP): Performed by: PSYCHIATRY & NEUROLOGY

## 2024-11-12 PROCEDURE — 1240000000 HC EMOTIONAL WELLNESS R&B

## 2024-11-12 PROCEDURE — 80307 DRUG TEST PRSMV CHEM ANLYZR: CPT

## 2024-11-12 PROCEDURE — 82550 ASSAY OF CK (CPK): CPT

## 2024-11-12 PROCEDURE — 99285 EMERGENCY DEPT VISIT HI MDM: CPT

## 2024-11-12 PROCEDURE — 6360000002 HC RX W HCPCS: Performed by: NURSE PRACTITIONER

## 2024-11-12 PROCEDURE — 85025 COMPLETE CBC W/AUTO DIFF WBC: CPT

## 2024-11-12 PROCEDURE — G0480 DRUG TEST DEF 1-7 CLASSES: HCPCS

## 2024-11-12 PROCEDURE — 80178 ASSAY OF LITHIUM: CPT

## 2024-11-12 PROCEDURE — 80179 DRUG ASSAY SALICYLATE: CPT

## 2024-11-12 RX ORDER — BENZTROPINE MESYLATE 1 MG/1
1 TABLET ORAL ONCE
Status: COMPLETED | OUTPATIENT
Start: 2024-11-12 | End: 2024-11-12

## 2024-11-12 RX ORDER — FLUPHENAZINE HYDROCHLORIDE 5 MG/1
10 TABLET ORAL 2 TIMES DAILY
Status: DISCONTINUED | OUTPATIENT
Start: 2024-11-12 | End: 2024-11-27

## 2024-11-12 RX ORDER — HYDROXYZINE HYDROCHLORIDE 50 MG/1
50 TABLET, FILM COATED ORAL 3 TIMES DAILY PRN
Status: DISCONTINUED | OUTPATIENT
Start: 2024-11-12 | End: 2024-11-27 | Stop reason: HOSPADM

## 2024-11-12 RX ORDER — LORAZEPAM 2 MG/ML
INJECTION INTRAMUSCULAR
Status: COMPLETED
Start: 2024-11-12 | End: 2024-11-12

## 2024-11-12 RX ORDER — NICOTINE 21 MG/24HR
1 PATCH, TRANSDERMAL 24 HOURS TRANSDERMAL DAILY
Status: DISCONTINUED | OUTPATIENT
Start: 2024-11-12 | End: 2024-11-27 | Stop reason: HOSPADM

## 2024-11-12 RX ORDER — MAGNESIUM HYDROXIDE/ALUMINUM HYDROXICE/SIMETHICONE 120; 1200; 1200 MG/30ML; MG/30ML; MG/30ML
30 SUSPENSION ORAL PRN
Status: DISCONTINUED | OUTPATIENT
Start: 2024-11-12 | End: 2024-11-27 | Stop reason: HOSPADM

## 2024-11-12 RX ORDER — LORAZEPAM 1 MG/1
2 TABLET ORAL ONCE
Status: COMPLETED | OUTPATIENT
Start: 2024-11-12 | End: 2024-11-12

## 2024-11-12 RX ORDER — LORAZEPAM 2 MG/ML
2 INJECTION INTRAMUSCULAR EVERY 6 HOURS PRN
Status: DISCONTINUED | OUTPATIENT
Start: 2024-11-12 | End: 2024-11-27 | Stop reason: HOSPADM

## 2024-11-12 RX ORDER — ACETAMINOPHEN 325 MG/1
650 TABLET ORAL EVERY 6 HOURS PRN
Status: DISCONTINUED | OUTPATIENT
Start: 2024-11-12 | End: 2024-11-27 | Stop reason: HOSPADM

## 2024-11-12 RX ORDER — DIPHENHYDRAMINE HYDROCHLORIDE 50 MG/ML
INJECTION INTRAMUSCULAR; INTRAVENOUS
Status: COMPLETED
Start: 2024-11-12 | End: 2024-11-12

## 2024-11-12 RX ORDER — DIPHENHYDRAMINE HYDROCHLORIDE 50 MG/ML
25 INJECTION INTRAMUSCULAR; INTRAVENOUS ONCE
Status: COMPLETED | OUTPATIENT
Start: 2024-11-12 | End: 2024-11-12

## 2024-11-12 RX ORDER — DROPERIDOL 2.5 MG/ML
5 INJECTION, SOLUTION INTRAMUSCULAR; INTRAVENOUS ONCE
Status: COMPLETED | OUTPATIENT
Start: 2024-11-12 | End: 2024-11-12

## 2024-11-12 RX ORDER — DIPHENHYDRAMINE HYDROCHLORIDE 50 MG/ML
50 INJECTION INTRAMUSCULAR; INTRAVENOUS EVERY 6 HOURS PRN
Status: DISCONTINUED | OUTPATIENT
Start: 2024-11-12 | End: 2024-11-27 | Stop reason: HOSPADM

## 2024-11-12 RX ADMIN — DIPHENHYDRAMINE HYDROCHLORIDE 50 MG: 50 INJECTION, SOLUTION INTRAMUSCULAR; INTRAVENOUS at 17:14

## 2024-11-12 RX ADMIN — LITHIUM CARBONATE 450 MG: 300 CAPSULE ORAL at 20:44

## 2024-11-12 RX ADMIN — DIPHENHYDRAMINE HYDROCHLORIDE 25 MG: 50 INJECTION INTRAMUSCULAR; INTRAVENOUS at 12:36

## 2024-11-12 RX ADMIN — LORAZEPAM 2 MG: 1 TABLET ORAL at 21:19

## 2024-11-12 RX ADMIN — DROPERIDOL 5 MG: 2.5 INJECTION, SOLUTION INTRAMUSCULAR; INTRAVENOUS at 12:34

## 2024-11-12 RX ADMIN — LORAZEPAM 2 MG: 2 INJECTION INTRAMUSCULAR; INTRAVENOUS at 23:21

## 2024-11-12 RX ADMIN — FLUPHENAZINE HYDROCHLORIDE 10 MG: 5 TABLET ORAL at 20:45

## 2024-11-12 RX ADMIN — BENZTROPINE MESYLATE 1 MG: 1 TABLET ORAL at 21:19

## 2024-11-12 RX ADMIN — DIPHENHYDRAMINE HYDROCHLORIDE 50 MG: 50 INJECTION, SOLUTION INTRAMUSCULAR; INTRAVENOUS at 23:22

## 2024-11-12 RX ADMIN — LORAZEPAM 2 MG: 2 INJECTION INTRAMUSCULAR; INTRAVENOUS at 17:16

## 2024-11-12 ASSESSMENT — SLEEP AND FATIGUE QUESTIONNAIRES
DO YOU HAVE DIFFICULTY SLEEPING: NO
SLEEP PATTERN: NORMAL
AVERAGE NUMBER OF SLEEP HOURS: 5
DO YOU USE A SLEEP AID: YES

## 2024-11-12 ASSESSMENT — LIFESTYLE VARIABLES
HOW OFTEN DO YOU HAVE A DRINK CONTAINING ALCOHOL: PATIENT UNABLE TO ANSWER
HOW MANY STANDARD DRINKS CONTAINING ALCOHOL DO YOU HAVE ON A TYPICAL DAY: PATIENT UNABLE TO ANSWER
HOW OFTEN DO YOU HAVE A DRINK CONTAINING ALCOHOL: PATIENT UNABLE TO ANSWER
HOW MANY STANDARD DRINKS CONTAINING ALCOHOL DO YOU HAVE ON A TYPICAL DAY: PATIENT UNABLE TO ANSWER

## 2024-11-12 NOTE — ED PROVIDER NOTES
(gastroesophageal reflux disease)     Hypertension     Severe manic bipolar 1 disorder with psychotic behavior (HCC) 10/28/2017    Suicide attempt (HCC) 12/2019    jumped off bridge, per grandmother not currently suicidal     ED COURSE:     ED Course as of 11/12/24 1440   Tue Nov 12, 2024   1247 Patient was evaluated by me.  Patient was significantly agitated.  Patient did require medication for both the safety of patient and staff.  Patient was to find people.  Patient was aggressive towards me. [JM]   1251 Discussed case with telepsych who recommends psychiatric admission.  I agree with this. [JM]   1255 EKG read interpreted by me.  Rate 79 bpm.  Normal axis.  Normal.  No ST elevation pression.  Poor R wave progression.  Incomplete right bundle.  Prior EKG on October 15, 2024 resolution of sinus tachycardia. [JM]      ED Course User Index  [JM] Rick Lim MD     Any labs and imaging were reviewed by myself.     Consultations:  IP CONSULT TO TELE-PSYCH (SOCIAL WORK ONLY)  - Recommend psychiatric admission     Discussion with Other Profesionals : None    COUNSELING:   I have spoken with the patient/caregiver and discussed today’s results, in addition to providing specific details for the plan of care and counseling regarding the diagnosis and prognosis and are agreeable with the plan. All results reviewed with pt and all questions answered.  I discussed the differential, results and discharge plan with the patient and/or family/friend/caregiver if present.  I emphasized the importance of follow-up with the physician I referred them to in the timeframe recommended.  I explained reasons for the patient to return to the Emergency Department. Additional verbal discharge instructions were also given and discussed with the patient to supplement those generated by the EMR. We also discussed medications that were prescribed (if any) including common side effects and interactions. All questions were addressed.  They
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
## 2024-11-12 NOTE — ED NOTES
Call placed and to Alicia Chovious Guardian to update on patient status.  Message left awaiting a return phone call.

## 2024-11-12 NOTE — ED NOTES
Nurse to nurse report given to Quin CASTILLO on [address] which includes patient presentation complaint, pink slip, medications, lab results EKG Qtc, and past medical/psych history and admitting orders.

## 2024-11-12 NOTE — VIRTUAL HEALTH
2 times daily.    ProviderKo MD   lithium 300 MG capsule Take 450 mg by mouth 2 times daily (with meals)    ProviderKo MD        Labs:  UDS: not available  ETOH: not available        Mental Status Exam:  Level of consciousness:  awake   Appearance:  ill-appearing.  Does appear stated age. No acute distress.  Behavior/Motor:  agitated, combative, and hyperactive  Attitude toward examiner:  evasive  SI/HI: Reported thoughts to hurt others   Speech:  rapid , Tone: normal tone  Mood: anxious and irritable  Affect: mood congruent  Thought Processes:  linear.   Thought Content: Delusions:  paranoid and mood incongruent  Hallucinations:  Hallucinations: +Visual Hallucinations  Cognition:  disoriented   Concentration: poor  Memory: impaired immediate recall, though not formally tested.  Insight: poor   Judgement: poor   Fund of Knowledge: adequate    Overall Level Suicide Risk: TelePsych CSSRS Risk Level: Low Risk  CSSR-S Screening: Patient reported no Sucidal ideation     Brief ClinicalSummary:   Patient is a 29 y.o.  male who presents for psychosis.    Per chart review patient with disorganized thought process in triage. Threatened to hit staff, Patient with flight of ideas, pressured speech     Writer talked to the patient, patient repoted that he is feeling not well. Then started to speak illogical, and started staying things that were  gibberish.  Patient covered his face ears and mouth. Patient reported that he is feeling violent, then started screaming and speaking using words that were not in order to make an understanded sentence. Patient  made multiple delusional statements \" I have a therapist geeta who lives in [facility]\". patient not able to come out of the disordered thinking to talk to the writer clearly. Patient was clearly able to articulate that he is having thoughts of harming others and \" feeling violent\".       Collateral: Alicia Valderrama,  [phone] Grandmother

## 2024-11-12 NOTE — ED NOTES
Attempted to call nurse to nurse report but they are in report and will have to call this writer back.

## 2024-11-12 NOTE — BH NOTE
4 Eyes Skin Assessment     NAME:  Lalo Valderrama  YOB: 1995  MEDICAL RECORD NUMBER:  [id number]    The patient is being assessed for  Admission    I agree that at least one RN has performed a thorough Head to Toe Skin Assessment on the patient. ALL assessment sites listed below have been assessed.      Areas assessed by both nurses:    Head, Face, Ears, Shoulders, Back, Chest, Arms, Elbows, Hands, Sacrum. Buttock, Coccyx, Ischium, and Legs. Feet and Heels        Does the Patient have a Wound? No noted wound(s)       James Prevention initiated by RN: No  Wound Care Orders initiated by RN: No    Pressure Injury (Stage 3,4, Unstageable, DTI, NWPT, and Complex wounds) if present, place Wound referral order by RN under : No    New Ostomies, if present place, Ostomy referral order under : No     Nurse 1 eSignature: Electronically signed by Lester Fuller RN on 11/12/24 at 5:50 PM EST    **SHARE this note so that the co-signing nurse can place an eSignature**    Nurse 2 eSignature: Electronically signed by Nirmala Hodges RN on 11/12/24 at 5:51 PM EST

## 2024-11-12 NOTE — ED TRIAGE NOTES
Patient with word salad and disorganized thought process in triage.  Threatened to hit this writer then later denies during triage.  Patient with flight of ideas, pressured speech.  
no

## 2024-11-13 PROBLEM — F31.2 SEVERE MANIC BIPOLAR 1 DISORDER WITH PSYCHOTIC BEHAVIOR (HCC): Status: RESOLVED | Noted: 2017-10-28 | Resolved: 2024-11-13

## 2024-11-13 PROCEDURE — 1240000000 HC EMOTIONAL WELLNESS R&B

## 2024-11-13 PROCEDURE — 6360000002 HC RX W HCPCS: Performed by: NURSE PRACTITIONER

## 2024-11-13 PROCEDURE — 90792 PSYCH DIAG EVAL W/MED SRVCS: CPT | Performed by: NURSE PRACTITIONER

## 2024-11-13 PROCEDURE — 6370000000 HC RX 637 (ALT 250 FOR IP): Performed by: NURSE PRACTITIONER

## 2024-11-13 PROCEDURE — 6370000000 HC RX 637 (ALT 250 FOR IP): Performed by: PSYCHIATRY & NEUROLOGY

## 2024-11-13 RX ORDER — LORAZEPAM 1 MG/1
1 TABLET ORAL 2 TIMES DAILY
Status: DISCONTINUED | OUTPATIENT
Start: 2024-11-13 | End: 2024-11-20

## 2024-11-13 RX ADMIN — HYDROXYZINE HYDROCHLORIDE 50 MG: 50 TABLET, FILM COATED ORAL at 09:17

## 2024-11-13 RX ADMIN — Medication 3 MG: at 21:17

## 2024-11-13 RX ADMIN — LITHIUM CARBONATE 450 MG: 300 CAPSULE ORAL at 09:17

## 2024-11-13 RX ADMIN — HYDROXYZINE HYDROCHLORIDE 50 MG: 50 TABLET, FILM COATED ORAL at 17:13

## 2024-11-13 RX ADMIN — LORAZEPAM 1 MG: 1 TABLET ORAL at 21:15

## 2024-11-13 RX ADMIN — FLUPHENAZINE HYDROCHLORIDE 10 MG: 5 TABLET ORAL at 09:18

## 2024-11-13 RX ADMIN — DIPHENHYDRAMINE HYDROCHLORIDE 50 MG: 50 INJECTION, SOLUTION INTRAMUSCULAR; INTRAVENOUS at 09:17

## 2024-11-13 RX ADMIN — LORAZEPAM 2 MG: 2 INJECTION INTRAMUSCULAR; INTRAVENOUS at 09:18

## 2024-11-13 RX ADMIN — LITHIUM CARBONATE 450 MG: 300 CAPSULE ORAL at 21:15

## 2024-11-13 RX ADMIN — FLUPHENAZINE HYDROCHLORIDE 10 MG: 5 TABLET ORAL at 21:15

## 2024-11-13 NOTE — BH NOTE
Patient observed to be resting with eyes closed at this time. Breathing is equal and unlabored. No signs of distress. Will continue to monitor.

## 2024-11-13 NOTE — GROUP NOTE
Group Therapy Note    Date: 11/13/2024    Group Start Time: 0955  Group End Time: 1035  Group Topic: Psychoeducation    [facility]    Ning Williamson, CTRS    Date: 11/13/2024  Type of Group: Psychoeducation      Module Name:  Color Therapy     Patient's Goal:  Pt will be able to identify how colors can effect ones mood, and be used to decrease increased mood symptoms.     Notes:  Pleasant and engaged in group. Sharing with others and completed worksheet.     Status After Intervention:  Improved    Participation Level: Active Listener and Interactive    Participation Quality: Appropriate and Attentive      Speech:  normal      Thought Process/Content: Logical      Affective Functioning: Congruent      Mood: euthymic      Level of consciousness:  Alert, Oriented x4, and Attentive      Response to Learning: Able to verbalize/acknowledge new learning, Able to retain information, and Progressing to goal      Endings: None Reported    Modes of Intervention: Education, Support, Socialization, and Problem-solving      Discipline Responsible: Psychoeducational Specialist      Signature:  Ning Williamson, CTRS

## 2024-11-13 NOTE — H&P
out patient providers  Will consult the hospitalist for a physical exam to rule out any co-morbid physical condition.    Home medication Reconciled       New Medications started during this admission :        Prn Haldol 5mg and Vistaril 50mg q6hr for extreme agitation.  Trazodone as ordered for insomnia  Vistaril as ordered for anxiety      Psychotherapy:   Encourage participation in milieu and group therapy  Individual therapy as needed        Patient's diagnosis, treatment plan, medication management was formulated at the end of evaluation and after reviewing relevant documentation. Patient was seen directly by myself and Dr. Perez    Lithium 450 mg twice daily  Cogentin 1 mg twice daily  Prolixin 10 mg twice daily  Ativan 1 mg twice daily for 2 doses for acute stabilization  Outpatient basis      Can discontinue constant observer.  Patient is deemed to be moderate risk for suicide based on productive risk factors as well as risk mitigation      Behavioral Services  Medicare Certification Upon Admission     I certify that this patient's inpatient psychiatric hospital admission is medically necessary for:    [x] (1) Treatment which could reasonably be expected to improve this patient's condition,        [ ] (2) Or for diagnostic study;      AND      [x](2) The inpatient psychiatric services are provided while the individual is under the care of a physician and are included in the individualized plan of care.     Estimated length of stay/service 3 to 7 days based on stability     Plan for post-hospital care outpatient psychiatric and counseling services    NOTE: This report was transcribed using voice recognition software. Every effort was made to ensure accuracy; however, inadvertent computerized transcription errors may be present.       Electronically signed by MICH Hernández CNP on 11/13/2024 at 1:13 PM

## 2024-11-13 NOTE — CARE COORDINATION
SW attempted to meet with pt to complete biopsychosocial assessment. Pt sleeping soundly in his room at this time. Per chart, pt was given PRN medications this morning for behaviors and agitation. SW will attempt again at a later time.

## 2024-11-13 NOTE — GROUP NOTE
Declined to share goal, inappropriate in group, cussing and talking the entire time, needed redirection.                                                                       Group Therapy Note    Date: 11/13/2024    Group Start Time: 0935  Group End Time: 0955  Group Topic: Community Meeting    [facility]    Ning Williamson, CTRS    Type of Group: Community Meeting      Patient's Goal:  Patient will be able to id staffing assignments, expectations of patients, and general information re: floor rules. Will be prompted to share goal for the day.     Notes:  Patient appeared to be an active listener, taking in information presented and was prompted to share goal for the day.    Status After Intervention:  Improved    Participation Level: Active Listener and Interactive    Participation Quality: Appropriate, Attentive, and Sharing      Speech:  normal      Thought Process/Content: Logical      Affective Functioning: Congruent      Mood: euthymic      Level of consciousness:  Alert, Oriented x4, and Attentive      Response to Learning: Able to verbalize/acknowledge new learning, Able to retain information, and Progressing to goal      Endings: None Reported    Modes of Intervention: Education, Support, and Clarifying      Discipline Responsible: Psychoeducational Specialist      Signature:  Ning Williamson, CTRS
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
## 2024-11-13 NOTE — GROUP NOTE
Group Therapy Note    Date: 11/13/2024    Group Start Time: 1530  Group End Time: 1600  Group Topic: Recreational    [facility]    Ning Williamson, DCS    Date: 11/13/2024  Module Name:  Pet Therapy     Patient's Goal:  Pt will be able to participate in pet therapy, by petting and chatting with pet owners.     Notes:  Engaged in group.     Status After Intervention:  Improved    Participation Level: Active Listener and Interactive    Participation Quality: Appropriate, Attentive, and Sharing      Speech:  normal      Thought Process/Content: Logical      Affective Functioning: Congruent      Mood: euthymic      Level of consciousness:  Alert, Oriented x4, and Attentive      Response to Learning: Able to verbalize/acknowledge new learning and Able to retain information      Endings: None Reported    Modes of Intervention: Support, Socialization, and Activity      Discipline Responsible: Recreational Therapist      Signature:  HERB Gardner

## 2024-11-13 NOTE — CARE COORDINATION
SW attempted to meet with the pt to complete the biopsychosocial assessment. Pt observed laying in bed sleeping soundly. SW will try again at a later time.

## 2024-11-14 PROCEDURE — 6370000000 HC RX 637 (ALT 250 FOR IP): Performed by: NURSE PRACTITIONER

## 2024-11-14 PROCEDURE — 6370000000 HC RX 637 (ALT 250 FOR IP): Performed by: PSYCHIATRY & NEUROLOGY

## 2024-11-14 PROCEDURE — 1240000000 HC EMOTIONAL WELLNESS R&B

## 2024-11-14 PROCEDURE — 99232 SBSQ HOSP IP/OBS MODERATE 35: CPT | Performed by: NURSE PRACTITIONER

## 2024-11-14 RX ADMIN — LITHIUM CARBONATE 450 MG: 300 CAPSULE ORAL at 22:34

## 2024-11-14 RX ADMIN — FLUPHENAZINE HYDROCHLORIDE 10 MG: 5 TABLET ORAL at 08:36

## 2024-11-14 RX ADMIN — LORAZEPAM 1 MG: 1 TABLET ORAL at 08:36

## 2024-11-14 RX ADMIN — ACETAMINOPHEN 650 MG: 325 TABLET ORAL at 11:26

## 2024-11-14 RX ADMIN — ACETAMINOPHEN 650 MG: 325 TABLET ORAL at 22:34

## 2024-11-14 RX ADMIN — LITHIUM CARBONATE 450 MG: 300 CAPSULE ORAL at 08:36

## 2024-11-14 RX ADMIN — FLUPHENAZINE HYDROCHLORIDE 10 MG: 5 TABLET ORAL at 22:33

## 2024-11-14 RX ADMIN — Medication 3 MG: at 22:34

## 2024-11-14 RX ADMIN — HYDROXYZINE HYDROCHLORIDE 50 MG: 50 TABLET, FILM COATED ORAL at 22:34

## 2024-11-14 RX ADMIN — LORAZEPAM 1 MG: 1 TABLET ORAL at 22:34

## 2024-11-14 ASSESSMENT — PATIENT HEALTH QUESTIONNAIRE - PHQ9
SUM OF ALL RESPONSES TO PHQ9 QUESTIONS 1 & 2: 0
1. LITTLE INTEREST OR PLEASURE IN DOING THINGS: NOT AT ALL
SUM OF ALL RESPONSES TO PHQ QUESTIONS 1-9: 0
SUM OF ALL RESPONSES TO PHQ QUESTIONS 1-9: 0
2. FEELING DOWN, DEPRESSED OR HOPELESS: NOT AT ALL
SUM OF ALL RESPONSES TO PHQ QUESTIONS 1-9: 0
SUM OF ALL RESPONSES TO PHQ QUESTIONS 1-9: 0

## 2024-11-14 ASSESSMENT — PAIN DESCRIPTION - LOCATION
LOCATION: GENERALIZED
LOCATION: OTHER (COMMENT)

## 2024-11-14 ASSESSMENT — PAIN SCALES - GENERAL
PAINLEVEL_OUTOF10: 0
PAINLEVEL_OUTOF10: 8
PAINLEVEL_OUTOF10: 2
PAINLEVEL_OUTOF10: 0

## 2024-11-14 ASSESSMENT — SLEEP AND FATIGUE QUESTIONNAIRES
DO YOU HAVE DIFFICULTY SLEEPING: NO
DO YOU USE A SLEEP AID: YES
SLEEP PATTERN: NORMAL
AVERAGE NUMBER OF SLEEP HOURS: 8

## 2024-11-14 ASSESSMENT — PAIN - FUNCTIONAL ASSESSMENT: PAIN_FUNCTIONAL_ASSESSMENT: PREVENTS OR INTERFERES SOME ACTIVE ACTIVITIES AND ADLS

## 2024-11-14 ASSESSMENT — PAIN DESCRIPTION - DESCRIPTORS
DESCRIPTORS: ACHING;SHARP
DESCRIPTORS: ACHING

## 2024-11-14 NOTE — GROUP NOTE
Group Therapy Note    Date: 11/14/2024    Group Start Time: 1045  Group End Time: 1125  Group Topic: Psychotherapy    [facility]    Selena Cook MSW, LSW        Group Therapy Note    Attendees: 7       Patient's Goal:  To increase social interaction and improve relationships with others.      Notes:  Pt was attentive in group and was able to identify an agenda. They were also able to verbalize relating to others within the group.      Status After Intervention:  Unchanged    Participation Level: Monopolizing    Participation Quality: Inappropriate, Intrusive, and Resistant      Speech:  pressured      Thought Process/Content: Delusional  Flight of ideas      Affective Functioning: Exaggerated      Mood: elevated      Level of consciousness:  Alert, Oriented x4, and Inattentive      Response to Learning: Able to verbalize current knowledge/experience, Able to verbalize/acknowledge new learning, and Resistant      Endings: None Reported    Modes of Intervention: Support, Socialization, and Exploration      Discipline Responsible: /Counselor      Signature:  DARINEL Junior LSW
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
## 2024-11-14 NOTE — GROUP NOTE
Group Therapy Note    Date: 11/14/2024    Group Start Time: 0930  Group End Time: 0945  Group Topic: Community Meeting    Cat Masters CTRS    Group Therapy Note    Attendees: 7    Date: 11/14/2024  Start Time: 0930  End Time:  0945  Number of Participants: 7    Type of Group: Community Meeting    Patient's Goal:  Increased awareness of functions of the milieu, staffing and programming. Identified goal for the day.    Notes:  Patient often entered and exited group. Patient shared goal for the day, \"Stay positive.\" Patient often made bizarre statements about vampires.    Status After Intervention:  Unchanged    Participation Level: Minimal    Participation Quality: Inappropriate and Resistant      Speech:  loud      Thought Process/Content: Delusional      Affective Functioning: Congruent      Mood: anxious      Level of consciousness:  Preoccupied and Inattentive      Response to Learning: Resistant      Endings: None Reported    Modes of Intervention: Education, Support, Socialization, Exploration, Clarifying, and Problem-solving      Discipline Responsible: Psychoeducational Specialist      Signature:  HERB Wyatt

## 2024-11-14 NOTE — BH NOTE
Patient has been in bed resting with eyes closed since 1730. No signs of distress. Breathing is equal and unlabored. Will continue to monitor.

## 2024-11-14 NOTE — BH NOTE
Patient approached nurse and asked what was for dinner because he wants to eat flesh. He said it is from the [place] Book of [employer] Dead. He asked nurse if she has read this book or eats flesh. Patient remained calm and cooperative with redirection.

## 2024-11-14 NOTE — DISCHARGE INSTRUCTIONS
Patient was offered a referral to substance abuse treatment, patient declined referral at this time.      Follow up for Tobacco Cessation at:    [facility]                                 Date:  Friday 11/29 at 10am              [address] [address]   (Inside [facility]    take B elevators to 7th floor)   Phone: [phone]   Fax: [phone]
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
## 2024-11-14 NOTE — CARE COORDINATION
Pt has approached SW several times today requesting SW to find out which hospital his girlfriend Priscilla Diego has been admitted to. He is not receptive to SW telling him that SW does not know this information and believes that Priscilla is on  caseload.
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
## 2024-11-15 PROCEDURE — 1240000000 HC EMOTIONAL WELLNESS R&B

## 2024-11-15 PROCEDURE — 6370000000 HC RX 637 (ALT 250 FOR IP): Performed by: NURSE PRACTITIONER

## 2024-11-15 PROCEDURE — 6370000000 HC RX 637 (ALT 250 FOR IP): Performed by: PSYCHIATRY & NEUROLOGY

## 2024-11-15 PROCEDURE — 99232 SBSQ HOSP IP/OBS MODERATE 35: CPT | Performed by: NURSE PRACTITIONER

## 2024-11-15 RX ADMIN — LORAZEPAM 1 MG: 1 TABLET ORAL at 08:54

## 2024-11-15 RX ADMIN — LITHIUM CARBONATE 450 MG: 300 CAPSULE ORAL at 20:38

## 2024-11-15 RX ADMIN — LORAZEPAM 1 MG: 1 TABLET ORAL at 20:38

## 2024-11-15 RX ADMIN — FLUPHENAZINE HYDROCHLORIDE 10 MG: 5 TABLET ORAL at 08:49

## 2024-11-15 RX ADMIN — FLUPHENAZINE HYDROCHLORIDE 10 MG: 5 TABLET ORAL at 20:38

## 2024-11-15 RX ADMIN — ACETAMINOPHEN 650 MG: 325 TABLET ORAL at 21:48

## 2024-11-15 RX ADMIN — HYDROXYZINE HYDROCHLORIDE 50 MG: 50 TABLET, FILM COATED ORAL at 14:23

## 2024-11-15 RX ADMIN — LITHIUM CARBONATE 450 MG: 300 CAPSULE ORAL at 08:54

## 2024-11-15 ASSESSMENT — PAIN DESCRIPTION - DESCRIPTORS: DESCRIPTORS: ACHING

## 2024-11-15 ASSESSMENT — PAIN DESCRIPTION - LOCATION: LOCATION: HEAD

## 2024-11-15 ASSESSMENT — PAIN SCALES - GENERAL
PAINLEVEL_OUTOF10: 0
PAINLEVEL_OUTOF10: 0
PAINLEVEL_OUTOF10: 8
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
## 2024-11-15 NOTE — GROUP NOTE
Group Therapy Note    Date: 11/15/2024    Group Start Time: 0945  Group End Time: 1015  Group Topic: Guest Group    Cat Masters CTRS    Group Therapy Note    Attendees: 9    Date: 11/15/2024  Start Time: 0945  End Time:  1015  Number of Participants: 9    Type of Group: Healthy Living/Wellness    Name:  Positive Self-Reflection    Patient's Goal:  Identified positive characteristics, influential past experiences and future goals.    Notes:  [facility] students facilitated group with supervision from DCS. Encouraged patients to share their experiences. Patient often entered and exited group.    Status After Intervention:  Unchanged    Participation Level: Minimal    Participation Quality: Resistant      Speech:  normal      Thought Process/Content: Delusional      Affective Functioning: Blunted      Mood:  Flat      Level of consciousness:  Inattentive      Response to Learning: Resistant      Endings: None Reported    Modes of Intervention: Education, Support, Socialization, Exploration, Clarifying, and Problem-solving      Discipline Responsible: Psychoeducational Specialist      Signature:  HERB Wyatt
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
## 2024-11-15 NOTE — CARE COORDINATION
Navigator received communication from MARCO ANTONIO Walters who reports patient grandmother/guardian is concern about patient plan in the community after inpatient.    Navigator sent communication to [facility]  Tessy HANNAH.  Inquired if patient is open with case management services or a potential candidate for ACT. Waiting on return communication.    Electronically signed by DARINEL Minor, DIONICIO on 11/15/2024 at 12:16 PM

## 2024-11-15 NOTE — BH NOTE
Pt guardian, Grandmother of pt, Alicia Valderrama, [phone] was contacted via phone and received permission to have pt continue treatment voluntarily.  This was witnessed by Grisel CASTILLO. No other complications.

## 2024-11-15 NOTE — BH NOTE
Nurse confirmed with patient's guardian Alicia Valderrama by telephone today at 10:15 a.m. that she consents to continuous treatment for patient on the unit. She thanked us for the care and treatment we are providing her grandson.

## 2024-11-15 NOTE — GROUP NOTE
Group Therapy Note    Date: 11/15/2024    Group Start Time: 1430  Group End Time: 1510  Group Topic: Psychoeducation    Cat Masters CTRS    Group Therapy Note    Attendees: 10    Date: 11/15/2024  Start Time: 1430  End Time:  1510  Number of Participants: 10    Type of Group: Psychoeducation    Name:  Mental Health Fact or Fiction Trivia    Patient's Goal:  Increased awareness of mental health facts and myths.     Notes:  Patient often entered and exited group. Patient did engage in group activity while at group.    Status After Intervention:  Unchanged    Participation Level: None    Participation Quality: Resistant      Speech:  loud      Thought Process/Content: Delusional      Affective Functioning: Incongruent      Mood: anxious      Level of consciousness:  Preoccupied and Inattentive      Response to Learning: Resistant      Endings: None Reported    Modes of Intervention: Education, Support, Socialization, Exploration, Clarifying, Problem-solving, and Activity      Discipline Responsible: Psychoeducational Specialist      Signature:  HERB Wyatt

## 2024-11-15 NOTE — BH NOTE
Patient wrote with fantasma on a piece of paper \"[address]. DO NOT TELL OTHER VAMPS.\" And handed it to another patient. Patient also swung open his door randomly and appeared to talk to someone in the leon and told the vampires they were welcome to come in his room now. He then ushered them into his room and closed the door.     Patient remains cooperative and friendly with staff and easily verbally redirectable.

## 2024-11-16 LAB
LITHIUM DATE LAST DOSE: ABNORMAL
LITHIUM DOSE AMOUNT: ABNORMAL
LITHIUM DOSE TIME: ABNORMAL
LITHIUM LEVEL: 0.4 MMOL/L (ref 0.5–1.5)

## 2024-11-16 PROCEDURE — 6360000002 HC RX W HCPCS: Performed by: NURSE PRACTITIONER

## 2024-11-16 PROCEDURE — 36415 COLL VENOUS BLD VENIPUNCTURE: CPT

## 2024-11-16 PROCEDURE — 99232 SBSQ HOSP IP/OBS MODERATE 35: CPT | Performed by: NURSE PRACTITIONER

## 2024-11-16 PROCEDURE — 6370000000 HC RX 637 (ALT 250 FOR IP): Performed by: NURSE PRACTITIONER

## 2024-11-16 PROCEDURE — 1240000000 HC EMOTIONAL WELLNESS R&B

## 2024-11-16 PROCEDURE — 6370000000 HC RX 637 (ALT 250 FOR IP): Performed by: PSYCHIATRY & NEUROLOGY

## 2024-11-16 PROCEDURE — 80178 ASSAY OF LITHIUM: CPT

## 2024-11-16 RX ADMIN — ACETAMINOPHEN 650 MG: 325 TABLET ORAL at 09:11

## 2024-11-16 RX ADMIN — LORAZEPAM 1 MG: 1 TABLET ORAL at 09:08

## 2024-11-16 RX ADMIN — DIPHENHYDRAMINE HYDROCHLORIDE 50 MG: 50 INJECTION, SOLUTION INTRAMUSCULAR; INTRAVENOUS at 18:31

## 2024-11-16 RX ADMIN — LORAZEPAM 1 MG: 1 TABLET ORAL at 21:38

## 2024-11-16 RX ADMIN — FLUPHENAZINE HYDROCHLORIDE 10 MG: 5 TABLET ORAL at 21:37

## 2024-11-16 RX ADMIN — FLUPHENAZINE HYDROCHLORIDE 10 MG: 5 TABLET ORAL at 09:07

## 2024-11-16 RX ADMIN — LITHIUM CARBONATE 450 MG: 300 CAPSULE ORAL at 21:38

## 2024-11-16 RX ADMIN — LORAZEPAM 2 MG: 2 INJECTION INTRAMUSCULAR; INTRAVENOUS at 18:30

## 2024-11-16 RX ADMIN — HYDROXYZINE HYDROCHLORIDE 50 MG: 50 TABLET, FILM COATED ORAL at 11:14

## 2024-11-16 RX ADMIN — LITHIUM CARBONATE 450 MG: 300 CAPSULE ORAL at 09:08

## 2024-11-16 ASSESSMENT — PAIN - FUNCTIONAL ASSESSMENT: PAIN_FUNCTIONAL_ASSESSMENT: PREVENTS OR INTERFERES SOME ACTIVE ACTIVITIES AND ADLS

## 2024-11-16 ASSESSMENT — PAIN DESCRIPTION - ORIENTATION: ORIENTATION: MID

## 2024-11-16 ASSESSMENT — PAIN DESCRIPTION - LOCATION: LOCATION: BACK

## 2024-11-16 ASSESSMENT — PAIN SCALES - WONG BAKER: WONGBAKER_NUMERICALRESPONSE: NO HURT

## 2024-11-16 ASSESSMENT — PAIN SCALES - GENERAL: PAINLEVEL_OUTOF10: 0

## 2024-11-16 ASSESSMENT — PAIN DESCRIPTION - DESCRIPTORS: DESCRIPTORS: ACHING
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
## 2024-11-16 NOTE — BH NOTE
Pt in room upon approach.  Pt has fair eye contact.    Appears to have poor grooming and hygiene.    Denies SI/HI, A/V/H, or thoughts of self harm when asked.    Rates anxiety at 2/10 and depression at 2/10.    Pt is paranoid referring to \"members of the mob\" and has delusions stating that \"my wife is missing\".  Denies pain. No noted signs or symptoms of distress.  Pt is compliant with PM medication.  According to previous notes pt attended daytime groups.  Pt voiced no complaints or concerns    Purposeful Q15min rounding continues.

## 2024-11-16 NOTE — BH NOTE
Pt came out of room and up to nurses station, stated \"that room is full of bodies\". Pt promptly returned to room. Purposeful Q15min rounding continues.

## 2024-11-16 NOTE — GROUP NOTE
Group Therapy Note    Date: 11/16/2024    Group Start Time: 1045  Group End Time: 1130  Group Topic: Cognitive Skills    [facility]    Shayy Wilks MSW, DIONICIO        Group Therapy Note    Attendees: 13         Patient's Goal:  Rojelio's Hierarchy of Needs     Notes:  Pt was an active participant, able to share examples but misinterprets often    Status After Intervention: unchanged    Participation Level: Active Listener    Participation Quality: Sharing      Speech:  loud      Thought Process/Content: delusional      Affective Functioning: Congruent      Mood: elevated, anxious      Level of consciousness:  Alert, very pre-occupied and off topic      Response to Learning: Able to verbalize current knowledge/experience and Able to retain information      Endings: None Reported    Modes of Intervention: Education, Support, Socialization, Exploration, Clarifying, Problem-solving, and Activity      Discipline Responsible: /Counselor      Signature:  DARINEL Monzon LSW

## 2024-11-17 PROCEDURE — 6370000000 HC RX 637 (ALT 250 FOR IP): Performed by: NURSE PRACTITIONER

## 2024-11-17 PROCEDURE — 6360000002 HC RX W HCPCS: Performed by: NURSE PRACTITIONER

## 2024-11-17 PROCEDURE — 1240000000 HC EMOTIONAL WELLNESS R&B

## 2024-11-17 PROCEDURE — 6370000000 HC RX 637 (ALT 250 FOR IP): Performed by: PSYCHIATRY & NEUROLOGY

## 2024-11-17 PROCEDURE — 99232 SBSQ HOSP IP/OBS MODERATE 35: CPT | Performed by: NURSE PRACTITIONER

## 2024-11-17 RX ORDER — LITHIUM CARBONATE 300 MG/1
600 CAPSULE ORAL 2 TIMES DAILY
Status: DISCONTINUED | OUTPATIENT
Start: 2024-11-17 | End: 2024-11-27 | Stop reason: HOSPADM

## 2024-11-17 RX ORDER — MENTHOL AND METHYL SALICYLATE 7.6; 29 G/100G; G/100G
1 OINTMENT TOPICAL 4 TIMES DAILY PRN
Status: DISCONTINUED | OUTPATIENT
Start: 2024-11-17 | End: 2024-11-27 | Stop reason: HOSPADM

## 2024-11-17 RX ADMIN — LITHIUM CARBONATE 600 MG: 300 CAPSULE ORAL at 08:21

## 2024-11-17 RX ADMIN — ACETAMINOPHEN 650 MG: 325 TABLET ORAL at 05:56

## 2024-11-17 RX ADMIN — DIPHENHYDRAMINE HYDROCHLORIDE 50 MG: 50 INJECTION, SOLUTION INTRAMUSCULAR; INTRAVENOUS at 18:45

## 2024-11-17 RX ADMIN — LORAZEPAM 1 MG: 1 TABLET ORAL at 21:41

## 2024-11-17 RX ADMIN — LITHIUM CARBONATE 600 MG: 300 CAPSULE ORAL at 20:37

## 2024-11-17 RX ADMIN — LORAZEPAM 1 MG: 1 TABLET ORAL at 08:21

## 2024-11-17 RX ADMIN — FLUPHENAZINE HYDROCHLORIDE 10 MG: 5 TABLET ORAL at 08:21

## 2024-11-17 RX ADMIN — LORAZEPAM 2 MG: 2 INJECTION INTRAMUSCULAR; INTRAVENOUS at 18:45

## 2024-11-17 RX ADMIN — FLUPHENAZINE HYDROCHLORIDE 10 MG: 5 TABLET ORAL at 20:37

## 2024-11-17 RX ADMIN — HYDROXYZINE HYDROCHLORIDE 50 MG: 50 TABLET, FILM COATED ORAL at 16:45

## 2024-11-17 ASSESSMENT — PAIN SCALES - GENERAL
PAINLEVEL_OUTOF10: 0
PAINLEVEL_OUTOF10: 6

## 2024-11-17 ASSESSMENT — PAIN SCALES - WONG BAKER: WONGBAKER_NUMERICALRESPONSE: NO HURT

## 2024-11-17 ASSESSMENT — PAIN DESCRIPTION - LOCATION: LOCATION: SHOULDER

## 2024-11-17 ASSESSMENT — PAIN - FUNCTIONAL ASSESSMENT: PAIN_FUNCTIONAL_ASSESSMENT: ACTIVITIES ARE NOT PREVENTED

## 2024-11-17 ASSESSMENT — PAIN DESCRIPTION - ORIENTATION: ORIENTATION: RIGHT
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
## 2024-11-17 NOTE — CARE COORDINATION
Pt approached sw inquiring how to file for custody of his 18 kids. Pt nonsensical then started to talk about \"the government\" and how they are \"examining\" his blood from all the blood work they took yesterday. Pt able to remain calm, but pre-occupied and bizarre.

## 2024-11-18 PROCEDURE — 99232 SBSQ HOSP IP/OBS MODERATE 35: CPT | Performed by: NURSE PRACTITIONER

## 2024-11-18 PROCEDURE — 1240000000 HC EMOTIONAL WELLNESS R&B

## 2024-11-18 PROCEDURE — 6370000000 HC RX 637 (ALT 250 FOR IP): Performed by: NURSE PRACTITIONER

## 2024-11-18 PROCEDURE — 6370000000 HC RX 637 (ALT 250 FOR IP): Performed by: PSYCHIATRY & NEUROLOGY

## 2024-11-18 RX ADMIN — HYDROXYZINE HYDROCHLORIDE 50 MG: 50 TABLET, FILM COATED ORAL at 15:46

## 2024-11-18 RX ADMIN — ACETAMINOPHEN 650 MG: 325 TABLET ORAL at 19:37

## 2024-11-18 RX ADMIN — LITHIUM CARBONATE 600 MG: 300 CAPSULE ORAL at 08:54

## 2024-11-18 RX ADMIN — LORAZEPAM 1 MG: 1 TABLET ORAL at 21:36

## 2024-11-18 RX ADMIN — LORAZEPAM 1 MG: 1 TABLET ORAL at 08:54

## 2024-11-18 RX ADMIN — FLUPHENAZINE HYDROCHLORIDE 10 MG: 5 TABLET ORAL at 21:36

## 2024-11-18 RX ADMIN — FLUPHENAZINE HYDROCHLORIDE 10 MG: 5 TABLET ORAL at 08:54

## 2024-11-18 RX ADMIN — LITHIUM CARBONATE 600 MG: 300 CAPSULE ORAL at 21:37

## 2024-11-18 ASSESSMENT — PAIN DESCRIPTION - ORIENTATION: ORIENTATION: LEFT;LOWER

## 2024-11-18 ASSESSMENT — PAIN SCALES - WONG BAKER: WONGBAKER_NUMERICALRESPONSE: NO HURT

## 2024-11-18 ASSESSMENT — PAIN - FUNCTIONAL ASSESSMENT: PAIN_FUNCTIONAL_ASSESSMENT: ACTIVITIES ARE NOT PREVENTED

## 2024-11-18 ASSESSMENT — PAIN SCALES - GENERAL
PAINLEVEL_OUTOF10: 0
PAINLEVEL_OUTOF10: 7

## 2024-11-18 ASSESSMENT — PAIN DESCRIPTION - DESCRIPTORS: DESCRIPTORS: ACHING

## 2024-11-18 ASSESSMENT — PAIN DESCRIPTION - LOCATION: LOCATION: TEETH

## 2024-11-18 NOTE — CARE COORDINATION
Pt approached SW and informed SW that he does not want to go to [facility] or [facility] at discharge.

## 2024-11-18 NOTE — BH NOTE
Pt is alert and oriented to name, place but unclear on date and poor insight into cause for admission. He denies SI/HI and admits to having command hallucinations and visual hallucinations. He reports two people's names Brandin and sees another person's eyes following him around the unit. During treatment team he reported the current antipsychotic medication regimen was not effective and requests Haldol. He has loose associations and delusional thinking with hyper focus on vampires. He is eating 100% of meals and denies any discomfort. Nurse encourages patient to shower clothes and clean up room. Pt is easily distracted and paces unit but is cooperative with staff direction. Pt has not had any angry outbursts all morning.
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
## 2024-11-18 NOTE — GROUP NOTE
Group Therapy Note    Date: 11/18/2024    Group Start Time: 1045  Group End Time: 1135  Group Topic: Psychotherapy    [facility]    Viola Robbins MSW, LSW        Group Therapy Note    Attendees: 14       Patient's Goal:  To increase social interaction and improve relationships with others.      Notes:  Pt was not appropriate for group, pt entered group and was able to identify an agenda however made inappropriate statements.     Status After Intervention:  Unchanged    Participation Level: Monopolizing    Participation Quality: Inappropriate      Speech:  pressured      Thought Process/Content: Flight of ideas      Affective Functioning: Exaggerated      Mood: euthymic      Level of consciousness:  Preoccupied      Response to Learning: Resistant      Endings: None Reported    Modes of Intervention: Support, Socialization, and Exploration      Discipline Responsible: /Counselor      Signature:  DARINEL Fitzgerald, DIONICIO

## 2024-11-18 NOTE — CARE COORDINATION
Pt was seen during treatment team. Pt states that he received PRN medications yesterday because he was depressed and acted out due to missing his wife. Pt states that he wants to see his wife. Pt is agreeable to medication adjustments presented to him. He denied questions/concerns for treatment team at this time.

## 2024-11-18 NOTE — CARE COORDINATION
Pt informed SW that his grandmother would like to speak with SW.     SW contacted pt grandmother/legal guardian Alicia Chovious [phone]. She states that pt has been calling her and telling her that he is discharging today. She will  pt when he is ready to discharge, but does not feel he is ready to discharge yet. She states that he will be upset that he is not discharging today. SW informed her that we met with pt as a treatment team this morning and explained to him that we are still working on medication adjustments for him, but that we will see how the medications work and go from there. She verbalized understanding.     Pt approached SW and stated that he is upset he is not discharging today, states that we are going against his freedom and he is going to senait us. SW offered support to pt. SW explained to pt that we discussed medication changes with him this morning. He is in control of his behavior at this time.

## 2024-11-18 NOTE — GROUP NOTE
Group Therapy Note    Date: 11/18/2024    Group Start Time: 1430  Group End Time: 1515  Group Topic: Psychoeducation    Cat Masters CTRS    Group Therapy Note    Attendees: 11    Date: 11/18/2024  Start Time: 1430  End Time:  1515  Number of Participants: 11    Type of Group: Psychoeducation    Name:  Using Music as a Coping Skill and Music Trivia    Patient's Goal:  Identify how music can be used as a coping skill for mental health. Engaged in music trivia game.    Notes:  Patient disruptive to group. Often talked over peers and CTRS. Staff asked to leave group and complied.     Status After Intervention:  Decompensated    Participation Level: Monopolizing    Participation Quality: Inappropriate, Intrusive, and Resistant      Speech:  loud      Thought Process/Content: Delusional      Affective Functioning: Congruent      Mood:  Irritable      Level of consciousness:  Inattentive      Response to Learning: Resistant      Endings: None Reported    Modes of Intervention: Education, Support, Socialization, Exploration, Clarifying, Problem-solving, and Activity      Discipline Responsible: Psychoeducational Specialist      Signature:  HERB Wyatt

## 2024-11-19 PROCEDURE — 1240000000 HC EMOTIONAL WELLNESS R&B

## 2024-11-19 PROCEDURE — 6370000000 HC RX 637 (ALT 250 FOR IP): Performed by: NURSE PRACTITIONER

## 2024-11-19 PROCEDURE — 6370000000 HC RX 637 (ALT 250 FOR IP): Performed by: PSYCHIATRY & NEUROLOGY

## 2024-11-19 RX ORDER — OLANZAPINE 5 MG/1
5 TABLET, ORALLY DISINTEGRATING ORAL 2 TIMES DAILY
Status: DISCONTINUED | OUTPATIENT
Start: 2024-11-19 | End: 2024-11-22

## 2024-11-19 RX ADMIN — FLUPHENAZINE HYDROCHLORIDE 10 MG: 5 TABLET ORAL at 08:30

## 2024-11-19 RX ADMIN — Medication 3 MG: at 20:27

## 2024-11-19 RX ADMIN — ACETAMINOPHEN 650 MG: 325 TABLET ORAL at 20:27

## 2024-11-19 RX ADMIN — LORAZEPAM 1 MG: 1 TABLET ORAL at 08:30

## 2024-11-19 RX ADMIN — LORAZEPAM 1 MG: 1 TABLET ORAL at 20:27

## 2024-11-19 RX ADMIN — FLUPHENAZINE HYDROCHLORIDE 10 MG: 5 TABLET ORAL at 20:27

## 2024-11-19 RX ADMIN — LITHIUM CARBONATE 600 MG: 300 CAPSULE ORAL at 20:27

## 2024-11-19 RX ADMIN — HYDROXYZINE HYDROCHLORIDE 50 MG: 50 TABLET, FILM COATED ORAL at 17:06

## 2024-11-19 RX ADMIN — OLANZAPINE 5 MG: 5 TABLET, ORALLY DISINTEGRATING ORAL at 20:27

## 2024-11-19 RX ADMIN — LITHIUM CARBONATE 600 MG: 300 CAPSULE ORAL at 08:30

## 2024-11-19 ASSESSMENT — PAIN SCALES - GENERAL
PAINLEVEL_OUTOF10: 8
PAINLEVEL_OUTOF10: 0

## 2024-11-19 ASSESSMENT — PAIN DESCRIPTION - ORIENTATION: ORIENTATION: RIGHT;LEFT;LOWER

## 2024-11-19 ASSESSMENT — PAIN DESCRIPTION - DESCRIPTORS: DESCRIPTORS: SHARP

## 2024-11-19 ASSESSMENT — PAIN DESCRIPTION - LOCATION: LOCATION: TEETH

## 2024-11-19 NOTE — BH NOTE
Pt at nurses station during assessment.  Pt has fair eye contact.    Appears to have fair grooming and hygiene.      Denies SI/HI, A/V/H, or thoughts of self harm when asked. Pt appears to be internally stimulated and delusional.    Rates anxiety at 5/10 and depression at 2/10. Pt concerned with where his wife is.     Pt reports tooth pain 8/10, bottom left molar.   Pt is compliant with PM medication.  According to previous notes pt attended daytime groups.  Pt voiced no complaints or concerns    Purposeful Q15min rounding continues.
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
## 2024-11-19 NOTE — CARE COORDINATION
SW met with the pt. Pt observed laying in bed. Pt reports feeling peachy today and he denied SI/HI/AVH. Pt was actively trying to take a nap. Pt did not have any questions or concerns for SW at this time.

## 2024-11-20 PROCEDURE — 1240000000 HC EMOTIONAL WELLNESS R&B

## 2024-11-20 PROCEDURE — 99232 SBSQ HOSP IP/OBS MODERATE 35: CPT | Performed by: NURSE PRACTITIONER

## 2024-11-20 PROCEDURE — 6370000000 HC RX 637 (ALT 250 FOR IP): Performed by: PSYCHIATRY & NEUROLOGY

## 2024-11-20 PROCEDURE — 6370000000 HC RX 637 (ALT 250 FOR IP): Performed by: NURSE PRACTITIONER

## 2024-11-20 RX ORDER — LORAZEPAM 0.5 MG/1
0.5 TABLET ORAL 2 TIMES DAILY
Status: DISCONTINUED | OUTPATIENT
Start: 2024-11-20 | End: 2024-11-22

## 2024-11-20 RX ADMIN — HYDROXYZINE HYDROCHLORIDE 50 MG: 50 TABLET, FILM COATED ORAL at 15:55

## 2024-11-20 RX ADMIN — LITHIUM CARBONATE 600 MG: 300 CAPSULE ORAL at 08:56

## 2024-11-20 RX ADMIN — FLUPHENAZINE HYDROCHLORIDE 10 MG: 5 TABLET ORAL at 20:35

## 2024-11-20 RX ADMIN — ACETAMINOPHEN 650 MG: 325 TABLET ORAL at 14:46

## 2024-11-20 RX ADMIN — LORAZEPAM 0.5 MG: 0.5 TABLET ORAL at 20:35

## 2024-11-20 RX ADMIN — FLUPHENAZINE HYDROCHLORIDE 10 MG: 5 TABLET ORAL at 08:56

## 2024-11-20 RX ADMIN — OLANZAPINE 5 MG: 5 TABLET, ORALLY DISINTEGRATING ORAL at 20:35

## 2024-11-20 RX ADMIN — LITHIUM CARBONATE 600 MG: 300 CAPSULE ORAL at 20:35

## 2024-11-20 RX ADMIN — OLANZAPINE 5 MG: 5 TABLET, ORALLY DISINTEGRATING ORAL at 08:56

## 2024-11-20 RX ADMIN — HYDROXYZINE HYDROCHLORIDE 50 MG: 50 TABLET, FILM COATED ORAL at 08:56

## 2024-11-20 RX ADMIN — ACETAMINOPHEN 650 MG: 325 TABLET ORAL at 20:40

## 2024-11-20 RX ADMIN — MENTHOL AND METHYL SALICYLATE 1 APPLICATION: 7.6; 29 OINTMENT TOPICAL at 20:40

## 2024-11-20 RX ADMIN — LORAZEPAM 1 MG: 1 TABLET ORAL at 08:56

## 2024-11-20 RX ADMIN — Medication 3 MG: at 20:35

## 2024-11-20 RX ADMIN — ACETAMINOPHEN 650 MG: 325 TABLET ORAL at 08:57

## 2024-11-20 ASSESSMENT — PAIN SCALES - GENERAL
PAINLEVEL_OUTOF10: 5
PAINLEVEL_OUTOF10: 6
PAINLEVEL_OUTOF10: 0
PAINLEVEL_OUTOF10: 4
PAINLEVEL_OUTOF10: 8

## 2024-11-20 ASSESSMENT — PAIN - FUNCTIONAL ASSESSMENT
PAIN_FUNCTIONAL_ASSESSMENT: ACTIVITIES ARE NOT PREVENTED
PAIN_FUNCTIONAL_ASSESSMENT: ACTIVITIES ARE NOT PREVENTED

## 2024-11-20 ASSESSMENT — PAIN DESCRIPTION - DESCRIPTORS
DESCRIPTORS: PATIENT UNABLE TO DESCRIBE
DESCRIPTORS: ACHING;DISCOMFORT;THROBBING

## 2024-11-20 ASSESSMENT — PAIN DESCRIPTION - ORIENTATION
ORIENTATION: LEFT;LOWER;POSTERIOR
ORIENTATION: LEFT;LOWER;POSTERIOR

## 2024-11-20 ASSESSMENT — PAIN DESCRIPTION - LOCATION
LOCATION: JAW;TEETH
LOCATION: JAW;TEETH

## 2024-11-20 NOTE — GROUP NOTE
Shared goal for the day as to talk to my wife.                                                                       Group Therapy Note    Date: 11/20/2024    Group Start Time: 0950  Group End Time: 1000  Group Topic: Community Meeting    [facility]    Ning Williamson, DCS    Type of Group: Community Meeting      Patient's Goal:  Patient will be able to id staffing assignments, expectations of patients, and general information re: floor rules. Will be prompted to share goal for the day.     Notes:  Patient appeared to be an active listener, taking in information presented and was prompted to share goal for the day.    Status After Intervention:  Improved    Participation Level: Active Listener and Interactive    Participation Quality: Appropriate, Attentive, and Sharing      Speech:  normal      Thought Process/Content: Logical      Affective Functioning: Congruent      Mood: euthymic      Level of consciousness:  Alert      Response to Learning: Able to verbalize/acknowledge new learning and Able to retain information      Endings: None Reported    Modes of Intervention: Support, Socialization, and Clarifying      Discipline Responsible: Psychoeducational Specialist      Signature:  HERB Gardner

## 2024-11-20 NOTE — GROUP NOTE
Group Therapy Note    Date: 11/20/2024    Group Start Time: 1000  Group End Time: 1040  Group Topic: Psychoeducation    [facility]    Ning Williamson, CTRS    Date: 11/20/2024    Type of Group: Psychoeducation    Wellness Binder Information  Module Name:  supporting someone with depression    Patient's Goal:  Pt will be able to id steps to helps others and ask for help to others.     Notes:  Pleasant and engaged in group, sharing and accepting of handout.     Status After Intervention:  Improved    Participation Level: Active Listener and Interactive    Participation Quality: Appropriate, Attentive, and Sharing      Speech:  normal      Thought Process/Content: Logical      Affective Functioning: Congruent      Mood: euthymic      Level of consciousness:  Alert, Oriented x4, and Attentive      Response to Learning: Able to verbalize/acknowledge new learning, Able to retain information, and Progressing to goal      Endings: None Reported    Modes of Intervention: Education, Support, Socialization, and Clarifying      Discipline Responsible: Psychoeducational Specialist      Signature:  Ning Williamson, CTRS

## 2024-11-20 NOTE — CARE COORDINATION
Pt was seen during treatment team. Pt reports feeling great and \"mentally there\" today. 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 Pt reports he was not trying to burn his house down. Pt was extremely disorganized, rapid/pressured speech, poor insight/judgement.

## 2024-11-21 PROCEDURE — 1240000000 HC EMOTIONAL WELLNESS R&B

## 2024-11-21 PROCEDURE — 6370000000 HC RX 637 (ALT 250 FOR IP): Performed by: NURSE PRACTITIONER

## 2024-11-21 PROCEDURE — 6370000000 HC RX 637 (ALT 250 FOR IP): Performed by: PSYCHIATRY & NEUROLOGY

## 2024-11-21 PROCEDURE — 36415 COLL VENOUS BLD VENIPUNCTURE: CPT

## 2024-11-21 PROCEDURE — 80178 ASSAY OF LITHIUM: CPT

## 2024-11-21 PROCEDURE — 6360000002 HC RX W HCPCS: Performed by: NURSE PRACTITIONER

## 2024-11-21 PROCEDURE — 99232 SBSQ HOSP IP/OBS MODERATE 35: CPT | Performed by: NURSE PRACTITIONER

## 2024-11-21 RX ORDER — CARBAMAZEPINE 200 MG/1
100 TABLET ORAL 2 TIMES DAILY
Status: DISCONTINUED | OUTPATIENT
Start: 2024-11-21 | End: 2024-11-22

## 2024-11-21 RX ADMIN — FLUPHENAZINE HYDROCHLORIDE 10 MG: 5 TABLET ORAL at 08:57

## 2024-11-21 RX ADMIN — OLANZAPINE 5 MG: 5 TABLET, ORALLY DISINTEGRATING ORAL at 20:16

## 2024-11-21 RX ADMIN — DIPHENHYDRAMINE HYDROCHLORIDE 50 MG: 50 INJECTION, SOLUTION INTRAMUSCULAR; INTRAVENOUS at 01:05

## 2024-11-21 RX ADMIN — MENTHOL AND METHYL SALICYLATE 1 APPLICATION: 7.6; 29 OINTMENT TOPICAL at 11:54

## 2024-11-21 RX ADMIN — LITHIUM CARBONATE 600 MG: 300 CAPSULE ORAL at 20:16

## 2024-11-21 RX ADMIN — Medication 3 MG: at 22:23

## 2024-11-21 RX ADMIN — LORAZEPAM 0.5 MG: 0.5 TABLET ORAL at 08:57

## 2024-11-21 RX ADMIN — ACETAMINOPHEN 650 MG: 325 TABLET ORAL at 22:23

## 2024-11-21 RX ADMIN — HYDROXYZINE HYDROCHLORIDE 50 MG: 50 TABLET, FILM COATED ORAL at 18:02

## 2024-11-21 RX ADMIN — LORAZEPAM 0.5 MG: 0.5 TABLET ORAL at 20:16

## 2024-11-21 RX ADMIN — LITHIUM CARBONATE 600 MG: 300 CAPSULE ORAL at 08:57

## 2024-11-21 RX ADMIN — HYDROXYZINE HYDROCHLORIDE 50 MG: 50 TABLET, FILM COATED ORAL at 00:16

## 2024-11-21 RX ADMIN — CARBAMAZEPINE 100 MG: 200 TABLET ORAL at 20:16

## 2024-11-21 RX ADMIN — FLUPHENAZINE HYDROCHLORIDE 10 MG: 5 TABLET ORAL at 20:16

## 2024-11-21 RX ADMIN — CARBAMAZEPINE 100 MG: 200 TABLET ORAL at 15:01

## 2024-11-21 RX ADMIN — OLANZAPINE 5 MG: 5 TABLET, ORALLY DISINTEGRATING ORAL at 08:58

## 2024-11-21 ASSESSMENT — PAIN SCALES - GENERAL
PAINLEVEL_OUTOF10: 0
PAINLEVEL_OUTOF10: 0
PAINLEVEL_OUTOF10: 3

## 2024-11-21 ASSESSMENT — PAIN DESCRIPTION - LOCATION: LOCATION: SHOULDER

## 2024-11-21 ASSESSMENT — PAIN SCALES - WONG BAKER: WONGBAKER_NUMERICALRESPONSE: NO HURT

## 2024-11-21 NOTE — BH NOTE
Pt states to this nurse that, \"I puked up my pills. Did I have my Ativan? That's ok, I don't want these pills anyway.\" Pt states that he already flushed what he threw up, but that theres \"some left on the seat.\"

## 2024-11-21 NOTE — GROUP NOTE
Group Therapy Note    Date: 11/21/2024    Group Start Time: 0930  Group End Time: 0945  Group Topic: Community Meeting    Cat Masters CTRS    Group Therapy Note    Attendees: 9    Date: 11/21/2024  Start Time: 0930  End Time:  0945  Number of Participants: 9    Type of Group: Community Meeting    Patient's Goal:  Increased awareness of functions of the milieu, staffing and programming. Identified goal for the day.    Notes:  Patient disruptive to group with inappropriate conversation. Patient shared goal for the day as, \"Psychopharmocology.\" Patient unable to explain clearly what he meant.    Status After Intervention:  Unchanged    Participation Level: Monopolizing    Participation Quality: Inappropriate, Intrusive, and Resistant      Speech:  loud      Thought Process/Content: Delusional  Flight of ideas      Affective Functioning: Congruent      Mood: anxious      Level of consciousness:  Preoccupied and Inattentive      Response to Learning: Resistant      Endings: None Reported    Modes of Intervention: Education, Support, Socialization, Exploration, Clarifying, and Problem-solving      Discipline Responsible: Psychoeducational Specialist      Signature:  HERB Wyatt

## 2024-11-21 NOTE — GROUP NOTE
Group Therapy Note    Date: 11/21/2024    Group Start Time: 0945  Group End Time: 1015  Group Topic: Psychoeducation    Cat Masters CTRS    Group Therapy Note    Attendees: 10    Date: 11/21/2024  Start Time: 0945  End Time:  1015  Number of Participants: 10    Type of Group: Psychoeducation    Name:  Processing Change    Patient's Goal:  Identify stages of change and healthy ways to process change.    Notes:  CTRS led educational group discussion on processing change. Encouraged patients to share their experiences. Patient disruptive to group and was asked to leave due to patient sharing with group about using meth. Patient sat on outside perimeter of group and listened quietly without inappropriate discussion.     Status After Intervention:  Unchanged    Participation Level: Monopolizing    Participation Quality: Inappropriate and Intrusive      Speech:  loud      Thought Process/Content: Delusional  Flight of ideas      Affective Functioning: Congruent      Mood: elevated      Level of consciousness:  Preoccupied and Inattentive      Response to Learning: Resistant      Endings: None Reported    Modes of Intervention: Education, Support, Socialization, Exploration, Clarifying, and Problem-solving      Discipline Responsible: Psychoeducational Specialist      Signature:  HERB Wyatt

## 2024-11-21 NOTE — GROUP NOTE
Group Therapy Note    Date: 11/21/2024    Group Start Time: 1050  Group End Time: 1130  Group Topic: Psychotherapy    [facility]    Lena Nguyen MSW, LSW        Group Therapy Note    Attendees: 6       Patient's Goal:  To increase social interaction and improve relationships with others.      Notes:  Pt was attentive in group and was able to identify an agenda. They were also able to verbalize relating to others within the group.     Status After Intervention:  Unchanged    Participation Level: Active Listener and Interactive    Participation Quality: Attentive, Sharing, Supportive, and Intrusive      Speech:  normal      Thought Process/Content: Delusional  Flight of ideas      Affective Functioning: Blunted      Mood: anxious      Level of consciousness:  Alert and Attentive      Response to Learning: Able to verbalize current knowledge/experience and Able to retain information      Endings: None Reported    Modes of Intervention: Education, Support, Socialization, Exploration, Clarifying, and Problem-solving      Discipline Responsible: /Counselor      Signature:  DARINEL Alfonso, DIONICIO

## 2024-11-21 NOTE — BH NOTE
Patient up pacing unit making delusional statements. Patient behavior remains in control. Safety rounds done q15 minutes. Will continue to monitor.

## 2024-11-21 NOTE — GROUP NOTE
Group Therapy Note    Date: 11/21/2024    Group Start Time: 1530  Group End Time: 1600  Group Topic: Recreational    Cat Masters CTRS    Group Therapy Note    Attendees: 8    Date: 11/21/2024  Start Time: 1530  End Time:  1600  Number of Participants: 8    Type of Group: Recreational    Name:  November She    Patient's Goal:  Increased awareness of November facts. Increased orientation to reality.    Notes:  Patient was actively engaged in group activity and answered questions appropriately. Patient often made bizarre comments but was able to be redirected to group topic.    Status After Intervention:  Improved    Participation Level: Active Listener and Interactive    Participation Quality: Appropriate, Attentive, and Sharing      Speech:  normal      Thought Process/Content: Logical  Linear      Affective Functioning: Congruent      Mood:  Appropriate      Level of consciousness:  Alert and Attentive      Response to Learning: Able to verbalize current knowledge/experience, Able to verbalize/acknowledge new learning, Able to retain information, Capable of insight, Able to change behavior, and Progressing to goal      Endings: None Reported    Modes of Intervention: Socialization, Clarifying, and Activity      Discipline Responsible: Psychoeducational Specialist      Signature:  HERB Wyatt

## 2024-11-21 NOTE — CARE COORDINATION
Pt walked past SW and greeted SW with a \"good morning\". He remains delusional and preoccupied, was overheard by SW stating multiple delusional statements to other peers on the unit. SW will continue to assist.

## 2024-11-22 PROCEDURE — 99232 SBSQ HOSP IP/OBS MODERATE 35: CPT | Performed by: NURSE PRACTITIONER

## 2024-11-22 PROCEDURE — 6370000000 HC RX 637 (ALT 250 FOR IP): Performed by: PSYCHIATRY & NEUROLOGY

## 2024-11-22 PROCEDURE — 6370000000 HC RX 637 (ALT 250 FOR IP): Performed by: NURSE PRACTITIONER

## 2024-11-22 PROCEDURE — 1240000000 HC EMOTIONAL WELLNESS R&B

## 2024-11-22 RX ORDER — OLANZAPINE 5 MG/1
10 TABLET, ORALLY DISINTEGRATING ORAL NIGHTLY
Status: DISCONTINUED | OUTPATIENT
Start: 2024-11-22 | End: 2024-11-27 | Stop reason: HOSPADM

## 2024-11-22 RX ORDER — LORAZEPAM 0.5 MG/1
0.5 TABLET ORAL NIGHTLY
Status: COMPLETED | OUTPATIENT
Start: 2024-11-23 | End: 2024-11-23

## 2024-11-22 RX ORDER — CARBAMAZEPINE 200 MG/1
200 TABLET ORAL 2 TIMES DAILY
Status: DISCONTINUED | OUTPATIENT
Start: 2024-11-22 | End: 2024-11-27 | Stop reason: HOSPADM

## 2024-11-22 RX ADMIN — LITHIUM CARBONATE 600 MG: 300 CAPSULE ORAL at 20:54

## 2024-11-22 RX ADMIN — OLANZAPINE 5 MG: 5 TABLET, ORALLY DISINTEGRATING ORAL at 09:28

## 2024-11-22 RX ADMIN — LITHIUM CARBONATE 600 MG: 300 CAPSULE ORAL at 09:28

## 2024-11-22 RX ADMIN — CARBAMAZEPINE 100 MG: 200 TABLET ORAL at 09:28

## 2024-11-22 RX ADMIN — OLANZAPINE 10 MG: 5 TABLET, ORALLY DISINTEGRATING ORAL at 20:54

## 2024-11-22 RX ADMIN — LORAZEPAM 0.5 MG: 0.5 TABLET ORAL at 09:28

## 2024-11-22 RX ADMIN — FLUPHENAZINE HYDROCHLORIDE 10 MG: 5 TABLET ORAL at 20:54

## 2024-11-22 RX ADMIN — Medication 3 MG: at 18:00

## 2024-11-22 RX ADMIN — CARBAMAZEPINE 200 MG: 200 TABLET ORAL at 20:54

## 2024-11-22 RX ADMIN — FLUPHENAZINE HYDROCHLORIDE 10 MG: 5 TABLET ORAL at 09:28

## 2024-11-22 ASSESSMENT — PAIN SCALES - GENERAL
PAINLEVEL_OUTOF10: 0

## 2024-11-22 ASSESSMENT — PAIN SCALES - WONG BAKER: WONGBAKER_NUMERICALRESPONSE: NO HURT

## 2024-11-22 NOTE — GROUP NOTE
Group Therapy Note    Date: 11/22/2024    Group Start Time: 1100  Group End Time: 1145  Group Topic: Psychotherapy    [facility]    Lena Nguyen, DARINEL, DIONICIO        Group Therapy Note    Attendees: 10       Patient's Goal:  To increase social interaction and improve relationships with others.      Notes:  Pt was attentive in group but left after the first five minutes.    Status After Intervention:  Unchanged    Participation Level: None    Participation Quality: Resistant      Speech:  mute      Thought Process/Content: Logical      Affective Functioning: Congruent      Mood: euthymic      Level of consciousness:  Drowsy      Response to Learning: Resistant      Endings: None Reported    Modes of Intervention: Education, Support, Socialization, Exploration, Clarifying, and Problem-solving      Discipline Responsible: /Counselor      Signature:  DARINEL Alfonso, DIONICIO

## 2024-11-22 NOTE — CARE COORDINATION
SW met with pt in treatment team. Pt was seen out in the common area. Pt stated that he would like to be discharged from the hospital Monday as he is only suppose to be in the hospital for 3-4 days and he has now been here for 10 days. Pt was encouraged to try getting more sleep and to sleep better at night.

## 2024-11-22 NOTE — GROUP NOTE
Group Therapy Note    Date: 11/22/2024    Group Start Time: 0930  Group End Time: 0945  Group Topic: Community Meeting    Cat Masters CTRS    Group Therapy Note    Attendees: 9    Date: 11/22/2024  Start Time: 0930  End Time:  0945  Number of Participants: 9    Type of Group: Community Meeting    Patient's Goal:  Increased awareness of functions of the milieu, staffing and programming. Identified goal for the day.    Notes:  Patient was an active listener in group. Patient shared goal for the day as, \"Clarity and a good state of mind.\"    Status After Intervention:  Improved    Participation Level: Active Listener and Interactive    Participation Quality: Appropriate, Attentive, and Sharing      Speech:  normal      Thought Process/Content: Logical  Linear      Affective Functioning: Congruent      Mood: Appropriate      Level of consciousness:  Alert and Attentive      Response to Learning: Able to verbalize current knowledge/experience, Able to verbalize/acknowledge new learning, Able to retain information, Capable of insight, Able to change behavior, and Progressing to goal      Endings: None Reported    Modes of Intervention: Education, Support, Socialization, Exploration, Clarifying, and Problem-solving      Discipline Responsible: Psychoeducational Specialist      Signature:  HERB Wyatt

## 2024-11-22 NOTE — GROUP NOTE
Group Therapy Note    Date: 11/22/2024    Group Start Time: 0945  Group End Time: 1015  Group Topic: Psychoeducation    Cat Masters CTRS    Group Therapy Note    Attendees: 11    Date: 11/22/2024  Start Time: 0945  End Time:  1015  Number of Participants: 11    Type of Group: Psychoeducation    Name:  Self-Esteem    Patient's Goal:  Increased awareness of what is self-esteem, causes of low self-esteem, how self-esteem affects mental health and ways to improve mental health.    Notes:  CTRS led educational group discussion on self-esteem. Encouraged patients to share their experiences. Patient was actively engaged in group discussion and made positive statements.    Status After Intervention:  Improved    Participation Level: Active Listener and Interactive    Participation Quality: Appropriate, Attentive, and Sharing      Speech:  normal      Thought Process/Content: Logical  Linear      Affective Functioning: Congruent      Mood:  Appropriate      Level of consciousness:  Alert and Attentive      Response to Learning: Able to verbalize current knowledge/experience, Able to verbalize/acknowledge new learning, Able to retain information, Capable of insight, Able to change behavior, and Progressing to goal      Endings: None Reported    Modes of Intervention: Education, Support, Socialization, Exploration, Clarifying, and Problem-solving      Discipline Responsible: Psychoeducational Specialist      Signature:  HERB Wyatt

## 2024-11-23 PROCEDURE — 6370000000 HC RX 637 (ALT 250 FOR IP): Performed by: PSYCHIATRY & NEUROLOGY

## 2024-11-23 PROCEDURE — 6370000000 HC RX 637 (ALT 250 FOR IP): Performed by: NURSE PRACTITIONER

## 2024-11-23 PROCEDURE — 99232 SBSQ HOSP IP/OBS MODERATE 35: CPT

## 2024-11-23 PROCEDURE — 1240000000 HC EMOTIONAL WELLNESS R&B

## 2024-11-23 RX ADMIN — FLUPHENAZINE HYDROCHLORIDE 10 MG: 5 TABLET ORAL at 20:27

## 2024-11-23 RX ADMIN — Medication 3 MG: at 20:30

## 2024-11-23 RX ADMIN — OLANZAPINE 10 MG: 5 TABLET, ORALLY DISINTEGRATING ORAL at 20:23

## 2024-11-23 RX ADMIN — CARBAMAZEPINE 200 MG: 200 TABLET ORAL at 09:41

## 2024-11-23 RX ADMIN — ACETAMINOPHEN 650 MG: 325 TABLET ORAL at 16:53

## 2024-11-23 RX ADMIN — MAGNESIUM HYDROXIDE 30 ML: 400 SUSPENSION ORAL at 17:09

## 2024-11-23 RX ADMIN — LITHIUM CARBONATE 600 MG: 300 CAPSULE ORAL at 20:25

## 2024-11-23 RX ADMIN — LORAZEPAM 0.5 MG: 0.5 TABLET ORAL at 20:26

## 2024-11-23 RX ADMIN — CARBAMAZEPINE 200 MG: 200 TABLET ORAL at 20:24

## 2024-11-23 RX ADMIN — LITHIUM CARBONATE 600 MG: 300 CAPSULE ORAL at 09:41

## 2024-11-23 RX ADMIN — FLUPHENAZINE HYDROCHLORIDE 10 MG: 5 TABLET ORAL at 09:41

## 2024-11-23 ASSESSMENT — PAIN SCALES - WONG BAKER: WONGBAKER_NUMERICALRESPONSE: NO HURT

## 2024-11-23 ASSESSMENT — PAIN SCALES - GENERAL
PAINLEVEL_OUTOF10: 0
PAINLEVEL_OUTOF10: 6
PAINLEVEL_OUTOF10: 0
PAINLEVEL_OUTOF10: 0

## 2024-11-23 ASSESSMENT — PAIN - FUNCTIONAL ASSESSMENT: PAIN_FUNCTIONAL_ASSESSMENT: ACTIVITIES ARE NOT PREVENTED

## 2024-11-23 ASSESSMENT — PAIN DESCRIPTION - LOCATION: LOCATION: ABDOMEN

## 2024-11-23 ASSESSMENT — PAIN DESCRIPTION - DESCRIPTORS: DESCRIPTORS: ACHING;SHARP

## 2024-11-23 ASSESSMENT — PAIN DESCRIPTION - ORIENTATION: ORIENTATION: LOWER;MID

## 2024-11-23 NOTE — BH NOTE
Pt is alert and oriented x 3. He denies SI/HI/ A/V hallucinations. He paces unit but is friendly. Speech is slower than in the past and more clear. He asked to have his shower this morning. While showering, staff cleaned his room because he keeps pieces and parts of foods and drinks in his room. He is attending groups. He denies any problems with medications. He has not had any violent outbursts today and is not yelling or displaying paranoid delusions as he has in previous days here. He denies pain.

## 2024-11-24 PROCEDURE — 6370000000 HC RX 637 (ALT 250 FOR IP): Performed by: NURSE PRACTITIONER

## 2024-11-24 PROCEDURE — 99232 SBSQ HOSP IP/OBS MODERATE 35: CPT

## 2024-11-24 PROCEDURE — 6370000000 HC RX 637 (ALT 250 FOR IP): Performed by: PSYCHIATRY & NEUROLOGY

## 2024-11-24 PROCEDURE — 1240000000 HC EMOTIONAL WELLNESS R&B

## 2024-11-24 RX ADMIN — MENTHOL AND METHYL SALICYLATE 1 APPLICATION: 7.6; 29 OINTMENT TOPICAL at 17:02

## 2024-11-24 RX ADMIN — OLANZAPINE 10 MG: 5 TABLET, ORALLY DISINTEGRATING ORAL at 21:49

## 2024-11-24 RX ADMIN — Medication 3 MG: at 17:39

## 2024-11-24 RX ADMIN — FLUPHENAZINE HYDROCHLORIDE 10 MG: 5 TABLET ORAL at 08:24

## 2024-11-24 RX ADMIN — CARBAMAZEPINE 200 MG: 200 TABLET ORAL at 08:24

## 2024-11-24 RX ADMIN — CARBAMAZEPINE 200 MG: 200 TABLET ORAL at 21:49

## 2024-11-24 RX ADMIN — LITHIUM CARBONATE 600 MG: 300 CAPSULE ORAL at 08:24

## 2024-11-24 RX ADMIN — LITHIUM CARBONATE 600 MG: 300 CAPSULE ORAL at 21:49

## 2024-11-24 RX ADMIN — FLUPHENAZINE HYDROCHLORIDE 10 MG: 5 TABLET ORAL at 21:49

## 2024-11-24 RX ADMIN — ACETAMINOPHEN 650 MG: 325 TABLET ORAL at 08:25

## 2024-11-24 ASSESSMENT — PAIN DESCRIPTION - LOCATION: LOCATION: SHOULDER

## 2024-11-24 ASSESSMENT — PAIN SCALES - WONG BAKER: WONGBAKER_NUMERICALRESPONSE: NO HURT

## 2024-11-24 ASSESSMENT — PAIN SCALES - GENERAL
PAINLEVEL_OUTOF10: 0
PAINLEVEL_OUTOF10: 4
PAINLEVEL_OUTOF10: 0
PAINLEVEL_OUTOF10: 0

## 2024-11-24 NOTE — BH NOTE
Pt presents A & O x 3, but remains disoriented with poor insight to situation for admission. He has a blunted affect. He denies SI and HI. He denies A/V hallucinations. This nurse did not observe him talking to himself or having any apparent internal stimulation. He had a verbal altercation with another patient last night due to his delusional thinking. When we talked with him today he said \"I am going to stay in my den all day.\" He is cooperative with all medications. He is avoiding peers who were verbally aggressive to him yesterday. Plan to continue coping skills eduction and monitor for effectiveness of anti-psychotic medications in decreasing his delusional, paranoid thinking.

## 2024-11-25 PROCEDURE — 99232 SBSQ HOSP IP/OBS MODERATE 35: CPT | Performed by: NURSE PRACTITIONER

## 2024-11-25 PROCEDURE — 1240000000 HC EMOTIONAL WELLNESS R&B

## 2024-11-25 PROCEDURE — 6370000000 HC RX 637 (ALT 250 FOR IP): Performed by: PSYCHIATRY & NEUROLOGY

## 2024-11-25 PROCEDURE — 6370000000 HC RX 637 (ALT 250 FOR IP): Performed by: NURSE PRACTITIONER

## 2024-11-25 RX ORDER — OLANZAPINE 5 MG/1
5 TABLET ORAL DAILY
Status: DISCONTINUED | OUTPATIENT
Start: 2024-11-25 | End: 2024-11-27 | Stop reason: HOSPADM

## 2024-11-25 RX ADMIN — CARBAMAZEPINE 200 MG: 200 TABLET ORAL at 21:13

## 2024-11-25 RX ADMIN — ALUMINUM HYDROXIDE, MAGNESIUM HYDROXIDE, AND SIMETHICONE 30 ML: 200; 200; 20 SUSPENSION ORAL at 18:06

## 2024-11-25 RX ADMIN — OLANZAPINE 5 MG: 5 TABLET, FILM COATED ORAL at 11:53

## 2024-11-25 RX ADMIN — FLUPHENAZINE HYDROCHLORIDE 10 MG: 5 TABLET ORAL at 08:57

## 2024-11-25 RX ADMIN — ACETAMINOPHEN 650 MG: 325 TABLET ORAL at 06:44

## 2024-11-25 RX ADMIN — HYDROXYZINE HYDROCHLORIDE 50 MG: 50 TABLET, FILM COATED ORAL at 21:13

## 2024-11-25 RX ADMIN — FLUPHENAZINE HYDROCHLORIDE 10 MG: 5 TABLET ORAL at 21:13

## 2024-11-25 RX ADMIN — OLANZAPINE 10 MG: 5 TABLET, ORALLY DISINTEGRATING ORAL at 21:13

## 2024-11-25 RX ADMIN — Medication 3 MG: at 18:00

## 2024-11-25 RX ADMIN — LITHIUM CARBONATE 600 MG: 300 CAPSULE ORAL at 21:13

## 2024-11-25 RX ADMIN — MENTHOL AND METHYL SALICYLATE 1 APPLICATION: 7.6; 29 OINTMENT TOPICAL at 18:29

## 2024-11-25 RX ADMIN — CARBAMAZEPINE 200 MG: 200 TABLET ORAL at 08:58

## 2024-11-25 RX ADMIN — LITHIUM CARBONATE 600 MG: 300 CAPSULE ORAL at 08:58

## 2024-11-25 ASSESSMENT — PAIN DESCRIPTION - LOCATION
LOCATION: SHOULDER
LOCATION: SHOULDER

## 2024-11-25 ASSESSMENT — PAIN SCALES - GENERAL
PAINLEVEL_OUTOF10: 0
PAINLEVEL_OUTOF10: 6
PAINLEVEL_OUTOF10: 6

## 2024-11-25 ASSESSMENT — PAIN DESCRIPTION - ORIENTATION
ORIENTATION: RIGHT
ORIENTATION: RIGHT

## 2024-11-25 ASSESSMENT — PAIN SCALES - WONG BAKER: WONGBAKER_NUMERICALRESPONSE: NO HURT

## 2024-11-25 ASSESSMENT — PAIN DESCRIPTION - DESCRIPTORS: DESCRIPTORS: SHARP;DISCOMFORT

## 2024-11-25 ASSESSMENT — PAIN - FUNCTIONAL ASSESSMENT: PAIN_FUNCTIONAL_ASSESSMENT: ACTIVITIES ARE NOT PREVENTED
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
## 2024-11-25 NOTE — BH NOTE
[facility]  Week Interdisciplinary Treatment Plan Note     Review Date & Time: 11/25/24 @  1030    Patient was in treatment team.    Estimated Length of Stay Update:  11/27/24   Estimated Discharge Date Update: 11/27/24    EDUCATION:   Learner Progress Toward Treatment Goals: Reviewed results and recommendations of this team, Reviewed group plan and strategies, Reviewed signs, symptoms and risk of self harm and violent behavior, and Reviewed goals and plan of care    Method: Small group    Outcome: Verbalized understanding and Needs reinforcement    PATIENT GOALS: Take medications as prescribed, attend group sessions, continue to work on discharge plans    PLAN/TREATMENT RECOMMENDATIONS UPDATE: 12/2/24    GOALS UPDATE:  Time frame for Short-Term Goals: 11/27/24 2 days      Pily Grimaldo RN

## 2024-11-25 NOTE — GROUP NOTE
Group Therapy Note    Date: 11/25/2024    Group Start Time: 0930  Group End Time: 0950  Group Topic: Community Meeting    Cat Masters CTRS    Group Therapy Note    Attendees: 14    Date: 11/25/2024  Start Time: 0930  End Time:  0950  Number of Participants: 14    Type of Group: Community Meeting    Patient's Goal:  Increased awareness of functions of the milieu, daily staffing and programming. Identified goal for the day.    Notes:  Patient was an active listener in group. Patient shared goal for the day as, \"Not to smoke weed when I leave.\"    Status After Intervention:  Improved    Participation Level: Interactive    Participation Quality: Sharing      Speech:  normal      Thought Process/Content: Logical  Linear      Affective Functioning: Blunted      Mood:  Flat      Level of consciousness:  Alert and Attentive      Response to Learning: Able to verbalize current knowledge/experience, Able to verbalize/acknowledge new learning, Able to retain information, Capable of insight, Able to change behavior, and Progressing to goal      Endings: None Reported    Modes of Intervention: Education, Support, Socialization, Exploration, Clarifying, and Problem-solving      Discipline Responsible: Psychoeducational Specialist      Signature:  HERB Wyatt

## 2024-11-25 NOTE — GROUP NOTE
Group Therapy Note    Date: 11/25/2024    Group Start Time: 1050  Group End Time: 1150  Group Topic: Psychotherapy    [facility]    Lena Nguyen, DIONICIO TENA        Group Therapy Note    Attendees: 11       Patient's Goal:  To increase social interaction and improve relationships with others.      Notes:  Pt was attentive in group and was able to identify an agenda. They were also able to verbalize relating to others within the group.     Status After Intervention:  Unchanged    Participation Level: Active Listener and Interactive    Participation Quality: Attentive and Sharing      Speech:  pressured      Thought Process/Content: Delusional      Affective Functioning: Blunted      Mood: anxious      Level of consciousness:  Alert and Attentive      Response to Learning: Able to verbalize current knowledge/experience and Able to verbalize/acknowledge new learning      Endings: None Reported    Modes of Intervention: Education, Support, Socialization, Exploration, Clarifying, and Problem-solving      Discipline Responsible: /Counselor      Signature:  DARINEL Alfonso, DIONICIO

## 2024-11-26 VITALS
BODY MASS INDEX: 28 KG/M2 | DIASTOLIC BLOOD PRESSURE: 95 MMHG | OXYGEN SATURATION: 94 % | RESPIRATION RATE: 18 BRPM | TEMPERATURE: 97.8 F | HEART RATE: 84 BPM | HEIGHT: 71 IN | SYSTOLIC BLOOD PRESSURE: 155 MMHG | WEIGHT: 200 LBS

## 2024-11-26 PROCEDURE — 6370000000 HC RX 637 (ALT 250 FOR IP): Performed by: PSYCHIATRY & NEUROLOGY

## 2024-11-26 PROCEDURE — 99232 SBSQ HOSP IP/OBS MODERATE 35: CPT | Performed by: NURSE PRACTITIONER

## 2024-11-26 PROCEDURE — 6360000002 HC RX W HCPCS: Performed by: NURSE PRACTITIONER

## 2024-11-26 PROCEDURE — 6370000000 HC RX 637 (ALT 250 FOR IP): Performed by: NURSE PRACTITIONER

## 2024-11-26 PROCEDURE — 1240000000 HC EMOTIONAL WELLNESS R&B

## 2024-11-26 RX ORDER — BENZTROPINE MESYLATE 1 MG/1
1 TABLET ORAL 2 TIMES DAILY
Status: DISCONTINUED | OUTPATIENT
Start: 2024-11-26 | End: 2024-11-27 | Stop reason: HOSPADM

## 2024-11-26 RX ADMIN — HYDROXYZINE HYDROCHLORIDE 50 MG: 50 TABLET, FILM COATED ORAL at 20:53

## 2024-11-26 RX ADMIN — CARBAMAZEPINE 200 MG: 200 TABLET ORAL at 08:36

## 2024-11-26 RX ADMIN — FLUPHENAZINE HYDROCHLORIDE 10 MG: 5 TABLET ORAL at 20:53

## 2024-11-26 RX ADMIN — OLANZAPINE 10 MG: 5 TABLET, ORALLY DISINTEGRATING ORAL at 20:53

## 2024-11-26 RX ADMIN — ACETAMINOPHEN 650 MG: 325 TABLET ORAL at 05:22

## 2024-11-26 RX ADMIN — BENZTROPINE MESYLATE 1 MG: 1 TABLET ORAL at 20:53

## 2024-11-26 RX ADMIN — LITHIUM CARBONATE 600 MG: 300 CAPSULE ORAL at 20:53

## 2024-11-26 RX ADMIN — BENZTROPINE MESYLATE 1 MG: 1 TABLET ORAL at 14:57

## 2024-11-26 RX ADMIN — CARBAMAZEPINE 200 MG: 200 TABLET ORAL at 20:53

## 2024-11-26 RX ADMIN — LITHIUM CARBONATE 600 MG: 300 CAPSULE ORAL at 08:36

## 2024-11-26 RX ADMIN — OLANZAPINE 5 MG: 5 TABLET, FILM COATED ORAL at 08:36

## 2024-11-26 RX ADMIN — FLUPHENAZINE HYDROCHLORIDE 10 MG: 5 TABLET ORAL at 08:36

## 2024-11-26 RX ADMIN — Medication 3 MG: at 18:05

## 2024-11-26 RX ADMIN — DIPHENHYDRAMINE HYDROCHLORIDE 50 MG: 50 INJECTION, SOLUTION INTRAMUSCULAR; INTRAVENOUS at 02:07

## 2024-11-26 ASSESSMENT — PAIN DESCRIPTION - LOCATION: LOCATION: HEAD

## 2024-11-26 ASSESSMENT — PAIN SCALES - GENERAL
PAINLEVEL_OUTOF10: 0
PAINLEVEL_OUTOF10: 7

## 2024-11-26 ASSESSMENT — PAIN SCALES - WONG BAKER: WONGBAKER_NUMERICALRESPONSE: NO HURT

## 2024-11-26 NOTE — GROUP NOTE
Group Therapy Note    Date: 11/26/2024    Group Start Time: 1100  Group End Time: 1145  Group Topic: Psychotherapy    [facility]    Viola Robbins MSW, LSW        Group Therapy Note    Attendees: 9       Patient's Goal:  To increase social interaction and improve relationships with others.      Notes:  Pt was attentive in group and was able to identify an agenda. They were also able to verbalize relating to others within the group.      Status After Intervention:  Improved    Participation Level: Active Listener and Interactive    Participation Quality: Appropriate, Attentive, Sharing, and Supportive      Speech:  normal      Thought Process/Content: Logical      Affective Functioning: Congruent      Mood: anxious      Level of consciousness:  Alert, Oriented x4, and Attentive      Response to Learning: Able to verbalize current knowledge/experience, Able to verbalize/acknowledge new learning, Able to retain information, and Capable of insight      Endings: None Reported    Modes of Intervention: Support, Socialization, and Exploration      Discipline Responsible: /Counselor      Signature:  DARINEL Fitzgerald LSW

## 2024-11-26 NOTE — GROUP NOTE
Group Therapy Note    Date: 11/26/2024    Group Start Time: 0930  Group End Time: 0950  Group Topic: Community Meeting    Cat Masters CTRS    Group Therapy Note    Attendees: 16    Date: 11/26/2024  Start Time: 0930  End Time:  0950  Number of Participants: 16    Type of Group: Community Meeting    Patient's Goal:  Increased awareness of functions of the milieu, daily staffing and programming. Identified goal for the day.    Notes:  Patient was an active listener in group. Patient shared goal for the day as, \"Work on my soundtrack.\" Patient at times made delusional statements but was able to be redirected.    Status After Intervention:  Improved    Participation Level: Active Listener and Interactive    Participation Quality: Attentive and Sharing      Speech:  normal      Thought Process/Content: Delusional at times      Affective Functioning: Congruent      Mood:  Appropriate      Level of consciousness:  Alert and Attentive      Response to Learning: Able to verbalize current knowledge/experience, Able to verbalize/acknowledge new learning, Able to retain information, Capable of insight, Able to change behavior, and Progressing to goal      Endings: None Reported    Modes of Intervention: Education, Support, Socialization, Exploration, Clarifying, and Problem-solving      Discipline Responsible: Psychoeducational Specialist      Signature:  HERB Wyatt

## 2024-11-26 NOTE — GROUP NOTE
Group Therapy Note    Date: 11/26/2024    Group Start Time: 0950  Group End Time: 1030  Group Topic: Psychoeducation    Cat Masters CTRS    Group Therapy Note    Attendees: 16    Date: 11/26/2024  Start Time: 0950  End Time:  1030  Number of Participants: 16    Type of Group: Psychoeducation    Name:  Staying Mentally Healthy with Technology    Patient's Goal:  Identify pros and cons of how technology can affect mental health. Increased awareness of ways to keep a healthy balance with technology use.    Notes:  CTRS led educational group discussion on staying healthy with technology. Encouraged patients to share their experiences. Patient was actively engaged in group discussion. Patient made some delusional statements during group but was able to be redirected.    Status After Intervention:  Improved    Participation Level: Active Listener and Interactive    Participation Quality: Attentive and Sharing      Speech:  normal      Thought Process/Content: Delusional at times      Affective Functioning: Congruent      Mood:  Appropriate      Level of consciousness:  Alert and Attentive      Response to Learning: Able to verbalize current knowledge/experience, Able to verbalize/acknowledge new learning, Able to retain information, Capable of insight, Able to change behavior, and Progressing to goal      Endings: None Reported    Modes of Intervention: Education, Support, Socialization, Exploration, Clarifying, and Problem-solving      Discipline Responsible: Psychoeducational Specialist      Signature:  HERB Wyatt

## 2024-11-26 NOTE — CARE COORDINATION
SW contacted [facility] [phone] and was advised their soonest appointment available with the med provider is 1/8/2025. The pt can see their med nurse prior to this if needed for any Maddie.

## 2024-11-26 NOTE — GROUP NOTE
Group Therapy Note    Date: 11/26/2024    Group Start Time: 1400  Group End Time: 1515  Group Topic: Recreational    [facility]    Group Therapy Note    Attendees: 8    Date: 11/26/2024  Start Time: 1400  End Time:  1515  Number of Participants: 8    Type of Group: Recreational    Name:  Cinema Therapy    Patient's Goal:  Patients connected with storyline while watching movie. Increased social skills.    Notes:  Patient actively engaged with watching movie.    Status After Intervention:  Improved    Participation Level: Active Listener and Interactive    Participation Quality: Appropriate and Attentive      Speech:  normal      Thought Process/Content: Logical  Linear      Affective Functioning: Congruent      Mood:  Appropriate      Level of consciousness:  Alert and Attentive      Response to Learning: Progressing to goal      Endings: None Reported    Modes of Intervention: Socialization, Activity, and Media      Discipline Responsible: Psychoeducational Specialist      Signature:  HERB Wyatt

## 2024-11-27 LAB
CARBAMAZEPINE DOSE: ABNORMAL MG
CARBAMAZEPINE SERPL-MCNC: 11 UG/ML (ref 4–10)
DATE LAST DOSE: ABNORMAL
TME LAST DOSE: ABNORMAL H

## 2024-11-27 PROCEDURE — 99239 HOSP IP/OBS DSCHRG MGMT >30: CPT | Performed by: NURSE PRACTITIONER

## 2024-11-27 PROCEDURE — 36415 COLL VENOUS BLD VENIPUNCTURE: CPT

## 2024-11-27 PROCEDURE — 6370000000 HC RX 637 (ALT 250 FOR IP): Performed by: PSYCHIATRY & NEUROLOGY

## 2024-11-27 PROCEDURE — 6360000002 HC RX W HCPCS: Performed by: NURSE PRACTITIONER

## 2024-11-27 PROCEDURE — 80156 ASSAY CARBAMAZEPINE TOTAL: CPT

## 2024-11-27 PROCEDURE — 6370000000 HC RX 637 (ALT 250 FOR IP): Performed by: NURSE PRACTITIONER

## 2024-11-27 RX ORDER — FLUPHENAZINE HYDROCHLORIDE 10 MG/1
10 TABLET ORAL NIGHTLY
Qty: 30 TABLET | Refills: 0 | Status: SHIPPED | OUTPATIENT
Start: 2024-11-28 | End: 2024-12-28

## 2024-11-27 RX ORDER — MENTHOL AND METHYL SALICYLATE 7.6; 29 G/100G; G/100G
1 OINTMENT TOPICAL 4 TIMES DAILY PRN
Qty: 1 EACH | Refills: 0 | Status: SHIPPED | OUTPATIENT
Start: 2024-11-27 | End: 2024-12-11

## 2024-11-27 RX ORDER — LITHIUM CARBONATE 600 MG/1
600 CAPSULE ORAL 2 TIMES DAILY
Qty: 60 CAPSULE | Refills: 0 | Status: SHIPPED | OUTPATIENT
Start: 2024-11-27 | End: 2024-12-27

## 2024-11-27 RX ORDER — CARBAMAZEPINE 200 MG/1
200 TABLET ORAL 2 TIMES DAILY
Qty: 60 TABLET | Refills: 0 | Status: SHIPPED | OUTPATIENT
Start: 2024-11-27 | End: 2024-12-27

## 2024-11-27 RX ORDER — FLUPHENAZINE DECANOATE 25 MG/ML
37.5 INJECTION, SOLUTION INTRAMUSCULAR; SUBCUTANEOUS
Status: DISCONTINUED | OUTPATIENT
Start: 2024-11-27 | End: 2024-11-27 | Stop reason: HOSPADM

## 2024-11-27 RX ORDER — FLUPHENAZINE HYDROCHLORIDE 5 MG/1
10 TABLET ORAL NIGHTLY
Status: DISCONTINUED | OUTPATIENT
Start: 2024-11-28 | End: 2024-11-27 | Stop reason: HOSPADM

## 2024-11-27 RX ORDER — OLANZAPINE 10 MG/1
10 TABLET ORAL NIGHTLY
Qty: 30 TABLET | Refills: 0 | Status: SHIPPED | OUTPATIENT
Start: 2024-11-27 | End: 2024-12-27

## 2024-11-27 RX ORDER — OLANZAPINE 5 MG/1
5 TABLET ORAL DAILY
Qty: 30 TABLET | Refills: 0 | Status: SHIPPED | OUTPATIENT
Start: 2024-11-28 | End: 2024-12-28

## 2024-11-27 RX ORDER — FLUPHENAZINE DECANOATE 25 MG/ML
37.5 INJECTION, SOLUTION INTRAMUSCULAR; SUBCUTANEOUS
Qty: 1.5 ML | Refills: 0 | Status: SHIPPED | OUTPATIENT
Start: 2024-12-11 | End: 2024-12-12

## 2024-11-27 RX ADMIN — FLUPHENAZINE DECANOATE 37.5 MG: 25 INJECTION, SOLUTION INTRAMUSCULAR; SUBCUTANEOUS at 11:32

## 2024-11-27 RX ADMIN — FLUPHENAZINE HYDROCHLORIDE 10 MG: 5 TABLET ORAL at 09:05

## 2024-11-27 RX ADMIN — BENZTROPINE MESYLATE 1 MG: 1 TABLET ORAL at 09:05

## 2024-11-27 RX ADMIN — LITHIUM CARBONATE 600 MG: 300 CAPSULE ORAL at 09:05

## 2024-11-27 RX ADMIN — OLANZAPINE 5 MG: 5 TABLET, FILM COATED ORAL at 09:05

## 2024-11-27 RX ADMIN — CARBAMAZEPINE 200 MG: 200 TABLET ORAL at 09:05
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
## 2024-11-27 NOTE — PROGRESS NOTES
Restraint Patient Debriefing    Patient condition on exit: Other Patient asleep in seclusion room with door open at this time. Patient not presented as a harm to self or others will continue to monitor and placed back on [id number] checks.    Patient debriefing (stated in patient's own word): Unable to perform patient asleep.    Patient states that the following helps him/her to maintain or regain behavioral control: Unable to perform patient asleep.  
Attended approx 1/2 of this afterN's 1h group on Foods that Decrease Depression and Home Improvement.    Attentive to a/v materials for portion during which he was present.  ( The nutrition con tent.   Completed 1st quiz and scored 100.    Politely excused himself afterward, speaking of feeling fatigued.     
Attentive to shaving and hygiene prior to this afterN's 90 min group on music trivia / clips.   We are giving credit for attendance despite his getting up and walking about for at least half of the group time.   Retained partial focus in his accepting both of the quiz papers and writing his name on them.    Did not focus enough to record / write down the quiz answers.    Earned particip prize.    Appeared to digest part of the learning points; expressed interest in the  history section ( belated 's day )   
BEHAVIORAL HEALTH FOLLOW-UP NOTE     11/16/2024     Patient was seen and examined in person, Chart reviewed   Patient's case discussed with staff/team    Chief Complaint: Ange and psychosis    Interim History:   Patient seen this morning up on the unit.  He manage delusional rapid pressured speech he continues to talk about his wife who he states is a nurse practitioner and states he does not know where she is he states that she was taken to \"a facility probably in [place].\"  He reported to staff that he was seeing dead bodies.  He starts talk about his blood type being oh negative.  He is disorganized delusional and paranoid    Appetite: [x] Normal/Unchanged  [] Increased  [] Decreased      Sleep:       [x] Normal/Unchanged  [] Fair       [] Poor              Energy:    [x] Normal/Unchanged  [] Increased  [] Decreased        SI [] Present  [x] Absent    HI  []Present  [x] Absent     Aggression:  [] yes  [x] no    Patient is [x] able  [] unable to CONTRACT FOR SAFETY     PAST MEDICAL/PSYCHIATRIC HISTORY:   Past Medical History:   Diagnosis Date    Bipolar 1 disorder ([facility])     Depression     GERD (gastroesophageal reflux disease)     Hypertension     Severe manic bipolar 1 disorder with psychotic behavior ([facility]) 10/28/2017    Suicide attempt ([facility]) 12/2019    jumped off bridge, per grandmother not currently suicidal       FAMILY/SOCIAL HISTORY:  No family history on file.  Social History     Socioeconomic History    Marital status: Single     Spouse name: Not on file    Number of children: Not on file    Years of education: Not on file    Highest education level: Not on file   Occupational History     Employer: NONE   Tobacco Use    Smoking status: Some Days     Types: Cigarettes    Smokeless tobacco: Never   Vaping Use    Vaping status: Some Days    Substances: Never   Substance and Sexual Activity    Alcohol use: Not Currently    Drug use: Yes     Types: Marijuana (Weed)    Sexual activity: Not Currently   Other 
BEHAVIORAL HEALTH FOLLOW-UP NOTE     11/17/2024     Patient was seen and examined in person, Chart reviewed   Patient's case discussed with staff/team    Chief Complaint: Ange and psychosis    Interim History:   Patient since morning upon the unit remains delusional staff indicates he is not talking about vampires he acquired IM.  Medication yesterday due to agitation and unable redirected.  He continues to make bizarre nonsensical statements talking out following custody for his 18 children and was also talking to  making delusional statements about the government.  He continues to focus on his \"wife.\"  He states \"a nurse practitioner.\"  He is nonsensical delusional flights of ideas bizarre appears preoccupied and internally stimulated      Appetite: [x] Normal/Unchanged  [] Increased  [] Decreased      Sleep:       [x] Normal/Unchanged  [] Fair       [] Poor              Energy:    [x] Normal/Unchanged  [] Increased  [] Decreased        SI [] Present  [x] Absent    HI  []Present  [x] Absent     Aggression:  [] yes  [x] no    Patient is [x] able  [] unable to CONTRACT FOR SAFETY     PAST MEDICAL/PSYCHIATRIC HISTORY:   Past Medical History:   Diagnosis Date    Bipolar 1 disorder ([facility])     Depression     GERD (gastroesophageal reflux disease)     Hypertension     Severe manic bipolar 1 disorder with psychotic behavior ([facility]) 10/28/2017    Suicide attempt ([facility]) 12/2019    jumped off bridge, per grandmother not currently suicidal       FAMILY/SOCIAL HISTORY:  No family history on file.  Social History     Socioeconomic History    Marital status: Single     Spouse name: Not on file    Number of children: Not on file    Years of education: Not on file    Highest education level: Not on file   Occupational History     Employer: NONE   Tobacco Use    Smoking status: Some Days     Types: Cigarettes    Smokeless tobacco: Never   Vaping Use    Vaping status: Some Days    Substances: Never   Substance and Sexual 
BEHAVIORAL HEALTH FOLLOW-UP NOTE     11/20/2024     Patient was seen and examined in person, Chart reviewed   Patient's case discussed with staff/team    Chief Complaint: Ange and psychosis    Interim History:   I saw patient this morning with the treatment team .  Patient is disorganized with rapid pressured speech staff reports has been up on the unit talking on the phone and can be irritable and threatening at times on the phone.  During evaluation today he has flights of ideas not making much sense he states that he wants us to stop giving him shots he is allergic to he starts timeout upon the scheme he talks about nurses using cocaine when they have their \"moments.\"  Delusional labile at times irritability at times.  Poor insight and judgment      Appetite: [x] Normal/Unchanged  [] Increased  [] Decreased      Sleep:       [x] Normal/Unchanged  [] Fair       [] Poor              Energy:    [x] Normal/Unchanged  [] Increased  [] Decreased        SI [] Present  [x] Absent    HI  []Present  [x] Absent     Aggression:  [] yes  [x] no    Patient is [x] able  [] unable to CONTRACT FOR SAFETY     PAST MEDICAL/PSYCHIATRIC HISTORY:   Past Medical History:   Diagnosis Date    Bipolar 1 disorder ([facility])     Depression     GERD (gastroesophageal reflux disease)     Hypertension     Severe manic bipolar 1 disorder with psychotic behavior ([facility]) 10/28/2017    Suicide attempt ([facility]) 12/2019    jumped off bridge, per grandmother not currently suicidal       FAMILY/SOCIAL HISTORY:  No family history on file.  Social History     Socioeconomic History    Marital status: Single     Spouse name: Not on file    Number of children: Not on file    Years of education: Not on file    Highest education level: Not on file   Occupational History     Employer: NONE   Tobacco Use    Smoking status: Some Days     Types: Cigarettes    Smokeless tobacco: Never   Vaping Use    Vaping status: Some Days    Substances: Never   Substance and Sexual 
BEHAVIORAL HEALTH FOLLOW-UP NOTE     11/23/2024     Patient was seen and examined in person, Chart reviewed   Patient's case discussed with staff/team    Chief Complaint: Ange and psychosis    Interim History:   Patient seen in his room lying in bed he states feeling little tired right now.  He does denies suicidal and homicidal ideation, denies auditory visual hallucinations does not appear internally preoccupied does not make delusional statements while speaking with me.  Speech is rapid and pressured he has been out of the unit per staff.  He has been taking his medication, he said no behavioral disturbances, he is social with select peers and attending select groups.  He is encouraged to continue taking medications, insight and judgment limited, impulse control limited.  Sleep and appetite reported to be fair.    Appetite: [x] Normal/Unchanged  [] Increased  [] Decreased      Sleep:       [x] Normal/Unchanged  [] Fair       [] Poor              Energy:    [x] Normal/Unchanged  [] Increased  [] Decreased        SI [] Present  [x] Absent    HI  []Present  [x] Absent     Aggression:  [] yes  [x] no    Patient is [x] able  [] unable to CONTRACT FOR SAFETY     PAST MEDICAL/PSYCHIATRIC HISTORY:   Past Medical History:   Diagnosis Date    Bipolar 1 disorder ([facility])     Depression     GERD (gastroesophageal reflux disease)     Hypertension     Severe manic bipolar 1 disorder with psychotic behavior ([facility]) 10/28/2017    Suicide attempt ([facility]) 12/2019    jumped off bridge, per grandmother not currently suicidal       FAMILY/SOCIAL HISTORY:  No family history on file.  Social History     Socioeconomic History    Marital status: Single     Spouse name: Not on file    Number of children: Not on file    Years of education: Not on file    Highest education level: Not on file   Occupational History     Employer: NONE   Tobacco Use    Smoking status: Some Days     Types: Cigarettes    Smokeless tobacco: Never   Vaping Use    Vaping 
BEHAVIORAL SERVICES: One - Hour In- Person Review  For Management of Violent or Self - Destructive Behavior    Seclusion/Restraint:  Seclusion     Reason for Intervention: Disruptive to unit. Agitated and verbally aggressive. Inappropriate behavior and was unable to be redirected.    Response to Intervention:  No signs of understanding at this time.    Medical Record reviewed and discussed precipitating events/behaviors with RN initiating Intervention:  Yes    Patient Medical Status:  Vital Signs: refused   Respiratory Status: WNL  Circulatory Status: WNL    Skin Integrity: WNL    Orientation: oriented to person and place    Mood/Affect: angry, anxious, irritable, and labile    Speech: Loud and Pressured    Thought Content: delusions, paranoid ideation, and tangential    Thought Processes: Tangential, Racing, Flight of Ideas, Loose Associations, Circumstantial, and Incoherent    Rationale for continued use of intervention: Patient continues to yell from locked seclusion. Often hitting the wall and door. Verbally aggressive towards staff.     Rationale for discontinuing intervention: Patient is able to: Maintain control of behavior.     One Hour Review Evaluation Physician Notification:  yes  
[facility] [place]  Discharge Note    Pt discharged with followings belongings:   Dental Appliances: None  Vision - Corrective Lenses: None  Hearing Aid: None  Jewelry: None  Body Piercings Removed: No  Clothing: Jacket/Coat (coat/jacket)  Other Valuables: Sent home   Valuables sent home with pt  or returned to patient. Patient educated on aftercare instructions: yes  Information faxed to n/a by n/a  at 1:53 PM .Patient verbalize understanding of AVS:  yes.    Status EXAM upon discharge:  Mental Status and Behavioral Exam  Normal: No  Level of Assistance: Independent/Self  Facial Expression: Brightened  Affect: Blunt  Level of Consciousness: Alert  Frequency of Checks: 4 times per hour, close  Mood:Normal: No  Mood: Anxious  Motor Activity:Normal: Yes  Motor Activity: Increased  Eye Contact: Fair  Observed Behavior: Friendly, Guarded, Cooperative  Sexual Misconduct History: Current - no  Preception: [place] to time, [place] to person, [place] to situation, [place] to place  Attention:Normal: No  Attention: Distractible  Thought Processes: Circumstantial  Thought Content:Normal: No  Thought Content: Preoccupations, Obsessions  Depression Symptoms: Impaired concentration  Anxiety Symptoms: Generalized  Ange Symptoms: Poor judgment  Hallucinations: None  Delusions: Yes  Delusions: Grandeur  Memory:Normal: No  Memory: Poor recent, Poor remote  Insight and Judgment: No  Insight and Judgment: Poor judgment, Poor insight    Tobacco Screening:  Practical Counseling, on admission, henry X, if applicable and completed (first 3 are required if patient doesn't refuse):            ( x) Recognizing danger situations (included triggers and roadblocks)                    ( x) Coping skills (new ways to manage stress,relaxation techniques, changing routine, distraction)                                                           (x ) Basic information about quitting (benefits of quitting, techniques in how to quit, available 
CLINICAL PHARMACY NOTE: MEDS TO BEDS    Total # of Prescriptions Filled: 7   The following medications were delivered to the patient:  Lithium carb 600mg  Fluphenazine 10mg  Carbazepine 200mg  Melatonin 3mg  Olanzapine 10mg  Olanzapine 5mg  Icy hot 7.6-29 ointment    Additional Documentation:  Delivered to Rola CASTILLO 11-27-24  
Dr. Fitzgerald notified patient continues to be loud, banging on walls in seclusion. Dr. Fitzgerald instructed to give IM ativan 2 hours after previously given PO ativan.  
Leisure assessment completed.    11/13/24 7510   Activities of Daily Living   Patient Requires assistance with daily self-care activities? No   Leisure Activity 1   3 Favorite Leisure Activities realitor exercising   Leisure Activity 2   Favorite Leisure Activities  same   Leisure Activity 3   Favorite Leisure Activities  same   Social   Patient reports spending the majority of their free time alone   Patient verbalizes a preference for spending free time with one other person   Patient’s perception of support system more healthy   Patient’s perception of barriers to socializing with others include(s) lack of comfort;lack of motivation/interest   Beliefs & Coping   Has difficulty dealing with feelings   Yes   Internalizes feelings/Keeps feelings in Yes   Externalizes feelings through aggressiveness or poor temper control  Yes   Feels uncomfortable around others  Yes   Has difficulty talking to others  No   Depends on others for direction or decisions No   Difficulty dealing with own anger  Yes   Frequently has difficulty with relationships  Yes   Has recently perceived/experienced loss, disappointment, humiliation or failure  NO   General perception about self likes self   Attitude about abilities more successful than not   Locus of Control  most of the time   Belief about recovery Recovery is possible   Patient Identified Strengths  taking care of my fiance, and my house   Patient Identified Limitations  being here   Perception of most stressful event prior to hospitalization need to get back to my fiance   Strengths and Limitations   Strengths Independent in basic self-care activities   Limitations Multiple barriers to leisure interests       
PT. GUARDIAN, DUNIA AKHTAR, GAVE PERMISSION VIA PHONE TO INITIATE TEGRETOL PER PT. REQUEST.  
PT. IS UP ON UNIT AT INTERVALS, ATTENDS GROUPS. IN CONTROL, NO UNIT PROBLEMS. SOCIAL WITH SELECT PEERS. APPETITE IS GOOD AND IS MEDICATION COMPLIANT.    PT. DENIED SUICIDAL IDEATIONS AND HOMICIDAL IDEATIONS. PT. DENIED HALLUCINATIONS. NO OVERT DELUSIONS AT THIS TIME. SPEECH REMAINS PRESSURED AND TANGENTIAL. LESS NEED FOR LIMIT SETTING AND REDIRECTION THIS A.M..      
PT. IS UP ON UNIT, LOUD AT TIMES, \"I KNOW WHAT'S GOING ON HERE, I CAN SMELL THE BLOOD. DID YOU SEE THAT MAKSIM'S FACE? THIS UNIT HAS CHANGED AND I NEED TO GET OUT OF HERE\".    PT. MOOD REMAINS LABILE WITH PERIODS OF IRRITABILITY AND PRESSURED, TANGENTIAL SPEECH.    PT. DENIED THOUGHTS OF HARM TO SELF OR OTHERS. PT. CONTINUES TO REQUIRE INTERMITTENT REDIRECTION, LIMIT SETTING AND SUPPORT REGARDING INAPPROPRIATE COMMENTS IN GROUPS AND OUT ON UNIT.  
PT. WITH INTERMITTENT IRRITABILITY WHEN ON PHONE THIS A.M.. PT. HAS BEEN OVERLY HELPFUL WITH SPECIFIC FEMALE PT.., REQUIRING LIMIT SETTING. BEHAVIORAL EXPECTATIONS REVIEWED, \"I AM NOT A CHILD\".  PT. HAS BEEN MEDICATION COMPLIANT, ATTENDED SELECT GROUPS. PT. DENIED SUICIDAL IDEATIONS AND HOMICIDAL IDEATIONS. PT. TALKS TO SELF AT TIMES, BUT DENIED HALLUCINATIONS. PT. REMAINS GRANDIOSE, DISCUSSING SIGNING UP FOR THE [employer] LIKE HIS GRANDFATHER.  
Patient appears to be resting comfortably in seclusion room with door open. No issues noted at this time. Will continue to monitor.  
Patient came into the group area during education group and sat down.  Patient then began spitting up on the floor stating \"they took too much blood from me.\"  Patient left area and returned with a face mask.  He then grabbed some paper towels and cleaned up the area.  Patient left the group area and did not return back to education group.   
Patient continues to be agitated in seclusion. Patient yelling and smashing hand on seclusion room door. Patient medicated with PRN IM medications ativan 2mg and benadryl 50mg. Will continue to assess.  
Patient declined invitation to the following groups:    Community Meeting    Patient will continue to be provided with opportunities to enhance leisure skills/interests and/or coping mechanisms.  
Patient declined invitation to the following groups:    Community Meeting    Patient will continue to be provided with opportunities to enhance leisure skills/interests and/or coping mechanisms.  
Patient declined invitation to the following groups:    Community Meeting  Education    Patient will continue to be provided with opportunities to enhance leisure skills/interests and/or coping mechanisms.  
Patient declined invitation to the following groups:    Community Meeting  Education    Patient will continue to be provided with opportunities to enhance leisure skills/interests and/or coping mechanisms.  
Patient declined invitation to the following groups:    Education    Patient will continue to be provided with opportunities to enhance leisure skills/interests and/or coping mechanisms.  
Patient declined invitation to the following groups:    Education  Recreation Activity    Patient will continue to be provided with opportunities to enhance leisure skills/interests and/or coping mechanisms.  
Patient declined to attend the following groups:    Community Meeting  Psychoeducation       Will continue to encourage patient to attend programming.  
Patient declined to attend the following groups:    Community Meeting  Psychoeducation   Recreation Activity    Will continue to encourage patient to attend programming.     
Patient declined to attend the following groups:    Peer Recovery    Will continue to encourage patient to attend programming.     
Patient declined to attend the following groups:    Psychoeducation     Pet Therapy     Will continue to encourage patient to attend programming.     
Patient declined verbal invitation to the following AM group    Community Meeting/Goal Setting    Patient will continue to be provided with opportunities to enhance leisure skills/interests and/or coping mechanisms.   
Patient denies suicidal ideation, homicidal ideations and AVH.  Patient denies anxiety and depression.  Patient reports he is feeling \"fine, angry, infuriated.\"  Patient had a verbal altercation with a peer but was able to be re-directed to his room.  Presents calm and cooperative during assessment.  Patient is out on the unit and is social with select peers.  Medications taken without issue.  No complaints or concerns verbalized at this time.  No unit problems reported.  Will continue to observe and support.          
Patient denies suicidal ideation, homicidal ideations and AVH.  Presents calm and cooperative during assessment.  Patient is out on the unit and is social with peers.  Medications taken without issue.  No complaints or concerns verbalized at this time.  No unit problems reported.  Will continue to observe and support.          
Patient displayed labile moods, yelling out at other patients on the unit. He stated, \"I will rape you.\" He was posturing towards other patients and was making gang signs towards another patient in the milieu and making verbally aggressive statements to beat them up. Nurses attempted redirection. Patient still continued to enter other patient's room and making verbally aggressive statements to them. This nurse offered a Tenriism book because he requested a Bible. Patient became increasingly aggressive and paced the unit, swinging his arms. Patient was yelling on the telephone.  His speech was rapid, pressured, and disorganized. He requested help and asked for Haldol medication. None was ordered at this time. Available PRN and scheduled medications administered. Plan to continue education on symptoms of psychosis and provided deescalating interventions.   
Patient is resting quietly in bed with eyes closed at this time.  No signs of distress or discomfort noted.  No PRN medications given thus far.  Safety needs met.  No unit problems reported.  Purposeful rounding continued.    
Patient resting with eyes closed. Respirations even and unlabored. No signs of distress. Purposeful rounding continued.  
Patient resting with eyes closed. Respirations even and unlabored. No signs of distress. Purposeful rounding continued.  
Patient's guardian Alicia informed of seclusion.  
Pt attended afternoon smoking cessation group. Pt appeared to be an active listener. Pt is able to share appropriately when prompted and asked facilitator relevant questions.  Pt was participant 1 of 12.    Electronically signed by Anneliese Urrutia on 11/22/2024 at 3:56 PM     
Pt refused to take flu vaccine. Order discontinued.   
SPOKE WITH PT.'S GRANDMA/GUARDIAN, DUNIA FOUNTAIN, WHO APPROVED ZYPREXA AND REPORTED PT. WILL GO TO THE DENTAL CLINIC ONCE DISCHARGED.  
Spiritual Support Group Note    Number of Participants in Group:    10                    Time: 2pm    Goal: Relief from isolation and loneliness             Anjelica Sharing             Self-understanding and gain insight              Acceptance and belonging            Recognize they are not alone                Socialization             Empowerment       Encouragement    Topic:  [] Spiritual Wellness and Self Care                  [] Hope                     [] Connecting with Divine/Others        [] Thankfulness and Gratitude               [x]  Meaningfulness and Purpose               [] Forgiveness               [] Peace               [] Connect to Community      [] Other    Participation Level:   [x] Active Listener   [] Minimal   [] Monopolizing   [] Interactive   [] No Participation   []  Other:     Attention:   [x] Alert   [] Distractible   [] Drowsy   [] Poor   [] Other:    Manner:   [x] Cooperative   [] Suspicious   [] Withdrawn   [] Guarded   [] Irritable   [] Inhospitable   [] Other:     Others Comments from Group:   
Unit manager-West and Clinical supervisor-Yadira made aware of patient being placed in seclusion.   
MD    nicotine (NICODERM CQ) 21 MG/24HR 1 patch, 1 patch, TransDERmal, Daily, Yon Perez MD, 1 patch at 11/14/24 0835    aluminum & magnesium hydroxide-simethicone (MAALOX) 200-200-20 MG/5ML suspension 30 mL, 30 mL, Oral, PRN, Yon Perez MD    hydrOXYzine HCl (ATARAX) tablet 50 mg, 50 mg, Oral, TID PRN, Yon Perez MD, 50 mg at 11/13/24 1713    melatonin tablet 3 mg, 3 mg, Oral, Nightly PRN, Yon Perez MD, 3 mg at 11/13/24 2117    fluPHENAZine HCl (PROLIXIN) tablet 10 mg, 10 mg, Oral, BID, Yon Perez MD, 10 mg at 11/14/24 0836    lithium capsule 450 mg, 450 mg, Oral, BID, Yon Perez MD, 450 mg at 11/14/24 0836    diphenhydrAMINE (BENADRYL) injection 50 mg, 50 mg, IntraMUSCular, Q6H PRN, Nely Esparza APRN - CNP, 50 mg at 11/13/24 0917    LORazepam (ATIVAN) injection 2 mg, 2 mg, IntraMUSCular, Q6H PRN, Vilma Nely TORI, APRN - CNP, 2 mg at 11/13/24 0918      Examination:  BP (!) 140/86   Pulse 92   Temp 97.8 °F (36.6 °C) (Tympanic)   Resp 14   SpO2 94%   Gait - steady  Medication side effects(SE):     Mental Status Examination:    Level of consciousness:  within normal limits   Appearance:  fair grooming and fair hygiene  Behavior/Motor:  no abnormalities noted  Attitude toward examiner:  cooperative  Speech:  spontaneous, normal rate and normal volume   Mood: \" I am fine\"  Affect: More elated still some underlying irritability  Thought processes: Disorganized with flight of ideas  thought content: Appears preoccupied grandiose guarded denies SI/HI intent or plan   Language: able to name objects and repeate phrases  Remote Memory: Poor  Recent Memory: Poor  Cognition:  oriented to person, place, and time   Fund of Knowledge: Vocabulary intact, pt is aware of current events and past history  Attetion and Concentration intact  Insight poor  Judgement poor      ASSESSMENT: Patient symptoms are:  [] Well controlled  [] Improving  [] Worsening  [] No 
acetaminophen (TYLENOL) tablet 650 mg, 650 mg, Oral, Q6H PRN, Yon Perez MD, 650 mg at 11/20/24 2040    magnesium hydroxide (MILK OF MAGNESIA) 400 MG/5ML suspension 30 mL, 30 mL, Oral, Daily PRN, Yon Perez MD    nicotine (NICODERM CQ) 21 MG/24HR 1 patch, 1 patch, TransDERmal, Daily, Yon Perez MD, 1 patch at 11/21/24 0901    aluminum & magnesium hydroxide-simethicone (MAALOX) 200-200-20 MG/5ML suspension 30 mL, 30 mL, Oral, PRN, Yon Perez MD    hydrOXYzine HCl (ATARAX) tablet 50 mg, 50 mg, Oral, TID PRN, Yon Perez MD, 50 mg at 11/21/24 0016    melatonin tablet 3 mg, 3 mg, Oral, Nightly PRN, Yon Perez MD, 3 mg at 11/20/24 2035    fluPHENAZine HCl (PROLIXIN) tablet 10 mg, 10 mg, Oral, BID, Yon Perez MD, 10 mg at 11/21/24 0857    diphenhydrAMINE (BENADRYL) injection 50 mg, 50 mg, IntraMUSCular, Q6H PRN, Nely Esparza APRN - CNP, 50 mg at 11/21/24 0105    LORazepam (ATIVAN) injection 2 mg, 2 mg, IntraMUSCular, Q6H PRN, Nely Esparza APRN - CNP, 2 mg at 11/17/24 1845      Examination:  BP (!) 111/53   Pulse 97   Temp 97.8 °F (36.6 °C) (Oral)   Resp 16   SpO2 94%   Gait - steady  Medication side effects(SE):     Mental Status Examination:    Level of consciousness:  within normal limits   Appearance:  fair grooming and fair hygiene  Behavior/Motor:  no abnormalities noted  Attitude toward examiner:  cooperative  Speech:  spontaneous, normal rate and normal volume   Mood: \" I am fine\"  Affect: More elated still some underlying irritability  Thought processes: Disorganized with flight of ideas  thought content: Appears preoccupied grandiose guarded denies SI/HI intent or plan   Language: able to name objects and repeate phrases  Remote Memory: Poor  Recent Memory: Poor  Cognition:  oriented to person, place, and time   Fund of Knowledge: Vocabulary intact, pt is aware of current events and past history  Attetion and Concentration intact  Insight 
externally, 4x Daily PRN, Nely Esparza APRN - CNP, 1 application  at 11/21/24 1154    influenza split vaccine (PF) (AFLURIA;FLUARIX) injection 0.5 mL, 0.5 mL, IntraMUSCular, Prior to discharge, Yon Perez MD    acetaminophen (TYLENOL) tablet 650 mg, 650 mg, Oral, Q6H PRN, Yon Perez MD, 650 mg at 11/24/24 0825    magnesium hydroxide (MILK OF MAGNESIA) 400 MG/5ML suspension 30 mL, 30 mL, Oral, Daily PRN, Yon Perez MD, 30 mL at 11/23/24 1709    nicotine (NICODERM CQ) 21 MG/24HR 1 patch, 1 patch, TransDERmal, Daily, Yon Perez MD, 1 patch at 11/23/24 1359    aluminum & magnesium hydroxide-simethicone (MAALOX) 200-200-20 MG/5ML suspension 30 mL, 30 mL, Oral, PRN, Yon Perez MD    hydrOXYzine HCl (ATARAX) tablet 50 mg, 50 mg, Oral, TID PRN, Yon Perez MD, 50 mg at 11/21/24 1802    fluPHENAZine HCl (PROLIXIN) tablet 10 mg, 10 mg, Oral, BID, Yon Perez MD, 10 mg at 11/24/24 0824    diphenhydrAMINE (BENADRYL) injection 50 mg, 50 mg, IntraMUSCular, Q6H PRN, Nely Esparza APRN - CNP, 50 mg at 11/21/24 0105    LORazepam (ATIVAN) injection 2 mg, 2 mg, IntraMUSCular, Q6H PRN, Nely Esparza APRN - CNP, 2 mg at 11/17/24 1845      Examination:  /77   Pulse 65   Temp 98.1 °F (36.7 °C) (Temporal)   Resp 14   Ht 1.803 m (5' 11\")   Wt 90.7 kg (200 lb)   SpO2 94%   BMI 27.89 kg/m²   Gait - steady  Medication side effects(SE):     Mental Status Examination:    Level of consciousness:  within normal limits   Appearance:  fair grooming and fair hygiene  Behavior/Motor:  no abnormalities noted  Attitude toward examiner:  cooperative  Speech:  spontaneous, normal rate and normal volume   Mood: \" I am fine\"  Affect: More elated still some underlying irritability  Thought processes: Disorganized with flight of ideas  thought content: Appears preoccupied grandiose guarded denies SI/HI intent or plan   Language: able to name objects and repeate phrases  Remote 
patch, TransDERmal, Daily, Yon Perez MD, 1 patch at 11/15/24 0854    aluminum & magnesium hydroxide-simethicone (MAALOX) 200-200-20 MG/5ML suspension 30 mL, 30 mL, Oral, PRN, Yon Perez MD    hydrOXYzine HCl (ATARAX) tablet 50 mg, 50 mg, Oral, TID PRN, Yon Perez MD, 50 mg at 11/14/24 2234    melatonin tablet 3 mg, 3 mg, Oral, Nightly PRN, Yon Perez MD, 3 mg at 11/14/24 2234    fluPHENAZine HCl (PROLIXIN) tablet 10 mg, 10 mg, Oral, BID, Yon Perez MD, 10 mg at 11/15/24 0849    lithium capsule 450 mg, 450 mg, Oral, BID, Yon Perez MD, 450 mg at 11/15/24 0854    diphenhydrAMINE (BENADRYL) injection 50 mg, 50 mg, IntraMUSCular, Q6H PRN, Nely Esparza APRN - CNP, 50 mg at 11/13/24 0917    LORazepam (ATIVAN) injection 2 mg, 2 mg, IntraMUSCular, Q6H PRN, Nely Esparza APRJEFF - CNP, 2 mg at 11/13/24 0918      Examination:  BP (!) 137/100   Pulse 91   Temp 97.2 °F (36.2 °C) (Oral)   Resp 16   SpO2 95%   Gait - steady  Medication side effects(SE):     Mental Status Examination:    Level of consciousness:  within normal limits   Appearance:  fair grooming and fair hygiene  Behavior/Motor:  no abnormalities noted  Attitude toward examiner:  cooperative  Speech:  spontaneous, normal rate and normal volume   Mood: \" I am fine\"  Affect: More elated still some underlying irritability  Thought processes: Disorganized with flight of ideas  thought content: Appears preoccupied grandiose guarded denies SI/HI intent or plan   Language: able to name objects and repeate phrases  Remote Memory: Poor  Recent Memory: Poor  Cognition:  oriented to person, place, and time   Fund of Knowledge: Vocabulary intact, pt is aware of current events and past history  Attetion and Concentration intact  Insight poor  Judgement poor      ASSESSMENT: Patient symptoms are:  [] Well controlled  [] Improving  [] Worsening  [] No change      Diagnosis:  Principal Problem:    Schizoaffective disorder, 
Poor  Recent Memory: Poor  Cognition:  oriented to person, place, and time   Fund of Knowledge: Vocabulary intact, pt is aware of current events and past history  Attetion and Concentration intact  Insight poor  Judgement poor      ASSESSMENT: Patient symptoms are:  [] Well controlled  [] Improving  [] Worsening  [x] No change      Diagnosis:  Principal Problem:    Schizoaffective disorder, bipolar type ([facility])  Active Problems:    Cluster B personality disorder ([facility])  Resolved Problems:    Schizoaffective disorder, bipolar type ([facility])    Severe manic bipolar 1 disorder with psychotic behavior ([facility])      LABS:    No results for input(s): \"WBC\", \"HGB\", \"PLT\" in the last 72 hours.    No results for input(s): \"NA\", \"K\", \"CL\", \"CO2\", \"BUN\", \"CREATININE\", \"GLUCOSE\" in the last 72 hours.    No results for input(s): \"BILITOT\", \"ALKPHOS\", \"AST\", \"ALT\" in the last 72 hours.    Lab Results   Component Value Date/Time    BARBSCNU NEGATIVE 11/12/2024 12:15 PM    LABBENZ NEGATIVE 11/12/2024 12:15 PM    LABMETH NEGATIVE 11/12/2024 12:15 PM    ETOH <10 11/12/2024 12:15 PM     Lab Results   Component Value Date/Time    TSH 1.09 01/23/2024 02:38 PM     Lab Results   Component Value Date    LITHIUM 0.4 (L) 11/21/2024     Lab Results   Component Value Date    VALPROATE 43 (L) 10/17/2016           Treatment Plan:  Reviewed current Medications with the patient.   Risks, benefits, side effects, drug-to-drug interactions and alternatives to treatment were discussed.  Collateral information:   CD evaluation  Encourage patient to attend group and other milieu activities.  Discharge planning discussed with the patient and treatment team.    Continue lithium 600 mg twice daily  Continue Prolixin 10 mg twice daily  Continue Zyprexa to 5 mg daily continue Zyprexa 10 mg at bedtime  Continue Tegretol 200 mg twice daily    We will plan for the Prolixin Decanoate injection        PSYCHOTHERAPY/COUNSELING:  [x] Therapeutic interview  [x] Supportive  [] 
oriented to person, place, and time   Fund of Knowledge: Vocabulary intact, pt is aware of current events and past history  Attetion and Concentration intact  Insight poor  Judgement poor      ASSESSMENT: Patient symptoms are:  [] Well controlled  [] Improving  [] Worsening  [x] No change      Diagnosis:  Principal Problem:    Schizoaffective disorder, bipolar type ([facility])  Active Problems:    Cluster B personality disorder ([facility])  Resolved Problems:    Schizoaffective disorder, bipolar type ([facility])    Severe manic bipolar 1 disorder with psychotic behavior ([facility])      LABS:    No results for input(s): \"WBC\", \"HGB\", \"PLT\" in the last 72 hours.    No results for input(s): \"NA\", \"K\", \"CL\", \"CO2\", \"BUN\", \"CREATININE\", \"GLUCOSE\" in the last 72 hours.    No results for input(s): \"BILITOT\", \"ALKPHOS\", \"AST\", \"ALT\" in the last 72 hours.    Lab Results   Component Value Date/Time    BARBSCNU NEGATIVE 11/12/2024 12:15 PM    LABBENZ NEGATIVE 11/12/2024 12:15 PM    LABMETH NEGATIVE 11/12/2024 12:15 PM    ETOH <10 11/12/2024 12:15 PM     Lab Results   Component Value Date/Time    TSH 1.09 01/23/2024 02:38 PM     Lab Results   Component Value Date    LITHIUM 0.4 (L) 11/21/2024     Lab Results   Component Value Date    VALPROATE 43 (L) 10/17/2016           Treatment Plan:  Reviewed current Medications with the patient.   Risks, benefits, side effects, drug-to-drug interactions and alternatives to treatment were discussed.  Collateral information:   CD evaluation  Encourage patient to attend group and other milieu activities.  Discharge planning discussed with the patient and treatment team.    Continue lithium 600 mg twice daily  Continue Prolixin 10 mg twice daily  Decrease Ativan to 0.5 mg at bedtime for 1 day  Zyprexa 10 mg at bedtime    Increase Tegretol 200 mg twice daily    We will plan for the Prolixin Decanoate injection        PSYCHOTHERAPY/COUNSELING:  [x] Therapeutic interview  [x] Supportive  [] CBT  [] Ongoing  [] 
    Anticipated Length of stay: 3 to 14 days based on stability        NOTE: This report was transcribed using voice recognition software. Every effort was made to ensure accuracy; however, inadvertent computerized transcription errors may be present.     Electronically signed by MICH Hernández CNP on 11/18/2024 at 1:12 PM    
injection        PSYCHOTHERAPY/COUNSELING:  [x] Therapeutic interview  [x] Supportive  [] CBT  [] Ongoing  [] Other    [x] Patient continues to need, on a daily basis, active treatment furnished directly by or requiring the supervision of inpatient psychiatric personnel            Behavioral Services                                              Medicare Re-Certification    I certify that the inpatient psychiatric hospital services furnished since the previous certification/re-certification were, and continue to be, medically necessary for;    [x] (1) Treatment which could reasonably be expected to improve the patient's condition,    [x] (2) Or for diagnostic study.    Estimated length of stay/service 3 to 15 days based on stability    Plan for post-hospital care outpatient psychiatric and counseling services    This patient continues to need, on a daily basis, active treatment furnished directly by or requiring the supervision of inpatient psychiatric personnel.    Electronically signed by MICH Hernández CNP on 11/25/2024 at 12:15 PM         NOTE: This report was transcribed using voice recognition software. Every effort was made to ensure accuracy; however, inadvertent computerized transcription errors may be present.     Electronically signed by MICH Hernández CNP on 11/25/2024 at 12:15 PM

## 2024-11-27 NOTE — CARE COORDINATION
In order to ensure appropriate transition and discharge planning is in place, the following documents have been transmitted to [facility], as the new outpatient provider:    The d/c diagnosis was transmitted to the next care provider  The reason for hospitalization was transmitted to the next care provider  The d/c medications (dosage and indication) were transmitted to the next care provider   The continuing care plan was transmitted to the next care provider

## 2024-11-27 NOTE — GROUP NOTE
Shared goal for the day as to take my shot and get rid of the hiccups!                                                                       Group Therapy Note    Date: 11/27/2024    Group Start Time: 0935  Group End Time: 0950  Group Topic: Community Meeting    [facility]  1    Ning Williamson, DCS    Type of Group: Community Meeting      Patient's Goal:  Patient will be able to id staffing assignments, expectations of patients, and general information re: floor rules. Will be prompted to share goal for the day.     Notes:  Patient appeared to be an active listener, taking in information presented and was prompted to share goal for the day.    Status After Intervention:  Improved    Participation Level: Active Listener and Interactive    Participation Quality: Appropriate, Attentive, and Sharing      Speech:  normal      Thought Process/Content: Logical      Affective Functioning: Congruent      Mood: euthymic      Level of consciousness:  Alert, Oriented x4, and Attentive      Response to Learning: Able to verbalize/acknowledge new learning, Able to retain information, and Progressing to goal      Endings: None Reported    Modes of Intervention: Support, Socialization, and Exploration      Discipline Responsible: Psychoeducational Specialist      Signature:  DC GardnerS

## 2024-11-27 NOTE — CARE COORDINATION
MARCO ANTONIO contacted [facility] [phone] to schedule appointment for pt fluPHENAZine decanoate (PROLIXIN) injection 37.5 mg due on 12/11. Pt has appointment 12/11 at 3:45pm to receive long acting injection.

## 2024-11-27 NOTE — CARE COORDINATION
MARCO ANTONIO contacted the [facility] [phone] and spoke with Marni. Marni stated the pt has been accepted but he will need meds in hand. Marni stated their nurse will only be available until 1:30 so N2N will need to be done prior to this.
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
## 2024-11-27 NOTE — GROUP NOTE
Group Therapy Note    Date: 11/27/2024    Group Start Time: 0950  Group End Time: 1035  Group Topic: Psychoeducation    [facility]    Ning Williamson, CTRS    Date: 11/27/2024  Module Name:  dim of wellness     Patient's Goal:  Pt will be able to id dimensions of wellness and what one can do to find balance.     Notes:  Pleasant and engaged in group, accepting of handout and sharing when prompted.     Status After Intervention:  Improved    Participation Level: Active Listener and Interactive    Participation Quality: Appropriate, Attentive, and Sharing      Speech:  normal      Thought Process/Content: Logical      Affective Functioning: Congruent      Mood: euthymic      Level of consciousness:  Alert, Oriented x4, and Attentive      Response to Learning: Able to verbalize/acknowledge new learning, Able to retain information, and Progressing to goal      Endings: None Reported    Modes of Intervention: Education, Support, Socialization, and Clarifying      Discipline Responsible: Psychoeducational Specialist      Signature:  Ning Williamson CTRS            Price (Use Numbers Only, No Special Characters Or $): 726 Price (Use Numbers Only, No Special Characters Or $): 938

## 2024-11-27 NOTE — CARE COORDINATION
SW spoke with RN who states that pt medications are not ready yet as pharmacy is busy. SW advised that once medications are delivered, RN is able to contact [facility] at [phone] when pt is ready to discharge to [facility]. RN verbalized understanding.

## 2024-11-27 NOTE — PLAN OF CARE
Problem: Anxiety  Goal: Will report anxiety at manageable levels  Description: INTERVENTIONS:  1. Administer medication as ordered  2. Teach and rehearse alternative coping skills  3. Provide emotional support with 1:1 interaction with staff  11/12/2024 2857 by Cristino Stovall, RN  Outcome: Not Progressing  11/12/2024 1740 by Lester Fuller, RN  Outcome: Progressing     Problem: Decision Making  Goal: Pt/Family able to effectively weigh alternatives and participate in decision making related to treatment and care  Description: INTERVENTIONS:  1. Determine when there are differences between patient's view, family's view, and healthcare provider's view of condition  2. Facilitate patient and family articulation of goals for care  3. Help patient and family identify pros/cons of alternative solutions  4. Provide information as requested by patient/family  5. Respect patient/family right to receive or not to receive information  6. Serve as a liaison between patient and family and health care team  7. Initiate Consults from Ethics, Palliative Care or initiate Family Care Conference as is appropriate  Outcome: Not Progressing     Problem: Confusion  Goal: Confusion, delirium, dementia, or psychosis is improved or at baseline  Description: INTERVENTIONS:  1. Assess for possible contributors to thought disturbance, including medications, impaired vision or hearing, underlying metabolic abnormalities, dehydration, psychiatric diagnoses, and notify attending LIP  2. [place] high risk fall precautions, as indicated  3. Provide frequent short contacts to provide reality reorientation, refocusing and direction  4. Decrease environmental stimuli, including noise as appropriate  5. Monitor and intervene to maintain adequate nutrition, hydration, elimination, sleep and activity  6. If unable to ensure safety without constant attention obtain sitter and review sitter guidelines with assigned personnel  7. Initiate Psychosocial 
  Problem: Anxiety  Goal: Will report anxiety at manageable levels  Description: INTERVENTIONS:  1. Administer medication as ordered  2. Teach and rehearse alternative coping skills  3. Provide emotional support with 1:1 interaction with staff  11/13/2024 1112 by Sayra Torrez RN  Outcome: Not Progressing     Problem: Coping  Goal: Pt/Family able to verbalize concerns and demonstrate effective coping strategies  Description: INTERVENTIONS:  1. Assist patient/family to identify coping skills, available support systems and cultural and spiritual values  2. Provide emotional support, including active listening and acknowledgement of concerns of patient and caregivers  3. Reduce environmental stimuli, as able  4. Instruct patient/family in relaxation techniques, as appropriate  5. Assess for spiritual pain/suffering and initiate Spiritual Care, Psychosocial Clinical Specialist consults as needed  Outcome: Not Progressing     Problem: Confusion  Goal: Confusion, delirium, dementia, or psychosis is improved or at baseline  Description: INTERVENTIONS:  1. Assess for possible contributors to thought disturbance, including medications, impaired vision or hearing, underlying metabolic abnormalities, dehydration, psychiatric diagnoses, and notify attending LIP  2. [place] high risk fall precautions, as indicated  3. Provide frequent short contacts to provide reality reorientation, refocusing and direction  4. Decrease environmental stimuli, including noise as appropriate  5. Monitor and intervene to maintain adequate nutrition, hydration, elimination, sleep and activity  6. If unable to ensure safety without constant attention obtain sitter and review sitter guidelines with assigned personnel  7. Initiate Psychosocial CNS and Spiritual Care consult, as indicated  11/13/2024 1112 by Sayra Torrez RN  Outcome: Not Progressing     Problem: Anxiety  Goal: Will report anxiety at manageable levels  Description: 
  Problem: Anxiety  Goal: Will report anxiety at manageable levels  Description: INTERVENTIONS:  1. Administer medication as ordered  2. Teach and rehearse alternative coping skills  3. Provide emotional support with 1:1 interaction with staff  11/23/2024 0954 by Grisel Ivy RN  Outcome: Progressing  11/23/2024 0545 by Eve Barillas RN  Outcome: Progressing  11/22/2024 2033 by Josué Barnes RN  Outcome: Progressing     Problem: Coping  Goal: Pt/Family able to verbalize concerns and demonstrate effective coping strategies  Description: INTERVENTIONS:  1. Assist patient/family to identify coping skills, available support systems and cultural and spiritual values  2. Provide emotional support, including active listening and acknowledgement of concerns of patient and caregivers  3. Reduce environmental stimuli, as able  4. Instruct patient/family in relaxation techniques, as appropriate  5. Assess for spiritual pain/suffering and initiate Spiritual Care, Psychosocial Clinical Specialist consults as needed  11/23/2024 0954 by Grisel Ivy RN  Outcome: Progressing  11/23/2024 0545 by Eve Barillas RN  Outcome: Progressing  11/22/2024 2033 by Josué Barnes RN  Outcome: Progressing     Problem: Decision Making  Goal: Pt/Family able to effectively weigh alternatives and participate in decision making related to treatment and care  Description: INTERVENTIONS:  1. Determine when there are differences between patient's view, family's view, and healthcare provider's view of condition  2. Facilitate patient and family articulation of goals for care  3. Help patient and family identify pros/cons of alternative solutions  4. Provide information as requested by patient/family  5. Respect patient/family right to receive or not to receive information  6. Serve as a liaison between patient and family and health care team  7. Initiate Consults from Ethics, Palliative Care or initiate Family Care Conference as is 
  Problem: Anxiety  Goal: Will report anxiety at manageable levels  Description: INTERVENTIONS:  1. Administer medication as ordered  2. Teach and rehearse alternative coping skills  3. Provide emotional support with 1:1 interaction with staff  Outcome: Progressing     Problem: Decision Making  Goal: Pt/Family able to effectively weigh alternatives and participate in decision making related to treatment and care  Description: INTERVENTIONS:  1. Determine when there are differences between patient's view, family's view, and healthcare provider's view of condition  2. Facilitate patient and family articulation of goals for care  3. Help patient and family identify pros/cons of alternative solutions  4. Provide information as requested by patient/family  5. Respect patient/family right to receive or not to receive information  6. Serve as a liaison between patient and family and health care team  7. Initiate Consults from Ethics, Palliative Care or initiate Family Care Conference as is appropriate  Outcome: Progressing     Problem: Confusion  Goal: Confusion, delirium, dementia, or psychosis is improved or at baseline  Description: INTERVENTIONS:  1. Assess for possible contributors to thought disturbance, including medications, impaired vision or hearing, underlying metabolic abnormalities, dehydration, psychiatric diagnoses, and notify attending LIP  2. [place] high risk fall precautions, as indicated  3. Provide frequent short contacts to provide reality reorientation, refocusing and direction  4. Decrease environmental stimuli, including noise as appropriate  5. Monitor and intervene to maintain adequate nutrition, hydration, elimination, sleep and activity  6. If unable to ensure safety without constant attention obtain sitter and review sitter guidelines with assigned personnel  7. Initiate Psychosocial CNS and Spiritual Care consult, as indicated  Outcome: Progressing     
  Problem: Coping  Goal: Pt/Family able to verbalize concerns and demonstrate effective coping strategies  Description: INTERVENTIONS:  1. Assist patient/family to identify coping skills, available support systems and cultural and spiritual values  2. Provide emotional support, including active listening and acknowledgement of concerns of patient and caregivers  3. Reduce environmental stimuli, as able  4. Instruct patient/family in relaxation techniques, as appropriate  5. Assess for spiritual pain/suffering and initiate Spiritual Care, Psychosocial Clinical Specialist consults as needed  Outcome: Progressing     Problem: Anxiety  Goal: Will report anxiety at manageable levels  Description: INTERVENTIONS:  1. Administer medication as ordered  2. Teach and rehearse alternative coping skills  3. Provide emotional support with 1:1 interaction with staff  Outcome: Progressing     Problem: Risk for Elopement  Goal: Patient will not exit the unit/facility without proper excort  Outcome: Progressing     Problem: Death & Dying  Goal: Pt/Family communicate acceptance of impending death and feel psychological comfort and peace  Description: INTERVENTIONS:  1. Assess patient/family anxiety and grief process related to end of life issues  2. Provide emotional and spiritual support  3. Provide information about the patient's health status with consideration of family and cultural values  4. Communicate willingness to discuss death and facilitate grief process  with patient/family as appropriate  5. Emphasize sustaining relationships within family system and community, or chava/spiritual traditions  6. Initiate Spiritual Care, Psychosocial Clinical Specialist, consult as needed  Outcome: Progressing     Problem: Change in Body Image  Goal: Pt/Family communicate acceptance of loss or change in body image and feel psychological comfort and peace  Description: INTERVENTIONS:  1. Assess patient/family anxiety and grief process 
  Problem: Decision Making  Goal: Pt/Family able to effectively weigh alternatives and participate in decision making related to treatment and care  Description: INTERVENTIONS:  1. Determine when there are differences between patient's view, family's view, and healthcare provider's view of condition  2. Facilitate patient and family articulation of goals for care  3. Help patient and family identify pros/cons of alternative solutions  4. Provide information as requested by patient/family  5. Respect patient/family right to receive or not to receive information  6. Serve as a liaison between patient and family and health care team  7. Initiate Consults from Ethics, Palliative Care or initiate Family Care Conference as is appropriate  11/24/2024 1118 by Grisel Ivy, RN  Outcome: Progressing  11/23/2024 2155 by Isaura Govea, RN  Outcome: Progressing     Problem: Confusion  Goal: Confusion, delirium, dementia, or psychosis is improved or at baseline  Description: INTERVENTIONS:  1. Assess for possible contributors to thought disturbance, including medications, impaired vision or hearing, underlying metabolic abnormalities, dehydration, psychiatric diagnoses, and notify attending LIP  2. [place] high risk fall precautions, as indicated  3. Provide frequent short contacts to provide reality reorientation, refocusing and direction  4. Decrease environmental stimuli, including noise as appropriate  5. Monitor and intervene to maintain adequate nutrition, hydration, elimination, sleep and activity  6. If unable to ensure safety without constant attention obtain sitter and review sitter guidelines with assigned personnel  7. Initiate Psychosocial CNS and Spiritual Care consult, as indicated  Outcome: Progressing     Problem: Anxiety  Goal: Will report anxiety at manageable levels  Description: INTERVENTIONS:  1. Administer medication as ordered  2. Teach and rehearse alternative coping skills  3. Provide emotional 
  Problem: Decision Making  Goal: Pt/Family able to effectively weigh alternatives and participate in decision making related to treatment and care  Description: INTERVENTIONS:  1. Determine when there are differences between patient's view, family's view, and healthcare provider's view of condition  2. Facilitate patient and family articulation of goals for care  3. Help patient and family identify pros/cons of alternative solutions  4. Provide information as requested by patient/family  5. Respect patient/family right to receive or not to receive information  6. Serve as a liaison between patient and family and health care team  7. Initiate Consults from Ethics, Palliative Care or initiate Family Care Conference as is appropriate  Recent Flowsheet Documentation  Taken 11/14/2024 0829 by Grisel Ivy, RN  Patient/family able to effectively weigh alternatives and participate in decision making related to treatment and care:   Facilitate patient and family articulation of goals for care   Help patient and family identify pros/cons of alternative solutions   Provide information as requested by patient/family  11/14/2024 0253 by Isaura Govea, RN  Outcome: Progressing  11/13/2024 2128 by Josué Barnes, RN  Outcome: Not Progressing     Problem: Confusion  Goal: Confusion, delirium, dementia, or psychosis is improved or at baseline  Description: INTERVENTIONS:  1. Assess for possible contributors to thought disturbance, including medications, impaired vision or hearing, underlying metabolic abnormalities, dehydration, psychiatric diagnoses, and notify attending LIP  2. [place] high risk fall precautions, as indicated  3. Provide frequent short contacts to provide reality reorientation, refocusing and direction  4. Decrease environmental stimuli, including noise as appropriate  5. Monitor and intervene to maintain adequate nutrition, hydration, elimination, sleep and activity  6. If unable to ensure safety without 
  Problem: Risk for Elopement  Goal: Patient will not exit the unit/facility without proper excort  11/20/2024 2237 by Kiesha Singh, RN  Outcome: Progressing     Problem: Anxiety  Goal: Will report anxiety at manageable levels  Description: INTERVENTIONS:  1. Administer medication as ordered  2. Teach and rehearse alternative coping skills  3. Provide emotional support with 1:1 interaction with staff  11/20/2024 2237 by Kiesha Singh, RN  Outcome: Progressing     Pt states no suicidal ideations, homicidal ideations, hallucinations. Pt is attending groups and taking medications. Pt is eating well. Patient is social with peers and is out in the day area. Pt. is cooperative, can be irritable.   
  Problem: Risk for Elopement  Goal: Patient will not exit the unit/facility without proper excort  Outcome: Progressing     Problem: Anxiety  Goal: Will report anxiety at manageable levels  Description: INTERVENTIONS:  1. Administer medication as ordered  2. Teach and rehearse alternative coping skills  3. Provide emotional support with 1:1 interaction with staff  11/14/2024 0253 by Isaura Govea RN  Outcome: Progressing  11/13/2024 2128 by Josué Barnes RN  Outcome: Progressing     Problem: Coping  Goal: Pt/Family able to verbalize concerns and demonstrate effective coping strategies  Description: INTERVENTIONS:  1. Assist patient/family to identify coping skills, available support systems and cultural and spiritual values  2. Provide emotional support, including active listening and acknowledgement of concerns of patient and caregivers  3. Reduce environmental stimuli, as able  4. Instruct patient/family in relaxation techniques, as appropriate  5. Assess for spiritual pain/suffering and initiate Spiritual Care, Psychosocial Clinical Specialist consults as needed  11/14/2024 0253 by Isaura Govea RN  Outcome: Progressing  11/13/2024 2128 by Josué Barnes RN  Outcome: Progressing     Problem: Decision Making  Goal: Pt/Family able to effectively weigh alternatives and participate in decision making related to treatment and care  Description: INTERVENTIONS:  1. Determine when there are differences between patient's view, family's view, and healthcare provider's view of condition  2. Facilitate patient and family articulation of goals for care  3. Help patient and family identify pros/cons of alternative solutions  4. Provide information as requested by patient/family  5. Respect patient/family right to receive or not to receive information  6. Serve as a liaison between patient and family and health care team  7. Initiate Consults from Ethics, Palliative Care or initiate Family Care Conference as is 
  Problem: Risk for Elopement  Goal: Patient will not exit the unit/facility without proper excort  Outcome: Progressing     Problem: Anxiety  Goal: Will report anxiety at manageable levels  Description: INTERVENTIONS:  1. Administer medication as ordered  2. Teach and rehearse alternative coping skills  3. Provide emotional support with 1:1 interaction with staff  11/18/2024 1941 by Abhilash Peck RN  Outcome: Progressing  11/18/2024 1247 by Grisel Ivy RN  Outcome: Progressing     Problem: Coping  Goal: Pt/Family able to verbalize concerns and demonstrate effective coping strategies  Description: INTERVENTIONS:  1. Assist patient/family to identify coping skills, available support systems and cultural and spiritual values  2. Provide emotional support, including active listening and acknowledgement of concerns of patient and caregivers  3. Reduce environmental stimuli, as able  4. Instruct patient/family in relaxation techniques, as appropriate  5. Assess for spiritual pain/suffering and initiate Spiritual Care, Psychosocial Clinical Specialist consults as needed  Outcome: Progressing     Problem: Decision Making  Goal: Pt/Family able to effectively weigh alternatives and participate in decision making related to treatment and care  Description: INTERVENTIONS:  1. Determine when there are differences between patient's view, family's view, and healthcare provider's view of condition  2. Facilitate patient and family articulation of goals for care  3. Help patient and family identify pros/cons of alternative solutions  4. Provide information as requested by patient/family  5. Respect patient/family right to receive or not to receive information  6. Serve as a liaison between patient and family and health care team  7. Initiate Consults from Ethics, Palliative Care or initiate Family Care Conference as is appropriate  11/18/2024 1941 by Abhilash Peck RN  Outcome: Progressing  11/18/2024 1247 by Cata 
  Problem: Risk for Elopement  Goal: Patient will not exit the unit/facility without proper excort  Outcome: Progressing     Problem: Anxiety  Goal: Will report anxiety at manageable levels  Description: INTERVENTIONS:  1. Administer medication as ordered  2. Teach and rehearse alternative coping skills  3. Provide emotional support with 1:1 interaction with staff  11/23/2024 2155 by Isaura Govea RN  Outcome: Progressing  11/23/2024 0954 by Grisel Ivy RN  Outcome: Progressing     Problem: Coping  Goal: Pt/Family able to verbalize concerns and demonstrate effective coping strategies  Description: INTERVENTIONS:  1. Assist patient/family to identify coping skills, available support systems and cultural and spiritual values  2. Provide emotional support, including active listening and acknowledgement of concerns of patient and caregivers  3. Reduce environmental stimuli, as able  4. Instruct patient/family in relaxation techniques, as appropriate  5. Assess for spiritual pain/suffering and initiate Spiritual Care, Psychosocial Clinical Specialist consults as needed  11/23/2024 2155 by Isaura Govea RN  Outcome: Progressing  11/23/2024 0954 by Grisel Ivy RN  Outcome: Progressing     Problem: Decision Making  Goal: Pt/Family able to effectively weigh alternatives and participate in decision making related to treatment and care  Description: INTERVENTIONS:  1. Determine when there are differences between patient's view, family's view, and healthcare provider's view of condition  2. Facilitate patient and family articulation of goals for care  3. Help patient and family identify pros/cons of alternative solutions  4. Provide information as requested by patient/family  5. Respect patient/family right to receive or not to receive information  6. Serve as a liaison between patient and family and health care team  7. Initiate Consults from Ethics, Palliative Care or initiate Family Care Conference as is 
  Problem: Risk for Elopement  Goal: Patient will not exit the unit/facility without proper excort  Outcome: Progressing     Problem: Anxiety  Goal: Will report anxiety at manageable levels  Description: INTERVENTIONS:  1. Administer medication as ordered  2. Teach and rehearse alternative coping skills  3. Provide emotional support with 1:1 interaction with staff  Outcome: Progressing  Flowsheets (Taken 11/15/2024 0908 by Grisel Ivy RN)  Will report anxiety at manageable levels: Teach and rehearse alternative coping skills     Problem: Confusion  Goal: Confusion, delirium, dementia, or psychosis is improved or at baseline  Description: INTERVENTIONS:  1. Assess for possible contributors to thought disturbance, including medications, impaired vision or hearing, underlying metabolic abnormalities, dehydration, psychiatric diagnoses, and notify attending LIP  2. [place] high risk fall precautions, as indicated  3. Provide frequent short contacts to provide reality reorientation, refocusing and direction  4. Decrease environmental stimuli, including noise as appropriate  5. Monitor and intervene to maintain adequate nutrition, hydration, elimination, sleep and activity  6. If unable to ensure safety without constant attention obtain sitter and review sitter guidelines with assigned personnel  7. Initiate Psychosocial CNS and Spiritual Care consult, as indicated  Outcome: Progressing     Problem: Safety - Violent/Self-destructive Restraint  Goal: Remains free of injury from restraints (Restraint for Violent/Self-Destructive Behavior)  Description: INTERVENTIONS:  1. Determine that de-escalation and other, less restrictive measures have been tried or would not be effective before applying the restraint  2. Identify and document the criteria for restraint  3. Evaluate the patient's condition at the time of restraint application  4. Inform patient/family regarding the reason for restraint/seclusion  5. Q2H: 
  Problem: Risk for Elopement  Goal: Patient will not exit the unit/facility without proper excort  Outcome: Progressing     Problem: Coping  Goal: Pt/Family able to verbalize concerns and demonstrate effective coping strategies  Description: INTERVENTIONS:  1. Assist patient/family to identify coping skills, available support systems and cultural and spiritual values  2. Provide emotional support, including active listening and acknowledgement of concerns of patient and caregivers  3. Reduce environmental stimuli, as able  4. Instruct patient/family in relaxation techniques, as appropriate  5. Assess for spiritual pain/suffering and initiate Spiritual Care, Psychosocial Clinical Specialist consults as needed  11/22/2024 0809 by Halima Harris, RN  Outcome: Progressing  11/21/2024 2042 by Josué Barnes, RN  Outcome: Progressing     Problem: Death & Dying  Goal: Pt/Family communicate acceptance of impending death and feel psychological comfort and peace  Description: INTERVENTIONS:  1. Assess patient/family anxiety and grief process related to end of life issues  2. Provide emotional and spiritual support  3. Provide information about the patient's health status with consideration of family and cultural values  4. Communicate willingness to discuss death and facilitate grief process  with patient/family as appropriate  5. Emphasize sustaining relationships within family system and community, or chava/spiritual traditions  6. Initiate Spiritual Care, Psychosocial Clinical Specialist, consult as needed  Outcome: Progressing     Problem: Change in Body Image  Goal: Pt/Family communicate acceptance of loss or change in body image and feel psychological comfort and peace  Description: INTERVENTIONS:  1. Assess patient/family anxiety and grief process related to change in body image, loss of functional status, loss of sense of self, and forgiveness  2. Provide emotional and spiritual support  3. Provide information about the 
  Problem: Risk for Elopement  Goal: Patient will not exit the unit/facility without proper excort  Outcome: Progressing  Flowsheets  Taken 11/12/2024 1730 by Lester Fuller, RN  Nursing Interventions for Elopement Risk: Escort with two staff members if patient must leave the unit  Taken 11/12/2024 1208 by Patti Mahmood, RN  Nursing Interventions for Elopement Risk: Escort with two staff members if patient must leave the unit     Problem: Anxiety  Goal: Will report anxiety at manageable levels  Description: INTERVENTIONS:  1. Administer medication as ordered  2. Teach and rehearse alternative coping skills  3. Provide emotional support with 1:1 interaction with staff  Outcome: Progressing     Problem: Coping  Goal: Pt/Family able to verbalize concerns and demonstrate effective coping strategies  Description: INTERVENTIONS:  1. Assist patient/family to identify coping skills, available support systems and cultural and spiritual values  2. Provide emotional support, including active listening and acknowledgement of concerns of patient and caregivers  3. Reduce environmental stimuli, as able  4. Instruct patient/family in relaxation techniques, as appropriate  5. Assess for spiritual pain/suffering and initiate Spiritual Care, Psychosocial Clinical Specialist consults as needed  Outcome: Progressing     Problem: Change in Body Image  Goal: Pt/Family communicate acceptance of loss or change in body image and feel psychological comfort and peace  Description: INTERVENTIONS:  1. Assess patient/family anxiety and grief process related to change in body image, loss of functional status, loss of sense of self, and forgiveness  2. Provide emotional and spiritual support  3. Provide information about the patient's health status with consideration of family and cultural values  4. Communicate willingness to discuss loss and facilitate grief process with patient/family as appropriate  5. Emphasize sustaining relationships 
 Patient is alert and oriented x 4.  Denies pain.  No noted signs or symptoms of distress.   Denies SI/HI, A/V/H, or thoughts of self harm when asked.    Rates Anxiety at 2/10 and Depression at 4/10.    Attended on unit groups today, able to make his needs known, minimal socialization with peers.   Medication compliant.     Pleasant, polite, and cooperative thus far this shift.      Affect is stable, constricted but Brightens with conversation.  Good eye contact.  Appropriate with peers and staff when out on unit.    Appears well groomed/neat, room is messy.    Described his mood as \"I just really miss my Wife and I'm in a lot of pain\".  Up for all meals.    Will continue to monitor for safety q 15 minute safety rounds and environmental assessments.       
 Patient is alert and oriented x 4.  No noted signs or symptoms of distress.   Denies SI/HI, reports he has A/V/H \"sometimes\", or thoughts of self harm when asked.    Rates Anxiety at 2/10 and Depression at 2/10.    Refused on unit groups today, reclusive to self and room, attempts to socialize with peers but is frequently disruptive or loud.  Delusional type comments this AM revolving his blood and \"vampires\".  Patient demands to know where his blood is going.    Medication compliant.     Pleasant, polite, and cooperative during assessment.    Affect is blunted.  Fair eye contact.     Appears slightly disheveled, room Is messy.    Up for all meals.    Had emesis this AM, stated it was due to \"all the blood being taken\" (blood drawn this AM).  Patient later denied any nausea or vomiting.   Will continue to monitor for safety q 15 minute safety rounds and environmental assessments.       Problem: Anxiety  Goal: Will report anxiety at manageable levels  Description: INTERVENTIONS:  1. Administer medication as ordered  2. Teach and rehearse alternative coping skills  3. Provide emotional support with 1:1 interaction with staff  Outcome: Not Progressing     
Patient becoming more redirectable. Patient still displaying impulsive behaviors and talking nonsensical. Patient is medication compliant. Patient is not attending group activities. Patient is eating meals. Patient denies any suicidal ideations/homicidal ideations/audio or visual hallucinations.      Problem: Anxiety  Goal: Will report anxiety at manageable levels  Description: INTERVENTIONS:  1. Administer medication as ordered  2. Teach and rehearse alternative coping skills  3. Provide emotional support with 1:1 interaction with staff  11/17/2024 2219 by Josué Barnes RN  Outcome: Progressing     Problem: Coping  Goal: Pt/Family able to verbalize concerns and demonstrate effective coping strategies  Description: INTERVENTIONS:  1. Assist patient/family to identify coping skills, available support systems and cultural and spiritual values  2. Provide emotional support, including active listening and acknowledgement of concerns of patient and caregivers  3. Reduce environmental stimuli, as able  4. Instruct patient/family in relaxation techniques, as appropriate  5. Assess for spiritual pain/suffering and initiate Spiritual Care, Psychosocial Clinical Specialist consults as needed  11/17/2024 2219 by Josué Barnes RN  Outcome: Progressing     Problem: Decision Making  Goal: Pt/Family able to effectively weigh alternatives and participate in decision making related to treatment and care  Description: INTERVENTIONS:  1. Determine when there are differences between patient's view, family's view, and healthcare provider's view of condition  2. Facilitate patient and family articulation of goals for care  3. Help patient and family identify pros/cons of alternative solutions  4. Provide information as requested by patient/family  5. Respect patient/family right to receive or not to receive information  6. Serve as a liaison between patient and family and health care team  7. Initiate Consults from Ethics, Palliative 
Patient has been in bed resting this evening. Patient still speaking nonsensical. Behavior has been better. Patient is medication compliant. Patient is attending group activities. Patient is eating meals. Patient denies any suicidal ideations/homicidal ideations. Pt endorses having hallucinations but does not describe them.       Problem: Anxiety  Goal: Will report anxiety at manageable levels  Description: INTERVENTIONS:  1. Administer medication as ordered  2. Teach and rehearse alternative coping skills  3. Provide emotional support with 1:1 interaction with staff  11/16/2024 2217 by Josué Barnes RN  Outcome: Progressing     Problem: Coping  Goal: Pt/Family able to verbalize concerns and demonstrate effective coping strategies  Description: INTERVENTIONS:  1. Assist patient/family to identify coping skills, available support systems and cultural and spiritual values  2. Provide emotional support, including active listening and acknowledgement of concerns of patient and caregivers  3. Reduce environmental stimuli, as able  4. Instruct patient/family in relaxation techniques, as appropriate  5. Assess for spiritual pain/suffering and initiate Spiritual Care, Psychosocial Clinical Specialist consults as needed  11/16/2024 2217 by Josué Barnes RN  Outcome: Progressing     Problem: Decision Making  Goal: Pt/Family able to effectively weigh alternatives and participate in decision making related to treatment and care  Description: INTERVENTIONS:  1. Determine when there are differences between patient's view, family's view, and healthcare provider's view of condition  2. Facilitate patient and family articulation of goals for care  3. Help patient and family identify pros/cons of alternative solutions  4. Provide information as requested by patient/family  5. Respect patient/family right to receive or not to receive information  6. Serve as a liaison between patient and family and health care team  7. Initiate 
Patient is alert and oriented x 4.  Denies pain.  No noted signs or symptoms of distress.   Denies SI/HI, A/V/H, or thoughts of self harm when asked.    Rates Anxiety at 0/10 and Depression at 1/10.    Attended on unit groups today, social with select peers.  Medication compliant.     Pleasant, polite, and cooperative upon approach.    Affect is constricted but Brightens with conversation.  Good eye contact.  Appropriate with peers and staff when out on unit.    Appears well groomed/neat, room Is messy.    Described his mood as \"froggy\".  Patient has been overheard making delusional type comments regarding vampirism to his peers while walking by nurses station.    Up for all meals.    Will continue to monitor for safety q 15 minute safety rounds and environmental assessments.       
Patient is alert and oriented x 4.  Denies pain.  No noted signs or symptoms of distress.   Denies SI/HI, A/V/H, or thoughts of self harm when asked.    Rates Anxiety at 8/10 and Depression at 3/10.    Refused on unit groups today, Social with select peers, observed being helpful and appropriate with female peer on the unit.   Medication compliant.     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  Remains slightly pressured with many somatic complaints.  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  Affect blunt.  Good eye contact.  Appropriate with peers and staff when out on unit and can be redirected easily.    Appears disheveled, room Is messy, did  some trash when asked.    Described his mood as \"peachy\".  Up for all meals.    Will continue to monitor for safety q 15 minute safety rounds and environmental assessments.     Problem: Anxiety  Goal: Will report anxiety at manageable levels  Description: INTERVENTIONS:  1. Administer medication as ordered  2. Teach and rehearse alternative coping skills  3. Provide emotional support with 1:1 interaction with staff  Outcome: Not Progressing     Problem: Coping  Goal: Pt/Family able to verbalize concerns and demonstrate effective coping strategies  Description: INTERVENTIONS:  1. Assist patient/family to identify coping skills, available 
Patient is medication compliant. Patient is attending group activities. Patient is eating meals. Patient denies any suicidal ideations/homicidal ideations/audio or visual hallucinations. Patient has been more in control and has been redirectable. Patient thought process has improved but is still impaired. Patient has poor boundaries and the nurse witnessed patient telling other patients not to take specific medications. Patient can be intrusive in other patients care.     Problem: Anxiety  Goal: Will report anxiety at manageable levels  Description: INTERVENTIONS:  1. Administer medication as ordered  2. Teach and rehearse alternative coping skills  3. Provide emotional support with 1:1 interaction with staff  11/21/2024 2042 by Josué Barnes, RN  Outcome: Progressing     Problem: Coping  Goal: Pt/Family able to verbalize concerns and demonstrate effective coping strategies  Description: INTERVENTIONS:  1. Assist patient/family to identify coping skills, available support systems and cultural and spiritual values  2. Provide emotional support, including active listening and acknowledgement of concerns of patient and caregivers  3. Reduce environmental stimuli, as able  4. Instruct patient/family in relaxation techniques, as appropriate  5. Assess for spiritual pain/suffering and initiate Spiritual Care, Psychosocial Clinical Specialist consults as needed  11/21/2024 2042 by Josué Barnes RN  Outcome: Progressing     Problem: Decision Making  Goal: Pt/Family able to effectively weigh alternatives and participate in decision making related to treatment and care  Description: INTERVENTIONS:  1. Determine when there are differences between patient's view, family's view, and healthcare provider's view of condition  2. Facilitate patient and family articulation of goals for care  3. Help patient and family identify pros/cons of alternative solutions  4. Provide information as requested by patient/family  5. Respect 
Pt resting in bed apparently asleep with easy even respirations at HS q 15 min electronic rounding.    
Pt resting in bed apparently asleep with easy even respirations at HS q 15 min electronic rounding.    
Pt resting in his room through evening. Pt denies SI, HI and AVH. Pt came out of room for a snack and did wake up to take medications. Pt making bizarre statements, such as, \"They give these meds to these 18 year olds, it's not right.\" Pt did not elaborate what he meant. Pt went back to rest after taking medications.    Problem: Anxiety  Goal: Will report anxiety at manageable levels  Description: INTERVENTIONS:  1. Administer medication as ordered  2. Teach and rehearse alternative coping skills  3. Provide emotional support with 1:1 interaction with staff  11/26/2024 2107 by Heber Patel, RN  Outcome: Progressing     Problem: Coping  Goal: Pt/Family able to verbalize concerns and demonstrate effective coping strategies  Description: INTERVENTIONS:  1. Assist patient/family to identify coping skills, available support systems and cultural and spiritual values  2. Provide emotional support, including active listening and acknowledgement of concerns of patient and caregivers  3. Reduce environmental stimuli, as able  4. Instruct patient/family in relaxation techniques, as appropriate  5. Assess for spiritual pain/suffering and initiate Spiritual Care, Psychosocial Clinical Specialist consults as needed  11/26/2024 2107 by Heber Patel, RN  Outcome: Progressing     Problem: Decision Making  Goal: Pt/Family able to effectively weigh alternatives and participate in decision making related to treatment and care  Description: INTERVENTIONS:  1. Determine when there are differences between patient's view, family's view, and healthcare provider's view of condition  2. Facilitate patient and family articulation of goals for care  3. Help patient and family identify pros/cons of alternative solutions  4. Provide information as requested by patient/family  5. Respect patient/family right to receive or not to receive information  6. Serve as a liaison between patient and family and health care team  7. Initiate 
Ethics, Palliative Care or initiate Family Care Conference as is appropriate  11/22/2024 2033 by Josué Barnes, RN  Outcome: Progressing     Problem: Confusion  Goal: Confusion, delirium, dementia, or psychosis is improved or at baseline  Description: INTERVENTIONS:  1. Assess for possible contributors to thought disturbance, including medications, impaired vision or hearing, underlying metabolic abnormalities, dehydration, psychiatric diagnoses, and notify attending LIP  2. [place] high risk fall precautions, as indicated  3. Provide frequent short contacts to provide reality reorientation, refocusing and direction  4. Decrease environmental stimuli, including noise as appropriate  5. Monitor and intervene to maintain adequate nutrition, hydration, elimination, sleep and activity  6. If unable to ensure safety without constant attention obtain sitter and review sitter guidelines with assigned personnel  7. Initiate Psychosocial CNS and Spiritual Care consult, as indicated  11/22/2024 2033 by Josué Barnes, RN  Outcome: Progressing     
alternatives and participate in decision making related to treatment and care  Description: INTERVENTIONS:  1. Determine when there are differences between patient's view, family's view, and healthcare provider's view of condition  2. Facilitate patient and family articulation of goals for care  3. Help patient and family identify pros/cons of alternative solutions  4. Provide information as requested by patient/family  5. Respect patient/family right to receive or not to receive information  6. Serve as a liaison between patient and family and health care team  7. Initiate Consults from Ethics, Palliative Care or initiate Family Care Conference as is appropriate  Outcome: Progressing  Flowsheets (Taken 11/14/2024 5434 by Grisel Ivy, RN)  Patient/family able to effectively weigh alternatives and participate in decision making related to treatment and care:   Facilitate patient and family articulation of goals for care   Help patient and family identify pros/cons of alternative solutions   Provide information as requested by patient/family     Problem: Confusion  Goal: Confusion, delirium, dementia, or psychosis is improved or at baseline  Description: INTERVENTIONS:  1. Assess for possible contributors to thought disturbance, including medications, impaired vision or hearing, underlying metabolic abnormalities, dehydration, psychiatric diagnoses, and notify attending LIP  2. [place] high risk fall precautions, as indicated  3. Provide frequent short contacts to provide reality reorientation, refocusing and direction  4. Decrease environmental stimuli, including noise as appropriate  5. Monitor and intervene to maintain adequate nutrition, hydration, elimination, sleep and activity  6. If unable to ensure safety without constant attention obtain sitter and review sitter guidelines with assigned personnel  7. Initiate Psychosocial CNS and Spiritual Care consult, as indicated  11/14/2024 2200 by Isaura Govea 
observation  8. Identify and implement measures to help patient regain control, assess readiness for release and initiate progressive release per policy  Outcome: Progressing     
requested by patient/family  5. Respect patient/family right to receive or not to receive information  6. Serve as a liaison between patient and family and health care team  7. Initiate Consults from Ethics, Palliative Care or initiate Family Care Conference as is appropriate  11/13/2024 2128 by Josué Barnes, RN  Outcome: Not Progressing     Problem: Confusion  Goal: Confusion, delirium, dementia, or psychosis is improved or at baseline  Description: INTERVENTIONS:  1. Assess for possible contributors to thought disturbance, including medications, impaired vision or hearing, underlying metabolic abnormalities, dehydration, psychiatric diagnoses, and notify attending LIP  2. [place] high risk fall precautions, as indicated  3. Provide frequent short contacts to provide reality reorientation, refocusing and direction  4. Decrease environmental stimuli, including noise as appropriate  5. Monitor and intervene to maintain adequate nutrition, hydration, elimination, sleep and activity  6. If unable to ensure safety without constant attention obtain sitter and review sitter guidelines with assigned personnel  7. Initiate Psychosocial CNS and Spiritual Care consult, as indicated  11/13/2024 2128 by Josué Barnes, RN  Outcome: Not Progressing     
Provide frequent short contacts to provide reality reorientation, refocusing and direction  4. Decrease environmental stimuli, including noise as appropriate  5. Monitor and intervene to maintain adequate nutrition, hydration, elimination, sleep and activity  6. If unable to ensure safety without constant attention obtain sitter and review sitter guidelines with assigned personnel  7. Initiate Psychosocial CNS and Spiritual Care consult, as indicated  11/25/2024 2100 by Sami Chappell, RN  Outcome: Progressing     Problem: Pain  Goal: Verbalizes/displays adequate comfort level or baseline comfort level  11/25/2024 2100 by Sami Chappell, RN  Outcome: Progressing     Patient in leon upon approach. Patient denied SI/HI. Patient remains med complaint and behavior remains in control. Patient has remained seclusive to room but has been visible on unit pacing with peers. Patient delusional at times but behavior remains in control. Safety rounds done q15 minutes. Will continue to monitor.  
dehydration, psychiatric diagnoses, and notify attending LIP  2. [place] high risk fall precautions, as indicated  3. Provide frequent short contacts to provide reality reorientation, refocusing and direction  4. Decrease environmental stimuli, including noise as appropriate  5. Monitor and intervene to maintain adequate nutrition, hydration, elimination, sleep and activity  6. If unable to ensure safety without constant attention obtain sitter and review sitter guidelines with assigned personnel  7. Initiate Psychosocial CNS and Spiritual Care consult, as indicated  Outcome: Not Progressing     
Verbalizes/displays adequate comfort level or baseline comfort level  11/20/2024 1011 by Halima Harris, RN  Outcome: Not Progressing  11/19/2024 2137 by Sami Chappell, RN  Outcome: Progressing

## 2024-11-27 NOTE — CARE COORDINATION
Pt was seen during treatment team. Pt states that he is feeling better and denied SI/HI/AVH. He also denied paranoia. Pt is agreeable to taking his long acting injection today and is agreeable to going to [facility] at discharge. He denied questions/concerns for treatment team at this time and is hopeful to discharge soon. Pt is calm and cooperative with linear thought process.     MARCO ANTONIO contacted pt grandmother/legal guardian Alicia Chovious [phone] to discuss pt discharge plan. She states that she has been speaking with pt and he sounds a little bit better. She states that she is agreeable to pt discharging to [facility] today if he is accepted. She states that she would like notified if pt does not go to [facility] today. She is aware of [facility] average length of stay. MARCO ANTONIO informed her that if she feels pt needs more mental health help in the community in the future, then she is able to contact the [place] [facility] and send a letter asking them to put pt in fresh start court. MARCO ANTONIO provided her with contact information for probate court. She verbalized understanding and states that she may do this int he future if pt decompensates again.     MARCO ANTONIO faxed referral to [facility].

## 2024-11-28 ENCOUNTER — FOLLOWUP TELEPHONE ENCOUNTER (OUTPATIENT)
Dept: PSYCHIATRY | Age: 29
End: 2024-11-28

## 2024-11-28 NOTE — TELEPHONE ENCOUNTER
Counselor contacted pt grandmother/legal guardian Alicia Chovious [phone] to discuss patient's status following discharge. Alicia reports that the patient is doing well, but that upon discharge, he refused to go to the crisis unit. Patient's grandmother reports that she is \"monitoring\" him, but notes that she would have preferred him to go to the crisis unit this was initial D/C plan set up from  prior to discharge). Alicia reports that she does not have any concerns at this time and notes that she will reach out should anything change and/or bring the patient to the ED if he were to get any \"worse\". Patient's LG reports she does not have any concerns about the patient or his safety at this time.         Carolann Napier, LPC

## 2025-08-06 PROBLEM — M62.82 NON-TRAUMATIC RHABDOMYOLYSIS: Status: ACTIVE | Noted: 2025-08-06

## 2025-08-06 PROBLEM — F29 PSYCHOSIS (HCC): Status: ACTIVE | Noted: 2025-08-06

## 2025-08-12 ENCOUNTER — HOSPITAL ENCOUNTER (EMERGENCY)
Age: 30
Discharge: HOME | End: 2025-08-13
Attending: EMERGENCY MEDICINE
Payer: MEDICAID

## 2025-08-12 DIAGNOSIS — F39 MOOD DISORDER: Primary | ICD-10-CM

## 2025-08-12 LAB
ALBUMIN SERPL-MCNC: 4.1 G/DL (ref 3.5–5.2)
ALP SERPL-CCNC: 147 U/L (ref 40–129)
ALT SERPL-CCNC: 27 U/L (ref 0–50)
AMPHET UR QL SCN: NEGATIVE
ANION GAP SERPL CALCULATED.3IONS-SCNC: 15 MMOL/L (ref 7–16)
APAP SERPL-MCNC: <5 UG/ML (ref 10–30)
AST SERPL-CCNC: 26 U/L (ref 0–50)
BARBITURATES UR QL SCN: NEGATIVE
BASOPHILS # BLD: 0.08 K/UL (ref 0–0.2)
BASOPHILS NFR BLD: 1 % (ref 0–2)
BENZODIAZ UR QL: NEGATIVE
BILIRUB SERPL-MCNC: 0.2 MG/DL (ref 0–1.2)
BILIRUB UR QL STRIP: NEGATIVE
BUN SERPL-MCNC: 11 MG/DL (ref 6–20)
BUPRENORPHINE UR QL: NEGATIVE
CALCIUM SERPL-MCNC: 9.3 MG/DL (ref 8.6–10)
CANNABINOIDS UR QL SCN: POSITIVE
CARBAMAZEPINE DOSE: ABNORMAL MG
CARBAMAZEPINE SERPL-MCNC: <2 UG/ML (ref 4–12)
CHLORIDE SERPL-SCNC: 106 MMOL/L (ref 98–107)
CLARITY UR: CLEAR
CO2 SERPL-SCNC: 21 MMOL/L (ref 22–29)
COCAINE UR QL SCN: NEGATIVE
COLOR UR: YELLOW
COMMENT: NORMAL
CREAT SERPL-MCNC: 0.8 MG/DL (ref 0.7–1.2)
DATE LAST DOSE: ABNORMAL
EOSINOPHIL # BLD: 0.86 K/UL (ref 0.05–0.5)
EOSINOPHILS RELATIVE PERCENT: 9 % (ref 0–6)
ERYTHROCYTE [DISTWIDTH] IN BLOOD BY AUTOMATED COUNT: 13.4 % (ref 11.5–15)
ETHANOLAMINE SERPL-MCNC: <10 MG/DL (ref 0–0.08)
FENTANYL UR QL: NEGATIVE
GFR, ESTIMATED: >90 ML/MIN/1.73M2
GLUCOSE SERPL-MCNC: 106 MG/DL (ref 74–99)
GLUCOSE UR STRIP-MCNC: NEGATIVE MG/DL
HCT VFR BLD AUTO: 44.3 % (ref 37–54)
HGB BLD-MCNC: 15.1 G/DL (ref 12.5–16.5)
HGB UR QL STRIP.AUTO: NEGATIVE
IMM GRANULOCYTES # BLD AUTO: 0.04 K/UL (ref 0–0.58)
IMM GRANULOCYTES NFR BLD: 0 % (ref 0–5)
KETONES UR STRIP-MCNC: NEGATIVE MG/DL
LEUKOCYTE ESTERASE UR QL STRIP: NEGATIVE
LYMPHOCYTES NFR BLD: 3.07 K/UL (ref 1.5–4)
LYMPHOCYTES RELATIVE PERCENT: 32 % (ref 20–42)
MCH RBC QN AUTO: 30.2 PG (ref 26–35)
MCHC RBC AUTO-ENTMCNC: 34.1 G/DL (ref 32–34.5)
MCV RBC AUTO: 88.6 FL (ref 80–99.9)
METHADONE UR QL: NEGATIVE
MONOCYTES NFR BLD: 0.93 K/UL (ref 0.1–0.95)
MONOCYTES NFR BLD: 10 % (ref 2–12)
NEUTROPHILS NFR BLD: 48 % (ref 43–80)
NEUTS SEG NFR BLD: 4.64 K/UL (ref 1.8–7.3)
NITRITE UR QL STRIP: NEGATIVE
OPIATES UR QL SCN: NEGATIVE
OXYCODONE UR QL SCN: NEGATIVE
PCP UR QL SCN: NEGATIVE
PH UR STRIP: 6 [PH] (ref 5–8)
PLATELET # BLD AUTO: 260 K/UL (ref 130–450)
PMV BLD AUTO: 10.7 FL (ref 7–12)
POTASSIUM SERPL-SCNC: 3.4 MMOL/L (ref 3.5–5.1)
PROT SERPL-MCNC: 7.3 G/DL (ref 6.4–8.3)
PROT UR STRIP-MCNC: NEGATIVE MG/DL
RBC # BLD AUTO: 5 M/UL (ref 3.8–5.8)
SALICYLATES SERPL-MCNC: <0.5 MG/DL (ref 0–30)
SODIUM SERPL-SCNC: 142 MMOL/L (ref 136–145)
SP GR UR STRIP: 1.01 (ref 1–1.03)
TEST INFORMATION: ABNORMAL
TME LAST DOSE: ABNORMAL H
TOXIC TRICYCLIC SC,BLOOD: NEGATIVE
UROBILINOGEN UR STRIP-ACNC: 0.2 EU/DL (ref 0–1)
WBC OTHER # BLD: 9.6 K/UL (ref 4.5–11.5)

## 2025-08-12 PROCEDURE — 80143 DRUG ASSAY ACETAMINOPHEN: CPT

## 2025-08-12 PROCEDURE — 2500000003 HC RX 250 WO HCPCS

## 2025-08-12 PROCEDURE — 80179 DRUG ASSAY SALICYLATE: CPT

## 2025-08-12 PROCEDURE — 85025 COMPLETE CBC W/AUTO DIFF WBC: CPT

## 2025-08-12 PROCEDURE — 80156 ASSAY CARBAMAZEPINE TOTAL: CPT

## 2025-08-12 PROCEDURE — 96372 THER/PROPH/DIAG INJ SC/IM: CPT

## 2025-08-12 PROCEDURE — 93005 ELECTROCARDIOGRAM TRACING: CPT | Performed by: EMERGENCY MEDICINE

## 2025-08-12 PROCEDURE — 80053 COMPREHEN METABOLIC PANEL: CPT

## 2025-08-12 PROCEDURE — 90791 PSYCH DIAGNOSTIC EVALUATION: CPT

## 2025-08-12 PROCEDURE — 80307 DRUG TEST PRSMV CHEM ANLYZR: CPT

## 2025-08-12 PROCEDURE — 81003 URINALYSIS AUTO W/O SCOPE: CPT

## 2025-08-12 PROCEDURE — 99285 EMERGENCY DEPT VISIT HI MDM: CPT

## 2025-08-12 PROCEDURE — G0480 DRUG TEST DEF 1-7 CLASSES: HCPCS

## 2025-08-12 PROCEDURE — 6360000002 HC RX W HCPCS: Performed by: EMERGENCY MEDICINE

## 2025-08-12 RX ORDER — WATER 10 ML/10ML
INJECTION INTRAMUSCULAR; INTRAVENOUS; SUBCUTANEOUS
Status: COMPLETED
Start: 2025-08-12 | End: 2025-08-12

## 2025-08-12 RX ORDER — ZIPRASIDONE MESYLATE 20 MG/ML
10 INJECTION, POWDER, LYOPHILIZED, FOR SOLUTION INTRAMUSCULAR ONCE
Status: COMPLETED | OUTPATIENT
Start: 2025-08-12 | End: 2025-08-12

## 2025-08-12 RX ADMIN — ZIPRASIDONE MESYLATE 10 MG: 20 INJECTION, POWDER, LYOPHILIZED, FOR SOLUTION INTRAMUSCULAR at 21:05

## 2025-08-12 RX ADMIN — WATER: 1 INJECTION INTRAMUSCULAR; INTRAVENOUS; SUBCUTANEOUS at 21:05

## 2025-08-13 VITALS
HEART RATE: 81 BPM | RESPIRATION RATE: 16 BRPM | SYSTOLIC BLOOD PRESSURE: 133 MMHG | DIASTOLIC BLOOD PRESSURE: 96 MMHG | OXYGEN SATURATION: 100 % | TEMPERATURE: 97 F

## 2025-08-13 PROCEDURE — 6360000002 HC RX W HCPCS: Performed by: EMERGENCY MEDICINE

## 2025-08-13 PROCEDURE — 96372 THER/PROPH/DIAG INJ SC/IM: CPT

## 2025-08-13 PROCEDURE — 6370000000 HC RX 637 (ALT 250 FOR IP): Performed by: EMERGENCY MEDICINE

## 2025-08-13 RX ORDER — NICOTINE 21 MG/24HR
1 PATCH, TRANSDERMAL 24 HOURS TRANSDERMAL ONCE
Status: DISCONTINUED | OUTPATIENT
Start: 2025-08-13 | End: 2025-08-13 | Stop reason: HOSPADM

## 2025-08-13 RX ORDER — WATER 10 ML/10ML
INJECTION INTRAMUSCULAR; INTRAVENOUS; SUBCUTANEOUS
Status: DISCONTINUED
Start: 2025-08-13 | End: 2025-08-13 | Stop reason: HOSPADM

## 2025-08-13 RX ORDER — ZIPRASIDONE MESYLATE 20 MG/ML
10 INJECTION, POWDER, LYOPHILIZED, FOR SOLUTION INTRAMUSCULAR ONCE
Status: COMPLETED | OUTPATIENT
Start: 2025-08-13 | End: 2025-08-13

## 2025-08-13 RX ORDER — MIDAZOLAM HYDROCHLORIDE 2 MG/2ML
2 INJECTION, SOLUTION INTRAMUSCULAR; INTRAVENOUS ONCE
Status: COMPLETED | OUTPATIENT
Start: 2025-08-13 | End: 2025-08-13

## 2025-08-13 RX ADMIN — MIDAZOLAM HYDROCHLORIDE 2 MG: 1 INJECTION, SOLUTION INTRAMUSCULAR; INTRAVENOUS at 03:48

## 2025-08-13 RX ADMIN — ZIPRASIDONE MESYLATE 10 MG: 20 INJECTION, POWDER, LYOPHILIZED, FOR SOLUTION INTRAMUSCULAR at 03:48

## 2025-08-15 LAB
EKG ATRIAL RATE: 98 BPM
EKG P AXIS: 59 DEGREES
EKG P-R INTERVAL: 144 MS
EKG Q-T INTERVAL: 352 MS
EKG QRS DURATION: 96 MS
EKG QTC CALCULATION (BAZETT): 449 MS
EKG R AXIS: 18 DEGREES
EKG T AXIS: 32 DEGREES
EKG VENTRICULAR RATE: 98 BPM

## 2025-08-15 PROCEDURE — 93010 ELECTROCARDIOGRAM REPORT: CPT | Performed by: INTERNAL MEDICINE

## (undated) DEVICE — BIT DRL L150MM DIA2MM CANN QUIK CPL W/O STP REUSE FOR 3MM

## (undated) DEVICE — GLOVE ORANGE PI 7   MSG9070

## (undated) DEVICE — INTENDED FOR TISSUE SEPARATION, AND OTHER PROCEDURES THAT REQUIRE A SHARP SURGICAL BLADE TO PUNCTURE OR CUT.: Brand: BARD-PARKER ® STAINLESS STEEL BLADES

## (undated) DEVICE — DRILL SYSTEM 7

## (undated) DEVICE — 3M™ COBAN™ NL STERILE NON-LATEX SELF-ADHERENT WRAP, 2084S, 4 IN X 5 YD (10 CM X 4,5 M), 18 ROLLS/CASE: Brand: 3M™ COBAN™

## (undated) DEVICE — STANDARD HYPODERMIC NEEDLE,ALUMINUM HUB: Brand: MONOJECT

## (undated) DEVICE — TUBING SUCT 12FR MAL ALUM SHFT FN CAP VENT UNIV CONN W/ OBT

## (undated) DEVICE — SET SPINAL NEURO STNEU1

## (undated) DEVICE — SYRINGE MED 20ML STD CLR PLAS LUERLOCK TIP N CTRL DISP

## (undated) DEVICE — 3M™ STERI-STRIP™ BLEND TONE SKIN CLOSURES, B1551, TAN, 1/4 IN X 3 IN (6 MM X 75 MM), 3 STRIPS/ENVELOPE: Brand: 3M™ STERI-STRIP™

## (undated) DEVICE — INTENT TO BE USED WITH SUTURE MATERIAL FOR TISSUE CLOSURE: Brand: RICHARD-ALLAN®  NEEDLE 1/2 CIRCLE REVERSE CUTTING

## (undated) DEVICE — Z DISCONTINUED PER MEDLINE USE 2741944 DRESSING AQUACEL 12 IN SURG W9XL30CM SIL CVR WTRPRF VIR BACT BARR ANTIMIC

## (undated) DEVICE — DRAPE CARM MINI FOR IMAG SYS INSIGHT FLROSCN

## (undated) DEVICE — SCREW CORTX SLFTP FTHRD 3.5X18MM
Type: IMPLANTABLE DEVICE | Site: PELVIS | Status: FUNCTIONAL
Removed: 2019-12-05

## (undated) DEVICE — APPLICATOR PREP 26ML 0.7% IOD POVACRYLEX 74% ISO ALC ST

## (undated) DEVICE — GLOVE SURG SZ 85 STD WHT LTX SYN POLYMER BEAD REINF ANTI RL

## (undated) DEVICE — DRESSING,GAUZE,XEROFORM,CURAD,5"X9",ST: Brand: CURAD

## (undated) DEVICE — MARKER,SKIN,WI/RULER AND LABELS: Brand: MEDLINE

## (undated) DEVICE — DECANTER BAG 9": Brand: MEDLINE INDUSTRIES, INC.

## (undated) DEVICE — GOWN,SIRUS,FABRNF,XL,20/CS: Brand: MEDLINE

## (undated) DEVICE — 1.5L THIN WALL CAN: Brand: CRD

## (undated) DEVICE — PACK,UNIV, II AURORA: Brand: MEDLINE

## (undated) DEVICE — SURGICAL PROCEDURE PACK LAMINECTOMY LUMBAR

## (undated) DEVICE — DRAPE,REIN 53X77,STERILE: Brand: MEDLINE

## (undated) DEVICE — SUTURE FIBERWIRE 3-0 L18IN NONABSORBABLE BLU L15MM 3/8 CIR AR722701

## (undated) DEVICE — GUIDEWIRE ORTH L150MM DIA1.1MM S STL NTHRD FOR 3MM CANN SCR

## (undated) DEVICE — DRAPE,U/ SHT,SPLIT,PLAS,STERIL: Brand: MEDLINE

## (undated) DEVICE — GLOVE ORANGE PI 8   MSG9080

## (undated) DEVICE — GAUZE 2X2IN ST BORDERED

## (undated) DEVICE — BLADE CLIPPER GEN PURP NS

## (undated) DEVICE — CONVERTORS STOCKINETTE: Brand: CONVERTORS

## (undated) DEVICE — SHEET,DRAPE,40X58,STERILE: Brand: MEDLINE

## (undated) DEVICE — CONTAINER VACUTAINER ANAER CULTURE SWAB

## (undated) DEVICE — MASTISOL ADHESIVE LIQ 2/3ML

## (undated) DEVICE — LABEL MED 4 IN SURG PANEL W/ PEN STRL

## (undated) DEVICE — BIT DRL L230MM DIA2.5MM ST 3 FLUT QUIK CPL NONRADIOPAQUE

## (undated) DEVICE — GRADUATE

## (undated) DEVICE — DRESSING ADH N ADH 8X35 IN 6X175 IN SFT CLTH MEDIPORE +

## (undated) DEVICE — Z DISCONTINUED USE 2275686 GLOVE SURG SZ 8 L12IN FNGR THK13MIL WHT ISOLEX POLYISOPRENE

## (undated) DEVICE — DRAPE C ARM UNIV W41XL74IN CLR PLAS XR VELC CLSR POLY STRP

## (undated) DEVICE — IMPLANTABLE DEVICE
Type: IMPLANTABLE DEVICE | Site: WRIST | Status: NON-FUNCTIONAL
Removed: 2019-12-19

## (undated) DEVICE — 3M™ IOBAN™ 2 ANTIMICROBIAL INCISE DRAPE 6640EZ: Brand: IOBAN™ 2

## (undated) DEVICE — GOWN,BREATHABLE SLV,AURORA,XLG,STRL: Brand: MEDLINE

## (undated) DEVICE — GAUZE,SPONGE,4"X4",16PLY,XRAY,STRL,LF: Brand: MEDLINE

## (undated) DEVICE — ZIMMER® STERILE DISPOSABLE TOURNIQUET CUFF WITH PLC, DUAL PORT, SINGLE BLADDER, 18 IN. (46 CM)

## (undated) DEVICE — BANDAGE,GAUZE,4.5"X4.1YD,STERILE,LF: Brand: MEDLINE

## (undated) DEVICE — READY WET SKIN SCRUB TRAY-LF: Brand: MEDLINE INDUSTRIES, INC.

## (undated) DEVICE — BIT DRL L145MM DIA3.2MM ST QUIK CPL W/O STP REUSE

## (undated) DEVICE — SOLUTION IV IRRIG WATER 1000ML POUR BRL 2F7114

## (undated) DEVICE — GLOVE SURG SZ 9 L12IN FNGR THK13MIL WHT ISOLEX POLYISOPRENE

## (undated) DEVICE — 4-PORT MANIFOLD: Brand: NEPTUNE 2

## (undated) DEVICE — THE MILL DISPOSABLE - MEDIUM

## (undated) DEVICE — GOWN,AURORA,BRTHSLV,2XL,18/CS: Brand: MEDLINE

## (undated) DEVICE — 5.0MM PRECISION ROUND

## (undated) DEVICE — BRUNNS CURRETTES

## (undated) DEVICE — DRAPE C ARM W41XL74IN UNIV MOB W RUBBERBAND CLP

## (undated) DEVICE — CODMAN® SURGICAL PATTIES 1/2" X 1" (1.27CM X 2.54CM): Brand: CODMAN®

## (undated) DEVICE — LOCATOR RAD PASS SPHR MRK NAVIGATION BALL DISP STLTH STN

## (undated) DEVICE — PADDING,UNDERCAST,COTTON, 3X4YD STERILE: Brand: MEDLINE

## (undated) DEVICE — SURGICAL PROCEDURE PACK BASIC

## (undated) DEVICE — GLOVE ORANGE PI 8 1/2   MSG9085

## (undated) DEVICE — TOWEL,OR,DSP,ST,BLUE,DLX,10/PK,8PK/CS: Brand: MEDLINE

## (undated) DEVICE — SPHERE EYE 1 MRK GUIDANCE PASS STEALTHSTATION 1PK/EA

## (undated) DEVICE — DRIP REDUCTION MANIFOLD

## (undated) DEVICE — SURGICAL PROCEDURE PACK HND

## (undated) DEVICE — EXTRAS UGOKWE

## (undated) DEVICE — GOWN,SIRUS,FABRNF,L,20/CS: Brand: MEDLINE

## (undated) DEVICE — NEEDLE SPNL L3.5IN PNK HUB S STL REG WALL FIT STYL W/ QNCKE

## (undated) DEVICE — COVER,TABLE,60X90,STERILE: Brand: MEDLINE

## (undated) DEVICE — CLOTH SURG PREP PREOPERATIVE CHLORHEXIDINE GLUC 2% READYPREP

## (undated) DEVICE — PACK,SHOULDER SPLIT: Brand: MEDLINE

## (undated) DEVICE — HYPODERMIC SAFETY NEEDLE: Brand: MAGELLAN

## (undated) DEVICE — SET ORTHO STD STORTSTD1

## (undated) DEVICE — BLADE ES L6IN ELASTOMERIC COAT EXT DURABLE BEND UPTO 90DEG

## (undated) DEVICE — SOLUTION SURG PREP ANTIMICROBIAL 4 OZ SKIN WND EXIDINE

## (undated) DEVICE — SYRINGE 20ML LL S/C 50

## (undated) DEVICE — KIT EVAC 400CC DIA1/8IN H PAT 12.5IN 3 SPR RND SHP PVC DRN

## (undated) DEVICE — 3M™ STERI-DRAPE™ U-DRAPE 1015: Brand: STERI-DRAPE™

## (undated) DEVICE — PATIENT RETURN ELECTRODE, SINGLE-USE, CONTACT QUALITY MONITORING, ADULT, WITH 9FT CORD, FOR PATIENTS WEIGING OVER 33LBS. (15KG): Brand: MEGADYNE

## (undated) DEVICE — SHEET, T, LAPAROTOMY, STERILE: Brand: MEDLINE

## (undated) DEVICE — JACKSON TABLE POSITIONER KIT: Brand: MEDLINE INDUSTRIES, INC.

## (undated) DEVICE — DRAPE EQUIP CASSETTE 25X24 IN XR W/ ADH STRP FOR CLOSURE

## (undated) DEVICE — K WIRE FIX L6IN DIA0.045IN 1600645] MICROAIRE SURGICAL INSTRUMENTS INC]

## (undated) DEVICE — SCREW BNE L50MM DIA3.5MM STD CORT S STL ST NONCANNULATED
Type: IMPLANTABLE DEVICE | Site: PELVIS | Status: NON-FUNCTIONAL
Removed: 2019-12-05

## (undated) DEVICE — SOLUTION IV IRRIG POUR BRL 0.9% SODIUM CHL 2F7124

## (undated) DEVICE — HANDPIECE SET WITH BONE CLEANING TIP AND SUCTION TUBE: Brand: INTERPULSE

## (undated) DEVICE — SUTURE SUTTAPE L40IN DIA1.3MM NONABSORBABLE WHT BLU L26.5MM AR7500

## (undated) DEVICE — SET ORTHO MINI IMPL FRAG

## (undated) DEVICE — BNDG,ELSTC,MATRIX,STRL,3"X5YD,LF,HOOK&LP: Brand: MEDLINE

## (undated) DEVICE — CASTING PLSTR DYNACAST 4X15FT

## (undated) DEVICE — DRAPE SURGICAL HAND PROX AURORA

## (undated) DEVICE — GAUZE,SPONGE,4"X4",12PLY,STERILE,LF,2'S: Brand: MEDLINE

## (undated) DEVICE — GAUZE,SPONGE,AVANT,4"X4",4PLY,STRL,10/TR: Brand: MEDLINE

## (undated) DEVICE — DRESSING,GAUZE,XEROFORM,CURAD,1"X8",ST: Brand: CURAD

## (undated) DEVICE — RESERVOIR BLD COLLCTN C3000ML 30UM FLTR

## (undated) DEVICE — Device

## (undated) DEVICE — CRADLE ARM W8.75XH12.5XL16IN FOAM SUPP ELEVATION VENT

## (undated) DEVICE — KIT SUCT DBL LUMN QLOK STD RESVR BRAT CELL SAVR

## (undated) DEVICE — SET ORTHO STD STORTSTD2

## (undated) DEVICE — DOUBLE BASIN SET: Brand: MEDLINE INDUSTRIES, INC.

## (undated) DEVICE — 3M™ IOBAN™ 2 ANTIMICROBIAL INCISE DRAPE 6650EZ: Brand: IOBAN™ 2

## (undated) DEVICE — PADDING,UNDERCAST,COTTON, 4"X4YD STERILE: Brand: MEDLINE

## (undated) DEVICE — CHLORAPREP 26ML ORANGE

## (undated) DEVICE — BANDAGE,ELASTIC,ESMARK,STERILE,4"X9',LF: Brand: MEDLINE

## (undated) DEVICE — 1000 S-DRAPE TOWEL DRAPE 10/BX: Brand: STERI-DRAPE™

## (undated) DEVICE — SKIN AFFIX SURG ADHESIVE 72/CS 0.55ML: Brand: MEDLINE

## (undated) DEVICE — SYRINGE MED 10ML TRNSLUC BRL PLUNG BLK MRK POLYPR CTRL

## (undated) DEVICE — SWAB SPEC COLL SHFT L5.25IN POLYUR FOAM TIP SFT DBL MEDIA